# Patient Record
Sex: FEMALE | Race: WHITE | NOT HISPANIC OR LATINO | Employment: OTHER | URBAN - METROPOLITAN AREA
[De-identification: names, ages, dates, MRNs, and addresses within clinical notes are randomized per-mention and may not be internally consistent; named-entity substitution may affect disease eponyms.]

---

## 2017-01-05 ENCOUNTER — ANTI-COAG VISIT (OUTPATIENT)
Dept: CARDIOLOGY | Facility: CLINIC | Age: 37
End: 2017-01-05
Payer: COMMERCIAL

## 2017-01-05 DIAGNOSIS — I63.411 EMBOLIC STROKE INVOLVING RIGHT MIDDLE CEREBRAL ARTERY: ICD-10-CM

## 2017-01-05 DIAGNOSIS — I63.9 CEREBROVASCULAR ACCIDENT (CVA), UNSPECIFIED MECHANISM: ICD-10-CM

## 2017-01-05 DIAGNOSIS — G81.94 LEFT HEMIPARESIS: ICD-10-CM

## 2017-01-05 DIAGNOSIS — Z79.01 LONG-TERM (CURRENT) USE OF ANTICOAGULANTS: Primary | ICD-10-CM

## 2017-01-05 LAB
CTP QC/QA: NORMAL
INR PPP: 2.6 (ref 2–3)

## 2017-01-05 PROCEDURE — 85610 PROTHROMBIN TIME: CPT | Mod: QW,S$GLB,,

## 2017-01-05 NOTE — PROGRESS NOTES
Patient confirms dose and compliance. No changes/problems reported. Will maintain dose and recheck in 3 weeks.

## 2017-01-05 NOTE — MR AVS SNAPSHOT
[facility]  [address]  [place] [address]  Phone: [phone]                  Cat Denson   2017 9:15 AM   Anti-coag visit    Description:  Female : 1980   Provider:  Vidya Metzger PharmD   Department:  [facility]           Diagnoses this Visit        Comments    Long-term (current) use of anticoagulants    -  Primary     Cerebrovascular accident (CVA), unspecified mechanism         Left hemiparesis         Embolic stroke involving right middle cerebral artery                To Do List           Future Appointments        Provider Department Dept Phone    2017 9:15 AM [facility] [phone]    2017 9:20 AM Chris Mauro MD [facility] Nephrology [phone]    2017 9:00 AM [facility] [phone]    2017 9:45 AM [facility] [phone]    2017 8:30 AM Rafaela Benoit MD [place] - Rheumatology [phone]      Goals (5 Years of Data)     None      [facility]     [facility] - 24/7 Assistance  Registered nurses in the [facility] provide clinical advisement, health education, appointment booking, and other advisory services.  Call for this free service at [phone].             Medications           Message regarding Medications     Verify the changes and/or additions to your medication regime listed below are the same as discussed with your clinician today.  If any of these changes or additions are incorrect, please notify your healthcare provider.             Verify that the below list of medications is an accurate representation of the medications you are currently taking.  If none reported, the list may be blank. If incorrect, please contact your healthcare provider. Carry this list with you in case of emergency.           Current Medications     buPROPion (WELLBUTRIN  SR) 150 MG TBSR 12 hr tablet Take 1 tablet (150 mg total) by mouth 2 (two) times daily.    cetirizine (ZYRTEC) 10 MG tablet Take 1 tablet (10 mg total) by mouth once daily.    chlorhexidine (PERIDEX) 0.12 % solution RINSE WITH 1/2 OZ BID AFTER BRUSHING AND FLOSSING    clobetasol 0.05% (TEMOVATE) 0.05 % Oint Apply topically 2 (two) times daily.    clonazePAM (KLONOPIN) 0.5 MG tablet Take 1 tablet (0.5 mg total) by mouth 3 (three) times daily as needed for Anxiety.    cyanocobalamin, vitamin B-12, (VITAMIN B-12) 1,000 mcg Subl Place under the tongue once daily.    escitalopram oxalate (LEXAPRO) 10 MG tablet Take 1 tablet (10 mg total) by mouth once daily.    fluticasone (FLONASE) 50 mcg/actuation nasal spray 2 sprays by Each Nare route once daily.    gabapentin (NEURONTIN) 800 MG tablet Take 800 mg by mouth 3 (three) times daily.    hydrocodone-acetaminophen 5-325mg (NORCO) 5-325 mg per tablet Take 1 tablet by mouth every 6 (six) hours as needed for Pain.    hydroxychloroquine (PLAQUENIL) 200 mg tablet TAKE 1 TABLET BY MOUTH TWICE DAILY    meloxicam (MOBIC) 15 MG tablet TAKE 1 TABLET(15 MG) BY MOUTH DAILY AS NEEDED FOR PAIN    methylphenidate (RITALIN) 5 MG tablet Take one tablet PO twice daily with meals. May take additional one-half to one tablet daily by 4 pm as needed for attention/focus    metoprolol succinate (TOPROL-XL) 25 MG 24 hr tablet Take 1 tablet (25 mg total) by mouth once daily.    nicotine (NICODERM CQ) 14 mg/24 hr Place 1 patch onto the skin once daily.    omeprazole (PRILOSEC) 40 MG capsule TAKE 1 CAPSULE(40 MG) BY MOUTH EVERY MORNING    ondansetron (ZOFRAN-ODT) 4 MG TbDL Take 1 tablet (4 mg total) by mouth every 8 (eight) hours as needed.    PNV WITH CA#74/IRON/FOLIC ACID (PRENATAL VITAMIN 1+1 ORAL) Take by mouth once daily. No vitamin K    PREVIDENT 5000 BOOSTER PLUS 1.1 % Pste BRUSH ON NIGHTLY AFTER NORMAL HYGIENE REGIMAN. SPIT OUT EXCESS DO NOT RINSE    promethazine (PHENERGAN) 25 MG tablet Take  25 mg by mouth every 6 (six) hours as needed.     ranitidine (ZANTAC) 300 MG tablet TK 1 T PO  QAM    trazodone (DESYREL) 50 MG tablet Take 1 tablet (50 mg total) by mouth nightly as needed for Insomnia.    warfarin (COUMADIN) 1 MG tablet Take 1 mg by mouth Daily. On Tuesday and friday     warfarin (COUMADIN) 2 MG tablet TAKE 1 TABLET BY MOUTH EVERY DAY    WELCHOL 625 mg tablet TAKE 1 TABLET BY MOUTH EVERY DAY INCREASE BY ONE PILL EVERY 3 TO 4 DAYS UNTIL DESIRED CONTROL IS REACHED           Clinical Reference Information           Allergies as of 1/5/2017     Amoxil [Amoxicillin]    Augmentin [Amoxicillin-pot Clavulanate]    Avelox [Moxifloxacin]      Immunizations Administered on Date of Encounter - 1/5/2017     None      Orders Placed During Today's Visit      Normal Orders This Visit    ISTAT INR          1/5/2017  9:06 AM - Trisha StephensD      Component Results     Component Value Flag Ref Range Units Status    INR 2.6  2.0 - 3.0  Final     Acceptable           December 2016 Details    Sun Mon Tue Wed Thu Fri Sat         1               2               3                 4               5               6      1 mg         7      2 mg         8      1 mg         9      2 mg         10      1 mg           11      2 mg         12      2 mg         13      1 mg         14      2 mg         15      1 mg         16      2 mg         17      1 mg           18      2 mg         19      2 mg         20   3.5   Hold   See details      21      2 mg         22      1 mg         23      2 mg         24      1 mg           25      2 mg         26      2 mg         27      1 mg         28      2 mg         29      1 mg         30      2 mg         31      1 mg          Date Details   12/20 Last INR check   INR: 3.5                 January 2017 Details    Sun Mon Tue Wed Thu Fri Sat     1      2 mg         2      2 mg         3      1 mg         4      2 mg         5   2.6   1 mg   See details      6      2  mg         7      1 mg           8      2 mg         9      2 mg         10      1 mg         11      2 mg         12      1 mg         13      2 mg         14      1 mg           15      2 mg         16      2 mg         17      1 mg         18      2 mg         19      1 mg         20      2 mg         21      1 mg           22      2 mg         23      2 mg         24      1 mg         25      2 mg         26      1 mg         27            28                 29               30               31                    Date Details   01/05 This INR check   INR: 2.6       Date of next INR:  1/27/2017               How to take your warfarin dose     To take:  1 mg Take 0.5 of a 2 mg tablet.    To take:  2 mg Take 1 of the 2 mg tablets.           Anticoagulation Summary as of 1/5/2017     Maintenance plan 1 mg (2 mg x 0.5) on Tue, Thu, Sat; 2 mg (2 mg x 1) all other days    Full instructions 1 mg on Tue, Thu, Sat; 2 mg all other days    Next INR check 1/27/2017      Anticoagulation Episode Summary     Comments [facility]

## 2017-01-10 RX ORDER — MELOXICAM 15 MG/1
TABLET ORAL
Qty: 30 TABLET | Refills: 0 | Status: SHIPPED | OUTPATIENT
Start: 2017-01-10 | End: 2017-02-17 | Stop reason: SDUPTHER

## 2017-01-10 RX ORDER — PROMETHAZINE HYDROCHLORIDE 25 MG/1
TABLET ORAL
Qty: 60 TABLET | Refills: 0 | OUTPATIENT
Start: 2017-01-10

## 2017-01-12 RX ORDER — TAMSULOSIN HYDROCHLORIDE 0.4 MG/1
0.4 CAPSULE ORAL DAILY
Qty: 30 CAPSULE | Refills: 3 | Status: SHIPPED | OUTPATIENT
Start: 2017-01-12 | End: 2017-10-03 | Stop reason: SDUPTHER

## 2017-01-12 RX ORDER — GABAPENTIN 800 MG/1
800 TABLET ORAL 2 TIMES DAILY
Qty: 60 TABLET | Refills: 3 | Status: SHIPPED | OUTPATIENT
Start: 2017-01-12 | End: 2017-05-09 | Stop reason: SDUPTHER

## 2017-01-16 ENCOUNTER — OFFICE VISIT (OUTPATIENT)
Dept: PSYCHIATRY | Facility: CLINIC | Age: 37
End: 2017-01-16
Payer: COMMERCIAL

## 2017-01-16 VITALS
WEIGHT: 107.56 LBS | HEART RATE: 77 BPM | BODY MASS INDEX: 19.06 KG/M2 | SYSTOLIC BLOOD PRESSURE: 119 MMHG | HEIGHT: 63 IN | DIASTOLIC BLOOD PRESSURE: 78 MMHG

## 2017-01-16 DIAGNOSIS — F51.01 PRIMARY INSOMNIA: ICD-10-CM

## 2017-01-16 DIAGNOSIS — Z86.73 HISTORY OF CVA (CEREBROVASCULAR ACCIDENT): ICD-10-CM

## 2017-01-16 DIAGNOSIS — F31.81 BIPOLAR 2 DISORDER: Chronic | ICD-10-CM

## 2017-01-16 DIAGNOSIS — F09 COGNITIVE DISORDER: ICD-10-CM

## 2017-01-16 PROCEDURE — 1159F MED LIST DOCD IN RCRD: CPT | Mod: S$GLB,,, | Performed by: PSYCHIATRY & NEUROLOGY

## 2017-01-16 PROCEDURE — 99213 OFFICE O/P EST LOW 20 MIN: CPT | Mod: S$GLB,,, | Performed by: PSYCHIATRY & NEUROLOGY

## 2017-01-16 PROCEDURE — 99499 UNLISTED E&M SERVICE: CPT | Mod: S$GLB,,, | Performed by: PSYCHIATRY & NEUROLOGY

## 2017-01-16 PROCEDURE — 99999 PR PBB SHADOW E&M-EST. PATIENT-LVL III: CPT | Mod: PBBFAC,,, | Performed by: PSYCHIATRY & NEUROLOGY

## 2017-01-16 RX ORDER — RISPERIDONE 0.5 MG/1
0.5 TABLET ORAL NIGHTLY
COMMUNITY
End: 2017-01-16 | Stop reason: SDUPTHER

## 2017-01-16 RX ORDER — ESCITALOPRAM OXALATE 20 MG/1
20 TABLET ORAL DAILY
Qty: 30 TABLET | Refills: 2 | Status: SHIPPED | OUTPATIENT
Start: 2017-01-16 | End: 2017-02-15

## 2017-01-16 RX ORDER — TRAZODONE HYDROCHLORIDE 50 MG/1
TABLET ORAL
Qty: 30 TABLET | Refills: 1 | Status: SHIPPED | OUTPATIENT
Start: 2017-01-16 | End: 2017-03-29 | Stop reason: SDUPTHER

## 2017-01-16 RX ORDER — RISPERIDONE 0.5 MG/1
0.5 TABLET ORAL NIGHTLY
Qty: 30 TABLET | Refills: 2 | Status: SHIPPED | OUTPATIENT
Start: 2017-01-16 | End: 2017-03-29

## 2017-01-16 RX ORDER — CLONAZEPAM 0.5 MG/1
TABLET ORAL
Qty: 45 TABLET | Refills: 1 | Status: SHIPPED | OUTPATIENT
Start: 2017-01-16 | End: 2017-03-29 | Stop reason: SDUPTHER

## 2017-01-16 RX ORDER — METHYLPHENIDATE HYDROCHLORIDE 5 MG/1
TABLET ORAL
Qty: 75 TABLET | Refills: 0 | Status: SHIPPED | OUTPATIENT
Start: 2017-02-15 | End: 2017-03-29

## 2017-01-16 RX ORDER — BUPROPION HYDROCHLORIDE 150 MG/1
TABLET, EXTENDED RELEASE ORAL
Qty: 1 TABLET | Refills: 0
Start: 2017-01-16 | End: 2017-02-22

## 2017-01-16 RX ORDER — METHYLPHENIDATE HYDROCHLORIDE 5 MG/1
TABLET ORAL
Qty: 75 TABLET | Refills: 0 | Status: SHIPPED | OUTPATIENT
Start: 2017-01-16 | End: 2017-02-15

## 2017-01-16 NOTE — PROGRESS NOTES
"Outpatient Psychiatry Follow-Up Visit (MD/NP)    1/16/2017    Clinical Status of Patient:  Outpatient (Ambulatory)    Chief Complaint:  Cat Denson is a 36 y.o. female who presents today for follow-up of mood disorder and anxiety.  Met with patient alone and pt's mother briefly after her appointment.     Interval History and Content of Current Session:  Interim Events/Subjective Report/Content of Current Session:  follow up appt.   Pt had a CVA in November 2015 - Embolic stroke involving right middle cerebral artery; she is hypercoagulable due to lupus.  She is currently taking Coumadin.  Pt has residual left hemiparesis.   She has had balance problems, forgetfulness, and severe focusing issue.    She is under the care of Rheumatology - Dr Benoit    Pt reports she is doing really good.   Not depressed at all;  She and BF Brent are getting along well; he is supportive to her; he assists her with ADLs including taking showers.  He moved into her mom's home with her and mom, her brother and his 2 children (3 yo and 3 yo).  This is the first time she has been in a heterosexual relationship; she is having some issues with sexual attraction to him; he does not pressure her.   She wants to remain in the relationship.  She has hx of low libido (discussed it be antidepressant related).   Denies hopelessness/worthlessness.   She enjoys social media; pt does not drive.  She feels mood symptoms are manageable.     Denies symptoms of bowen/psychosis.     Sleep is pretty good; "as long as I don't wet the bed."  This started after her CVA - she was incontinent day and night after her CVA and it has improved     Memory is ok; forgetful at times.  Nowhere near her previous baseline.  She handles her own meds without errors; her mother assists her with her finances.  When she leaves one room, she forgets why she is there.  Loses train of thought.   She does not wander or get lost.   She still has left sided weakness; she " "falls frequently - "it is a normal thing for me."  This is since her CVA.   Not currently in PT.  She denies any head injuries (discussed risks of anticoagulant/ brain hemorrhage).  Ritalin allows her to think more clearly and hold her focus on something for longer than 5 mins.     Denies suicidal/homicidal ideations.  No self harm or violence.  No access to guns.  Prior hx of suicide attempts - when she was 20 yo after her grandmother  - she walked to her GF house, she sat on top of her and made her watch her swallow a bottle of pills (Zoloft, Paxil, muscle relaxant).  GF induced pt to vomit.  Prior hx of cutting.  "I can't now b/c of blood thinners."  She does think about doing it - she instead distracts herself on her computer.  Her paternal GF committed suicide (she reports b/c her dad  a black woman - she shot himself in the head).   She reports she had some SI a few weeks ago - passive SI - she told mom she may need inpatient treatment.  When it was raining x 3 days, and she felt trapped at home.       Anxiety - manageable; around [place], she felt stressed, she has mild panic attack at [facility].  No agoraphobia.  No excessive worry.   No PTSD symptoms.     Appetite - non existent;  She has lost 8 lbs in interim.     She denies binge eating or purging; body image is "good"  No excessive exercise.  No diuretics or laxatives.       - scheduled for neuropsychological testing   Left ankle fracture now healed.     Alcohol: none   Drug use: marijuana use daily - the only way she can get an appetite 4-5 times daily;  She first started at 20 yo.  She denies use today. No clonazepam use yet today; denies misuse.   No other drugs, No IVDA.   She does not identify marijuana as a problem  Tobacco - 1/2 ppd   She quit caffeine completely     Pt receives SSID since  (health and psychiatric conditions)    Mother helps her bills and medications; pt does not drive (not since CVA).      On AIMS today, " "pt noted to have some minimal mouth movements - activated with using both hands and right hand alone too - pt has no awareness and denies any impairment.  When she is trying to do something with her left hand, she has some awareness of this - this started after her CVA    Review of Systems   · PSYCHIATRIC: Pertinant items are noted in the narrative.  · CONSTITUTIONAL: + weight loss   · MUSCULOSKELETAL: none   · CV: no chest pain, no awareness of tachycardia  · NEURO: left HP, no seizures     Past Medical, Family and Social History: The patient's past medical, family and social history have been reviewed and updated as appropriate within the electronic medical record - see encounter notes.    Medications:   Lexapro 10 mg daily   Clonazepam 0.5 mg PO TID prn anxiety - she takes Clonazepam 0.5- 1 mg nightly - PCP prescribed   Risperdal 0.5 mg nightly   Trazodone 50 mg nightly   Ritalin 5 mg TID - she takes three times DAILY   Wellbutrin  mg TWICE DAILY - Dr Goins     Compliance:  Yes     Side effects: ? Involuntary movements, urinary incontinence on higher dose of trazodone, wt loss on Wellbutrin ?     Risk Parameters:  Patient reports no current suicidal ideation  Patient reports no homicidal ideation  Patient reports no self-injurious behavior  Patient reports no violent behavior    Exam (detailed: at least 9 elements; comprehensive: all 15 elements)   Constitutional  Vitals:  Most recent vital signs, dated more than 90 days prior to this appointment, were reviewed.   Vitals:    01/16/17 0931   BP: 119/78   Pulse: 77   Weight: 48.8 kg (107 lb 9.4 oz)   Height: 5' 3" (1.6 m)        General:  age appropriate, thinner, fair grooming, good eye contact, hair dyed pink/purle grown out, wearing black clothes      Musculoskeletal  Muscle Strength/Tone:  no tremor   Gait & Station:  slow, mostly steady today      Psychiatric  Speech:  no latency; no press, improved spontaneity today, talkative   Mood & Affect:  "good" "   Restricted, smiles at times    Thought Process:  Linear with questions, slowed    Associations:  intact   Thought Content:  no current suicidality, no homicidality, delusions, or paranoia   Insight:  Fair    Judgement: behavior is adequate to circumstances   Orientation:  Alert and oriented to month, 2017, Monday  [facility], SIMRAN Tariq   Memory: fair recall of recent hx, 3/3 recall, 2/3 at 3 mins    Language: grossly intact, can repeat    Attention Span & Concentration:  BALDO Medina, 20-17-14-7    Fund of Knowledge:  intact and appropriate to age and level of education, familiar with aspects of current personal life     Assessment and Diagnosis   Status/Progress: Based on the examination today, the patient's problem(s) is/are under fair control..  New problems have not been presented today.   Co-morbidities are complicating management of the primary condition.      General Impression: pt had CVA Nov 2015 with significantly impaired focus, memory lapses, worsening depression, anxiety - improved with ritalin and on current medications (previously ok'd ritalin use with PCP).  Pt had episode of acute hepatitis, liver enzymes improved, but now with declining renal function; pt also resumed smoking; she is also using marijuana. She stopped caffeine products;  Pt reports mood and anxiety have improved considerably, she is upbeat and positive today.  Involuntary mouth movements noted on AIMS    Bipolar 2 Disorder   Panic Disorder with Agoraphobia   Cognitive Disorder due to CVA  Insomnia Disorder  Cannabis Use Disorder   Tobacco Use Disorder  Abnormal involuntary movements     Lupus, migraines, CVA, hx hepatitis, declining renal function     GAF: 7    Intervention/Counseling/Treatment Plan   · Medication Management: The risks and benefits of medication were discussed with the patient.    -  Reduce Clonazepam 0.5 mg to BID PRN anxiety (#45), advised to us lowest effective dose. PCP prescribing the medication.   Discussed risk of decreased RT, fall risk, sedation, addictive potential, and not to mix with alcohol.    - Continue Risperdal 0.5 mg nightly for now. Discussed risks of TD and will repeat AIMS at next visit; may need to d/c. Typical RAJWINDER's reviewed including weight gain, abnormal movements, EPS, TD, metabolic side effects.     - Titrate Lexapro to 20 mg daily.     - Continue Ritalin to 5 mg TWICE DAILY to TID prn poor focus. Monitor BP/HR.   Discussed risks of abuse potential, insomnia, anxiety, elevated BP, HR, arrthymias, MI, stroke, seizures. sudden death. Discussed significant risks of mixing this with stimulant, seizures, stroke, cardiac adverse events. Pt verbalized an understanding.  She has stopped caffeine.    - Continue Trazodone 25-50 mg nightly for now     - Continue psychotherapy with Monique Dleeon LCSW    - refrain from cannabis use - psychoeducation today about risks of use, pt counseled today on health risks associated with use.     - Pt instructed to go to ER with thoughts of harming self, others; Call to report any worsening of symptoms or problems with the medication    - referral to Neurology - pt reports she is not being followed by Neurology anymore since her CVA. Will need evaluation of abnormal mouth movements     - referral for neuropsychological testing s/p CVA - pt with ongoing cognitive deficits 2/21/17    - Wean off of wellbutrin  mg daily x 10 days, then stop - pt losing wt since starting this medication       Return to Clinic: 2 months

## 2017-01-16 NOTE — MR AVS SNAPSHOT
[facility]  [address]  [place] [address]  Phone: [phone]                  Cat Denson   2017 9:30 AM   Office Visit    Description:  Female : 1980   Provider:  Sakina Cruz MD   Department:  [facility]           Diagnoses this Visit        Comments    Bipolar 2 disorder         Primary insomnia         History of CVA (cerebrovascular accident)         Cognitive disorder                To Do List           Future Appointments        Provider Department Dept Phone    2017 9:00 AM [facility] [phone]    2017 10:00 AM [facility]  Psychiatry [phone]    2017 9:45 AM [facility] [phone]    2017 8:30 AM MD Yamel [facility]ell - Rheumatology [phone]    3/2/2017 11:00 AM [facility]  Psychiatry [phone]      Goals (5 Years of Data)     None      Follow-Up and Disposition     Return for 2 months .       These Medications        Disp Refills Start End    escitalopram oxalate (LEXAPRO) 20 MG tablet 30 tablet 2 2017 2/15/2017    Take 1 tablet (20 mg total) by mouth once daily. - Oral    Pharmacy: [facility] [address] [place], [place] [address] - [address] AT [facility] [address] Ph #: [phone]       buPROPion (WELLBUTRIN SR) 150 MG TBSR 12 hr tablet 1 tablet 0 2017     Take one tablet PO daily x 10 days, then STOP    Pharmacy: [facility] [address] [place] [address] [place] - [address] AT [facility] & [facility] [address] Ph #: [phone]       clonazePAM (KLONOPIN) 0.5 MG tablet 45 tablet 1 2017     Take one tablet PO BID PRN anxiety    Pharmacy: [facility] [address] [place] [address] [place] - [address] AT [facility] [address] Ph #: [phone]       methylphenidate (RITALIN) 5 MG tablet 75 tablet 0 2017 2/15/2017    Take one tablet PO twice daily with  meals. May take additional one-half to one tablet daily by 4 pm as needed for attention/focus    Pharmacy: [facility] [address] [place] [address] [address] AT [place] [address] Ph #: [phone]       risperidone (RISPERDAL) 0.5 MG Tab 30 tablet 2 1/16/2017     Take 1 tablet (0.5 mg total) by mouth every evening. - Oral    Pharmacy: [facility] [address] [place] [address] [address] AT [place] [address] Ph #: [phone]       methylphenidate (RITALIN) 5 MG tablet 75 tablet 0 2/15/2017 3/17/2017    Take one tablet PO twice daily with meals. May take additional one-half to one tablet daily by 4 pm as needed for attention/focus    Pharmacy: [facility] [address] [place] [address] [address] AT [place] [address] Ph #: [phone]       trazodone (DESYREL) 50 MG tablet 30 tablet 1 1/16/2017     Take one-half to one tablet PO nightly PRN insomnia    Pharmacy: [facility] [place] [address] AT [place] [address] Ph #: [phone]         [facility] On [facility]     [facility] On Call Nurse [facility] - 24/7 Assistance  Registered nurses in the [facility] Center provide clinical advisement, health education, appointment booking, and other advisory services.  Call for this free service at [phone].             Medications           Message regarding Medications     Verify the changes and/or additions to your medication regime listed below are the same as discussed with your clinician today.  If any of these changes or additions are incorrect, please notify your healthcare provider.        START taking these NEW medications        Refills    escitalopram oxalate (LEXAPRO) 20 MG tablet 2    Sig: Take 1 tablet (20 mg total) by mouth once daily.    Class: Normal    Route: Oral    risperidone (RISPERDAL) 0.5 MG Tab 2    Sig: Take 1 tablet (0.5 mg total) by mouth every evening.    Class: Normal    Route: Oral    methylphenidate  (RITALIN) 5 MG tablet 0    Starting on: 2/15/2017    Sig: Take one tablet PO twice daily with meals. May take additional one-half to one tablet daily by 4 pm as needed for attention/focus    Class: Print      CHANGE how you are taking these medications     Start Taking Instead of    buPROPion (WELLBUTRIN SR) 150 MG TBSR 12 hr tablet buPROPion (WELLBUTRIN SR) 150 MG TBSR 12 hr tablet    Dosage:  Take one tablet PO daily x 10 days, then STOP Dosage:  Take 1 tablet (150 mg total) by mouth 2 (two) times daily.    Reason for Change:  Reorder     clonazePAM (KLONOPIN) 0.5 MG tablet clonazePAM (KLONOPIN) 0.5 MG tablet    Dosage:  Take one tablet PO BID PRN anxiety Dosage:  Take 1 tablet (0.5 mg total) by mouth 3 (three) times daily as needed for Anxiety.    Reason for Change:  Reorder     trazodone (DESYREL) 50 MG tablet trazodone (DESYREL) 50 MG tablet    Dosage:  Take one-half to one tablet PO nightly PRN insomnia Dosage:  Take 1 tablet (50 mg total) by mouth nightly as needed for Insomnia.    Reason for Change:  Reorder       STOP taking these medications     hydrocodone-acetaminophen 5-325mg (NORCO) 5-325 mg per tablet Take 1 tablet by mouth every 6 (six) hours as needed for Pain.           Verify that the below list of medications is an accurate representation of the medications you are currently taking.  If none reported, the list may be blank. If incorrect, please contact your healthcare provider. Carry this list with you in case of emergency.           Current Medications     buPROPion (WELLBUTRIN SR) 150 MG TBSR 12 hr tablet Take one tablet PO daily x 10 days, then STOP    cetirizine (ZYRTEC) 10 MG tablet Take 1 tablet (10 mg total) by mouth once daily.    chlorhexidine (PERIDEX) 0.12 % solution RINSE WITH 1/2 OZ BID AFTER BRUSHING AND FLOSSING    clonazePAM (KLONOPIN) 0.5 MG tablet Take one tablet PO BID PRN anxiety    cyanocobalamin, vitamin B-12, (VITAMIN B-12) 1,000 mcg Subl Place under the tongue once daily.     fluticasone (FLONASE) 50 mcg/actuation nasal spray 2 sprays by Each Nare route once daily.    gabapentin (NEURONTIN) 800 MG tablet Take 1 tablet (800 mg total) by mouth 2 (two) times daily.    hydroxychloroquine (PLAQUENIL) 200 mg tablet TAKE 1 TABLET BY MOUTH TWICE DAILY    meloxicam (MOBIC) 15 MG tablet TAKE 1 TABLET(15 MG) BY MOUTH DAILY AS NEEDED FOR PAIN    metoprolol succinate (TOPROL-XL) 25 MG 24 hr tablet Take 1 tablet (25 mg total) by mouth once daily.    omeprazole (PRILOSEC) 40 MG capsule TAKE 1 CAPSULE(40 MG) BY MOUTH EVERY MORNING    ondansetron (ZOFRAN-ODT) 4 MG TbDL Take 1 tablet (4 mg total) by mouth every 8 (eight) hours as needed.    PNV WITH CA#74/IRON/FOLIC ACID (PRENATAL VITAMIN 1+1 ORAL) Take by mouth once daily. No vitamin K    PREVIDENT 5000 BOOSTER PLUS 1.1 % Pste BRUSH ON NIGHTLY AFTER NORMAL HYGIENE REGIMAN. SPIT OUT EXCESS DO NOT RINSE    promethazine (PHENERGAN) 25 MG tablet Take 25 mg by mouth every 6 (six) hours as needed.     ranitidine (ZANTAC) 300 MG tablet Take 1 tablet (300 mg total) by mouth once daily.    risperidone (RISPERDAL) 0.5 MG Tab Take 1 tablet (0.5 mg total) by mouth every evening.    tamsulosin (FLOMAX) 0.4 mg Cp24 Take 1 capsule (0.4 mg total) by mouth once daily.    trazodone (DESYREL) 50 MG tablet Take one-half to one tablet PO nightly PRN insomnia    warfarin (COUMADIN) 1 MG tablet Take 1 mg by mouth Daily. On Tuesday and friday     warfarin (COUMADIN) 2 MG tablet TAKE 1 TABLET BY MOUTH EVERY DAY    WELCHOL 625 mg tablet TAKE 1 TABLET BY MOUTH EVERY DAY INCREASE BY ONE PILL EVERY 3 TO 4 DAYS UNTIL DESIRED CONTROL IS REACHED    clobetasol 0.05% (TEMOVATE) 0.05 % Oint Apply topically 2 (two) times daily.    escitalopram oxalate (LEXAPRO) 20 MG tablet Take 1 tablet (20 mg total) by mouth once daily.    methylphenidate (RITALIN) 5 MG tablet Take one tablet PO twice daily with meals. May take additional one-half to one tablet daily by 4 pm as needed for  "attention/focus    methylphenidate (RITALIN) 5 MG tablet Starting on Feb 15, 2017. Take one tablet PO twice daily with meals. May take additional one-half to one tablet daily by 4 pm as needed for attention/focus    nicotine (NICODERM CQ) 14 mg/24 hr Place 1 patch onto the skin once daily.           Clinical Reference Information           Vital Signs - Last Recorded  Most recent update: 1/16/2017  9:36 AM by Lydia Mccracken MA    BP Pulse Ht Wt BMI    119/78 77 5' 3" (1.6 m) 48.8 kg (107 lb 9.4 oz) 19.06 kg/m2      Blood Pressure          Most Recent Value    BP  119/78      Allergies as of 1/16/2017     Amoxil [Amoxicillin]    Augmentin [Amoxicillin-pot Clavulanate]    Avelox [Moxifloxacin]      Immunizations Administered on Date of Encounter - 1/16/2017     None      Orders Placed During Today's Visit      Normal Orders This Visit    Ambulatory Referral to Neurology       "

## 2017-01-19 ENCOUNTER — TELEPHONE (OUTPATIENT)
Dept: NEUROLOGY | Facility: CLINIC | Age: 37
End: 2017-01-19

## 2017-01-20 ENCOUNTER — DOCUMENTATION ONLY (OUTPATIENT)
Dept: REHABILITATION | Facility: HOSPITAL | Age: 37
End: 2017-01-20

## 2017-01-20 NOTE — PROGRESS NOTES
REHAB SERVICES OUTPATIENT DISCHARGE SUMMARY  Physical Therapy      Name:  Cat Denson  Date:  1/20/17  Date of Evaluation:  10/20/16  Physician:  Dayton Dukes MD  Total # Of Visits:  1  Cancelled:  1    Diagnosis:  L hemiparesis    Physical/Functional Status: see EMR for PT recommendations/POC  The patient is to be discharged from our Therapy service for the following reason(s):  Patient has not attended therapy since 10/24/16    Degree of Goal Achievement:  Patient has not met goals secondary to:  Lack of attendance    Patient Education:  provided    Discharge Plan:  Other:  Ongoing PT would be beneficial.

## 2017-01-26 ENCOUNTER — LAB VISIT (OUTPATIENT)
Dept: LAB | Facility: HOSPITAL | Age: 37
End: 2017-01-26
Attending: INTERNAL MEDICINE
Payer: COMMERCIAL

## 2017-01-26 DIAGNOSIS — M32.9 LUPUS (SYSTEMIC LUPUS ERYTHEMATOSUS): ICD-10-CM

## 2017-01-26 LAB
ALBUMIN SERPL BCP-MCNC: 4.3 G/DL
ALP SERPL-CCNC: 83 U/L
ALT SERPL W/O P-5'-P-CCNC: 13 U/L
ANION GAP SERPL CALC-SCNC: 13 MMOL/L
AST SERPL-CCNC: 19 U/L
BASOPHILS # BLD AUTO: 0 K/UL
BASOPHILS NFR BLD: 1 %
BILIRUB SERPL-MCNC: 0.4 MG/DL
BUN SERPL-MCNC: 12 MG/DL
C3 SERPL-MCNC: 91 MG/DL
C4 SERPL-MCNC: 29 MG/DL
CALCIUM SERPL-MCNC: 9.8 MG/DL
CHLORIDE SERPL-SCNC: 108 MMOL/L
CO2 SERPL-SCNC: 19 MMOL/L
CREAT SERPL-MCNC: 2.1 MG/DL
CRP SERPL-MCNC: 0.4 MG/L
DIFFERENTIAL METHOD: ABNORMAL
EOSINOPHIL # BLD AUTO: 0.1 K/UL
EOSINOPHIL NFR BLD: 2.6 %
ERYTHROCYTE [DISTWIDTH] IN BLOOD BY AUTOMATED COUNT: 13.3 %
ERYTHROCYTE [SEDIMENTATION RATE] IN BLOOD BY WESTERGREN METHOD: 9 MM/HR
EST. GFR  (AFRICAN AMERICAN): 34 ML/MIN/1.73 M^2
EST. GFR  (NON AFRICAN AMERICAN): 30 ML/MIN/1.73 M^2
GLUCOSE SERPL-MCNC: 109 MG/DL
HCT VFR BLD AUTO: 40 %
HGB BLD-MCNC: 12.9 G/DL
LYMPHOCYTES # BLD AUTO: 1.7 K/UL
LYMPHOCYTES NFR BLD: 41.9 %
MCH RBC QN AUTO: 28.9 PG
MCHC RBC AUTO-ENTMCNC: 32.1 %
MCV RBC AUTO: 90 FL
MONOCYTES # BLD AUTO: 0.2 K/UL
MONOCYTES NFR BLD: 4.5 %
NEUTROPHILS # BLD AUTO: 2.1 K/UL
NEUTROPHILS NFR BLD: 50 %
PLATELET # BLD AUTO: 157 K/UL
PMV BLD AUTO: 10.6 FL
POTASSIUM SERPL-SCNC: 4 MMOL/L
PROT SERPL-MCNC: 7.4 G/DL
RBC # BLD AUTO: 4.44 M/UL
SODIUM SERPL-SCNC: 140 MMOL/L
WBC # BLD AUTO: 4.1 K/UL

## 2017-01-26 PROCEDURE — 86160 COMPLEMENT ANTIGEN: CPT

## 2017-01-26 PROCEDURE — 85651 RBC SED RATE NONAUTOMATED: CPT

## 2017-01-26 PROCEDURE — 86160 COMPLEMENT ANTIGEN: CPT | Mod: 59

## 2017-01-26 PROCEDURE — 86140 C-REACTIVE PROTEIN: CPT

## 2017-01-26 PROCEDURE — 36415 COLL VENOUS BLD VENIPUNCTURE: CPT

## 2017-01-26 PROCEDURE — 85025 COMPLETE CBC W/AUTO DIFF WBC: CPT

## 2017-01-26 PROCEDURE — 80053 COMPREHEN METABOLIC PANEL: CPT

## 2017-01-26 PROCEDURE — 86225 DNA ANTIBODY NATIVE: CPT

## 2017-01-27 ENCOUNTER — ANTI-COAG VISIT (OUTPATIENT)
Dept: CARDIOLOGY | Facility: CLINIC | Age: 37
End: 2017-01-27
Payer: COMMERCIAL

## 2017-01-27 DIAGNOSIS — G81.94 LEFT HEMIPARESIS: ICD-10-CM

## 2017-01-27 DIAGNOSIS — Z79.01 LONG-TERM (CURRENT) USE OF ANTICOAGULANTS: Primary | ICD-10-CM

## 2017-01-27 DIAGNOSIS — I63.411 EMBOLIC STROKE INVOLVING RIGHT MIDDLE CEREBRAL ARTERY: ICD-10-CM

## 2017-01-27 LAB
CTP QC/QA: YES
DSDNA AB SER-ACNC: NORMAL [IU]/ML
INR PPP: 1.3 (ref 2–3)

## 2017-01-27 PROCEDURE — 85610 PROTHROMBIN TIME: CPT | Mod: QW,S$GLB,,

## 2017-01-27 PROCEDURE — 99211 OFF/OP EST MAY X REQ PHY/QHP: CPT | Mod: 25,S$GLB,,

## 2017-01-27 NOTE — PROGRESS NOTES
Patient confirms dose, reports that she had about 1/2 cup of broccoli this past week. Nothing to explain this extremely low INR. Will boost today and increase but watch close until in range and stable.

## 2017-02-03 ENCOUNTER — ANTI-COAG VISIT (OUTPATIENT)
Dept: CARDIOLOGY | Facility: CLINIC | Age: 37
End: 2017-02-03
Payer: COMMERCIAL

## 2017-02-03 DIAGNOSIS — Z79.01 LONG-TERM (CURRENT) USE OF ANTICOAGULANTS: Primary | ICD-10-CM

## 2017-02-03 DIAGNOSIS — I63.411 EMBOLIC STROKE INVOLVING RIGHT MIDDLE CEREBRAL ARTERY: ICD-10-CM

## 2017-02-03 DIAGNOSIS — G81.94 LEFT HEMIPARESIS: ICD-10-CM

## 2017-02-03 LAB — INR PPP: 1.9 (ref 2–3)

## 2017-02-03 PROCEDURE — 85610 PROTHROMBIN TIME: CPT | Mod: QW,S$GLB,,

## 2017-02-03 PROCEDURE — 99211 OFF/OP EST MAY X REQ PHY/QHP: CPT | Mod: 25,S$GLB,,

## 2017-02-03 NOTE — PROGRESS NOTES
Confirms dose and compliance. Had tofu this week broccoli the week prior. Patient will be consistent. Will increase dose slightly and recheck in 2 weeks.

## 2017-02-03 NOTE — MR AVS SNAPSHOT
[facility]  [address]  [place] [address]  Phone: [phone]                  Cat Denson   2/3/2017 9:30 AM   Anti-coag visit    Description:  Female : 1980   Provider:  Vidya Metzger PharmD   Department:  [facility]           Diagnoses this Visit        Comments    Long-term (current) use of anticoagulants    -  Primary     Left hemiparesis         Embolic stroke involving right middle cerebral artery                To Do List           Future Appointments        Provider Department Dept Phone    2/3/2017 9:30 AM [facility] [phone]    2017 1:15 PM [facility] [phone]    2017 10:00 AM [facility] [phone]      Goals (5 Years of Data)     None      [facility] On Call     [facility] On Call Nurse Care Line -  Assistance  Registered nurses in the [facility] On Call Center provide clinical advisement, health education, appointment booking, and other advisory services.  Call for this free service at [phone].             Medications           Message regarding Medications     Verify the changes and/or additions to your medication regime listed below are the same as discussed with your clinician today.  If any of these changes or additions are incorrect, please notify your healthcare provider.             Verify that the below list of medications is an accurate representation of the medications you are currently taking.  If none reported, the list may be blank. If incorrect, please contact your healthcare provider. Carry this list with you in case of emergency.           Current Medications     buPROPion (WELLBUTRIN SR) 150 MG TBSR 12 hr tablet Take one tablet PO daily x 10 days, then STOP    cetirizine (ZYRTEC) 10 MG tablet Take 1 tablet (10 mg total) by mouth once daily.    chlorhexidine (PERIDEX) 0.12 % solution RINSE WITH 1/2  OZ BID AFTER BRUSHING AND FLOSSING    clobetasol 0.05% (TEMOVATE) 0.05 % Oint Apply topically 2 (two) times daily.    clonazePAM (KLONOPIN) 0.5 MG tablet Take one tablet PO BID PRN anxiety    cyanocobalamin, vitamin B-12, (VITAMIN B-12) 1,000 mcg Subl Place under the tongue once daily.    escitalopram oxalate (LEXAPRO) 20 MG tablet Take 1 tablet (20 mg total) by mouth once daily.    fluticasone (FLONASE) 50 mcg/actuation nasal spray 2 sprays by Each Nare route once daily.    gabapentin (NEURONTIN) 800 MG tablet Take 1 tablet (800 mg total) by mouth 2 (two) times daily.    hydroxychloroquine (PLAQUENIL) 200 mg tablet TAKE 1 TABLET BY MOUTH TWICE DAILY    meloxicam (MOBIC) 15 MG tablet TAKE 1 TABLET(15 MG) BY MOUTH DAILY AS NEEDED FOR PAIN    methylphenidate (RITALIN) 5 MG tablet Take one tablet PO twice daily with meals. May take additional one-half to one tablet daily by 4 pm as needed for attention/focus    methylphenidate (RITALIN) 5 MG tablet Starting on Feb 15, 2017. Take one tablet PO twice daily with meals. May take additional one-half to one tablet daily by 4 pm as needed for attention/focus    metoprolol succinate (TOPROL-XL) 25 MG 24 hr tablet Take 1 tablet (25 mg total) by mouth once daily.    nicotine (NICODERM CQ) 14 mg/24 hr Place 1 patch onto the skin once daily.    omeprazole (PRILOSEC) 40 MG capsule TAKE 1 CAPSULE(40 MG) BY MOUTH EVERY MORNING    ondansetron (ZOFRAN-ODT) 4 MG TbDL Take 1 tablet (4 mg total) by mouth every 8 (eight) hours as needed.    PNV WITH CA#74/IRON/FOLIC ACID (PRENATAL VITAMIN 1+1 ORAL) Take by mouth once daily. No vitamin K    PREVIDENT 5000 BOOSTER PLUS 1.1 % Pste BRUSH ON NIGHTLY AFTER NORMAL HYGIENE REGIMAN. SPIT OUT EXCESS DO NOT RINSE    promethazine (PHENERGAN) 25 MG tablet Take 25 mg by mouth every 6 (six) hours as needed.     ranitidine (ZANTAC) 300 MG tablet Take 1 tablet (300 mg total) by mouth once daily.    risperidone (RISPERDAL) 0.5 MG Tab Take 1 tablet (0.5 mg  total) by mouth every evening.    tamsulosin (FLOMAX) 0.4 mg Cp24 Take 1 capsule (0.4 mg total) by mouth once daily.    trazodone (DESYREL) 50 MG tablet Take one-half to one tablet PO nightly PRN insomnia    warfarin (COUMADIN) 1 MG tablet Take 1 mg by mouth Daily. On Tuesday and friday     warfarin (COUMADIN) 2 MG tablet TAKE 1 TABLET BY MOUTH EVERY DAY    WELCHOL 625 mg tablet TAKE 1 TABLET BY MOUTH EVERY DAY INCREASE BY ONE PILL EVERY 3 TO 4 DAYS UNTIL DESIRED CONTROL IS REACHED           Clinical Reference Information           Allergies as of 2/3/2017     Amoxil [Amoxicillin]    Augmentin [Amoxicillin-pot Clavulanate]    Avelox [Moxifloxacin]      Immunizations Administered on Date of Encounter - 2/3/2017     None      Orders Placed During Today's Visit      Normal Orders This Visit    POCT INR          2/3/2017  8:53 AM - Trisha StephensD      Component Results     Component Value Flag Ref Range Units Status    INR 1.9 (A) 2.0 - 3.0  Final      January 2017 Details    Sun Mon Tue Wed Thu Fri Sat     1               2               3               4      2 mg         5   2.6   1 mg   See details      6      2 mg         7      1 mg           8      2 mg         9      2 mg         10      1 mg         11      2 mg         12      1 mg         13      2 mg         14      1 mg           15      2 mg         16      2 mg         17      1 mg         18      2 mg         19      1 mg         20      2 mg         21      1 mg           22      2 mg         23      2 mg         24      1 mg         25      2 mg         26      1 mg         27   1.3   4 mg   See details      28      2 mg           29      2 mg         30      2 mg         31      1 mg              Date Details   01/05 INR: 2.6      01/27 Last INR check   INR: 1.3                 February 2017 Details    Sun Mon Tue Wed Thu Fri Sat        1      2 mg         2      1 mg         3   1.9   2 mg   See details      4      2 mg           5      2 mg          6      2 mg         7      2 mg         8      2 mg         9      1 mg         10      2 mg         11      2 mg           12      2 mg         13      2 mg         14      2 mg         15      2 mg         16            17               18                 19               20               21               22               23               24               25                 26               27               28                    Date Details   02/03 This INR check   INR: 1.9       Date of next INR:  2/16/2017               How to take your warfarin dose     To take:  1 mg Take 0.5 of a 2 mg tablet.    To take:  2 mg Take 1 of the 2 mg tablets.           Anticoagulation Summary as of 2/3/2017     Maintenance plan 1 mg (2 mg x 0.5) on Thu; 2 mg (2 mg x 1) all other days    Full instructions 1 mg on Thu; 2 mg all other days    Next INR check 2/16/2017      Anticoagulation Episode Summary     Comments [facility]      Language Assistance Services     ATTENTION: Language assistance services are available, free of charge. Please call [phone].      ATENCIÓN: Si habla myron, tiene a couch disposición servicios gratuitos de asistencia lingüística. Llame al [phone].     IDRIS Ý: N?u b?n nói Ti?ng Vi?t, có các d?ch v? h? tr? ngôn ng? mi?n phí dành cho b?n. G?i s? [phone].         [facility] complies with applicable Federal civil rights laws and does not discriminate on the basis of race, color, national origin, age, disability, or sex.

## 2017-02-14 ENCOUNTER — OFFICE VISIT (OUTPATIENT)
Dept: FAMILY MEDICINE | Facility: CLINIC | Age: 37
End: 2017-02-14
Payer: COMMERCIAL

## 2017-02-14 VITALS
BODY MASS INDEX: 18.32 KG/M2 | TEMPERATURE: 98 F | HEART RATE: 96 BPM | SYSTOLIC BLOOD PRESSURE: 113 MMHG | HEIGHT: 63 IN | DIASTOLIC BLOOD PRESSURE: 86 MMHG | WEIGHT: 103.38 LBS

## 2017-02-14 DIAGNOSIS — N39.0 URINARY TRACT INFECTION WITH HEMATURIA, SITE UNSPECIFIED: ICD-10-CM

## 2017-02-14 DIAGNOSIS — R11.2 NAUSEA AND VOMITING, INTRACTABILITY OF VOMITING NOT SPECIFIED, UNSPECIFIED VOMITING TYPE: Primary | ICD-10-CM

## 2017-02-14 DIAGNOSIS — R31.9 URINARY TRACT INFECTION WITH HEMATURIA, SITE UNSPECIFIED: ICD-10-CM

## 2017-02-14 LAB
BILIRUB SERPL-MCNC: ABNORMAL MG/DL
BLOOD, POC UA: 250
GLUCOSE UR QL STRIP: NORMAL
KETONES UR QL STRIP: ABNORMAL
LEUKOCYTE ESTERASE URINE, POC: ABNORMAL
NITRITE, POC UA: ABNORMAL
PH, POC UA: 5
PROTEIN, POC: ABNORMAL
SPECIFIC GRAVITY, POC UA: 1.01
UROBILINOGEN, POC UA: NORMAL

## 2017-02-14 PROCEDURE — 87186 SC STD MICRODIL/AGAR DIL: CPT

## 2017-02-14 PROCEDURE — 99214 OFFICE O/P EST MOD 30 MIN: CPT | Mod: 25,S$GLB,, | Performed by: FAMILY MEDICINE

## 2017-02-14 PROCEDURE — 87086 URINE CULTURE/COLONY COUNT: CPT

## 2017-02-14 PROCEDURE — 87088 URINE BACTERIA CULTURE: CPT

## 2017-02-14 PROCEDURE — 99999 PR PBB SHADOW E&M-EST. PATIENT-LVL IV: CPT | Mod: PBBFAC,,, | Performed by: FAMILY MEDICINE

## 2017-02-14 PROCEDURE — 81000 URINALYSIS NONAUTO W/SCOPE: CPT | Mod: S$GLB,,, | Performed by: FAMILY MEDICINE

## 2017-02-14 PROCEDURE — 87077 CULTURE AEROBIC IDENTIFY: CPT

## 2017-02-14 RX ORDER — ONDANSETRON HYDROCHLORIDE 8 MG/1
8 TABLET, FILM COATED ORAL EVERY 8 HOURS PRN
Qty: 15 TABLET | Refills: 1 | Status: SHIPPED | OUTPATIENT
Start: 2017-02-14 | End: 2017-03-29

## 2017-02-14 RX ORDER — SULFAMETHOXAZOLE AND TRIMETHOPRIM 800; 160 MG/1; MG/1
1 TABLET ORAL 2 TIMES DAILY
Qty: 20 TABLET | Refills: 0 | Status: SHIPPED | OUTPATIENT
Start: 2017-02-14 | End: 2017-02-24

## 2017-02-14 NOTE — PROGRESS NOTES
Subjective:       Patient ID: Cat Denson is a 37 y.o. female.    Chief Complaint: Diarrhea and Throwing up    HPI Comments: Mother and pt. report a life long hx of episodes of nausea and vomiting.  No recent travel or Abx or suspect foods.    Diarrhea    This is a new problem. The current episode started in the past 7 days. The problem occurs less than 2 times per day. The stool consistency is described as watery. Associated symptoms include vomiting. Pertinent negatives include no abdominal pain or fever. There are no known risk factors. She has tried change of diet for the symptoms. The treatment provided mild relief.     Review of Systems   Constitutional: Negative for fever.   Respiratory: Negative for shortness of breath.    Cardiovascular: Negative for chest pain.   Gastrointestinal: Positive for diarrhea, nausea and vomiting. Negative for abdominal pain.   Genitourinary: Positive for dysuria and frequency. Negative for flank pain.   Skin: Negative for rash.   All other systems reviewed and are negative.      Objective:      Physical Exam   Constitutional: She appears well-developed and well-nourished.   HENT:   Mouth/Throat: Oropharynx is clear and moist and mucous membranes are normal.   Eyes: Pupils are equal, round, and reactive to light. No scleral icterus.   Neck: Neck supple.   Cardiovascular: Normal rate and regular rhythm.    No murmur heard.  , sitting on my exam.  Orthostatic VS show no tilt.   Pulmonary/Chest: Effort normal and breath sounds normal.   Abdominal: Soft. Bowel sounds are normal. There is no tenderness. There is no CVA tenderness.   Musculoskeletal: She exhibits no edema or tenderness.   Lymphadenopathy:     She has no cervical adenopathy.   Skin: Skin is warm and dry.       Assessment:       1. Nausea and vomiting, intractability of vomiting not specified, unspecified vomiting type    2. Urinary tract infection with hematuria, site unspecified        Plan:          Cat was seen today for diarrhea and throwing up.    Diagnoses and all orders for this visit:    Nausea and vomiting, intractability of vomiting not specified, unspecified vomiting type  -     ondansetron (ZOFRAN) 8 MG tablet; Take 1 tablet (8 mg total) by mouth every 8 (eight) hours as needed for Nausea.    Urinary tract infection with hematuria, site unspecified  -     sulfamethoxazole-trimethoprim 800-160mg (BACTRIM DS) 800-160 mg Tab; Take 1 tablet by mouth 2 (two) times daily.  -     POCT Urinalysis  -     Urine culture     Pt and mother advised to go to ER if any worsening.

## 2017-02-16 LAB — BACTERIA UR CULT: NORMAL

## 2017-02-17 DIAGNOSIS — G47.00 INSOMNIA: ICD-10-CM

## 2017-02-18 RX ORDER — TRAZODONE HYDROCHLORIDE 100 MG/1
TABLET ORAL
Qty: 30 TABLET | Refills: 0 | OUTPATIENT
Start: 2017-02-18

## 2017-02-18 RX ORDER — MELOXICAM 15 MG/1
TABLET ORAL
Qty: 30 TABLET | Refills: 0 | Status: SHIPPED | OUTPATIENT
Start: 2017-02-18 | End: 2017-03-29

## 2017-02-20 ENCOUNTER — ANTI-COAG VISIT (OUTPATIENT)
Dept: CARDIOLOGY | Facility: CLINIC | Age: 37
End: 2017-02-20

## 2017-02-20 ENCOUNTER — LAB VISIT (OUTPATIENT)
Dept: LAB | Facility: HOSPITAL | Age: 37
End: 2017-02-20
Attending: FAMILY MEDICINE
Payer: MEDICARE

## 2017-02-20 DIAGNOSIS — I63.411 EMBOLIC STROKE INVOLVING RIGHT MIDDLE CEREBRAL ARTERY: ICD-10-CM

## 2017-02-20 DIAGNOSIS — Z79.01 LONG-TERM (CURRENT) USE OF ANTICOAGULANTS: ICD-10-CM

## 2017-02-20 DIAGNOSIS — G81.94 LEFT HEMIPARESIS: ICD-10-CM

## 2017-02-20 LAB
INR PPP: 2.1
PROTHROMBIN TIME: 21.6 SEC

## 2017-02-20 PROCEDURE — 85610 PROTHROMBIN TIME: CPT

## 2017-02-20 PROCEDURE — 36415 COLL VENOUS BLD VENIPUNCTURE: CPT

## 2017-02-21 ENCOUNTER — OFFICE VISIT (OUTPATIENT)
Dept: PSYCHIATRY | Facility: CLINIC | Age: 37
End: 2017-02-21
Payer: COMMERCIAL

## 2017-02-21 DIAGNOSIS — F31.81 BIPOLAR 2 DISORDER: ICD-10-CM

## 2017-02-21 DIAGNOSIS — F01.50 VASCULAR DEMENTIA, UNCOMPLICATED: ICD-10-CM

## 2017-02-21 DIAGNOSIS — F09 COGNITIVE DISORDER: Primary | ICD-10-CM

## 2017-02-21 PROCEDURE — 90791 PSYCH DIAGNOSTIC EVALUATION: CPT | Mod: S$GLB,,, | Performed by: PSYCHOLOGIST

## 2017-02-21 PROCEDURE — 96118 PR NEUROPSYCH TESTING BY PSYCH/PHYS: CPT | Mod: 59,S$GLB,, | Performed by: PSYCHOLOGIST

## 2017-02-21 PROCEDURE — 96119 PR NEUROPSYCH TESTING BY TECHNICIAN: CPT | Mod: 59,S$GLB,, | Performed by: PSYCHOLOGIST

## 2017-02-22 ENCOUNTER — OFFICE VISIT (OUTPATIENT)
Dept: NEUROLOGY | Facility: CLINIC | Age: 37
End: 2017-02-22
Payer: MEDICARE

## 2017-02-22 VITALS
DIASTOLIC BLOOD PRESSURE: 81 MMHG | BODY MASS INDEX: 18.32 KG/M2 | RESPIRATION RATE: 18 BRPM | HEART RATE: 106 BPM | HEIGHT: 63 IN | SYSTOLIC BLOOD PRESSURE: 108 MMHG | WEIGHT: 103.38 LBS

## 2017-02-22 DIAGNOSIS — M62.838 MUSCLE SPASTICITY: ICD-10-CM

## 2017-02-22 DIAGNOSIS — F09 COGNITIVE DYSFUNCTION, ACQUIRED: ICD-10-CM

## 2017-02-22 DIAGNOSIS — M32.9 SLE (SYSTEMIC LUPUS ERYTHEMATOSUS RELATED SYNDROME): ICD-10-CM

## 2017-02-22 DIAGNOSIS — I63.411 EMBOLIC STROKE INVOLVING RIGHT MIDDLE CEREBRAL ARTERY: Primary | ICD-10-CM

## 2017-02-22 DIAGNOSIS — R41.4 LEFT-SIDED NEGLECT: ICD-10-CM

## 2017-02-22 DIAGNOSIS — R32 URINARY INCONTINENCE, UNSPECIFIED TYPE: ICD-10-CM

## 2017-02-22 DIAGNOSIS — E78.5 HYPERLIPIDEMIA LDL GOAL <70: ICD-10-CM

## 2017-02-22 DIAGNOSIS — H53.462 LEFT HOMONYMOUS HEMIANOPSIA: ICD-10-CM

## 2017-02-22 PROCEDURE — 99999 PR PBB SHADOW E&M-EST. PATIENT-LVL V: CPT | Mod: PBBFAC,,, | Performed by: PSYCHIATRY & NEUROLOGY

## 2017-02-22 PROCEDURE — 99215 OFFICE O/P EST HI 40 MIN: CPT | Mod: S$GLB,,, | Performed by: PSYCHIATRY & NEUROLOGY

## 2017-02-22 PROCEDURE — 99499 UNLISTED E&M SERVICE: CPT | Mod: S$GLB,,, | Performed by: PSYCHIATRY & NEUROLOGY

## 2017-02-22 PROCEDURE — 1160F RVW MEDS BY RX/DR IN RCRD: CPT | Mod: S$GLB,,, | Performed by: PSYCHIATRY & NEUROLOGY

## 2017-02-22 RX ORDER — BACLOFEN 10 MG/1
TABLET ORAL
Qty: 60 TABLET | Refills: 3 | Status: SHIPPED | OUTPATIENT
Start: 2017-02-22 | End: 2017-05-21 | Stop reason: SDUPTHER

## 2017-02-22 NOTE — PROGRESS NOTES
Subjective:         Patient ID: Cat Denson is a very pleasnat right handed 37 y.o.  female who presents for history of stroke and hypercoagulable state, as well as involuntary movement of the mouth    History of Present Illness    Reviewed records in [facility], including clinic note from Dr. Cruz January 16, 2017.  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  Currently on Coumadin, residual left hemiparesis, balance problems, forgetfulness and concentration difficulty.  She was referred here for follow-up after her stroke, evaluation of abnormal mouth movements.  Referred for detailed neuropsychological testing to further assess her ongoing cognitive deficits.    Review of her earlier records indicates embolic stroke involving right middle cerebral artery, hemiplegic migraine, seizure, systemic lupus erythematosus, fibromyalgia, bipolar 2 disorder    She presented to [facility] 11/25/15 with left-sided weakness, numbness, slurred speech, right gaze preference.  NIH stroke scale score 15.  She was outside the treatment window for IV TPA.    She recalls a few days prior to her stroke, left foot went completely dead.  Severe headache at onset, went to the hospital next day. Remote hx of migraines as a child.  No hx of blood clots, no pregnancies.  Uncle had a stroke, over age 50 at least.     Reviewed medications with her.  She has not been having any bleeding issues she is aware of. She does find ritalin helpful with thinking.     She still has loss of left visual field, still having nocturnal enuresis; on flomax.  Does not think flomax is helping.   Has frequency, large volumes. Often difficulty starting.  Feels urge, but may have some functional incontinence.  Wearing undergarments.     Nocturnal painful leg cramps, inattention to the left side, twisted and fractured her left ankle October 2016.  May get left arm caught when passing furniture, may forget  to put left arm in a shirt sleeve.     Nausea in AM, will throw up, takes zofran in the AM. Smokes marijuana daily to stimulate appetite. Denies any other illegal drugs, denies abusing prescriptions.    She nor boyfriend are aware of involuntary limb or facial movements    Review of patient's allergies indicates:   Allergen Reactions    Amoxil [amoxicillin]      hives    Augmentin [amoxicillin-pot clavulanate]     Avelox [moxifloxacin]      itching     Current Outpatient Prescriptions   Medication Sig Dispense Refill    cetirizine (ZYRTEC) 10 MG tablet Take 1 tablet (10 mg total) by mouth once daily.  0    chlorhexidine (PERIDEX) 0.12 % solution RINSE WITH 1/2 OZ BID AFTER BRUSHING AND FLOSSING  6    clonazePAM (KLONOPIN) 0.5 MG tablet Take one tablet PO BID PRN anxiety 45 tablet 1    cyanocobalamin, vitamin B-12, (VITAMIN B-12) 1,000 mcg Subl Place under the tongue once daily.      gabapentin (NEURONTIN) 800 MG tablet Take 1 tablet (800 mg total) by mouth 2 (two) times daily. 60 tablet 3    hydroxychloroquine (PLAQUENIL) 200 mg tablet TAKE 1 TABLET BY MOUTH TWICE DAILY 60 tablet 0    meloxicam (MOBIC) 15 MG tablet TAKE 1 TABLET(15 MG) BY MOUTH DAILY AS NEEDED FOR PAIN 30 tablet 0    methylphenidate (RITALIN) 5 MG tablet Take one tablet PO twice daily with meals. May take additional one-half to one tablet daily by 4 pm as needed for attention/focus 75 tablet 0    metoprolol succinate (TOPROL-XL) 25 MG 24 hr tablet Take 1 tablet (25 mg total) by mouth once daily. 30 tablet 5    omeprazole (PRILOSEC) 40 MG capsule TAKE 1 CAPSULE(40 MG) BY MOUTH EVERY MORNING 30 capsule 5    ondansetron (ZOFRAN) 8 MG tablet Take 1 tablet (8 mg total) by mouth every 8 (eight) hours as needed for Nausea. 15 tablet 1    PNV WITH CA#74/IRON/FOLIC ACID (PRENATAL VITAMIN 1+1 ORAL) Take by mouth once daily. No vitamin K      PREVIDENT 5000 BOOSTER PLUS 1.1 % Pste BRUSH ON NIGHTLY AFTER NORMAL HYGIENE REGIMAN. SPIT OUT EXCESS  DO NOT RINSE  4    ranitidine (ZANTAC) 300 MG tablet Take 1 tablet (300 mg total) by mouth once daily. 30 tablet 3    risperidone (RISPERDAL) 0.5 MG Tab Take 1 tablet (0.5 mg total) by mouth every evening. 30 tablet 2    sulfamethoxazole-trimethoprim 800-160mg (BACTRIM DS) 800-160 mg Tab Take 1 tablet by mouth 2 (two) times daily. 20 tablet 0    tamsulosin (FLOMAX) 0.4 mg Cp24 Take 1 capsule (0.4 mg total) by mouth once daily. 30 capsule 3    trazodone (DESYREL) 50 MG tablet Take one-half to one tablet PO nightly PRN insomnia 30 tablet 1    warfarin (COUMADIN) 1 MG tablet Take 1 mg by mouth Daily. On Tuesday and friday       warfarin (COUMADIN) 2 MG tablet TAKE 1 TABLET BY MOUTH EVERY DAY 30 tablet 6    WELCHOL 625 mg tablet TAKE 1 TABLET BY MOUTH EVERY DAY INCREASE BY ONE PILL EVERY 3 TO 4 DAYS UNTIL DESIRED CONTROL IS REACHED 180 tablet 0    baclofen (LIORESAL) 10 MG tablet Take one to two tablet before bedtime for muscle spasms and cramps as needed 60 tablet 3    clobetasol 0.05% (TEMOVATE) 0.05 % Oint Apply topically 2 (two) times daily. 30 g 0    escitalopram oxalate (LEXAPRO) 20 MG tablet Take 1 tablet (20 mg total) by mouth once daily. 30 tablet 2     No current facility-administered medications for this visit.        Past Medical History  Past Medical History   Diagnosis Date    Anxiety     Bipolar 1 disorder     Chronic kidney disease     Chronic pain     Depression      bipolar 2    Fibromyalgia     Hx of psychiatric care     Lupus     Psychiatric problem     Renal tubular acidosis     Seizure     Stroke 13-    Suicide attempt      overdosed on a bottle of paxil, muscle relaxers, & zoloft    Therapy        Past Surgical History  Past Surgical History   Procedure Laterality Date    Cholecystectomy         Family History  Family History   Problem Relation Age of Onset    Hypertension Mother     Hyperlipidemia Mother     No Known Problems Father     Post-traumatic stress  disorder Brother     Drug abuse Brother     Diabetes Maternal Uncle     Hypertension Maternal Uncle     Alcohol abuse Maternal Uncle     Scoliosis Paternal Aunt     Heart disease Paternal Uncle     Mental illness Maternal Grandmother     Cancer Maternal Grandmother      lung / brain - smoker    Drug abuse Maternal Grandmother     Hypertension Maternal Grandmother     Hyperlipidemia Maternal Grandmother     Diabetes Maternal Grandmother     Heart disease Maternal Grandfather     Hypertension Maternal Grandfather     Alcohol abuse Maternal Grandfather     No Known Problems Paternal Grandmother     Mental illness Paternal Grandfather     Early death Paternal Grandfather      self       Social History  Social History     Social History    Marital status: Single     Spouse name: N/A    Number of children: 0    Years of education: N/A     Occupational History    Not on file.     Social History Main Topics    Smoking status: Former Smoker     Packs/day: 1.00     Years: 15.00     Types: Cigarettes     Start date: 11/25/2015    Smokeless tobacco: Never Used      Comment: quit smoking after the stroke    Alcohol use No    Drug use: Yes     Special: Marijuana      Comment: smokes occasionally, helps with pain and appetite    Sexual activity: Yes     Partners: Female     Other Topics Concern    Not on file     Social History Narrative    Doesn't drive since the stroke, mother drives her and she lives with her mother.        Review of Systems  Review of Systems    Objective:        Vitals:    02/22/17 1011   BP: 108/81   Pulse: 106   Resp: 18     Body mass index is 18.32 kg/(m^2).  Neurologic Exam     Mental Status   Oriented to person, place, and time.   Follows 3 step commands.   Attention: decreased to left to two modalities. Concentration: normal.   Speech: speech is normal (Soft spoken slow jeff, no dysarthria)  Level of consciousness: alert  Knowledge: good.   Able to name object. Able to  repeat. Normal comprehension.     Cranial Nerves     CN II   Visual acuity: (incomplete left hemianopsia)    CN III, IV, VI   Pupils are equal, round, and reactive to light.  Extraocular motions are normal.   Right pupil: Shape: regular.   Left pupil: Shape: regular.   CN III: no CN III palsy  CN VI: no CN VI palsy  Nystagmus: none   Diplopia: none  Ophthalmoparesis: none    CN V   Facial sensation intact.     CN VII   Right facial weakness: none  Left facial weakness: subtle flattening of left nasolabial fold, subtle perioral asymmetry L upper lip.    CN VIII   Hearing: intact    CN IX, X   Palate: symmetric    CN XI   CN XI normal.     CN XII   CN XII normal.     Motor Exam   Muscle bulk: normal  Overall muscle tone: normal  Right arm tone: normal  Left arm tone: spastic (very mildly)  Right arm pronator drift: absent  Left arm pronator drift: present  Right leg tone: normal  Left leg tone: spastic (mild to confrontation)    Strength   Strength 5/5 except as noted.   Left wrist extension: 4/5  Left anterior tibial: 4/5       Slight weakness, 4+ with left wrist and finger extensors,  4/5 left ankle dorsiflexion    Minimal spasticity on the left to passive movement, however with ambulation holds left arm flexed at the wrist and elbow, adducts left leg     Sensory Exam   Light touch normal.   Right arm proprioception: normal  Right leg proprioception: normal  Pinprick normal.        Diminished proprioceptive sense left arm and leg, sensory neglect intermittently to 2x simultaneous stimuli     Gait, Coordination, and Reflexes     Gait  Gait: spastic (with ambulation holds left arm flexed at the wrist and elbow, adducts left leg)    Coordination   Finger to nose coordination: abnormal (on the left)    Tremor   Resting tremor: absent  Intention tremor: present (LUE)    Reflexes   Right brachioradialis: 1+  Left brachioradialis: 1+  Right biceps: 1+  Left biceps: 1+  Right triceps: 1+  Left triceps: 1+  Right patellar:  1+  Left patellar: 1+  Right achilles: 1+  Left achilles: 1+       Mild intention tremor LUE, slow F-N     NIH Stroke Scale      Interval: 3 months  Time: 12:03 PM  Person Administering Scale: Orestes Ribeiro    Administer stroke scale items in the order listed. Record performance in each category after each subscale exam. Do not go back and change scores. Follow directions provided for each exam technique. Scores should reflect what the patient does, not what the clinician thinks the patient can do. The clinician should record answers while administering the exam and work quickly. Except where indicated, the patient should not be coached (i.e., repeated requests to patient to make a special effort).      1a  Level of consciousness: 0=alert; keenly responsive   1b. LOC questions:  0=Answers both tasks correctly   1c. LOC commands: 0=Answers both tasks correctly   2.  Best Gaze: 0=normal   3.  Visual: 1=Partial hemianopia   4. Facial Palsy: 1=Minor paralysis (flattened nasolabial fold, asymmetric on smiling)   5a.  Motor left arm: 1=Drift, limb holds 90 (or 45) degrees but drifts down before full 10 seconds: does not hit bed   5b.  Motor right arm: 0=No drift, limb holds 90 (or 45) degrees for full 10 seconds   6a. motor left le=No drift, limb holds 90 (or 45) degrees for full 10 seconds   6b  Motor right le=No drift, limb holds 90 (or 45) degrees for full 10 seconds   7. Limb Ataxia: 0=Absent   8.  Sensory: 0=Normal; no sensory loss   9. Best Language:  0=No aphasia, normal   10. Dysarthria: 0=Normal   11. Extinction and Inattention: 2=Profound kiko-inattention or kkio-inattention to more than one modality. Does not recognize own hand or orients only to one side of space   12. Distal motor function: 1=At least some extension after 5 seconds, but is not fully extended. Any movement of the fingers which is not in response to a command is not scored    Total:   6       Abnormal Involuntary Movement Scale  (AIMS)    0=none, 1=minimal 2=mild 3= moderate 4=severe    I. Facial and oral movements:  1. Muscles of facial expression forehead, eyebrows, periorbital area, cheeks, frowning, blinking, smiling, grimaning 0   2. Lips and perioral area puckering, pouting, smacking 0   3. Jaw biting, clenching, chewing, mouth opening, lateral movement 0   4. Tongue movement in and out of mouth, darting 0     II. Extremity Movements:  5. Upper (arms, wrists, hands) 0   6. Lower (legs, knees, ankles, toes) 0     III. Trunk movements:  7. Neck, shoulders, and hips 0       IV. Global Judgement:  8. Severity of abnormal movements overall 0   9. Incapacitation due to abnormal movements 0   10. Patients awareness (patient only report, 0-not aware, 1-no distress, 2-mild, 3-mod, 4-severe) 0     V. Dental status:  11. Current problems with teeth and/or dentures?   No   11. Are dentures usually worn?   No   12. Edentia? No   13. Do movements disappear with sleep? No          Physical Exam   Constitutional: She is oriented to person, place, and time. She appears well-developed and well-nourished.   HENT:   Head: Normocephalic and atraumatic.   Eyes: EOM are normal. Pupils are equal, round, and reactive to light.       Left inferior quadrantanopia, incomplete superior quadrantanopia     Neck: Carotid bruit is not present.   Cardiovascular: Normal rate and regular rhythm.    Neurological: She is oriented to person, place, and time. She has an abnormal Finger-Nose-Finger Test (on the left).   Reflex Scores:       Tricep reflexes are 1+ on the right side and 1+ on the left side.       Bicep reflexes are 1+ on the right side and 1+ on the left side.       Brachioradialis reflexes are 1+ on the right side and 1+ on the left side.       Patellar reflexes are 1+ on the right side and 1+ on the left side.       Achilles reflexes are 1+ on the right side and 1+ on the left side.  Psychiatric: Her speech is normal.   Vitals reviewed.        Data Review:      Results for MARA LYNCH (MRN [id number]) as of 2/22/2017 10:34   Ref. Range 2/20/2017 14:11   Protime Latest Ref Range: 9.0 - 12.5 sec 21.6 (H)   Coumadin Monitoring INR Latest Ref Range: 0.8 - 1.2  2.1 (H)     Results for MARA LYNCH (MRN [id number]) as of 2/22/2017 10:34   Ref. Range 1/26/2017 08:33   WBC Latest Ref Range: 3.90 - 12.70 K/uL 4.10   RBC Latest Ref Range: 4.00 - 5.40 M/uL 4.44   Hemoglobin Latest Ref Range: 12.0 - 16.0 g/dL 12.9   Hematocrit Latest Ref Range: 37.0 - 48.5 % 40.0   MCV Latest Ref Range: 82 - 98 fL 90   MCH Latest Ref Range: 27.0 - 31.0 pg 28.9   MCHC Latest Ref Range: 32.0 - 36.0 % 32.1   RDW Latest Ref Range: 11.5 - 14.5 % 13.3   Platelets Latest Ref Range: 150 - 350 K/uL 157   MPV Latest Ref Range: 9.2 - 12.9 fL 10.6   Gran% Latest Ref Range: 38.0 - 73.0 % 50.0   Gran # Latest Ref Range: 1.8 - 7.7 K/uL 2.1   Lymph% Latest Ref Range: 18.0 - 48.0 % 41.9   Lymph # Latest Ref Range: 1.0 - 4.8 K/uL 1.7   Mono% Latest Ref Range: 4.0 - 15.0 % 4.5   Mono # Latest Ref Range: 0.3 - 1.0 K/uL 0.2 (L)   Eosinophil% Latest Ref Range: 0.0 - 8.0 % 2.6   Eos # Latest Ref Range: 0.0 - 0.5 K/uL 0.1   Basophil% Latest Ref Range: 0.0 - 1.9 % 1.0   Baso # Latest Ref Range: 0.00 - 0.20 K/uL 0.00   Sed Rate Latest Ref Range: 0 - 20 mm/Hr 9   Sodium Latest Ref Range: 136 - 145 mmol/L 140   Potassium Latest Ref Range: 3.5 - 5.1 mmol/L 4.0   Chloride Latest Ref Range: 95 - 110 mmol/L 108   CO2 Latest Ref Range: 23 - 29 mmol/L 19 (L)   Anion Gap Latest Ref Range: 8 - 16 mmol/L 13   BUN, Bld Latest Ref Range: 6 - 20 mg/dL 12   Creatinine Latest Ref Range: 0.5 - 1.4 mg/dL 2.1 (H)   eGFR if non African American Latest Ref Range: >60 mL/min/1.73 m^2 30 (A)   eGFR if African American Latest Ref Range: >60 mL/min/1.73 m^2 34 (A)   Glucose Latest Ref Range: 70 - 110 mg/dL 109   Calcium Latest Ref Range: 8.7 - 10.5 mg/dL 9.8   Alkaline Phosphatase Latest Ref Range: 55 - 135 U/L 83   Total Protein  Latest Ref Range: 6.0 - 8.4 g/dL 7.4   Albumin Latest Ref Range: 3.5 - 5.2 g/dL 4.3   Total Bilirubin Latest Ref Range: 0.1 - 1.0 mg/dL 0.4   AST Latest Ref Range: 10 - 40 U/L 19   ALT Latest Ref Range: 10 - 44 U/L 13   CRP Latest Ref Range: 0.0 - 8.2 mg/L 0.4   ds DNA Ab Latest Ref Range: Negative 1:10  Negative 1:10   Complement (C-3) Latest Ref Range: 50 - 180 mg/dL 91   Complement (C-4) Latest Ref Range: 11 - 44 mg/dL 29   Results for MARA LYNCH (MRN [id number]) as of 2/22/2017 10:34   Ref. Range 8/2/2016 08:20 8/2/2016 08:20   TRISTAN HEP-2 Titer Unknown Positive 1:1280 S...    Anti-SSA Antibody Latest Ref Range: 0.00 - 19.99 .04 (H) 189.04 (H)   Anti-SSA Interpretation Latest Ref Range: Negative  Positive (A) Positive (A)   Anti-SSB Antibody Latest Ref Range: 0.00 - 19.99 EU  63.51 (H)   Anti-SSB Interpretation Latest Ref Range: Negative   Positive (A)   ds DNA Ab Latest Ref Range: Negative 1:10  Negative 1:10 Negative 1:10   Anti Sm Antibody Latest Ref Range: 0.00 - 19.99 EU  1.22   Anti-Sm Interpretation Latest Ref Range: Negative   Negative   Anti Sm/RNP Antibody Latest Ref Range: 0.00 - 19.99 EU  1.40   Anti-Sm/RNP Interpretation Latest Ref Range: Negative   Negative     Results for MARA LYNCH (MRN [id number]) as of 2/22/2017 10:34   Ref. Range 1/29/2016 08:48   Beta-2 Glyco 1 IgG Latest Ref Range: <=20 SGU <9   Beta-2 Glyco 1 IgM Latest Ref Range: <=20 SMU <9   Beta-2 Glyco 1 IgA Latest Ref Range: <=20 KATHERINE <9   Results for MARA LYNCH (MRN [id number]) as of 2/22/2017 10:34   Ref. Range 9/23/2013 16:40   APA Isotype IgG Latest Ref Range: 0.00 - 14.99 GPL <9.40   APA Isotype IgM Latest Ref Range: 0.00 - 12.49 MPL <9.40   Beta-2 Glyco 1 IgG Latest Ref Range: 0 - 20 SGU <9   Beta-2 Glyco 1 IgM Latest Ref Range: 0 - 20 SMU <9   Beta-2 Glyco 1 IgA Latest Ref Range: 0 - 20 KATHERINE <9   DRVVT, Lupus Anticoagulant Latest Ref Range: Negative  Negative     Results for FEMIERKERAY  ALLAN (MRN [id number]) as of 2/22/2017 10:55   Ref. Range 4/4/2016 08:48   Cholesterol Latest Ref Range: 120 - 199 mg/dL 176   HDL Latest Ref Range: 40 - 75 mg/dL 40   LDL Cholesterol Latest Ref Range: 63.0 - 159.0 mg/dL 116.0   Total Cholesterol/HDL Ratio Latest Ref Range: 2.0 - 5.0  4.4   Triglycerides Latest Ref Range: 30 - 150 mg/dL 100     CT head 11/12/15:  Impression        Acute-early subacute right MCA territory cerebral infarction.  No hemorrhagic transformation.  Consider correlation with MRI of the brain.         CTA head and neck, CT perfusion 11/25/15:  Impression       Core infarcts, without penumbra, are noted within the right anterior frontal lobe and right posterior parietal lobe.  Of note, there is gyriform enhancement involving the core infarct within the right anterior frontal lobe, suggesting at least subacute/late subacute/remote timing.  Additionally, there is a focus of hypoattenuation with surrounding gyriform enhancement within the right posterior parasagittal frontal lobe, that demonstrates increased mean transit time however no significant blood flow or blood volume abnormalities, may reflect remote infarction.  There is no evidence of underlying arteriovenous malformation in the regions of gyriform enhancement.    Abrupt cut off of the proximal to mid portion of the A1 segment of the right PORSHA, with intraluminal filling defect in the proximal M1 segment of the right MCA concerning for thrombus.  The vessels are widely patent distal to these regions of suspected thrombosis.  No aneurysm seen.    Apparent edema involving the vocal cords, could reflect sequela of apposition from breath-holding for the examination, however correlation with current respiratory status is recommended, direct visualization as warranted.    Additional findings above.      This case was reviewed by staff radiologist Dr. Sun and findings were discussed with Dr. Cuba  21:40.  ______________________________________     Electronically signed by resident: АННА STEVENS MD  Date: 11/25/15  Time: 22:24     MRI brain 11/26/15:  Narrative   Findings: The patient was administered 10 cc of gadavist.    There are multifocal hemispheric infarcts involving the right frontal, right parietal, right occipital and posterior temporal lobes as well as the right basal ganglia including caudate head and globus pallidus.  This is MCA territory.  No blood products are seen.  Pituitary craniocervical junction show nothing unusual.  Left hemisphere is unremarkable.  Postcontrast images show some intra-arterial enhancement as well as gyriform enhancement.  Most of the gyriform enhancement there is superior.   Impression    Acute right hemispheric infarcts as above.         Echo 11/27/15:  CONCLUSIONS     1 - Normal left ventricular systolic function (EF 60-65%).     2 - Normal right ventricular systolic function .     3 - Normal left ventricular diastolic function.     Assessment:       1. Embolic stroke involving right middle cerebral artery    2. Left-sided neglect    3. Left homonymous hemianopsia    4. Urinary incontinence, unspecified type    5. Cognitive dysfunction, acquired    6. Hyperlipidemia LDL goal <70    7. SLE (systemic lupus erythematosus related syndrome)    8. Muscle spasticity        One week prior to her presentation with stroke, she experienced sudden onset of painless weakness and numbness to the left foot.  CT of the lumbar spine showed some mild disc bulging, and apparently was suspected that this was peripheral in nature.  Most likely this was in early stroke event, followed by recurrent embolic phenomenon.    She has a history of SLE, kidney disease, hepatic dysfunction, cigarette smoking, mildly elevated lipids.  Currently on anticoagulation, at times either supra or subtherapeutic.  Not currently on statin.    I don't see any evidence of tardive dyskinesia or dystonia on  today's exam.  There is some subtle left facial weakness and asymmetry which is most likely stroke related.  Unclear if she had lupus anticoagulant or antiphospholipid antibodies aside from beta-2 checked at the time of her infarct.    Plan:       Agree with referral for detailed neuropsychological testing; this was done yesterday and will follow-up on the results.  Suspect her neglect is going to make ongoing rehabilitation to her left side difficult.  Ongoing cognitive therapy would be beneficial, and would like to see her wean off of the Ritalin in the future.    Will repeat lipid panel - need to weight potential benefit of statin vs risk of further hepatic injury.  Consider Omega 3 FA    Baclofen before bedtime for nocturnal muscle cramps and spasticity, would like to see her try to use clonazepam less frequently.  Counseled against cigarette smoking, as well as marijuana smoking due to the direct injurious effects of smoking, as well as long-term psychological effects of chronic marijuana use.  Also strongly cautioned against use of Reglan in the future as dyskinesias were raised as a concern.  I don't see any on today's exam however.    I'm not sure an alpha blocker is what she needs to help treat her urinary incontinence.  Refer to urology, consider urodynamic testing and antispasmodic agent if appropriate    Embolic stroke involving right middle cerebral artery  -     Cardiolipin antibody; Future; Expected date: 2/22/17  -     Cardiolipin antibody, IgA; Future; Expected date: 2/22/17  -     Antithrombin III; Future; Expected date: 2/22/17  -     Protein C activity; Future; Expected date: 2/22/17  -     Protein S activity; Future; Expected date: 2/22/17  -     DRVVT; Future; Expected date: 2/22/17  -     Homocysteine, serum; Future; Expected date: 2/22/17  -     Factor 5 leiden; Future; Expected date: 2/22/17  -     Prothrombin gene mutation; Future; Expected date: 2/22/17  -     Beta-2 glycoprotein  antibodies; Future; Expected date: 2/22/17  -     Miscellaneous Sendout Test Blood; Future; Expected date: 2/22/17  -     Miscellaneous Sendout Test Blood; Future; Expected date: 2/22/17  -     Miscellaneous Sendout Test Blood; Future; Expected date: 2/22/17  -     Miscellaneous Sendout Test Blood; Future; Expected date: 2/22/17  -     LIPID PANEL; Future; Expected date: 2/22/17    Left-sided neglect    Left homonymous hemianopsia    Urinary incontinence, unspecified type  -     Ambulatory Referral to Urogynecology    Cognitive dysfunction, acquired    Hyperlipidemia LDL goal <70  -     LIPID PANEL; Future; Expected date: 2/22/17    SLE (systemic lupus erythematosus related syndrome)  -     Cardiolipin antibody; Future; Expected date: 2/22/17  -     Cardiolipin antibody, IgA; Future; Expected date: 2/22/17  -     Antithrombin III; Future; Expected date: 2/22/17  -     Protein C activity; Future; Expected date: 2/22/17  -     Protein S activity; Future; Expected date: 2/22/17  -     DRVVT; Future; Expected date: 2/22/17  -     Homocysteine, serum; Future; Expected date: 2/22/17  -     Factor 5 leiden; Future; Expected date: 2/22/17  -     Prothrombin gene mutation; Future; Expected date: 2/22/17  -     Beta-2 glycoprotein antibodies; Future; Expected date: 2/22/17  -     Miscellaneous Sendout Test Blood; Future; Expected date: 2/22/17  -     Miscellaneous Sendout Test Blood; Future; Expected date: 2/22/17  -     Miscellaneous Sendout Test Blood; Future; Expected date: 2/22/17  -     Miscellaneous Sendout Test Blood; Future; Expected date: 2/22/17    Muscle spasticity  Comments:  left lower extremity, post stroke  Orders:  -     baclofen (LIORESAL) 10 MG tablet; Take one to two tablet before bedtime for muscle spasms and cramps as needed  Dispense: 60 tablet; Refill: 3    Return in about 3 months (around 5/22/2017).        Patient information shared at the time of visit:    Over 60 minutes time spent with patient, >  50% in discussion and counseling with patient regarding diagnosis, prognosis and treatment strategies     Thank you very much for the opportunity to assist in this patient's care.  If you have any questions or concerns, please do not hesitate to contact me at any time.     Sincerely,  Orestes Ribeiro, DO

## 2017-02-22 NOTE — LETTER
February 22, 2017      Sakina Cruz MD  [address]  [facility] [address]           [facility]  [address] [facility] [address] [place] [address]  Phone: [phone]  Fax: [phone]          Patient: Cat Denson   MR Number: [id number]   YOB: 1980   Date of Visit: 2/22/2017       Dear Dr. Sakina Cruz:    Thank you for referring Cat Denson to me for evaluation. Attached you will find relevant portions of my assessment and plan of care.    If you have questions, please do not hesitate to call me. I look forward to following Cat Denson along with you.    Sincerely,    Orestes Ribeiro, DO    Enclosure  CC:  No Recipients    If you would like to receive this communication electronically, please contact [email or web address] or [phone] to request more information on [employer] Link access.    For providers and/or their staff who would like to refer a patient to [facility], please contact us through our one-stop-shop provider referral line, [facility], at [phone].    If you feel you have received this communication in error or would no longer like to receive these types of communications, please e-mail [email or web address]

## 2017-02-22 NOTE — MR AVS SNAPSHOT
[facility]  [address] [facility] [address] [place] [address]  Phone: [phone]  Fax: [phone]                  Cat Denson   2017 10:00 AM   Office Visit    Description:  Female : 1980   Provider:  Orestes Ribeiro DO   Department:  [place] - Neurology           Reason for Visit     Stroke           Diagnoses this Visit        Comments    Embolic stroke involving right middle cerebral artery    -  Primary     Left-sided neglect         Left homonymous hemianopsia         Urinary incontinence, unspecified type         Cognitive dysfunction, acquired         Hyperlipidemia LDL goal <70         SLE (systemic lupus erythematosus related syndrome)         Muscle spasticity     left lower extremity, post stroke           To Do List           Future Appointments        Provider Department Dept Phone    2017 8:00 AM LAB, [facility] [phone]    3/14/2017 9:15 AM MD Chandni Cochran - Urology [phone]    3/17/2017 8:30 AM Eduard Stephens - Coumadin [phone]    2017 1:00 PM Orestes Ribeiro DO [facility] [phone]      Goals (5 Years of Data)     None      Follow-Up and Disposition     Return in about 3 months (around 2017).       These Medications        Disp Refills Start End    baclofen (LIORESAL) 10 MG tablet 60 tablet 3 2017     Take one to two tablet before bedtime for muscle spasms and cramps as needed    [facility]: [facility] [id number] [place] [address] [address] [facility] [address] Ph #: [phone]         [facility]     [facility] Nurse Care Line -  Assistance  Registered nurses in the [facility] provide clinical advisement, health education, appointment booking, and other advisory services.  Call for this free service at [phone].             Medications           Message regarding Medications      Verify the changes and/or additions to your medication regime listed below are the same as discussed with your clinician today.  If any of these changes or additions are incorrect, please notify your healthcare provider.        START taking these NEW medications        Refills    baclofen (LIORESAL) 10 MG tablet 3    Sig: Take one to two tablet before bedtime for muscle spasms and cramps as needed    Class: Normal      STOP taking these medications     buPROPion (WELLBUTRIN SR) 150 MG TBSR 12 hr tablet Take one tablet PO daily x 10 days, then STOP    nicotine (NICODERM CQ) 14 mg/24 hr Place 1 patch onto the skin once daily.    ondansetron (ZOFRAN-ODT) 4 MG TbDL Take 1 tablet (4 mg total) by mouth every 8 (eight) hours as needed.    promethazine (PHENERGAN) 25 MG tablet Take 25 mg by mouth every 6 (six) hours as needed.            Verify that the below list of medications is an accurate representation of the medications you are currently taking.  If none reported, the list may be blank. If incorrect, please contact your healthcare provider. Carry this list with you in case of emergency.           Current Medications     cetirizine (ZYRTEC) 10 MG tablet Take 1 tablet (10 mg total) by mouth once daily.    chlorhexidine (PERIDEX) 0.12 % solution RINSE WITH 1/2 OZ BID AFTER BRUSHING AND FLOSSING    clonazePAM (KLONOPIN) 0.5 MG tablet Take one tablet PO BID PRN anxiety    cyanocobalamin, vitamin B-12, (VITAMIN B-12) 1,000 mcg Subl Place under the tongue once daily.    gabapentin (NEURONTIN) 800 MG tablet Take 1 tablet (800 mg total) by mouth 2 (two) times daily.    hydroxychloroquine (PLAQUENIL) 200 mg tablet TAKE 1 TABLET BY MOUTH TWICE DAILY    meloxicam (MOBIC) 15 MG tablet TAKE 1 TABLET(15 MG) BY MOUTH DAILY AS NEEDED FOR PAIN    methylphenidate (RITALIN) 5 MG tablet Take one tablet PO twice daily with meals. May take additional one-half to one tablet daily by 4 pm as needed for attention/focus    metoprolol  "succinate (TOPROL-XL) 25 MG 24 hr tablet Take 1 tablet (25 mg total) by mouth once daily.    omeprazole (PRILOSEC) 40 MG capsule TAKE 1 CAPSULE(40 MG) BY MOUTH EVERY MORNING    ondansetron (ZOFRAN) 8 MG tablet Take 1 tablet (8 mg total) by mouth every 8 (eight) hours as needed for Nausea.    PNV WITH CA#74/IRON/FOLIC ACID (PRENATAL VITAMIN 1+1 ORAL) Take by mouth once daily. No vitamin K    PREVIDENT 5000 BOOSTER PLUS 1.1 % Pste BRUSH ON NIGHTLY AFTER NORMAL HYGIENE REGIMAN. SPIT OUT EXCESS DO NOT RINSE    ranitidine (ZANTAC) 300 MG tablet Take 1 tablet (300 mg total) by mouth once daily.    risperidone (RISPERDAL) 0.5 MG Tab Take 1 tablet (0.5 mg total) by mouth every evening.    sulfamethoxazole-trimethoprim 800-160mg (BACTRIM DS) 800-160 mg Tab Take 1 tablet by mouth 2 (two) times daily.    tamsulosin (FLOMAX) 0.4 mg Cp24 Take 1 capsule (0.4 mg total) by mouth once daily.    trazodone (DESYREL) 50 MG tablet Take one-half to one tablet PO nightly PRN insomnia    warfarin (COUMADIN) 1 MG tablet Take 1 mg by mouth Daily. On Tuesday and friday     warfarin (COUMADIN) 2 MG tablet TAKE 1 TABLET BY MOUTH EVERY DAY    WELCHOL 625 mg tablet TAKE 1 TABLET BY MOUTH EVERY DAY INCREASE BY ONE PILL EVERY 3 TO 4 DAYS UNTIL DESIRED CONTROL IS REACHED    baclofen (LIORESAL) 10 MG tablet Take one to two tablet before bedtime for muscle spasms and cramps as needed    clobetasol 0.05% (TEMOVATE) 0.05 % Oint Apply topically 2 (two) times daily.    escitalopram oxalate (LEXAPRO) 20 MG tablet Take 1 tablet (20 mg total) by mouth once daily.           Clinical Reference Information           Your Vitals Were     BP Pulse Resp Height Weight BMI    108/81 (BP Location: Right arm, Patient Position: Sitting, BP Method: Automatic) 106 18 5' 3" (1.6 m) 46.9 kg (103 lb 6.3 oz) 18.32 kg/m2      Blood Pressure          Most Recent Value    BP  108/81      Allergies as of 2/22/2017     Amoxil [Amoxicillin]    Augmentin [Amoxicillin-pot " Clavulanate]    Avelox [Moxifloxacin]      Immunizations Administered on Date of Encounter - 2/22/2017     None      Orders Placed During Today's Visit      Normal Orders This Visit    Ambulatory Referral to Urogynecology     Future Labs/Procedures Expected by Expires    Antithrombin III  2/22/2017 4/23/2018    Beta-2 glycoprotein antibodies  2/22/2017 4/23/2018    Cardiolipin antibody, IgA  2/22/2017 4/23/2018    Cardiolipin antibody  2/22/2017 4/23/2018    DRVVT  2/22/2017 4/23/2018    Factor 5 leiden  2/22/2017 4/23/2018    Homocysteine, serum  2/22/2017 4/23/2018    LIPID PANEL  2/22/2017 4/23/2018    Miscellaneous Sendout Test Blood  2/22/2017 4/23/2018    Miscellaneous Sendout Test Blood  2/22/2017 4/23/2018    Miscellaneous Sendout Test Blood  2/22/2017 4/23/2018    Miscellaneous Sendout Test Blood  2/22/2017 4/23/2018    Protein C activity  2/22/2017 4/23/2018    Protein S activity  2/22/2017 4/23/2018    Prothrombin gene mutation  2/22/2017 4/23/2018      Language Assistance Services     ATTENTION: Language assistance services are available, free of charge. Please call [phone].      ATENCIÓN: Si habla español, tiene a couch disposición servicios gratuitos de asistencia lingüística. Llame al [phone].     CHÚ Ý: N?u b?n nói Ti?ng Vi?t, có các d?ch v? h? tr? ngôn ng? mi?n phí dành cho b?n. G?i s? [phone].         [facility] complies with applicable Federal civil rights laws and does not discriminate on the basis of race, color, national origin, age, disability, or sex.

## 2017-02-23 ENCOUNTER — LAB VISIT (OUTPATIENT)
Dept: LAB | Facility: HOSPITAL | Age: 37
End: 2017-02-23
Attending: PSYCHIATRY & NEUROLOGY
Payer: MEDICARE

## 2017-02-23 DIAGNOSIS — E78.5 HYPERLIPIDEMIA LDL GOAL <70: ICD-10-CM

## 2017-02-23 DIAGNOSIS — M32.9 SLE (SYSTEMIC LUPUS ERYTHEMATOSUS RELATED SYNDROME): ICD-10-CM

## 2017-02-23 DIAGNOSIS — I63.411 EMBOLIC STROKE INVOLVING RIGHT MIDDLE CEREBRAL ARTERY: ICD-10-CM

## 2017-02-23 LAB
CHOLEST/HDLC SERPL: 5.1 {RATIO}
HCYS SERPL-SCNC: 26.9 UMOL/L
HDL/CHOLESTEROL RATIO: 19.5 %
HDLC SERPL-MCNC: 164 MG/DL
HDLC SERPL-MCNC: 32 MG/DL
LDLC SERPL CALC-MCNC: 110.8 MG/DL
NONHDLC SERPL-MCNC: 132 MG/DL
TRIGL SERPL-MCNC: 106 MG/DL

## 2017-02-23 PROCEDURE — 86147 CARDIOLIPIN ANTIBODY EA IG: CPT | Mod: 59

## 2017-02-23 PROCEDURE — 86148 ANTI-PHOSPHOLIPID ANTIBODY: CPT | Mod: 91

## 2017-02-23 PROCEDURE — 30000890 MAYO MISCELLANEOUS TEST (REFLEX): Mod: 91

## 2017-02-23 PROCEDURE — 83090 ASSAY OF HOMOCYSTEINE: CPT

## 2017-02-23 PROCEDURE — 83520 IMMUNOASSAY QUANT NOS NONAB: CPT | Mod: 91

## 2017-02-23 PROCEDURE — 86147 CARDIOLIPIN ANTIBODY EA IG: CPT

## 2017-02-23 PROCEDURE — 81241 F5 GENE: CPT

## 2017-02-23 PROCEDURE — 85598 HEXAGNAL PHOSPH PLTLT NEUTRL: CPT

## 2017-02-23 PROCEDURE — 83516 IMMUNOASSAY NONANTIBODY: CPT | Mod: 91

## 2017-02-23 PROCEDURE — 86146 BETA-2 GLYCOPROTEIN ANTIBODY: CPT | Mod: 59

## 2017-02-23 PROCEDURE — 80061 LIPID PANEL: CPT

## 2017-02-23 PROCEDURE — 85300 ANTITHROMBIN III ACTIVITY: CPT

## 2017-02-23 PROCEDURE — 85303 CLOT INHIBIT PROT C ACTIVITY: CPT

## 2017-02-23 PROCEDURE — 85613 RUSSELL VIPER VENOM DILUTED: CPT

## 2017-02-23 PROCEDURE — 85305 CLOT INHIBIT PROT S TOTAL: CPT

## 2017-02-23 PROCEDURE — 81240 F2 GENE: CPT

## 2017-02-24 ENCOUNTER — TELEPHONE (OUTPATIENT)
Dept: NEUROLOGY | Facility: CLINIC | Age: 37
End: 2017-02-24

## 2017-02-24 DIAGNOSIS — R79.83 HOMOCYSTEINEMIA: ICD-10-CM

## 2017-02-24 LAB
CARDIOLIPIN IGG SER IA-ACNC: <9.4 GPL
CARDIOLIPIN IGM SER IA-ACNC: <9.4 MPL

## 2017-02-24 NOTE — TELEPHONE ENCOUNTER
Spoke with patient , informed of recent lab results, verbalized understanding and reports that she has viewed results on the patient portal. Patient to follow up with PCP to monitor liver function as well as the suggested need for statin medication.  Message forwarded to PCP to schedule follow up.

## 2017-02-24 NOTE — TELEPHONE ENCOUNTER
Dr Ribeiro Has referred the patient back to you for f/u of liver function and has suggested the patient start a statin medication due to recent lipid panel results. How do you wish to proceed?

## 2017-02-25 ENCOUNTER — TELEPHONE (OUTPATIENT)
Dept: FAMILY MEDICINE | Facility: CLINIC | Age: 37
End: 2017-02-25

## 2017-02-25 LAB
B2 GLYCOPROT1 IGA SER QL: <9 SAU
B2 GLYCOPROT1 IGG SER QL: <9 SGU
B2 GLYCOPROT1 IGM SER QL: <9 SMU

## 2017-02-26 ENCOUNTER — TELEPHONE (OUTPATIENT)
Dept: PSYCHIATRY | Facility: CLINIC | Age: 37
End: 2017-02-26

## 2017-02-26 NOTE — PROGRESS NOTES
Psychiatry Initial Visit (PhD/LCSW)    Date:   2/21/2017                CPT Code: 57355    Referred by: Sakina Cruz M.D.    Chief complaint/reason for encounter:  Neuropsychological Evaluation    Clinical status of patient:  Outpatient    Met with:  Patient and boyfriend  Brent    History of present illness: Ms. Cat Denson is a 37 year old white  female referred for Neuropsychological Evaluation on an outpatient basis due to subjective memory decline following right MCA CVA in November 2015.  She reported that she forgets to close the car door, forgets to flush the toilet, forgets why she went into a room, and can no longer count money. Her boyfriend Brent reported that is seems that she chiefly forgets or has trouble with things on her left side (left neglect). She reported that she misplaces personal items in the home; forgets conversations and events; has difficulty recalling appointments; gets confused about what day it is; has left food cooking on the stove; needs help managing her medications; needs help managing her finances; loses her train of thought in conversation; and reports word-finding difficulty (not observed).  She does not drive. She reported it seems to be a little better over time as she has learned how to manage it. There is no family history of memory problems/dementia. Ms. Denson has a long prior psychiatric history, beginning in adolescence. She has been hospitalized in psychiatric facilities 6 times in the past. Her last hospitalization was prior to her stroke in 2015. She has had suicidal ideation in the past but denies this currently. She is currently receiving psychiatric care from Dr. Cruz and Mrs. Wiley with a diagnosis of bipolar disorder. She and her boyfriend reported she is currently relatively stable from the emotional perspective. She also denied current depression or bowen but did admit she is a bit anxious. She was pleasant and cooperative in interview.  She will continue to see Dr. Cruz and Mrs. Wiley for psychiatric care. She was encouraged to resume PT, OT, and Speech Therapy for further stroke rehabilitation.    Pain scale: noncontributory    Symptoms:  Mood: denied  Anxiety:  anxious  Substance abuse: smokes MJ daily for her stomach, denies problems with it  Cognitive functioning:  memory decline    Psychiatric history: as above    Medical history: cognitive disorder, embolic stroke involving right MCA, migraine, insomnia, asthma, CKD, hypokalemia, underweight, fibromyalgia, lupus, tachycardia, and tobacco use disorder., CT of the head completed 4/21/2016 revealed encephalomalacia from an old infarct right hemisphere but no acute intracranial findings. MRI of the brain completed on 11/25/2015 revealed multifocal hemispheric infarcts involving the right frontal, right parietal, right occipital and posterior temporal lobes as well as the right basal ganglia including caudate head and globus pallidus, in the MCA territory. MMSE completed in Psychiatry on 11/15/16 yielded a score of 27/30, in the average range.    Family history of psychiatric illness: grandmother with paranoid schizophrenia, mother with depression    Social history (marriage, employment, etc.):  9th grade education with subsequent GED. Worked as a . Disabled. Has not worked since 2001.  once and . No children. Has boyfriend of 2 years, Brent. Lives with mother, boyfriend, brother and his two children.  Enjoys playing games on the computer, reading [employer].     Substance use:   Alcohol: no   Drugs: smokes MJ 5 bowls per day for stomach   Tobacco: ½ ppd   Caffeine: takes ½ caffeine pill per day for headaches    Strengths and Liabilities:   Strength:  Pt is expressive/articulate.   Liability:  Pt has subjective memory loss, bipolar disorder.    Mental Status Exam:  General appearance:  appears stated age, casually dressed, short pink hair, gait abnormality  Speech:   normal rate and tone  Level of cooperation:  cooperative  Thought processes:  logical, goal-directed  Mood:  euthymic  Thought content:  no illusions, no visual hallucinations, no auditory hallucinations, no delusions, no active or passive homicidal thoughts, no active or passive suicidal ideation, no obsessions, no compulsions, no violence  Affect:  appropriate  Orientation:  oriented to person and place, but not to time - off 4 days on day of the month  Memory:  Recent memory:  3 of 3 objects after brief delay.    Remote memory - says she cannot recall things from the past but boyfriend does not see that  Attention span and concentration:  spelled HOUSE forward and backwards  Fund of general knowledge: 4 of 4 recent presidents  Abstract reasoning:    Similarities: abstract.    Proverbs: dont know  Judgment and insight: fair  Language:  intact    Diagnostic impressions:  Cognitive disorder F09  Vascular dementia F01.50  Bipolar 2 disorder F31.81    Plan:  Pt will complete Neuropsychological testing.  Report of Neuropsychological Evaluation will follow. It can be accessed through the Chart Review activity in Epic under the Notes tab.  It will be titled Psych Testing.  She will continue to see Dr. Cruz and Mrs. Wiley for psychiatric care.   She was encouraged to resume PT, OT, and Speech Therapy for further stroke rehabilitation.    Return to clinic:  as scheduled    Length of time:   50 minutes

## 2017-02-26 NOTE — PSYCH TESTING
[facility] [address] [place]  [address]  [phone]    REPORT OF NEUROPSYCHOLOGICAL EVALUATION    NAME: Cat Denson  OC #: [id number]  : 1980    REFERRED BY: Sakina Cruz M.D.    EVALUATED BY:  Court Miller, Ph.D., Clinical Psychologist  Dariel Sena M.S., Psychometrician    DATE OF EVALUATION:     EVALUATION PROCEDURES AND TIME:  Conducted by Psychologist:  Interpretation and report of test data  Review and integration of diagnostic interview, medical record, and test data  Conducted by Technician:  Repeatable Battery for the Assessment of Neuropsychological Status (RBANS)  Temporal Orientation Test  Naming Test from the Neuropsychological Assessment Battery (NAB)  Controlled Oral Word Association Test  Facial Recognition Test  Acevedo Visual Motor Gestalt Test  Wechsler Memory Scale IV Logical Memory & Visual Reproduction Battery (WMS-IV LMVR)  Wisconsin Card Sorting Test - 64 (WCST-64)  Trail Making Test, Parts A & B  Scott Depression Inventory - II (BDI-II)  Scott Anxiety Inventory (JULIET)  Time: CPT Code 74971 - 2 hours; CPT Code 17706 - 2 hours    EVALUATION FINDINGS:  The diagnostic interview revealed Ms. Cat Denson is a 37 year old right-handed white female referred for Neuropsychological Evaluation on an outpatient basis due to subjective memory decline following right MCA CVA in 2015.  She reported that she forgets to close the car door, forgets to flush the toilet, forgets why she went into a room, and can no longer count money. Her boyfriend Brent reported that is seems that she chiefly forgets or has trouble with things on her left side (left neglect). She reported that she misplaces personal items in the home; forgets conversations and events; has difficulty recalling appointments; gets confused about what day it is; has left food cooking on the stove; needs help managing her medications; needs help managing her finances; loses her  train of thought in conversation; and reports word-finding difficulty (not observed).  She does not drive. She reported it seems to be a little better over time as she has learned how to manage it. There is no family history of memory problems/dementia. Ms. Denson has a long prior psychiatric history, beginning in adolescence. She has been hospitalized in psychiatric facilities 6 times in the past. Her last hospitalization was prior to her stroke in 2015. She has had suicidal ideation in the past but denies this currently. She is currently receiving psychiatric care from Dr. Cruz and Mrs. Wiley with a diagnosis of bipolar disorder. She and her boyfriend reported she is currently relatively stable from the emotional perspective. She also denied current depression or bowen but did admit she is a bit anxious. She was pleasant and cooperative in interview. The Mental Status Exam suggested reduced temporal orientation and abstraction ability. She will continue to see Dr. Cruz and Mrs. Wiley for psychiatric care. She was encouraged to resume PT, OT, and Speech Therapy for further stroke rehabilitation.    The medical record also revealed prior medical history of cognitive disorder, embolic stroke involving right MCA, migraine, insomnia, asthma, CKD, hypokalemia, underweight, fibromyalgia, lupus, tachycardia, and tobacco use disorder. CT of the head completed 4/21/2016 revealed encephalomalacia from an old infarct right hemisphere but no acute intracranial findings. MRI of the brain completed on 11/25/2015 revealed multifocal hemispheric infarcts involving the right frontal, right parietal, right occipital and posterior temporal lobes as well as the right basal ganglia including caudate head and globus pallidus, in the MCA territory. MMSE completed in Psychiatry on 11/15/16 yielded a score of 27/30, in the average range.      TEST DATA:  Neuropsychological tests administered by the technician revealed that  the patient reported she has a 9th grade education with subsequent GED. She worked as a . She is disabled and has not worked since 2001. She was alert and cooperative during the evaluation.  Effort on all tests was satisfactory to produce valid results.    Ms. Denson obtained a total score of 74 on the RBANS, which is at the 4th percentile, suggesting moderate impairment in general cognitive functioning.  Five areas were tested.  The Immediate Memory Index revealed mild impairment in the ability to remember verbal information immediately after it is presented, with a score of 85 at the 16th percentile.  The Visuospatial/Constructional Index revealed moderate impairment in the ability to perceive spatial relations and to construct a spatially accurate copy of a drawing, with a score of 69 at the 2nd percentile. Visuospatial perception and constructional ability were moderately impaired. The Language Index revealed mild impairment in the ability to respond verbally to either naming or retrieving learned material, with a score of 82 at the 12th percentile. Naming was average. Verbal fluency was moderately impaired. Attention, or the capacity to remember and manipulate both visually and orally presented information in short-term storage, was severely impaired, with a score of 64 at the 1st percentile. Auditory attention was average. Visual attention was severely impaired. Delayed memory, or anterograde memory capacity, was average, with a score of 97 at the 42nd percentile.  Subtest performances revealed high average list recall, average list recognition and story recall, and mildly impaired figure recall. For reference, the expected average score on each index is 100, which is at the 50th percentile.    The Temporal Orientation Test indicated she was oriented to day of the week, month, year, and time of day but not to day of the month (4 days off).  Her score on this measure suggested moderate impairment in  temporal orientation.    Naming, as assessed by the NAB Test, was above average.  Verbal fluency, as assessed by the Controlled Oral Word Association Test, was in the low average range.    Performance on the Facial Recognition Test suggested no prosopagnosia was present, as her score was in the average range.  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    The WMS-IV LMVR was administered to further assess memory.  Her Immediate Memory Index of 88 was in the low average range and her Delayed Memory Index of 70 was in the moderately impaired range. Her Auditory Memory Index of 102 was in the average range and her Visual Memory Index of 55 was in the severely impaired range. The expected average score for each index is 100. Scaled scores of the memory indexes revealed high average immediate auditory memory, average delayed auditory memory, moderately impaired immediate visual memory, and severely impaired delayed visual memory.    The WCST-64 was administered to assess abstract reasoning and conceptualization.  Her categories completed score (2) was in the mildly impaired range. Her total errors score (29) was in the mildly impaired range and her perseverative errors score (22) was in the mildly to moderately impaired range.  Her performance suggested mildly impaired abstract reasoning skills.    Her score on the Trail Making Test, Part A, (77 seconds for completion) indicated that psychomotor speed was in the severely impaired range.  Her score on Part B (222 seconds for completion) indicated that her ability to handle complex relationships which require rapid change of set, or mental flexibility, was in the severely impaired range.    The BDI-II was administered to assess for depression.  Her score of 15 suggested mild depression was present.  The JULIET was administered to assess for anxiety.  Her score of 6 suggested minimal anxiety was  present.    SUMMARY AND RECOMMENDATIONS:  Ms. Denson was referred for Neuropsychological Evaluation on an outpatient basis due to subjective memory decline following right MCA CVA in November 2015.  Her general cognitive abilities as assessed by the RBANS were in the moderately impaired range, with average delayed memory (with mildly impaired delayed visual memory); mildly impaired immediate verbal memory and language (with moderately impaired verbal fluency); moderately impaired visuospatial/constructional abilities; and severely impaired attention.  Further assessment of specific cognitive abilities revealed no deficits in naming, verbal fluency, and facial recognition; but temporal orientation, constructional ability, abstract reasoning, psychomotor speed, and mental flexibility were impaired.  Additional memory assessment revealed high average immediate auditory memory, average delayed auditory memory, moderately impaired immediate visual memory, and severely impaired delayed visual memory.  Personality test data suggested the presence of mild depression.  Neuropsychological test results reveal impairment in immediate and delayed visual memory, visual attention, temporal orientation, visuospatial/constructional abilities, abstract reasoning, psychomotor speed, and mental flexibility; and variability in immediate auditory/verbal memory (high average and mildly impaired performances) and verbal fluency (low average and moderately impaired performances); with mild depression. The pattern of impairment is consistent with right MCA CVA. She will continue to see Dr. Cruz and Mrs. Wiley for psychiatric care. She was encouraged to resume PT, OT, and Speech Therapy for further stroke rehabilitation.        Interpretation and report and coding were completed on 2/26/2017.

## 2017-02-27 LAB
APTT HEX PL PPP: NEGATIVE S
AT III ACT/NOR PPP CHRO: 105 %
DEPRECATED CARDIOLIPIN IGA SER: <9 APL
F2 GENE MUT ANL BLD/T: NORMAL
F5 P.R506Q BLD/T QL: NORMAL

## 2017-02-28 LAB — MAYO MISCELLANEOUS RESULT (REF): NORMAL

## 2017-03-01 ENCOUNTER — HOSPITAL ENCOUNTER (INPATIENT)
Facility: HOSPITAL | Age: 37
LOS: 2 days | Discharge: HOME | DRG: 918 | End: 2017-03-04
Attending: EMERGENCY MEDICINE | Admitting: HOSPITALIST
Payer: COMMERCIAL

## 2017-03-01 ENCOUNTER — TELEPHONE (OUTPATIENT)
Dept: FAMILY MEDICINE | Facility: CLINIC | Age: 37
End: 2017-03-01

## 2017-03-01 DIAGNOSIS — M32.19 SYSTEMIC LUPUS ERYTHEMATOSUS WITH OTHER ORGAN INVOLVEMENT, UNSPECIFIED SLE TYPE: ICD-10-CM

## 2017-03-01 DIAGNOSIS — R41.82 ALTERED MENTAL STATUS: ICD-10-CM

## 2017-03-01 DIAGNOSIS — R79.1 SUPRATHERAPEUTIC INR: Primary | ICD-10-CM

## 2017-03-01 DIAGNOSIS — F31.81 BIPOLAR 2 DISORDER: Chronic | ICD-10-CM

## 2017-03-01 DIAGNOSIS — R79.1 SUPRATHERAPEUTIC INTERNATIONAL NORMALIZED RATIO (INR): ICD-10-CM

## 2017-03-01 DIAGNOSIS — K92.0 HEMATEMESIS WITHOUT NAUSEA: ICD-10-CM

## 2017-03-01 LAB — LA PPP-IMP: NEGATIVE

## 2017-03-01 PROCEDURE — 99285 EMERGENCY DEPT VISIT HI MDM: CPT | Mod: 25

## 2017-03-01 PROCEDURE — 20000000 HC ICU ROOM

## 2017-03-01 NOTE — TELEPHONE ENCOUNTER
----- Message from Camelia Sanderson sent at 3/1/2017  8:27 AM CST -----  Contact: Arabella Melendez called regarding the patient receiving an email to schedule for nausea. Wanted to schedule for today if possible. Please contact [phone]

## 2017-03-01 NOTE — IP AVS SNAPSHOT
[address] [place] [address]  Phone: [phone]           Patient Discharge Instructions     Our goal is to set you up for success. This packet includes information on your condition, medications, and your home care. It will help you to care for yourself so you don't get sicker and need to go back to the hospital.     Please ask your nurse if you have any questions.        There are many details to remember when preparing to leave the hospital. Here is what you will need to do:    1. Take your medicine. If you are prescribed medications, review your Medication List in the following pages. You may have new medications to  at the pharmacy and others that you'll need to stop taking. Review the instructions for how and when to take your medications. Talk with your doctor or nurses if you are unsure of what to do.     2. Go to your follow-up appointments. Specific follow-up information is listed in the following pages. Your may be contacted by a transition nurse or clinical provider about future appointments. Be sure we have all of the phone numbers to reach you, if needed. Please contact your provider's office if you are unable to make an appointment.     3. Watch for warning signs. Your doctor or nurse will give you detailed warning signs to watch for and when to call for assistance. These instructions may also include educational information about your condition. If you experience any of warning signs to your health, call your doctor.               ** Verify the list of medication(s) below is accurate and up to date. Carry this with you in case of emergency. If your medications have changed, please notify your healthcare provider.             Medication List      START taking these medications        Additional Info                      rivaroxaban 20 mg Tab   Commonly known as:  XARELTO   Quantity:  30 tablet   Refills:  2   Dose:  20 mg    Instructions:  Take  1 tablet (20 mg total) by mouth daily with dinner or evening meal.     Begin Date    AM    Noon    PM    Bedtime         CONTINUE taking these medications        Additional Info                      baclofen 10 MG tablet   Commonly known as:  LIORESAL   Quantity:  60 tablet   Refills:  3    Instructions:  Take one to two tablet before bedtime for muscle spasms and cramps as needed     Begin Date    AM    Noon    PM    Bedtime       cetirizine 10 MG tablet   Commonly known as:  ZYRTEC   Refills:  0   Dose:  10 mg    Instructions:  Take 1 tablet (10 mg total) by mouth once daily.     Begin Date    AM    Noon    PM    Bedtime       chlorhexidine 0.12 % solution   Commonly known as:  PERIDEX   Refills:  6    Instructions:  RINSE WITH 1/2 OZ BID AFTER BRUSHING AND FLOSSING     Begin Date    AM    Noon    PM    Bedtime       clobetasol 0.05% 0.05 % Oint   Commonly known as:  TEMOVATE   Quantity:  30 g   Refills:  0    Instructions:  Apply topically 2 (two) times daily.     Begin Date    AM    Noon    PM    Bedtime       clonazePAM 0.5 MG tablet   Commonly known as:  KLONOPIN   Quantity:  45 tablet   Refills:  1    Last time this was given:  0.5 mg on 3/4/2017  1:00 AM   Instructions:  Take one tablet PO BID PRN anxiety     Begin Date    AM    Noon    PM    Bedtime       escitalopram oxalate 20 MG tablet   Commonly known as:  LEXAPRO   Quantity:  30 tablet   Refills:  2   Dose:  20 mg    Last time this was given:  20 mg on 3/4/2017  8:25 AM   Instructions:  Take 1 tablet (20 mg total) by mouth once daily.     Begin Date    AM    Noon    PM    Bedtime       gabapentin 800 MG tablet   Commonly known as:  NEURONTIN   Quantity:  60 tablet   Refills:  3   Dose:  800 mg    Instructions:  Take 1 tablet (800 mg total) by mouth 2 (two) times daily.     Begin Date    AM    Noon    PM    Bedtime       hydroxychloroquine 200 mg tablet   Commonly known as:  PLAQUENIL   Quantity:  60 tablet   Refills:  0    Last time this was given:   200 mg on 3/4/2017  8:25 AM   Instructions:  TAKE 1 TABLET BY MOUTH TWICE DAILY     Begin Date    AM    Noon    PM    Bedtime       meloxicam 15 MG tablet   Commonly known as:  MOBIC   Quantity:  30 tablet   Refills:  0    Instructions:  TAKE 1 TABLET(15 MG) BY MOUTH DAILY AS NEEDED FOR PAIN     Begin Date    AM    Noon    PM    Bedtime       methylphenidate 5 MG tablet   Commonly known as:  RITALIN   Quantity:  75 tablet   Refills:  0    Instructions:  Take one tablet PO twice daily with meals. May take additional one-half to one tablet daily by 4 pm as needed for attention/focus     Begin Date    AM    Noon    PM    Bedtime       metoprolol succinate 25 MG 24 hr tablet   Commonly known as:  TOPROL-XL   Quantity:  30 tablet   Refills:  5   Dose:  25 mg    Last time this was given:  25 mg on 3/2/2017 11:27 AM   Instructions:  Take 1 tablet (25 mg total) by mouth once daily.     Begin Date    AM    Noon    PM    Bedtime       omeprazole 40 MG capsule   Commonly known as:  PRILOSEC   Quantity:  30 capsule   Refills:  5    Instructions:  TAKE 1 CAPSULE(40 MG) BY MOUTH EVERY MORNING     Begin Date    AM    Noon    PM    Bedtime       ondansetron 8 MG tablet   Commonly known as:  ZOFRAN   Quantity:  15 tablet   Refills:  1   Dose:  8 mg    Instructions:  Take 1 tablet (8 mg total) by mouth every 8 (eight) hours as needed for Nausea.     Begin Date    AM    Noon    PM    Bedtime       PRENATAL VITAMIN 1+1 ORAL   Refills:  0    Instructions:  Take by mouth once daily. No vitamin K     Begin Date    AM    Noon    PM    Bedtime       PREVIDENT 5000 BOOSTER PLUS 1.1 % Pste   Refills:  4   Generic drug:  sodium fluoride    Instructions:  BRUSH ON NIGHTLY AFTER NORMAL HYGIENE REGIMAN. SPIT OUT EXCESS DO NOT RINSE     Begin Date    AM    Noon    PM    Bedtime       ranitidine 300 MG tablet   Commonly known as:  ZANTAC   Quantity:  30 tablet   Refills:  3   Dose:  300 mg    Instructions:  Take 1 tablet (300 mg total) by mouth  once daily.     Begin Date    AM    Noon    PM    Bedtime       risperidone 0.5 MG Tab   Commonly known as:  RISPERDAL   Quantity:  30 tablet   Refills:  2   Dose:  0.5 mg    Last time this was given:  0.5 mg on 3/3/2017  9:41 PM   Instructions:  Take 1 tablet (0.5 mg total) by mouth every evening.     Begin Date    AM    Noon    PM    Bedtime       tamsulosin 0.4 mg Cp24   Commonly known as:  FLOMAX   Quantity:  30 capsule   Refills:  3   Dose:  0.4 mg    Instructions:  Take 1 capsule (0.4 mg total) by mouth once daily.     Begin Date    AM    Noon    PM    Bedtime       trazodone 50 MG tablet   Commonly known as:  DESYREL   Quantity:  30 tablet   Refills:  1    Last time this was given:  50 mg on 3/2/2017  8:20 PM   Instructions:  Take one-half to one tablet PO nightly PRN insomnia     Begin Date    AM    Noon    PM    Bedtime       VITAMIN B-12 1,000 mcg Subl   Refills:  0   Generic drug:  cyanocobalamin (vitamin B-12)    Instructions:  Place under the tongue once daily.     Begin Date    AM    Noon    PM    Bedtime       WELCHOL 625 mg tablet   Quantity:  180 tablet   Refills:  0   Generic drug:  colesevelam    Last time this was given:  625 mg on 3/4/2017  8:25 AM   Instructions:  TAKE 1 TABLET BY MOUTH EVERY DAY INCREASE BY ONE PILL EVERY 3 TO 4 DAYS UNTIL DESIRED CONTROL IS REACHED     Begin Date    AM    Noon    PM    Bedtime         STOP taking these medications     warfarin 1 MG tablet   Commonly known as:  COUMADIN       warfarin 2 MG tablet   Commonly known as:  COUMADIN            Where to Get Your Medications      These medications were sent to [id number]  SIMRAN AGUSTIN - [address] AT [facility] [address]  [address] [address]     Phone:  [phone]     rivaroxaban 20 mg Tab                  Please bring to all follow up appointments:    1. A copy of your discharge instructions.  2. All medicines you are currently taking in their original  bottles.  3. Identification and insurance card.    Please arrive 15 minutes ahead of scheduled appointment time.    Please call 24 hours in advance if you must reschedule your appointment and/or time.        Your Scheduled Appointments     Mar 14, 2017  9:15 AM CDT   Consult with MD [facility] - Urology ([place] [facility])    [address]  [place] [address]   [phone]            Mar 17, 2017  8:30 AM CDT   Anticoagulation with [facility]ard [facility] - Coumadin ([place] [facility])    [address]  [place] [address]   [phone]            May 23, 2017  1:00 PM CDT   Established Patient Visit with DO Zhen Ruby - Neurology ([place])    [address] [facility] [address] [place] [address]   [phone]              Follow-up Information     Follow up with Orestes Ribeiro DO In 1 week.    Specialty:  Neurology    Contact information:    [address] [facility] [place] [address]  [phone]          Follow up with Sakina Cruz MD In 1 week.    Specialty:  Psychiatry    Contact information:    [address]  [place] [address]  [phone]          Follow up with Dayton Dukes MD.    Specialty:  Family Medicine    Why:  As needed    Contact information:    [address]  [address]  [place] [address]  [phone]          Discharge Instructions     Future Orders    Activity as tolerated     Diet general     Questions:    Total calories:      Fat restriction, if any:      Protein restriction, if any:      Na restriction, if any:      Fluid restriction:      Additional restrictions:          Primary Diagnosis     Your primary diagnosis was:  Altered Mental Status      Admission Information     Date & Time Provider Department CSN    3/1/2017 11:39 PM Neela Sifuentes MD [facility] [id number]      Care Providers     Provider Role Specialty Primary office phone    Neela Sifuentes MD Attending Provider  "Hospitalist [phone]    Jeovanny Lobo MD Consulting Physician  Psychiatry [phone]    Orestes Ribeiro DO Consulting Physician  Neurology [phone]      Your Vitals Were     BP Pulse Temp Resp Height Weight    144/81 75 97.6 °F (36.4 °C) 34 5' 6" (1.676 m) 46.7 kg (103 lb)    Last Period SpO2 BMI          (LMP Unknown) 98% 16.62 kg/m2        Recent Lab Values        11/26/2015 12/1/2015                        4:35 AM  7:14 AM          A1C 5.0 5.0                     Pending Labs     Order Current Status    Phosphatidylethanol (PETH) In process    VDRL, CSF In process    Vitamin B1 In process    West Nile Virus by PCR, CSF In process    CSF culture Preliminary result    Prepare Fresh Frozen Plasma 4 Units Preliminary result      Allergies as of 3/4/2017        Reactions    Amoxil [Amoxicillin]     hives    Augmentin [Amoxicillin-pot Clavulanate]     Avelox [Moxifloxacin]     itching      [facility]     [facility] Nurse Care Line - 24/7 Assistance  Unless otherwise directed by your provider, please contact [facility], our nurse care line that is available for 24/7 assistance.     Registered nurses in the [facility] provide clinical advisement, health education, appointment booking, and other advisory services.  Call for this free service at [phone].        Advance Directives     An advance directive is a document which, in the event you are no longer able to make decisions for yourself, tells your healthcare team what kind of treatment you do or do not want to receive, or who you would like to make those decisions for you.  If you do not currently have an advance directive, [facility] encourages you to create one.  For more information call:  [phone]), [phone] ([phone]),  or log on to [email or web address].        Language Assistance Services     ATTENTION: Language assistance services are available, free of charge. Please call " [phone].      ATENCIÓN: Si habla myron, tiene a couch disposición servicios gratuitos de asistencia lingüística. Babita al [phone].     [facility] Ý: N?u b?n nói Ti?ng Vi?t, có các d?ch v? h? tr? ngôn ng? mi?n phí dành cho b?n. G?i s? [phone].        Blood Transfusion Reaction Signs and Symptoms     The blood you have received has been matched for you as carefully as possible. Most patients who receive a blood transfusion do not experience problems. However, there can be a delayed reaction that happens a few weeks after your blood transfusion. Contact your physician immediately if you experience any NEW SYMPTOMS listed below:     Fever greater than 100.4 degrees    Chills   Yellow color to your skin or eyes(Jaundice)   Back pain, chest pain, or pain at the infusion site   Weakness (more than usual)   Discomfort or uneasiness more than usual (Malaise)   Nausea or vomiting   Shortness of breath, wheezing, or coughing   Higher or lower blood pressure than normal   Skin rash, itching, skin redness, or localized swelling (example: hands or feet)   Urinating less than normal   Urine appears reddish or orange and is darker than normal      Remember that some these signs may already exist for you--such as having chronic back pain or high blood pressure. You only need to look for and report to your doctor any new occurrences since your blood transfusion that are of concern.        Stroke Education              Chronic Kindey Disease Education             X[place]elto Information            [facility] complies with applicable Federal civil rights laws and does not discriminate on the basis of race, color, national origin, age, disability, or sex.

## 2017-03-02 ENCOUNTER — TELEPHONE (OUTPATIENT)
Dept: PSYCHIATRY | Facility: CLINIC | Age: 37
End: 2017-03-02

## 2017-03-02 ENCOUNTER — TELEPHONE (OUTPATIENT)
Dept: NEUROLOGY | Facility: CLINIC | Age: 37
End: 2017-03-02

## 2017-03-02 PROBLEM — E87.20 METABOLIC ACIDOSIS: Status: ACTIVE | Noted: 2017-03-02

## 2017-03-02 PROBLEM — R41.82 ALTERED MENTAL STATUS: Status: ACTIVE | Noted: 2017-03-02

## 2017-03-02 PROBLEM — R79.1 SUPRATHERAPEUTIC INTERNATIONAL NORMALIZED RATIO (INR): Status: ACTIVE | Noted: 2017-03-02

## 2017-03-02 LAB
ABO + RH BLD: NORMAL
ALBUMIN SERPL BCP-MCNC: 4.9 G/DL
ALP SERPL-CCNC: 81 U/L
ALT SERPL W/O P-5'-P-CCNC: 14 U/L
AMMONIA PLAS-SCNC: 29 UMOL/L
AMPHET+METHAMPHET UR QL: NEGATIVE
ANION GAP SERPL CALC-SCNC: 16 MMOL/L
APAP SERPL-MCNC: <3 UG/ML
APTT PROTEIN C ACTIVATOR+FV DP/APTT PPP: 12 %
AST SERPL-CCNC: 16 U/L
B-HCG UR QL: NEGATIVE
BARBITURATES UR QL SCN>200 NG/ML: NEGATIVE
BASOPHILS # BLD AUTO: 0 K/UL
BASOPHILS # BLD AUTO: 0 K/UL
BASOPHILS NFR BLD: 0.7 %
BASOPHILS NFR BLD: 0.7 %
BENZODIAZ UR QL SCN>200 NG/ML: NEGATIVE
BILIRUB SERPL-MCNC: 0.3 MG/DL
BILIRUB UR QL STRIP: ABNORMAL
BLD GP AB SCN CELLS X3 SERPL QL: NORMAL
BLD PROD TYP BPU: NORMAL
BLOOD UNIT EXPIRATION DATE: NORMAL
BLOOD UNIT TYPE CODE: 1700
BLOOD UNIT TYPE CODE: 7300
BLOOD UNIT TYPE: NORMAL
BUN SERPL-MCNC: 16 MG/DL
BZE UR QL SCN: NEGATIVE
CALCIUM SERPL-MCNC: 9.8 MG/DL
CANNABINOIDS UR QL SCN: NORMAL
CHLORIDE SERPL-SCNC: 105 MMOL/L
CLARITY UR: CLEAR
CO2 SERPL-SCNC: 17 MMOL/L
CODING SYSTEM: NORMAL
COLOR UR: YELLOW
CREAT SERPL-MCNC: 2.3 MG/DL
CREAT UR-MCNC: 146 MG/DL
CTP QC/QA: YES
DIFFERENTIAL METHOD: ABNORMAL
DIFFERENTIAL METHOD: NORMAL
DISPENSE STATUS: NORMAL
EOSINOPHIL # BLD AUTO: 0 K/UL
EOSINOPHIL # BLD AUTO: 0 K/UL
EOSINOPHIL NFR BLD: 0.4 %
EOSINOPHIL NFR BLD: 0.7 %
ERYTHROCYTE [DISTWIDTH] IN BLOOD BY AUTOMATED COUNT: 13.1 %
ERYTHROCYTE [DISTWIDTH] IN BLOOD BY AUTOMATED COUNT: 13.2 %
EST. GFR  (AFRICAN AMERICAN): 31 ML/MIN/1.73 M^2
EST. GFR  (NON AFRICAN AMERICAN): 27 ML/MIN/1.73 M^2
ETHANOL SERPL-MCNC: <10 MG/DL
GLUCOSE SERPL-MCNC: 117 MG/DL
GLUCOSE UR QL STRIP: NEGATIVE
HCT VFR BLD AUTO: 39.1 %
HCT VFR BLD AUTO: 40.8 %
HGB BLD-MCNC: 13.1 G/DL
HGB BLD-MCNC: 13.3 G/DL
HGB UR QL STRIP: NEGATIVE
INR PPP: 8.1
INR PPP: 8.6
KETONES UR QL STRIP: ABNORMAL
LEUKOCYTE ESTERASE UR QL STRIP: NEGATIVE
LYMPHOCYTES # BLD AUTO: 1.4 K/UL
LYMPHOCYTES # BLD AUTO: 1.4 K/UL
LYMPHOCYTES NFR BLD: 22.5 %
LYMPHOCYTES NFR BLD: 24.1 %
MAYO MISCELLANEOUS RESULT (REF): NORMAL
MCH RBC QN AUTO: 29.5 PG
MCH RBC QN AUTO: 29.8 PG
MCHC RBC AUTO-ENTMCNC: 32.5 %
MCHC RBC AUTO-ENTMCNC: 33.4 %
MCV RBC AUTO: 89 FL
MCV RBC AUTO: 91 FL
METHADONE UR QL SCN>300 NG/ML: NEGATIVE
MONOCYTES # BLD AUTO: 0.2 K/UL
MONOCYTES # BLD AUTO: 0.4 K/UL
MONOCYTES NFR BLD: 3.5 %
MONOCYTES NFR BLD: 6.9 %
NEUTROPHILS # BLD AUTO: 3.8 K/UL
NEUTROPHILS # BLD AUTO: 4.5 K/UL
NEUTROPHILS NFR BLD: 67.6 %
NEUTROPHILS NFR BLD: 72.9 %
NITRITE UR QL STRIP: NEGATIVE
NUM UNITS TRANS FFP: NORMAL
OPIATES UR QL SCN: NEGATIVE
PCP UR QL SCN>25 NG/ML: NEGATIVE
PH UR STRIP: 6 [PH] (ref 5–8)
PLATELET # BLD AUTO: 158 K/UL
PLATELET # BLD AUTO: 171 K/UL
PMV BLD AUTO: 10.4 FL
PMV BLD AUTO: 10.9 FL
POTASSIUM SERPL-SCNC: 4.4 MMOL/L
PROT S ACT/NOR PPP: 26 %
PROT SERPL-MCNC: 8 G/DL
PROT UR QL STRIP: NEGATIVE
PROTHROMBIN TIME: 78 SEC
PROTHROMBIN TIME: 83.4 SEC
RBC # BLD AUTO: 4.38 M/UL
RBC # BLD AUTO: 4.51 M/UL
SALICYLATES SERPL-MCNC: <5 MG/DL
SODIUM SERPL-SCNC: 138 MMOL/L
SP GR UR STRIP: 1.02 (ref 1–1.03)
TOXICOLOGY INFORMATION: NORMAL
TSH SERPL DL<=0.005 MIU/L-ACNC: 1.55 UIU/ML
URN SPEC COLLECT METH UR: ABNORMAL
UROBILINOGEN UR STRIP-ACNC: NEGATIVE EU/DL
WBC # BLD AUTO: 5.6 K/UL
WBC # BLD AUTO: 6.2 K/UL

## 2017-03-02 PROCEDURE — 63600175 PHARM REV CODE 636 W HCPCS: Performed by: PSYCHIATRY & NEUROLOGY

## 2017-03-02 PROCEDURE — 85610 PROTHROMBIN TIME: CPT | Mod: 91

## 2017-03-02 PROCEDURE — 86901 BLOOD TYPING SEROLOGIC RH(D): CPT

## 2017-03-02 PROCEDURE — 63600175 PHARM REV CODE 636 W HCPCS: Performed by: HOSPITALIST

## 2017-03-02 PROCEDURE — P9011 BLOOD SPLIT UNIT: HCPCS

## 2017-03-02 PROCEDURE — 81025 URINE PREGNANCY TEST: CPT | Performed by: EMERGENCY MEDICINE

## 2017-03-02 PROCEDURE — 36430 TRANSFUSION BLD/BLD COMPNT: CPT

## 2017-03-02 PROCEDURE — 82140 ASSAY OF AMMONIA: CPT

## 2017-03-02 PROCEDURE — 80320 DRUG SCREEN QUANTALCOHOLS: CPT

## 2017-03-02 PROCEDURE — 84443 ASSAY THYROID STIM HORMONE: CPT

## 2017-03-02 PROCEDURE — 99232 SBSQ HOSP IP/OBS MODERATE 35: CPT | Mod: ,,, | Performed by: INTERNAL MEDICINE

## 2017-03-02 PROCEDURE — 85025 COMPLETE CBC W/AUTO DIFF WBC: CPT | Mod: 91

## 2017-03-02 PROCEDURE — 86985 SPLIT BLOOD OR PRODUCTS: CPT

## 2017-03-02 PROCEDURE — 36415 COLL VENOUS BLD VENIPUNCTURE: CPT

## 2017-03-02 PROCEDURE — 81003 URINALYSIS AUTO W/O SCOPE: CPT

## 2017-03-02 PROCEDURE — C9113 INJ PANTOPRAZOLE SODIUM, VIA: HCPCS | Performed by: HOSPITALIST

## 2017-03-02 PROCEDURE — 85610 PROTHROMBIN TIME: CPT

## 2017-03-02 PROCEDURE — 86900 BLOOD TYPING SEROLOGIC ABO: CPT

## 2017-03-02 PROCEDURE — 99291 CRITICAL CARE FIRST HOUR: CPT | Mod: ,,, | Performed by: PSYCHIATRY & NEUROLOGY

## 2017-03-02 PROCEDURE — 80053 COMPREHEN METABOLIC PANEL: CPT

## 2017-03-02 PROCEDURE — 84425 ASSAY OF VITAMIN B-1: CPT

## 2017-03-02 PROCEDURE — 80307 DRUG TEST PRSMV CHEM ANLYZR: CPT | Mod: 59

## 2017-03-02 PROCEDURE — 25000003 PHARM REV CODE 250: Performed by: PHYSICIAN ASSISTANT

## 2017-03-02 PROCEDURE — 25000003 PHARM REV CODE 250: Performed by: HOSPITALIST

## 2017-03-02 PROCEDURE — 97802 MEDICAL NUTRITION INDIV IN: CPT | Performed by: DIETITIAN, REGISTERED

## 2017-03-02 PROCEDURE — 80329 ANALGESICS NON-OPIOID 1 OR 2: CPT

## 2017-03-02 PROCEDURE — P9017 PLASMA 1 DONOR FRZ W/IN 8 HR: HCPCS

## 2017-03-02 PROCEDURE — 63600175 PHARM REV CODE 636 W HCPCS: Performed by: PHYSICIAN ASSISTANT

## 2017-03-02 PROCEDURE — 25000003 PHARM REV CODE 250: Performed by: PSYCHIATRY & NEUROLOGY

## 2017-03-02 PROCEDURE — 20000000 HC ICU ROOM

## 2017-03-02 PROCEDURE — 63600175 PHARM REV CODE 636 W HCPCS: Performed by: INTERNAL MEDICINE

## 2017-03-02 PROCEDURE — 82570 ASSAY OF URINE CREATININE: CPT

## 2017-03-02 RX ORDER — HYDROXYCHLOROQUINE SULFATE 200 MG/1
200 TABLET, FILM COATED ORAL 2 TIMES DAILY
Status: DISCONTINUED | OUTPATIENT
Start: 2017-03-02 | End: 2017-03-04 | Stop reason: HOSPADM

## 2017-03-02 RX ORDER — WARFARIN 1 MG/1
1 TABLET ORAL DAILY
Status: DISCONTINUED | OUTPATIENT
Start: 2017-03-02 | End: 2017-03-02

## 2017-03-02 RX ORDER — SODIUM CHLORIDE 9 MG/ML
1000 INJECTION, SOLUTION INTRAVENOUS CONTINUOUS
Status: ACTIVE | OUTPATIENT
Start: 2017-03-02 | End: 2017-03-03

## 2017-03-02 RX ORDER — ZIPRASIDONE MESYLATE 20 MG/ML
10 INJECTION, POWDER, LYOPHILIZED, FOR SOLUTION INTRAMUSCULAR EVERY 12 HOURS PRN
Status: DISCONTINUED | OUTPATIENT
Start: 2017-03-02 | End: 2017-03-04

## 2017-03-02 RX ORDER — CLONAZEPAM 0.5 MG/1
0.5 TABLET ORAL 2 TIMES DAILY PRN
Status: DISCONTINUED | OUTPATIENT
Start: 2017-03-02 | End: 2017-03-04 | Stop reason: HOSPADM

## 2017-03-02 RX ORDER — RISPERIDONE 0.25 MG/1
0.5 TABLET ORAL NIGHTLY
Status: DISCONTINUED | OUTPATIENT
Start: 2017-03-02 | End: 2017-03-04 | Stop reason: HOSPADM

## 2017-03-02 RX ORDER — ONDANSETRON 2 MG/ML
4 INJECTION INTRAMUSCULAR; INTRAVENOUS EVERY 6 HOURS PRN
Status: DISCONTINUED | OUTPATIENT
Start: 2017-03-02 | End: 2017-03-04

## 2017-03-02 RX ORDER — PANTOPRAZOLE SODIUM 40 MG/10ML
40 INJECTION, POWDER, LYOPHILIZED, FOR SOLUTION INTRAVENOUS DAILY
Status: DISCONTINUED | OUTPATIENT
Start: 2017-03-02 | End: 2017-03-03

## 2017-03-02 RX ORDER — PHYTONADIONE 10 MG/ML
10 INJECTION, EMULSION INTRAMUSCULAR; INTRAVENOUS; SUBCUTANEOUS ONCE
Status: COMPLETED | OUTPATIENT
Start: 2017-03-03 | End: 2017-03-03

## 2017-03-02 RX ORDER — TRAZODONE HYDROCHLORIDE 50 MG/1
50 TABLET ORAL NIGHTLY PRN
Status: DISCONTINUED | OUTPATIENT
Start: 2017-03-02 | End: 2017-03-04 | Stop reason: HOSPADM

## 2017-03-02 RX ORDER — AMOXICILLIN 250 MG
1 CAPSULE ORAL 2 TIMES DAILY PRN
Status: DISCONTINUED | OUTPATIENT
Start: 2017-03-02 | End: 2017-03-04 | Stop reason: HOSPADM

## 2017-03-02 RX ORDER — METOPROLOL SUCCINATE 25 MG/1
25 TABLET, EXTENDED RELEASE ORAL DAILY
Status: DISCONTINUED | OUTPATIENT
Start: 2017-03-02 | End: 2017-03-04

## 2017-03-02 RX ORDER — ACETAMINOPHEN 325 MG/1
650 TABLET ORAL EVERY 6 HOURS PRN
Status: DISCONTINUED | OUTPATIENT
Start: 2017-03-02 | End: 2017-03-04 | Stop reason: HOSPADM

## 2017-03-02 RX ORDER — ESCITALOPRAM OXALATE 10 MG/1
20 TABLET ORAL DAILY
Status: DISCONTINUED | OUTPATIENT
Start: 2017-03-02 | End: 2017-03-04 | Stop reason: HOSPADM

## 2017-03-02 RX ORDER — COLESEVELAM 180 1/1
625 TABLET ORAL
Status: DISCONTINUED | OUTPATIENT
Start: 2017-03-02 | End: 2017-03-04 | Stop reason: HOSPADM

## 2017-03-02 RX ORDER — GABAPENTIN 400 MG/1
800 CAPSULE ORAL 2 TIMES DAILY
Status: DISCONTINUED | OUTPATIENT
Start: 2017-03-02 | End: 2017-03-04 | Stop reason: HOSPADM

## 2017-03-02 RX ORDER — PHYTONADIONE 10 MG/ML
10 INJECTION, EMULSION INTRAMUSCULAR; INTRAVENOUS; SUBCUTANEOUS ONCE
Status: COMPLETED | OUTPATIENT
Start: 2017-03-02 | End: 2017-03-02

## 2017-03-02 RX ADMIN — GABAPENTIN 800 MG: 400 CAPSULE ORAL at 11:03

## 2017-03-02 RX ADMIN — RISPERIDONE 0.5 MG: 0.25 TABLET, FILM COATED ORAL at 08:03

## 2017-03-02 RX ADMIN — THIAMINE HYDROCHLORIDE 100 MG: 100 INJECTION, SOLUTION INTRAMUSCULAR; INTRAVENOUS at 06:03

## 2017-03-02 RX ADMIN — SODIUM CHLORIDE 1000 ML: 0.9 INJECTION, SOLUTION INTRAVENOUS at 09:03

## 2017-03-02 RX ADMIN — GABAPENTIN 800 MG: 400 CAPSULE ORAL at 08:03

## 2017-03-02 RX ADMIN — PHYTONADIONE 10 MG: 10 INJECTION, EMULSION INTRAMUSCULAR; INTRAVENOUS; SUBCUTANEOUS at 11:03

## 2017-03-02 RX ADMIN — PANTOPRAZOLE SODIUM 40 MG: 40 INJECTION, POWDER, FOR SOLUTION INTRAVENOUS at 10:03

## 2017-03-02 RX ADMIN — TRAZODONE HYDROCHLORIDE 50 MG: 50 TABLET ORAL at 08:03

## 2017-03-02 RX ADMIN — ONDANSETRON 4 MG: 2 INJECTION INTRAMUSCULAR; INTRAVENOUS at 07:03

## 2017-03-02 RX ADMIN — ZIPRASIDONE MESYLATE 10 MG: 20 INJECTION, POWDER, LYOPHILIZED, FOR SOLUTION INTRAMUSCULAR at 09:03

## 2017-03-02 RX ADMIN — ESCITALOPRAM OXALATE 20 MG: 10 TABLET ORAL at 11:03

## 2017-03-02 RX ADMIN — HYDROXYCHLOROQUINE SULFATE 200 MG: 200 TABLET, FILM COATED ORAL at 11:03

## 2017-03-02 RX ADMIN — HYDROXYCHLOROQUINE SULFATE 200 MG: 200 TABLET, FILM COATED ORAL at 08:03

## 2017-03-02 RX ADMIN — CLONAZEPAM 0.5 MG: 0.5 TABLET ORAL at 02:03

## 2017-03-02 RX ADMIN — METOPROLOL SUCCINATE 25 MG: 25 TABLET, EXTENDED RELEASE ORAL at 11:03

## 2017-03-02 RX ADMIN — COLESEVELAM HYDROCHLORIDE 625 MG: 625 TABLET, FILM COATED ORAL at 02:03

## 2017-03-02 NOTE — ASSESSMENT & PLAN NOTE
Avoid non-essential nephrotoxins, dose medications according to GFR  Monitor I/O, daily weight. Monitor for volume overload

## 2017-03-02 NOTE — SUBJECTIVE & OBJECTIVE
Past Medical History:   Diagnosis Date    Anxiety     Bipolar 1 disorder     Chronic kidney disease     Chronic pain     Depression     bipolar 2    Fibromyalgia     Hx of psychiatric care     Lupus     Psychiatric problem     Renal tubular acidosis     Seizure     Stroke 11-    Suicide attempt     overdosed on a bottle of paxil, muscle relaxers, & zoloft    Therapy        Past Surgical History:   Procedure Laterality Date    CHOLECYSTECTOMY         Review of patient's allergies indicates:   Allergen Reactions    Amoxil [amoxicillin]      hives    Augmentin [amoxicillin-pot clavulanate]     Avelox [moxifloxacin]      itching       Current Neurological Medications:      No current facility-administered medications on file prior to encounter.      Current Outpatient Prescriptions on File Prior to Encounter   Medication Sig    baclofen (LIORESAL) 10 MG tablet Take one to two tablet before bedtime for muscle spasms and cramps as needed    cetirizine (ZYRTEC) 10 MG tablet Take 1 tablet (10 mg total) by mouth once daily.    chlorhexidine (PERIDEX) 0.12 % solution RINSE WITH 1/2 OZ BID AFTER BRUSHING AND FLOSSING    clobetasol 0.05% (TEMOVATE) 0.05 % Oint Apply topically 2 (two) times daily.    clonazePAM (KLONOPIN) 0.5 MG tablet Take one tablet PO BID PRN anxiety    cyanocobalamin, vitamin B-12, (VITAMIN B-12) 1,000 mcg Subl Place under the tongue once daily.    escitalopram oxalate (LEXAPRO) 20 MG tablet Take 1 tablet (20 mg total) by mouth once daily.    gabapentin (NEURONTIN) 800 MG tablet Take 1 tablet (800 mg total) by mouth 2 (two) times daily.    hydroxychloroquine (PLAQUENIL) 200 mg tablet TAKE 1 TABLET BY MOUTH TWICE DAILY    meloxicam (MOBIC) 15 MG tablet TAKE 1 TABLET(15 MG) BY MOUTH DAILY AS NEEDED FOR PAIN    methylphenidate (RITALIN) 5 MG tablet Take one tablet PO twice daily with meals. May take additional one-half to one tablet daily by 4 pm as needed for attention/focus     metoprolol succinate (TOPROL-XL) 25 MG 24 hr tablet Take 1 tablet (25 mg total) by mouth once daily.    omeprazole (PRILOSEC) 40 MG capsule TAKE 1 CAPSULE(40 MG) BY MOUTH EVERY MORNING    ondansetron (ZOFRAN) 8 MG tablet Take 1 tablet (8 mg total) by mouth every 8 (eight) hours as needed for Nausea.    PNV WITH CA#74/IRON/FOLIC ACID (PRENATAL VITAMIN 1+1 ORAL) Take by mouth once daily. No vitamin K    PREVIDENT 5000 BOOSTER PLUS 1.1 % Pste BRUSH ON NIGHTLY AFTER NORMAL HYGIENE REGIMAN. SPIT OUT EXCESS DO NOT RINSE    ranitidine (ZANTAC) 300 MG tablet Take 1 tablet (300 mg total) by mouth once daily.    risperidone (RISPERDAL) 0.5 MG Tab Take 1 tablet (0.5 mg total) by mouth every evening.    tamsulosin (FLOMAX) 0.4 mg Cp24 Take 1 capsule (0.4 mg total) by mouth once daily.    trazodone (DESYREL) 50 MG tablet Take one-half to one tablet PO nightly PRN insomnia    warfarin (COUMADIN) 1 MG tablet Take 1 mg by mouth Daily. On Tuesday and friday     warfarin (COUMADIN) 2 MG tablet TAKE 1 TABLET BY MOUTH EVERY DAY    WELCHOL 625 mg tablet TAKE 1 TABLET BY MOUTH EVERY DAY INCREASE BY ONE PILL EVERY 3 TO 4 DAYS UNTIL DESIRED CONTROL IS REACHED     Family History     Problem Relation (Age of Onset)    Alcohol abuse Maternal Uncle, Maternal Grandfather    Cancer Maternal Grandmother    Diabetes Maternal Uncle, Maternal Grandmother    Drug abuse Brother, Maternal Grandmother    Early death Paternal Grandfather    Heart disease Paternal Uncle, Maternal Grandfather    Hyperlipidemia Mother, Maternal Grandmother    Hypertension Mother, Maternal Uncle, Maternal Grandmother, Maternal Grandfather    Mental illness Maternal Grandmother, Paternal Grandfather    No Known Problems Father, Paternal Grandmother    Post-traumatic stress disorder Brother    Scoliosis Paternal Aunt        Social History Main Topics    Smoking status: Former Smoker     Packs/day: 1.00     Years: 15.00     Types: Cigarettes     Start date:  "11/25/2015    Smokeless tobacco: Never Used      Comment: quit smoking after the stroke    Alcohol use No    Drug use: Yes     Special: Marijuana      Comment: smokes occasionally, helps with pain and appetite    Sexual activity: Yes     Partners: Female     Review of Systems   Unable to perform ROS: Mental status change     Objective:     Vital Signs (Most Recent):  Temp: 99 °F (37.2 °C) (03/02/17 1615)  Pulse: (!) 54 (03/02/17 1615)  Resp: (!) 23 (03/02/17 1615)  BP: 136/82 (03/02/17 1615)  SpO2: 100 % (03/02/17 1615) Vital Signs (24h Range):  Temp:  [98 °F (36.7 °C)-99 °F (37.2 °C)] 99 °F (37.2 °C)  Pulse:  [] 54  Resp:  [16-51] 23  SpO2:  [97 %-100 %] 100 %  BP: (133-178)/() 136/82     Weight: 46.7 kg (103 lb)  Body mass index is 16.62 kg/(m^2).    Physical Exam   Constitutional: She appears well-developed and well-nourished.   HENT:   Head: Normocephalic and atraumatic.   Cardiovascular: Normal rate and regular rhythm.    Pulmonary/Chest: Effort normal and breath sounds normal.   Neurological:   Reflex Scores:       Tricep reflexes are 1+ on the right side and 1+ on the left side.       Bicep reflexes are 1+ on the right side and 1+ on the left side.       Brachioradialis reflexes are 1+ on the right side and 1+ on the left side.       Patellar reflexes are 1+ on the right side and 1+ on the left side.       Achilles reflexes are 1+ on the right side and 1+ on the left side.  Psychiatric: Her mood appears not anxious. Her affect is inappropriate. Her affect is not angry. Her speech is delayed and tangential. She is actively hallucinating. Thought content is delusional. She expresses impulsivity and inappropriate judgment. She is inattentive.   Vitals reviewed.      NEUROLOGICAL EXAMINATION:     MENTAL STATUS   Oriented to person.   Disoriented to place.   Disoriented to time.   Speech: (Slowed  )  Knowledge: poor.   Able to name object. Able to repeat.        Stated we were at "school" - when " cued by my dress, stethoscope;  her clothing, IV, etc. Recognized in hospital. Able to perform simple calculations.  Easily distracted. Responding to internal stimuli     CRANIAL NERVES     CN V   Facial sensation intact.     CN VII   Left facial weakness: central (flat NLF, asymmetric smile)    CN VIII   CN VIII normal.     CN IX, X   CN IX normal.        Left hemianopsia     MOTOR EXAM        Mild spastic weakness left UE and left LE     REFLEXES     Reflexes   Right brachioradialis: 1+  Left brachioradialis: 1+  Right biceps: 1+  Left biceps: 1+  Right triceps: 1+  Left triceps: 1+  Right patellar: 1+  Left patellar: 1+  Right achilles: 1+  Left achilles: 1+      Significant Labs:  Results for MARA LYNCH (MRN [id number]) as of 3/2/2017 16:05   Ref. Range 2/23/2017 12:23 3/2/2017 01:10 3/2/2017 09:03   WBC Latest Ref Range: 3.90 - 12.70 K/uL  5.60 6.20   RBC Latest Ref Range: 4.00 - 5.40 M/uL  4.38 4.51   Hemoglobin Latest Ref Range: 12.0 - 16.0 g/dL  13.1 13.3   Hematocrit Latest Ref Range: 37.0 - 48.5 %  39.1 40.8   MCV Latest Ref Range: 82 - 98 fL  89 91   MCH Latest Ref Range: 27.0 - 31.0 pg  29.8 29.5   MCHC Latest Ref Range: 32.0 - 36.0 %  33.4 32.5   RDW Latest Ref Range: 11.5 - 14.5 %  13.2 13.1   Platelets Latest Ref Range: 150 - 350 K/uL  171 158   MPV Latest Ref Range: 9.2 - 12.9 fL  10.4 10.9   Gran% Latest Ref Range: 38.0 - 73.0 %  67.6 72.9   Gran # Latest Ref Range: 1.8 - 7.7 K/uL  3.8 4.5   Lymph% Latest Ref Range: 18.0 - 48.0 %  24.1 22.5   Lymph # Latest Ref Range: 1.0 - 4.8 K/uL  1.4 1.4   Mono% Latest Ref Range: 4.0 - 15.0 %  6.9 3.5 (L)   Mono # Latest Ref Range: 0.3 - 1.0 K/uL  0.4 0.2 (L)   Eosinophil% Latest Ref Range: 0.0 - 8.0 %  0.7 0.4   Eos # Latest Ref Range: 0.0 - 0.5 K/uL  0.0 0.0   Basophil% Latest Ref Range: 0.0 - 1.9 %  0.7 0.7   Baso # Latest Ref Range: 0.00 - 0.20 K/uL  0.00 0.00   Protime Latest Ref Range: 9.0 - 12.5 sec  78.0 (H) 83.4 (H)   Coumadin Monitoring INR  Latest Ref Range: 0.8 - 1.2   8.1 (HH) 8.6 (HH)   Prothrombin Gene Mutation Unknown Normal Genotype     APA Isotype IgG Latest Ref Range: 0.00 - 14.99 GPL <9.40     APA Isotype IgM Latest Ref Range: 0.00 - 12.49 MPL <9.40     Anticardiolipin IgA Latest Ref Range: <12 APL <9.0     Beta-2 Glyco 1 IgG Latest Ref Range: <=20 SGU <9     Beta-2 Glyco 1 IgM Latest Ref Range: <=20 SMU <9     Beta-2 Glyco 1 IgA Latest Ref Range: <=20 KATHERINE <9     DRVVT, Lupus Anticoagulant Latest Ref Range: Negative  Negative     Protein C Activity Latest Ref Range: 70 - 140 % 12 (L)     Protein S Activity Latest Ref Range: 70 - 140 % 26 (L)     Factor V Leiden Unknown Normal Genotype     Hex Phosph Neut Test Latest Ref Range: Negative  Negative     Antithrombin III Latest Ref Range: 83 - 118 % 105       Intermediately elevated PS/PT Antibody ([facility] lab result)    Results for MARA LYNCH (MRN [id number]) as of 3/2/2017 16:05   Ref. Range 2/23/2017 12:23 3/2/2017 00:39 3/2/2017 00:48 3/2/2017 06:43   Sodium Latest Ref Range: 136 - 145 mmol/L   138    Potassium Latest Ref Range: 3.5 - 5.1 mmol/L   4.4    Chloride Latest Ref Range: 95 - 110 mmol/L   105    CO2 Latest Ref Range: 23 - 29 mmol/L   17 (L)    Anion Gap Latest Ref Range: 8 - 16 mmol/L   16    BUN, Bld Latest Ref Range: 6 - 20 mg/dL   16    Creatinine Latest Ref Range: 0.5 - 1.4 mg/dL   2.3 (H)    eGFR if non African American Latest Ref Range: >60 mL/min/1.73 m^2   27 (A)    eGFR if African American Latest Ref Range: >60 mL/min/1.73 m^2   31 (A)    Glucose Latest Ref Range: 70 - 110 mg/dL   117 (H)    Calcium Latest Ref Range: 8.7 - 10.5 mg/dL   9.8    Alkaline Phosphatase Latest Ref Range: 55 - 135 U/L   81    Total Protein Latest Ref Range: 6.0 - 8.4 g/dL   8.0    Albumin Latest Ref Range: 3.5 - 5.2 g/dL   4.9    Total Bilirubin Latest Ref Range: 0.1 - 1.0 mg/dL   0.3    AST Latest Ref Range: 10 - 40 U/L   16    ALT Latest Ref Range: 10 - 44 U/L   14    Ammonia Latest Ref  Range: 10 - 50 umol/L    29   Triglycerides Latest Ref Range: 30 - 150 mg/dL 106      Cholesterol Latest Ref Range: 120 - 199 mg/dL 164      HDL Latest Ref Range: 40 - 75 mg/dL 32 (L)      LDL Cholesterol Latest Ref Range: 63.0 - 159.0 mg/dL 110.8      Total Cholesterol/HDL Ratio Latest Ref Range: 2.0 - 5.0  5.1 (H)      Homocysteine Latest Ref Range: 4.0 - 15.5 umol/L 26.9 (H)          Significant Imaging:   Imaging Results         CT Head Without Contrast (Final result) Result time:  03/02/17 08:18:06    Final result by Jensen Acuña MD (03/02/17 08:18:06)    Impression:       1.  No acute intracranial process.  2.  Chronic right cerebral hemisphere infarcts without significant change.          Electronically signed by: Jensen Acuña MD  Date:     03/02/17  Time:    08:18     Narrative:    CT HEAD WITHOUT CONTRAST    Technique: 5 mm axial images were obtained from the vertex to the skullbase, without administration of contrast.  Multiplanar reformatted images were created.    Comparison: 04/21/2016    FINDINGS:  Moderate regions of encephalomalacia are again identified within the right frontal and parietal lobes compatible with chronic infarcts.  No visible acute infarct.  There is no intracranial hemorrhage.  No mass or mass effect.  The ventricles are normal in size.  The calvarium is intact.

## 2017-03-02 NOTE — PLAN OF CARE
"Met with pt to complete her assessment.  Pt's affect was flat and blunted. Pt verified her PCP as Dr. LINDA Dukes, psychiatrist as Dr. Cruz, primary insurance as PHN and secondary insurance as Medicaid. Pt reports hx of numerous inpatient psych admissions, however cannot remember where.  She verified that is was okay for me to call her mother for additional information.  Pt reports alcohol use daily and THC "a lot."  Pt denies using other street drugs.  Pt is aware of the PEC.  Phone contact with pt's mother Arabella, [phone] who stated pt has had 4 previous inpatient psych admissions.  The last admission for depression was at [facility], a couple of years ago, [facility] before 2015 and several admissions in [place] however mother could not remember when.  Pt reportedly had a stroke in 2015 and [place] [facility] [place] [facility].  Mother denies that pt has been depressed stating that pt "has been doing well, has not been depressed, taking her meds and suddenly started acting strange."  CM will continue to follow.       03/02/17 1530   Discharge Assessment   Assessment Type Discharge Planning Assessment   Confirmed/corrected address and phone number on facesheet? Yes   Assessment information obtained from? Patient   Communicated expected length of stay with patient/caregiver no   Type of Healthcare Directive Received (denies, pt is single with no children)   If Healthcare Directive is received, is it scanned into Epic? no (comment)   Prior to hospitilization cognitive status: Unable to Assess   Prior to hospitalization functional status: Independent   Current cognitive status: (pt is alert, oriented to day and year however not month)   Arrived From home or self-care   Lives With sibling(s);parent(s);other (see comments)  (mother, brother Patrick and boyfriend Brent Andrade)   Able to Return to Prior Arrangements no  (pt is under a PEC)   How many people do you have in your home that can help with your " "care after discharge? 3   Who are your caregiver(s) and their phone number(s)? (mother Arabella, [phone], boyfriend Brent Andrade, [phone], brother Patrick Denson [phone])   Patient's perception of discharge disposition other (comments)  (pt aware of the PEC)   Readmission Within The Last 30 Days other (see comments)  (pt reports "yes, here ([facility]) for TB")   Patient currently receives home health services? No   Does the patient currently use HME? No   Equipment Currently Used at Home none   Do you have any problems affording any of your prescribed medications? No  (pharmacy [facility])   Is the patient taking medications as prescribed? yes   Do you have any financial concerns preventing you from receiving the healthcare you need? No   Does the patient have transportation to healthcare appointments? Yes   Transportation Available family or friend will provide   On Dialysis? No   Does the patient receive services at the Coumadin Clinic? No   Discharge Plan A Psychiatric hospital   Discharge Plan B Home   Patient/Family In Agreement With Plan yes     "

## 2017-03-02 NOTE — CONSULTS
"[facility] Note    CC: hematemesis    HPI 37 y.o. female is being evaluated for moderate hematemesis that started yesterday - associated with melena and coumadin toxicity.  She is unable to provide any additional history due to her mental status.  She just repeats whatever I say.      Past Medical History:   Diagnosis Date    Anxiety     Bipolar 1 disorder     Chronic kidney disease     Chronic pain     Depression     bipolar 2    Fibromyalgia     Hx of psychiatric care     Lupus     Psychiatric problem     Renal tubular acidosis     Seizure     Stroke 11-    Suicide attempt     overdosed on a bottle of paxil, muscle relaxers, & zoloft    Therapy          Review of Systems  Unable to obtain due to AMS    Physical Examination  /86  Pulse 82  Temp 98.8 °F (37.1 °C) (Oral)   Resp (!) 51  Ht 5' 6" (1.676 m)  Wt 46.7 kg (103 lb)  LMP  (LMP Unknown) Comment: hx of Depo  SpO2 100%  Breastfeeding? No  BMI 16.62 kg/m2  General appearance: alert, cooperative, no distress  HENT: Normocephalic, atraumatic, neck symmetrical, no nasal discharge   Lungs: clear to auscultation bilaterally, symmetric chest wall expansion bilaterally  Heart: regular rate and rhythm without rub  Abdomen: soft, NT ND BS present no organomegaly    Labs:  H/H 13/40    Imaging:  CT head - 1.  No acute intracranial process.  2.  Chronic right cerebral hemisphere infarcts without significant change.      Assessment:   38 yo female admit with hematemesis and melena with coumadin toxicity with an INR > 8.      Plan:  NPO x meds  Vitamin K 10 mg SQ x 2 doses  Daily INR  Keep Hgb at 8-9 mg/dL  Place 2 large bore IV's  Serial H/H  Plan for EGD once INR corrected    Maneesh Gupta MD [facility]  [address], [address]  [place] [address]  Office: [phone]  Fax: [phone]    "

## 2017-03-02 NOTE — PROGRESS NOTES
Attempt to call in consult to Psych but office does not open until 10am.  Called 's office and spoke with Danyelle; confirmed that they did receive the PEC fax on this pt.

## 2017-03-02 NOTE — CONSULTS
[facility]  Adult Nutrition  Consult Note    SUMMARY     Recommendations  Recommendation/Intervention:   1.) When appopriate per MD, recommend Clear Liquids advancing as patient tolerats to GI soft/Light diet   2.) Not appropriate for Vitamin K/Coumadin education at this time- will f/u.  Goals: 1.) diet will advance within 72 hrs.   Nutrition Goal Status: new  Communication of RD Recs:  (sticky note to MD)     1. Supratherapeutic INR    2. Altered mental status      Past Medical History:   Diagnosis Date    Anxiety     Bipolar 1 disorder     Chronic kidney disease     Chronic pain     Depression     bipolar 2    Fibromyalgia     Hx of psychiatric care     Lupus     Psychiatric problem     Renal tubular acidosis     Seizure     Stroke 11-    Suicide attempt     overdosed on a bottle of paxil, muscle relaxers, & zoloft    Therapy          Reason for Assessment  Reason for Assessment: nurse/nurse practitioner consult  Interdisciplinary Rounds: attended  General Information Comments: Discussed on rounds, patient admits with AMS and INR >8. NPO. RN reports patient with nausea/vomiting/diarrhea x7 days pta and x2 episodes of vomiting today. Rd consulted for decreased oral intake and coumadin education. Patient not appropriate for eduation at this time due to mental status. Will continue to follow.     Nutrition Prescription Ordered  Current Diet Order: NPO      Evaluation of Received Nutrients/Fluid Intake  Oral Fluid (mL):  (no data per I/Os)  IV Fluid (mL):  (no continuous orders)     Nutrition Risk Screen  Nutrition Risk Screen: reduced oral intake over the last month, other (see comments) (emesis times one week)    Labs/Tests/Procedures/Meds  Diagnostic Test/Procedure Review: reviewed, pertinent  Pertinent Labs Reviewed: reviewed, pertinent  BMP  Lab Results   Component Value Date     03/02/2017    K 4.4 03/02/2017     03/02/2017    CO2 17 (L) 03/02/2017    BUN 16  03/02/2017    CREATININE 2.3 (H) 03/02/2017    CALCIUM 9.8 03/02/2017    ANIONGAP 16 03/02/2017    ESTGFRAFRICA 31 (A) 03/02/2017    EGFRNONAA 27 (A) 03/02/2017     Lab Results   Component Value Date    ALBUMIN 4.9 03/02/2017     Lab Results   Component Value Date    CALCIUM 9.8 03/02/2017    PHOS 4.2 12/13/2015     No results for input(s): POCTGLUCOSE in the last 24 hours.    Pertinent Medications Reviewed: reviewed   Scheduled Meds:   colesevelam  625 mg Oral Daily with breakfast    escitalopram oxalate  20 mg Oral Daily    gabapentin  800 mg Oral BID    hydroxychloroquine  200 mg Oral BID    metoprolol succinate  25 mg Oral Daily    pantoprazole  40 mg Intravenous Daily    [START ON 3/3/2017] phytonadione vitamin k  10 mg Subcutaneous Once    risperidone  0.5 mg Oral QHS     Continuous Infusions:       Physical Findings  Overall Physical Appearance: underweight  Oral/Mouth Cavity: WDL  Skin:  (Geronimo score 17)    Anthropometrics  Height (inches): 65.98 in  Weight Method: Stated  Weight (kg): 46.7 kg  Ideal Body Weight (IBW), Female: 129.9 lb  % Ideal Body Weight, Female (lb): 79.26 lb  BMI (kg/m2): 16.63  BMI Grade: 16 - 16.9 protein-energy malnutrition grade II  Usual Body Weight (UBW), kg:  (JOEY at this time)      Estimated/Assessed Needs  Weight Used For Calorie Calculations: 46.7 kg (102 lb 15.3 oz)   Height (cm): 167.6 cm  35 kcal/kg (kcal): 1634.5 and 40 kcal/kg (kcal): 1868 (weight gain)   RMR (Dewey-St. Jeor Equation): 1180.83  Weight Used For Protein Calculations: 46.7 kg (102 lb 15.3 oz)  1.2 gm Protein (gm): 56.16 and 2.0 gm Protein (gm): 93.6  Fluid Need Method: RDA Method (or per MD)      Monitor and Evaluation  Food and Nutrient Intake: energy intake, food and beverage intake  Food and Nutrient Adminstration: diet order  Anthropometric Measurements: weight, weight change, body mass index  Biochemical Data, Medical Tests and Procedures: electrolyte and renal panel, glucose/endocrine  profile, lipid profile  Nutrition-Focused Physical Findings: overall appearance    Nutrition Risk  Level of Risk:  (x2 weekly)    Nutrition Follow-Up  RD Follow-up?: Yes    Assessment and Plan  No new Assessment & Plan notes have been filed under this hospital service since the last note was generated.  Service: Nutrition    Nutrition Diagnosis: Malnutrition  Etiology: decreased ability to consume sufficient energy  Signs/Symptoms: BMI 16.63, NPO status  Status: new    %EEN: 0-25%  %Meal intake: NPO    Discharge Planning: unable to determine at this time

## 2017-03-02 NOTE — ASSESSMENT & PLAN NOTE
D/dx includes psychosis from alcohol, drug effect, lupus cerebritis or neuropsychiatric manifestation of lupus, psychosis associated with bipolar disorder. Behavior less likely from acute stroke or seizure activity    MRI and LP (if possible once INR normal)  for possible lupus cerebritis  Thiamine supplementation

## 2017-03-02 NOTE — PLAN OF CARE
Problem: Nutrition, Imbalanced: Inadequate Oral Intake (Adult)  Goal: Identify Related Risk Factors and Signs and Symptoms  Related risk factors and signs and symptoms are identified upon initiation of Human Response Clinical Practice Guideline (CPG)  Recommendations  Recommendation/Intervention:   1.) When appopriate per MD, recommend Clear Liquids advancing as patient tolerats to GI soft/Light diet   2.) Not appropriate for Vitamin K/Coumadin education at this time- will f/u.  Goals: 1.) diet will advance within 72 hrs.   Nutrition Goal Status: new  Communication of RD Recs: (sticky note to MD)

## 2017-03-02 NOTE — TELEPHONE ENCOUNTER
----- Message from Chica Valiente sent at 3/2/2017  9:43 AM CST -----  Contact: [phone]  Please call  Dr. Ericksons/[facility] in reference to patient.  Call [phone].

## 2017-03-02 NOTE — TELEPHONE ENCOUNTER
Spoke with CCU nurse at [facility], reports Dr. Sifuentes would like consult on this patient. Notified Dr. Ribeiro.

## 2017-03-02 NOTE — SUBJECTIVE & OBJECTIVE
Past Medical History:   Diagnosis Date    Anxiety     Bipolar 1 disorder     Chronic kidney disease     Chronic pain     Depression     bipolar 2    Fibromyalgia     Hx of psychiatric care     Lupus     Psychiatric problem     Renal tubular acidosis     Seizure     Stroke 11-    Suicide attempt     overdosed on a bottle of paxil, muscle relaxers, & zoloft    Therapy        Past Surgical History:   Procedure Laterality Date    CHOLECYSTECTOMY         Review of patient's allergies indicates:   Allergen Reactions    Amoxil [amoxicillin]      hives    Augmentin [amoxicillin-pot clavulanate]     Avelox [moxifloxacin]      itching       No current facility-administered medications on file prior to encounter.      Current Outpatient Prescriptions on File Prior to Encounter   Medication Sig    baclofen (LIORESAL) 10 MG tablet Take one to two tablet before bedtime for muscle spasms and cramps as needed    cetirizine (ZYRTEC) 10 MG tablet Take 1 tablet (10 mg total) by mouth once daily.    chlorhexidine (PERIDEX) 0.12 % solution RINSE WITH 1/2 OZ BID AFTER BRUSHING AND FLOSSING    clobetasol 0.05% (TEMOVATE) 0.05 % Oint Apply topically 2 (two) times daily.    clonazePAM (KLONOPIN) 0.5 MG tablet Take one tablet PO BID PRN anxiety    cyanocobalamin, vitamin B-12, (VITAMIN B-12) 1,000 mcg Subl Place under the tongue once daily.    escitalopram oxalate (LEXAPRO) 20 MG tablet Take 1 tablet (20 mg total) by mouth once daily.    gabapentin (NEURONTIN) 800 MG tablet Take 1 tablet (800 mg total) by mouth 2 (two) times daily.    hydroxychloroquine (PLAQUENIL) 200 mg tablet TAKE 1 TABLET BY MOUTH TWICE DAILY    meloxicam (MOBIC) 15 MG tablet TAKE 1 TABLET(15 MG) BY MOUTH DAILY AS NEEDED FOR PAIN    methylphenidate (RITALIN) 5 MG tablet Take one tablet PO twice daily with meals. May take additional one-half to one tablet daily by 4 pm as needed for attention/focus    metoprolol succinate (TOPROL-XL)  25 MG 24 hr tablet Take 1 tablet (25 mg total) by mouth once daily.    omeprazole (PRILOSEC) 40 MG capsule TAKE 1 CAPSULE(40 MG) BY MOUTH EVERY MORNING    ondansetron (ZOFRAN) 8 MG tablet Take 1 tablet (8 mg total) by mouth every 8 (eight) hours as needed for Nausea.    PNV WITH CA#74/IRON/FOLIC ACID (PRENATAL VITAMIN 1+1 ORAL) Take by mouth once daily. No vitamin K    PREVIDENT 5000 BOOSTER PLUS 1.1 % Pste BRUSH ON NIGHTLY AFTER NORMAL HYGIENE REGIMAN. SPIT OUT EXCESS DO NOT RINSE    ranitidine (ZANTAC) 300 MG tablet Take 1 tablet (300 mg total) by mouth once daily.    risperidone (RISPERDAL) 0.5 MG Tab Take 1 tablet (0.5 mg total) by mouth every evening.    tamsulosin (FLOMAX) 0.4 mg Cp24 Take 1 capsule (0.4 mg total) by mouth once daily.    trazodone (DESYREL) 50 MG tablet Take one-half to one tablet PO nightly PRN insomnia    warfarin (COUMADIN) 1 MG tablet Take 1 mg by mouth Daily. On Tuesday and friday     warfarin (COUMADIN) 2 MG tablet TAKE 1 TABLET BY MOUTH EVERY DAY    WELCHOL 625 mg tablet TAKE 1 TABLET BY MOUTH EVERY DAY INCREASE BY ONE PILL EVERY 3 TO 4 DAYS UNTIL DESIRED CONTROL IS REACHED     Family History     Problem Relation (Age of Onset)    Alcohol abuse Maternal Uncle, Maternal Grandfather    Cancer Maternal Grandmother    Diabetes Maternal Uncle, Maternal Grandmother    Drug abuse Brother, Maternal Grandmother    Early death Paternal Grandfather    Heart disease Paternal Uncle, Maternal Grandfather    Hyperlipidemia Mother, Maternal Grandmother    Hypertension Mother, Maternal Uncle, Maternal Grandmother, Maternal Grandfather    Mental illness Maternal Grandmother, Paternal Grandfather    No Known Problems Father, Paternal Grandmother    Post-traumatic stress disorder Brother    Scoliosis Paternal Aunt        Social History Main Topics    Smoking status: Former Smoker     Packs/day: 1.00     Years: 15.00     Types: Cigarettes     Start date: 11/25/2015    Smokeless tobacco: Never  Used      Comment: quit smoking after the stroke    Alcohol use No    Drug use: Yes     Special: Marijuana      Comment: smokes occasionally, helps with pain and appetite    Sexual activity: Yes     Partners: Female     Review of Systems   Unable to perform ROS: Mental status change     Objective:     Vital Signs (Most Recent):  Temp: 98.8 °F (37.1 °C) (03/01/17 2336)  Pulse: 78 (03/02/17 0330)  Resp: 20 (03/02/17 0330)  BP: 133/83 (03/02/17 0330)  SpO2: 98 % (03/02/17 0330) Vital Signs (24h Range):  Temp:  [98.8 °F (37.1 °C)] 98.8 °F (37.1 °C)  Pulse:  [] 78  Resp:  [16-20] 20  SpO2:  [98 %-100 %] 98 %  BP: (133-147)/() 133/83     Weight: 46.7 kg (103 lb)  Body mass index is 16.62 kg/(m^2).    Physical Exam   Constitutional: She appears well-developed. No distress.   HENT:   Head: Normocephalic and atraumatic.   Eyes: Pupils are equal, round, and reactive to light. Right eye exhibits no discharge. Left eye exhibits no discharge. No scleral icterus.   Neck: Neck supple. No JVD present.   Cardiovascular: Normal rate and regular rhythm.    Pulmonary/Chest: Effort normal and breath sounds normal. No respiratory distress.   Abdominal: Soft. Bowel sounds are normal.   Musculoskeletal: She exhibits no edema or tenderness.   Neurological: She is alert. She exhibits normal muscle tone.   Skin: Skin is warm and dry. She is not diaphoretic.   Psychiatric: Her mood appears anxious. Her affect is inappropriate. She is hyperactive. Thought content is delusional. Cognition and memory are impaired.   Nursing note and vitals reviewed.       Significant Labs:   CBC:   Recent Labs  Lab 03/02/17  0110   WBC 5.60   HGB 13.1   HCT 39.1        CMP:   Recent Labs  Lab 03/02/17  0048      K 4.4      CO2 17*   *   BUN 16   CREATININE 2.3*   CALCIUM 9.8   PROT 8.0   ALBUMIN 4.9   BILITOT 0.3   ALKPHOS 81   AST 16   ALT 14   ANIONGAP 16   EGFRNONAA 27*     Lab Results   Component Value Date    TSH  1.552 03/02/2017     Lab Results   Component Value Date    INR 8.1 (HH) 03/02/2017    INR 2.1 (H) 02/20/2017    INR 1.9 (A) 02/03/2017     Toxicology: Marijuana    Urinalysis    Recent Labs  Lab 03/02/17  0035   COLORU Yellow   SPECGRAV 1.020   PHUR 6.0   PROTEINUA Negative   NITRITE Negative   LEUKOCYTESUR Negative   UROBILINOGEN Negative       Significant Imaging:     CT Head (preliminary interpretation):  No acute intracranial findings

## 2017-03-02 NOTE — PROGRESS NOTES
Pt continues to exhibit bizarre behaviour such as staring at the nurse and following her around the room with her eyes, repeating phrases and questions back to the nurse, and reaching for the name jackie or just stretching out her hand toward the nurse and continuing to keep it stretched toward the nurse as she walks in the room.  Pt father has been to the room but the pt doesn't appear to recognize him; they have been estranged for many years and have just recently begun trying to have a relationship.  Her mother came also and when asked the pt said her name is Willa; the mother does not know who Willa is.  The pt brother came also and seemed to recognize him; he kissed her cheek and she appeared to expect this greeting.  Then Brent the pt boyfriend came in and the pt called him by the correct name.  She continues to pick at her tele leads and takes off her BP cuff but she has not pulled at her IV although she complains about it vehemently with cursing; she loudly states five or six curse words as if she is reading them off a list whenever I have to access her IV.

## 2017-03-02 NOTE — PROGRESS NOTES
Dr. Ribeiro was in to see the pt. Pt con to exhibit flight of ideas, picking at the air or especially at name tags, and parroting phrases.

## 2017-03-02 NOTE — PROGRESS NOTES
Called in consult for Dr. Ribeiro to [facility] scheduling (number on [facility]lodex).  Received call from Nilda at Dr. Ribeiro's office; she states he is in the hospital and she will text him and request that he come see her.

## 2017-03-02 NOTE — H&P
[facility] Medicine  History & Physical    Patient Name: Cat Denson  MRN: [id number]  Admission Date: 3/1/2017  Attending Physician: Neela Sifuentes MD   Primary Care Provider: Dayton Dukes MD         Patient information was obtained from spouse/SO and ER records.     Subjective:     Principal Problem:Altered mental status    Chief Complaint:   Chief Complaint   Patient presents with    bizarre behavior     pt has flat effect and has shuffling walk/weakness to left side of body(chronic from stroke Nov 2015); slow to answer but speech is clear        HPI: Miss Denson presents for evaluation of abnormal behavior.  She is not providing an appropriate/reliable history. History obtained by her boyfriend who lives with her. He noticed on couple of days ago she has been hyper focused and not answering questions appropriately. He does not know her past medical history. He does not know her family medical history. He states she does not drink alcohol or use illicit substances.    Past Medical History:   Diagnosis Date    Anxiety     Bipolar 1 disorder     Chronic kidney disease     Chronic pain     Depression     bipolar 2    Fibromyalgia     Hx of psychiatric care     Lupus     Psychiatric problem     Renal tubular acidosis     Seizure     Stroke 11-    Suicide attempt     overdosed on a bottle of paxil, muscle relaxers, & zoloft    Therapy        Past Surgical History:   Procedure Laterality Date    CHOLECYSTECTOMY         Review of patient's allergies indicates:   Allergen Reactions    Amoxil [amoxicillin]      hives    Augmentin [amoxicillin-pot clavulanate]     Avelox [moxifloxacin]      itching       No current facility-administered medications on file prior to encounter.      Current Outpatient Prescriptions on File Prior to Encounter   Medication Sig    baclofen (LIORESAL) 10 MG tablet Take one to two tablet before bedtime for muscle spasms and  cramps as needed    cetirizine (ZYRTEC) 10 MG tablet Take 1 tablet (10 mg total) by mouth once daily.    chlorhexidine (PERIDEX) 0.12 % solution RINSE WITH 1/2 OZ BID AFTER BRUSHING AND FLOSSING    clobetasol 0.05% (TEMOVATE) 0.05 % Oint Apply topically 2 (two) times daily.    clonazePAM (KLONOPIN) 0.5 MG tablet Take one tablet PO BID PRN anxiety    cyanocobalamin, vitamin B-12, (VITAMIN B-12) 1,000 mcg Subl Place under the tongue once daily.    escitalopram oxalate (LEXAPRO) 20 MG tablet Take 1 tablet (20 mg total) by mouth once daily.    gabapentin (NEURONTIN) 800 MG tablet Take 1 tablet (800 mg total) by mouth 2 (two) times daily.    hydroxychloroquine (PLAQUENIL) 200 mg tablet TAKE 1 TABLET BY MOUTH TWICE DAILY    meloxicam (MOBIC) 15 MG tablet TAKE 1 TABLET(15 MG) BY MOUTH DAILY AS NEEDED FOR PAIN    methylphenidate (RITALIN) 5 MG tablet Take one tablet PO twice daily with meals. May take additional one-half to one tablet daily by 4 pm as needed for attention/focus    metoprolol succinate (TOPROL-XL) 25 MG 24 hr tablet Take 1 tablet (25 mg total) by mouth once daily.    omeprazole (PRILOSEC) 40 MG capsule TAKE 1 CAPSULE(40 MG) BY MOUTH EVERY MORNING    ondansetron (ZOFRAN) 8 MG tablet Take 1 tablet (8 mg total) by mouth every 8 (eight) hours as needed for Nausea.    PNV WITH CA#74/IRON/FOLIC ACID (PRENATAL VITAMIN 1+1 ORAL) Take by mouth once daily. No vitamin K    PREVIDENT 5000 BOOSTER PLUS 1.1 % Pste BRUSH ON NIGHTLY AFTER NORMAL HYGIENE REGIMAN. SPIT OUT EXCESS DO NOT RINSE    ranitidine (ZANTAC) 300 MG tablet Take 1 tablet (300 mg total) by mouth once daily.    risperidone (RISPERDAL) 0.5 MG Tab Take 1 tablet (0.5 mg total) by mouth every evening.    tamsulosin (FLOMAX) 0.4 mg Cp24 Take 1 capsule (0.4 mg total) by mouth once daily.    trazodone (DESYREL) 50 MG tablet Take one-half to one tablet PO nightly PRN insomnia    warfarin (COUMADIN) 1 MG tablet Take 1 mg by mouth Daily. On  Tuesday and friday     warfarin (COUMADIN) 2 MG tablet TAKE 1 TABLET BY MOUTH EVERY DAY    WELCHOL 625 mg tablet TAKE 1 TABLET BY MOUTH EVERY DAY INCREASE BY ONE PILL EVERY 3 TO 4 DAYS UNTIL DESIRED CONTROL IS REACHED     Family History     Problem Relation (Age of Onset)    Alcohol abuse Maternal Uncle, Maternal Grandfather    Cancer Maternal Grandmother    Diabetes Maternal Uncle, Maternal Grandmother    Drug abuse Brother, Maternal Grandmother    Early death Paternal Grandfather    Heart disease Paternal Uncle, Maternal Grandfather    Hyperlipidemia Mother, Maternal Grandmother    Hypertension Mother, Maternal Uncle, Maternal Grandmother, Maternal Grandfather    Mental illness Maternal Grandmother, Paternal Grandfather    No Known Problems Father, Paternal Grandmother    Post-traumatic stress disorder Brother    Scoliosis Paternal Aunt        Social History Main Topics    Smoking status: Former Smoker     Packs/day: 1.00     Years: 15.00     Types: Cigarettes     Start date: 11/25/2015    Smokeless tobacco: Never Used      Comment: quit smoking after the stroke    Alcohol use No    Drug use: Yes     Special: Marijuana      Comment: smokes occasionally, helps with pain and appetite    Sexual activity: Yes     Partners: Female     Review of Systems   Unable to perform ROS: Mental status change     Objective:     Vital Signs (Most Recent):  Temp: 98.8 °F (37.1 °C) (03/01/17 2336)  Pulse: 78 (03/02/17 0330)  Resp: 20 (03/02/17 0330)  BP: 133/83 (03/02/17 0330)  SpO2: 98 % (03/02/17 0330) Vital Signs (24h Range):  Temp:  [98.8 °F (37.1 °C)] 98.8 °F (37.1 °C)  Pulse:  [] 78  Resp:  [16-20] 20  SpO2:  [98 %-100 %] 98 %  BP: (133-147)/() 133/83     Weight: 46.7 kg (103 lb)  Body mass index is 16.62 kg/(m^2).    Physical Exam   Constitutional: She appears well-developed. No distress.   HENT:   Head: Normocephalic and atraumatic.   Eyes: Pupils are equal, round, and reactive to light. Right eye exhibits  no discharge. Left eye exhibits no discharge. No scleral icterus.   Neck: Neck supple. No JVD present.   Cardiovascular: Normal rate and regular rhythm.    Pulmonary/Chest: Effort normal and breath sounds normal. No respiratory distress.   Abdominal: Soft. Bowel sounds are normal.   Musculoskeletal: She exhibits no edema or tenderness.   Neurological: She is alert. She exhibits normal muscle tone.   Skin: Skin is warm and dry. She is not diaphoretic.   Psychiatric: Her mood appears anxious. Her affect is inappropriate. She is hyperactive. Thought content is delusional. Cognition and memory are impaired.   Nursing note and vitals reviewed.       Significant Labs:   CBC:   Recent Labs  Lab 03/02/17  0110   WBC 5.60   HGB 13.1   HCT 39.1        CMP:   Recent Labs  Lab 03/02/17  0048      K 4.4      CO2 17*   *   BUN 16   CREATININE 2.3*   CALCIUM 9.8   PROT 8.0   ALBUMIN 4.9   BILITOT 0.3   ALKPHOS 81   AST 16   ALT 14   ANIONGAP 16   EGFRNONAA 27*     Lab Results   Component Value Date    TSH 1.552 03/02/2017     Lab Results   Component Value Date    INR 8.1 (HH) 03/02/2017    INR 2.1 (H) 02/20/2017    INR 1.9 (A) 02/03/2017     Toxicology: Marijuana    Urinalysis    Recent Labs  Lab 03/02/17  0035   COLORU Yellow   SPECGRAV 1.020   PHUR 6.0   PROTEINUA Negative   NITRITE Negative   LEUKOCYTESUR Negative   UROBILINOGEN Negative       Significant Imaging:     CT Head (preliminary interpretation):  No acute intracranial findings    Assessment/Plan:     * Altered mental status  ?Psychosis  Continue PEC  Consult a psychiatrist for evaluation    Supratherapeutic international normalized ratio (INR)  Hold warfarin      CKD (chronic kidney disease) stage 3, GFR 30-59 ml/min  Avoid non-essential nephrotoxins, dose medications according to GFR  Monitor I/O, daily weight. Monitor for volume overload      Lupus (systemic lupus erythematosus)  Continue Hydroxychloroquine. Hold Warfarin.      Bipolar 2  disorder  Consult a psychiatrist      Metabolic acidosis  ?due to CKD  Serial BMP  Consider adding Sodium Bicarbonate      VTE Risk Mitigation     None        Mandi Dewitt PA-C  Department of Hospital Medicine   [facility]

## 2017-03-02 NOTE — CONSULTS
"[facility]  Neurology  Consult Note    Patient Name: Cat Denson  MRN: [id number]  Admission Date: 3/1/2017  Hospital Length of Stay: 0 days  Code Status: Full Code   Attending Provider: Neela Sifuentes MD   Consulting Provider: Orestes Ribeiro DO  Primary Care Physician: Dayton Dukes MD  Principal Problem:Altered mental status    Consults   Subjective:     Chief Complaint:  Mental status change     HPI:   Reviewed prior records as well as ED and hospital records.  Pt known to me from recent clinic visit for suspected embolic stroke in 2015, on warfarin for antiphospholipid antibody syndrome in setting of SLE.     Has been exhibiting odd behaviors; picking at invisible objects, incoherent, making bizarre statements and mannerisms. Brought to ED, noted to have elevated INR 8.1.      From ED note:  The patient is a 37 y.o. female with a PMHx of CKD, lupus, fibromyalgia, sz, and stroke who is presenting with the ED for the acute onset of AMS x 1.5 days. Per pt's boyfriend, the pt suddenly began to "act strangely" yesterday morning: inappropriate responses as well as intermittent aphasia. The boyfriend notes that the pt will "grab at people and things" but "doesn't react like normal to me". He denies any change in baseline strength or sensation to L side of body, recent fevers, or drug abuse but confirms N/v/d x 1 week. He denies previous episodes of similar sx. Pt has past hx of psychiatric concerns including Bipolar I and previous suicide attempt. No pertinent past surgical hx. HPI and ROS are limited secondary to mental status change.     She states she was "bleeding from every orifice" but clearly though processes are not normal; disoriented, tangential, incoherent speech. Has had some emesis. Nursing notes document examples of bizarre behaviors and interactions with nursing, family, etc.        Past Medical History:   Diagnosis Date    Anxiety     Bipolar 1 disorder     " Chronic kidney disease     Chronic pain     Depression     bipolar 2    Fibromyalgia     Hx of psychiatric care     Lupus     Psychiatric problem     Renal tubular acidosis     Seizure     Stroke 11-    Suicide attempt     overdosed on a bottle of paxil, muscle relaxers, & zoloft    Therapy        Past Surgical History:   Procedure Laterality Date    CHOLECYSTECTOMY         Review of patient's allergies indicates:   Allergen Reactions    Amoxil [amoxicillin]      hives    Augmentin [amoxicillin-pot clavulanate]     Avelox [moxifloxacin]      itching       Current Neurological Medications:      No current facility-administered medications on file prior to encounter.      Current Outpatient Prescriptions on File Prior to Encounter   Medication Sig    baclofen (LIORESAL) 10 MG tablet Take one to two tablet before bedtime for muscle spasms and cramps as needed    cetirizine (ZYRTEC) 10 MG tablet Take 1 tablet (10 mg total) by mouth once daily.    chlorhexidine (PERIDEX) 0.12 % solution RINSE WITH 1/2 OZ BID AFTER BRUSHING AND FLOSSING    clobetasol 0.05% (TEMOVATE) 0.05 % Oint Apply topically 2 (two) times daily.    clonazePAM (KLONOPIN) 0.5 MG tablet Take one tablet PO BID PRN anxiety    cyanocobalamin, vitamin B-12, (VITAMIN B-12) 1,000 mcg Subl Place under the tongue once daily.    escitalopram oxalate (LEXAPRO) 20 MG tablet Take 1 tablet (20 mg total) by mouth once daily.    gabapentin (NEURONTIN) 800 MG tablet Take 1 tablet (800 mg total) by mouth 2 (two) times daily.    hydroxychloroquine (PLAQUENIL) 200 mg tablet TAKE 1 TABLET BY MOUTH TWICE DAILY    meloxicam (MOBIC) 15 MG tablet TAKE 1 TABLET(15 MG) BY MOUTH DAILY AS NEEDED FOR PAIN    methylphenidate (RITALIN) 5 MG tablet Take one tablet PO twice daily with meals. May take additional one-half to one tablet daily by 4 pm as needed for attention/focus    metoprolol succinate (TOPROL-XL) 25 MG 24 hr tablet Take 1 tablet (25 mg  total) by mouth once daily.    omeprazole (PRILOSEC) 40 MG capsule TAKE 1 CAPSULE(40 MG) BY MOUTH EVERY MORNING    ondansetron (ZOFRAN) 8 MG tablet Take 1 tablet (8 mg total) by mouth every 8 (eight) hours as needed for Nausea.    PNV WITH CA#74/IRON/FOLIC ACID (PRENATAL VITAMIN 1+1 ORAL) Take by mouth once daily. No vitamin K    PREVIDENT 5000 BOOSTER PLUS 1.1 % Pste BRUSH ON NIGHTLY AFTER NORMAL HYGIENE REGIMAN. SPIT OUT EXCESS DO NOT RINSE    ranitidine (ZANTAC) 300 MG tablet Take 1 tablet (300 mg total) by mouth once daily.    risperidone (RISPERDAL) 0.5 MG Tab Take 1 tablet (0.5 mg total) by mouth every evening.    tamsulosin (FLOMAX) 0.4 mg Cp24 Take 1 capsule (0.4 mg total) by mouth once daily.    trazodone (DESYREL) 50 MG tablet Take one-half to one tablet PO nightly PRN insomnia    warfarin (COUMADIN) 1 MG tablet Take 1 mg by mouth Daily. On Tuesday and friday     warfarin (COUMADIN) 2 MG tablet TAKE 1 TABLET BY MOUTH EVERY DAY    WELCHOL 625 mg tablet TAKE 1 TABLET BY MOUTH EVERY DAY INCREASE BY ONE PILL EVERY 3 TO 4 DAYS UNTIL DESIRED CONTROL IS REACHED     Family History     Problem Relation (Age of Onset)    Alcohol abuse Maternal Uncle, Maternal Grandfather    Cancer Maternal Grandmother    Diabetes Maternal Uncle, Maternal Grandmother    Drug abuse Brother, Maternal Grandmother    Early death Paternal Grandfather    Heart disease Paternal Uncle, Maternal Grandfather    Hyperlipidemia Mother, Maternal Grandmother    Hypertension Mother, Maternal Uncle, Maternal Grandmother, Maternal Grandfather    Mental illness Maternal Grandmother, Paternal Grandfather    No Known Problems Father, Paternal Grandmother    Post-traumatic stress disorder Brother    Scoliosis Paternal Aunt        Social History Main Topics    Smoking status: Former Smoker     Packs/day: 1.00     Years: 15.00     Types: Cigarettes     Start date: 11/25/2015    Smokeless tobacco: Never Used      Comment: quit smoking after the  "stroke    Alcohol use No    Drug use: Yes     Special: Marijuana      Comment: smokes occasionally, helps with pain and appetite    Sexual activity: Yes     Partners: Female     Review of Systems   Unable to perform ROS: Mental status change     Objective:     Vital Signs (Most Recent):  Temp: 99 °F (37.2 °C) (03/02/17 1615)  Pulse: (!) 54 (03/02/17 1615)  Resp: (!) 23 (03/02/17 1615)  BP: 136/82 (03/02/17 1615)  SpO2: 100 % (03/02/17 1615) Vital Signs (24h Range):  Temp:  [98 °F (36.7 °C)-99 °F (37.2 °C)] 99 °F (37.2 °C)  Pulse:  [] 54  Resp:  [16-51] 23  SpO2:  [97 %-100 %] 100 %  BP: (133-178)/() 136/82     Weight: 46.7 kg (103 lb)  Body mass index is 16.62 kg/(m^2).    Physical Exam   Constitutional: She appears well-developed and well-nourished.   HENT:   Head: Normocephalic and atraumatic.   Cardiovascular: Normal rate and regular rhythm.    Pulmonary/Chest: Effort normal and breath sounds normal.   Neurological:   Reflex Scores:       Tricep reflexes are 1+ on the right side and 1+ on the left side.       Bicep reflexes are 1+ on the right side and 1+ on the left side.       Brachioradialis reflexes are 1+ on the right side and 1+ on the left side.       Patellar reflexes are 1+ on the right side and 1+ on the left side.       Achilles reflexes are 1+ on the right side and 1+ on the left side.  Psychiatric: Her mood appears not anxious. Her affect is inappropriate. Her affect is not angry. Her speech is delayed and tangential. She is actively hallucinating. Thought content is delusional. She expresses impulsivity and inappropriate judgment. She is inattentive.   Vitals reviewed.      NEUROLOGICAL EXAMINATION:     MENTAL STATUS   Oriented to person.   Disoriented to place.   Disoriented to time.   Speech: (Slowed  )  Knowledge: poor.   Able to name object. Able to repeat.        Stated we were at "school" - when cued by my dress, stethoscope;  her clothing, IV, etc. Recognized in hospital. Able " to perform simple calculations.  Easily distracted. Responding to internal stimuli     CRANIAL NERVES     CN V   Facial sensation intact.     CN VII   Left facial weakness: central (flat NLF, asymmetric smile)    CN VIII   CN VIII normal.     CN IX, X   CN IX normal.        Left hemianopsia     MOTOR EXAM        Mild spastic weakness left UE and left LE     REFLEXES     Reflexes   Right brachioradialis: 1+  Left brachioradialis: 1+  Right biceps: 1+  Left biceps: 1+  Right triceps: 1+  Left triceps: 1+  Right patellar: 1+  Left patellar: 1+  Right achilles: 1+  Left achilles: 1+      Significant Labs:  Results for MARA LYNCH (MRN [id number]) as of 3/2/2017 16:05   Ref. Range 2/23/2017 12:23 3/2/2017 01:10 3/2/2017 09:03   WBC Latest Ref Range: 3.90 - 12.70 K/uL  5.60 6.20   RBC Latest Ref Range: 4.00 - 5.40 M/uL  4.38 4.51   Hemoglobin Latest Ref Range: 12.0 - 16.0 g/dL  13.1 13.3   Hematocrit Latest Ref Range: 37.0 - 48.5 %  39.1 40.8   MCV Latest Ref Range: 82 - 98 fL  89 91   MCH Latest Ref Range: 27.0 - 31.0 pg  29.8 29.5   MCHC Latest Ref Range: 32.0 - 36.0 %  33.4 32.5   RDW Latest Ref Range: 11.5 - 14.5 %  13.2 13.1   Platelets Latest Ref Range: 150 - 350 K/uL  171 158   MPV Latest Ref Range: 9.2 - 12.9 fL  10.4 10.9   Gran% Latest Ref Range: 38.0 - 73.0 %  67.6 72.9   Gran # Latest Ref Range: 1.8 - 7.7 K/uL  3.8 4.5   Lymph% Latest Ref Range: 18.0 - 48.0 %  24.1 22.5   Lymph # Latest Ref Range: 1.0 - 4.8 K/uL  1.4 1.4   Mono% Latest Ref Range: 4.0 - 15.0 %  6.9 3.5 (L)   Mono # Latest Ref Range: 0.3 - 1.0 K/uL  0.4 0.2 (L)   Eosinophil% Latest Ref Range: 0.0 - 8.0 %  0.7 0.4   Eos # Latest Ref Range: 0.0 - 0.5 K/uL  0.0 0.0   Basophil% Latest Ref Range: 0.0 - 1.9 %  0.7 0.7   Baso # Latest Ref Range: 0.00 - 0.20 K/uL  0.00 0.00   Protime Latest Ref Range: 9.0 - 12.5 sec  78.0 (H) 83.4 (H)   Coumadin Monitoring INR Latest Ref Range: 0.8 - 1.2   8.1 (HH) 8.6 (HH)   Prothrombin Gene Mutation Unknown  Normal Genotype     APA Isotype IgG Latest Ref Range: 0.00 - 14.99 GPL <9.40     APA Isotype IgM Latest Ref Range: 0.00 - 12.49 MPL <9.40     Anticardiolipin IgA Latest Ref Range: <12 APL <9.0     Beta-2 Glyco 1 IgG Latest Ref Range: <=20 SGU <9     Beta-2 Glyco 1 IgM Latest Ref Range: <=20 SMU <9     Beta-2 Glyco 1 IgA Latest Ref Range: <=20 KATHERINE <9     DRVVT, Lupus Anticoagulant Latest Ref Range: Negative  Negative     Protein C Activity Latest Ref Range: 70 - 140 % 12 (L)     Protein S Activity Latest Ref Range: 70 - 140 % 26 (L)     Factor V Leiden Unknown Normal Genotype     Hex Phosph Neut Test Latest Ref Range: Negative  Negative     Antithrombin III Latest Ref Range: 83 - 118 % 105       Intermediately elevated PS/PT Antibody ([facility] lab result)    Results for MARA LYNCH (MRN [id number]) as of 3/2/2017 16:05   Ref. Range 2/23/2017 12:23 3/2/2017 00:39 3/2/2017 00:48 3/2/2017 06:43   Sodium Latest Ref Range: 136 - 145 mmol/L   138    Potassium Latest Ref Range: 3.5 - 5.1 mmol/L   4.4    Chloride Latest Ref Range: 95 - 110 mmol/L   105    CO2 Latest Ref Range: 23 - 29 mmol/L   17 (L)    Anion Gap Latest Ref Range: 8 - 16 mmol/L   16    BUN, Bld Latest Ref Range: 6 - 20 mg/dL   16    Creatinine Latest Ref Range: 0.5 - 1.4 mg/dL   2.3 (H)    eGFR if non African American Latest Ref Range: >60 mL/min/1.73 m^2   27 (A)    eGFR if African American Latest Ref Range: >60 mL/min/1.73 m^2   31 (A)    Glucose Latest Ref Range: 70 - 110 mg/dL   117 (H)    Calcium Latest Ref Range: 8.7 - 10.5 mg/dL   9.8    Alkaline Phosphatase Latest Ref Range: 55 - 135 U/L   81    Total Protein Latest Ref Range: 6.0 - 8.4 g/dL   8.0    Albumin Latest Ref Range: 3.5 - 5.2 g/dL   4.9    Total Bilirubin Latest Ref Range: 0.1 - 1.0 mg/dL   0.3    AST Latest Ref Range: 10 - 40 U/L   16    ALT Latest Ref Range: 10 - 44 U/L   14    Ammonia Latest Ref Range: 10 - 50 umol/L    29   Triglycerides Latest Ref Range: 30 - 150 mg/dL 106       Cholesterol Latest Ref Range: 120 - 199 mg/dL 164      HDL Latest Ref Range: 40 - 75 mg/dL 32 (L)      LDL Cholesterol Latest Ref Range: 63.0 - 159.0 mg/dL 110.8      Total Cholesterol/HDL Ratio Latest Ref Range: 2.0 - 5.0  5.1 (H)      Homocysteine Latest Ref Range: 4.0 - 15.5 umol/L 26.9 (H)          Significant Imaging:   Imaging Results         CT Head Without Contrast (Final result) Result time:  03/02/17 08:18:06    Final result by Jensen Acuña MD (03/02/17 08:18:06)    Impression:       1.  No acute intracranial process.  2.  Chronic right cerebral hemisphere infarcts without significant change.          Electronically signed by: Jensen Acuña MD  Date:     03/02/17  Time:    08:18     Narrative:    CT HEAD WITHOUT CONTRAST    Technique: 5 mm axial images were obtained from the vertex to the skullbase, without administration of contrast.  Multiplanar reformatted images were created.    Comparison: 04/21/2016    FINDINGS:  Moderate regions of encephalomalacia are again identified within the right frontal and parietal lobes compatible with chronic infarcts.  No visible acute infarct.  There is no intracranial hemorrhage.  No mass or mass effect.  The ventricles are normal in size.  The calvarium is intact.                Assessment and Plan:     * Altered mental status  D/dx includes psychosis from alcohol, drug effect, lupus cerebritis or neuropsychiatric manifestation of lupus, psychosis associated with bipolar disorder. Behavior less likely from acute stroke or seizure activity    MRI and LP (if possible once INR normal)  for possible lupus cerebritis  Thiamine supplementation    Bipolar 2 disorder  Address per psychiatry    Lupus (systemic lupus erythematosus)  Lupus dx and moderate positive aPL antibody, appropriate setting for continued treatment with warfarin to prevent stroke.  Concern with hepatic disease and difficulty maintaining INR therapeutic range.  Consider alternative (NOAC) vs  antiplatelet therapy only    Possible neuropsychiatric manifestation of lupus, lupus cerebritis.  Will obtain MRI brain with and without contrast if possible, recommend LP for evidence of active inflammation if persistent and once INR normalized.  Treat behaviors with antipsychotic medications, recommend psychiatry and rheumatology input    Stage 3 chronic kidney disease       Anticoagulated on Coumadin  Holding due to supratherapeutic    Homocysteinemia  B12, B6, folic acid      VTE Risk Mitigation     None          Thank you for your consult. I will follow-up with patient. Please contact us if you have any additional questions.    Orestes Ribeiro, DO  Neurology  [facility]

## 2017-03-02 NOTE — PROGRESS NOTES
Received a call from the lab with repeat INR/ PT= 8.6/ 83.4  Called to Dr. Harris and let him know Dr. Sifuentes has ordered 4 UFFP for INR > 4  States he will put in orders for Vit K and keep NPO except meds; I let him know she has not had meds or food today.   May scope tomorrow.

## 2017-03-02 NOTE — ASSESSMENT & PLAN NOTE
Lupus dx and moderate positive aPL antibody, appropriate setting for continued treatment with warfarin to prevent stroke.  Concern with hepatic disease and difficulty maintaining INR therapeutic range.  Consider alternative (NOAC) vs antiplatelet therapy only    Possible neuropsychiatric manifestation of lupus, lupus cerebritis.  Will obtain MRI brain with and without contrast if possible, recommend LP for evidence of active inflammation if persistent and once INR normalized.  Treat behaviors with antipsychotic medications, recommend psychiatry and rheumatology input

## 2017-03-02 NOTE — PROGRESS NOTES
"Pt admitted to ICU room 513 from ER. Pt transferred to ICU bed and connected to monitors. Pt Boyfriend at bedside. Pt answers direct questions however not always appropriately. Pt does not attempt to converse with nursing or boyfriend. Pt follows simple commands. Pt able to state birthday however denies knowing name. Pt knows Tigre Hernandez is president however denies knowing year. Pt denies knowing boyfriends name. When asked mothers name pt states "mamma" over and over again in a loop. Pt also parroting what nurse and boyfriend are saying. Pt with slight strength deficit to lt side mainly seen in LLE. Pt boyfriend states that the last time pt was seen in her normal mental status was Tuesday afternoon around 2 pm. Pt mostly cooperative however does point, poke, and pick at self, nurse, and boyfriend. Face symmetrical. PERRL at 4 mm.   "

## 2017-03-02 NOTE — ED PROVIDER NOTES
"Encounter Date: 3/1/2017    SCRIBE #1 NOTE: I, Day Arellano , am scribing for, and in the presence of,  Dr. Robles . I have scribed the entire note.       History     Chief Complaint   Patient presents with    bizarre behavior     pt has flat effect and has shuffling walk/weakness to left side of body(chronic from stroke Nov 2015); slow to answer but speech is clear     Review of patient's allergies indicates:   Allergen Reactions    Amoxil [amoxicillin]      hives    Augmentin [amoxicillin-pot clavulanate]     Avelox [moxifloxacin]      itching     HPI Comments:     03/02/2017  12:25 AM     Chief Complaint: Bizarre behavior       The patient is a 37 y.o. female with a PMHx of CKD, lupus, fibromyalgia, sz, and stroke who is presenting with the ED for the acute onset of AMS x 1.5 days. Per pt's boyfriend, the pt suddenly began to "act strangely" yesterday morning: inappropriate responses as well as intermittent aphasia. The boyfriend notes that the pt will "grab at people and things" but "doesn't react like normal to me". He denies any change in baseline strength or sensation to L side of body, recent fevers, or drug abuse but confirms N/v/d x 1 week. He denies previous episodes of similar sx. Pt has past hx of psychiatric concerns including Bipolar I and previous suicide attempt. No pertinent past surgical hx. HPI and ROS are limited secondary to mental status change.             The history is provided by medical records and the patient.     Past Medical History:   Diagnosis Date    Anxiety     Bipolar 1 disorder     Chronic kidney disease     Chronic pain     Depression     bipolar 2    Fibromyalgia     Hx of psychiatric care     Lupus     Psychiatric problem     Renal tubular acidosis     Seizure     Stroke 11-    Suicide attempt     overdosed on a bottle of paxil, muscle relaxers, & zoloft    Therapy      Past Surgical History:   Procedure Laterality Date    CHOLECYSTECTOMY       Family " History   Problem Relation Age of Onset    Hypertension Mother     Hyperlipidemia Mother     No Known Problems Father     Post-traumatic stress disorder Brother     Drug abuse Brother     Diabetes Maternal Uncle     Hypertension Maternal Uncle     Alcohol abuse Maternal Uncle     Scoliosis Paternal Aunt     Heart disease Paternal Uncle     Mental illness Maternal Grandmother     Cancer Maternal Grandmother      lung / brain - smoker    Drug abuse Maternal Grandmother     Hypertension Maternal Grandmother     Hyperlipidemia Maternal Grandmother     Diabetes Maternal Grandmother     Heart disease Maternal Grandfather     Hypertension Maternal Grandfather     Alcohol abuse Maternal Grandfather     No Known Problems Paternal Grandmother     Mental illness Paternal Grandfather     Early death Paternal Grandfather      self     Social History   Substance Use Topics    Smoking status: Former Smoker     Packs/day: 1.00     Years: 15.00     Types: Cigarettes     Start date: 11/25/2015    Smokeless tobacco: Never Used      Comment: quit smoking after the stroke    Alcohol use No     Review of Systems   Unable to perform ROS: Mental status change       Physical Exam   Initial Vitals   BP Pulse Resp Temp SpO2   03/01/17 2336 03/01/17 2336 03/01/17 2336 03/01/17 2336 03/01/17 2336   147/102 127 16 98.8 °F (37.1 °C) 100 %     Physical Exam    Nursing note and vitals reviewed.  Constitutional: She appears well-developed and well-nourished.   HENT:   Head: Normocephalic and atraumatic.   Mouth/Throat: Oropharynx is clear and moist.   Eyes: EOM are normal. Pupils are equal, round, and reactive to light.   Neck: Normal range of motion. Neck supple.   Cardiovascular: Normal rate, regular rhythm, normal heart sounds and intact distal pulses. Exam reveals no friction rub.    No murmur heard.  Pulmonary/Chest: Breath sounds normal. She has no wheezes. She has no rhonchi. She has no rales. She exhibits no  "tenderness.   Abdominal: Soft. She exhibits no distension. There is no tenderness.   Musculoskeletal: Normal range of motion. She exhibits no edema or tenderness.   Lymphadenopathy:     She has no cervical adenopathy.   Neurological: She is alert.   Pt states that she is oriented x 0 but is able to name a "sethascope" and "no ifs ands or buts". Mild L sided weakness that is chronic.    Skin: Skin is warm and dry.   Psychiatric: Her affect is inappropriate.   Bizarre affect          ED Course   Procedures  Labs Reviewed   COMPREHENSIVE METABOLIC PANEL - Abnormal; Notable for the following:        Result Value    CO2 17 (*)     Glucose 117 (*)     Creatinine 2.3 (*)     eGFR if  31 (*)     eGFR if non  27 (*)     All other components within normal limits   URINALYSIS - Abnormal; Notable for the following:     Ketones, UA Trace (*)     Bilirubin (UA) 1+ (*)     All other components within normal limits   ACETAMINOPHEN LEVEL - Abnormal; Notable for the following:     Acetaminophen (Tylenol), Serum <3.0 (*)     All other components within normal limits   SALICYLATE LEVEL - Abnormal; Notable for the following:     Salicylate Lvl <5.0 (*)     All other components within normal limits   PROTIME-INR - Abnormal; Notable for the following:     Prothrombin Time 78.0 (*)     INR 8.1 (*)     All other components within normal limits    Narrative:     PT INR  critical result(s) called and verbal readback obtained from   Eli Rush RN. , 03/02/2017 01:58   CBC W/ AUTO DIFFERENTIAL   TSH   DRUG SCREEN PANEL, URINE EMERGENCY   ALCOHOL,MEDICAL (ETHANOL)   POCT URINE PREGNANCY             Medical Decision Making:   History:   I obtained history from: someone other than patient.       <> Summary of History: Boyfriend gave entire history  Old Medical Records: I decided to obtain old medical records.  Differential Diagnosis:   Ddx includes stroke, delirium, medication reaction , psychotic break, " bipolar      Pt with 1.5 day onset of bizarre behavior   Independently Interpreted Test(s):   I have ordered and independently interpreted X-rays - see summary below.       <> Summary of X-Ray Reading(s): Head CT: No acute process  Clinical Tests:   Lab Tests: Ordered and Reviewed  Radiological Study: Ordered  ED Management:  Patient's workup is unremarkable including a head CT.  However, her INR is supratherapeutic at 8.1.  Unclear etiology of her symptoms although a psychiatric etiology is most likely.  I will involuntarily commit the patient and admit to internal medicine for further management of her altered mental status while we await for her INR to improve.  Other:   I discussed test(s) with the performing physician.       <> Summary of the Findings: CT head: No acute process  I have discussed this case with another health care provider.       <> Summary of the Discussion: Internal medicine            Scribe Attestation:   Scribe #1: I performed the above scribed service and the documentation accurately describes the services I performed. I attest to the accuracy of the note.    Attending Attestation:           Physician Attestation for Scribe:  Physician Attestation Statement for Scribe #1: I, Dr. Robles , reviewed documentation, as scribed by Day Arellano  in my presence, and it is both accurate and complete.                 ED Course     Clinical Impression:   The primary encounter diagnosis was Supratherapeutic INR. A diagnosis of Altered mental status was also pertinent to this visit.          Alpesh Robles III, MD  03/02/17 0619

## 2017-03-02 NOTE — PROGRESS NOTES
Pt has vomited bloody looking emesis.  After cleaning pt and administering zofran I asked her a few questions including place, situation, time.  She responds to her own name but gives her boyfriend's name as Mitul, a character in the series [employer].  She does not know where she is or why she is in the hospital.  I said her family says she is not acting like herself; I asked her do you feel like you're normal self right now?  She nodded her head yes.  At times she could not answer questions and repeated back to me what I asked her.

## 2017-03-03 ENCOUNTER — SURGERY (OUTPATIENT)
Age: 37
End: 2017-03-03

## 2017-03-03 ENCOUNTER — ANESTHESIA (OUTPATIENT)
Dept: ENDOSCOPY | Facility: HOSPITAL | Age: 37
DRG: 918 | End: 2017-03-03
Payer: COMMERCIAL

## 2017-03-03 ENCOUNTER — ANESTHESIA EVENT (OUTPATIENT)
Dept: ENDOSCOPY | Facility: HOSPITAL | Age: 37
DRG: 918 | End: 2017-03-03
Payer: COMMERCIAL

## 2017-03-03 VITALS — RESPIRATION RATE: 13 BRPM

## 2017-03-03 LAB
ALBUMIN SERPL BCP-MCNC: 3.9 G/DL
ALP SERPL-CCNC: 74 U/L
ALT SERPL W/O P-5'-P-CCNC: 16 U/L
ANION GAP SERPL CALC-SCNC: 12 MMOL/L
AST SERPL-CCNC: 22 U/L
BASOPHILS # BLD AUTO: 0.1 K/UL
BASOPHILS NFR BLD: 2.1 %
BASOPHILS NFR CSF MANUAL: 0 %
BILIRUB SERPL-MCNC: 0.6 MG/DL
BILIRUB UR QL STRIP: NEGATIVE
BUN SERPL-MCNC: 16 MG/DL
CALCIUM SERPL-MCNC: 8.9 MG/DL
CHLORIDE SERPL-SCNC: 109 MMOL/L
CLARITY CSF: CLEAR
CLARITY UR: CLEAR
CO2 SERPL-SCNC: 19 MMOL/L
COLOR CSF: COLORLESS
COLOR UR: YELLOW
CREAT SERPL-MCNC: 1.7 MG/DL
DIFFERENTIAL METHOD: ABNORMAL
EOSINOPHIL # BLD AUTO: 0.2 K/UL
EOSINOPHIL NFR BLD: 3.1 %
EOSINOPHIL NFR CSF MANUAL: 0 %
ERYTHROCYTE [DISTWIDTH] IN BLOOD BY AUTOMATED COUNT: 13.3 %
EST. GFR  (AFRICAN AMERICAN): 44 ML/MIN/1.73 M^2
EST. GFR  (NON AFRICAN AMERICAN): 39 ML/MIN/1.73 M^2
GLUCOSE CSF-MCNC: 45 MG/DL
GLUCOSE SERPL-MCNC: 71 MG/DL
GLUCOSE UR QL STRIP: NEGATIVE
HCT VFR BLD AUTO: 31.9 %
HGB BLD-MCNC: 10.5 G/DL
HGB UR QL STRIP: NEGATIVE
INR PPP: 1.4
KETONES UR QL STRIP: ABNORMAL
LEUKOCYTE ESTERASE UR QL STRIP: NEGATIVE
LYMPHOCYTES # BLD AUTO: 2.1 K/UL
LYMPHOCYTES NFR BLD: 32.2 %
LYMPHOCYTES NFR CSF MANUAL: 0 %
MAGNESIUM SERPL-MCNC: 2.3 MG/DL
MCH RBC QN AUTO: 29.6 PG
MCHC RBC AUTO-ENTMCNC: 32.8 %
MCV RBC AUTO: 90 FL
MONOCYTES # BLD AUTO: 0.3 K/UL
MONOCYTES NFR BLD: 5.2 %
MONOS+MACROS NFR CSF MANUAL: 0 %
NEUTROPHILS # BLD AUTO: 3.7 K/UL
NEUTROPHILS NFR BLD: 57.4 %
NEUTROPHILS NFR CSF MANUAL: 0 %
NITRITE UR QL STRIP: NEGATIVE
PH UR STRIP: 6 [PH] (ref 5–8)
PHOSPHATE SERPL-MCNC: 3.7 MG/DL
PLATELET # BLD AUTO: 131 K/UL
PMV BLD AUTO: 9.5 FL
POTASSIUM SERPL-SCNC: 4 MMOL/L
PROT CSF-MCNC: 28 MG/DL
PROT SERPL-MCNC: 6.8 G/DL
PROT UR QL STRIP: NEGATIVE
PROTHROMBIN TIME: 14 SEC
RBC # BLD AUTO: 3.54 M/UL
RBC # CSF: 0 /CU MM
SODIUM SERPL-SCNC: 140 MMOL/L
SP GR UR STRIP: <=1.005 (ref 1–1.03)
SPECIMEN VOL CSF: 12 ML
URN SPEC COLLECT METH UR: ABNORMAL
UROBILINOGEN UR STRIP-ACNC: NEGATIVE EU/DL
WBC # BLD AUTO: 6.4 K/UL
WBC # CSF: 0 /CU MM

## 2017-03-03 PROCEDURE — 80053 COMPREHEN METABOLIC PANEL: CPT

## 2017-03-03 PROCEDURE — 84100 ASSAY OF PHOSPHORUS: CPT

## 2017-03-03 PROCEDURE — 37000008 HC ANESTHESIA 1ST 15 MINUTES: Performed by: INTERNAL MEDICINE

## 2017-03-03 PROCEDURE — 81003 URINALYSIS AUTO W/O SCOPE: CPT

## 2017-03-03 PROCEDURE — 43239 EGD BIOPSY SINGLE/MULTIPLE: CPT | Performed by: INTERNAL MEDICINE

## 2017-03-03 PROCEDURE — 99233 SBSQ HOSP IP/OBS HIGH 50: CPT | Mod: ,,, | Performed by: PSYCHIATRY & NEUROLOGY

## 2017-03-03 PROCEDURE — 63600175 PHARM REV CODE 636 W HCPCS: Performed by: INTERNAL MEDICINE

## 2017-03-03 PROCEDURE — 25500020 PHARM REV CODE 255: Performed by: HOSPITALIST

## 2017-03-03 PROCEDURE — C9113 INJ PANTOPRAZOLE SODIUM, VIA: HCPCS | Performed by: HOSPITALIST

## 2017-03-03 PROCEDURE — 25000003 PHARM REV CODE 250: Performed by: NURSE ANESTHETIST, CERTIFIED REGISTERED

## 2017-03-03 PROCEDURE — 43239 EGD BIOPSY SINGLE/MULTIPLE: CPT | Mod: ,,, | Performed by: INTERNAL MEDICINE

## 2017-03-03 PROCEDURE — 88305 TISSUE EXAM BY PATHOLOGIST: CPT | Performed by: PATHOLOGY

## 2017-03-03 PROCEDURE — 82945 GLUCOSE OTHER FLUID: CPT

## 2017-03-03 PROCEDURE — 36415 COLL VENOUS BLD VENIPUNCTURE: CPT

## 2017-03-03 PROCEDURE — 0DB68ZX EXCISION OF STOMACH, VIA NATURAL OR ARTIFICIAL OPENING ENDOSCOPIC, DIAGNOSTIC: ICD-10-PCS | Performed by: INTERNAL MEDICINE

## 2017-03-03 PROCEDURE — D9220A PRA ANESTHESIA: Mod: CRNA,,, | Performed by: NURSE ANESTHETIST, CERTIFIED REGISTERED

## 2017-03-03 PROCEDURE — 87070 CULTURE OTHR SPECIMN AEROBIC: CPT

## 2017-03-03 PROCEDURE — 89051 BODY FLUID CELL COUNT: CPT

## 2017-03-03 PROCEDURE — 83735 ASSAY OF MAGNESIUM: CPT

## 2017-03-03 PROCEDURE — 84157 ASSAY OF PROTEIN OTHER: CPT

## 2017-03-03 PROCEDURE — 37000009 HC ANESTHESIA EA ADD 15 MINS: Performed by: INTERNAL MEDICINE

## 2017-03-03 PROCEDURE — 63600175 PHARM REV CODE 636 W HCPCS: Performed by: NURSE ANESTHETIST, CERTIFIED REGISTERED

## 2017-03-03 PROCEDURE — 20000000 HC ICU ROOM

## 2017-03-03 PROCEDURE — 87205 SMEAR GRAM STAIN: CPT

## 2017-03-03 PROCEDURE — 85610 PROTHROMBIN TIME: CPT

## 2017-03-03 PROCEDURE — 63600175 PHARM REV CODE 636 W HCPCS: Performed by: PHYSICIAN ASSISTANT

## 2017-03-03 PROCEDURE — 63600175 PHARM REV CODE 636 W HCPCS: Performed by: HOSPITALIST

## 2017-03-03 PROCEDURE — A9577 INJ MULTIHANCE: HCPCS | Performed by: HOSPITALIST

## 2017-03-03 PROCEDURE — 25000003 PHARM REV CODE 250: Performed by: PHYSICIAN ASSISTANT

## 2017-03-03 PROCEDURE — 63600175 PHARM REV CODE 636 W HCPCS: Performed by: PSYCHIATRY & NEUROLOGY

## 2017-03-03 PROCEDURE — D9220A PRA ANESTHESIA: Mod: ANES,,, | Performed by: ANESTHESIOLOGY

## 2017-03-03 PROCEDURE — 009U3ZX DRAINAGE OF SPINAL CANAL, PERCUTANEOUS APPROACH, DIAGNOSTIC: ICD-10-PCS | Performed by: RADIOLOGY

## 2017-03-03 PROCEDURE — 80321 ALCOHOLS BIOMARKERS 1OR 2: CPT

## 2017-03-03 PROCEDURE — 87798 DETECT AGENT NOS DNA AMP: CPT

## 2017-03-03 PROCEDURE — 85025 COMPLETE CBC W/AUTO DIFF WBC: CPT

## 2017-03-03 PROCEDURE — 86592 SYPHILIS TEST NON-TREP QUAL: CPT

## 2017-03-03 PROCEDURE — 27201012 HC FORCEPS, HOT/COLD, DISP: Performed by: INTERNAL MEDICINE

## 2017-03-03 PROCEDURE — 25000003 PHARM REV CODE 250: Performed by: PSYCHIATRY & NEUROLOGY

## 2017-03-03 RX ORDER — PANTOPRAZOLE SODIUM 40 MG/1
40 TABLET, DELAYED RELEASE ORAL DAILY
Status: DISCONTINUED | OUTPATIENT
Start: 2017-03-04 | End: 2017-03-04 | Stop reason: HOSPADM

## 2017-03-03 RX ORDER — DEXTROMETHORPHAN/PSEUDOEPHED 2.5-7.5/.8
DROPS ORAL
Status: DISCONTINUED
Start: 2017-03-03 | End: 2017-03-03 | Stop reason: WASHOUT

## 2017-03-03 RX ORDER — SODIUM CHLORIDE 9 MG/ML
INJECTION, SOLUTION INTRAVENOUS CONTINUOUS PRN
Status: DISCONTINUED | OUTPATIENT
Start: 2017-03-03 | End: 2017-03-03

## 2017-03-03 RX ORDER — EPINEPHRINE 0.1 MG/ML
INJECTION INTRAVENOUS
Status: DISCONTINUED
Start: 2017-03-03 | End: 2017-03-03 | Stop reason: WASHOUT

## 2017-03-03 RX ORDER — PROPOFOL 10 MG/ML
VIAL (ML) INTRAVENOUS
Status: DISCONTINUED | OUTPATIENT
Start: 2017-03-03 | End: 2017-03-03

## 2017-03-03 RX ORDER — LIDOCAINE HYDROCHLORIDE 10 MG/ML
INJECTION, SOLUTION INTRAVENOUS
Status: DISCONTINUED | OUTPATIENT
Start: 2017-03-03 | End: 2017-03-03

## 2017-03-03 RX ADMIN — HYDROXYCHLOROQUINE SULFATE 200 MG: 200 TABLET, FILM COATED ORAL at 08:03

## 2017-03-03 RX ADMIN — SODIUM CHLORIDE: 0.9 INJECTION, SOLUTION INTRAVENOUS at 02:03

## 2017-03-03 RX ADMIN — GADOBENATE DIMEGLUMINE 4 ML: 529 INJECTION, SOLUTION INTRAVENOUS at 11:03

## 2017-03-03 RX ADMIN — COLESEVELAM HYDROCHLORIDE 625 MG: 625 TABLET, FILM COATED ORAL at 08:03

## 2017-03-03 RX ADMIN — ONDANSETRON 4 MG: 2 INJECTION INTRAMUSCULAR; INTRAVENOUS at 08:03

## 2017-03-03 RX ADMIN — PHYTONADIONE 10 MG: 10 INJECTION, EMULSION INTRAMUSCULAR; INTRAVENOUS; SUBCUTANEOUS at 06:03

## 2017-03-03 RX ADMIN — RISPERIDONE 0.5 MG: 0.25 TABLET, FILM COATED ORAL at 09:03

## 2017-03-03 RX ADMIN — LIDOCAINE HYDROCHLORIDE 50 MG: 10 INJECTION, SOLUTION INTRAVENOUS at 02:03

## 2017-03-03 RX ADMIN — THIAMINE HYDROCHLORIDE 100 MG: 100 INJECTION, SOLUTION INTRAMUSCULAR; INTRAVENOUS at 05:03

## 2017-03-03 RX ADMIN — PROPOFOL 50 MG: 10 INJECTION, EMULSION INTRAVENOUS at 02:03

## 2017-03-03 RX ADMIN — PROPOFOL 100 MG: 10 INJECTION, EMULSION INTRAVENOUS at 02:03

## 2017-03-03 RX ADMIN — HYDROXYCHLOROQUINE SULFATE 200 MG: 200 TABLET, FILM COATED ORAL at 09:03

## 2017-03-03 RX ADMIN — GABAPENTIN 800 MG: 400 CAPSULE ORAL at 09:03

## 2017-03-03 RX ADMIN — ESCITALOPRAM OXALATE 20 MG: 10 TABLET ORAL at 08:03

## 2017-03-03 RX ADMIN — PANTOPRAZOLE SODIUM 40 MG: 40 INJECTION, POWDER, FOR SOLUTION INTRAVENOUS at 08:03

## 2017-03-03 RX ADMIN — GABAPENTIN 800 MG: 400 CAPSULE ORAL at 08:03

## 2017-03-03 NOTE — INTERVAL H&P NOTE
The patient has been examined and the H&P has been reviewed:    I concur with the findings and no changes have occurred since H&P was written.    Anesthesia/Surgery risks, benefits and alternative options discussed and understood by patient/family.    Planned procedure: Fluoroscopically guided lumbar puncture.        Active Hospital Problems    Diagnosis  POA    *Altered mental status [R41.82]  Yes    Supratherapeutic international normalized ratio (INR) [R79.1]  Yes    Metabolic acidosis [E87.2]  Yes    Homocysteinemia [E72.11]  Yes    Anticoagulated on Coumadin [Z51.81, Z79.01]  Not Applicable    Adult BMI <19 kg/sq m [Z68.1]  Not Applicable    Stage 3 chronic kidney disease [N18.3]  Yes     Chronic    Lupus (systemic lupus erythematosus) [M32.9]  Yes    Bipolar 2 disorder [F31.81]  Yes     Chronic      Resolved Hospital Problems    Diagnosis Date Resolved POA   No resolved problems to display.

## 2017-03-03 NOTE — PROGRESS NOTES
Met with pt and pt's boyfriend in the room.  Pt was alert and actively engaged in conversation.  Pt reports feeling a lot better however does not have any recollection of what brought her to the hospital.  Pt denies feelings of depression or suicidal ideations.  Pt reports that she is waiting for further tests to be done today.  Updated pt that her mother had left me a message last night on my phone requesting an update on pt's condition and to tell pt that she loved her.  Pt reports that her mother came by this morning to see her. Pt denies any needs at this time.  Awaiting psych consult and further testing to be done.  CM will continue to follow.

## 2017-03-03 NOTE — ANESTHESIA PREPROCEDURE EVALUATION
03/03/2017  Cat Denson is a 37 y.o., female.    [facility] Anesthesia Evaluation    I have reviewed the Patient Summary Reports.    I have reviewed the Nursing Notes.   I have reviewed the Medications.     Review of Systems  Anesthesia Hx:  No problems with previous Anesthesia Denies Hx of Anesthetic complications    Social:  Former Smoker    Hematology/Oncology:        Hematology Comments: Lupus   Cardiovascular:   Denies Valvular problems/Murmurs.  Denies Dysrhythmias.  hyperlipidemia    Pulmonary:   Asthma    Renal/:   Chronic Renal Disease, CRI    Hepatic/GI:   GERD Liver Disease, Hepatitis    Neurological:   CVA (left hemiparesis ), residual symptoms Headaches Seizures    Endocrine:   Denies Diabetes. Denies Hypothyroidism.    Psych:   Psychiatric History Bipolar d/o   Prev attempt at suicide          Physical Exam  General:  Well nourished    Airway/Jaw/Neck:  Airway Findings: Mouth Opening: Normal Tongue: Normal  General Airway Assessment: Adult, Good  Mallampati: II  Improves to II with phonation.  TM Distance: 4-6 cm      Dental:  Dental Findings: In tact   Chest/Lungs:  Chest/Lungs Findings: Clear to auscultation, Normal Respiratory Rate, Decreased Breath Sounds Bilateral     Heart/Vascular:  Heart Findings: Rate: Normal  Rhythm: Regular Rhythm  Sounds: Normal  Heart murmur: negative       Mental Status:  Mental Status Findings:  Cooperative, Alert and Oriented         Anesthesia Plan  Type of Anesthesia, risks & benefits discussed:  Anesthesia Type:  general  Patient's Preference:   Intra-op Monitoring Plan:   Intra-op Monitoring Plan Comments:   Post Op Pain Control Plan:   Post Op Pain Control Plan Comments:   Induction:   IV  Beta Blocker:  Patient is not currently on a Beta-Blocker (No further documentation required).       Informed Consent: Patient understands risks and agrees with  Anesthesia plan.  Questions answered. Anesthesia consent signed with patient.  ASA Score: 3     Day of Surgery Review of History & Physical:    H&P update referred to the surgeon.         Ready For Surgery From Anesthesia Perspective.

## 2017-03-03 NOTE — PLAN OF CARE
Problem: Patient Care Overview  Goal: Plan of Care Review  Outcome: Ongoing (interventions implemented as appropriate)  Pt continuing to exhibit bizarre behavior. Pt continues with repetitious and parroting speech. Pt also continues with bizarre statements. Pt medicated with Geodon to help pt relax and sleep. Pt relaxed and slept after medication. VSS. Safety maintained. Sitter at bedside. PEC precautions in place.

## 2017-03-03 NOTE — PROGRESS NOTES
EGD completed    Mild gastritis (biopsied)  Otherwise unremarkable exam    Patient with reported hematemesis prior to admission with no drop in hemoglobin level or melena.  Suspect dark emesis (non bloody).  No further symptoms.    Plan:  Follow up pathology  Advance diet  Protonix 40 mg daily x 1 week  Please re-consult as needed.

## 2017-03-03 NOTE — NURSING
Patient s/p lumbar puncture, consent obtained by Dr. Nickerson, pt tolerated procedure well, bandaid to lumbar spine Rosario FREDERICK, RN pts nurse at bedside during YAMILEX josé

## 2017-03-03 NOTE — OP NOTE
Radiology Post-Procedure Note    Pre Op Diagnosis: Headache  Post Op Diagnosis: Same    Procedure: Fluoro guided LP    Procedure performed by: Krishan  Written Informed Consent Obtained: Yes  Specimen Removed: YES   Estimated Blood Loss: Minimal    Findings:   Left paramedian L2-3 level.  11 cc Clear CSF obtained.         Patient tolerated procedure well.    @SIG@

## 2017-03-03 NOTE — ASSESSMENT & PLAN NOTE
Significantly improved. On thiamine supplementation.  No evidence of CNS infection or inflammation, unlikely lupus related.     D/dx includes psychosis from alcohol, drug effect, lupus cerebritis or neuropsychiatric manifestation of lupus, psychosis associated with bipolar disorder. Behavior less likely from acute stroke or seizure activity

## 2017-03-03 NOTE — SUBJECTIVE & OBJECTIVE
Subjective:     Interval History: much better today.  MRI showed old stroke, no new infarct, no hemorrhage, no abnormal enhancement.  LP results with no cells, normal protein and glucose. No signs of inflammatory process.    She does not have any idea what could have happened, poorly recalls events of the past few days.  Denies smoking any synthetic cannabinoids or other drug ingestions, denies any recent change in medications, recent trauma, fevers, etc.     Current Neurological Medications:      Current Facility-Administered Medications   Medication Dose Route Frequency Provider Last Rate Last Dose    acetaminophen tablet 650 mg  650 mg Oral Q6H PRN Feizal Fakier, PA-C        clonazePAM tablet 0.5 mg  0.5 mg Oral BID PRN Feizal Fakier, PA-C   0.5 mg at 03/02/17 1448    colesevelam tablet 625 mg  625 mg Oral Daily with breakfast Feizal Fakier, PA-C   625 mg at 03/03/17 0814    escitalopram oxalate tablet 20 mg  20 mg Oral Daily Feizal Fakier, PA-C   20 mg at 03/03/17 0814    gabapentin capsule 800 mg  800 mg Oral BID Feizal Fakier, PA-C   800 mg at 03/03/17 0814    hydroxychloroquine tablet 200 mg  200 mg Oral BID Feizal Fakier, PA-C   200 mg at 03/03/17 0814    metoprolol succinate (TOPROL-XL) 24 hr tablet 25 mg  25 mg Oral Daily Feizal Fakier, PA-C   Stopped at 03/03/17 0900    multihance gadobenate dimeglumine 529 mg/mL (0.1mmol/0.2mL)             ondansetron injection 4 mg  4 mg Intravenous Q6H PRN Feizal Fakier, PA-C   4 mg at 03/03/17 0814    [START ON 3/4/2017] pantoprazole EC tablet 40 mg  40 mg Oral Daily David Harris MD        risperidone tablet 0.5 mg  0.5 mg Oral QHS Feizal Fakier, PA-C   0.5 mg at 03/02/17 2020    senna-docusate 8.6-50 mg per tablet 1 tablet  1 tablet Oral BID PRN Feizal Fakier, PA-C        thiamine (B-1) 100 mg in dextrose 5 % 50 mL IVPB  100 mg Intravenous Q24H Orestes Ribeiro DO 12.5 mL/hr at 03/03/17 1739 100 mg at 03/03/17 1739    trazodone tablet 50 mg  50  mg Oral Nightly PRN Mandi Dewitt PA-C   50 mg at 03/02/17 2020    ziprasidone injection 10 mg  10 mg Intramuscular Q12H PRN Mandi Dewitt PA-C   10 mg at 03/02/17 2144       Review of Systems   Constitutional: Negative for chills, fatigue and fever.   HENT: Negative for congestion.    Eyes: Negative for visual disturbance.   Respiratory: Negative for cough, shortness of breath and wheezing.    Cardiovascular: Negative for chest pain and palpitations.   Gastrointestinal: Negative for abdominal pain, constipation, diarrhea, nausea and vomiting.   Endocrine: Negative for cold intolerance and heat intolerance.   Genitourinary: Negative for difficulty urinating, dysuria and hematuria.   Musculoskeletal: Negative for arthralgias and myalgias.   Skin: Negative for rash and wound.   Allergic/Immunologic: Negative for immunocompromised state.   Neurological: Negative for dizziness, tremors, seizures, weakness, numbness and headaches.   Hematological: Does not bruise/bleed easily.   Psychiatric/Behavioral: Negative for dysphoric mood and sleep disturbance. The patient is not nervous/anxious.      Objective:     Vital Signs (Most Recent):  Temp: 98 °F (36.7 °C) (03/03/17 1300)  Pulse: (!) 44 (03/03/17 1530)  Resp: 13 (03/03/17 1530)  BP: (!) 93/53 (03/03/17 1530)  SpO2: 100 % (03/03/17 1530) Vital Signs (24h Range):  Temp:  [97.6 °F (36.4 °C)-98.4 °F (36.9 °C)] 98 °F (36.7 °C)  Pulse:  [44-82] 44  Resp:  [13-54] 13  SpO2:  [95 %-100 %] 100 %  BP: ()/(50-86) 93/53     Weight: 46.7 kg (103 lb)  Body mass index is 16.62 kg/(m^2).    Physical Exam   Constitutional: She appears well-developed and well-nourished.   HENT:   Head: Normocephalic and atraumatic.   Eyes: Pupils are equal, round, and reactive to light.   Cardiovascular: Normal rate and regular rhythm.    Psychiatric: Her speech is normal and behavior is normal. Judgment and thought content normal. Her affect is blunt.   Vitals reviewed.      NEUROLOGICAL  EXAMINATION:     MENTAL STATUS   Oriented to person.   Oriented to place.   Oriented to time.   Attention: normal. Concentration: normal.   Speech: speech is normal   Level of consciousness: alert  Able to name object. Able to repeat. Normal comprehension.        Speech content normal.  Flat affect but coherent and appropriate     CRANIAL NERVES     CN III, IV, VI   Pupils are equal, round, and reactive to light.      Significant Labs:   Results for MARA LYNCH (MRN [id number]) as of 3/3/2017 17:54   Ref. Range 3/3/2017 07:34   WBC Latest Ref Range: 3.90 - 12.70 K/uL 6.40   RBC Latest Ref Range: 4.00 - 5.40 M/uL 3.54 (L)   Hemoglobin Latest Ref Range: 12.0 - 16.0 g/dL 10.5 (L)   Hematocrit Latest Ref Range: 37.0 - 48.5 % 31.9 (L)   MCV Latest Ref Range: 82 - 98 fL 90   MCH Latest Ref Range: 27.0 - 31.0 pg 29.6   MCHC Latest Ref Range: 32.0 - 36.0 % 32.8   RDW Latest Ref Range: 11.5 - 14.5 % 13.3   Platelets Latest Ref Range: 150 - 350 K/uL 131 (L)   MPV Latest Ref Range: 9.2 - 12.9 fL 9.5   Gran% Latest Ref Range: 38.0 - 73.0 % 57.4   Gran # Latest Ref Range: 1.8 - 7.7 K/uL 3.7   Lymph% Latest Ref Range: 18.0 - 48.0 % 32.2   Lymph # Latest Ref Range: 1.0 - 4.8 K/uL 2.1   Mono% Latest Ref Range: 4.0 - 15.0 % 5.2   Mono # Latest Ref Range: 0.3 - 1.0 K/uL 0.3   Eosinophil% Latest Ref Range: 0.0 - 8.0 % 3.1   Eos # Latest Ref Range: 0.0 - 0.5 K/uL 0.2   Basophil% Latest Ref Range: 0.0 - 1.9 % 2.1 (H)   Baso # Latest Ref Range: 0.00 - 0.20 K/uL 0.10   Protime Latest Ref Range: 9.0 - 12.5 sec 14.0 (H)   Coumadin Monitoring INR Latest Ref Range: 0.8 - 1.2  1.4 (H)   Sodium Latest Ref Range: 136 - 145 mmol/L 140   Potassium Latest Ref Range: 3.5 - 5.1 mmol/L 4.0   Chloride Latest Ref Range: 95 - 110 mmol/L 109   CO2 Latest Ref Range: 23 - 29 mmol/L 19 (L)   Anion Gap Latest Ref Range: 8 - 16 mmol/L 12   BUN, Bld Latest Ref Range: 6 - 20 mg/dL 16   Creatinine Latest Ref Range: 0.5 - 1.4 mg/dL 1.7 (H)   eGFR if non   Latest Ref Range: >60 mL/min/1.73 m^2 39 (A)   eGFR if African American Latest Ref Range: >60 mL/min/1.73 m^2 44 (A)   Glucose Latest Ref Range: 70 - 110 mg/dL 71   Calcium Latest Ref Range: 8.7 - 10.5 mg/dL 8.9   Phosphorus Latest Ref Range: 2.7 - 4.5 mg/dL 3.7   Magnesium Latest Ref Range: 1.6 - 2.6 mg/dL 2.3   Alkaline Phosphatase Latest Ref Range: 55 - 135 U/L 74   Total Protein Latest Ref Range: 6.0 - 8.4 g/dL 6.8   Albumin Latest Ref Range: 3.5 - 5.2 g/dL 3.9   Total Bilirubin Latest Ref Range: 0.1 - 1.0 mg/dL 0.6   AST Latest Ref Range: 10 - 40 U/L 22   ALT Latest Ref Range: 10 - 44 U/L 16     Results for MARA LYNCH (MRN [id number]) as of 3/3/2017 17:54   Ref. Range 3/3/2017 11:46   Color, CSF Latest Ref Range: Colorless  Colorless   Heme Aliquot Latest Units: mL 12.0   Appearance, CSF Latest Ref Range: Clear  Clear   WBC, CSF Latest Ref Range: 0 - 5 /cu mm 0   RBC, CSF Latest Ref Range: 0 /cu mm 0   Segmented Neutrophils, CSF Latest Ref Range: 0 - 6 % 0   Lymphs, CSF Latest Ref Range: 40 - 80 % 0 (L)   Mono/Macrophage, CSF Latest Ref Range: 15 - 45 % 0 (L)   Eosinophils, CSF Latest Units: % 0   Baso, CSF Latest Units: % 0   Glucose, CSF Latest Ref Range: 40 - 70 mg/dL 45   Protein, CSF Latest Ref Range: 15 - 40 mg/dL 28       Significant Imaging:   MRI Brain W WO Contrast    Status: Final result         MyChart Results Release      MyChart Status: Active Results Release       Signed by      Signed Credentials Date/Time    Phone Pager     FABIOLA CLIFTON MD 3/03/2017 11:57 [phone]       PACS Images      Show images for MRI Brain W WO Contrast       External Result Report      External Result Report       Narrative      Comparison:CT head dated 3/2/17 and MRI brain dated 11/26/15    Technique: Sagittal T1, axial T1, T2, flair, T2 gradient echo and diffusion and 3 plane postcontrast T1-weighted sequences of the brain. 4 ml Gadavist was administered  intravenously for the contrast sequences.    Findings:No reduced diffusion is identified to suggest acute ischemia.  There are large areas of encephalomalacia within the right frontal, parietal and superior temporal lobes consistent with prior MCA territory infarcts.  There is also a right paramedian posterior frontal/parietal encephalomalacia consistent with PORSHA territory infarct.  Thin areas of T1 hyperintensity are identified involving the parietal and temporal involved territories is noted consistent with cortical laminar necrosis.  There is ipsilateral Wallerian degeneration.  No focal abnormal magnetic susceptibility to suggest abnormal parenchymal blood products.  There is mild diffuse sulcal prominence consistent with atrophy.  There is mild ventriculomegaly.  No extra-axial fluid collection is seen.  No mass effect or midline shift is seen.  Confluent T2 hyperintense white matter changes are noted in the right cerebral hemisphere consistent with gliosis.  No left-sided white matter disease is identified.  No acute posterior fossa abnormality is seen.  Basal arterial flow voids are grossly maintained.  The dural venous sinuses flow-voids are grossly maintained.  No acute orbital soft tissue abnormality is seen.  No focal abnormal contrast enhancement is identified.  No abnormal leptomeningeal enhancement is seen.  The pituitary gland and corpus callosum are unremarkable.  No clival or calvarial abnormality is seen.       Impression        1.  No acute intracranial abnormality is seen.  Specifically, no reduced diffusion to suggest acute ischemia.  No abnormal contrast enhancement.  2.  Chronic right cerebral hemisphere infarcts including large MCA territory infarcts and smaller territory in the right frontoparietal PORSHA territory.  Associated ipsilateral Wallerian degeneration and mild cortical laminar necrosis noted.  Confluent white matter disease in the right cerebral hemisphere consistent with associated  gliosis.      Electronically signed by: Gagandeep Nickerson MD  Date: 03/03/17  Time: 11:57

## 2017-03-03 NOTE — ANESTHESIA POSTPROCEDURE EVALUATION
"Anesthesia Post Evaluation    Patient: Cat Denson    Procedure(s) Performed: Procedure(s) (LRB):  ESOPHAGOGASTRODUODENOSCOPY (EGD) (N/A)    Final Anesthesia Type: general  Patient location during evaluation: PACU  Patient participation: Yes- Able to Participate  Level of consciousness: awake and alert and oriented  Post-procedure vital signs: reviewed and stable  Pain management: adequate  Airway patency: patent  PONV status at discharge: No PONV  Anesthetic complications: no      Cardiovascular status: blood pressure returned to baseline  Respiratory status: unassisted, spontaneous ventilation and room air  Hydration status: euvolemic  Follow-up not needed.        Visit Vitals    BP (!) 92/54    Pulse (!) 49    Temp 36.7 °C (98 °F)    Resp 13    Ht 5' 6" (1.676 m)    Wt 46.7 kg (103 lb)    LMP  (LMP Unknown)    SpO2 99%    Breastfeeding No    BMI 16.62 kg/m2       Pain/Link Score: Pain Assessment Performed: Yes (3/3/2017  3:05 PM)  Presence of Pain: non-verbal indicators absent (3/3/2017  3:10 PM)  Link Score: 5 (3/3/2017  3:10 PM)      "

## 2017-03-03 NOTE — TRANSFER OF CARE
"Anesthesia Transfer of Care Note    Patient: Cat Denson    Procedure(s) Performed: Procedure(s) (LRB):  ESOPHAGOGASTRODUODENOSCOPY (EGD) (N/A)    Patient location: ICU    Anesthesia Type: general    Transport from OR: Transported from OR on 2-3 L/min O2 by NC with adequate spontaneous ventilation    Post pain: adequate analgesia    Post assessment: no apparent anesthetic complications    Post vital signs: stable    Level of consciousness: awake    Nausea/Vomiting: no nausea/vomiting    Complications: none          Last vitals:   Visit Vitals    /61 (BP Method: Automatic)    Pulse (!) 58    Temp 36.7 °C (98 °F)    Resp 18    Ht 5' 6" (1.676 m)    Wt 46.7 kg (103 lb)    LMP  (LMP Unknown)    SpO2 95%    Breastfeeding No    BMI 16.62 kg/m2     "

## 2017-03-03 NOTE — PROGRESS NOTES
"Pt noticed camera in room and asked that it be covered up.  Explained that no one is watching pt unless the camera is turned toward her.  She states, "okay."  Called EICU and let them know she is a psych pt and to please avoid coming into room.  Leyla in EICU agreed and states she will put a privacy notice on pt room.    "

## 2017-03-03 NOTE — PROGRESS NOTES
"Pt asking when the "virvane" would be out of her system so she could go home. Pt verbalizing she is affected by "dark magic" and she wouldn't get better until "she removed the dark magic".   "

## 2017-03-03 NOTE — NURSING
Received orders for LP on patient, chart reviewed Labs PT/INR 14.0/1.4,  pt received 4 units FFP yesterday for INR 8.1 on arrival. Pt is on coumadin home medication presumed last dose Tuesday since information unable to be obtained from patient. Dr. Nickerson notified, will proceed with LP. AR

## 2017-03-03 NOTE — ASSESSMENT & PLAN NOTE
INR today 1.4.  Consider starting xarelto 20 mg daily as she has had difficulty in the past maintaining therapeutic INR on warfarin

## 2017-03-04 VITALS
OXYGEN SATURATION: 98 % | HEIGHT: 66 IN | TEMPERATURE: 98 F | RESPIRATION RATE: 34 BRPM | DIASTOLIC BLOOD PRESSURE: 81 MMHG | HEART RATE: 75 BPM | WEIGHT: 103 LBS | BODY MASS INDEX: 16.55 KG/M2 | SYSTOLIC BLOOD PRESSURE: 144 MMHG

## 2017-03-04 LAB
INR PPP: 1.1
PROTHROMBIN TIME: 11.9 SEC
SPECIMEN SOURCE: NORMAL
WNV RNA SPEC QL NAA+PROBE: NEGATIVE

## 2017-03-04 PROCEDURE — 36415 COLL VENOUS BLD VENIPUNCTURE: CPT

## 2017-03-04 PROCEDURE — 25000003 PHARM REV CODE 250: Performed by: PHYSICIAN ASSISTANT

## 2017-03-04 PROCEDURE — 25000003 PHARM REV CODE 250: Performed by: INTERNAL MEDICINE

## 2017-03-04 PROCEDURE — 85610 PROTHROMBIN TIME: CPT

## 2017-03-04 RX ORDER — TRAMADOL HYDROCHLORIDE 50 MG/1
50 TABLET ORAL ONCE
Status: COMPLETED | OUTPATIENT
Start: 2017-03-04 | End: 2017-03-04

## 2017-03-04 RX ORDER — ENOXAPARIN SODIUM 100 MG/ML
50 INJECTION SUBCUTANEOUS EVERY 12 HOURS
Status: DISCONTINUED | OUTPATIENT
Start: 2017-03-04 | End: 2017-03-04

## 2017-03-04 RX ORDER — WARFARIN SODIUM 5 MG/1
5 TABLET ORAL DAILY
Status: DISCONTINUED | OUTPATIENT
Start: 2017-03-04 | End: 2017-03-04

## 2017-03-04 RX ADMIN — ACETAMINOPHEN 650 MG: 325 TABLET, FILM COATED ORAL at 11:03

## 2017-03-04 RX ADMIN — TRAMADOL HYDROCHLORIDE 50 MG: 50 TABLET, COATED ORAL at 01:03

## 2017-03-04 RX ADMIN — ESCITALOPRAM OXALATE 20 MG: 10 TABLET ORAL at 08:03

## 2017-03-04 RX ADMIN — COLESEVELAM HYDROCHLORIDE 625 MG: 625 TABLET, FILM COATED ORAL at 08:03

## 2017-03-04 RX ADMIN — CLONAZEPAM 0.5 MG: 0.5 TABLET ORAL at 01:03

## 2017-03-04 RX ADMIN — GABAPENTIN 800 MG: 400 CAPSULE ORAL at 08:03

## 2017-03-04 RX ADMIN — PANTOPRAZOLE SODIUM 40 MG: 40 TABLET, DELAYED RELEASE ORAL at 08:03

## 2017-03-04 RX ADMIN — HYDROXYCHLOROQUINE SULFATE 200 MG: 200 TABLET, FILM COATED ORAL at 08:03

## 2017-03-04 NOTE — PLAN OF CARE
Problem: Patient Care Overview  Goal: Plan of Care Review  Outcome: Ongoing (interventions implemented as appropriate)  Pt much more coherent this shift. Pt at reported baseline behavior/ mannerisms. VSS. Safety measures in place. PEC precautions in place.

## 2017-03-04 NOTE — PROGRESS NOTES
[facility]  Neurology  Progress Note    Patient Name: Cat Denson  MRN: [id number]  Admission Date: 3/1/2017  Hospital Length of Stay: 1 days  Code Status: Full Code   Attending Provider: Neela Sifuentes MD  Primary Care Physician: Dayton Dukes MD   Principal Problem:Altered mental status      Subjective:     Interval History: much better today.  MRI showed old stroke, no new infarct, no hemorrhage, no abnormal enhancement.  LP results with no cells, normal protein and glucose. No signs of inflammatory process.    She does not have any idea what could have happened, poorly recalls events of the past few days.  Denies smoking any synthetic cannabinoids or other drug ingestions, denies any recent change in medications, recent trauma, fevers, etc.     Current Neurological Medications:      Current Facility-Administered Medications   Medication Dose Route Frequency Provider Last Rate Last Dose    acetaminophen tablet 650 mg  650 mg Oral Q6H PRN Feizal Fakier, PA-C        clonazePAM tablet 0.5 mg  0.5 mg Oral BID PRN Feizal Fakier, PA-C   0.5 mg at 03/02/17 1448    colesevelam tablet 625 mg  625 mg Oral Daily with breakfast Feizal Fakier, PA-C   625 mg at 03/03/17 0814    escitalopram oxalate tablet 20 mg  20 mg Oral Daily Feizal Fakier, PA-C   20 mg at 03/03/17 0814    gabapentin capsule 800 mg  800 mg Oral BID Feizal Fakier, PA-C   800 mg at 03/03/17 0814    hydroxychloroquine tablet 200 mg  200 mg Oral BID Feizal Fakier, PA-C   200 mg at 03/03/17 0814    metoprolol succinate (TOPROL-XL) 24 hr tablet 25 mg  25 mg Oral Daily Feizal Fakier, PA-C   Stopped at 03/03/17 0900    multihance gadobenate dimeglumine 529 mg/mL (0.1mmol/0.2mL)             ondansetron injection 4 mg  4 mg Intravenous Q6H PRN Feizal Fakier, PA-C   4 mg at 03/03/17 0814    [START ON 3/4/2017] pantoprazole EC tablet 40 mg  40 mg Oral Daily David Harris MD        risperidone tablet 0.5 mg  0.5 mg Oral QHS  YASMIN Hahn-C   0.5 mg at 03/02/17 2020    senna-docusate 8.6-50 mg per tablet 1 tablet  1 tablet Oral BID PRN Mandi Dewitt PA-C        thiamine (B-1) 100 mg in dextrose 5 % 50 mL IVPB  100 mg Intravenous Q24H Orestes Ashrafsusie, DO 12.5 mL/hr at 03/03/17 1739 100 mg at 03/03/17 1739    trazodone tablet 50 mg  50 mg Oral Nightly PRN YASMIN Hahn-C   50 mg at 03/02/17 2020    ziprasidone injection 10 mg  10 mg Intramuscular Q12H PRN YASMIN Hahn-C   10 mg at 03/02/17 2144       Review of Systems   Constitutional: Negative for chills, fatigue and fever.   HENT: Negative for congestion.    Eyes: Negative for visual disturbance.   Respiratory: Negative for cough, shortness of breath and wheezing.    Cardiovascular: Negative for chest pain and palpitations.   Gastrointestinal: Negative for abdominal pain, constipation, diarrhea, nausea and vomiting.   Endocrine: Negative for cold intolerance and heat intolerance.   Genitourinary: Negative for difficulty urinating, dysuria and hematuria.   Musculoskeletal: Negative for arthralgias and myalgias.   Skin: Negative for rash and wound.   Allergic/Immunologic: Negative for immunocompromised state.   Neurological: Negative for dizziness, tremors, seizures, weakness, numbness and headaches.   Hematological: Does not bruise/bleed easily.   Psychiatric/Behavioral: Negative for dysphoric mood and sleep disturbance. The patient is not nervous/anxious.      Objective:     Vital Signs (Most Recent):  Temp: 98 °F (36.7 °C) (03/03/17 1300)  Pulse: (!) 44 (03/03/17 1530)  Resp: 13 (03/03/17 1530)  BP: (!) 93/53 (03/03/17 1530)  SpO2: 100 % (03/03/17 1530) Vital Signs (24h Range):  Temp:  [97.6 °F (36.4 °C)-98.4 °F (36.9 °C)] 98 °F (36.7 °C)  Pulse:  [44-82] 44  Resp:  [13-54] 13  SpO2:  [95 %-100 %] 100 %  BP: ()/(50-86) 93/53     Weight: 46.7 kg (103 lb)  Body mass index is 16.62 kg/(m^2).    Physical Exam   Constitutional: She appears well-developed and  well-nourished.   HENT:   Head: Normocephalic and atraumatic.   Eyes: Pupils are equal, round, and reactive to light.   Cardiovascular: Normal rate and regular rhythm.    Psychiatric: Her speech is normal and behavior is normal. Judgment and thought content normal. Her affect is blunt.   Vitals reviewed.      NEUROLOGICAL EXAMINATION:     MENTAL STATUS   Oriented to person.   Oriented to place.   Oriented to time.   Attention: normal. Concentration: normal.   Speech: speech is normal   Level of consciousness: alert  Able to name object. Able to repeat. Normal comprehension.        Speech content normal.  Flat affect but coherent and appropriate     CRANIAL NERVES     CN III, IV, VI   Pupils are equal, round, and reactive to light.      Significant Labs:   Results for MARA LYNCH (MRN [id number]) as of 3/3/2017 17:54   Ref. Range 3/3/2017 07:34   WBC Latest Ref Range: 3.90 - 12.70 K/uL 6.40   RBC Latest Ref Range: 4.00 - 5.40 M/uL 3.54 (L)   Hemoglobin Latest Ref Range: 12.0 - 16.0 g/dL 10.5 (L)   Hematocrit Latest Ref Range: 37.0 - 48.5 % 31.9 (L)   MCV Latest Ref Range: 82 - 98 fL 90   MCH Latest Ref Range: 27.0 - 31.0 pg 29.6   MCHC Latest Ref Range: 32.0 - 36.0 % 32.8   RDW Latest Ref Range: 11.5 - 14.5 % 13.3   Platelets Latest Ref Range: 150 - 350 K/uL 131 (L)   MPV Latest Ref Range: 9.2 - 12.9 fL 9.5   Gran% Latest Ref Range: 38.0 - 73.0 % 57.4   Gran # Latest Ref Range: 1.8 - 7.7 K/uL 3.7   Lymph% Latest Ref Range: 18.0 - 48.0 % 32.2   Lymph # Latest Ref Range: 1.0 - 4.8 K/uL 2.1   Mono% Latest Ref Range: 4.0 - 15.0 % 5.2   Mono # Latest Ref Range: 0.3 - 1.0 K/uL 0.3   Eosinophil% Latest Ref Range: 0.0 - 8.0 % 3.1   Eos # Latest Ref Range: 0.0 - 0.5 K/uL 0.2   Basophil% Latest Ref Range: 0.0 - 1.9 % 2.1 (H)   Baso # Latest Ref Range: 0.00 - 0.20 K/uL 0.10   Protime Latest Ref Range: 9.0 - 12.5 sec 14.0 (H)   Coumadin Monitoring INR Latest Ref Range: 0.8 - 1.2  1.4 (H)   Sodium Latest Ref Range: 136 -  145 mmol/L 140   Potassium Latest Ref Range: 3.5 - 5.1 mmol/L 4.0   Chloride Latest Ref Range: 95 - 110 mmol/L 109   CO2 Latest Ref Range: 23 - 29 mmol/L 19 (L)   Anion Gap Latest Ref Range: 8 - 16 mmol/L 12   BUN, Bld Latest Ref Range: 6 - 20 mg/dL 16   Creatinine Latest Ref Range: 0.5 - 1.4 mg/dL 1.7 (H)   eGFR if non African American Latest Ref Range: >60 mL/min/1.73 m^2 39 (A)   eGFR if African American Latest Ref Range: >60 mL/min/1.73 m^2 44 (A)   Glucose Latest Ref Range: 70 - 110 mg/dL 71   Calcium Latest Ref Range: 8.7 - 10.5 mg/dL 8.9   Phosphorus Latest Ref Range: 2.7 - 4.5 mg/dL 3.7   Magnesium Latest Ref Range: 1.6 - 2.6 mg/dL 2.3   Alkaline Phosphatase Latest Ref Range: 55 - 135 U/L 74   Total Protein Latest Ref Range: 6.0 - 8.4 g/dL 6.8   Albumin Latest Ref Range: 3.5 - 5.2 g/dL 3.9   Total Bilirubin Latest Ref Range: 0.1 - 1.0 mg/dL 0.6   AST Latest Ref Range: 10 - 40 U/L 22   ALT Latest Ref Range: 10 - 44 U/L 16     Results for MARA LYNCH (MRN [id number]) as of 3/3/2017 17:54   Ref. Range 3/3/2017 11:46   Color, CSF Latest Ref Range: Colorless  Colorless   Heme Aliquot Latest Units: mL 12.0   Appearance, CSF Latest Ref Range: Clear  Clear   WBC, CSF Latest Ref Range: 0 - 5 /cu mm 0   RBC, CSF Latest Ref Range: 0 /cu mm 0   Segmented Neutrophils, CSF Latest Ref Range: 0 - 6 % 0   Lymphs, CSF Latest Ref Range: 40 - 80 % 0 (L)   Mono/Macrophage, CSF Latest Ref Range: 15 - 45 % 0 (L)   Eosinophils, CSF Latest Units: % 0   Baso, CSF Latest Units: % 0   Glucose, CSF Latest Ref Range: 40 - 70 mg/dL 45   Protein, CSF Latest Ref Range: 15 - 40 mg/dL 28       Significant Imaging:   MRI Brain W WO Contrast    Status: Final result         [facility] Results Release      [facility] Status: Active Results Release       Signed by      Signed Credentials Date/Time    Phone Pager     FABIOLA CLIFTON MD 3/03/2017 11:57 [phone]       PACS Images      Show images for MRI Brain W WO  Contrast       External Result Report      External Result Report       Narrative      Comparison:CT head dated 3/2/17 and MRI brain dated 11/26/15    Technique: Sagittal T1, axial T1, T2, flair, T2 gradient echo and diffusion and 3 plane postcontrast T1-weighted sequences of the brain. 4 ml Gadavist was administered intravenously for the contrast sequences.    Findings:No reduced diffusion is identified to suggest acute ischemia.  There are large areas of encephalomalacia within the right frontal, parietal and superior temporal lobes consistent with prior MCA territory infarcts.  There is also a right paramedian posterior frontal/parietal encephalomalacia consistent with PORSHA territory infarct.  Thin areas of T1 hyperintensity are identified involving the parietal and temporal involved territories is noted consistent with cortical laminar necrosis.  There is ipsilateral Wallerian degeneration.  No focal abnormal magnetic susceptibility to suggest abnormal parenchymal blood products.  There is mild diffuse sulcal prominence consistent with atrophy.  There is mild ventriculomegaly.  No extra-axial fluid collection is seen.  No mass effect or midline shift is seen.  Confluent T2 hyperintense white matter changes are noted in the right cerebral hemisphere consistent with gliosis.  No left-sided white matter disease is identified.  No acute posterior fossa abnormality is seen.  Basal arterial flow voids are grossly maintained.  The dural venous sinuses flow-voids are grossly maintained.  No acute orbital soft tissue abnormality is seen.  No focal abnormal contrast enhancement is identified.  No abnormal leptomeningeal enhancement is seen.  The pituitary gland and corpus callosum are unremarkable.  No clival or calvarial abnormality is seen.       Impression        1.  No acute intracranial abnormality is seen.  Specifically, no reduced diffusion to suggest acute ischemia.  No abnormal contrast enhancement.  2.  Chronic  right cerebral hemisphere infarcts including large MCA territory infarcts and smaller territory in the right frontoparietal PORSHA territory.  Associated ipsilateral Wallerian degeneration and mild cortical laminar necrosis noted.  Confluent white matter disease in the right cerebral hemisphere consistent with associated gliosis.      Electronically signed by: Gagandeep Nickerson MD  Date: 03/03/17  Time: 11:57                 Assessment and Plan:     * Altered mental status  Significantly improved. On thiamine supplementation.  No evidence of CNS infection or inflammation, unlikely lupus related.     D/dx includes psychosis from alcohol, drug effect, lupus cerebritis or neuropsychiatric manifestation of lupus, psychosis associated with bipolar disorder. Behavior less likely from acute stroke or seizure activity      Lupus (systemic lupus erythematosus)  Lupus dx and moderate positive aPL antibody, appropriate setting for continued treatment with warfarin to prevent stroke.  Concern with hepatic disease and difficulty maintaining INR therapeutic range.      INR today 1.4.  Consider starting xarelto 20 mg daily as she has had difficulty in the past maintaining therapeutic INR on warfarin        Stage 3 chronic kidney disease  Acute renal injury improved       Anticoagulated on Coumadin  INR today 1.4.  Consider starting xarelto 20 mg daily as she has had difficulty in the past maintaining therapeutic INR on warfarin      VTE Risk Mitigation     None          Orestes Ribeiro,   Neurology  [facility]

## 2017-03-04 NOTE — NURSING
Pt more alert and coherent. Calm and cooperative. To be discharged home this afternoon. Patient aware and family notified.

## 2017-03-04 NOTE — ASSESSMENT & PLAN NOTE
Lupus dx and moderate positive aPL antibody, appropriate setting for continued treatment with warfarin to prevent stroke.  Concern with hepatic disease and difficulty maintaining INR therapeutic range.      INR today 1.4.  Consider starting xarelto 20 mg daily as she has had difficulty in the past maintaining therapeutic INR on warfarin

## 2017-03-04 NOTE — PSYCH
HISTORY OF PRESENT ILLNESS:  The patient is a 37-year-old white female whose   mailing address is [address] [place] [address].  The patient   presented with bizarre behavior, which was described as having inappropriate   verbal responses.  She stated that she was told that she felt being in a TV show   and that she was talking to people who were not there and that she has no   memory of what she was doing at the time when she was brought to the hospital.    The patient states that she has sleep difficulty, which she describes as early   insomnia.  Her appetite and weight is stable.  Denies any crying spells, has   mood swings with sadness and euphoria, less need for sleep, more energetic.    Denies any auditory or visual hallucinations, and was surprised to hear the   condition that she was brought in.  The patient states that she smokes marijuana   on a regular basis because of her nausea and vomiting and that she smokes about   a gram a day and has been smoking this amount for a period of time.  Denies any   alcohol use.  Denies any other street drug use.    PAST PSYCHIATRIC HISTORY:  The patient has history of suicide attempt in the   past, has history of psychiatric hospitalization as well.  She is currently   under treatment of Dr. Cruz and is on Lexapro and Risperdal.  The patient feels   that she has been stable on this combination.    FAMILY HISTORY:  Strong mood disorder with one of the grandfathers having   committed suicide.    SOCIAL HISTORY:  The patient's parents are both alive in their late 50s.  The   patient has one brother.  She is unmarried, no children and says that she does   not work.    MEDICAL AND PHYSICAL HISTORY:  She has history of seizure, renal tubular   acidosis, lupus, fibromyalgia, and status post cholecystectomy.    MENTAL STATUS EXAMINATION:  A 37-year-old white female who gave information   readily, has low volume speech.  Her thought processes are fairly well  "  organized.  There is no evidence of any looseness of associations or   disorganized thinking.  Denies any auditory or visual hallucinations, none   observed.  She is alert, attentive, cooperative, fully oriented to day, date,   month, year, age, date of birth, president and past president.  Her recent   memory is 4 out of 4 words after 1 minute and 3 out of 4 words after 5 minutes.    Remote memory seems fair.  Her serial seven is 100-7, 93-7, 86-7, 79.  Her   insight and judgment seems in the realm of normalcy.    IMPRESSION:  AXIS I:  1.  Delirium, probably multifactorial, seems to have resolved.  2.  Bipolar disorder by history.  AXIS II:  Nothing significant.  AXIS III:  Includes chronic pain, fibromyalgia, lupus, and seizure disorder.  AXIS IV:  Social, economic, health.  AXIS V:  About 40.    RECOMMENDATIONS AND PLAN:  The patient's delirium seems to have resolved.  She   does have bipolar disorder, which she says "that Lexapro and Risperdal seemed to   have helped."  The patient admits to marijuana use on a regular basis except   that any marijuana till proven otherwise have PCP and/or PCP like analogs   present.  From psychiatric point, she seems to be stable at this point and needs   to follow her outpatient appointments.      CARMEN  dd: 03/03/2017 14:24:46 (CST)  td: 03/03/2017 15:22:27 (CST)  Doc ID   #[id number]  Job ID #[id number]    CC:     "

## 2017-03-04 NOTE — DISCHARGE SUMMARY
[facility] Medicine  Discharge Summary      Patient Name: Cat Denson  MRN: [id number]  Admission Date: 3/1/2017  Hospital Length of Stay: 2 days  Discharge Date and Time:  03/04/2017 2:42 PM  Attending Physician: Neela Sifuentes MD   Discharging Provider: Neela Sifuentes MD  Primary Care Provider: [place] Dukes MD      HPI:   Miss Denson presents for evaluation of abnormal behavior.  She is not providing an appropriate/reliable history. History obtained by her boyfriend who lives with her. He noticed on couple of days ago she has been hyper focused and not answering questions appropriately. He does not know her past medical history. He does not know her family medical history. He states she does not drink alcohol or use illicit substances.    Procedure(s) (LRB):  ESOPHAGOGASTRODUODENOSCOPY (EGD) (N/A)      Indwelling Lines/Drains at time of discharge:   Lines/Drains/Airways          No matching active lines, drains, or airways        Hospital Course:   AMS--improved. 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   hld on welchol  Hx bipolar-/depression-on resperdal/lexapro/trazodone, follows with psychiatry   Follows with neurology for CVA/APLA       Darrell-improved with ivf. Step mother reported that pt had recent uti treated with bactrim  Trended.    UTI-e coli pansensitive. Dc on ceftin   Alert Ox3, NAD, lungs clear, cor r, abd neg ext neg edema rom x 4    Advised not to use thc, etoh or any other substances besides her medication.     Was seen by neurology and psychiatry during admit   Consults:       Consults         Status Ordering Provider     Inpatient consult to Gastroenterology  Once     Provider:  David Harris MD    Completed NEELA SIFUENTES     Inpatient consult to Neurology  Once     Provider:  Orestes Ribeiro DO    Acknowledged NEELA SIFUENTES     Inpatient consult to  Psychiatry  Once     Provider:  Jeovanny Lobo MD    Acknowledged GUSTAVO PAEZ consult to dietary  Once     Provider:  (Not yet assigned)    Completed RACQUEL ANDREA     Nutrition Services Referral  Once     Provider:  (Not yet assigned)    Completed RACQUEL ANDREA          Significant Diagnostic Studies:   Microbiology Results (last 7 days)     Procedure Component Value Units Date/Time    CSF culture [[id number]] Collected:  03/03/17 1146    Order Status:  Completed Specimen:  CSF (Spinal Fluid) Updated:  03/04/17 0719     CSF CULTURE No Growth to date     Gram Stain Result Cytospin indicates:      Rare WBC's      No organisms seen    CSF culture [[id number]] Collected:  03/03/17 1146    Order Status:  Canceled Specimen:  CSF (Spinal Fluid) from CSF Tap, Tube 1 Updated:  03/03/17 1147    Narrative:       Culture CSF was cancelled on 03/03/2017 at 12:21 by CLG; Duplicate   order; reordered as stat      Results for MARA LYNCH (MRN [id number]) as of 3/4/2017 14:42   Ref. Range 3/2/2017 09:03 3/3/2017 07:34   RBC Latest Ref Range: 4.00 - 5.40 M/uL 4.51 3.54 (L)   Hemoglobin Latest Ref Range: 12.0 - 16.0 g/dL 13.3 10.5 (L)   Hematocrit Latest Ref Range: 37.0 - 48.5 % 40.8 31.9 (L)   MCV Latest Ref Range: 82 - 98 fL 91 90   MCH Latest Ref Range: 27.0 - 31.0 pg 29.5 29.6   MCHC Latest Ref Range: 32.0 - 36.0 % 32.5 32.8   RDW Latest Ref Range: 11.5 - 14.5 % 13.1 13.3   Platelets Latest Ref Range: 150 - 350 K/uL 158 131 (L)     Results for MARA LYNCH (MRN [id number]) as of 3/4/2017 14:42   Ref. Range 3/2/2017 00:48 3/2/2017 06:43 3/3/2017 07:34   Sodium Latest Ref Range: 136 - 145 mmol/L 138  140   Potassium Latest Ref Range: 3.5 - 5.1 mmol/L 4.4  4.0   Chloride Latest Ref Range: 95 - 110 mmol/L 105  109   CO2 Latest Ref Range: 23 - 29 mmol/L 17 (L)  19 (L)   Anion Gap Latest Ref Range: 8 - 16 mmol/L 16  12   BUN, Bld Latest Ref Range: 6 - 20 mg/dL 16  16   Creatinine Latest Ref Range: 0.5  - 1.4 mg/dL 2.3 (H)  1.7 (H)   eGFR if non African American Latest Ref Range: >60 mL/min/1.73 m^2 27 (A)  39 (A)   eGFR if African American Latest Ref Range: >60 mL/min/1.73 m^2 31 (A)  44 (A)   Glucose Latest Ref Range: 70 - 110 mg/dL 117 (H)  71   Calcium Latest Ref Range: 8.7 - 10.5 mg/dL 9.8  8.9   Phosphorus Latest Ref Range: 2.7 - 4.5 mg/dL   3.7   Magnesium Latest Ref Range: 1.6 - 2.6 mg/dL   2.3   Alkaline Phosphatase Latest Ref Range: 55 - 135 U/L 81  74   Total Protein Latest Ref Range: 6.0 - 8.4 g/dL 8.0  6.8   Albumin Latest Ref Range: 3.5 - 5.2 g/dL 4.9  3.9   Total Bilirubin Latest Ref Range: 0.1 - 1.0 mg/dL 0.3  0.6   AST Latest Ref Range: 10 - 40 U/L 16  22   ALT Latest Ref Range: 10 - 44 U/L 14  16   Results for MARA LYNCH (MRN [id number]) as of 3/4/2017 14:42   Ref. Range 3/2/2017 00:35   Benzodiazepines Unknown Negative   Methadone metabolites Unknown Negative   Phencyclidine Unknown Negative   Cocaine (Metab.) Unknown Negative   Opiate Scrn, Ur Unknown Negative   Barbiturate Screen, Ur Unknown Negative   Amphetamine Screen, Ur Unknown Negative   Marijuana (THC) Metabolite Unknown Presumptive Positive     Pending Diagnostic Studies:     Procedure Component Value Units Date/Time    Phosphatidylethanol (PETH) [[id number]] Collected:  03/03/17 1815    Order Status:  Sent Lab Status:  In process Updated:  03/03/17 2056    Specimen:  Blood from Blood     VDRL, CSF [[id number]] Collected:  03/03/17 1146    Order Status:  Sent Lab Status:  In process Updated:  03/03/17 1741    Specimen:  CSF (Spinal Fluid) from Cerebrospinal Fluid     Vitamin B1 [[id number]] Collected:  03/02/17 2002    Order Status:  Sent Lab Status:  In process Updated:  03/02/17 2007    Specimen:  Blood from Blood     West Nile Virus by PCR, CSF [[id number]] Collected:  03/03/17 1146    Order Status:  Sent Lab Status:  In process Updated:  03/03/17 1204    Specimen:  CSF (Spinal Fluid) from Cerebrospinal Fluid         Final  Active Diagnoses:    Diagnosis Date Noted POA    PRINCIPAL PROBLEM:  Altered mental status [R41.82] 03/02/2017 Yes    Supratherapeutic international normalized ratio (INR) [R79.1] 03/02/2017 Yes    Metabolic acidosis [E87.2] 03/02/2017 Yes    Homocysteinemia [E72.11] 02/24/2017 Yes    Anticoagulated on Coumadin [Z51.81, Z79.01] 12/29/2015 Not Applicable    Adult BMI <19 kg/sq m [Z68.1] 12/29/2015 Not Applicable    Stage 3 chronic kidney disease [N18.3] 11/25/2015 Yes     Chronic    Lupus (systemic lupus erythematosus) [M32.9] 11/27/2013 Yes    Bipolar 2 disorder [F31.81] 07/19/2013 Yes     Chronic      Problems Resolved During this Admission:    Diagnosis Date Noted Date Resolved POA      No new Assessment & Plan notes have been filed under this hospital service since the last note was generated.  Service: Hospital Medicine      Discharged Condition: fair    Disposition: Home or Self Care    Follow Up:  Follow-up Information     Follow up with Orestes Ribeiro DO In 1 week.    Specialty:  Neurology    Contact information:    [address] [facility] [address]  [phone]          Follow up with Sakina Cruz MD In 1 week.    Specialty:  Psychiatry    Contact information:    [address]  [place] [address]  [phone]          Follow up with [place] Dukes MD.    Specialty:  Family Medicine    Why:  As needed    Contact information:    [address]  [address]  [place] [address] [phone]          Patient Instructions:     Diet general     Activity as tolerated       Medications:  Reconciled Home Medications:   Current Discharge Medication List      START taking these medications    Details   rivaroxaban (XARELTO) 20 mg Tab Take 1 tablet (20 mg total) by mouth daily with dinner or evening meal.  Qty: 30 tablet, Refills: 2         CONTINUE these medications which have NOT CHANGED    Details   baclofen (LIORESAL) 10 MG tablet Take one to two tablet before bedtime for muscle  spasms and cramps as needed  Qty: 60 tablet, Refills: 3    Associated Diagnoses: Muscle spasticity      cetirizine (ZYRTEC) 10 MG tablet Take 1 tablet (10 mg total) by mouth once daily.  Refills: 0    Associated Diagnoses: URI (upper respiratory infection)      chlorhexidine (PERIDEX) 0.12 % solution RINSE WITH 1/2 OZ BID AFTER BRUSHING AND FLOSSING  Refills: 6      clobetasol 0.05% (TEMOVATE) 0.05 % Oint Apply topically 2 (two) times daily.  Qty: 30 g, Refills: 0      clonazePAM (KLONOPIN) 0.5 MG tablet Take one tablet PO BID PRN anxiety  Qty: 45 tablet, Refills: 1    Associated Diagnoses: Bipolar 2 disorder; Primary insomnia      cyanocobalamin, vitamin B-12, (VITAMIN B-12) 1,000 mcg Subl Place under the tongue once daily.      escitalopram oxalate (LEXAPRO) 20 MG tablet Take 1 tablet (20 mg total) by mouth once daily.  Qty: 30 tablet, Refills: 2      gabapentin (NEURONTIN) 800 MG tablet Take 1 tablet (800 mg total) by mouth 2 (two) times daily.  Qty: 60 tablet, Refills: 3      hydroxychloroquine (PLAQUENIL) 200 mg tablet TAKE 1 TABLET BY MOUTH TWICE DAILY  Qty: 60 tablet, Refills: 0    Associated Diagnoses: Lupus (systemic lupus erythematosus)      meloxicam (MOBIC) 15 MG tablet TAKE 1 TABLET(15 MG) BY MOUTH DAILY AS NEEDED FOR PAIN  Qty: 30 tablet, Refills: 0      methylphenidate (RITALIN) 5 MG tablet Take one tablet PO twice daily with meals. May take additional one-half to one tablet daily by 4 pm as needed for attention/focus  Qty: 75 tablet, Refills: 0      metoprolol succinate (TOPROL-XL) 25 MG 24 hr tablet Take 1 tablet (25 mg total) by mouth once daily.  Qty: 30 tablet, Refills: 5    Associated Diagnoses: Tachycardia      omeprazole (PRILOSEC) 40 MG capsule TAKE 1 CAPSULE(40 MG) BY MOUTH EVERY MORNING  Qty: 30 capsule, Refills: 5    Associated Diagnoses: Gastroesophageal reflux disease, esophagitis presence not specified      ondansetron (ZOFRAN) 8 MG tablet Take 1 tablet (8 mg total) by mouth every 8  (eight) hours as needed for Nausea.  Qty: 15 tablet, Refills: 1    Associated Diagnoses: Nausea and vomiting, intractability of vomiting not specified, unspecified vomiting type      PNV WITH CA#74/IRON/FOLIC ACID (PRENATAL VITAMIN 1+1 ORAL) Take by mouth once daily. No vitamin K      PREVIDENT 5000 BOOSTER PLUS 1.1 % Pste BRUSH ON NIGHTLY AFTER NORMAL HYGIENE REGIMAN. SPIT OUT EXCESS DO NOT RINSE  Refills: 4      ranitidine (ZANTAC) 300 MG tablet Take 1 tablet (300 mg total) by mouth once daily.  Qty: 30 tablet, Refills: 3      risperidone (RISPERDAL) 0.5 MG Tab Take 1 tablet (0.5 mg total) by mouth every evening.  Qty: 30 tablet, Refills: 2      tamsulosin (FLOMAX) 0.4 mg Cp24 Take 1 capsule (0.4 mg total) by mouth once daily.  Qty: 30 capsule, Refills: 3      trazodone (DESYREL) 50 MG tablet Take one-half to one tablet PO nightly PRN insomnia  Qty: 30 tablet, Refills: 1      WELCHOL 625 mg tablet TAKE 1 TABLET BY MOUTH EVERY DAY INCREASE BY ONE PILL EVERY 3 TO 4 DAYS UNTIL DESIRED CONTROL IS REACHED  Qty: 180 tablet, Refills: 0    Associated Diagnoses: Postcholecystectomy diarrhea         STOP taking these medications       warfarin (COUMADIN) 1 MG tablet Comments:   Reason for Stopping:         warfarin (COUMADIN) 2 MG tablet Comments:   Reason for Stopping:             Time spent on the discharge of patient: 24 minutes    Neela Sifuentes MD  Department of Hospital Medicine  [facility]

## 2017-03-05 NOTE — PLAN OF CARE
03/05/17 0926   Final Note   Assessment Type Discharge Planning Assessment   Discharge Disposition Home   Discharge planning education complete? Yes

## 2017-03-06 ENCOUNTER — PATIENT OUTREACH (OUTPATIENT)
Dept: ADMINISTRATIVE | Facility: CLINIC | Age: 37
End: 2017-03-06
Payer: MEDICARE

## 2017-03-06 LAB — VIT B1 SERPL-MCNC: 32 UG/L (ref 38–122)

## 2017-03-06 NOTE — PATIENT INSTRUCTIONS
Confusion  Confusion is a change in a persons ability to think clearly. There may be trouble recognizing familiar people and places or knowing what day it is. Memory, judgment, and decision-making may also be affected. In severe cases, the person may have limited or no response to being spoken to.  Confusion is usually a sign of an underlying problem. It may occur suddenly. Or it may develop gradually over time. Causes of confusion include brain injury, medicines, alcohol, withdrawal from certain medicines or illegal drugs, and infection. Heart attack and stroke may cause it. Confusion can also be a sign of dementia or a mental illness.  Treatment will depend on the cause of the problem. If the issue is a medicine, stopping the medicine may help. The healthcare provider will perform an evaluation and certain tests may be done. Follow up with the healthcare provider for the results.  Home care  · Be sure someone is with the confused person at all times. He or she should not be left alone or unsupervised.  · Tell the healthcare provider about all medicines that the person takes. These include prescription, over-the-counter, herbs, and supplements.  · Dehydration can increase confusion. Ask the healthcare provider how much fluid the person should be drinking. Offer liquids and ensure that they are taken.  · Keep all medicines in a secure place under the caregivers control. To prevent overdose, a confused person should take medicines only under the supervision of a caregiver.  · To help a person with confusion:  ¨ Establish a daily routine. Change can be a source of stress for someone with confusion. Make and keep a time schedule for common tasks such as bathing, dressing, taking medicines, meals, going for walks, shopping, naps and bed time.  ¨ Speak slowly and clearly with a gentle tone of voice. Use short simple words and sentences. Ask one question at a time. Do not interrupt, criticize or argue. Be calm and  supportive. Use friendly facial expressions. Use pointing and touching to help communicate. If there has been loss of long-term memory, do not ask questions about past events. This would only cause frustration for the person.  ¨ Use lists, signs, family photos, clocks and calendars as memory aids. Label cabinets and drawers. Try to distract, not confront, the patient. When he/she becomes frustrated or upset, redirect his/her attention to eating or some other activity of interest.  ¨ If this proves to be due to a permanent condition, talk to the healthcare provider or a  about getting a Power of  for healthcare and for financial decisions. It is best to do this while the person can still sign legal documents and make his or her own decisions. Otherwise, a court order will be required.  Follow-up care  Follow up with the person's healthcare provider or as advised for further testing or changes in medical care.  When to seek medical advice  Call the healthcare provider for any of the following:  · Frequent falling  · Refusal to eat or drink  · Violent behavior or behavior too difficult to manage at home  · New hallucinations or delusions  · Increased drowsiness  · Complaints of headache or numbness or weakness of the face, arm or leg  · Nausea or vomiting  · Slurred speech or trouble speaking, walking, or seeing  · Fainting spell, dizziness or seizure  · Unexplained fever over 100.4º F (38.0º C) or as directed by the healthcare provider  Date Last Reviewed: 8/23/2015  © 9088-9693 [employer]. [address] [place] [address], [place] [address]. All rights reserved. This information is not intended as a substitute for professional medical care. Always follow your healthcare professional's instructions.

## 2017-03-07 ENCOUNTER — TELEPHONE (OUTPATIENT)
Dept: NEUROLOGY | Facility: CLINIC | Age: 37
End: 2017-03-07

## 2017-03-08 ENCOUNTER — TELEPHONE (OUTPATIENT)
Dept: FAMILY MEDICINE | Facility: CLINIC | Age: 37
End: 2017-03-08

## 2017-03-08 LAB
CMV SPEC QL SHELL VIAL CULT: NO GROWTH
GRAM STN SPEC: NORMAL
VDRL CSF QL: NORMAL

## 2017-03-08 NOTE — TELEPHONE ENCOUNTER
----- Message from Romi Hoff sent at 3/7/2017 12:50 PM CST -----  Contact: Patient's mother Arabella  Patient was in [facility] in [place] for 3 days with a bad headache. Over dosed on Coumadin and had a terrible headache. Still having bad headaches. Patient needs to be seen by her PCP. She is in terrible pain. Please call Arabella at [phone] or patient [phone] with the soonest appointment.

## 2017-03-09 ENCOUNTER — OFFICE VISIT (OUTPATIENT)
Dept: FAMILY MEDICINE | Facility: CLINIC | Age: 37
End: 2017-03-09
Payer: MEDICARE

## 2017-03-09 VITALS
DIASTOLIC BLOOD PRESSURE: 96 MMHG | HEART RATE: 83 BPM | WEIGHT: 101 LBS | SYSTOLIC BLOOD PRESSURE: 143 MMHG | BODY MASS INDEX: 17.89 KG/M2 | HEIGHT: 63 IN

## 2017-03-09 DIAGNOSIS — Z09 HOSPITAL DISCHARGE FOLLOW-UP: Primary | ICD-10-CM

## 2017-03-09 PROCEDURE — 1160F RVW MEDS BY RX/DR IN RCRD: CPT | Mod: S$GLB,,, | Performed by: FAMILY MEDICINE

## 2017-03-09 PROCEDURE — 99213 OFFICE O/P EST LOW 20 MIN: CPT | Mod: S$GLB,,, | Performed by: FAMILY MEDICINE

## 2017-03-09 PROCEDURE — 99999 PR PBB SHADOW E&M-EST. PATIENT-LVL II: CPT | Mod: PBBFAC,,, | Performed by: FAMILY MEDICINE

## 2017-03-09 RX ORDER — ESCITALOPRAM OXALATE 10 MG/1
20 TABLET ORAL
Refills: 2 | COMMUNITY
Start: 2017-02-23 | End: 2017-03-29

## 2017-03-09 RX ORDER — ACETAMINOPHEN 500 MG
500 TABLET ORAL EVERY 6 HOURS PRN
Refills: 0 | COMMUNITY
Start: 2017-03-09 | End: 2017-08-03

## 2017-03-09 NOTE — PROGRESS NOTES
Subjective:       Patient ID: Cat Denson is a 37 y.o. female.    Chief Complaint: Headache    HPI     Hospital Discharge Follow Up   Pt is here to follow up for recent hospitalization.   The patient was admitted after experiencing changes to mental status and disorientation.  Pt reports that during the course of hospitalization pt had improvement in mental status.     MRI results showed: Chronic right cerebral hemisphere infarcts including large MCA territory infarcts and smaller territory in the right frontoparietal PORSHA territory.  Associated ipsilateral Wallerian degeneration and mild cortical laminar necrosis noted.  Confluent white matter disease in the right cerebral hemisphere consistent with associated gliosis.    Changes to medications after discharge include, coumadin was switched to Xarelto.  Since discharge, the pt has continued to have normal mental status but reports that she has HA's.     Review of Systems   Constitutional: Negative for chills and fever.   HENT: Negative for sore throat.    Eyes: Negative for visual disturbance.   Respiratory: Negative for cough and shortness of breath.    Cardiovascular: Negative for chest pain and leg swelling.   Gastrointestinal: Negative for abdominal pain, blood in stool, constipation, diarrhea and vomiting.   Genitourinary: Negative for difficulty urinating, dysuria and hematuria.   Musculoskeletal: Negative for arthralgias.   Neurological: Positive for headaches. Negative for dizziness and weakness.       Objective:      Physical Exam   Constitutional: She appears well-developed and well-nourished. No distress.   HENT:   Head: Normocephalic and atraumatic.   Mouth/Throat: Oropharynx is clear and moist. No oropharyngeal exudate.   Eyes: EOM are normal. Pupils are equal, round, and reactive to light.   Neck: Normal range of motion. Neck supple. No thyromegaly present.   Cardiovascular: Normal rate, regular rhythm, normal heart sounds and intact distal  pulses.    Pulmonary/Chest: Effort normal and breath sounds normal. No respiratory distress. She has no wheezes.   Abdominal: Soft. Bowel sounds are normal. She exhibits no distension and no mass. There is no tenderness.   Musculoskeletal: She exhibits no edema.   Lymphadenopathy:     She has no cervical adenopathy.   Neurological: She is alert.   Skin: Skin is warm. No rash noted. No erythema.   Psychiatric: She has a normal mood and affect. Her behavior is normal.   Vitals reviewed.      Assessment:       1. Hospital discharge follow-up        Plan:       1. Hospital discharge follow-up  Continue Xarelto and follow up with Neurology.   Give Tylenol for HA, PRN.   - acetaminophen (TYLENOL) 500 MG tablet; Take 1 tablet (500 mg total) by mouth every 6 (six) hours as needed for Pain.; Refill: 0    Portions of this note were created using Dragon voice recognition software. There may be voice recognition errors found in the text, and attempts were made to correct these errors prior to signature    [place] Dukes MD    Family Medicine  3/9/2017

## 2017-03-09 NOTE — MR AVS SNAPSHOT
[facility]  [address]  [place] [address]  Phone: [phone]  Fax: [phone]                  Cat Denson   3/9/2017 9:20 AM   Office Visit    Description:  Female : 1980   Provider:  Dayton Dukes MD   Department:  [facility]           Reason for Visit     Headache           Diagnoses this Visit        Comments    Hospital discharge follow-up    -  Primary            To Do List           Future Appointments        Provider Department Dept Phone    3/14/2017 9:15 AM Karmen Edwards MD [facility] - Urology [phone]    3/17/2017 8:30 AM Trisha StephensD [facility] - Coumadin [phone]    2017 1:00 PM Orestes Ribeiro DO [facility] [phone]    2017 9:20 AM Dayton Dukes MD [facility] [phone]      Goals (5 Years of Data)     None      Follow-Up and Disposition     Return in about 3 months (around 2017).    Follow-up and Disposition History      PURCHASE these Medications (No prescription required)        Start End    acetaminophen (TYLENOL) 500 MG tablet 3/9/2017     Sig - Route: Take 1 tablet (500 mg total) by mouth every 6 (six) hours as needed for Pain. - Oral    Class: OTC      [facility]     [facility] -  Assistance  Registered nurses in the [facility] provide clinical advisement, health education, appointment booking, and other advisory services.  Call for this free service at [phone].             Medications           Message regarding Medications     Verify the changes and/or additions to your medication regime listed below are the same as discussed with your clinician today.  If any of these changes or additions are incorrect, please notify your healthcare provider.        START taking these NEW medications        Refills    acetaminophen (TYLENOL) 500 MG tablet 0    Sig: Take 1 tablet (500 mg total) by mouth  every 6 (six) hours as needed for Pain.    Class: OTC    Route: Oral           Verify that the below list of medications is an accurate representation of the medications you are currently taking.  If none reported, the list may be blank. If incorrect, please contact your healthcare provider. Carry this list with you in case of emergency.           Current Medications     baclofen (LIORESAL) 10 MG tablet Take one to two tablet before bedtime for muscle spasms and cramps as needed    cetirizine (ZYRTEC) 10 MG tablet Take 1 tablet (10 mg total) by mouth once daily.    chlorhexidine (PERIDEX) 0.12 % solution RINSE WITH 1/2 OZ BID AFTER BRUSHING AND FLOSSING    clonazePAM (KLONOPIN) 0.5 MG tablet Take one tablet PO BID PRN anxiety    cyanocobalamin, vitamin B-12, (VITAMIN B-12) 1,000 mcg Subl Place under the tongue once daily.    escitalopram oxalate (LEXAPRO) 10 MG tablet 20 mg.    gabapentin (NEURONTIN) 800 MG tablet Take 1 tablet (800 mg total) by mouth 2 (two) times daily.    hydroxychloroquine (PLAQUENIL) 200 mg tablet TAKE 1 TABLET BY MOUTH TWICE DAILY    meloxicam (MOBIC) 15 MG tablet TAKE 1 TABLET(15 MG) BY MOUTH DAILY AS NEEDED FOR PAIN    methylphenidate (RITALIN) 5 MG tablet Take one tablet PO twice daily with meals. May take additional one-half to one tablet daily by 4 pm as needed for attention/focus    metoprolol succinate (TOPROL-XL) 25 MG 24 hr tablet Take 1 tablet (25 mg total) by mouth once daily.    omeprazole (PRILOSEC) 40 MG capsule TAKE 1 CAPSULE(40 MG) BY MOUTH EVERY MORNING    ondansetron (ZOFRAN) 8 MG tablet Take 1 tablet (8 mg total) by mouth every 8 (eight) hours as needed for Nausea.    PNV WITH CA#74/IRON/FOLIC ACID (PRENATAL VITAMIN 1+1 ORAL) Take by mouth once daily. No vitamin K    PREVIDENT 5000 BOOSTER PLUS 1.1 % Pste BRUSH ON NIGHTLY AFTER NORMAL HYGIENE REGIMAN. SPIT OUT EXCESS DO NOT RINSE    ranitidine (ZANTAC) 300 MG tablet Take 1 tablet (300 mg total) by mouth once daily.     "risperidone (RISPERDAL) 0.5 MG Tab Take 1 tablet (0.5 mg total) by mouth every evening.    rivaroxaban (XARELTO) 20 mg Tab Take 1 tablet (20 mg total) by mouth daily with dinner or evening meal.    tamsulosin (FLOMAX) 0.4 mg Cp24 Take 1 capsule (0.4 mg total) by mouth once daily.    trazodone (DESYREL) 50 MG tablet Take one-half to one tablet PO nightly PRN insomnia    WELCHOL 625 mg tablet TAKE 1 TABLET BY MOUTH EVERY DAY INCREASE BY ONE PILL EVERY 3 TO 4 DAYS UNTIL DESIRED CONTROL IS REACHED    acetaminophen (TYLENOL) 500 MG tablet Take 1 tablet (500 mg total) by mouth every 6 (six) hours as needed for Pain.    clobetasol 0.05% (TEMOVATE) 0.05 % Oint Apply topically 2 (two) times daily.           Clinical Reference Information           Your Vitals Were     BP Pulse Height Weight Last Period BMI    143/96 83 5' 3" (1.6 m) 45.8 kg (100 lb 15.5 oz) (LMP Unknown) 17.89 kg/m2      Blood Pressure          Most Recent Value    BP  (!)  143/96      Allergies as of 3/9/2017     Amoxil [Amoxicillin]    Augmentin [Amoxicillin-pot Clavulanate]    Avelox [Moxifloxacin]      Immunizations Administered on Date of Encounter - 3/9/2017     None      Language Assistance Services     ATTENTION: Language assistance services are available, free of charge. Please call [phone].      ATENCIÓN: Si habla myron, tiene a couch disposición servicios gratuitos de asistencia lingüística. Llame al [phone].     IDRIS Ý: N?u b?n nói Ti?ng Vi?t, có các d?ch v? h? tr? ngôn ng? mi?n phí dành cho b?n. G?i s? [phone].         [facility] complies with applicable Federal civil rights laws and does not discriminate on the basis of race, color, national origin, age, disability, or sex.        "

## 2017-03-10 LAB — PHOSPHATIDYLETHANOL (PETH): NEGATIVE NG/ML

## 2017-03-11 ENCOUNTER — PATIENT MESSAGE (OUTPATIENT)
Dept: FAMILY MEDICINE | Facility: CLINIC | Age: 37
End: 2017-03-11

## 2017-03-12 ENCOUNTER — HOSPITAL ENCOUNTER (EMERGENCY)
Facility: HOSPITAL | Age: 37
Discharge: PSYCHIATRIC OR SUBSTANCE USE FACILITY | End: 2017-03-12
Attending: EMERGENCY MEDICINE
Payer: MEDICARE

## 2017-03-12 ENCOUNTER — PATIENT MESSAGE (OUTPATIENT)
Dept: PSYCHIATRY | Facility: CLINIC | Age: 37
End: 2017-03-12

## 2017-03-12 VITALS
HEART RATE: 88 BPM | OXYGEN SATURATION: 98 % | TEMPERATURE: 98 F | HEIGHT: 63 IN | DIASTOLIC BLOOD PRESSURE: 72 MMHG | SYSTOLIC BLOOD PRESSURE: 138 MMHG | WEIGHT: 100 LBS | BODY MASS INDEX: 17.72 KG/M2 | RESPIRATION RATE: 24 BRPM

## 2017-03-12 DIAGNOSIS — F12.10 MARIJUANA ABUSE: ICD-10-CM

## 2017-03-12 DIAGNOSIS — F31.10 BIPOLAR I DISORDER WITH MANIA: Primary | ICD-10-CM

## 2017-03-12 DIAGNOSIS — N83.201 RIGHT OVARIAN CYST: ICD-10-CM

## 2017-03-12 LAB
ALBUMIN SERPL BCP-MCNC: 5.1 G/DL
ALP SERPL-CCNC: 92 U/L
ALT SERPL W/O P-5'-P-CCNC: 26 U/L
AMPHET+METHAMPHET UR QL: NEGATIVE
ANION GAP SERPL CALC-SCNC: 15 MMOL/L
APAP SERPL-MCNC: <3 UG/ML
AST SERPL-CCNC: 20 U/L
B-HCG UR QL: NEGATIVE
BARBITURATES UR QL SCN>200 NG/ML: NEGATIVE
BASOPHILS # BLD AUTO: 0 K/UL
BASOPHILS NFR BLD: 0.2 %
BENZODIAZ UR QL SCN>200 NG/ML: NORMAL
BILIRUB SERPL-MCNC: 0.4 MG/DL
BILIRUB UR QL STRIP: NEGATIVE
BUN SERPL-MCNC: 14 MG/DL
BZE UR QL SCN: NEGATIVE
CALCIUM SERPL-MCNC: 10.5 MG/DL
CANNABINOIDS UR QL SCN: NORMAL
CHLORIDE SERPL-SCNC: 111 MMOL/L
CLARITY UR: CLEAR
CO2 SERPL-SCNC: 19 MMOL/L
COLOR UR: YELLOW
CREAT SERPL-MCNC: 1.9 MG/DL
CREAT UR-MCNC: 62.6 MG/DL
CTP QC/QA: YES
DIFFERENTIAL METHOD: ABNORMAL
EOSINOPHIL # BLD AUTO: 0 K/UL
EOSINOPHIL NFR BLD: 0 %
ERYTHROCYTE [DISTWIDTH] IN BLOOD BY AUTOMATED COUNT: 13.4 %
EST. GFR  (AFRICAN AMERICAN): 39 ML/MIN/1.73 M^2
EST. GFR  (NON AFRICAN AMERICAN): 34 ML/MIN/1.73 M^2
GLUCOSE SERPL-MCNC: 122 MG/DL
GLUCOSE UR QL STRIP: NEGATIVE
HCT VFR BLD AUTO: 37.5 %
HGB BLD-MCNC: 11.9 G/DL
HGB UR QL STRIP: NEGATIVE
KETONES UR QL STRIP: NEGATIVE
LEUKOCYTE ESTERASE UR QL STRIP: NEGATIVE
LIPASE SERPL-CCNC: 39 U/L
LYMPHOCYTES # BLD AUTO: 1.1 K/UL
LYMPHOCYTES NFR BLD: 13 %
MCH RBC QN AUTO: 28.9 PG
MCHC RBC AUTO-ENTMCNC: 31.7 %
MCV RBC AUTO: 91 FL
METHADONE UR QL SCN>300 NG/ML: NEGATIVE
MONOCYTES # BLD AUTO: 0.6 K/UL
MONOCYTES NFR BLD: 7.2 %
NEUTROPHILS # BLD AUTO: 6.5 K/UL
NEUTROPHILS NFR BLD: 79.6 %
NITRITE UR QL STRIP: NEGATIVE
OPIATES UR QL SCN: NEGATIVE
PCP UR QL SCN>25 NG/ML: NEGATIVE
PH UR STRIP: 7 [PH] (ref 5–8)
PLATELET # BLD AUTO: 296 K/UL
PMV BLD AUTO: 9.6 FL
POTASSIUM SERPL-SCNC: 3.4 MMOL/L
PROT SERPL-MCNC: 8.6 G/DL
PROT UR QL STRIP: NEGATIVE
RBC # BLD AUTO: 4.11 M/UL
SALICYLATES SERPL-MCNC: <5 MG/DL
SODIUM SERPL-SCNC: 145 MMOL/L
SP GR UR STRIP: <=1.005 (ref 1–1.03)
TOXICOLOGY INFORMATION: NORMAL
TSH SERPL DL<=0.005 MIU/L-ACNC: 1.02 UIU/ML
URN SPEC COLLECT METH UR: ABNORMAL
UROBILINOGEN UR STRIP-ACNC: NEGATIVE EU/DL
WBC # BLD AUTO: 8.1 K/UL

## 2017-03-12 PROCEDURE — 81025 URINE PREGNANCY TEST: CPT | Performed by: EMERGENCY MEDICINE

## 2017-03-12 PROCEDURE — 81003 URINALYSIS AUTO W/O SCOPE: CPT

## 2017-03-12 PROCEDURE — 63600175 PHARM REV CODE 636 W HCPCS: Performed by: EMERGENCY MEDICINE

## 2017-03-12 PROCEDURE — 99285 EMERGENCY DEPT VISIT HI MDM: CPT | Mod: 25

## 2017-03-12 PROCEDURE — 96365 THER/PROPH/DIAG IV INF INIT: CPT

## 2017-03-12 PROCEDURE — 36415 COLL VENOUS BLD VENIPUNCTURE: CPT

## 2017-03-12 PROCEDURE — 85025 COMPLETE CBC W/AUTO DIFF WBC: CPT

## 2017-03-12 PROCEDURE — 82570 ASSAY OF URINE CREATININE: CPT

## 2017-03-12 PROCEDURE — 84443 ASSAY THYROID STIM HORMONE: CPT

## 2017-03-12 PROCEDURE — 96375 TX/PRO/DX INJ NEW DRUG ADDON: CPT

## 2017-03-12 PROCEDURE — 25000003 PHARM REV CODE 250: Performed by: EMERGENCY MEDICINE

## 2017-03-12 PROCEDURE — 80053 COMPREHEN METABOLIC PANEL: CPT

## 2017-03-12 PROCEDURE — 83690 ASSAY OF LIPASE: CPT

## 2017-03-12 PROCEDURE — 96366 THER/PROPH/DIAG IV INF ADDON: CPT

## 2017-03-12 PROCEDURE — 80307 DRUG TEST PRSMV CHEM ANLYZR: CPT

## 2017-03-12 PROCEDURE — 80329 ANALGESICS NON-OPIOID 1 OR 2: CPT

## 2017-03-12 RX ORDER — IBUPROFEN 200 MG
1 TABLET ORAL
Status: DISCONTINUED | OUTPATIENT
Start: 2017-03-12 | End: 2017-03-12 | Stop reason: HOSPADM

## 2017-03-12 RX ORDER — ONDANSETRON 2 MG/ML
4 INJECTION INTRAMUSCULAR; INTRAVENOUS
Status: COMPLETED | OUTPATIENT
Start: 2017-03-12 | End: 2017-03-12

## 2017-03-12 RX ORDER — PANTOPRAZOLE SODIUM 40 MG/1
40 TABLET, DELAYED RELEASE ORAL
Status: COMPLETED | OUTPATIENT
Start: 2017-03-12 | End: 2017-03-12

## 2017-03-12 RX ORDER — LORAZEPAM 2 MG/ML
2 INJECTION INTRAMUSCULAR
Status: COMPLETED | OUTPATIENT
Start: 2017-03-12 | End: 2017-03-12

## 2017-03-12 RX ADMIN — NICOTINE 1 PATCH: 21 PATCH, EXTENDED RELEASE TRANSDERMAL at 03:03

## 2017-03-12 RX ADMIN — PANTOPRAZOLE SODIUM 40 MG: 40 TABLET, DELAYED RELEASE ORAL at 07:03

## 2017-03-12 RX ADMIN — PROMETHAZINE HYDROCHLORIDE 12.5 MG: 25 INJECTION, SOLUTION INTRAMUSCULAR; INTRAVENOUS at 04:03

## 2017-03-12 RX ADMIN — LORAZEPAM 2 MG: 2 INJECTION, SOLUTION INTRAMUSCULAR; INTRAVENOUS at 12:03

## 2017-03-12 RX ADMIN — ONDANSETRON 4 MG: 2 INJECTION INTRAMUSCULAR; INTRAVENOUS at 12:03

## 2017-03-12 NOTE — ED NOTES
pts mother called and asked for update and was told no information can be given over phone but pt was still here. She asked if she could be called if pt is kept or discharged.cell number [phone] Arabella Rose.

## 2017-03-12 NOTE — ED NOTES
Faxed packet to [facility], [facility], [facility], [facility], [facility], [facility], [facility], [facility], and [facility],.

## 2017-03-12 NOTE — ED NOTES
Presents to the ER with c/o altered mental status. Family report that patient has not slept for 3 days. Patient is restless and confused. Associated complaints are lowe back pain, RUQ pain decreased appetite that is secondary to nausea from patient attempting self induced vomiting with her fingers, rhinorrhea and mild frontal headache that does not radiate and has resolved since arriving to the ED. Family reports that patient has been attempting to make herself vomit for the past day. Patient does have a psych history and has an appointment with psychiatrist tomorrow. Patient is currently taking Trazodone, family denies patient missing any recent doses. Mucous membranes are pink and moist. Skin is warm, dry and intact. Lungs are clear bilaterally, respirations are regular and unlabored. Denies cough, congestion, rhinorrhea or SOB. BS active x4, tenderness to RUQ  with palpation, abd is soft and not distended. Denies any appetite or activity change. S1S2, capillary refill is < 2 seconds. Denies dysuria, difficulty urinating, frequency, numbness, tingling or weakness. HERBERTH SHELDON

## 2017-03-12 NOTE — ED NOTES
Patient has complaint of nausea and is requesting medication. Patient has been instructed to stop sticking her finger down her throat; she verbalized understanding.

## 2017-03-12 NOTE — ED PROVIDER NOTES
Encounter Date: 3/12/2017    SCRIBE #1 NOTE: Oriana ORTIZ am scribing for, and in the presence of, Dr. Lazar.       History     Chief Complaint   Patient presents with    Altered Mental Status     Review of patient's allergies indicates:   Allergen Reactions    Amoxil [amoxicillin]      hives    Augmentin [amoxicillin-pot clavulanate]     Avelox [moxifloxacin]      itching     HPI Comments: 3/12/2017  11:32 AM     Chief Complaint: Bizarre Behavior    The patient is a 37 y.o. female with PMHx of lupus, fibromyalgia, bipolar 1 disorder, seizure, stroke with left side weakness, CKD, depression and anxiety who presents with bizarre behavior. The patient is confused, restless and has not sleeping for the past 3 days. The patient is having difficulty with memory recall. Pt complains of sharp pains to the right lower abdomen with dysuria, nausea and vomiting for 2 days. Patient's boyfriend reports she is self-inducing vomiting by sticking her finger in her mouth. This has been ongoing for 1 day. She reports she cannot eat due to the nausea. She also reports chronic lower back pain, runny nose and headache. No recent fevers, vision changes, sore throat, cough, cp or sob. Pt is currently on trazodone for psychiatric issues. Pt has an appointment with psychiatrist tomorrow.     The patient was recently admitted for similar behavior. She was treated for UTI with antibiotics. Pt boyfriend reports her current symptoms began after being discharged. Shx of cholecystectomy.    The history is provided by the patient and a significant other.     Past Medical History:   Diagnosis Date    Anxiety     Bipolar 1 disorder     Chronic kidney disease     Chronic pain     Depression     bipolar 2    Fibromyalgia     Hx of psychiatric care     Lupus     Psychiatric problem     Renal tubular acidosis     Seizure     Stroke 11-    Suicide attempt     overdosed on a bottle of paxil, muscle relaxers, & zoloft     Therapy      Past Surgical History:   Procedure Laterality Date    CHOLECYSTECTOMY       Family History   Problem Relation Age of Onset    Hypertension Mother     Hyperlipidemia Mother     No Known Problems Father     Post-traumatic stress disorder Brother     Drug abuse Brother     Diabetes Maternal Uncle     Hypertension Maternal Uncle     Alcohol abuse Maternal Uncle     Scoliosis Paternal Aunt     Heart disease Paternal Uncle     Mental illness Maternal Grandmother     Cancer Maternal Grandmother      lung / brain - smoker    Drug abuse Maternal Grandmother     Hypertension Maternal Grandmother     Hyperlipidemia Maternal Grandmother     Diabetes Maternal Grandmother     Heart disease Maternal Grandfather     Hypertension Maternal Grandfather     Alcohol abuse Maternal Grandfather     No Known Problems Paternal Grandmother     Mental illness Paternal Grandfather     Early death Paternal Grandfather      self     Social History   Substance Use Topics    Smoking status: Former Smoker     Packs/day: 1.00     Years: 15.00     Types: Cigarettes     Start date: 11/25/2015    Smokeless tobacco: Never Used      Comment: quit smoking after the stroke    Alcohol use No     Review of Systems   Constitutional: Negative for appetite change, chills and fever.   HENT: Positive for rhinorrhea. Negative for congestion and sore throat.    Eyes: Negative for visual disturbance.   Respiratory: Negative for cough and shortness of breath.    Cardiovascular: Negative for chest pain.   Gastrointestinal: Positive for abdominal pain, nausea and vomiting. Negative for diarrhea.   Genitourinary: Positive for dysuria.   Musculoskeletal: Positive for back pain. Negative for myalgias.   Skin: Negative for rash.   Neurological: Positive for headaches. Negative for weakness and numbness.   Hematological: Does not bruise/bleed easily.   Psychiatric/Behavioral: Positive for sleep disturbance (restless).        + Bizarre  behavior   All other systems reviewed and are negative.      Physical Exam   Initial Vitals   BP Pulse Resp Temp SpO2   03/12/17 1045 03/12/17 1045 03/12/17 1045 03/12/17 1045 03/12/17 1045   149/92 133 24 98.2 °F (36.8 °C) 99 %     Physical Exam    Nursing note and vitals reviewed.  Constitutional: No distress.   Patient repeatedly sticks her finger in her mouth for self-induced vomiting.   HENT:   Head: Normocephalic and atraumatic.   Mouth/Throat: Oropharynx is clear and moist.   Eyes: EOM are normal. Pupils are equal, round, and reactive to light.   Neck: Normal range of motion.   Cardiovascular: Normal rate, regular rhythm, normal heart sounds and intact distal pulses. Exam reveals no gallop and no friction rub.    No murmur heard.  Pulmonary/Chest: Breath sounds normal. She has no wheezes. She has no rhonchi. She has no rales.   Abdominal: Soft. She exhibits no distension. There is tenderness (mid pelvic TTP) in the right lower quadrant. There is no rebound and no guarding.   No flank TTP.   Musculoskeletal: Normal range of motion. She exhibits tenderness (Mild benign lower back TTP). She exhibits no edema.   Neurological: She is alert and oriented to person, place, and time.   Skin: Skin is dry. No rash noted.         ED Course   Procedures  Labs Reviewed   CBC W/ AUTO DIFFERENTIAL   COMPREHENSIVE METABOLIC PANEL   DRUG SCREEN PANEL, URINE EMERGENCY   TSH   SALICYLATE LEVEL   ACETAMINOPHEN LEVEL   LIPASE   URINALYSIS   POCT URINE PREGNANCY             Medical Decision Making:   Patient was recently admitted for the same thing.  She was seen by neurology, psychiatry, internal medicine, and lupus vasculitis or cerebral etiology of her altered mental status was ruled out.  Her lumbar puncture was negative.  Her MRI did not show anything acute.  The patient has had a prior stroke, but she doesn't have any evidence of new CVA.  Today her labs show no signs of leukocytosis and her electrolytes are at baseline.  She  presented with withdrawn affect, not sleeping for the past few days, insomnia, poor memory recall, and increasing possible psychosis.  She is complaining of mild right lower quadrant abdominal pain and I ordered a CT which showed an 18 mm right ovarian hyperdensity likely hemorrhagic cyst versus follicle versus an endometrioma.  She has no signs of pyelonephritis and no signs of urinary tract infection on urinalysis.  I believe the ovarian cyst is causing pain, but also believe she is having exacerbation of her bipolar with bowen and needs to be placed under a physician's emergency certificate to placement at a psychiatric hospital.  Here in emergency room and give her Ativan   For her fixation on trying to induce herself to vomit.  She calm down and I gave her Phenergan which also helped.  Her urine drug screen is positive for marijuana again she admits to smoking marijuana on a regular basis.  During her hospitalization last time Dr. Lobo saw her and felt that she had bipolar but he saw her on the fourth day of admission after she been on her psychiatric meds.  Her symptoms improved but when she went home her symptoms exacerbated again.  I feel that she needs to be hospitalized a psychiatric institution for medical management.  And I believe that she is medically clear at this time.  In regards to her cyst she can take ibuprofen.              Scribe Attestation:   Scribe #1: I performed the above scribed service and the documentation accurately describes the services I performed. I attest to the accuracy of the note.    Attending Attestation:           Physician Attestation for Scribe:  Physician Attestation Statement for Scribe #1: I, Dr. Lazar, reviewed documentation, as scribed by Oriana Walters in my presence, and it is both accurate and complete.                 ED Course     Clinical Impression:   The primary encounter diagnosis was Bipolar I disorder with bowen. Diagnoses of Right ovarian cyst and  Marijuana abuse were also pertinent to this visit.          Tj Lazar MD  03/12/17 [id number]

## 2017-03-12 NOTE — ED NOTES
Patient is resting in bed with eyes closed, chest rise is symmetrical, respirations are regular and unlabored. PITO KEVIN

## 2017-03-12 NOTE — ED NOTES
Patient is resting in bed with her eyes closed, chest rise is symmetrical, respirations are regular and unlabored. PITO KEVIN

## 2017-03-12 NOTE — ED NOTES
Dr. Marlow office was called per Dr. Lazar request regarding patient. Message was sent to on call  and I was instructed to call office back if Dr. Marlow or on call Dr does not return call in next 30 minutes.

## 2017-03-12 NOTE — ED NOTES
PEC faxed to and called into both [facility] and [facility] office.

## 2017-03-13 ENCOUNTER — TELEPHONE (OUTPATIENT)
Dept: FAMILY MEDICINE | Facility: CLINIC | Age: 37
End: 2017-03-13

## 2017-03-13 NOTE — TELEPHONE ENCOUNTER
----- Message from Olga Prasad sent at 3/13/2017  9:46 AM CDT -----  Contact: Karmen with [facility]   Karmen with [facility] called for a hospital follow up for the patient for the last week on March or the first week on April   Nothing showing   Please call her at   to schedule or advise.

## 2017-03-13 NOTE — ED NOTES
Patient has been updated on acceptance and mother has been called with name and number of facility.

## 2017-03-17 ENCOUNTER — PATIENT MESSAGE (OUTPATIENT)
Dept: CARDIOLOGY | Facility: CLINIC | Age: 37
End: 2017-03-17

## 2017-03-17 ENCOUNTER — TELEPHONE (OUTPATIENT)
Dept: FAMILY MEDICINE | Facility: CLINIC | Age: 37
End: 2017-03-17

## 2017-03-17 NOTE — TELEPHONE ENCOUNTER
----- Message from Eva Blood sent at 3/17/2017  1:21 PM CDT -----  Contact: [facility]  Patient needs with in 30 days appointment due to discharge 03/18/17 [facility] Dx bi polar and anxiety. Please call patient at [phone]. Thanks!

## 2017-03-27 ENCOUNTER — DOCUMENTATION ONLY (OUTPATIENT)
Dept: PSYCHIATRY | Facility: CLINIC | Age: 37
End: 2017-03-27

## 2017-03-28 ENCOUNTER — OFFICE VISIT (OUTPATIENT)
Dept: FAMILY MEDICINE | Facility: CLINIC | Age: 37
End: 2017-03-28
Payer: MEDICARE

## 2017-03-28 VITALS
HEART RATE: 75 BPM | DIASTOLIC BLOOD PRESSURE: 68 MMHG | WEIGHT: 94.13 LBS | BODY MASS INDEX: 16.68 KG/M2 | SYSTOLIC BLOOD PRESSURE: 99 MMHG | HEIGHT: 63 IN

## 2017-03-28 DIAGNOSIS — G81.94 LEFT HEMIPARESIS: ICD-10-CM

## 2017-03-28 DIAGNOSIS — R56.9 SEIZURE: ICD-10-CM

## 2017-03-28 DIAGNOSIS — Z09 HOSPITAL DISCHARGE FOLLOW-UP: Primary | ICD-10-CM

## 2017-03-28 DIAGNOSIS — G43.409 HEMIPLEGIC MIGRAINE WITHOUT STATUS MIGRAINOSUS, NOT INTRACTABLE: ICD-10-CM

## 2017-03-28 PROBLEM — R41.82 ALTERED MENTAL STATUS: Status: RESOLVED | Noted: 2017-03-02 | Resolved: 2017-03-28

## 2017-03-28 PROCEDURE — 99213 OFFICE O/P EST LOW 20 MIN: CPT | Mod: S$GLB,,, | Performed by: FAMILY MEDICINE

## 2017-03-28 PROCEDURE — 99499 UNLISTED E&M SERVICE: CPT | Mod: S$GLB,,, | Performed by: FAMILY MEDICINE

## 2017-03-28 PROCEDURE — 99999 PR PBB SHADOW E&M-EST. PATIENT-LVL II: CPT | Mod: PBBFAC,,, | Performed by: FAMILY MEDICINE

## 2017-03-28 PROCEDURE — 1160F RVW MEDS BY RX/DR IN RCRD: CPT | Mod: S$GLB,,, | Performed by: FAMILY MEDICINE

## 2017-03-28 RX ORDER — OLANZAPINE 5 MG/1
TABLET ORAL
Refills: 0 | COMMUNITY
Start: 2017-03-18 | End: 2017-03-29 | Stop reason: SDUPTHER

## 2017-03-28 RX ORDER — PROMETHAZINE HYDROCHLORIDE 25 MG/1
25 TABLET ORAL EVERY 4 HOURS
Qty: 60 TABLET | Refills: 5 | Status: SHIPPED | OUTPATIENT
Start: 2017-03-28 | End: 2017-09-29

## 2017-03-28 NOTE — PROGRESS NOTES
Subjective:       Patient ID: Cat Denson is a 37 y.o. female.    Chief Complaint: Hospital Follow Up    HPI     Hospital D/c f/u  Pt was recently hospitalized for psych related issues.   She was observed by her mother to have abnormal behavior.   She was admitted to [facility].   Since discharge, the pt has had baseline behavior and mental status.   She has been adherent to medication regimen.   Pt will have regularly scheduled appointments with her behavioral therapists.     Review of Systems   Constitutional: Negative for chills and fever.   HENT: Negative for sore throat.    Eyes: Negative for visual disturbance.   Respiratory: Negative for cough and shortness of breath.    Cardiovascular: Negative for chest pain and leg swelling.   Gastrointestinal: Negative for abdominal pain, blood in stool, constipation, diarrhea and vomiting.   Genitourinary: Negative for difficulty urinating, dysuria and hematuria.   Musculoskeletal: Negative for arthralgias.   Neurological: Negative for dizziness and weakness.       Objective:      Physical Exam   Constitutional: She appears well-developed and well-nourished. No distress.   HENT:   Head: Normocephalic and atraumatic.   Mouth/Throat: Oropharynx is clear and moist. No oropharyngeal exudate.   Eyes: EOM are normal. Pupils are equal, round, and reactive to light.   Neck: Normal range of motion. Neck supple. No thyromegaly present.   Cardiovascular: Normal rate, regular rhythm, normal heart sounds and intact distal pulses.    Pulmonary/Chest: Effort normal and breath sounds normal. No respiratory distress. She has no wheezes.   Abdominal: Soft. Bowel sounds are normal. She exhibits no distension and no mass. There is no tenderness.   Musculoskeletal: She exhibits no edema.   Lymphadenopathy:     She has no cervical adenopathy.   Neurological: She is alert.   Skin: Skin is warm. No rash noted. No erythema.   Psychiatric: She has a normal mood  and affect. Her behavior is normal.   Vitals reviewed.      Assessment:       1. Hospital discharge follow-up    2. Hemiplegic migraine without status migrainosus, not intractable    3. Seizure    4. Left hemiparesis        Plan:       1. Hospital discharge follow-up  No changes to medications at this time  Pt is to continue to f/u with psychiatry for further eval and management.     2. Hemiplegic migraine without status migrainosus, not intractable  Continue to follow up with neurology.   Condition stable.     3. Seizure  Continue to follow up with neurology. Last seizure approx 4 years ago.   Condition stable.     4. Left hemiparesis  Continue to follow up with neurology.   Condition stable.       Portions of this note were created using Dragon voice recognition software. There may be voice recognition errors found in the text, and attempts were made to correct these errors prior to signature    Dayton Dukes MD    Family Medicine  3/28/2017

## 2017-03-28 NOTE — PROGRESS NOTES
I received discharge records from [facility]:     3/12/17 - 3/17/17    Psych medications:     Lexapro 10 mg daily   Olanzapine 5 mg nightly   Clonazepam 0.5 mg BID    Pt admitted for w/d affect, psychosis, poor memory recall, not sleeping x days     Pt worked up first for abdominal pain, nausea, vomiting.   No significant findings.    Previous admit earlier on Medicine for accidental OD oncoumadin.    Urine tox + THC, benzodiazepines

## 2017-03-28 NOTE — MR AVS SNAPSHOT
[facility]  [address] [place] [address]  Phone: [phone]  Fax: [phone]                  Cat Denson   3/28/2017 2:00 PM   Office Visit    Description:  Female : 1980   Provider:  Dayton Dukes MD   Department:  [facility] [facility]           Reason for Visit     Hospital Follow Up           Diagnoses this Visit        Comments    Hospital discharge follow-up    -  Primary     Hemiplegic migraine without status migrainosus, not intractable         Seizure         Left hemiparesis                To Do List           Future Appointments        Provider Department Dept Phone    2017 1:00 PM Orestes Ribeiro DO [place] - Neurology [phone]    2017 9:20 AM Dayton Dukes MD [facility] [phone]      Goals (5 Years of Data)     None      Follow-Up and Disposition     Return in about 6 months (around 2017).    Follow-up and Disposition History       These Medications        Disp Refills Start End    promethazine (PHENERGAN) 25 MG tablet 60 tablet 5 3/28/2017     Take 1 tablet (25 mg total) by mouth every 4 (four) hours. - Oral    Pharmacy: [id number]  [place], [place] - [address] AT [place] [address] Ph #: [phone]         [facility] On Call     [facility] On Call Nurse Care Line -  Assistance  Registered nurses in the [facility] On Call Center provide clinical advisement, health education, appointment booking, and other advisory services.  Call for this free service at [phone].             Medications           Message regarding Medications     Verify the changes and/or additions to your medication regime listed below are the same as discussed with your clinician today.  If any of these changes or additions are incorrect, please notify your healthcare provider.        START taking these NEW medications        Refills    promethazine (PHENERGAN) 25 MG tablet 5    Sig:  Take 1 tablet (25 mg total) by mouth every 4 (four) hours.    Class: Normal    Route: Oral           Verify that the below list of medications is an accurate representation of the medications you are currently taking.  If none reported, the list may be blank. If incorrect, please contact your healthcare provider. Carry this list with you in case of emergency.           Current Medications     acetaminophen (TYLENOL) 500 MG tablet Take 1 tablet (500 mg total) by mouth every 6 (six) hours as needed for Pain.    baclofen (LIORESAL) 10 MG tablet Take one to two tablet before bedtime for muscle spasms and cramps as needed    cetirizine (ZYRTEC) 10 MG tablet Take 1 tablet (10 mg total) by mouth once daily.    chlorhexidine (PERIDEX) 0.12 % solution RINSE WITH 1/2 OZ BID AFTER BRUSHING AND FLOSSING    clonazePAM (KLONOPIN) 0.5 MG tablet Take one tablet PO BID PRN anxiety    cyanocobalamin, vitamin B-12, (VITAMIN B-12) 1,000 mcg Subl Place under the tongue once daily.    escitalopram oxalate (LEXAPRO) 10 MG tablet 20 mg.    gabapentin (NEURONTIN) 800 MG tablet Take 1 tablet (800 mg total) by mouth 2 (two) times daily.    hydroxychloroquine (PLAQUENIL) 200 mg tablet TAKE 1 TABLET BY MOUTH TWICE DAILY    meloxicam (MOBIC) 15 MG tablet TAKE 1 TABLET(15 MG) BY MOUTH DAILY AS NEEDED FOR PAIN    metoprolol succinate (TOPROL-XL) 25 MG 24 hr tablet Take 1 tablet (25 mg total) by mouth once daily.    olanzapine (ZYPREXA) 5 MG tablet TK 1 T PO QHS    omeprazole (PRILOSEC) 40 MG capsule TAKE 1 CAPSULE(40 MG) BY MOUTH EVERY MORNING    ondansetron (ZOFRAN) 8 MG tablet Take 1 tablet (8 mg total) by mouth every 8 (eight) hours as needed for Nausea.    PNV WITH CA#74/IRON/FOLIC ACID (PRENATAL VITAMIN 1+1 ORAL) Take by mouth once daily. No vitamin K    PREVIDENT 5000 BOOSTER PLUS 1.1 % Pste BRUSH ON NIGHTLY AFTER NORMAL HYGIENE REGIMAN. SPIT OUT EXCESS DO NOT RINSE    ranitidine (ZANTAC) 300 MG tablet Take 1 tablet (300 mg total) by mouth  "once daily.    risperidone (RISPERDAL) 0.5 MG Tab Take 1 tablet (0.5 mg total) by mouth every evening.    rivaroxaban (XARELTO) 20 mg Tab Take 1 tablet (20 mg total) by mouth daily with dinner or evening meal.    tamsulosin (FLOMAX) 0.4 mg Cp24 Take 1 capsule (0.4 mg total) by mouth once daily.    trazodone (DESYREL) 50 MG tablet Take one-half to one tablet PO nightly PRN insomnia    WELCHOL 625 mg tablet TAKE 1 TABLET BY MOUTH EVERY DAY INCREASE BY ONE PILL EVERY 3 TO 4 DAYS UNTIL DESIRED CONTROL IS REACHED    clobetasol 0.05% (TEMOVATE) 0.05 % Oint Apply topically 2 (two) times daily.    methylphenidate (RITALIN) 5 MG tablet Take one tablet PO twice daily with meals. May take additional one-half to one tablet daily by 4 pm as needed for attention/focus    promethazine (PHENERGAN) 25 MG tablet Take 1 tablet (25 mg total) by mouth every 4 (four) hours.           Clinical Reference Information           Your Vitals Were     BP Pulse Height Weight Last Period BMI    99/68 75 5' 3" (1.6 m) 42.7 kg (94 lb 2.2 oz) (LMP Unknown) 16.68 kg/m2      Blood Pressure          Most Recent Value    BP  99/68      Allergies as of 3/28/2017     Amoxil [Amoxicillin]    Augmentin [Amoxicillin-pot Clavulanate]    Avelox [Moxifloxacin]      Immunizations Administered on Date of Encounter - 3/28/2017     None      Language Assistance Services     ATTENTION: Language assistance services are available, free of charge. Please call [phone].      ATENCIÓN: Si habla español, tiene a couch disposición servicios gratuitos de asistencia lingüística. Llame al [phone].     IDRIS Ý: N?u b?n nói Ti?ng Vi?t, có các d?ch v? h? tr? ngôn ng? mi?n phí dành cho b?n. G?i s? [phone].         [facility] complies with applicable Federal civil rights laws and does not discriminate on the basis of race, color, national origin, age, disability, or sex.        "

## 2017-03-29 ENCOUNTER — OFFICE VISIT (OUTPATIENT)
Dept: PSYCHIATRY | Facility: CLINIC | Age: 37
End: 2017-03-29
Payer: COMMERCIAL

## 2017-03-29 VITALS
SYSTOLIC BLOOD PRESSURE: 118 MMHG | HEIGHT: 63 IN | DIASTOLIC BLOOD PRESSURE: 77 MMHG | WEIGHT: 103.19 LBS | HEART RATE: 87 BPM | BODY MASS INDEX: 18.29 KG/M2

## 2017-03-29 DIAGNOSIS — F09 COGNITIVE DISORDER: ICD-10-CM

## 2017-03-29 DIAGNOSIS — F31.81 BIPOLAR 2 DISORDER: ICD-10-CM

## 2017-03-29 DIAGNOSIS — F51.01 PRIMARY INSOMNIA: ICD-10-CM

## 2017-03-29 PROCEDURE — 99499 UNLISTED E&M SERVICE: CPT | Mod: S$GLB,,, | Performed by: PSYCHIATRY & NEUROLOGY

## 2017-03-29 PROCEDURE — 1160F RVW MEDS BY RX/DR IN RCRD: CPT | Mod: S$GLB,,, | Performed by: PSYCHIATRY & NEUROLOGY

## 2017-03-29 PROCEDURE — 99999 PR PBB SHADOW E&M-EST. PATIENT-LVL III: CPT | Mod: PBBFAC,,, | Performed by: PSYCHIATRY & NEUROLOGY

## 2017-03-29 PROCEDURE — 99213 OFFICE O/P EST LOW 20 MIN: CPT | Mod: S$GLB,,, | Performed by: PSYCHIATRY & NEUROLOGY

## 2017-03-29 RX ORDER — LORATADINE 10 MG/1
10 TABLET ORAL DAILY
COMMUNITY
End: 2017-10-03 | Stop reason: SDUPTHER

## 2017-03-29 RX ORDER — CLONAZEPAM 0.5 MG/1
TABLET ORAL
Qty: 60 TABLET | Refills: 0 | Status: SHIPPED | OUTPATIENT
Start: 2017-03-29 | End: 2017-04-28 | Stop reason: SDUPTHER

## 2017-03-29 RX ORDER — TRAZODONE HYDROCHLORIDE 50 MG/1
TABLET ORAL
Qty: 30 TABLET | Refills: 0 | Status: SHIPPED | OUTPATIENT
Start: 2017-03-29 | End: 2017-04-28 | Stop reason: SDUPTHER

## 2017-03-29 RX ORDER — OLANZAPINE 5 MG/1
TABLET ORAL
Qty: 30 TABLET | Refills: 0 | Status: SHIPPED | OUTPATIENT
Start: 2017-03-29 | End: 2017-04-28 | Stop reason: SDUPTHER

## 2017-03-29 NOTE — MR AVS SNAPSHOT
[facility]  [address]  [facility] [address]  Phone: [phone]                  Cat Denson   3/29/2017 9:00 AM   Office Visit    Description:  Female : 1980   Provider:  Sakina Cruz MD   Department:  [facility]           Diagnoses this Visit        Comments    Bipolar 2 disorder         Cognitive disorder         Primary insomnia                To Do List           Future Appointments        Provider Department Dept Phone    2017 10:00 AM Monique Deleon [facility] - Psychiatry [phone]    2017 9:00 AM Monique Deleon [facility]  Psychiatry [phone]    2017 1:00 PM Orestes Ribeiro DO [facility] Neurology [phone]    2017 9:20 AM Dayton Dukes MD [facility] Family Medicine [phone]      Goals (5 Years of Data)     None      Follow-Up and Disposition     Return in about 4 weeks (around 2017).       These Medications        Disp Refills Start End    trazodone (DESYREL) 50 MG tablet 30 tablet 0 3/29/2017     Take one-half to one tablet PO nightly PRN insomnia    Pharmacy: [facility] [address] [place] [address] [address] AT [facility] [address] Ph #: [phone]       olanzapine (ZYPREXA) 5 MG tablet 30 tablet 0 3/29/2017     Take one tablet PO nightly    Pharmacy: [facility] [address] [place] [address] [address] AT [facility] [address] [facility] [address] Ph #: [phone]       clonazePAM (KLONOPIN) 0.5 MG tablet 60 tablet 0 3/29/2017     Take one tablet PO BID PRN anxiety    Pharmacy: [facility] [address] [place] [address] [address] AT [facility] & [place] [address] Ph #: [phone]         [facility] On Call     [facility] On Call Nurse Care Line -  Assistance  Registered nurses in the [facility] On Call Center provide clinical advisement, health education, appointment booking, and other advisory services.  Call for this free service at [phone].              Medications           Message regarding Medications     Verify the changes and/or additions to your medication regime listed below are the same as discussed with your clinician today.  If any of these changes or additions are incorrect, please notify your healthcare provider.        CHANGE how you are taking these medications     Start Taking Instead of    olanzapine (ZYPREXA) 5 MG tablet olanzapine (ZYPREXA) 5 MG tablet    Dosage:  Take one tablet PO nightly Dosage:  TK 1 T PO QHS    Reason for Change:  Reorder       STOP taking these medications     cetirizine (ZYRTEC) 10 MG tablet Take 1 tablet (10 mg total) by mouth once daily.    clobetasol 0.05% (TEMOVATE) 0.05 % Oint Apply topically 2 (two) times daily.    cyanocobalamin, vitamin B-12, (VITAMIN B-12) 1,000 mcg Subl Place under the tongue once daily.    escitalopram oxalate (LEXAPRO) 10 MG tablet 20 mg.    meloxicam (MOBIC) 15 MG tablet TAKE 1 TABLET(15 MG) BY MOUTH DAILY AS NEEDED FOR PAIN    methylphenidate (RITALIN) 5 MG tablet Take one tablet PO twice daily with meals. May take additional one-half to one tablet daily by 4 pm as needed for attention/focus    ondansetron (ZOFRAN) 8 MG tablet Take 1 tablet (8 mg total) by mouth every 8 (eight) hours as needed for Nausea.    PNV WITH CA#74/IRON/FOLIC ACID (PRENATAL VITAMIN 1+1 ORAL) Take by mouth once daily. No vitamin K    risperidone (RISPERDAL) 0.5 MG Tab Take 1 tablet (0.5 mg total) by mouth every evening.           Verify that the below list of medications is an accurate representation of the medications you are currently taking.  If none reported, the list may be blank. If incorrect, please contact your healthcare provider. Carry this list with you in case of emergency.           Current Medications     acetaminophen (TYLENOL) 500 MG tablet Take 1 tablet (500 mg total) by mouth every 6 (six) hours as needed for Pain.    baclofen (LIORESAL) 10 MG tablet Take one to two tablet before bedtime for muscle  "spasms and cramps as needed    chlorhexidine (PERIDEX) 0.12 % solution RINSE WITH 1/2 OZ BID AFTER BRUSHING AND FLOSSING    clonazePAM (KLONOPIN) 0.5 MG tablet Take one tablet PO BID PRN anxiety    gabapentin (NEURONTIN) 800 MG tablet Take 1 tablet (800 mg total) by mouth 2 (two) times daily.    hydroxychloroquine (PLAQUENIL) 200 mg tablet TAKE 1 TABLET BY MOUTH TWICE DAILY    loratadine (CLARITIN) 10 mg tablet Take 10 mg by mouth once daily.    metoprolol succinate (TOPROL-XL) 25 MG 24 hr tablet Take 1 tablet (25 mg total) by mouth once daily.    olanzapine (ZYPREXA) 5 MG tablet Take one tablet PO nightly    omeprazole (PRILOSEC) 40 MG capsule TAKE 1 CAPSULE(40 MG) BY MOUTH EVERY MORNING    PREVIDENT 5000 BOOSTER PLUS 1.1 % Pste BRUSH ON NIGHTLY AFTER NORMAL HYGIENE REGIMAN. SPIT OUT EXCESS DO NOT RINSE    promethazine (PHENERGAN) 25 MG tablet Take 1 tablet (25 mg total) by mouth every 4 (four) hours.    ranitidine (ZANTAC) 300 MG tablet Take 1 tablet (300 mg total) by mouth once daily.    rivaroxaban (XARELTO) 20 mg Tab Take 1 tablet (20 mg total) by mouth daily with dinner or evening meal.    tamsulosin (FLOMAX) 0.4 mg Cp24 Take 1 capsule (0.4 mg total) by mouth once daily.    trazodone (DESYREL) 50 MG tablet Take one-half to one tablet PO nightly PRN insomnia    WELCHOL 625 mg tablet TAKE 1 TABLET BY MOUTH EVERY DAY INCREASE BY ONE PILL EVERY 3 TO 4 DAYS UNTIL DESIRED CONTROL IS REACHED           Clinical Reference Information           Your Vitals Were     BP Pulse Height Weight Last Period BMI    118/77 87 5' 3" (1.6 m) 46.8 kg (103 lb 2.8 oz) (LMP Unknown) 18.28 kg/m2      Blood Pressure          Most Recent Value    BP  118/77      Allergies as of 3/29/2017     Amoxil [Amoxicillin]    Augmentin [Amoxicillin-pot Clavulanate]    Avelox [Moxifloxacin]      Immunizations Administered on Date of Encounter - 3/29/2017     None      Language Assistance Services     ATTENTION: Language assistance services are " available, free of charge. Please call [phone].      ATENCIÓN: Si habla davidañol, tiene a couch disposición servicios gratuitos de asistencia lingüística. Llame al [phone].     CHÚ Ý: N?u b?n nói Ti?ng Vi?t, có các d?ch v? h? tr? ngôn ng? mi?n phí dành cho b?n. G?i s? [phone].         [facility] complies with applicable Federal civil rights laws and does not discriminate on the basis of race, color, national origin, age, disability, or sex.

## 2017-03-29 NOTE — PROGRESS NOTES
Outpatient Psychiatry Follow-Up Visit (MD/NP)    3/29/2017    Clinical Status of Patient:  Outpatient (Ambulatory)    Chief Complaint:  Cat Denson is a 37 y.o. female who presents today for follow-up of mood disorder and anxiety.  Met with patient alone and pt's BF Brent Andrade    Interval History and Content of Current Session:  Interim Events/Subjective Report/Content of Current Session:  follow up appt.       Pt had a CVA in November 2015 - Embolic stroke involving right middle cerebral artery; she is hypercoagulable due to lupus.  She is currently taking Coumadin.  Pt has residual left hemiparesis.   She has had balance problems, forgetfulness, and severe focusing issue.    She is under the care of Rheumatology - Dr Benoit  Now under the care of Dr Chebehar - Neurology.     Pt had episode of altered mental status - she became obsessed with Netflix per BF- she thought she was in the show; BF reports she lost touch with reality; she was admitted on med unit for prolonged INR - she was not admitted to psych unit; transitioned to xarelto.     One week later - she became obsessed with vomiting - she took Ritalin again BID - she kept feeling nauseous and would not go away; many times a day she was self inducing vomiting.  BF returned her to ER and she was sent to [facility].     D/C Summary reviewed -   Psych medications:      Lexapro 10 mg daily   Olanzapine 5 mg nightly   Clonazepam 0.5 mg BID     Pt admitted for w/d affect, psychosis, poor memory recall, not sleeping x days      Pt worked up first for abdominal pain, nausea, vomiting.   No significant findings.     Previous admit earlier on Medicine for accidental OD oncoumadin.   Urine tox + THC, benzodiazepines     Today, pt reports she is not taking lexapro - did not take it at all following d/c.  BF confirms.   She has resumed trazodone 50 mg nightly and is taking olanzapine and clonazepam.     Neuropsychological testing results  "reviewed with patient and BF:   Personality test data suggested the presence of mild depression. Neuropsychological test results reveal impairment in immediate and delayed visual memory, visual attention, temporal orientation, visuospatial/constructional abilities, abstract reasoning, psychomotor speed, and mental flexibility; and variability in immediate auditory/verbal memory (high average and mildly impaired performances) and verbal fluency (low average and moderately impaired performances); with mild depression. The pattern of impairment is consistent with right MCA CVA. She will continue to see Dr. Cruz and . [facility] for psychiatric care. She was encouraged to resume PT, OT, and Speech Therapy for further stroke rehabilitation.    Interim HX:   She is doing good now - no signs of this recurring of recurrence.    Pt is using marijuana twice daily - at one point she was using six times a day.     Tobacco: 1/2 pack daily   Alcohol: none   Drug use - see above;  She started using cannabis at 20 yo; she has used on/off for years.  She uses for appetite boost; she does not see it as a problem; she denies that it affects her home life; she does verbalizes an understanding that it could affect her health.  "it slows me down a little, it gives me the stumbly walk."     Pt is sleeping nightly - she goes to bed early around 7 pm - she woke up at 2 am this am and thought it was 6 am - sometimes, she does have broken sleep.  She feels she is getting enough sleep.   Appetite improved; wt loss of 4 lbs; no recent emesis or purging;  Nausea still present; she denies hx eating d/o; phenergan helps.      Motivation tends to be low;  No crying spells; + fatigue   Denies suicidal/homicidal ideations.  No self harm, no violence.      Memory is fairly good she thinks;  She feels her focus is ok; BF thinks her memory is ok.   She needs assistance with bathing, meal preparation, dressing self; some incontinence is am; she wears " undergarment at night,  No fecal incontinence. Mom now helps with meds - not prior;  Mom handles finances; she does not drive.     She does feel depressed at all currently - she cannot recall when she last did feel depressed.     Prior to [facility] - she thinks she mixed up her ritalin and took an extra - she was up for days.  She was managing her own medications.  5 days with little sleep.  No rapid speech - she was staying to herself; BF reports she was confused; she was oriented the second ER presentation, but not the first.   No psychosis per patient or BF; no RIS. Denies auditory/visual hallucinations    Anxiety - some anxiety; I wish I know what happened   No panic wattacks  Some worry     Pt receives SSID since 2010 (health and psychiatric conditions)    Mother helps her bills and medications; pt does not drive (not since CVA).      On AIMS today, pt noted to have some minimal mouth movements - activated with using right hand alone - pt has no awareness and denies any impairment.  this started after her CVA - BF notes it only occurs with use of hemiparetic side - not occurring with left hand us today and not occurring spontaneously - no dyskinesias seen on Dr Chebehar's exam     Review of Systems   · PSYCHIATRIC: Pertinant items are noted in the narrative.  · CONSTITUTIONAL: + weight loss   · MUSCULOSKELETAL: none   · CV: no chest pain, no awareness of tachycardia  · NEURO: left HP, no seizures     Past Medical, Family and Social History: The patient's past medical, family and social history have been reviewed and updated as appropriate within the electronic medical record - see encounter notes.    Medications:   Clonazepam 0.5 mg PO BID prn anxiety   Olanzapine 5 mg nightly   Trazodone 50 mg nightly     Compliance:  Yes - not taking Lexapro     Side effects: none reported now; methylphenidate may have destabilized mood, especially if patient taking extra doses     Risk Parameters:  Patient reports no current  "suicidal ideation  Patient reports no homicidal ideation  Patient reports no self-injurious behavior  Patient reports no violent behavior    Exam (detailed: at least 9 elements; comprehensive: all 15 elements)   Constitutional  Vitals:  Most recent vital signs, dated less than 90 days prior to this appointment, were reviewed.   Vitals:    03/29/17 0901   BP: 118/77   Pulse: 87   Weight: 46.8 kg (103 lb 2.8 oz)   Height: 5' 3" (1.6 m)        General:  age appropriate, thinner, fair grooming, good eye contact, hair dyed and cut short,  wearing black clothes      Musculoskeletal  Muscle Strength/Tone:  no tremor   Gait & Station:  slow, mostly steady today      Psychiatric  Speech:  no latency; no press, decreased spontaneity today    Mood & Affect:  "good"   Restricted, smiles at times    Thought Process:  Linear with questions, slowed    Associations:  intact   Thought Content:  no current suicidality, no homicidality, delusions, or paranoia   Insight:  Fair    Judgement: Fair    Orientation:  Alert and oriented to month, 2017, Wednesday   [facility] [place]   Memory: fair recall of recent hx, 3/3 recall, 2/3 at 3 mins    Language: grossly intact, can repeat - no ifs, ands, or buts    Attention Span & Concentration:  Grossly intact to interview [facility] of Knowledge:  intact and appropriate to age and level of education, familiar with aspects of current personal life     Assessment and Diagnosis   Status/Progress: Based on the examination today, the patient's problem(s) is/are under fair control..  New problems have been presented today.   Co-morbidities are complicating management of the primary condition.      General Impression: pt had CVA Nov 2015 with significantly impaired focus, memory lapses, worsening depression, anxiety - improved with ritalin and on current medications (previously ok'd ritalin use with PCP).  Pt had episode of acute hepatitis, liver enzymes improved, but now with declining " renal function; pt also resumed smoking; she is also using marijuana. Recent admission to [facility] and earlier admission for supratherapeutic INR. Mother managing medications now. Pt under care of Neurology; Neuropsychological testing done and consistent with her MCA CVA.        Bipolar 2 Disorder   Panic Disorder with Agoraphobia   Cognitive Disorder due to CVA  Insomnia Disorder  Cannabis Use Disorder   Tobacco Use Disorder  Abnormal involuntary movements     Lupus, migraines, CVA, hx hepatitis, declining renal function     GAF: 58     Intervention/Counseling/Treatment Plan   · Medication Management: The risks and benefits of medication were discussed with the patient.    -  Continue Clonazepam 0.5 mg BID PRN anxiety for now - advised to us lowest effective dose. Discussed risk of decreased RT, fall risk, sedation, addictive potential, and not to mix with alcohol. Plan to taper further next visit.     - Continue Olanzapine 5 mg nightly. Typical RAJWINDER's reviewed including weight gain, abnormal movements, EPS, TD, metabolic side effects.     - advised to not take Ritalin, do not restart Lexapro     - Continue Trazodone 25-50 mg nightly for now     - Continue psychotherapy with Monique Deleon LCSW    - refrain from cannabis use - psychoeducation today about risks of use, pt counseled today on health risks associated with use. She declines resources for rehab     - pt counseled on smoking cessation and risks for further CVA    - Pt instructed to go to ER with thoughts of harming self, others; Call to report any worsening of symptoms or problems with the medication    - follow up with Neurology 5/23/17     - [facility] [place] - pt's boyfriend. Santy Andrade.       Return to Clinic: 1 month

## 2017-04-04 ENCOUNTER — OFFICE VISIT (OUTPATIENT)
Dept: PSYCHIATRY | Facility: CLINIC | Age: 37
End: 2017-04-04
Payer: COMMERCIAL

## 2017-04-04 DIAGNOSIS — F31.81 BIPOLAR 2 DISORDER: Primary | ICD-10-CM

## 2017-04-04 DIAGNOSIS — F40.01 PANIC DISORDER WITH AGORAPHOBIA: ICD-10-CM

## 2017-04-04 DIAGNOSIS — F09 COGNITIVE DISORDER: ICD-10-CM

## 2017-04-04 PROCEDURE — 99999 PR PBB SHADOW E&M-EST. PATIENT-LVL III: CPT | Mod: PBBFAC,,, | Performed by: SOCIAL WORKER

## 2017-04-04 PROCEDURE — 99499 UNLISTED E&M SERVICE: CPT | Mod: S$GLB,,, | Performed by: SOCIAL WORKER

## 2017-04-04 PROCEDURE — 90834 PSYTX W PT 45 MINUTES: CPT | Mod: S$GLB,,, | Performed by: SOCIAL WORKER

## 2017-04-06 ENCOUNTER — TELEPHONE (OUTPATIENT)
Dept: NEUROLOGY | Facility: CLINIC | Age: 37
End: 2017-04-06

## 2017-04-10 RX ORDER — MELOXICAM 15 MG/1
TABLET ORAL
Qty: 30 TABLET | Refills: 0 | Status: SHIPPED | OUTPATIENT
Start: 2017-04-10 | End: 2017-05-12 | Stop reason: SDUPTHER

## 2017-04-18 DIAGNOSIS — M32.9 LUPUS (SYSTEMIC LUPUS ERYTHEMATOSUS): ICD-10-CM

## 2017-04-19 RX ORDER — HYDROXYCHLOROQUINE SULFATE 200 MG/1
TABLET, FILM COATED ORAL
Qty: 30 TABLET | Refills: 0 | Status: SHIPPED | OUTPATIENT
Start: 2017-04-19 | End: 2017-04-24 | Stop reason: SDUPTHER

## 2017-04-19 NOTE — TELEPHONE ENCOUNTER
Patient has an appointment made for 4/24/17 at 3:30pm. Pt is aware of time and date of her appointment.

## 2017-04-24 ENCOUNTER — OFFICE VISIT (OUTPATIENT)
Dept: RHEUMATOLOGY | Facility: CLINIC | Age: 37
End: 2017-04-24
Payer: COMMERCIAL

## 2017-04-24 VITALS
BODY MASS INDEX: 17.7 KG/M2 | DIASTOLIC BLOOD PRESSURE: 63 MMHG | HEIGHT: 63 IN | SYSTOLIC BLOOD PRESSURE: 101 MMHG | WEIGHT: 99.88 LBS | HEART RATE: 85 BPM

## 2017-04-24 DIAGNOSIS — Z79.899 LONG-TERM USE OF HYDROXYCHLOROQUINE: Primary | ICD-10-CM

## 2017-04-24 DIAGNOSIS — M32.19 OTHER SYSTEMIC LUPUS ERYTHEMATOSUS WITH OTHER ORGAN INVOLVEMENT: ICD-10-CM

## 2017-04-24 PROCEDURE — 99999 PR PBB SHADOW E&M-EST. PATIENT-LVL III: CPT | Mod: PBBFAC,,, | Performed by: INTERNAL MEDICINE

## 2017-04-24 PROCEDURE — 1160F RVW MEDS BY RX/DR IN RCRD: CPT | Mod: S$GLB,,, | Performed by: INTERNAL MEDICINE

## 2017-04-24 PROCEDURE — 99213 OFFICE O/P EST LOW 20 MIN: CPT | Mod: S$GLB,,, | Performed by: INTERNAL MEDICINE

## 2017-04-24 PROCEDURE — 99499 UNLISTED E&M SERVICE: CPT | Mod: S$GLB,,, | Performed by: INTERNAL MEDICINE

## 2017-04-24 RX ORDER — HYDROXYCHLOROQUINE SULFATE 200 MG/1
200 TABLET, FILM COATED ORAL 2 TIMES DAILY
Qty: 30 TABLET | Refills: 2 | Status: SHIPPED | OUTPATIENT
Start: 2017-04-24 | End: 2017-06-13 | Stop reason: SDUPTHER

## 2017-04-24 ASSESSMENT — ROUTINE ASSESSMENT OF PATIENT INDEX DATA (RAPID3)
TOTAL RAPID3 SCORE: 4.94
MDHAQ FUNCTION SCORE: 1.3
PSYCHOLOGICAL DISTRESS SCORE: 4.4
PATIENT GLOBAL ASSESSMENT SCORE: 4.5
PAIN SCORE: 6

## 2017-04-24 ASSESSMENT — SYSTEMIC LUPUS ERYTHEMATOSUS DISEASE ACTIVITY INDEX (SLEDAI): TOTAL_SCORE: 0

## 2017-04-24 NOTE — PROGRESS NOTES
"Subjective:       Patient ID: Cat Denson is a 37 y.o. female.    Chief Complaint: SLE  HPI 37-year-old patient with SLE is here for follow-up. She was diagnosed with SLE and Sjogren syndrome in 2005.  Manifested by TRISTAN Pos 1:1280 S, .29, SSB 46.65, + arthritis + tyler rash + photosensitivity +sicca symptoms +Raynaud's +livedoid rash.  She also has history of embolic stroke secondary to smoking and birth control use.  Negative anti-phospholipid antibodies.  Currently on hydroxychloroquine 200 mg twice daily.  Up-to-date on eye exams  Also has fibromyalgia and chronic pain syndrome.  Denies any joint pain today Denies any joint swelling.  Denies early morning stiffness  She denies fever, weight loss, serositis, hematuria, no photosensitivity/rash at this time, oral or nasal ulcerS, alopecia, dysphagia, diarrhea or blood in the stools    Review of Systems   Constitutional: Negative for fatigue and fever.   HENT: Negative for ear discharge and ear pain.    Eyes: Negative for pain and redness.   Respiratory: Negative for cough and shortness of breath.    Cardiovascular: Negative for chest pain and palpitations.   Gastrointestinal: Negative for abdominal distention and abdominal pain.   Genitourinary: Negative for genital sores and hematuria.   Neurological: Negative for tremors and seizures.   Psychiatric/Behavioral: Negative for agitation and hallucinations.         Objective:     /63 (BP Location: Right arm, Patient Position: Sitting, BP Method: Automatic)  Pulse 85  Ht 5' 3" (1.6 m)  Wt 45.3 kg (99 lb 13.9 oz)  BMI 17.69 kg/m2     Physical Exam   Constitutional: She is oriented to person, place, and time and well-developed, well-nourished, and in no distress.   HENT:   Head: Atraumatic.   Eyes: Conjunctivae and EOM are normal. Pupils are equal, round, and reactive to light.   Neck: Normal range of motion. Neck supple. No thyromegaly present.   Cardiovascular: Normal rate and regular rhythm. "    Pulmonary/Chest: Effort normal and breath sounds normal.   Abdominal: Soft. Bowel sounds are normal.   Neurological: She is alert and oriented to person, place, and time.   Skin: Skin is warm and dry.     Psychiatric: Memory and affect normal.   Musculoskeletal: She exhibits no edema.     No joint effusion                Lab Results   Component Value Date    WBC 8.10 03/12/2017    HGB 11.9 (L) 03/12/2017    HCT 37.5 03/12/2017    MCV 91 03/12/2017     03/12/2017     CMP  Sodium   Date Value Ref Range Status   03/12/2017 145 136 - 145 mmol/L Final     Potassium   Date Value Ref Range Status   03/12/2017 3.4 (L) 3.5 - 5.1 mmol/L Final     Chloride   Date Value Ref Range Status   03/12/2017 111 (H) 95 - 110 mmol/L Final     CO2   Date Value Ref Range Status   03/12/2017 19 (L) 23 - 29 mmol/L Final     Glucose   Date Value Ref Range Status   03/12/2017 122 (H) 70 - 110 mg/dL Final     BUN, Bld   Date Value Ref Range Status   03/12/2017 14 6 - 20 mg/dL Final     Creatinine   Date Value Ref Range Status   03/12/2017 1.9 (H) 0.5 - 1.4 mg/dL Final     Calcium   Date Value Ref Range Status   03/12/2017 10.5 8.7 - 10.5 mg/dL Final     Total Protein   Date Value Ref Range Status   03/12/2017 8.6 (H) 6.0 - 8.4 g/dL Final     Albumin   Date Value Ref Range Status   03/12/2017 5.1 3.5 - 5.2 g/dL Final     Total Bilirubin   Date Value Ref Range Status   03/12/2017 0.4 0.1 - 1.0 mg/dL Final     Comment:     For infants and newborns, interpretation of results should be based  on gestational age, weight and in agreement with clinical  observations.  Premature Infant recommended reference ranges:  Up to 24 hours.............<8.0 mg/dL  Up to 48 hours............<12.0 mg/dL  3-5 days..................<15.0 mg/dL  6-29 days.................<15.0 mg/dL       Alkaline Phosphatase   Date Value Ref Range Status   03/12/2017 92 55 - 135 U/L Final     AST   Date Value Ref Range Status   03/12/2017 20 10 - 40 U/L Final     ALT   Date  Value Ref Range Status   03/12/2017 26 10 - 44 U/L Final     Anion Gap   Date Value Ref Range Status   03/12/2017 15 8 - 16 mmol/L Final     eGFR if    Date Value Ref Range Status   03/12/2017 39 (A) >60 mL/min/1.73 m^2 Final     eGFR if non    Date Value Ref Range Status   03/12/2017 34 (A) >60 mL/min/1.73 m^2 Final     Comment:     Calculation used to obtain the estimated glomerular filtration  rate (eGFR) is the CKD-EPI equation. Since race is unknown   in our information system, the eGFR values for   -American and Non--American patients are given   for each creatinine result.       Lab Results   Component Value Date    SEDRATE 9 01/26/2017     Lab Results   Component Value Date    CRP 0.4 01/26/2017      Results for MARA LYNCH (MRN [id number]) as of 8/1/2016 09:59   Ref. Range 7/19/2013 10:30 9/23/2013 16:40 9/30/2014 11:53 8/3/2015 08:53 1/29/2016 08:48   TRISTAN Latest Ref Range: Neg <1:160  Pos, Titer to follow (A)       TRISTAN HEP-2 Titer Unknown Pos 1:1280 (A) Positive 1:640 Sp...      TRISTAN Hep-2 Pattern Unknown Speckled (A)       Anti-SSA Antibody Latest Ref Range: 0.00 - 19.99 .29 (A) 139.68 (H)      Anti-SSA Interpretation Latest Ref Range: Negative  Positive (A) Positive (A)      Anti-SSB Antibody Latest Ref Range: 0.00 - 19.99 EU 46.65 33.16 (H)      Anti-SSB Interpretation Latest Ref Range: Negative  Positive (A) Positive (A)      ds DNA Ab Latest Ref Range: Negative 1:10   Negative 1:10   Negative 1:10   Anti Sm Antibody Latest Ref Range: 0.00 - 19.99 EU  1.51      Anti-Sm Interpretation Latest Ref Range: Negative   Negative      Anti Sm/RNP Antibody Latest Ref Range: 0.00 - 19.99 EU  16.51      Anti-Sm/RNP Interpretation Latest Ref Range: Negative   Negative      TTG IgA Latest Ref Range: <20 UNITS   7     Complement (C-3) Latest Ref Range: 50 - 180 mg/dL  102  100 118   Complement (C-4) Latest Ref Range: 11 - 44 mg/dL  29  32 34   IgA Latest Ref  Range: 40 - 350 mg/dL   229     Protein, Serum Latest Ref Range: 6.0 - 8.4 g/dL  7.5      Albumin grams/dl Latest Ref Range: 3.35 - 5.55 g/dL  4.35      Alpha-1 grams/dl Latest Ref Range: 0.17 - 0.41 g/dL  0.33      Alpha-2 grams/dl Latest Ref Range: 0.43 - 0.99 g/dL  0.83      Beta grams/dl Latest Ref Range: 0.50 - 1.10 g/dL  0.77      Gamma grams/dl Latest Ref Range: 0.67 - 1.58 g/dL  1.23      CCP Antibodies Latest Ref Range: 0 - 4.9 U/mL < 0.5 (<)       Rheumatoid Factor Latest Ref Range: 0 - 15 IU/ml 39 (H)         Assessment:   SLE -TRISTAN Pos 1:1280 S, .29, SSB 46.65, + arthritis + tyler rash + photosensitivity.  SLEDAI 0    Sjogren's syndrome  Raynaud's phenomenon  Fibromyalgia  Chronic pain syndrome  Bipolar 2 disorder  Long-term use of hydroxychloroquine  S/P left hemiparesis  Plan:   Check c3, c4, ds dna today, reviewed UA   Continue hydroxychloroquine 200 mg twice daily  Continue gabapentin to 800 mg tid  Return to clinic in 3 months with lupus activity labs

## 2017-04-24 NOTE — MR AVS SNAPSHOT
[facility]  [address]  [place] [address]  Phone: [phone]  Fax: [phone]                  Cat Denson   2017 3:30 PM   Office Visit    Description:  Female : 1980   Provider:  Rafaela Benoit MD   Department:  [facility]           Reason for Visit     Follow-up           Diagnoses this Visit        Comments    Other systemic lupus erythematosus with other organ involvement                To Do List           Future Appointments        Provider Department Dept Phone    2017 3:50 PM LAB, [address] [address] [facility] [phone]    2017 11:40 AM Orestes Ribeiro DO [facility] [phone]    2017 11:00 AM LAB, [facility] [phone]    2017 11:20 AM LAB, [facility] [phone]    2017 10:30 AM MD [facility] [phone]      Goals (5 Years of Data)     None       These Medications        Disp Refills Start End    hydroxychloroquine (PLAQUENIL) 200 mg tablet 30 tablet 2 2017     Take 1 tablet (200 mg total) by mouth 2 (two) times daily. - Oral    Pharm[facility]: [facility] [address] [place] [address] [facility] [address] [facility] [address] [facility] [address] Ph #: [phone]         [facility]     [facility] Nurse Care Line -  Assistance  Unless otherwise directed by your provider, please contact [facility], our nurse care line that is available for  assistance.     Registered nurses in the [facility] provide: appointment scheduling, clinical advisement, health education, and other advisory services.  Call: [phone] (toll free)               Medications           Message regarding Medications     Verify the changes and/or additions to your medication regime listed below are the same as discussed with your clinician today.  If any of these changes  or additions are incorrect, please notify your healthcare provider.        CHANGE how you are taking these medications     Start Taking Instead of    hydroxychloroquine (PLAQUENIL) 200 mg tablet hydroxychloroquine (PLAQUENIL) 200 mg tablet    Dosage:  Take 1 tablet (200 mg total) by mouth 2 (two) times daily. Dosage:  TAKE 1 TABLET BY MOUTH TWICE DAILY    Reason for Change:  Reorder            Verify that the below list of medications is an accurate representation of the medications you are currently taking.  If none reported, the list may be blank. If incorrect, please contact your healthcare provider. Carry this list with you in case of emergency.           Current Medications     acetaminophen (TYLENOL) 500 MG tablet Take 1 tablet (500 mg total) by mouth every 6 (six) hours as needed for Pain.    baclofen (LIORESAL) 10 MG tablet Take one to two tablet before bedtime for muscle spasms and cramps as needed    chlorhexidine (PERIDEX) 0.12 % solution RINSE WITH 1/2 OZ BID AFTER BRUSHING AND FLOSSING    clonazePAM (KLONOPIN) 0.5 MG tablet Take one tablet PO BID PRN anxiety    gabapentin (NEURONTIN) 800 MG tablet Take 1 tablet (800 mg total) by mouth 2 (two) times daily.    hydroxychloroquine (PLAQUENIL) 200 mg tablet Take 1 tablet (200 mg total) by mouth 2 (two) times daily.    loratadine (CLARITIN) 10 mg tablet Take 10 mg by mouth once daily.    meloxicam (MOBIC) 15 MG tablet TAKE 1 TABLET BY MOUTH EVERY DAY AS NEEDED FOR PAIN    metoprolol succinate (TOPROL-XL) 25 MG 24 hr tablet Take 1 tablet (25 mg total) by mouth once daily.    olanzapine (ZYPREXA) 5 MG tablet Take one tablet PO nightly    omeprazole (PRILOSEC) 40 MG capsule TAKE 1 CAPSULE(40 MG) BY MOUTH EVERY MORNING    PREVIDENT 5000 BOOSTER PLUS 1.1 % Pste BRUSH ON NIGHTLY AFTER NORMAL HYGIENE REGIMAN. SPIT OUT EXCESS DO NOT RINSE    promethazine (PHENERGAN) 25 MG tablet Take 1 tablet (25 mg total) by mouth every 4 (four) hours.    ranitidine (ZANTAC) 300 MG  "tablet Take 1 tablet (300 mg total) by mouth once daily.    rivaroxaban (XARELTO) 20 mg Tab Take 1 tablet (20 mg total) by mouth daily with dinner or evening meal.    tamsulosin (FLOMAX) 0.4 mg Cp24 Take 1 capsule (0.4 mg total) by mouth once daily.    trazodone (DESYREL) 50 MG tablet Take one-half to one tablet PO nightly PRN insomnia    WELCHOL 625 mg tablet TAKE 1 TABLET BY MOUTH EVERY DAY INCREASE BY ONE PILL EVERY 3 TO 4 DAYS UNTIL DESIRED CONTROL IS REACHED           Clinical Reference Information           Your Vitals Were     BP Pulse Height Weight BMI    101/63 (BP Location: Right arm, Patient Position: Sitting, BP Method: Automatic) 85 5' 3" (1.6 m) 45.3 kg (99 lb 13.9 oz) 17.69 kg/m2      Blood Pressure          Most Recent Value    BP  101/63      Allergies as of 4/24/2017     Amoxil [Amoxicillin]    Augmentin [Amoxicillin-pot Clavulanate]    Avelox [Moxifloxacin]      Immunizations Administered on Date of Encounter - 4/24/2017     None      Orders Placed During Today's Visit     Future Labs/Procedures Expected by Expires    Anti-DNA antibody, double-stranded  4/24/2017 6/23/2018    Anti-DNA antibody, double-stranded  4/24/2017 6/23/2018    C-reactive protein  4/24/2017 6/23/2018    C3 complement  4/24/2017 6/23/2018    C3 complement  4/24/2017 6/23/2018    C4 complement  4/24/2017 6/23/2018    C4 complement  4/24/2017 6/23/2018    CBC auto differential  4/24/2017 6/23/2018    Comprehensive metabolic panel  4/24/2017 6/23/2018    Protein / creatinine ratio, urine  4/24/2017 6/23/2018    Sedimentation rate, manual  4/24/2017 6/23/2018    Urinalysis  4/24/2017 6/23/2018      Language Assistance Services     ATTENTION: Language assistance services are available, free of charge. Please call [phone].      ATENCIÓN: Si habla español, tiene a couch disposición servicios gratuitos de asistencia lingüística. Llame al [phone].     CHÚ Ý: N?u b?n nói Ti?ng Vi?t, có các d?ch v? h? tr? ngôn ng? mi?n phí " dành cho b?n. G?i s? [phone].         [facility] complies with applicable Federal civil rights laws and does not discriminate on the basis of race, color, national origin, age, disability, or sex.

## 2017-04-28 ENCOUNTER — OFFICE VISIT (OUTPATIENT)
Dept: PSYCHIATRY | Facility: CLINIC | Age: 37
End: 2017-04-28
Payer: COMMERCIAL

## 2017-04-28 VITALS
DIASTOLIC BLOOD PRESSURE: 76 MMHG | WEIGHT: 99.19 LBS | HEIGHT: 63 IN | BODY MASS INDEX: 17.57 KG/M2 | HEART RATE: 100 BPM | SYSTOLIC BLOOD PRESSURE: 134 MMHG

## 2017-04-28 DIAGNOSIS — F40.01 PANIC DISORDER WITH AGORAPHOBIA: ICD-10-CM

## 2017-04-28 DIAGNOSIS — F31.81 BIPOLAR 2 DISORDER: ICD-10-CM

## 2017-04-28 DIAGNOSIS — F12.20 CANNABIS USE DISORDER, MODERATE, DEPENDENCE: ICD-10-CM

## 2017-04-28 DIAGNOSIS — F09 COGNITIVE DISORDER: ICD-10-CM

## 2017-04-28 PROCEDURE — 99499 UNLISTED E&M SERVICE: CPT | Mod: S$GLB,,, | Performed by: PSYCHIATRY & NEUROLOGY

## 2017-04-28 PROCEDURE — 1160F RVW MEDS BY RX/DR IN RCRD: CPT | Mod: S$GLB,,, | Performed by: PSYCHIATRY & NEUROLOGY

## 2017-04-28 PROCEDURE — 99999 PR PBB SHADOW E&M-EST. PATIENT-LVL III: CPT | Mod: PBBFAC,,, | Performed by: PSYCHIATRY & NEUROLOGY

## 2017-04-28 PROCEDURE — 99213 OFFICE O/P EST LOW 20 MIN: CPT | Mod: S$GLB,,, | Performed by: PSYCHIATRY & NEUROLOGY

## 2017-04-28 RX ORDER — TRAZODONE HYDROCHLORIDE 50 MG/1
TABLET ORAL
Qty: 30 TABLET | Refills: 0 | Status: SHIPPED | OUTPATIENT
Start: 2017-04-28 | End: 2017-05-19 | Stop reason: SDUPTHER

## 2017-04-28 RX ORDER — OLANZAPINE 5 MG/1
TABLET ORAL
Qty: 1 TABLET | Refills: 0
Start: 2017-04-28 | End: 2017-05-19

## 2017-04-28 RX ORDER — ARIPIPRAZOLE 5 MG/1
TABLET ORAL
Qty: 30 TABLET | Refills: 1 | Status: SHIPPED | OUTPATIENT
Start: 2017-04-28 | End: 2017-05-19 | Stop reason: SDUPTHER

## 2017-04-28 RX ORDER — CLONAZEPAM 0.5 MG/1
TABLET ORAL
Qty: 30 TABLET | Refills: 2 | Status: SHIPPED | OUTPATIENT
Start: 2017-04-28 | End: 2017-05-19 | Stop reason: SDUPTHER

## 2017-04-28 NOTE — PROGRESS NOTES
Outpatient Psychiatry Follow-Up Visit (MD/NP)    4/28/2017    Clinical Status of Patient:  Outpatient (Ambulatory)    Chief Complaint:  Cat Denson is a 37 y.o. female who presents today for follow-up of mood disorder and anxiety.  Met with patient alone and then pt with her mother together.     Interval History and Content of Current Session:  Interim Events/Subjective Report/Content of Current Session:       Pt presents for follow up treatment of  follow up appt of bipolar II disorder, panic disorder, cannabis use disorder, and cognitive disorder.   Pt had a CVA in November 2015 - Embolic stroke involving right middle cerebral artery; she is hypercoagulable due to lupus.  She is currently taking Coumadin.  Pt has residual left hemiparesis.   She has had balance problems, forgetfulness, and severe focusing issue.    She is under the care of Rheumatology - Dr Benoit  Now under the care of Dr Chebehar - Neurology.     Pt admitted to [facility] last month for w/d affect, psychosis, poor memory recall, not sleeping x days - if related to bipolar spectrum illness, this would change dx to bipolar 1 disorder.  Pt's ongoing cannabis use complicates clinical picture.  Also, pt may have been taking meds inappropriately at the time, including Ritalin - mother now administering medications.     Interim Hx:   Pt presents today appearing withdrawn, mildly tremulous;  Her heart rate was initially quite elevated at 147.  I rechecked it at end of interview and it dropped to 100.  Pt reports she was smoking in the parking lot when called into the office.  She also took 1/2 caffeine tablet this morning ~ apprx 100 mg b/c she has trouble waking up in am.  Pt denies chest pain or awareness of palpitations.  Restless, fidgety.     She took 1/2 caffeine pill this AM - 100 mg this am b/c trouble waking up in morning,     HR now 147 - no chest pain or awareness of palpitations.  She is restless, fidgety.     Mom  "in waiting room.      She is sleeping at night. Is feeling restless at times.   She cannot stay focused on anything.      Mood is good.  Still with boyfriend and doing well;   Motivation is fair, some days in bed due to LE pain "it's just easier to lay in bed and watch TV."   Appetite so/so - eats one a day in the evening - not cooking.  She drinks mostly juice during the day.  Typical dinner is tacos.   Does not eat breakfast - not hungry.   BMI 17.5. She continues to lose weight.  She drinks Smoothies throughout the day.     No binging or purging - she throws up once a week; but not self induced.  She denies intentionally restricted calories.  Never been able to keep weight on.      Self care is adequate.   Hopeful, no worthlessness   Denies suicidal/homicidal ideations.  No self harm.     Memory ok - not forgetful per patient.    She needs assistance with bathing, meal preparation, dressing self; some incontinence is am; she wears undergarment at night,  No fecal incontinence. Mom now helps with meds - not prior;  Mom handles finances; she does not drive.     No bowen;  Denies auditory/visual hallucinations  No ideas of reference.     Anxiety - ok;  She stopped AM Clonazepam in morning - it did not help with the grogginess - this was a few weeks ago.  She has continued to take nighttime dose.   She reports she gets weird twtichy" things in her hands (bilaterally).     No panic attacks;  Not really comfortable leaving home "it is a lot of work."    No racing thoughts, mostly calm.    Alcohol: "never" - last drink few years ago.   THC daily - 4-5 times/daily (this is higher report than last visit);  It makes her stomach less nauseous; it giver her an appetite - pt report of benefit.   No other drug use.  1/2 ppd tobacco    Per mother:   Lays around alot, not going out in the world, not motivated to do anyhting  But, mom reports she is upbeat, not depressed.   Mom now administers medications b/c she thinks Cat " "was taking medications improperly leading to psych admission.  Pt has chronic digestive disorder, with frequent emesis in am - mom reports pt has had GI work up which did not reveal cause of emesis .   She does not feel in any discomfort today.   No falls, confusion, seizures.  Mother reports pt is chronically restless, she does not feel it is worse than in past     "I think I am doing pretty good."    Mom administers all medications.    No crying spells.        She is under the care of Monique Deleon [facility]    Neuropsychological testing:   Personality test data suggested the presence of mild depression. Neuropsychological test results reveal impairment in immediate and delayed visual memory, visual attention, temporal orientation, visuospatial/constructional abilities, abstract reasoning, psychomotor speed, and mental flexibility; and variability in immediate auditory/verbal memory (high average and mildly impaired performances) and verbal fluency (low average and moderately impaired performances); with mild depression. The pattern of impairment is consistent with right MCA CVA. She will continue to see Dr. Cruz and Mrs. Wiley for psychiatric care. She was encouraged to resume PT, OT, and Speech Therapy for further stroke rehabilitation.    Pt receives SSID since 2010 (health and psychiatric conditions)      On AIMS today, pt noted to have continued some minimal mouth movements - activated with using right hand alone - pt has no awareness and denies any impairment.  this started after her CVA - BF noted it only occurs with use of hemiparetic side - not occurring with left hand us today and not occurring spontaneously - no dyskinesias seen on Dr Chebehar's exam     Review of Systems   · PSYCHIATRIC: Pertinant items are noted in the narrative.  · CONSTITUTIONAL: + weight loss   · MUSCULOSKELETAL: none   · CV: no chest pain, no awareness of tachycardia  · NEURO: left HP, no seizures     Past Medical, Family " "and Social History: The patient's past medical, family and social history have been reviewed and updated as appropriate within the electronic medical record - see encounter notes.    Medications:   Clonazepam 0.5 mg PO BID prn anxiety - taking once daily   Olanzapine 5 mg nightly   Trazodone 50 mg nightly     Compliance:  Yes     Side effects: ? sedation; methylphenidate may have destabilized mood, especially if patient taking extra doses     Risk Parameters:  Patient reports no current suicidal ideation  Patient reports no homicidal ideation  Patient reports no self-injurious behavior  Patient reports no violent behavior    Exam (detailed: at least 9 elements; comprehensive: all 15 elements)   Constitutional  Vitals:  Most recent vital signs, dated less than 90 days prior to this appointment, were reviewed.   Vitals:    04/28/17 0900   BP: 134/76   Pulse: (!) 147   Weight: 45 kg (99 lb 3.3 oz)   Height: 5' 3" (1.6 m)   repeat       General:  age appropriate, thinner, fair grooming, good eye contact, hair dyed and cut short,  wearing black clothes, restless      Musculoskeletal  Muscle Strength/Tone:  Very slight tremulousness    Gait & Station:  slow     Psychiatric  Speech:  no latency; no press, decreased spontaneity    Mood & Affect:  "good"   Restricted    Thought Process:  Linear with questions, somewhat slowed    Associations:  intact   Thought Content:  no current suicidality, no homicidality, delusions, or paranoia   Insight:  Fair    Judgement: Fair    Orientation:  Grossly intact for content of interview    Memory: fair recall of recent hx   Language: grossly intact   Attention Span & Concentration:  Grossly intact to interview    Fund of Knowledge:  intact and appropriate to age and level of education, familiar with aspects of current personal life     Assessment and Diagnosis   Status/Progress: Based on the examination today, the patient's problem(s) is/are under fair but inadequate control..  New " problems have not been presented today.   Co-morbidities are complicating management of the primary condition.      General Impression: pt had CVA Nov 2015 with significantly impaired focus, memory lapses, worsening depression, anxiety - improved with ritalin and on current medications (previously ok'd ritalin use with PCP).  Pt had episode of acute hepatitis, liver enzymes improved, but now with declining renal function; pt also resumed smoking; she is also using marijuana. Recent admission to [facility] and earlier admission for supratherapeutic INR. Mother managing medications now. Pt under care of Neurology; Neuropsychological testing done and consistent with her MCA CVA.    Today, pt presents as restless, initially tachycardic.  She continues to smoke tobacco and marijuana.  She complains of poor focus/motivation.  Mother feels pt is not depressed       Bipolar 2 Disorder   R/O Bipolar 1 Disorder   Panic Disorder with Agoraphobia   Cognitive Disorder due to CVA  Insomnia Disorder  Cannabis Use Disorder. Moderate   Tobacco Use Disorder  Abnormal involuntary movements     Lupus, migraines, CVA, hx hepatitis, declining renal function     GAF: 55     Intervention/Counseling/Treatment Plan   · Medication Management: The risks and benefits of medication were discussed with the patient.    -  Reduce Rx to Clonazepam 0.5 mg only once daily PRN anxiety for now - advised to us lowest effective dose and try to reduce nightly tablet to just one-half tablet nightly. Discussed risk of decreased RT, fall risk, sedation, addictive potential, and not to mix with alcohol. Plan to taper further next visit.     - Wean Olanzapine to 2.5 mg nightly x 10 days, then stop.  May be too sedating for patient     - Start Abilify 2.5 mg daily x 3 days, then titrate to 5 mg daily.  Typical RAJWINDER's reviewed including weight gain, abnormal movements, EPS, TD, metabolic side effects. May give added benefit for focus issues in  addition to use as mood stabilizer    - Try to reduce Trazodone to 25 mg nightly for now due to oversedation     - Continue psychotherapy with Monique Deleon LCSW    - refrain from cannabis use - psychoeducation today about risks of use, pt counseled today on health risks associated with use. She declines resources for rehab     - pt counseled on smoking cessation and risks for further CVA    - Pt instructed to go to ER with thoughts of harming self, others; Call to report any worsening of symptoms or problems with the medication    - follow up with Neurology 5/23/17     - [facility] - pt's boyfriend. Santy Andrade on chart .       Return to Clinic: 3 weeks

## 2017-04-28 NOTE — MR AVS SNAPSHOT
[facility]  [address]  [place] [address]  Phone: [phone]                  Cat Denson   2017 9:30 AM   Office Visit    Description:  Female : 1980   Provider:  Sakina Cruz MD   Department:  [facility]           Diagnoses this Visit        Comments    Bipolar 2 disorder         Primary insomnia                To Do List           Future Appointments        Provider Department Dept Phone    2017 9:00 AM Moniqueevangelista Deleon LCSW [place] [facility] [phone]    2017 11:40 AM Orestes Ribeiro DO [facility] [phone]    2017 11:00 AM [facility] [phone]    2017 11:20 AM [facility] [phone]    2017 10:30 AM Rafaela Benoit MD [place] [facility] [phone]      Goals (5 Years of Data)     None      Follow-Up and Disposition     Return for 3 weeks .       These Medications        Disp Refills Start End    clonazePAM (KLONOPIN) 0.5 MG tablet 30 tablet 2 2017     Take one tablet PO nightly PRN anxiety    Pharmacy: [facility] [address] [place] [address] [address] AT [facility] & [place] [address] Ph #: [phone]       aripiprazole (ABILIFY) 5 MG Tab 30 tablet 1 2017     Take one-half tablet PO daily x 3 days, then increase to one tablet PO daily    Pharmacy: [facility] [address] [place], [place] [address] [address] AT [facility] & [facility] [address] Ph #: [phone]       olanzapine (ZYPREXA) 5 MG tablet 1 tablet 0 2017     Take one-half tablet nightly x 10 days, then STOP    Pharmacy: [facility] [address] [place] [address] [address] AT [facility] & [facility] [address] Ph #: [phone]       trazodone (DESYREL) 50 MG tablet 30 tablet 0 2017     Take one-half to one tablet PO nightly PRN insomnia    Pharmacy: [facility] [address] [facility] [address]  [address] #: [phone]         [facility]     [facility] Care [facility] - 24/7 Assistance  Unless otherwise directed by your provider, please contact [facility], our nurse care line that is available for 24/7 assistance.     Registered nurses in the [facility] provide: appointment scheduling, clinical advisement, health education, and other advisory services.  Call: [phone] (toll free)               Medications           Message regarding Medications     Verify the changes and/or additions to your medication regime listed below are the same as discussed with your clinician today.  If any of these changes or additions are incorrect, please notify your healthcare provider.        START taking these NEW medications        Refills    aripiprazole (ABILIFY) 5 MG Tab 1    Sig: Take one-half tablet PO daily x 3 days, then increase to one tablet PO daily    Class: Normal      CHANGE how you are taking these medications     Start Taking Instead of    clonazePAM (KLONOPIN) 0.5 MG tablet clonazePAM (KLONOPIN) 0.5 MG tablet    Dosage:  Take one tablet PO nightly PRN anxiety Dosage:  Take one tablet PO BID PRN anxiety    Reason for Change:  Reorder     olanzapine (ZYPREXA) 5 MG tablet olanzapine (ZYPREXA) 5 MG tablet    Dosage:  Take one-half tablet nightly x 10 days, then STOP Dosage:  Take one tablet PO nightly    Reason for Change:  Reorder            Verify that the below list of medications is an accurate representation of the medications you are currently taking.  If none reported, the list may be blank. If incorrect, please contact your healthcare provider. Carry this list with you in case of emergency.           Current Medications     acetaminophen (TYLENOL) 500 MG tablet Take 1 tablet (500 mg total) by mouth every 6 (six) hours as needed for Pain.    baclofen (LIORESAL) 10 MG tablet Take one to two tablet before bedtime for muscle spasms and cramps as needed     "chlorhexidine (PERIDEX) 0.12 % solution RINSE WITH 1/2 OZ BID AFTER BRUSHING AND FLOSSING    clonazePAM (KLONOPIN) 0.5 MG tablet Take one tablet PO nightly PRN anxiety    gabapentin (NEURONTIN) 800 MG tablet Take 1 tablet (800 mg total) by mouth 2 (two) times daily.    hydroxychloroquine (PLAQUENIL) 200 mg tablet Take 1 tablet (200 mg total) by mouth 2 (two) times daily.    loratadine (CLARITIN) 10 mg tablet Take 10 mg by mouth once daily.    olanzapine (ZYPREXA) 5 MG tablet Take one-half tablet nightly x 10 days, then STOP    omeprazole (PRILOSEC) 40 MG capsule TAKE 1 CAPSULE(40 MG) BY MOUTH EVERY MORNING    PREVIDENT 5000 BOOSTER PLUS 1.1 % Pste BRUSH ON NIGHTLY AFTER NORMAL HYGIENE REGIMAN. SPIT OUT EXCESS DO NOT RINSE    promethazine (PHENERGAN) 25 MG tablet Take 1 tablet (25 mg total) by mouth every 4 (four) hours.    ranitidine (ZANTAC) 300 MG tablet Take 1 tablet (300 mg total) by mouth once daily.    rivaroxaban (XARELTO) 20 mg Tab Take 1 tablet (20 mg total) by mouth daily with dinner or evening meal.    tamsulosin (FLOMAX) 0.4 mg Cp24 Take 1 capsule (0.4 mg total) by mouth once daily.    trazodone (DESYREL) 50 MG tablet Take one-half to one tablet PO nightly PRN insomnia    WELCHOL 625 mg tablet TAKE 1 TABLET BY MOUTH EVERY DAY INCREASE BY ONE PILL EVERY 3 TO 4 DAYS UNTIL DESIRED CONTROL IS REACHED    aripiprazole (ABILIFY) 5 MG Tab Take one-half tablet PO daily x 3 days, then increase to one tablet PO daily    meloxicam (MOBIC) 15 MG tablet TAKE 1 TABLET BY MOUTH EVERY DAY AS NEEDED FOR PAIN    metoprolol succinate (TOPROL-XL) 25 MG 24 hr tablet Take 1 tablet (25 mg total) by mouth once daily.           Clinical Reference Information           Your Vitals Were     BP Pulse Height Weight BMI    134/76 100 5' 3" (1.6 m) 45 kg (99 lb 3.3 oz) 17.57 kg/m2      Blood Pressure          Most Recent Value    BP  134/76      Allergies as of 4/28/2017     Amoxil [Amoxicillin]    Augmentin [Amoxicillin-pot " Clavulanate]    Avelox [Moxifloxacin]      Immunizations Administered on Date of Encounter - 4/28/2017     None      Language Assistance Services     ATTENTION: Language assistance services are available, free of charge. Please call [phone].      ATENCIÓN: Si habla myron, tiene a couch disposición servicios gratuitos de asistencia lingüística. Llame al [phone].     CHÚ Ý: N?u b?n nói Ti?ng Vi?t, có các d?ch v? h? tr? ngôn ng? mi?n phí dành cho b?n. G?i s? [phone].         [facility] complies with applicable Federal civil rights laws and does not discriminate on the basis of race, color, national origin, age, disability, or sex.

## 2017-04-30 NOTE — PROGRESS NOTES
Individual Psychotherapy (PhD/LCSW)    4/4/2017    Site:  [facility]         Therapeutic Intervention: Met with patient.  Outpatient - Insight oriented psychotherapy 45 min - CPT code 20951, Outpatient - Behavior modifying psychotherapy 45 min - CPT code 13550 and Outpatient - Supportive psychotherapy 45 min - CPT Code 18032    Chief complaint/reason for encounter: depression, anxiety, sleep, interpersonal and sexual problems     Interval history and content of current session:  Patient presented for follow up session.  Patient was admitted to a psychiatric facility twice in the last 2 months.  Regarding her hospitalizations, she said that her medications got all mixed up in the way that she was taking them and this caused strange behavior.  Patient stated that her mother is now in charge of administering her medication.  Patient stated that following her deliruim, she didn't feel like herself at all.  This has gotten better and she feels more like herself now, although she has lost most memories of 2016.  She appeared animated in speech and talkative.  Patient stated that she plays video games or watches television for most of the day.   discussed the need to get out of the house and exercise.  We discussed her weight loss and she stated that she has lost 15 pounds since June.  She stated that she usually wakes up nauseous and ends up vomiting once or twice a day.  She believes that this is due to sinus drainage upsetting her stomach.  Patient stated that she is not having any problems at home with her mother or her brother.  She is not experiencing any depression or anxiety.  Her relationship with her boyfriend is stable.  He is still living with her.  She discussed a sexual problem that they are having, the contributing barriers, and how she feels about this.      Treatment plan:  · Target symptoms: depression, distractability, lack of focus, anxiety   · Why chosen therapy is appropriate versus another  modality: relevant to diagnosis  · Outcome monitoring methods: self-report, observation, feedback from family  · Therapeutic intervention type: insight oriented psychotherapy, behavior modifying psychotherapy, supportive psychotherapy    Risk parameters:  Patient reports no suicidal ideation  Patient reports no homicidal ideation  Patient reports no self-injurious behavior  Patient reports no violent behavior    Verbal deficits: None    Patient's response to intervention:  The patient's response to intervention is accepting.    Progress toward goals and other mental status changes:  The patient's progress toward goals is limited.    Diagnosis:     ICD-10-CM ICD-9-CM   1. Bipolar 2 disorder F31.81 296.89   2. Cognitive disorder F09 294.9   3. Panic disorder with agoraphobia F40.01 300.21       Plan:  individual psychotherapy and consult psychiatrist for medication evaluation    Return to clinic: Return in about 4 weeks (around 5/4/2017).    Length of Service (minutes): 45

## 2017-05-04 ENCOUNTER — OFFICE VISIT (OUTPATIENT)
Dept: PSYCHIATRY | Facility: CLINIC | Age: 37
End: 2017-05-04
Payer: COMMERCIAL

## 2017-05-04 DIAGNOSIS — F09 COGNITIVE DISORDER: ICD-10-CM

## 2017-05-04 DIAGNOSIS — F31.81 BIPOLAR 2 DISORDER: Primary | ICD-10-CM

## 2017-05-04 DIAGNOSIS — F40.01 PANIC DISORDER WITH AGORAPHOBIA: ICD-10-CM

## 2017-05-04 PROCEDURE — 90834 PSYTX W PT 45 MINUTES: CPT | Mod: S$GLB,,, | Performed by: SOCIAL WORKER

## 2017-05-04 PROCEDURE — 99999 PR PBB SHADOW E&M-EST. PATIENT-LVL III: CPT | Mod: PBBFAC,,, | Performed by: SOCIAL WORKER

## 2017-05-04 PROCEDURE — 99499 UNLISTED E&M SERVICE: CPT | Mod: S$GLB,,, | Performed by: SOCIAL WORKER

## 2017-05-05 ENCOUNTER — TELEPHONE (OUTPATIENT)
Dept: FAMILY MEDICINE | Facility: CLINIC | Age: 37
End: 2017-05-05

## 2017-05-05 NOTE — TELEPHONE ENCOUNTER
----- Message from Christi Rollins sent at 5/4/2017 11:50 AM CDT -----  Contact: Elma redd/Fadia [phone]  Please call her to verify the directions for the Welchol.  Thank you!

## 2017-05-09 RX ORDER — GABAPENTIN 800 MG/1
TABLET ORAL
Qty: 60 TABLET | Refills: 0 | Status: SHIPPED | OUTPATIENT
Start: 2017-05-09 | End: 2017-05-10 | Stop reason: SDUPTHER

## 2017-05-09 RX ORDER — OLANZAPINE 5 MG/1
TABLET ORAL
Qty: 30 TABLET | Refills: 0 | OUTPATIENT
Start: 2017-05-09

## 2017-05-10 RX ORDER — GABAPENTIN 800 MG/1
TABLET ORAL
Qty: 60 TABLET | Refills: 4 | Status: SHIPPED | OUTPATIENT
Start: 2017-05-10 | End: 2017-11-14 | Stop reason: SDUPTHER

## 2017-05-10 NOTE — TELEPHONE ENCOUNTER
----- Message from Camelia Sanderson sent at 5/9/2017 12:30 PM CDT -----  Contact: Arabella  Mom called regarding refill of medications for the patient. Please contact [phone] (home)     gabapentin 800 MG tablet  XARELTO 20 mg [facility] [address]  [place] - [address] AT [facility] & [facility] [address]  [address] [place] [address]  Phone: [phone] Fax: [phone]

## 2017-05-12 RX ORDER — MELOXICAM 15 MG/1
TABLET ORAL
Qty: 30 TABLET | Refills: 0 | Status: SHIPPED | OUTPATIENT
Start: 2017-05-12 | End: 2017-06-05 | Stop reason: SDUPTHER

## 2017-05-17 ENCOUNTER — PATIENT MESSAGE (OUTPATIENT)
Dept: FAMILY MEDICINE | Facility: CLINIC | Age: 37
End: 2017-05-17

## 2017-05-18 ENCOUNTER — OFFICE VISIT (OUTPATIENT)
Dept: NEUROLOGY | Facility: CLINIC | Age: 37
End: 2017-05-18
Payer: MEDICARE

## 2017-05-18 VITALS
WEIGHT: 93.81 LBS | BODY MASS INDEX: 16.62 KG/M2 | RESPIRATION RATE: 20 BRPM | DIASTOLIC BLOOD PRESSURE: 77 MMHG | SYSTOLIC BLOOD PRESSURE: 112 MMHG | HEART RATE: 86 BPM | HEIGHT: 63 IN

## 2017-05-18 DIAGNOSIS — M32.9 SLE (SYSTEMIC LUPUS ERYTHEMATOSUS RELATED SYNDROME): ICD-10-CM

## 2017-05-18 DIAGNOSIS — I63.411 EMBOLIC STROKE INVOLVING RIGHT MIDDLE CEREBRAL ARTERY: Primary | ICD-10-CM

## 2017-05-18 DIAGNOSIS — E55.9 VITAMIN D DEFICIENCY: ICD-10-CM

## 2017-05-18 DIAGNOSIS — R11.2 INTRACTABLE VOMITING WITH NAUSEA, UNSPECIFIED VOMITING TYPE: ICD-10-CM

## 2017-05-18 DIAGNOSIS — R63.4 WEIGHT LOSS, NON-INTENTIONAL: ICD-10-CM

## 2017-05-18 DIAGNOSIS — E51.9 THIAMINE DEFICIENCY: ICD-10-CM

## 2017-05-18 PROCEDURE — 99214 OFFICE O/P EST MOD 30 MIN: CPT | Mod: S$GLB,,, | Performed by: PSYCHIATRY & NEUROLOGY

## 2017-05-18 PROCEDURE — 99999 PR PBB SHADOW E&M-EST. PATIENT-LVL V: CPT | Mod: PBBFAC,,, | Performed by: PSYCHIATRY & NEUROLOGY

## 2017-05-18 PROCEDURE — 99499 UNLISTED E&M SERVICE: CPT | Mod: S$GLB,,, | Performed by: PSYCHIATRY & NEUROLOGY

## 2017-05-18 RX ORDER — ONDANSETRON 4 MG/1
4 TABLET, ORALLY DISINTEGRATING ORAL EVERY 6 HOURS PRN
Qty: 60 TABLET | Refills: 3 | Status: SHIPPED | OUTPATIENT
Start: 2017-05-18 | End: 2017-10-03 | Stop reason: SDUPTHER

## 2017-05-18 NOTE — TELEPHONE ENCOUNTER
Patient mother is requesting something else other than phenergan , she is vomiting everyday , so it is not working. Please advise

## 2017-05-18 NOTE — MR AVS SNAPSHOT
[facility]  [address] [facility] [address] [place] [address]  Phone: [phone]  Fax: [phone]                  Cat Denson   2017 11:40 AM   Office Visit    Description:  Female : 1980   Provider:  Orestes Ribeiro DO   Department:  [facility]           Reason for Visit     Stroke           Diagnoses this Visit        Comments    Embolic stroke involving right middle cerebral artery    -  Primary     SLE (systemic lupus erythematosus related syndrome)         Intractable vomiting with nausea, unspecified vomiting type         Thiamine deficiency         Weight loss, non-intentional         Vitamin D deficiency                To Do List           Future Appointments        Provider Department Dept Phone    2017 11:00 AM [facility] [phone]    2017 11:20 AM [facility] [phone]    2017 10:30 AM Rafaela Benoit MD [place] [facility] [phone]    2017 11:00 AM Orestes Ribeiro DO [facility] [phone]    2017 9:20 AM Dayton Dukes MD [place] [facility] [phone]      Goals (5 Years of Data)     None      Follow-Up and Disposition     Return in about 3 months (around 2017).      [facility]     [facility] Care [facility] - / Assistance  Unless otherwise directed by your provider, please contact [facility], our nurse care line that is available for  assistance.     Registered nurses in the [facility] provide: appointment scheduling, clinical advisement, health education, and other advisory services.  Call: [phone] (toll free)               Medications           Message regarding Medications     Verify the changes and/or additions to your medication regime listed below are the same as discussed with your clinician today.  If any of these changes or additions are incorrect, please  notify your healthcare provider.             Verify that the below list of medications is an accurate representation of the medications you are currently taking.  If none reported, the list may be blank. If incorrect, please contact your healthcare provider. Carry this list with you in case of emergency.           Current Medications     acetaminophen (TYLENOL) 500 MG tablet Take 1 tablet (500 mg total) by mouth every 6 (six) hours as needed for Pain.    aripiprazole (ABILIFY) 5 MG Tab Take one-half tablet PO daily x 3 days, then increase to one tablet PO daily    baclofen (LIORESAL) 10 MG tablet Take one to two tablet before bedtime for muscle spasms and cramps as needed    chlorhexidine (PERIDEX) 0.12 % solution RINSE WITH 1/2 OZ BID AFTER BRUSHING AND FLOSSING    clonazePAM (KLONOPIN) 0.5 MG tablet Take one tablet PO nightly PRN anxiety    gabapentin (NEURONTIN) 800 MG tablet TAKE 1 TABLET(800 MG) BY MOUTH TWICE DAILY    hydroxychloroquine (PLAQUENIL) 200 mg tablet Take 1 tablet (200 mg total) by mouth 2 (two) times daily.    loratadine (CLARITIN) 10 mg tablet Take 10 mg by mouth once daily.    meloxicam (MOBIC) 15 MG tablet TAKE 1 TABLET BY MOUTH EVERY DAY WITH FOOD AS NEEDED FOR PAIN    olanzapine (ZYPREXA) 5 MG tablet Take one-half tablet nightly x 10 days, then STOP    omeprazole (PRILOSEC) 40 MG capsule TAKE 1 CAPSULE(40 MG) BY MOUTH EVERY MORNING    PREVIDENT 5000 BOOSTER PLUS 1.1 % Pste BRUSH ON NIGHTLY AFTER NORMAL HYGIENE REGIMAN. SPIT OUT EXCESS DO NOT RINSE    promethazine (PHENERGAN) 25 MG tablet Take 1 tablet (25 mg total) by mouth every 4 (four) hours.    ranitidine (ZANTAC) 300 MG tablet TAKE 1 TABLET(300 MG) BY MOUTH EVERY DAY    rivaroxaban (XARELTO) 20 mg Tab Take 1 tablet (20 mg total) by mouth daily with dinner or evening meal.    tamsulosin (FLOMAX) 0.4 mg Cp24 Take 1 capsule (0.4 mg total) by mouth once daily.    trazodone (DESYREL) 50 MG tablet Take one-half to one tablet PO nightly PRN  "insomnia    WELCHOL 625 mg tablet TAKE 1 TABLET BY MOUTH EVERY DAY INCREASE BY ONE PILL EVERY 3 TO 4 DAYS UNTIL DESIRED CONTROL IS REACHED    metoprolol succinate (TOPROL-XL) 25 MG 24 hr tablet Take 1 tablet (25 mg total) by mouth once daily.           Clinical Reference Information           Your Vitals Were     BP Pulse Resp Height Weight BMI    112/77 86 20 5' 3" (1.6 m) 42.5 kg (93 lb 12.9 oz) 16.62 kg/m2      Blood Pressure          Most Recent Value    BP  112/77      Allergies as of 5/18/2017     Amoxil [Amoxicillin]    Augmentin [Amoxicillin-pot Clavulanate]    Avelox [Moxifloxacin]      Immunizations Administered on Date of Encounter - 5/18/2017     None      Orders Placed During Today's Visit      Normal Orders This Visit    Ambulatory consult to Nutrition Services     Future Labs/Procedures Expected by Expires    VITAMIN D  5/18/2017 7/17/2018      Instructions    I want you to start taking a over the counter thiamine supplement - start with 100 mg one pill three times a day for the next week, then drop it down to once a day.  Can crush this and take with applesauce if you want.      I would also like you to get in with a nutritionist / dietician to make the most of whatever calories you are getting, and to make sure you are not deficient in other nutrients.     Take an over the counter vitamin D3 supplement, I would recommend 5000 units a day, if this needs to be changed i can let you know after the lab studies are done       Language Assistance Services     ATTENTION: Language assistance services are available, free of charge. Please call [phone].      ATENCIÓN: Si habla davidañol, tiene a couch disposición servicios gratuitos de asistencia lingüística. Llame al [phone].     [facility] Ý: N?u b?n nói Ti?ng Vi?t, có các d?ch v? h? tr? ngôn ng? mi?n phí dành cho b?n. G?i s? [phone].         [facility] complies with applicable Federal civil rights laws and does not discriminate on the " basis of race, color, national origin, age, disability, or sex.

## 2017-05-18 NOTE — PROGRESS NOTES
Subjective:         Patient ID: Cat Denson is a 37 y.o.  female who presents for follow up of prior embolic right MCA stroke, antiphospholipid antibody syndrome and lupus    History of Present Illness    Since we initially met in the office February 22, 2017, I saw Cat in the hospital in March 2017 when she presented with bizarre behaviors,  These resolved spontaneously after 48 hrs. Repeat MRI did not show any new stroke.  LP was negative.     Reviewed records from hospital re-admission 3/12/17; has been experiencing significant nausea and vomiting, had been inducing vomiting initially. Was admitted to psychiatric hospital for a week, had medication changes but says she still feels nausea all the time. Low back and leg pain, worse with activity.  Not very active, in bed a lot.     Reviewed records from psychiatry, Dr. Cruz.      Review of patient's allergies indicates:   Allergen Reactions    Amoxil [amoxicillin]      hives    Augmentin [amoxicillin-pot clavulanate]     Avelox [moxifloxacin]      itching     Current Outpatient Prescriptions   Medication Sig Dispense Refill    acetaminophen (TYLENOL) 500 MG tablet Take 1 tablet (500 mg total) by mouth every 6 (six) hours as needed for Pain.  0    chlorhexidine (PERIDEX) 0.12 % solution RINSE WITH 1/2 OZ BID AFTER BRUSHING AND FLOSSING  6    gabapentin (NEURONTIN) 800 MG tablet TAKE 1 TABLET(800 MG) BY MOUTH TWICE DAILY 60 tablet 4    hydroxychloroquine (PLAQUENIL) 200 mg tablet Take 1 tablet (200 mg total) by mouth 2 (two) times daily. 30 tablet 2    loratadine (CLARITIN) 10 mg tablet Take 10 mg by mouth once daily.      meloxicam (MOBIC) 15 MG tablet TAKE 1 TABLET BY MOUTH EVERY DAY WITH FOOD AS NEEDED FOR PAIN 30 tablet 0    omeprazole (PRILOSEC) 40 MG capsule TAKE 1 CAPSULE(40 MG) BY MOUTH EVERY MORNING 30 capsule 5    PREVIDENT 5000 BOOSTER PLUS 1.1 % Pste BRUSH ON NIGHTLY AFTER NORMAL HYGIENE REGIMAN. SPIT OUT EXCESS DO NOT RINSE  4     promethazine (PHENERGAN) 25 MG tablet Take 1 tablet (25 mg total) by mouth every 4 (four) hours. 60 tablet 5    rivaroxaban (XARELTO) 20 mg Tab Take 1 tablet (20 mg total) by mouth daily with dinner or evening meal. 30 tablet 5    tamsulosin (FLOMAX) 0.4 mg Cp24 Take 1 capsule (0.4 mg total) by mouth once daily. 30 capsule 3    WELCHOL 625 mg tablet TAKE 1 TABLET BY MOUTH EVERY DAY INCREASE BY ONE PILL EVERY 3 TO 4 DAYS UNTIL DESIRED CONTROL IS REACHED 180 tablet 0    aripiprazole (ABILIFY) 5 MG Tab Take one and one-half tablets PO nightly 45 tablet 1    baclofen (LIORESAL) 10 MG tablet TAKE 1 TO 2 TABLETS BY MOUTH EVERY NIGHT AT BEDTIME FOR MUSCLE SPASMS AND CRAMPS AS NEEDED 60 tablet 5    clonazePAM (KLONOPIN) 0.5 MG tablet Take one-half to one tablet PO nightly PRN anxiety 30 tablet 1    metoprolol succinate (TOPROL-XL) 25 MG 24 hr tablet Take 1 tablet (25 mg total) by mouth once daily. 30 tablet 5    ondansetron (ZOFRAN-ODT) 4 MG TbDL Take 1 tablet (4 mg total) by mouth every 6 (six) hours as needed. 60 tablet 3    ranitidine (ZANTAC) 300 MG tablet TAKE 1 TABLET(300 MG) BY MOUTH EVERY DAY 30 tablet 0    trazodone (DESYREL) 50 MG tablet Take one-half to one tablet PO nightly PRN insomnia 30 tablet 1     No current facility-administered medications for this visit.          Review of Systems  Review of Systems    Objective:        Vitals:    05/18/17 1159   BP: 112/77   Pulse: 86   Resp: 20     Body mass index is 16.62 kg/m².  Neurologic Exam     Mental Status   Oriented to person, place, and time.   Follows 3 step commands.   Attention: decreased to left to two modalities. Concentration: normal.   Speech: speech is normal (Soft spoken slow jeff, no dysarthria)  Level of consciousness: alert  Knowledge: good.   Able to name object. Able to repeat. Normal comprehension.     Cranial Nerves     CN II   Visual acuity: (incomplete left hemianopsia)    CN III, IV, VI   Pupils are equal, round, and  reactive to light.  Extraocular motions are normal.   Right pupil: Shape: regular.   Left pupil: Shape: regular.   CN III: no CN III palsy  CN VI: no CN VI palsy  Nystagmus: none   Diplopia: none  Ophthalmoparesis: none    CN V   Facial sensation intact.     CN VII   Right facial weakness: none  Left facial weakness: subtle flattening of left nasolabial fold, subtle perioral asymmetry L upper lip.    CN VIII   Hearing: intact    CN IX, X   Palate: symmetric    CN XI   CN XI normal.     CN XII   CN XII normal.     Motor Exam   Muscle bulk: normal  Overall muscle tone: normal  Right arm tone: normal  Left arm tone: spastic (very mildly)  Right arm pronator drift: absent  Left arm pronator drift: present  Right leg tone: normal  Left leg tone: spastic (mild to confrontation)    Strength   Strength 5/5 except as noted.   Left wrist extension: 4/5  Left anterior tibial: 4/5Slight weakness, 4+ with left wrist and finger extensors,  4/5 left ankle dorsiflexion    Minimal spasticity on the left to passive movement, however with ambulation holds left arm flexed at the wrist and elbow, adducts left leg     Sensory Exam   Light touch normal.   Right arm proprioception: normal  Right leg proprioception: normal  Pinprick normal.   Diminished proprioceptive sense left arm and leg, sensory neglect intermittently to 2x simultaneous stimuli     Gait, Coordination, and Reflexes     Gait  Gait: spastic (with ambulation holds left arm flexed at the wrist and elbow, adducts left leg)    Coordination   Finger to nose coordination: abnormal (on the left)    Tremor   Resting tremor: absent  Intention tremor: present (LUE)    Reflexes   Right brachioradialis: 1+  Left brachioradialis: 1+  Right biceps: 1+  Left biceps: 1+  Right triceps: 1+  Left triceps: 1+  Right patellar: 1+  Left patellar: 1+  Right achilles: 1+  Left achilles: 1+Mild intention tremor LUE, slow F-N       Physical Exam   Constitutional: She is oriented to person, place,  and time.   Eyes: EOM are normal. Pupils are equal, round, and reactive to light.   Neurological: She is oriented to person, place, and time. She has an abnormal Finger-Nose-Finger Test (on the left).   Reflex Scores:       Tricep reflexes are 1+ on the right side and 1+ on the left side.       Bicep reflexes are 1+ on the right side and 1+ on the left side.       Brachioradialis reflexes are 1+ on the right side and 1+ on the left side.       Patellar reflexes are 1+ on the right side and 1+ on the left side.       Achilles reflexes are 1+ on the right side and 1+ on the left side.  Psychiatric: Her speech is normal.         Data Review:  Results for MARA LYNCH (MRN [id number]) as of 5/18/2017 12:24   Ref. Range 3/2/2017 20:02   Thiamine Latest Ref Range: 38 - 122 ug/L 32 (L)   Results for MARA LYNCH (MRN [id number]) as of 5/18/2017 12:24   Ref. Range 3/3/2017 11:46   Color, CSF Latest Ref Range: Colorless  Colorless   Heme Aliquot Latest Units: mL 12.0   Appearance, CSF Latest Ref Range: Clear  Clear   WBC, CSF Latest Ref Range: 0 - 5 /cu mm 0   RBC, CSF Latest Ref Range: 0 /cu mm 0   Segmented Neutrophils, CSF Latest Ref Range: 0 - 6 % 0   Lymphs, CSF Latest Ref Range: 40 - 80 % 0 (L)   Mono/Macrophage, CSF Latest Ref Range: 15 - 45 % 0 (L)   Eosinophils, CSF Latest Units: % 0   Baso, CSF Latest Units: % 0   Glucose, CSF Latest Ref Range: 40 - 70 mg/dL 45   Protein, CSF Latest Ref Range: 15 - 40 mg/dL 28   West Nile Virus Source Unknown CSF   West Nile Virus Result Latest Ref Range: Negative  Negative     Assessment:        1. Embolic stroke involving right middle cerebral artery    2. SLE (systemic lupus erythematosus related syndrome)    3. Intractable vomiting with nausea, unspecified vomiting type    4. Thiamine deficiency    5. Weight loss, non-intentional    6. Vitamin D deficiency            Plan:         Problem List Items Addressed This Visit        Neuro    Embolic stroke involving  right middle cerebral artery - Primary      Other Visit Diagnoses     SLE (systemic lupus erythematosus related syndrome)        Intractable vomiting with nausea, unspecified vomiting type        Relevant Orders    Ambulatory consult to Nutrition Services    Thiamine deficiency        Weight loss, non-intentional        Relevant Orders    Ambulatory consult to Nutrition Services    Vitamin D deficiency        Relevant Orders    VITAMIN D (Completed)        I want you to start taking a over the counter thiamine supplement - start with 100 mg one pill three times a day for the next week, then drop it down to once a day.  Can crush this and take with applesauce if you want.      I would also like you to get in with a nutritionist / dietician to make the most of whatever calories you are getting, and to make sure you are not deficient in other nutrients.     Take an over the counter vitamin D3 supplement, I would recommend 5000 units a day, if this needs to be changed i can let you know after the lab studies are done    Return in about 3 months (around 8/18/2017).       Thank you very much for the opportunity to assist in this patient's care.  If you have any questions or concerns, please do not hesitate to contact me at any time.     Sincerely,  Orestes Ribeiro, DO

## 2017-05-18 NOTE — PATIENT INSTRUCTIONS
I want you to start taking a over the counter thiamine supplement - start with 100 mg one pill three times a day for the next week, then drop it down to once a day.  Can crush this and take with applesauce if you want.      I would also like you to get in with a nutritionist / dietician to make the most of whatever calories you are getting, and to make sure you are not deficient in other nutrients.     Take an over the counter vitamin D3 supplement, I would recommend 5000 units a day, if this needs to be changed i can let you know after the lab studies are done

## 2017-05-18 NOTE — TELEPHONE ENCOUNTER
I have sent in Zofran for this pt to take.     Portions of this note were created using Dragon voice recognition software. There may be voice recognition errors found in the text, and attempts were made to correct these errors prior to signature    Dayton Dukes MD    Family Medicine  5/18/2017

## 2017-05-19 ENCOUNTER — TELEPHONE (OUTPATIENT)
Dept: PSYCHIATRY | Facility: CLINIC | Age: 37
End: 2017-05-19

## 2017-05-19 ENCOUNTER — OFFICE VISIT (OUTPATIENT)
Dept: PSYCHIATRY | Facility: CLINIC | Age: 37
End: 2017-05-19
Payer: COMMERCIAL

## 2017-05-19 ENCOUNTER — LAB VISIT (OUTPATIENT)
Dept: LAB | Facility: HOSPITAL | Age: 37
End: 2017-05-19
Attending: PSYCHIATRY & NEUROLOGY
Payer: MEDICARE

## 2017-05-19 VITALS
DIASTOLIC BLOOD PRESSURE: 78 MMHG | BODY MASS INDEX: 16.8 KG/M2 | WEIGHT: 94.81 LBS | SYSTOLIC BLOOD PRESSURE: 112 MMHG | HEIGHT: 63 IN | HEART RATE: 93 BPM

## 2017-05-19 DIAGNOSIS — R63.4 WEIGHT LOSS: ICD-10-CM

## 2017-05-19 DIAGNOSIS — F31.81 BIPOLAR 2 DISORDER: ICD-10-CM

## 2017-05-19 DIAGNOSIS — R11.15 EMESIS, PERSISTENT: ICD-10-CM

## 2017-05-19 DIAGNOSIS — F41.9 ANXIETY: Primary | ICD-10-CM

## 2017-05-19 DIAGNOSIS — E55.9 VITAMIN D DEFICIENCY: ICD-10-CM

## 2017-05-19 LAB — 25(OH)D3+25(OH)D2 SERPL-MCNC: 27 NG/ML

## 2017-05-19 PROCEDURE — 82306 VITAMIN D 25 HYDROXY: CPT

## 2017-05-19 PROCEDURE — 36415 COLL VENOUS BLD VENIPUNCTURE: CPT | Mod: PO

## 2017-05-19 PROCEDURE — 99499 UNLISTED E&M SERVICE: CPT | Mod: S$GLB,,, | Performed by: PSYCHIATRY & NEUROLOGY

## 2017-05-19 PROCEDURE — 1160F RVW MEDS BY RX/DR IN RCRD: CPT | Mod: S$GLB,,, | Performed by: PSYCHIATRY & NEUROLOGY

## 2017-05-19 PROCEDURE — 99999 PR PBB SHADOW E&M-EST. PATIENT-LVL III: CPT | Mod: PBBFAC,,, | Performed by: PSYCHIATRY & NEUROLOGY

## 2017-05-19 PROCEDURE — 99213 OFFICE O/P EST LOW 20 MIN: CPT | Mod: S$GLB,,, | Performed by: PSYCHIATRY & NEUROLOGY

## 2017-05-19 RX ORDER — CLONAZEPAM 0.5 MG/1
TABLET ORAL
Qty: 30 TABLET | Refills: 1 | Status: SHIPPED | OUTPATIENT
Start: 2017-05-19 | End: 2017-06-27 | Stop reason: DRUGHIGH

## 2017-05-19 RX ORDER — ARIPIPRAZOLE 5 MG/1
TABLET ORAL
Qty: 45 TABLET | Refills: 1 | Status: SHIPPED | OUTPATIENT
Start: 2017-05-19 | End: 2017-06-27 | Stop reason: DRUGHIGH

## 2017-05-19 RX ORDER — TRAZODONE HYDROCHLORIDE 50 MG/1
TABLET ORAL
Qty: 30 TABLET | Refills: 1 | Status: SHIPPED | OUTPATIENT
Start: 2017-05-19 | End: 2017-06-27 | Stop reason: ALTCHOICE

## 2017-05-19 NOTE — MR AVS SNAPSHOT
[facility]  [address]  [place] [address]  Phone: [phone]                  Cat Denson   2017 9:00 AM   Office Visit    Description:  Female : 1980   Provider:  Sakina Cruz MD   Department:  [facility]           Diagnoses this Visit        Comments    Emesis, persistent    -  Primary     Bipolar 2 disorder         Weight loss                To Do List           Future Appointments        Provider Department Dept Phone    2017 11:00 AM [facility] [phone]    2017 11:20 AM [facility] [phone]    2017 10:30 AM Rafaela Benoit MD [facility] [phone]    8/10/2017 9:00 AM Monique Deleon [facility] [place] [facility] [phone]    2017 11:00 AM Orestes Ribeiro DO [facility] [phone]      Goals (5 Years of Data)     None      Follow-Up and Disposition     Return for 6 WEEKS .       These Medications        Disp Refills Start End    aripiprazole (ABILIFY) 5 MG Tab 45 tablet 1 2017     Take one and one-half tablets PO nightly    Pharmacy: [facility] [address] [place] [address] [address] AT [facility] [address] Ph #: [phone]       trazodone (DESYREL) 50 MG tablet 30 tablet 1 2017     Take one-half to one tablet PO nightly PRN insomnia    Pharmacy: [facility] [address] [place] [address] [address] AT [facility] [address] Ph #: [phone]       clonazePAM (KLONOPIN) 0.5 MG tablet 30 tablet 1 2017     Take one-half to one tablet PO nightly PRN anxiety    Pharmacy: [facility] [address] [place] [address] [address] AT [facility] [address] Ph #: [phone]         [facility]     [facility] Care [facility] -  Assistance  Unless otherwise directed by your provider, please contact [facility], our nurse care line that is  available for 24/7 assistance.     Registered nurses in the [facility] provide: appointment scheduling, clinical advisement, health education, and other advisory services.  Call: [phone] (toll free)               Medications           Message regarding Medications     Verify the changes and/or additions to your medication regime listed below are the same as discussed with your clinician today.  If any of these changes or additions are incorrect, please notify your healthcare provider.        CHANGE how you are taking these medications     Start Taking Instead of    aripiprazole (ABILIFY) 5 MG Tab aripiprazole (ABILIFY) 5 MG Tab    Dosage:  Take one and one-half tablets PO nightly Dosage:  Take one-half tablet PO daily x 3 days, then increase to one tablet PO daily    Reason for Change:  Reorder     clonazePAM (KLONOPIN) 0.5 MG tablet clonazePAM (KLONOPIN) 0.5 MG tablet    Dosage:  Take one-half to one tablet PO nightly PRN anxiety Dosage:  Take one tablet PO nightly PRN anxiety    Reason for Change:  Reorder       STOP taking these medications     olanzapine (ZYPREXA) 5 MG tablet Take one-half tablet nightly x 10 days, then STOP           Verify that the below list of medications is an accurate representation of the medications you are currently taking.  If none reported, the list may be blank. If incorrect, please contact your healthcare provider. Carry this list with you in case of emergency.           Current Medications     acetaminophen (TYLENOL) 500 MG tablet Take 1 tablet (500 mg total) by mouth every 6 (six) hours as needed for Pain.    aripiprazole (ABILIFY) 5 MG Tab Take one and one-half tablets PO nightly    baclofen (LIORESAL) 10 MG tablet Take one to two tablet before bedtime for muscle spasms and cramps as needed    chlorhexidine (PERIDEX) 0.12 % solution RINSE WITH 1/2 OZ BID AFTER BRUSHING AND FLOSSING    clonazePAM (KLONOPIN) 0.5 MG tablet Take one-half to one tablet PO nightly  "PRN anxiety    gabapentin (NEURONTIN) 800 MG tablet TAKE 1 TABLET(800 MG) BY MOUTH TWICE DAILY    hydroxychloroquine (PLAQUENIL) 200 mg tablet Take 1 tablet (200 mg total) by mouth 2 (two) times daily.    loratadine (CLARITIN) 10 mg tablet Take 10 mg by mouth once daily.    meloxicam (MOBIC) 15 MG tablet TAKE 1 TABLET BY MOUTH EVERY DAY WITH FOOD AS NEEDED FOR PAIN    omeprazole (PRILOSEC) 40 MG capsule TAKE 1 CAPSULE(40 MG) BY MOUTH EVERY MORNING    ondansetron (ZOFRAN-ODT) 4 MG TbDL Take 1 tablet (4 mg total) by mouth every 6 (six) hours as needed.    PREVIDENT 5000 BOOSTER PLUS 1.1 % Pste BRUSH ON NIGHTLY AFTER NORMAL HYGIENE REGIMAN. SPIT OUT EXCESS DO NOT RINSE    promethazine (PHENERGAN) 25 MG tablet Take 1 tablet (25 mg total) by mouth every 4 (four) hours.    ranitidine (ZANTAC) 300 MG tablet TAKE 1 TABLET(300 MG) BY MOUTH EVERY DAY    rivaroxaban (XARELTO) 20 mg Tab Take 1 tablet (20 mg total) by mouth daily with dinner or evening meal.    tamsulosin (FLOMAX) 0.4 mg Cp24 Take 1 capsule (0.4 mg total) by mouth once daily.    trazodone (DESYREL) 50 MG tablet Take one-half to one tablet PO nightly PRN insomnia    WELCHOL 625 mg tablet TAKE 1 TABLET BY MOUTH EVERY DAY INCREASE BY ONE PILL EVERY 3 TO 4 DAYS UNTIL DESIRED CONTROL IS REACHED    metoprolol succinate (TOPROL-XL) 25 MG 24 hr tablet Take 1 tablet (25 mg total) by mouth once daily.           Clinical Reference Information           Your Vitals Were     BP Pulse Height Weight BMI    112/78 93 5' 3" (1.6 m) 43 kg (94 lb 12.8 oz) 16.79 kg/m2      Blood Pressure          Most Recent Value    BP  112/78      Allergies as of 5/19/2017     Amoxil [Amoxicillin]    Augmentin [Amoxicillin-pot Clavulanate]    Avelox [Moxifloxacin]      Immunizations Administered on Date of Encounter - 5/19/2017     None      Orders Placed During Today's Visit      Normal Orders This Visit    Ambulatory Referral to Gastroenterology       Language Assistance Services     ATTENTION: " Language assistance services are available, free of charge. Please call [phone].      ATENCIÓN: Si habla myron, tiene a couch disposición servicios gratuitos de asistencia lingüística. Llame al [phone].     CHÚ Ý: N?u b?n nói Ti?ng Vi?t, có các d?ch v? h? tr? ngôn ng? mi?n phí dành cho b?n. G?i s? [phone].         [facility] complies with applicable Federal civil rights laws and does not discriminate on the basis of race, color, national origin, age, disability, or sex.
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
## 2017-05-21 DIAGNOSIS — M62.838 MUSCLE SPASTICITY: ICD-10-CM

## 2017-05-21 RX ORDER — BACLOFEN 10 MG/1
TABLET ORAL
Qty: 60 TABLET | Refills: 5 | Status: SHIPPED | OUTPATIENT
Start: 2017-05-21 | End: 2017-08-03

## 2017-05-23 ENCOUNTER — PATIENT MESSAGE (OUTPATIENT)
Dept: GASTROENTEROLOGY | Facility: CLINIC | Age: 37
End: 2017-05-23

## 2017-05-24 ENCOUNTER — TELEPHONE (OUTPATIENT)
Dept: PSYCHIATRY | Facility: CLINIC | Age: 37
End: 2017-05-24

## 2017-05-24 NOTE — TELEPHONE ENCOUNTER
----- Message from Sakina Cruz MD sent at 5/23/2017 12:36 PM CDT -----  Please refer pt to [facility] if not done.  Thanks

## 2017-05-25 ENCOUNTER — PATIENT MESSAGE (OUTPATIENT)
Dept: GASTROENTEROLOGY | Facility: CLINIC | Age: 37
End: 2017-05-25

## 2017-05-29 DIAGNOSIS — R00.0 TACHYCARDIA: ICD-10-CM

## 2017-05-29 RX ORDER — METOPROLOL SUCCINATE 25 MG/1
25 TABLET, EXTENDED RELEASE ORAL DAILY
Qty: 30 TABLET | Refills: 5 | Status: SHIPPED | OUTPATIENT
Start: 2017-05-29 | End: 2017-10-03 | Stop reason: SDUPTHER
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
## 2017-06-05 RX ORDER — MELOXICAM 15 MG/1
TABLET ORAL
Qty: 30 TABLET | Refills: 0 | Status: SHIPPED | OUTPATIENT
Start: 2017-06-05 | End: 2017-07-05 | Stop reason: SDUPTHER

## 2017-06-06 ENCOUNTER — OFFICE VISIT (OUTPATIENT)
Dept: GASTROENTEROLOGY | Facility: CLINIC | Age: 37
End: 2017-06-06
Payer: MEDICARE

## 2017-06-06 VITALS
WEIGHT: 92.81 LBS | HEART RATE: 89 BPM | BODY MASS INDEX: 16.45 KG/M2 | HEIGHT: 63 IN | DIASTOLIC BLOOD PRESSURE: 55 MMHG | SYSTOLIC BLOOD PRESSURE: 81 MMHG | RESPIRATION RATE: 20 BRPM

## 2017-06-06 DIAGNOSIS — K29.50 CHRONIC GASTRITIS WITHOUT BLEEDING, UNSPECIFIED GASTRITIS TYPE: ICD-10-CM

## 2017-06-06 DIAGNOSIS — R63.4 WEIGHT LOSS: ICD-10-CM

## 2017-06-06 DIAGNOSIS — R93.89 ABNORMAL FINDING ON CT SCAN: ICD-10-CM

## 2017-06-06 DIAGNOSIS — K31.84 GASTROPARESIS: ICD-10-CM

## 2017-06-06 DIAGNOSIS — I95.9 HYPOTENSION, UNSPECIFIED HYPOTENSION TYPE: ICD-10-CM

## 2017-06-06 DIAGNOSIS — R11.15 PERSISTENT VOMITING: Primary | ICD-10-CM

## 2017-06-06 PROCEDURE — 99999 PR PBB SHADOW E&M-EST. PATIENT-LVL V: CPT | Mod: PBBFAC,,, | Performed by: NURSE PRACTITIONER

## 2017-06-06 PROCEDURE — 99214 OFFICE O/P EST MOD 30 MIN: CPT | Mod: S$GLB,,, | Performed by: NURSE PRACTITIONER

## 2017-06-06 NOTE — LETTER
June 8, 2017        Sakina Cruz MD  [address]  [place] [address]             [place] [facility] [place] [facility]  [address]  [place] [address]  Phone: [phone]   Patient: Cat Denson   MR Number: [id number]   YOB: 1980   Date of Visit: 6/6/2017       Dear Dr. Cruz:    Thank you for referring Cat Denson to me for evaluation. Below are the relevant portions of my assessment and plan of care.        1. Persistent vomiting    2. Chronic gastritis without bleeding, unspecified gastritis type    3. Weight loss    4. Gastroparesis    5. Abnormal finding on CT scan    6. Hypotension, unspecified hypotension type          Persistent vomiting  -     NM Gastric Emptying; Future; Expected date: 06/06/2017  -     CT Abdomen Pelvis  Without Contrast; Future; Expected date: 06/06/2017  -     Pregnancy, urine rapid; Future; Expected date: 06/06/2017  - continue zofran or phenergan prn as directed; advised not to take together; patient and mother verbalized understanding    Chronic gastritis without bleeding, unspecified gastritis type  - continue prilosec 40 mg once daily as directed, take in the morning before breakfast, discussed about possible long term use of medication (prefer to use lowest effective dose or discontinuing if possible) and discussed the risks & benefits with taking a reflux medication long term, and to take OTC calcium and vitamin d supplements as directed (such as Citracal +D), pt verbalized understanding  - continue zantac 300 mg once daily as directed  -discussed about the different types of medications used to treat reflux and how to use them, antacids can be used PRN for breakthrough heartburn symptoms by reducing stomach acid that is already produced, H2 blockers (zantac) work by limiting the amount acid production, & PPI's work to block acid production and are taken daily, patient verbalized understanding.  -Educated patient on  lifestyle modifications to help control/reduce reflux/abdominal pain including: avoid large meals, avoid eating within 2-3 hours of bedtime (avoid late night eating & lying down soon after eating), elevate head of bed if nocturnal symptoms are present, smoking cessation (if current smoker), & weight loss (if overweight).   -Educated to avoid known foods which trigger reflux symptoms & to minimize/avoid high-fat foods, chocolate, caffeine, citrus, alcohol, & tomato products.  -Advised to avoid/limit use of NSAID's (such as Mobic), since they can cause GI upset, bleeding, and/or ulcers. If needed, take with food.    Weight loss  - encouraged PO intake and daily calorie counts to ensure adequate nutrition is taken in, recommend at least 1,800-2,000 calories a day  - recommend nutritional drinks, such as Boost, Ensure or Glucerna, to supplement nutrition needs    Gastroparesis  -     NM Gastric Emptying; Future; Expected date: 06/06/2017  -     CT Abdomen Pelvis  Without Contrast; Future; Expected date: 06/06/2017  -     Pregnancy, urine rapid; Future; Expected date: 06/06/2017  Discussed diagnosis and possibly etiologies and that it can be idiopathetic, may also improve over time or can be lifelong, reviewed AVS educational material on diagnosis and given to patient, patient verbalized understanding  Recommended small frequent meals instead of 3 big meals a day, low fat meals, avoid high fiber (insoluble fiber), red meats, dairy, and non-digestible solids (peels, fruit pulp, etc). Avoid NSAIDs and narcotics. Recommend low residue diet.  -recommend gastric emptying studies  -Discussed about possible medical therapy such as Reglan or erythromycin and discussed that this would be managed by one of the GI doctors, lastly surgical options are available at [facility] (Dr. Justin Hein), patient verbalized understanding    Abnormal finding on CT scan  -     Ambulatory referral to Gynecology  -     CT Abdomen Pelvis   Without Contrast; Future; Expected date: 06/06/2017  -     Pregnancy, urine rapid; Future; Expected date: 06/06/2017  - follow-up with urology    Hypotension, unspecified hypotension type  Recommend follow-up with Primary Care Provider for continued evaluation and management.    Return in about 2 months (around 8/6/2017), or if symptoms worsen or fail to improve, for follow-up with one of the GI physicians.      If you have questions, please do not hesitate to call me. I look forward to following Cat along with you.    Sincerely,      April Valencia, KEESHAP           CC  No Recipients

## 2017-06-06 NOTE — PATIENT INSTRUCTIONS
Gastroparesis     Gastroparesis means that food and fluids move too slowly out of the stomach into the duodenum.   Gastroparesis (also called delayed gastric emptying) happens when the stomach takes longer than normal to empty of food. This is due to a problem with motility (the movement of the muscles in the digestive tract). For many people, gastroparesis is a lifelong condition. But treatment can help relieve symptoms and prevent complications. Read on to learn more about gastroparesis and how it can be managed.  How gastroparesis develops  With normal motility, signals from nerves tell the stomach muscles when to contract. These muscles move food from the stomach into the duodenum (the first part of the small bowel). With gastroparesis, the nerves or muscles are damaged. This causes motility to slow down or stop completely. As a result, food cannot move from the stomach properly. This delayed emptying can cause nausea, vomiting, and other symptoms. Malnutrition can result. Bezoars (hardened lumps of food) can form in the stomach and cause other complications as well.  Causes of gastroparesis  Gastroparesis can be caused by any of the following:  · Diabetes  · Surgery involving any of the digestive organs, such as the stomach and bowels  · Certain medicines, such as strong pain medicines (narcotics)  · Certain conditions, such as systemic scleroderma, Parkinson disease, and thyroid disease  · After a viral illness  In many cases, the cause of gastroparesis cannot be found.  Signs and symptoms of gastroparesis  These can include:  · Nausea and vomiting  · Feeling full quickly when eating  · Belly pain  · Heartburn  · Belly bloating  · Weight loss  · Loss of appetite  · High and low blood sugar levels (in people with diabetes)  Diagnosing gastroparesis  Your healthcare provider will ask about your symptoms and health history. Youll also be examined. In addition, blood tests and X-rays are often done to check  your health and rule out other problems. To confirm the problem, you may need other tests as well. These can include:  · Upper endoscopy. This is done to see inside the stomach and duodenum. For the test, an endoscope is used. This is a thin, flexible tube with a tiny camera on the end. Its inserted through the mouth and down into the stomach and duodenum.  · Upper gastrointestinal (GI) series. This is done to take X-rays of the upper GI tract from the mouth to the small bowel. For the test, a substance called barium is used. The barium coats the upper GI tract so that it will show up clearly on X-rays.  · Gastric emptying scan. This is done to measure how quickly food leaves the stomach. For the test, a meal containing a harmless radioactive substance (tracer) is eaten. Then scans of the stomach are done. The tracer shows up clearly on the scans and shows the movement of the food through the stomach.  · Antroduodenal manometry. This test gives pressure measurements of the stomach and small intestine to check how the contractions are working.  · Newer tests. These are being created and include breath tests and wireless capsule studies   Treating gastroparesis  The goal of treatment is to help you manage your condition. Treatment may include one or more of the following:  · Dietary changes. You may need to make changes to your eating habits and daily diet. For instance, your healthcare provider may instruct you to eat small meals throughout the day. Doing this can keep you from feeling full too quickly. You may be placed on a liquid or soft diet. This means youll eat liquid foods or foods that are mashed or put through a . In addition, you may need to avoid foods high in fats and fiber. These can slow digestion. For more help with your diet, your healthcare provider can refer you to a dietitian. In severe cases, you may need a feeding tube. This sends liquid food or medicine directly to your small bowel,  bypassing the stomach.  · Treating diabetes. If you are diabetic, it is important to control your blood sugar. High sugar levels worsen gastroparesis.   · Medicines. These can help manage symptoms, such as nausea and vomiting. They can also improve motility. Each medicine has specific risks and side effects. Your doctor can tell you more about any medicine that is prescribed for you.  · Surgery. You may need to have a tube surgically inserted into the stomach. The tube removes excess air and fluid. This can relieve severe symptoms of nausea and vomiting. In rare cases, other surgery may be needed on the stomach or small bowel. This is to create a new passageway for food to be emptied from the stomach.  · Gastric electrical stimulation. This treatment is done less often and may not be available. Your healthcare provider can tell you more about this treatment if it is a choice for you.  Diabetes and gastroparesis  If you have diabetes, gastroparesis can make it harder to manage your blood sugar level. Youll need to take extra steps in your treatment to prevent complications. Work with your healthcare provider to learn what you can do to protect your health. For more information, contact the American Diabetes Association, www.diabetes.org.   Long-term concerns   With treatment, most people can manage their symptoms and maintain their usual routines. If your symptoms are moderate to severe, you may need to see your healthcare provider more often for checkups. Also, other treatments will likely be needed.  Date Last Reviewed: 7/14/2016  © 3415-6979 [employer]. [address] [place] [address], [place] [address]. All rights reserved. This information is not intended as a substitute for professional medical care. Always follow your healthcare professional's instructions.

## 2017-06-06 NOTE — PROGRESS NOTES
"Subjective:       Patient ID: Cat Denson is a 37 y.o. female Body mass index is 16.44 kg/m².    Chief Complaint: Emesis (nausea) and Weight Loss    This patient is new to me.  Referring Provider:  Dr. Cruz for weight loss and persistent emesis.  Established patient of Dr. Harris & Dr. Colin.    Patient is here with her mother, whom assisted with history.  Reviewed ER note:  "HPI Comments: 3/12/2017  11:32 AM   Chief Complaint: Bizarre Behavior  The patient is a 37 y.o. female with PMHx of lupus, fibromyalgia, bipolar 1 disorder, seizure, stroke with left side weakness, CKD, depression and anxiety who presents with bizarre behavior. The patient is confused, restless and has not sleeping for the past 3 days. The patient is having difficulty with memory recall. Pt complains of sharp pains to the right lower abdomen with dysuria, nausea and vomiting for 2 days. Patient's boyfriend reports she is self-inducing vomiting by sticking her finger in her mouth. This has been ongoing for 1 day. She reports she cannot eat due to the nausea. She also reports chronic lower back pain, runny nose and headache. No recent fevers, vision changes, sore throat, cough, cp or sob. Pt is currently on trazodone for psychiatric issues. Pt has an appointment with psychiatrist tomorrow.   The patient was recently admitted for similar behavior. She was treated for UTI with antibiotics. Pt boyfriend reports her current symptoms began after being discharged. Shx of cholecystectomy.  The history is provided by the patient and a significant other."      Emesis    This is a chronic problem. Episode onset: intermittnet throughout her life. Episode frequency: at least 3 times a day. The problem has been gradually worsening (over the past month). The emesis has an appearance of stomach contents (denies bright red blood or coffee ground emesis). There has been no fever. Associated symptoms include weight loss. Pertinent negatives include no " abdominal pain, chest pain, chills, coughing, diarrhea (history of diarrhea; controlled on welchol) or fever. Associated symptoms comments: Bowel movements once a day- welchol 2 nightly for diarrhea. Risk factors: denies foreign travel, ill contacts, suspect food intake, recent head injury, or change/new medications; except for abilify started on 5/19/17; reports takes mobic rarely. Treatments tried: zofran prn and phenergan prn; prilosec 40 mg once daily, zantac 300 mg once daily. The treatment provided mild relief.     Review of Systems   Constitutional: Positive for appetite change (decreased) and weight loss. Negative for chills, fatigue and fever.   HENT: Negative for sore throat and trouble swallowing.    Respiratory: Negative for cough, choking and shortness of breath.    Cardiovascular: Negative for chest pain.   Gastrointestinal: Positive for vomiting. Negative for abdominal pain, anal bleeding, blood in stool, constipation, diarrhea (history of diarrhea; controlled on welchol), nausea and rectal pain.   Genitourinary: Negative for difficulty urinating, dysuria, flank pain, frequency, pelvic pain and urgency.   Neurological: Negative for weakness and light-headedness.       Past Medical History:   Diagnosis Date    Anxiety     Bipolar 1 disorder     Chronic kidney disease     Chronic pain     Depression     bipolar 2    Fibromyalgia     Gastroparesis     Hx of psychiatric care     Lactose intolerance     Lupus     Psychiatric problem     Renal tubular acidosis     Seizure     Sjogren's syndrome     Smoker     Stroke 11-    Suicide attempt     overdosed on a bottle of paxil, muscle relaxers, & zoloft    Therapy      Past Surgical History:   Procedure Laterality Date    CHOLECYSTECTOMY      COLONOSCOPY  11/12/2014    Dr. Colin, repeat at 52 years old for screening    UPPER GASTROINTESTINAL ENDOSCOPY  03/03/2017    Dr. Harris     Family History   Problem Relation Age of Onset     Hypertension Mother     Hyperlipidemia Mother     No Known Problems Father     Post-traumatic stress disorder Brother     Drug abuse Brother     Diabetes Maternal Uncle     Hypertension Maternal Uncle     Alcohol abuse Maternal Uncle     Scoliosis Paternal Aunt     Heart disease Paternal Uncle     Mental illness Maternal Grandmother     Cancer Maternal Grandmother      lung / brain - smoker    Drug abuse Maternal Grandmother     Hypertension Maternal Grandmother     Hyperlipidemia Maternal Grandmother     Diabetes Maternal Grandmother     Heart disease Maternal Grandfather     Hypertension Maternal Grandfather     Alcohol abuse Maternal Grandfather     No Known Problems Paternal Grandmother     Mental illness Paternal Grandfather     Early death Paternal Grandfather      self    Colon cancer Neg Hx     Colon polyps Neg Hx     Crohn's disease Neg Hx     Ulcerative colitis Neg Hx     Celiac disease Neg Hx      Wt Readings from Last 10 Encounters:   06/06/17 42.1 kg (92 lb 13 oz)   05/18/17 42.5 kg (93 lb 12.9 oz)   04/24/17 45.3 kg (99 lb 13.9 oz)   03/28/17 42.7 kg (94 lb 2.2 oz)   03/12/17 45.4 kg (100 lb)   03/09/17 45.8 kg (100 lb 15.5 oz)   03/03/17 46.7 kg (103 lb)   02/22/17 46.9 kg (103 lb 6.3 oz)   02/14/17 46.9 kg (103 lb 6.3 oz)   12/08/16 56.7 kg (125 lb)     Lab Results   Component Value Date    WBC 8.10 03/12/2017    HGB 11.9 (L) 03/12/2017    HCT 37.5 03/12/2017    MCV 91 03/12/2017     03/12/2017     CMP  Sodium   Date Value Ref Range Status   03/12/2017 145 136 - 145 mmol/L Final     Potassium   Date Value Ref Range Status   03/12/2017 3.4 (L) 3.5 - 5.1 mmol/L Final     Chloride   Date Value Ref Range Status   03/12/2017 111 (H) 95 - 110 mmol/L Final     CO2   Date Value Ref Range Status   03/12/2017 19 (L) 23 - 29 mmol/L Final     Glucose   Date Value Ref Range Status   03/12/2017 122 (H) 70 - 110 mg/dL Final     BUN, Bld   Date Value Ref Range Status   03/12/2017 14  6 - 20 mg/dL Final     Creatinine   Date Value Ref Range Status   03/12/2017 1.9 (H) 0.5 - 1.4 mg/dL Final     Calcium   Date Value Ref Range Status   03/12/2017 10.5 8.7 - 10.5 mg/dL Final     Total Protein   Date Value Ref Range Status   03/12/2017 8.6 (H) 6.0 - 8.4 g/dL Final     Albumin   Date Value Ref Range Status   03/12/2017 5.1 3.5 - 5.2 g/dL Final     Total Bilirubin   Date Value Ref Range Status   03/12/2017 0.4 0.1 - 1.0 mg/dL Final     Comment:     For infants and newborns, interpretation of results should be based  on gestational age, weight and in agreement with clinical  observations.  Premature Infant recommended reference ranges:  Up to 24 hours.............<8.0 mg/dL  Up to 48 hours............<12.0 mg/dL  3-5 days..................<15.0 mg/dL  6-29 days.................<15.0 mg/dL       Alkaline Phosphatase   Date Value Ref Range Status   03/12/2017 92 55 - 135 U/L Final     AST   Date Value Ref Range Status   03/12/2017 20 10 - 40 U/L Final     ALT   Date Value Ref Range Status   03/12/2017 26 10 - 44 U/L Final     Anion Gap   Date Value Ref Range Status   03/12/2017 15 8 - 16 mmol/L Final     eGFR if    Date Value Ref Range Status   03/12/2017 39 (A) >60 mL/min/1.73 m^2 Final     eGFR if non    Date Value Ref Range Status   03/12/2017 34 (A) >60 mL/min/1.73 m^2 Final     Comment:     Calculation used to obtain the estimated glomerular filtration  rate (eGFR) is the CKD-EPI equation. Since race is unknown   in our information system, the eGFR values for   -American and Non--American patients are given   for each creatinine result.       Lab Results   Component Value Date    LIPASE 39 03/12/2017     Lab Results   Component Value Date    TSH 1.023 03/12/2017     Reviewed prior medical records including radiology report of 3/12/17 CT renal stone, abdomen, pelvis; 8/19/16 limited abdominal ultrasound; 12/23/14 gastric emptying study (delayed); 3/4/17  "discharge summary, & endoscopy history (see surgical history).    11/12/14 Colonoscopy was reviewed and procedure report states:   " Findings:       The perianal examination was normal.       Non-bleeding internal hemorrhoids were found during retroflexion and        were medium-sized.       The rectum, sigmoid colon, descending colon, transverse colon,        ascending colon, cecum, appendiceal orifice and ileocecal valve        appeared normal. Biopsies were taken with a cold forceps from the        right colon and left colon for evaluation of microscopic colitis.       The terminal ileum appeared normal.  Impression:          - Non-bleeding internal hemorrhoids.                       - The rectum, sigmoid colon, descending colon,                        transverse colon, ascending colon, cecum,                        appendiceal orifice and ileocecal valve are normal.                        Biopsied.                       - The examined portion of the ileum was normal.  Recommendation:      - Patient has a contact number available for                        emergencies. The signs and symptoms of potential                        delayed complications were discussed with the                        patient. Return to normal activities tomorrow.                        Written discharge instructions were provided to the                        patient.                       - High fiber diet.                       - Continue present medications.                       - Repeat colonoscopy at age 52 for screening                        purposes.                       - Discharge patient to home (ambulatory).                       - Await pathology results.                       - Return to nurse practitioner after studies are                        complete. ".  Biopsy results:   "1-2. COLON; BIOPSY:  UNREMARKABLE COLONIC MUCOSA.  NO EVIDENCE OF MICROSCOPIC COLITIS.  NO ACUTE COLITIS.  NO EVIDENCE OF CHRONIC " "INJURY.  NEGATIVE FOR ADENOMA."    3/3/17 EGD was reviewed and procedure report states:   " Impression:          - Normal esophagus.                       - Gastritis. Biopsied.                       - Normal examined duodenum.                       - Patient with reported hematemesis without a drop                        in hemoglobin with mild gastritis on exam (biopsied).  Recommendation:      - [facility] care.                       - Advance diet as tolerated.                       - Continue present medications.                       - Await pathology results.                       - Return to primary care physician as previously                        scheduled. ".  Biopsy results:   "Stomach, antrum and body, biopsy:  Mild chronic nonactive gastritis.  Negative for H. pylori by routine stain.  Negative for intestinal metaplasia and dysplasia."  Objective:      Physical Exam   Constitutional: She is oriented to person, place, and time. She appears well-developed and well-nourished. No distress.   HENT:   Mouth/Throat: Oropharynx is clear and moist and mucous membranes are normal. No oral lesions. No oropharyngeal exudate.   Eyes: Conjunctivae are normal. Pupils are equal, round, and reactive to light. No scleral icterus.   Neck: Neck supple. No tracheal deviation present.   Cardiovascular: Normal rate.    Pulmonary/Chest: Effort normal and breath sounds normal. No respiratory distress. She has no wheezes.   Abdominal: Soft. Normal appearance and bowel sounds are normal. She exhibits no distension, no abdominal bruit and no mass. There is no hepatosplenomegaly. There is no tenderness. There is no rigidity, no rebound, no guarding, no tenderness at McBurney's point and negative Richards's sign. No hernia.   Lymphadenopathy:     She has no cervical adenopathy.   Neurological: She is alert and oriented to person, place, and time.   Skin: Skin is warm and dry. No rash noted. She is " not diaphoretic. No erythema. No pallor.   Non-jaundiced   Psychiatric: She has a normal mood and affect. Her behavior is normal.   Nursing note and vitals reviewed.      Assessment:       1. Persistent vomiting    2. Chronic gastritis without bleeding, unspecified gastritis type    3. Weight loss    4. Gastroparesis    5. Abnormal finding on CT scan    6. Hypotension, unspecified hypotension type        Plan:       Persistent vomiting  -     NM Gastric Emptying; Future; Expected date: 06/06/2017  -     CT Abdomen Pelvis  Without Contrast; Future; Expected date: 06/06/2017  -     Pregnancy, urine rapid; Future; Expected date: 06/06/2017  - continue zofran or phenergan prn as directed; advised not to take together; patient and mother verbalized understanding    Chronic gastritis without bleeding, unspecified gastritis type  - continue prilosec 40 mg once daily as directed, take in the morning before breakfast, discussed about possible long term use of medication (prefer to use lowest effective dose or discontinuing if possible) and discussed the risks & benefits with taking a reflux medication long term, and to take OTC calcium and vitamin d supplements as directed (such as Citracal +D), pt verbalized understanding  - continue zantac 300 mg once daily as directed  -discussed about the different types of medications used to treat reflux and how to use them, antacids can be used PRN for breakthrough heartburn symptoms by reducing stomach acid that is already produced, H2 blockers (zantac) work by limiting the amount acid production, & PPI's work to block acid production and are taken daily, patient verbalized understanding.  -Educated patient on lifestyle modifications to help control/reduce reflux/abdominal pain including: avoid large meals, avoid eating within 2-3 hours of bedtime (avoid late night eating & lying down soon after eating), elevate head of bed if nocturnal symptoms are present, smoking cessation (if  current smoker), & weight loss (if overweight).   -Educated to avoid known foods which trigger reflux symptoms & to minimize/avoid high-fat foods, chocolate, caffeine, citrus, alcohol, & tomato products.  -Advised to avoid/limit use of NSAID's (such as Mobic), since they can cause GI upset, bleeding, and/or ulcers. If needed, take with food.    Weight loss  - encouraged PO intake and daily calorie counts to ensure adequate nutrition is taken in, recommend at least 1,800-2,000 calories a day  - recommend nutritional drinks, such as Boost, Ensure or Glucerna, to supplement nutrition needs    Gastroparesis  -     NM Gastric Emptying; Future; Expected date: 06/06/2017  -     CT Abdomen Pelvis  Without Contrast; Future; Expected date: 06/06/2017  -     Pregnancy, urine rapid; Future; Expected date: 06/06/2017  Discussed diagnosis and possibly etiologies and that it can be idiopathetic, may also improve over time or can be lifelong, reviewed AVS educational material on diagnosis and given to patient, patient verbalized understanding  Recommended small frequent meals instead of 3 big meals a day, low fat meals, avoid high fiber (insoluble fiber), red meats, dairy, and non-digestible solids (peels, fruit pulp, etc). Avoid NSAIDs and narcotics. Recommend low residue diet.  -recommend gastric emptying studies  -Discussed about possible medical therapy such as Reglan or erythromycin and discussed that this would be managed by one of the GI doctors, lastly surgical options are available at [facility] (Dr. Justin Hein), patient verbalized understanding    Abnormal finding on CT scan  -     Ambulatory referral to Gynecology  -     CT Abdomen Pelvis  Without Contrast; Future; Expected date: 06/06/2017  -     Pregnancy, urine rapid; Future; Expected date: 06/06/2017  - follow-up with urology    Hypotension, unspecified hypotension type  Recommend follow-up with Primary Care Provider for continued evaluation and  management.    Return in about 2 months (around 8/6/2017), or if symptoms worsen or fail to improve, for follow-up with one of the GI physicians.      If no improvement in symptoms or symptoms worsen, call/follow-up at clinic or go to ER.

## 2017-06-12 ENCOUNTER — HOSPITAL ENCOUNTER (OUTPATIENT)
Dept: RADIOLOGY | Facility: HOSPITAL | Age: 37
Discharge: HOME | End: 2017-06-12
Attending: NURSE PRACTITIONER
Payer: MEDICARE

## 2017-06-12 DIAGNOSIS — R93.89 ABNORMAL FINDING ON CT SCAN: ICD-10-CM

## 2017-06-12 DIAGNOSIS — R11.15 PERSISTENT VOMITING: ICD-10-CM

## 2017-06-12 DIAGNOSIS — K31.84 GASTROPARESIS: ICD-10-CM

## 2017-06-12 PROCEDURE — 74176 CT ABD & PELVIS W/O CONTRAST: CPT | Mod: 26,,, | Performed by: RADIOLOGY

## 2017-06-12 PROCEDURE — 74176 CT ABD & PELVIS W/O CONTRAST: CPT | Mod: TC

## 2017-06-13 ENCOUNTER — HOSPITAL ENCOUNTER (OUTPATIENT)
Dept: RADIOLOGY | Facility: HOSPITAL | Age: 37
Discharge: HOME | End: 2017-06-13
Attending: NURSE PRACTITIONER
Payer: MEDICARE

## 2017-06-13 DIAGNOSIS — R11.15 PERSISTENT VOMITING: ICD-10-CM

## 2017-06-13 DIAGNOSIS — K31.84 GASTROPARESIS: ICD-10-CM

## 2017-06-13 DIAGNOSIS — M32.19 OTHER SYSTEMIC LUPUS ERYTHEMATOSUS WITH OTHER ORGAN INVOLVEMENT: ICD-10-CM

## 2017-06-13 PROCEDURE — 78264 GASTRIC EMPTYING IMG STUDY: CPT | Mod: 26,,, | Performed by: RADIOLOGY

## 2017-06-13 PROCEDURE — 78264 GASTRIC EMPTYING IMG STUDY: CPT | Mod: TC

## 2017-06-14 DIAGNOSIS — R93.89 ABNORMAL FINDING ON CT SCAN: Primary | ICD-10-CM

## 2017-06-14 RX ORDER — HYDROXYCHLOROQUINE SULFATE 200 MG/1
TABLET, FILM COATED ORAL
Qty: 30 TABLET | Refills: 0 | Status: SHIPPED | OUTPATIENT
Start: 2017-06-14 | End: 2017-06-28 | Stop reason: SDUPTHER

## 2017-06-27 ENCOUNTER — OFFICE VISIT (OUTPATIENT)
Dept: PSYCHIATRY | Facility: CLINIC | Age: 37
End: 2017-06-27
Payer: COMMERCIAL

## 2017-06-27 VITALS
HEIGHT: 63 IN | BODY MASS INDEX: 15.94 KG/M2 | HEART RATE: 111 BPM | SYSTOLIC BLOOD PRESSURE: 112 MMHG | WEIGHT: 89.94 LBS | DIASTOLIC BLOOD PRESSURE: 76 MMHG

## 2017-06-27 DIAGNOSIS — R63.4 WEIGHT LOSS: ICD-10-CM

## 2017-06-27 DIAGNOSIS — F41.9 ANXIETY: ICD-10-CM

## 2017-06-27 DIAGNOSIS — F12.20 CANNABIS USE DISORDER, MODERATE, DEPENDENCE: ICD-10-CM

## 2017-06-27 DIAGNOSIS — K31.84 GASTROPARESIS: Primary | ICD-10-CM

## 2017-06-27 DIAGNOSIS — F09 COGNITIVE DISORDER: ICD-10-CM

## 2017-06-27 DIAGNOSIS — Z79.899 HIGH RISK MEDICATION USE: ICD-10-CM

## 2017-06-27 DIAGNOSIS — F31.81 BIPOLAR 2 DISORDER: ICD-10-CM

## 2017-06-27 PROCEDURE — 99213 OFFICE O/P EST LOW 20 MIN: CPT | Mod: S$GLB,,, | Performed by: PSYCHIATRY & NEUROLOGY

## 2017-06-27 PROCEDURE — 99999 PR PBB SHADOW E&M-EST. PATIENT-LVL III: CPT | Mod: PBBFAC,,, | Performed by: PSYCHIATRY & NEUROLOGY

## 2017-06-27 PROCEDURE — 99499 UNLISTED E&M SERVICE: CPT | Mod: S$GLB,,, | Performed by: PSYCHIATRY & NEUROLOGY

## 2017-06-27 RX ORDER — ARIPIPRAZOLE 10 MG/1
10 TABLET ORAL DAILY
Qty: 30 TABLET | Refills: 1 | Status: SHIPPED | OUTPATIENT
Start: 2017-06-27 | End: 2017-08-03 | Stop reason: DRUGHIGH

## 2017-06-27 RX ORDER — MIRTAZAPINE 7.5 MG/1
7.5 TABLET, FILM COATED ORAL NIGHTLY
Qty: 30 TABLET | Refills: 1 | Status: SHIPPED | OUTPATIENT
Start: 2017-06-27 | End: 2017-08-03 | Stop reason: DRUGHIGH

## 2017-06-27 RX ORDER — CLONAZEPAM 0.5 MG/1
TABLET ORAL
Qty: 15 TABLET | Refills: 1 | Status: SHIPPED | OUTPATIENT
Start: 2017-06-27 | End: 2017-08-03

## 2017-06-28 DIAGNOSIS — M32.19 OTHER SYSTEMIC LUPUS ERYTHEMATOSUS WITH OTHER ORGAN INVOLVEMENT: ICD-10-CM

## 2017-06-28 RX ORDER — HYDROXYCHLOROQUINE SULFATE 200 MG/1
TABLET, FILM COATED ORAL
Qty: 30 TABLET | Refills: 0 | Status: SHIPPED | OUTPATIENT
Start: 2017-06-28 | End: 2017-07-12 | Stop reason: SDUPTHER

## 2017-07-04 NOTE — PROGRESS NOTES
Outpatient Psychiatry Follow-Up Visit (MD/NP)    6/27/2017    Clinical Status of Patient:  Outpatient (Ambulatory)    Chief Complaint:  Cat Denson is a 37 y.o. female who presents today for follow-up of mood disorder and anxiety.  Met with patient alone and then pt with her boyfriend together.     Interval History and Content of Current Session:  Interim Events/Subjective Report/Content of Current Session:  38 yo disabled female presents to clinic for follow up treatment of  follow up appt of bipolar I disorder, panic disorder, cannabis use disorder, and cognitive disorder.   She has a prior hx of chronic pain, nausea, and emesis.  Has been having health issues since she was a child, dx with Lupus at 12 yo. She missed a lot of school.  Has impulsive tendencies with hx of cutting, shaving her head (shaved today).  She has prior hx of suicide attempt at 22 yo.  Multiple prior admissions for depression, SI.   Pt had a CVA in November 2015 - Embolic stroke involving right middle cerebral artery; she is hypercoagulable due to lupus.  She is currently taking Coumadin.  Pt has residual left hemiparesis.   She has had balance problems, forgetfulness, and severe focusing issue.    She is under the care of Rheumatology - Dr Benoit  Now also under the care of Dr Chebehar - Neurology.     Neuropsychological testing 2/17:   Personality test data suggested the presence of mild depression. Neuropsychological test results reveal impairment in immediate and delayed visual memory, visual attention, temporal orientation, visuospatial/constructional abilities, abstract reasoning, psychomotor speed, and mental flexibility; and variability in immediate auditory/verbal memory (high average and mildly impaired performances) and verbal fluency (low average and moderately impaired performances); with mild depression. The pattern of impairment is consistent with right MCA CVA. She will continue to see Dr. Cruz and Mrs. Wiley  for psychiatric care. She was encouraged to resume PT, OT, and Speech Therapy for further stroke rehabilitation.     Past Psychiatric Hx:  Dx Bipolar 2 Disorder (dx over 10 yrs ago), Anxiety, Impulse Control Disorder, hx pseudoseizures with possible epileptic seizures   Prior IOP: [facility]   Hx Suicide attempt at 20 yo  Hx suicidal ideations with admit to [facility] January 2015, [facility] for SI 2015,   First admit: 26 yo: for SI - [facility]; most recent [facility]. 2017 - for w/d affect, psychosis, poor memory recall, not sleeping x days  5 prior psych admissions  Hx self harm at age 17 yo     Past psychiatric hx: Wellbutrin for smoking cessation, helped to reduce, Zoloft, Trazodone (taken off due to tiredness one month ago), Lexapro, Paxil, Cymbalta (no pain benefit), Geodon, Trileptal, Lamictal, Seroquel, Chantix (makes her vomit), Olanzapine, Risperdal, Effexor, ritalin     Past medical history:  Lupus   Sjogren's   CKD  Fibromyalgia, chronic pain   Seizure Disorder   Bronchospasm   Hx CVA  Gastroparesis   Fibromyalgia     Interim Hx:   Pt presents today for follow up.  Pt has seen GI and has undergone gastric emptying studying, CT abd/pelvis for persistent nausea/vomiting.  Dx of gastroparesis.   And ovarian cyst- recommend seeing GYN.  She has lost 5 pounds in interim.  BMI today is 15.9.  Lowest adult weight 60 lbs at age 63 yo.  She has not seen nutritionist. She does not tolerate Boost- stomach gets heavy and she throws up.  She denies prior h/o eating disorder.  She denies intentional purging; she consumes very few calories because it is not tolerated she reports. She feels she is underweight.  She has eaten only a can of Taiwanese onion soup and crackers today.  She wants to quit smoking - she started at 21 yrs old.  She did not start [facility] per my recommendations - she has down intake, but does not know if she is up to the drive.  She is tired, weak,  "but denies feeling depressed. + chronic pain b/l LE.  She denies any urges to cut/self harm. She feels her memory is stable.   Denies symptoms of bowen/psychosis.  Denies suicidal/homicidal ideations.  Mildly irritable at times. She watches [employer] all day. Does not leave home much "I don't have anywhere to go."    She uses THC multiple times daily  Tobacco: 1/2 ppd  No alcohol use   Caffeine: 1/2 caffeine pill few times a week     Per BF Brent: she does not do much, never has.  He does not think she is depressed.  Denies change in her personality. He is concerned she needs to eat more. She has not been recently confused.  She enjoys video games. Does not get dressed daily.  Showers once weekly. .  BF lives with her.      Low libido, sex physically hurts her, she bleeds after.  Due for Gyn appt.  Last menstrual cycle Nov 2015 - she had Depo provera inj prior to CVA.   She is currently in her first heterosexual relationship.        She needs assistance with bathing, meal preparation, dressing self; some incontinence is am (improved); she wears undergarment at night,  No fecal incontinence. Mom now helps with meds - not prior;  Mom handles finances; she does not drive.     She is under the care of Monique Deleon LCSW    On AIMS, pt noted to have continued some minimal mouth movements - activated with using right hand alone - pt has no awareness and denies any impairment.  this started after her CVA - BF noted it only occurs with use of hemiparetic side - not occurring with left hand us today and not occurring spontaneously - no dyskinesias seen on Dr Chebehar's exam     Review of Systems   · PSYCHIATRIC: Pertinant items are noted in the narrative.  · CONSTITUTIONAL: + weight loss   · MUSCULOSKELETAL: back and LE pain   · CV: no chest pain, occasional tachycardia  · NEURO: left HP, no seizures     Past Medical, Family and Social History: The patient's past medical, family and social history have been reviewed and " "updated as appropriate within the electronic medical record - see encounter notes.    Medications:   Clonazepam 0.5 mg PO nightly - BID prn anxiety - taking usually once daily   Abilify 7.5 mg daily   Trazodone 50 mg nightly     Compliance:  Yes     Side effects:  None side effects     Risk Parameters:  Patient reports no current suicidal ideation  Patient reports no homicidal ideation  Patient reports no self-injurious behavior  Patient reports no violent behavior    Exam (detailed: at least 9 elements; comprehensive: all 15 elements)   Constitutional  Vitals:  Most recent vital signs, dated less than 90 days prior to this appointment, were reviewed.   Vitals:    06/27/17 0926   BP: 112/76   Pulse: (!) 111   Weight: 40.8 kg (89 lb 15.2 oz)   Height: 5' 3" (1.6 m)   repeat       General:  age appropriate, very thin, dark clothes, fair grooming, good eye contact, hair cut short,  Not restless     Musculoskeletal  Muscle Strength/Tone:  No tremors noted    Gait & Station:  slow     Psychiatric  Speech:  no latency; no press, spontaneous    Mood & Affect:  "ok"  Restricted, but does smile    Thought Process:  Linear with questions, somewhat slowed    Associations:  intact   Thought Content:  no current suicidality, no homicidality, delusions, or paranoia   Insight:  Fair    Judgement: Fair    Orientation:  Grossly intact for content of interview   Memory: fair recall of recent hx   Language: grossly intact    Attention Span & Concentration:  Grossly intact to interview    Fund of Knowledge:  intact and appropriate to age and level of education, familiar with aspects of current personal life     Assessment and Diagnosis   Status/Progress: Based on the examination today, the patient's problem(s) is/are under inadequate control.  New problems have not been presented today.   Co-morbidities are complicating management of the primary condition.      General Impression: pt had CVA Nov 2015 with significantly impaired " focus, memory lapses, worsening depression, anxiety - improved with ritalin and on current medications (previously ok'd ritalin use with PCP).  Pt had episode of acute hepatitis, liver enzymes improved, but now with declining renal function; pt also resumed smoking; she is also using marijuana. Recent admission to [facility] and earlier admission for supratherapeutic INR. Mother managing medications now. Pt under care of Neurology; Neuropsychological testing done and consistent with her MCA CVA.    She continues to smoke tobacco and marijuana.  She complains of poor focus/motivation.  Mother feels pt is not depressed - improved with Abilify. Pt continues to lose weight with persistent n/v.   Given hospitalization earlier this yr with manic symptoms, will change dx to Bipolar 1 disorder which may be more appropriate.  Substance abuse also a factor and complicates dx.     Bipolar 1 Disorder, MRE depressed, partial remission   Panic Disorder with Agoraphobia   Cognitive Disorder due to CVA  Insomnia Disorder  Cannabis Use Disorder. Moderate   Tobacco Use Disorder    Lupus, migraines, CVA, hx hepatitis, declining renal function     GAF: 54     Intervention/Counseling/Treatment Plan   · Medication Management: The risks and benefits of medication were discussed with the patient.    -  Reduce Clonazepam to 0.25 mg only once daily PRN anxiety for now.  Discussed risk of decreased RT, fall risk, sedation, addictive potential, and not to mix with alcohol.     - titrate Abilify to 10 mg daily.  Typical RAJWINDER's reviewed including weight gain, abnormal movements, EPS, TD, metabolic side effects. May give added benefit for focus issues in addition to use as mood stabilizer    - D/C Trazodone and start Mirtazapine 7.5 mg nightly - may promote wt gain     - Continue psychotherapy with Monique Deleon LCSW    - refrain from cannabis use - psychoeducation today about risks of use, pt counseled today on health risks  associated with use. She declines resources for rehab     - pt counseled on smoking cessation and risks for further CVA    - Pt instructed to go to ER with thoughts of harming self, others; Call to report any worsening of symptoms or problems with the medication    - IOC - pt's boyfriend. Santy Andrade on chart     - referral to IOP - she is accepting of referral to [facility]     - referral again to Nutritionist     - lab work: HbA1c, lipid panel     Return to Clinic: 6 weeks

## 2017-07-06 RX ORDER — MELOXICAM 15 MG/1
TABLET ORAL
Qty: 30 TABLET | Refills: 0 | Status: SHIPPED | OUTPATIENT
Start: 2017-07-06 | End: 2017-08-03

## 2017-07-10 ENCOUNTER — TELEPHONE (OUTPATIENT)
Dept: PSYCHIATRY | Facility: CLINIC | Age: 37
End: 2017-07-10

## 2017-07-10 NOTE — TELEPHONE ENCOUNTER
Please advise Mirtazapine is a generic and may be affordable if they discuss directly with pharmacy; we also have good Rx savings cards here in clinic.     Please all provide pt with contact information for [facility] in case pt not able to obtain new insurance in near future:     [address], [place], [place] · [phone] - medications may potentially be provided through this program     Please advise pt/her mother that we do offer lower cost self pay rates for patient's without insurance for office visits.      It is important that Cat remain on her medications - please ask for pt/her mother to contact us for update

## 2017-07-10 NOTE — TELEPHONE ENCOUNTER
Received PA request for mirtazapine and aripiprazole. And faxed to [facility].    When checking on its status, I was informaed that both these medications are on formulary for patient's plan.     Called pharmacy who states that the medications have been put to patients 'profile' but are not being reflled. When I asked for clarifcation she states 'patient probably didn't need to medication refilled.    Attempt to call patient for clarification if she is taking these medications but no answer and unable to leave voicemail.    Will reattempt later

## 2017-07-10 NOTE — TELEPHONE ENCOUNTER
Spoke with patient's mom, Arabella who states that the patient is currently uninsured and can't get any of her medications refilled until they apply for another insurance plan-     Arabella states she got abilify filled late last month so she's okay on that medication but never filled the mirtazapine

## 2017-07-11 NOTE — TELEPHONE ENCOUNTER
AROLDO Spoke with patient's mother who states patient's father is working to fix the insurance problem but the patient still has people's health medicare.    She states she is thankful Dr. Cruz called with information on different routes so Susan can continue her medication.     States she will call tomorrow with more information on the health insurance and should be able to fill her medications

## 2017-07-12 ENCOUNTER — PATIENT MESSAGE (OUTPATIENT)
Dept: PSYCHIATRY | Facility: CLINIC | Age: 37
End: 2017-07-12

## 2017-07-12 DIAGNOSIS — M32.19 OTHER SYSTEMIC LUPUS ERYTHEMATOSUS WITH OTHER ORGAN INVOLVEMENT: ICD-10-CM

## 2017-07-12 RX ORDER — HYDROXYCHLOROQUINE SULFATE 200 MG/1
TABLET, FILM COATED ORAL
Qty: 30 TABLET | Refills: 0 | Status: SHIPPED | OUTPATIENT
Start: 2017-07-12 | End: 2017-07-25 | Stop reason: SDUPTHER

## 2017-07-16 RX ORDER — ARIPIPRAZOLE 5 MG/1
TABLET ORAL
Qty: 45 TABLET | Refills: 0 | OUTPATIENT
Start: 2017-07-16

## 2017-07-18 ENCOUNTER — LAB VISIT (OUTPATIENT)
Dept: LAB | Facility: HOSPITAL | Age: 37
End: 2017-07-18
Attending: INTERNAL MEDICINE
Payer: MEDICARE

## 2017-07-18 ENCOUNTER — OFFICE VISIT (OUTPATIENT)
Dept: GASTROENTEROLOGY | Facility: CLINIC | Age: 37
End: 2017-07-18
Payer: MEDICARE

## 2017-07-18 VITALS
DIASTOLIC BLOOD PRESSURE: 69 MMHG | WEIGHT: 88.38 LBS | HEIGHT: 63 IN | SYSTOLIC BLOOD PRESSURE: 98 MMHG | HEART RATE: 91 BPM | BODY MASS INDEX: 15.66 KG/M2 | RESPIRATION RATE: 18 BRPM

## 2017-07-18 DIAGNOSIS — K31.84 GASTROPARESIS: Primary | ICD-10-CM

## 2017-07-18 DIAGNOSIS — R63.4 WEIGHT LOSS: ICD-10-CM

## 2017-07-18 DIAGNOSIS — M32.19 OTHER SYSTEMIC LUPUS ERYTHEMATOSUS WITH OTHER ORGAN INVOLVEMENT: ICD-10-CM

## 2017-07-18 DIAGNOSIS — R11.2 NON-INTRACTABLE VOMITING WITH NAUSEA, UNSPECIFIED VOMITING TYPE: ICD-10-CM

## 2017-07-18 DIAGNOSIS — K29.50 CHRONIC GASTRITIS WITHOUT BLEEDING, UNSPECIFIED GASTRITIS TYPE: ICD-10-CM

## 2017-07-18 LAB
BACTERIA #/AREA URNS HPF: ABNORMAL /HPF
BILIRUB UR QL STRIP: NEGATIVE
CLARITY UR: CLEAR
COLOR UR: YELLOW
CREAT UR-MCNC: 109.7 MG/DL
GLUCOSE UR QL STRIP: NEGATIVE
HGB UR QL STRIP: NEGATIVE
KETONES UR QL STRIP: NEGATIVE
LEUKOCYTE ESTERASE UR QL STRIP: ABNORMAL
MICROSCOPIC COMMENT: ABNORMAL
NITRITE UR QL STRIP: POSITIVE
PH UR STRIP: 7 [PH] (ref 5–8)
PROT UR QL STRIP: ABNORMAL
PROT UR-MCNC: 18 MG/DL
PROT/CREAT RATIO, UR: 0.16
RBC #/AREA URNS HPF: 1 /HPF (ref 0–4)
SP GR UR STRIP: 1.02 (ref 1–1.03)
SQUAMOUS #/AREA URNS HPF: 1 /HPF
URN SPEC COLLECT METH UR: ABNORMAL
UROBILINOGEN UR STRIP-ACNC: NEGATIVE EU/DL
WBC #/AREA URNS HPF: 5 /HPF (ref 0–5)

## 2017-07-18 PROCEDURE — 81000 URINALYSIS NONAUTO W/SCOPE: CPT

## 2017-07-18 PROCEDURE — 82570 ASSAY OF URINE CREATININE: CPT

## 2017-07-18 PROCEDURE — 99214 OFFICE O/P EST MOD 30 MIN: CPT | Mod: S$GLB,,, | Performed by: NURSE PRACTITIONER

## 2017-07-18 PROCEDURE — 99999 PR PBB SHADOW E&M-EST. PATIENT-LVL V: CPT | Mod: PBBFAC,,, | Performed by: NURSE PRACTITIONER

## 2017-07-18 RX ORDER — PANTOPRAZOLE SODIUM 40 MG/1
40 TABLET, DELAYED RELEASE ORAL DAILY
Qty: 30 TABLET | Refills: 6 | Status: SHIPPED | OUTPATIENT
Start: 2017-07-18 | End: 2017-10-03 | Stop reason: SDUPTHER

## 2017-07-18 NOTE — PROGRESS NOTES
"Subjective:       Patient ID: Cat Denson is a 37 y.o. female Body mass index is 15.66 kg/m².    Chief Complaint: Nausea and Emesis    Established patient of Dr. Harris, Dr. Colin, and myself.    Patient is here for follow-up. Patient denies having any heartburn- controlled with medication but she is requesting to switch to protonix since prilosec isn't covered by her insurance.   Reviewed ER note:  "HPI Comments: 3/12/2017  11:32 AM   Chief Complaint: Bizarre Behavior  The patient is a 37 y.o. female with PMHx of lupus, fibromyalgia, bipolar 1 disorder, seizure, stroke with left side weakness, CKD, depression and anxiety who presents with bizarre behavior. The patient is confused, restless and has not sleeping for the past 3 days. The patient is having difficulty with memory recall. Pt complains of sharp pains to the right lower abdomen with dysuria, nausea and vomiting for 2 days. Patient's boyfriend reports she is self-inducing vomiting by sticking her finger in her mouth. This has been ongoing for 1 day. She reports she cannot eat due to the nausea. She also reports chronic lower back pain, runny nose and headache. No recent fevers, vision changes, sore throat, cough, cp or sob. Pt is currently on trazodone for psychiatric issues. Pt has an appointment with psychiatrist tomorrow.   The patient was recently admitted for similar behavior. She was treated for UTI with antibiotics. Pt boyfriend reports her current symptoms began after being discharged. Shx of cholecystectomy.  The history is provided by the patient and a significant other."      Emesis    This is a chronic problem. Episode onset: intermittent throughout her life. Episode frequency: about  1-2 times a week. The problem has been gradually improving (since our last visit). The emesis has an appearance of stomach contents (denies bright red blood or coffee ground emesis). There has been no fever. Associated symptoms include weight loss " (continue to lose weight despite increase in eating). Pertinent negatives include no abdominal pain, chest pain, chills, coughing, diarrhea (history of diarrhea; controlled on welchol) or fever. Associated symptoms comments: Bowel movements once a day- welchol 2 nightly. Risk factors: denies foreign travel, ill contacts, suspect food intake, recent head injury, or change/new medications; reports takes mobic rarely. Treatments tried: zofran prn and phenergan prn; prilosec 40 mg once daily, zantac 300 mg once daily. The treatment provided moderate relief.   Nausea   This is a chronic problem. The problem occurs every several days. The problem has been gradually improving. Associated symptoms include nausea and vomiting. Pertinent negatives include no abdominal pain, chest pain, chills, coughing, fatigue, fever, sore throat or weakness.     Review of Systems   Constitutional: Positive for appetite change (decreased; eats about one meal a day, denies snacking; has tried ensure/boost and does not tolerate it well) and weight loss (continue to lose weight despite increase in eating). Negative for chills, fatigue and fever.   HENT: Negative for sore throat and trouble swallowing.    Respiratory: Negative for cough, choking and shortness of breath.    Cardiovascular: Negative for chest pain.   Gastrointestinal: Positive for nausea and vomiting. Negative for abdominal pain, anal bleeding, blood in stool, constipation, diarrhea (history of diarrhea; controlled on welchol) and rectal pain.   Genitourinary: Negative for difficulty urinating, dysuria, flank pain, frequency, pelvic pain and urgency.   Neurological: Negative for weakness and light-headedness.       Past Medical History:   Diagnosis Date    Anxiety     Bipolar 1 disorder     Chronic kidney disease     Chronic pain     Depression     bipolar 2    Fibromyalgia     Gastroparesis     Hx of psychiatric care     Lactose intolerance     Lupus     Psychiatric  problem     Renal tubular acidosis     Seizure     Sjogren's syndrome     Smoker     Stroke 11-    Suicide attempt     overdosed on a bottle of paxil, muscle relaxers, & zoloft    Therapy      Past Surgical History:   Procedure Laterality Date    CHOLECYSTECTOMY      COLONOSCOPY  11/12/2014    Dr. Colin, repeat at 52 years old for screening    UPPER GASTROINTESTINAL ENDOSCOPY  03/03/2017    Dr. Harris     Family History   Problem Relation Age of Onset    Hypertension Mother     Hyperlipidemia Mother     No Known Problems Father     Post-traumatic stress disorder Brother     Drug abuse Brother     Diabetes Maternal Uncle     Hypertension Maternal Uncle     Alcohol abuse Maternal Uncle     Scoliosis Paternal Aunt     Heart disease Paternal Uncle     Mental illness Maternal Grandmother     Cancer Maternal Grandmother      lung / brain - smoker    Drug abuse Maternal Grandmother     Hypertension Maternal Grandmother     Hyperlipidemia Maternal Grandmother     Diabetes Maternal Grandmother     Heart disease Maternal Grandfather     Hypertension Maternal Grandfather     Alcohol abuse Maternal Grandfather     No Known Problems Paternal Grandmother     Mental illness Paternal Grandfather     Early death Paternal Grandfather      self    Colon cancer Neg Hx     Colon polyps Neg Hx     Crohn's disease Neg Hx     Ulcerative colitis Neg Hx     Celiac disease Neg Hx      Wt Readings from Last 10 Encounters:   07/18/17 40.1 kg (88 lb 6.5 oz)   06/06/17 42.1 kg (92 lb 13 oz)   05/18/17 42.5 kg (93 lb 12.9 oz)   04/24/17 45.3 kg (99 lb 13.9 oz)   03/28/17 42.7 kg (94 lb 2.2 oz)   03/12/17 45.4 kg (100 lb)   03/09/17 45.8 kg (100 lb 15.5 oz)   03/03/17 46.7 kg (103 lb)   02/22/17 46.9 kg (103 lb 6.3 oz)   02/14/17 46.9 kg (103 lb 6.3 oz)     Lab Results   Component Value Date    WBC 8.10 03/12/2017    HGB 11.9 (L) 03/12/2017    HCT 37.5 03/12/2017    MCV 91 03/12/2017      03/12/2017     CMP  Sodium   Date Value Ref Range Status   03/12/2017 145 136 - 145 mmol/L Final     Potassium   Date Value Ref Range Status   03/12/2017 3.4 (L) 3.5 - 5.1 mmol/L Final     Chloride   Date Value Ref Range Status   03/12/2017 111 (H) 95 - 110 mmol/L Final     CO2   Date Value Ref Range Status   03/12/2017 19 (L) 23 - 29 mmol/L Final     Glucose   Date Value Ref Range Status   03/12/2017 122 (H) 70 - 110 mg/dL Final     BUN, Bld   Date Value Ref Range Status   03/12/2017 14 6 - 20 mg/dL Final     Creatinine   Date Value Ref Range Status   03/12/2017 1.9 (H) 0.5 - 1.4 mg/dL Final     Calcium   Date Value Ref Range Status   03/12/2017 10.5 8.7 - 10.5 mg/dL Final     Total Protein   Date Value Ref Range Status   03/12/2017 8.6 (H) 6.0 - 8.4 g/dL Final     Albumin   Date Value Ref Range Status   03/12/2017 5.1 3.5 - 5.2 g/dL Final     Total Bilirubin   Date Value Ref Range Status   03/12/2017 0.4 0.1 - 1.0 mg/dL Final     Comment:     For infants and newborns, interpretation of results should be based  on gestational age, weight and in agreement with clinical  observations.  Premature Infant recommended reference ranges:  Up to 24 hours.............<8.0 mg/dL  Up to 48 hours............<12.0 mg/dL  3-5 days..................<15.0 mg/dL  6-29 days.................<15.0 mg/dL       Alkaline Phosphatase   Date Value Ref Range Status   03/12/2017 92 55 - 135 U/L Final     AST   Date Value Ref Range Status   03/12/2017 20 10 - 40 U/L Final     ALT   Date Value Ref Range Status   03/12/2017 26 10 - 44 U/L Final     Anion Gap   Date Value Ref Range Status   03/12/2017 15 8 - 16 mmol/L Final     eGFR if    Date Value Ref Range Status   03/12/2017 39 (A) >60 mL/min/1.73 m^2 Final     eGFR if non    Date Value Ref Range Status   03/12/2017 34 (A) >60 mL/min/1.73 m^2 Final     Comment:     Calculation used to obtain the estimated glomerular filtration  rate (eGFR) is the CKD-EPI  "equation. Since race is unknown   in our information system, the eGFR values for   -American and Non--American patients are given   for each creatinine result.       Lab Results   Component Value Date    LIPASE 39 03/12/2017     Lab Results   Component Value Date    TSH 1.023 03/12/2017     Reviewed prior medical records including radiology report of 3/12/17 CT renal stone, abdomen, pelvis; 8/19/16 limited abdominal ultrasound; 12/23/14 gastric emptying study (delayed); 3/4/17 discharge summary, 6/12/17 ct abdomen pelvis, 6/13/17 gastric emptying study, & endoscopy history (see surgical history).    11/12/14 Colonoscopy was reviewed and procedure report states:   " Findings:       The perianal examination was normal.       Non-bleeding internal hemorrhoids were found during retroflexion and        were medium-sized.       The rectum, sigmoid colon, descending colon, transverse colon,        ascending colon, cecum, appendiceal orifice and ileocecal valve        appeared normal. Biopsies were taken with a cold forceps from the        right colon and left colon for evaluation of microscopic colitis.       The terminal ileum appeared normal.  Impression:          - Non-bleeding internal hemorrhoids.                       - The rectum, sigmoid colon, descending colon,                        transverse colon, ascending colon, cecum,                        appendiceal orifice and ileocecal valve are normal.                        Biopsied.                       - The examined portion of the ileum was normal.  Recommendation:      - Patient has a contact number available for                        emergencies. The signs and symptoms of potential                        delayed complications were discussed with the                        patient. Return to normal activities tomorrow.                        Written discharge instructions were provided to the                        patient.                       - " "High fiber diet.                       - Continue present medications.                       - Repeat colonoscopy at age 52 for screening                        purposes.                       - Discharge patient to home (ambulatory).                       - Await pathology results.                       - Return to nurse practitioner after studies are                        complete. ".  Biopsy results:   "1-2. COLON; BIOPSY:  UNREMARKABLE COLONIC MUCOSA.  NO EVIDENCE OF MICROSCOPIC COLITIS.  NO ACUTE COLITIS.  NO EVIDENCE OF CHRONIC INJURY.  NEGATIVE FOR ADENOMA."    3/3/17 EGD was reviewed and procedure report states:   " Impression:          - Normal esophagus.                       - Gastritis. Biopsied.                       - Normal examined duodenum.                       - Patient with reported hematemesis without a drop                        in hemoglobin with mild gastritis on exam (biopsied).  Recommendation:      - [facility] care.                       - Advance diet as tolerated.                       - Continue present medications.                       - Await pathology results.                       - Return to primary care physician as previously                        scheduled. ".  Biopsy results:   "Stomach, antrum and body, biopsy:  Mild chronic nonactive gastritis.  Negative for H. pylori by routine stain.  Negative for intestinal metaplasia and dysplasia."  Objective:      Physical Exam   Constitutional: She is oriented to person, place, and time. She appears well-developed. No distress.   HENT:   Mouth/Throat: Oropharynx is clear and moist and mucous membranes are normal. No oral lesions. No oropharyngeal exudate.   Eyes: Conjunctivae are normal. No scleral icterus.   Cardiovascular: Normal rate.    Pulmonary/Chest: Effort normal. No respiratory distress.   Abdominal: Soft. Normal appearance and bowel sounds are normal. She exhibits no distension and no " mass. There is no hepatosplenomegaly. There is no tenderness. There is no rigidity, no rebound and no guarding. No hernia.   Neurological: She is alert and oriented to person, place, and time.   Skin: Skin is warm and dry. No rash noted. She is not diaphoretic. No erythema. No pallor.   Non-jaundiced   Psychiatric: She has a normal mood and affect. Her behavior is normal.   Nursing note and vitals reviewed.      Assessment:       1. Gastroparesis    2. Chronic gastritis without bleeding, unspecified gastritis type    3. Non-intractable vomiting with nausea, unspecified vomiting type    4. Weight loss        Plan:       Gastroparesis  -     Ambulatory Referral to Gastroenterology- Dr. Arevalo (motility specialist)  reviewed diagnosis and possibly etiologies and that it can be idiopathetic, may also improve over time or can be lifelong, reviewed AVS educational material on diagnosis and given to patient, patient verbalized understanding  Recommended small frequent meals instead of 3 big meals a day, low fat meals, avoid high fiber (insoluble fiber), red meats, dairy, and non-digestible solids (peels, fruit pulp, etc). Avoid NSAIDs (such as Mobic) and narcotics. Recommend low residue diet.  -Discussed about possible medical therapy such as Reglan or erythromycin and discussed that this would be managed by one of the GI doctors, lastly surgical options are available at [facility] (Dr. Justin Hein), patient verbalized understanding & agreed with management plan    Chronic gastritis without bleeding, unspecified gastritis type  -     Ambulatory Referral to Gastroenterology  - discontinue prilosec due to ineffective therapy  - continue zantac 300 mg once daily as directed  - START protonix 40 mg once daily as directed, - take in the morning before breakfast, discussed about possible long term use of medication (prefer to use lowest effective dose or discontinuing if possible) and discussed the risks & benefits with  taking a reflux medication long term, and to take OTC calcium and vitamin d supplements as directed (such as Citracal +D), pt verbalized understanding  -discussed about the different types of medications used to treat reflux and how to use them, antacids can be used PRN for breakthrough heartburn symptoms by reducing stomach acid that is already produced, H2 blockers (zantac) work by limiting the amount acid production, & PPI's work to block acid production and are taken daily, patient verbalized understanding.  -Educated patient on lifestyle modifications to help control/reduce reflux/abdominal pain including: avoid large meals, avoid eating within 2-3 hours of bedtime (avoid late night eating & lying down soon after eating), elevate head of bed if nocturnal symptoms are present, smoking cessation (if current smoker), & weight loss (if overweight).   -Educated to avoid known foods which trigger reflux symptoms & to minimize/avoid high-fat foods, chocolate, caffeine, citrus, alcohol, & tomato products.  -Advised to avoid/limit use of NSAID's, since they can cause GI upset, bleeding, and/or ulcers. If needed, take with food.    Nausea and vomiting  -     Ambulatory Referral to Gastroenterology  - continue zofran or phenergan prn as directed; advised not to take together; patient and mother verbalized understanding    Weight loss  -     Ambulatory Referral to Gastroenterology- Dr. Arevalo  - encouraged PO intake and daily calorie counts to ensure adequate nutrition is taken in, recommend at least 2,000 calories a day  - recommend nutritional drinks, such as ProNourish, to supplement nutrition needs    Abnormal finding on CT scan  - follow-up with GYN for continued evaluation and management    Hypotension, unspecified hypotension type  Recommend follow-up with Primary Care Provider for continued evaluation and management.    Return if symptoms worsen or fail to improve, for FOLLOW-UP WITH ONE OF THE GI PHYSICIANS FOR  CONTINUED EVALUATION AND MANAGEMENT.    If no improvement in symptoms or symptoms worsen, call/follow-up at clinic or go to ER.

## 2017-07-18 NOTE — PATIENT INSTRUCTIONS
Gastroparesis     Gastroparesis means that food and fluids move too slowly out of the stomach into the duodenum.   Gastroparesis (also called delayed gastric emptying) happens when the stomach takes longer than normal to empty of food. This is due to a problem with motility (the movement of the muscles in the digestive tract). For many people, gastroparesis is a lifelong condition. But treatment can help relieve symptoms and prevent complications. Read on to learn more about gastroparesis and how it can be managed.  How gastroparesis develops  With normal motility, signals from nerves tell the stomach muscles when to contract. These muscles move food from the stomach into the duodenum (the first part of the small bowel). With gastroparesis, the nerves or muscles are damaged. This causes motility to slow down or stop completely. As a result, food cannot move from the stomach properly. This delayed emptying can cause nausea, vomiting, and other symptoms. Malnutrition can result. Bezoars (hardened lumps of food) can form in the stomach and cause other complications as well.  Causes of gastroparesis  Gastroparesis can be caused by any of the following:  · Diabetes  · Surgery involving any of the digestive organs, such as the stomach and bowels  · Certain medicines, such as strong pain medicines (narcotics)  · Certain conditions, such as systemic scleroderma, Parkinson disease, and thyroid disease  · After a viral illness  In many cases, the cause of gastroparesis cannot be found.  Signs and symptoms of gastroparesis  These can include:  · Nausea and vomiting  · Feeling full quickly when eating  · Belly pain  · Heartburn  · Belly bloating  · Weight loss  · Loss of appetite  · High and low blood sugar levels (in people with diabetes)  Diagnosing gastroparesis  Your healthcare provider will ask about your symptoms and health history. Youll also be examined. In addition, blood tests and X-rays are often done to check  your health and rule out other problems. To confirm the problem, you may need other tests as well. These can include:  · Upper endoscopy. This is done to see inside the stomach and duodenum. For the test, an endoscope is used. This is a thin, flexible tube with a tiny camera on the end. Its inserted through the mouth and down into the stomach and duodenum.  · Upper gastrointestinal (GI) series. This is done to take X-rays of the upper GI tract from the mouth to the small bowel. For the test, a substance called barium is used. The barium coats the upper GI tract so that it will show up clearly on X-rays.  · Gastric emptying scan. This is done to measure how quickly food leaves the stomach. For the test, a meal containing a harmless radioactive substance (tracer) is eaten. Then scans of the stomach are done. The tracer shows up clearly on the scans and shows the movement of the food through the stomach.  · Antroduodenal manometry. This test gives pressure measurements of the stomach and small intestine to check how the contractions are working.  · Newer tests. These are being created and include breath tests and wireless capsule studies   Treating gastroparesis  The goal of treatment is to help you manage your condition. Treatment may include one or more of the following:  · Dietary changes. You may need to make changes to your eating habits and daily diet. For instance, your healthcare provider may instruct you to eat small meals throughout the day. Doing this can keep you from feeling full too quickly. You may be placed on a liquid or soft diet. This means youll eat liquid foods or foods that are mashed or put through a . In addition, you may need to avoid foods high in fats and fiber. These can slow digestion. For more help with your diet, your healthcare provider can refer you to a dietitian. In severe cases, you may need a feeding tube. This sends liquid food or medicine directly to your small bowel,  bypassing the stomach.  · Treating diabetes. If you are diabetic, it is important to control your blood sugar. High sugar levels worsen gastroparesis.   · Medicines. These can help manage symptoms, such as nausea and vomiting. They can also improve motility. Each medicine has specific risks and side effects. Your doctor can tell you more about any medicine that is prescribed for you.  · Surgery. You may need to have a tube surgically inserted into the stomach. The tube removes excess air and fluid. This can relieve severe symptoms of nausea and vomiting. In rare cases, other surgery may be needed on the stomach or small bowel. This is to create a new passageway for food to be emptied from the stomach.  · Gastric electrical stimulation. This treatment is done less often and may not be available. Your healthcare provider can tell you more about this treatment if it is a choice for you.  Diabetes and gastroparesis  If you have diabetes, gastroparesis can make it harder to manage your blood sugar level. Youll need to take extra steps in your treatment to prevent complications. Work with your healthcare provider to learn what you can do to protect your health. For more information, contact the American Diabetes Association, www.diabetes.org.   Long-term concerns   With treatment, most people can manage their symptoms and maintain their usual routines. If your symptoms are moderate to severe, you may need to see your healthcare provider more often for checkups. Also, other treatments will likely be needed.  Date Last Reviewed: 7/14/2016  © 6324-1816 [employer] [address] [place] [address], [place] [address]. All rights reserved. This information is not intended as a substitute for professional medical care. Always follow your healthcare professional's instructions.

## 2017-07-19 ENCOUNTER — TELEPHONE (OUTPATIENT)
Dept: PSYCHIATRY | Facility: CLINIC | Age: 37
End: 2017-07-19

## 2017-07-20 NOTE — TELEPHONE ENCOUNTER
"Please advise the patient:     I have reviewed her lab results and released them on My Chart:     1. Hemoglobin A1c (3 month check of blood sugar): no diabetes present  2. Lipid panel: cholesterol and triglycerides are acceptable except HDL or "good" cholesterol is low.  This is not a new finding.     I recommend that the patient quit smoking, get regular physical exercise as tolerated, and follow a healthy diet to target her low HDL.  Diets high in fruits, veggies, whole grains, beans, and lean meats (if you eat meat) are generally considered healthy.    Please continue the psychiatric medications as prescribed.  I do not see any evidence of significant metabolic side effects from Abilify at this time.   i  "

## 2017-07-20 NOTE — TELEPHONE ENCOUNTER
Attempt to reach patient. No answer on mobile number and voicemail left on home number to call us back at [phone]

## 2017-07-21 ENCOUNTER — PATIENT MESSAGE (OUTPATIENT)
Dept: FAMILY MEDICINE | Facility: CLINIC | Age: 37
End: 2017-07-21

## 2017-07-21 ENCOUNTER — PATIENT MESSAGE (OUTPATIENT)
Dept: PSYCHIATRY | Facility: CLINIC | Age: 37
End: 2017-07-21

## 2017-07-24 NOTE — TELEPHONE ENCOUNTER
Patient mother has been called and she states no appointment is need at this time this was just a FYI for the doctor.

## 2017-07-25 ENCOUNTER — OFFICE VISIT (OUTPATIENT)
Dept: RHEUMATOLOGY | Facility: CLINIC | Age: 37
End: 2017-07-25
Payer: MEDICARE

## 2017-07-25 VITALS
HEART RATE: 79 BPM | BODY MASS INDEX: 15.47 KG/M2 | SYSTOLIC BLOOD PRESSURE: 100 MMHG | HEIGHT: 63 IN | DIASTOLIC BLOOD PRESSURE: 70 MMHG | WEIGHT: 87.31 LBS

## 2017-07-25 DIAGNOSIS — N39.0 URINARY TRACT INFECTION WITHOUT HEMATURIA, SITE UNSPECIFIED: ICD-10-CM

## 2017-07-25 DIAGNOSIS — M32.19 OTHER SYSTEMIC LUPUS ERYTHEMATOSUS WITH OTHER ORGAN INVOLVEMENT: ICD-10-CM

## 2017-07-25 DIAGNOSIS — Z79.899 LONG-TERM USE OF PLAQUENIL: Primary | ICD-10-CM

## 2017-07-25 PROCEDURE — 99999 PR PBB SHADOW E&M-EST. PATIENT-LVL III: CPT | Mod: PBBFAC,,, | Performed by: INTERNAL MEDICINE

## 2017-07-25 PROCEDURE — 99499 UNLISTED E&M SERVICE: CPT | Mod: S$GLB,,, | Performed by: INTERNAL MEDICINE

## 2017-07-25 PROCEDURE — 99214 OFFICE O/P EST MOD 30 MIN: CPT | Mod: S$GLB,,, | Performed by: INTERNAL MEDICINE

## 2017-07-25 RX ORDER — HYDROXYCHLOROQUINE SULFATE 200 MG/1
200 TABLET, FILM COATED ORAL 2 TIMES DAILY
Qty: 60 TABLET | Refills: 5 | Status: SHIPPED | OUTPATIENT
Start: 2017-07-25 | End: 2017-10-03 | Stop reason: SDUPTHER

## 2017-07-25 RX ORDER — SULFAMETHOXAZOLE AND TRIMETHOPRIM 800; 160 MG/1; MG/1
1 TABLET ORAL 2 TIMES DAILY
Qty: 14 TABLET | Refills: 0 | Status: SHIPPED | OUTPATIENT
Start: 2017-07-25 | End: 2017-08-03 | Stop reason: ALTCHOICE

## 2017-07-25 ASSESSMENT — SYSTEMIC LUPUS ERYTHEMATOSUS DISEASE ACTIVITY INDEX (SLEDAI): TOTAL_SCORE: 0

## 2017-07-25 ASSESSMENT — ROUTINE ASSESSMENT OF PATIENT INDEX DATA (RAPID3)
TOTAL RAPID3 SCORE: 5.33
MDHAQ FUNCTION SCORE: 1.2
PAIN SCORE: 5
PATIENT GLOBAL ASSESSMENT SCORE: 7
PSYCHOLOGICAL DISTRESS SCORE: 4.4

## 2017-07-25 NOTE — PROGRESS NOTES
"Subjective:       Patient ID: Cat Denson is a 37 y.o. female.    Chief Complaint: SLE  HPI 37-year-old patient with SLE is here for follow-up. She was diagnosed with SLE and Sjogren syndrome in 2005.  Manifested by TRISTAN Pos 1:1280 S, .29, SSB 46.65, + arthritis + tyler rash + photosensitivity +sicca symptoms +Raynaud's +livedoid rash.  She also has history of embolic stroke secondary to smoking and birth control use.  Negative anti-phospholipid antibodies.  Currently on hydroxychloroquine 200 mg twice daily.  Eye exam pending  UA reveals UTI.  Did not follow-up with primary care physician.  Requesting Bactrim as it has helped in the past.   Denies any joint pain today Denies any joint swelling.  Denies early morning stiffness  She denies fever, weight loss, serositis, hematuria, no photosensitivity/rash at this time, oral or nasal ulcerS, alopecia, dysphagia, diarrhea or blood in the stools    Review of Systems   Constitutional: Negative for fatigue and fever.   HENT: Negative for ear discharge and ear pain.    Eyes: Negative for pain and redness.   Respiratory: Negative for cough and shortness of breath.    Cardiovascular: Negative for chest pain and palpitations.   Gastrointestinal: Negative for abdominal distention and abdominal pain.   Genitourinary: Negative for genital sores and hematuria.   Neurological: Negative for tremors and seizures.   Psychiatric/Behavioral: Negative for agitation and hallucinations.         Objective:     /70 (BP Location: Right arm, Patient Position: Sitting, BP Method: Automatic)   Pulse 79   Ht 5' 3" (1.6 m)   Wt 39.6 kg (87 lb 4.8 oz)   BMI 15.46 kg/m²      Physical Exam   Constitutional: She is oriented to person, place, and time and well-developed, well-nourished, and in no distress.   HENT:   Head: Atraumatic.   Eyes: Conjunctivae and EOM are normal. Pupils are equal, round, and reactive to light.   Neck: Normal range of motion. Neck supple. No " thyromegaly present.   Cardiovascular: Normal rate and regular rhythm.    Pulmonary/Chest: Effort normal and breath sounds normal.   Abdominal: Soft. Bowel sounds are normal.   Neurological: She is alert and oriented to person, place, and time.   Skin: Skin is warm and dry.     Psychiatric: Memory and affect normal.   Musculoskeletal: She exhibits no edema.     No joint effusion                Lab Results   Component Value Date    WBC 7.90 07/18/2017    HGB 13.5 07/18/2017    HCT 40.6 07/18/2017    MCV 93 07/18/2017     07/18/2017     CMP  Sodium   Date Value Ref Range Status   07/18/2017 141 136 - 145 mmol/L Final     Potassium   Date Value Ref Range Status   07/18/2017 3.7 3.5 - 5.1 mmol/L Final     Chloride   Date Value Ref Range Status   07/18/2017 107 95 - 110 mmol/L Final     CO2   Date Value Ref Range Status   07/18/2017 24 23 - 29 mmol/L Final     Glucose   Date Value Ref Range Status   07/18/2017 91 70 - 110 mg/dL Final     BUN, Bld   Date Value Ref Range Status   07/18/2017 6 6 - 20 mg/dL Final     Creatinine   Date Value Ref Range Status   07/18/2017 1.4 0.5 - 1.4 mg/dL Final     Calcium   Date Value Ref Range Status   07/18/2017 10.0 8.7 - 10.5 mg/dL Final     Total Protein   Date Value Ref Range Status   07/18/2017 6.8 6.0 - 8.4 g/dL Final     Albumin   Date Value Ref Range Status   07/18/2017 3.7 3.5 - 5.2 g/dL Final     Total Bilirubin   Date Value Ref Range Status   07/18/2017 0.3 0.1 - 1.0 mg/dL Final     Comment:     For infants and newborns, interpretation of results should be based  on gestational age, weight and in agreement with clinical  observations.  Premature Infant recommended reference ranges:  Up to 24 hours.............<8.0 mg/dL  Up to 48 hours............<12.0 mg/dL  3-5 days..................<15.0 mg/dL  6-29 days.................<15.0 mg/dL       Alkaline Phosphatase   Date Value Ref Range Status   07/18/2017 68 55 - 135 U/L Final     AST   Date Value Ref Range Status    07/18/2017 17 10 - 40 U/L Final     ALT   Date Value Ref Range Status   07/18/2017 10 10 - 44 U/L Final     Anion Gap   Date Value Ref Range Status   07/18/2017 10 8 - 16 mmol/L Final     eGFR if    Date Value Ref Range Status   07/18/2017 56 (A) >60 mL/min/1.73 m^2 Final     eGFR if non    Date Value Ref Range Status   07/18/2017 49 (A) >60 mL/min/1.73 m^2 Final     Comment:     Calculation used to obtain the estimated glomerular filtration  rate (eGFR) is the CKD-EPI equation. Since race is unknown   in our information system, the eGFR values for   -American and Non--American patients are given   for each creatinine result.       Lab Results   Component Value Date    SEDRATE 7 07/18/2017     Lab Results   Component Value Date    CRP 0.2 07/18/2017      Results for MARA LYNCH (MRN [id number]) as of 8/1/2016 09:59   Ref. Range 7/19/2013 10:30 9/23/2013 16:40 9/30/2014 11:53 8/3/2015 08:53 1/29/2016 08:48   TRISTAN Latest Ref Range: Neg <1:160  Pos, Titer to follow (A)       TRISTAN HEP-2 Titer Unknown Pos 1:1280 (A) Positive 1:640 Sp...      TRISTAN Hep-2 Pattern Unknown Speckled (A)       Anti-SSA Antibody Latest Ref Range: 0.00 - 19.99 .29 (A) 139.68 (H)      Anti-SSA Interpretation Latest Ref Range: Negative  Positive (A) Positive (A)      Anti-SSB Antibody Latest Ref Range: 0.00 - 19.99 EU 46.65 33.16 (H)      Anti-SSB Interpretation Latest Ref Range: Negative  Positive (A) Positive (A)      ds DNA Ab Latest Ref Range: Negative 1:10   Negative 1:10   Negative 1:10   Anti Sm Antibody Latest Ref Range: 0.00 - 19.99 EU  1.51      Anti-Sm Interpretation Latest Ref Range: Negative   Negative      Anti Sm/RNP Antibody Latest Ref Range: 0.00 - 19.99 EU  16.51      Anti-Sm/RNP Interpretation Latest Ref Range: Negative   Negative      TTG IgA Latest Ref Range: <20 UNITS   7     Complement (C-3) Latest Ref Range: 50 - 180 mg/dL  102  100 118   Complement (C-4) Latest Ref  Range: 11 - 44 mg/dL  29  32 34   IgA Latest Ref Range: 40 - 350 mg/dL   229     Protein, Serum Latest Ref Range: 6.0 - 8.4 g/dL  7.5      Albumin grams/dl Latest Ref Range: 3.35 - 5.55 g/dL  4.35      Alpha-1 grams/dl Latest Ref Range: 0.17 - 0.41 g/dL  0.33      Alpha-2 grams/dl Latest Ref Range: 0.43 - 0.99 g/dL  0.83      Beta grams/dl Latest Ref Range: 0.50 - 1.10 g/dL  0.77      Gamma grams/dl Latest Ref Range: 0.67 - 1.58 g/dL  1.23      CCP Antibodies Latest Ref Range: 0 - 4.9 U/mL < 0.5 (<)       Rheumatoid Factor Latest Ref Range: 0 - 15 IU/ml 39 (H)         Assessment:   SLE -TRISTAN Pos 1:1280 S, .29, SSB 46.65, + arthritis + tyler rash + photosensitivity.  SLEDAI 0    Long-term use of hydroxychloroquine  UTI  Sjogren's syndrome  Raynaud's phenomenon  Fibromyalgia  Chronic pain syndrome  Bipolar 2 disorder  S/P left hemiparesis  Plan:   Ophthalmology referral for Plaquenil eye exam  Continue hydroxychloroquine 200 mg twice daily  Start Bactrim for UTI in follow-up with primary care physician  Continue gabapentin to 800 mg tid  Return to clinic in 6 months with lupus activity labs

## 2017-08-03 ENCOUNTER — TELEPHONE (OUTPATIENT)
Dept: PSYCHIATRY | Facility: CLINIC | Age: 37
End: 2017-08-03

## 2017-08-03 ENCOUNTER — TELEPHONE (OUTPATIENT)
Dept: FAMILY MEDICINE | Facility: CLINIC | Age: 37
End: 2017-08-03

## 2017-08-03 ENCOUNTER — OFFICE VISIT (OUTPATIENT)
Dept: PSYCHIATRY | Facility: CLINIC | Age: 37
End: 2017-08-03
Payer: COMMERCIAL

## 2017-08-03 VITALS
WEIGHT: 83.13 LBS | HEIGHT: 63 IN | SYSTOLIC BLOOD PRESSURE: 110 MMHG | BODY MASS INDEX: 14.73 KG/M2 | DIASTOLIC BLOOD PRESSURE: 62 MMHG

## 2017-08-03 DIAGNOSIS — F12.20 CANNABIS USE DISORDER, MODERATE, DEPENDENCE: ICD-10-CM

## 2017-08-03 DIAGNOSIS — K31.84 GASTROPARESIS: ICD-10-CM

## 2017-08-03 DIAGNOSIS — F09 COGNITIVE DISORDER: ICD-10-CM

## 2017-08-03 DIAGNOSIS — F31.32 BIPOLAR 1 DISORDER, DEPRESSED, MODERATE: ICD-10-CM

## 2017-08-03 DIAGNOSIS — F41.9 ANXIETY: ICD-10-CM

## 2017-08-03 PROCEDURE — 99213 OFFICE O/P EST LOW 20 MIN: CPT | Mod: S$GLB,,, | Performed by: PSYCHIATRY & NEUROLOGY

## 2017-08-03 PROCEDURE — 99999 PR PBB SHADOW E&M-EST. PATIENT-LVL II: CPT | Mod: PBBFAC,,, | Performed by: PSYCHIATRY & NEUROLOGY

## 2017-08-03 PROCEDURE — 99499 UNLISTED E&M SERVICE: CPT | Mod: S$GLB,,, | Performed by: PSYCHIATRY & NEUROLOGY

## 2017-08-03 PROCEDURE — 3008F BODY MASS INDEX DOCD: CPT | Mod: S$GLB,,, | Performed by: PSYCHIATRY & NEUROLOGY

## 2017-08-03 RX ORDER — HYDROXYZINE PAMOATE 25 MG/1
25 CAPSULE ORAL EVERY 8 HOURS PRN
Qty: 60 CAPSULE | Refills: 0 | Status: SHIPPED | OUTPATIENT
Start: 2017-08-03 | End: 2017-08-28 | Stop reason: SDUPTHER

## 2017-08-03 RX ORDER — MIRTAZAPINE 15 MG/1
15 TABLET, FILM COATED ORAL NIGHTLY
Qty: 30 TABLET | Refills: 0 | Status: SHIPPED | OUTPATIENT
Start: 2017-08-03 | End: 2017-08-28 | Stop reason: SDUPTHER

## 2017-08-03 RX ORDER — ARIPIPRAZOLE 10 MG/1
10 TABLET ORAL DAILY
Qty: 30 TABLET | Refills: 0 | Status: SHIPPED | OUTPATIENT
Start: 2017-08-03 | End: 2017-08-28 | Stop reason: SDUPTHER

## 2017-08-03 NOTE — TELEPHONE ENCOUNTER
Patient was scheduled to see Dr Escamilla today 8/3/17 for weight loss, not eating and dehydration.  Spoke to patient's mother which she states this has been going on for a month now.  Advised patient's mother to take her to the ER.  Verbalized understanding appt will be canceled.

## 2017-08-03 NOTE — PROGRESS NOTES
Outpatient Psychiatry Follow-Up Visit (MD/NP)    8/3/2017    Clinical Status of Patient:  Outpatient (Ambulatory)    Chief Complaint:  Cat Denson is a 37 y.o. female who presents today for follow-up of mood disorder and anxiety.  Met with patient alone and then pt with her boyfriend together.     Interval History and Content of Current Session:  Interim Events/Subjective Report/Content of Current Session:  36 yo disabled female presents to clinic for follow up treatment of  follow up appt of bipolar I disorder, panic disorder, cannabis use disorder, and cognitive disorder.   She has a prior hx of chronic pain, nausea, and emesis.  Has been having health issues since she was a child, dx with Lupus at 10 yo. She missed a lot of school.  Has impulsive tendencies with hx of cutting, shaving her head (shaved today).  She has prior hx of suicide attempt at 20 yo.  Multiple prior admissions for depression, SI.   Pt had a CVA in November 2015 - Embolic stroke involving right middle cerebral artery; she is hypercoagulable due to lupus.  She is currently taking Coumadin.  Pt has residual left hemiparesis.   She has had balance problems, forgetfulness, and severe focusing issue.    She is under the care of Rheumatology - Dr Singla Dr Chebehar - Neurology.   PCP: Hector Dukes MD    Neuropsychological testing 2/17:   Personality test data suggested the presence of mild depression. Neuropsychological test results reveal impairment in immediate and delayed visual memory, visual attention, temporal orientation, visuospatial/constructional abilities, abstract reasoning, psychomotor speed, and mental flexibility; and variability in immediate auditory/verbal memory (high average and mildly impaired performances) and verbal fluency (low average and moderately impaired performances); with mild depression. The pattern of impairment is consistent with right MCA CVA. She will continue to see Dr. Cruz and Mrs. Wiley for  psychiatric care. She was encouraged to resume PT, OT, and Speech Therapy for further stroke rehabilitation.     Past Psychiatric Hx:  Dx Bipolar 2 Disorder (dx over 10 yrs ago), Anxiety, Impulse Control Disorder, hx pseudoseizures with possible epileptic seizures   Prior IOP: [facility]   Hx Suicide attempt at 20 yo  Hx suicidal ideations with admit to [facility] January 2015, [facility] for SI 2015,   First admit: 26 yo: for SI - [facility]; most recent [facility]. 2017 - for w/d affect, psychosis, poor memory recall, not sleeping x days  5 prior psych admissions  Hx self harm at age 17 yo     Past psychiatric hx: Wellbutrin for smoking cessation, helped to reduce, Zoloft, Trazodone (taken off due to tiredness one month ago), Lexapro, Paxil, Cymbalta (no pain benefit), Geodon, Trileptal, Lamictal, Seroquel, Chantix (makes her vomit), Olanzapine, Risperdal, Effexor, ritalin     Past medical history:  Lupus   Sjogren's   CKD  Fibromyalgia, chronic pain   Seizure Disorder   Bronchospasm   Hx CVA  Gastroparesis   Fibromyalgia     Interim Hx:   Mirtazapine 7.5 mg nightly added last visit.  She is no longer taking Clonazepam.  She is compliant with Abilify 10 mg daily.   Pt weighs 83 lbs today. BMI 14.72.   Wt last visit with me 6/17 was 89 lbs.     She reports problems with nausea, vomiting 2-3 times daily;  She denies purging.  She denies binge eating.  She knows she is underweight.  She missed Depoprovera in 2/17 - she has still not had a menstrual cycle since her CVA.  She denies being sexually active except for once with current BF.   She is still in relationship - he is with her today in [place].  She thinks she will end it soon, he wants to have sexual relationship (once they did, she does not want to do it again),   Pt has no libido.   She reports he is very supportive to her.   Her home environment is ok, brother in penitentiary - he had an outburst at bank and was arrested.  "  She eats "a little bit" Bulgarian onion soup - 1/2 can a day - this is it, but is drinking fluids.  Watered down apple juice 2-3 ten ounce bottles daily.     She has appt with gastroparesis appt at [facility] in Sept Dr Decker?   She has a lot of anxiety, chest has weight on it, heart races.  It is hard to leave home. She is agoraphobia - fearful to leave home - only leaves for MD appts.  She is tense, on edge. She gets irritable.  Family tells her she is lashing out, easily angered.     Denies auditory/visual hallucinations  No paranoia  No bowen.   Not sleeping well - does not get sleepy until 3:30 am - sleeps only 3 hrs a night. She does nap 1 hr during day - sleep issues x 2 weeks   Denies feeling sad, no motivation, no energy, not hopelessness, no worthlessness, no guilt.   Denies suicidal/homicidal ideations.  Yells, no violence, no self harm at all    Tobacco: 1/2 ppd - states she wants to quit, does not know how   Drug use: no THC x 1 week, no other drug use   Caffeine: she reports she quit 1/2 tab caffeine tab last week 200 mg tabs - stomach upset     She needs assistance with bathing - needs reminders - last bath 2 days ago, does not get dressed daily, watches TV/on [employer].   Mom now helps with meds and handles finances; she does not drive.   No incontinence  She does not drive   Memory ok     I spoke with patient's mother today on the phone; she also expressed concerned about pt's wt loss. She feels patient's medical issue is primary issue, not psychiatric.  She denies that Cat is very depressed, but acknowledges that she hardly eats due to gastroparesis.  She has not observed any intentional purging by patient in months since her prior psych admission.  She reports a few weeks ago, pt began having body spasms on the floor - she was fully conscious and was given K+ tablets b/c she has had this reaction in past with low K+.   The spasms did stop. Mother notified PCP.  Mother also reports pt has diarrhea " "since insurance stopped paying for Welchol.     Lab work 7/18 reviewed.     She is under the care of Monique Deleon LCSW    On AIMS, pt noted to have continued some minimal mouth movements - activated with using right hand alone - pt has no awareness and denies any impairment.  this started after her CVA - BF noted it only occurs with use of hemiparetic side - not occurring with left hand us today and not occurring spontaneously - no dyskinesias seen on Dr Chebehar's exam     Review of Systems   · PSYCHIATRIC: Pertinant items are noted in the narrative.  · CONSTITUTIONAL: + weight loss   · MUSCULOSKELETAL: back and LE pain   · CV: no chest pain, occasional tachycardia  · NEURO: left HP, no seizures     Past Medical, Family and Social History: The patient's past medical, family and social history have been reviewed and updated as appropriate within the electronic medical record - see encounter notes.    Medications:   Abilify 10 mg daily   Mirtazapine 7.5 mg nightly     Compliance:  Yes     Side effects:  None reported     Risk Parameters:  Patient reports no current suicidal ideation  Patient reports no homicidal ideation  Patient reports no self-injurious behavior  Patient reports no violent behavior    Exam (detailed: at least 9 elements; comprehensive: all 15 elements)   Constitutional  Vitals:  Most recent vital signs, dated less than 90 days prior to this appointment, were reviewed.   Vitals:    08/03/17 1029   Weight: 37.7 kg (83 lb 1.8 oz)   Height: 5' 3" (1.6 m)   repeat       General:  age appropriate, very thin, dark clothes, fair grooming, fair eye contact, anxious at times      Musculoskeletal  Muscle Strength/Tone:  No tremors noted    Gait & Station:  slow     Psychiatric  Speech:  no latency; no press, decreased spontaneity    Mood & Affect:  "ok"  Blunted    Thought Process:  Linear with questions, somewhat slowed    Associations:  intact   Thought Content:  no current suicidality, no " homicidality, delusions, or paranoia   Insight:  Fair    Judgement: Fair    Orientation:  Grossly intact for content of interview   Memory: fair recall of recent hx   Language: grossly intact    Attention Span & Concentration:  Grossly intact to interview    Fund of Knowledge:  intact and appropriate to age and level of education, familiar with aspects of current personal life     Assessment and Diagnosis   Status/Progress: Based on the examination today, the patient's problem(s) is/are under inadequate control.  New problems have not been presented today.   Co-morbidities are complicating management of the primary condition.      General Impression: pt had CVA Nov 2015 with significantly impaired focus, memory lapses, worsening depression, anxiety - improved with ritalin and on current medications (previously ok'd ritalin use with PCP).  Pt had episode of acute hepatitis, liver enzymes improved, but now with declining renal function; pt also resumed smoking; she is also using marijuana. Recent admission to [facility] and earlier admission for supratherapeutic INR. Mother managing medications now. Pt under care of Neurology; Neuropsychological testing done and consistent with her MCA CVA.    She continues to smoke tobacco and marijuana.  She complains of poor focus/motivation.  Mother feels pt is not depressed - improved with Abilify. Pt continues to lose weight with persistent n/v.   Given hospitalization earlier this yr with manic symptoms, will change dx to Bipolar 1 disorder which may be more appropriate.  Substance abuse also a factor and complicates dx.   Pt with significant wt loss since last visit; she and her mother both deny that pt has purged at all in recent months (not since last admission).  She reports nausea and emesis - no benefit from anti-emetics.     Bipolar 1 Disorder, MRE depressed, moderate   Panic Disorder with Agoraphobia   Cognitive Disorder due to CVA  Insomnia  "Disorder  Cannabis Use Disorder. Moderate   Tobacco Use Disorder    Lupus, migraines, CVA, hx hepatitis, declining renal function, gastroparesis     GAF: 54    Intervention/Counseling/Treatment Plan   · Medication Management: The risks and benefits of medication were discussed with the patient.    -  Trial of Vistaril 25 mg TID prn anxiety; discussed potential for oversedation, dry mouth.    - continue Abilify 10 mg daily for now  Typical RAJWINDER's reviewed including weight gain, abnormal movements, EPS, TD, metabolic side effects. May give added benefit for focus issues in addition to use as mood stabilizer    - titrate Mirtazapine to 15 mg nightly - may promote wt gain, tx anxiety, monitor for mood destabilization      - Continue psychotherapy with Monique Deleon LCSW    - refrain from cannabis use - psychoeducation today about risks of use, pt counseled today on health risks associated with use. She declines resources for rehab     - pt counseled on smoking cessation and risks for further CVA    - Pt instructed to go to ER with thoughts of harming self, others; Call to report any worsening of symptoms or problems with the medication    - IOC - pt's boyfriend. Santy Andrade on chart     - referral to IOP - she is accepting of referral to [facility] - has not followed up due to medical issue    - arranged appt today to be seen by PCP, Dr Escamilla for emesis/nausea associated with gastroparesis; pt with significant wt loss in interim and mother reports episode of "body spasms"     Return to Clinic: 3 weeks  "

## 2017-08-03 NOTE — TELEPHONE ENCOUNTER
When scheduling patient for follow up appointment for Dr Cruz she voiced that she had an appointment today with Dr Escamilla for 3:40 pm that was cancelled and they told her to go to ER for evaluation. Patient was confused because she didn't think she was feeling bad enough to have to go to ER. We scheduled her for a Saturday appointment with DR Andrew.   Explained to patient that if she was to start experiencing any SOB or dizziness or any other symptoms like passing out vomiting to go to ER for evaluation.  Patient understood and will go to ER if symptoms worsen. Otherwise she will see Dr Andrew on Saturday @ 11am

## 2017-08-05 ENCOUNTER — OFFICE VISIT (OUTPATIENT)
Dept: FAMILY MEDICINE | Facility: CLINIC | Age: 37
End: 2017-08-05
Payer: MEDICARE

## 2017-08-05 VITALS
BODY MASS INDEX: 15.35 KG/M2 | WEIGHT: 86.63 LBS | HEIGHT: 63 IN | DIASTOLIC BLOOD PRESSURE: 70 MMHG | HEART RATE: 93 BPM | RESPIRATION RATE: 16 BRPM | SYSTOLIC BLOOD PRESSURE: 101 MMHG | TEMPERATURE: 99 F | OXYGEN SATURATION: 96 %

## 2017-08-05 DIAGNOSIS — K91.89 POSTCHOLECYSTECTOMY DIARRHEA: ICD-10-CM

## 2017-08-05 DIAGNOSIS — R79.83 HOMOCYSTINEMIA: ICD-10-CM

## 2017-08-05 DIAGNOSIS — K31.84 GASTROPARESIS: Primary | ICD-10-CM

## 2017-08-05 DIAGNOSIS — R19.7 POSTCHOLECYSTECTOMY DIARRHEA: ICD-10-CM

## 2017-08-05 PROCEDURE — 99999 PR PBB SHADOW E&M-EST. PATIENT-LVL IV: CPT | Mod: PBBFAC,,, | Performed by: INTERNAL MEDICINE

## 2017-08-05 PROCEDURE — 3008F BODY MASS INDEX DOCD: CPT | Mod: S$GLB,,, | Performed by: INTERNAL MEDICINE

## 2017-08-05 PROCEDURE — 99499 UNLISTED E&M SERVICE: CPT | Mod: S$GLB,,, | Performed by: INTERNAL MEDICINE

## 2017-08-05 PROCEDURE — 99214 OFFICE O/P EST MOD 30 MIN: CPT | Mod: S$GLB,,, | Performed by: INTERNAL MEDICINE

## 2017-08-05 RX ORDER — CHOLESTYRAMINE 4 G/9G
4 POWDER, FOR SUSPENSION ORAL
Qty: 270 PACKET | Refills: 3 | Status: SHIPPED | OUTPATIENT
Start: 2017-08-05 | End: 2018-02-28

## 2017-08-05 RX ORDER — METOCLOPRAMIDE 5 MG/1
5 TABLET ORAL 4 TIMES DAILY
Qty: 90 TABLET | Refills: 1 | Status: SHIPPED | OUTPATIENT
Start: 2017-08-05 | End: 2017-10-03 | Stop reason: SDUPTHER

## 2017-08-05 NOTE — PROGRESS NOTES
"Subjective:       Patient ID: Cat Denson is a 37 y.o. female.    Chief Complaint: Emesis (daily); Weight Loss; and Diarrhea (sometimes)    HPI   CHIEF COMPLAINT: gastroenteritis   HPI: Has known gastroparesis.  Has both lupus and homocystinemia  .    ONSET/TIMING: Trauma: no. . Onset   5 years    ago. Sudden: no.      DURATION: .  Intermittent     QUALITY/COURSE:    unchanged  .     LOCATION:     INTENSITY/SEVERITY:  #   5   / 10 (on 1 to 10 scale).  Vomited:  2-3 x/day.  diarhia: 1 x/d.     MODIFIERS/TREATMENTS: . Eating worsens: no. . Drinking worsens: no.  Possible contaminated food: no.          The following symptoms/statements  are positive if BOLD, negative otherwise.     CONTEXT/WHEN: . Similar problems.  Exposure_to_others_with_similar_symptoms. .  .  Pregnancy .  Recent_contaminated_food. .  Alcohol_ingestion.   Stopped_adrenal_steroids . . Antibiotics.  Travel    REVIEW OF SYMPTOMS:    . Fever(subjective) .  Vertigo .  Headache . Hematemesis .. Bloody_stools . Watery_stools . Loose_stools . Abdominal_pain . Chest_Pain . Dyspnea .  Urinary_Problems . Jaundice . dizziness .          Review of Systems    Objective:      Vitals:    08/05/17 1126   BP: 96/67   Pulse: 90   Resp: 16   Temp: 98.6 °F (37 °C)   TempSrc: Oral   SpO2: 96%   Weight: 39.3 kg (86 lb 10.3 oz)   Height: 5' 3" (1.6 m)   PainSc: 0-No pain     Physical Exam   Constitutional: She appears well-developed and well-nourished.   2 second capillary refill   Eyes: Pupils are equal, round, and reactive to light.   Cardiovascular: Normal rate, regular rhythm and normal heart sounds.    Pulmonary/Chest: Effort normal and breath sounds normal.   Abdominal: Soft. There is no tenderness.   Musculoskeletal:   Weak left arm with weak hand grasp   Neurological: She is alert.   Psychiatric: She has a normal mood and affect. Her behavior is normal. Thought content normal.   Nursing note and vitals reviewed.        Assessment:       1. Gastroparesis "    2. Homocystinemia    3. Postcholecystectomy diarrhea          Plan:   Discussed the risk of tardive dyskinesia due to the Reglan.  The patient understands that the tardive dyskinesia but could become permanent and is willing to take the risk.  She seemed to understand the implications  Gastroparesis  -     metoclopramide HCl (REGLAN) 5 MG tablet; Take 1 tablet (5 mg total) by mouth 4 (four) times daily.  Dispense: 90 tablet; Refill: 1    Homocystinemia    Postcholecystectomy diarrhea  -     cholestyramine (QUESTRAN) 4 gram packet; Take 1 packet (4 g total) by mouth 3 (three) times daily with meals.  Dispense: 270 packet; Refill: 3      Return in about 6 weeks (around 9/16/2017).

## 2017-08-10 ENCOUNTER — TELEPHONE (OUTPATIENT)
Dept: PSYCHIATRY | Facility: CLINIC | Age: 37
End: 2017-08-10

## 2017-08-10 ENCOUNTER — OFFICE VISIT (OUTPATIENT)
Dept: PSYCHIATRY | Facility: CLINIC | Age: 37
End: 2017-08-10
Payer: COMMERCIAL

## 2017-08-10 ENCOUNTER — OFFICE VISIT (OUTPATIENT)
Dept: OBSTETRICS AND GYNECOLOGY | Facility: CLINIC | Age: 37
End: 2017-08-10
Payer: MEDICARE

## 2017-08-10 VITALS
HEIGHT: 63 IN | WEIGHT: 88.63 LBS | HEART RATE: 77 BPM | DIASTOLIC BLOOD PRESSURE: 72 MMHG | BODY MASS INDEX: 15.7 KG/M2 | SYSTOLIC BLOOD PRESSURE: 107 MMHG

## 2017-08-10 DIAGNOSIS — F40.01 PANIC DISORDER WITH AGORAPHOBIA: ICD-10-CM

## 2017-08-10 DIAGNOSIS — Z01.419 ENCOUNTER FOR WELL WOMAN EXAM WITH ROUTINE GYNECOLOGICAL EXAM: Primary | ICD-10-CM

## 2017-08-10 DIAGNOSIS — F31.81 BIPOLAR 2 DISORDER: ICD-10-CM

## 2017-08-10 DIAGNOSIS — F41.9 ANXIETY: Primary | ICD-10-CM

## 2017-08-10 PROCEDURE — G0101 CA SCREEN;PELVIC/BREAST EXAM: HCPCS | Mod: S$GLB,,, | Performed by: OBSTETRICS & GYNECOLOGY

## 2017-08-10 PROCEDURE — 99499 UNLISTED E&M SERVICE: CPT | Mod: S$GLB,,, | Performed by: SOCIAL WORKER

## 2017-08-10 PROCEDURE — 88142 CYTOPATH C/V THIN LAYER: CPT

## 2017-08-10 PROCEDURE — 90834 PSYTX W PT 45 MINUTES: CPT | Mod: S$GLB,,, | Performed by: SOCIAL WORKER

## 2017-08-10 PROCEDURE — 99999 PR PBB SHADOW E&M-EST. PATIENT-LVL III: CPT | Mod: PBBFAC,,, | Performed by: OBSTETRICS & GYNECOLOGY

## 2017-08-10 NOTE — TELEPHONE ENCOUNTER
----- Message from Monique Deleon LCSW sent at 8/10/2017  9:30 AM CDT -----  Regarding: update  Patient is with me now.  She weighs 88 lbs today, a 5 lb gain in the last week.  She stated that her appetite hasn't changed much but the vomiting has stopped.  She said that she has been eating only chips and salsa con queso about 3 times per day.

## 2017-08-10 NOTE — TELEPHONE ENCOUNTER
Thank you the update.  I am glad to hear that she has gained substantial weight, although she is not getting the proper nutrients. I believe she has an upcoming appt with GI specialist at [facility].

## 2017-08-14 ENCOUNTER — TELEPHONE (OUTPATIENT)
Dept: OPTOMETRY | Facility: CLINIC | Age: 37
End: 2017-08-14

## 2017-08-14 NOTE — TELEPHONE ENCOUNTER
----- Message from Pastora Bowling sent at 8/11/2017  4:40 PM CDT -----  Please call pt and schedule has a ref in system.  [facility]  Book field same day???  Thanks

## 2017-08-18 ENCOUNTER — PATIENT MESSAGE (OUTPATIENT)
Dept: FAMILY MEDICINE | Facility: CLINIC | Age: 37
End: 2017-08-18

## 2017-08-18 NOTE — TELEPHONE ENCOUNTER
Spoke to patient's mother who reports that on 2 occasions patient had what looked like all over muscle spasms.  Resembled a grand mal seizure but was wide awake. She reports that this was very painful. States this happened before quite some time ago and she had a low potassium level. Mother gave her a K+ tab after the 2nd episode today just in case. Advised that she take the patient to the ER for eval. Mother agreed.

## 2017-08-21 NOTE — PROGRESS NOTES
SUBJECTIVE:   37 y.o. female   for annual routine Pap and checkup. No LMP recorded. Patient is not currently having periods (Reason: Other)..  She complains of ovarian cyst on ct.        Past Medical History:   Diagnosis Date    Anxiety     Bipolar 1 disorder     Chronic kidney disease     Chronic pain     Depression     bipolar 2    Fibromyalgia     Gastroparesis     Hx of psychiatric care     Lactose intolerance     Lupus     Psychiatric problem     Renal tubular acidosis     Seizure     Sjogren's syndrome     Smoker     Stroke 2015    Suicide attempt     overdosed on a bottle of paxil, muscle relaxers, & zoloft    Therapy      Past Surgical History:   Procedure Laterality Date    CHOLECYSTECTOMY      COLONOSCOPY  2014    Dr. Colin, repeat at 52 years old for screening    UPPER GASTROINTESTINAL ENDOSCOPY  2017    Dr. Harris     Social History     Social History    Marital status: Single     Spouse name: N/A    Number of children: 0    Years of education: N/A     Occupational History    Not on file.     Social History Main Topics    Smoking status: Current Every Day Smoker     Packs/day: 1.00     Years: 15.00     Types: Cigarettes     Start date: 2015    Smokeless tobacco: Never Used      Comment: quit smoking after the stroke    Alcohol use No    Drug use:      Types: Marijuana      Comment: smokes occasionally, helps with pain and appetite    Sexual activity: No      Comment: usually female, but currently with her boyfriend     Other Topics Concern    Not on file     Social History Narrative    Doesn't drive since the stroke, mother drives her and she lives with her mother.     Family History   Problem Relation Age of Onset    Hypertension Mother     Hyperlipidemia Mother     No Known Problems Father     Post-traumatic stress disorder Brother     Drug abuse Brother     Diabetes Maternal Uncle     Hypertension Maternal Uncle     Alcohol abuse  Maternal Uncle     Scoliosis Paternal Aunt     Heart disease Paternal Uncle     Mental illness Maternal Grandmother     Cancer Maternal Grandmother      lung / brain - smoker    Drug abuse Maternal Grandmother     Hypertension Maternal Grandmother     Hyperlipidemia Maternal Grandmother     Diabetes Maternal Grandmother     Heart disease Maternal Grandfather     Hypertension Maternal Grandfather     Alcohol abuse Maternal Grandfather     No Known Problems Paternal Grandmother     Mental illness Paternal Grandfather     Early death Paternal Grandfather      self    Colon cancer Neg Hx     Colon polyps Neg Hx     Crohn's disease Neg Hx     Ulcerative colitis Neg Hx     Celiac disease Neg Hx      OB History    Para Term  AB Living   0 0 0 0 0 0   SAB TAB Ectopic Multiple Live Births   0 0 0 0 0               Current Outpatient Prescriptions   Medication Sig Dispense Refill    aripiprazole (ABILIFY) 10 MG Tab Take 1 tablet (10 mg total) by mouth once daily. 30 tablet 0    chlorhexidine (PERIDEX) 0.12 % solution RINSE WITH 1/2 OZ BID AFTER BRUSHING AND FLOSSING  6    cholestyramine (QUESTRAN) 4 gram packet Take 1 packet (4 g total) by mouth 3 (three) times daily with meals. 270 packet 3    gabapentin (NEURONTIN) 800 MG tablet TAKE 1 TABLET(800 MG) BY MOUTH TWICE DAILY 60 tablet 4    hydroxychloroquine (PLAQUENIL) 200 mg tablet Take 1 tablet (200 mg total) by mouth 2 (two) times daily. 60 tablet 5    hydrOXYzine pamoate (VISTARIL) 25 MG Cap Take 1 capsule (25 mg total) by mouth every 8 (eight) hours as needed (anxiety). 60 capsule 0    loratadine (CLARITIN) 10 mg tablet Take 10 mg by mouth once daily.      metoclopramide HCl (REGLAN) 5 MG tablet Take 1 tablet (5 mg total) by mouth 4 (four) times daily. 90 tablet 1    metoprolol succinate (TOPROL-XL) 25 MG 24 hr tablet Take 1 tablet (25 mg total) by mouth once daily. 30 tablet 5    mirtazapine (REMERON) 15 MG tablet Take 1 tablet  (15 mg total) by mouth every evening. 30 tablet 0    ondansetron (ZOFRAN-ODT) 4 MG TbDL Take 1 tablet (4 mg total) by mouth every 6 (six) hours as needed. 60 tablet 3    pantoprazole (PROTONIX) 40 MG tablet Take 1 tablet (40 mg total) by mouth once daily. Before breakfast 30 tablet 6    PREVIDENT 5000 BOOSTER PLUS 1.1 % Pste BRUSH ON NIGHTLY AFTER NORMAL HYGIENE REGIMAN. SPIT OUT EXCESS DO NOT RINSE  4    ranitidine (ZANTAC) 300 MG tablet TAKE 1 TABLET(300 MG) BY MOUTH EVERY DAY 30 tablet 0    rivaroxaban (XARELTO) 20 mg Tab Take 1 tablet (20 mg total) by mouth daily with dinner or evening meal. 30 tablet 5    tamsulosin (FLOMAX) 0.4 mg Cp24 Take 1 capsule (0.4 mg total) by mouth once daily. 30 capsule 3    WELCHOL 625 mg tablet TAKE 1 TABLET BY MOUTH EVERY DAY INCREASE BY ONE PILL EVERY 3 TO 4 DAYS UNTIL DESIRED CONTROL IS REACHED 180 tablet 0    promethazine (PHENERGAN) 25 MG tablet Take 1 tablet (25 mg total) by mouth every 4 (four) hours. 60 tablet 5     No current facility-administered medications for this visit.      Allergies: Amoxil [amoxicillin]; Augmentin [amoxicillin-pot clavulanate]; and Avelox [moxifloxacin]     ROS:  Constitutional: no weight loss, weight gain, fever, fatigue  Eyes:  No vision changes, glasses/contacts  ENT/Mouth: No ulcers, sinus problems, ears ringing, headache  Cardiovascular: No inability to lie flat, chest pain, exercise intolerance, swelling, heart palpitations  Respiratory: No wheezing, coughing blood, shortness of breath, or cough  Gastrointestinal: No diarrhea, bloody stool, nausea/vomiting, constipation, gas, hemorrhoids  Genitourinary: No blood in urine, painful urination, urgency of urination, frequency of urination, incomplete emptying, incontinence, abnormal bleeding, painful periods, heavy periods, vaginal discharge, vaginal odor, painful intercourse, sexual problems, bleeding after intercourse.  Musculoskeletal: No muscle weakness  Skin/Breast: No painful  "breasts, nipple discharge, masses, rash, ulcers  Neurological: No passing out, seizures, numbness, headache  Endocrine: No diabetes, hypothyroid, hyperthyroid, hot flashes, hair loss, abnormal hair growth, ance  Psychiatric: No depression, crying  Hematologic: No bruises, bleeding, swollen lymph nodes, anemia.      OBJECTIVE:   The patient appears well, alert, oriented x 3, in no distress.  /72   Pulse 77   Ht 5' 3" (1.6 m)   Wt 40.2 kg (88 lb 10 oz)   BMI 15.70 kg/m²   NECK: no thyromegaly, trachea midline  SKIN: no acne, striae, hirsutism  BREAST EXAM: breasts appear normal, no suspicious masses, no skin or nipple changes or axillary nodes  ABDOMEN: no hernias, masses, or hepatosplenomegaly  GENITALIA: normal external genitalia, no erythema, no discharge  URETHRA: normal urethra, normal urethral meatus  VAGINA: Normal  CERVIX: no lesions or cervical motion tenderness  UTERUS: normal size, contour, position, consistency, mobility, non-tender  ADNEXA: normal adnexa    \  ASSESSMENT:   well woman    PLAN:   pap smear  counseled on breast self exam  return annually or prn  Reassured cyst normal with ovulation  "

## 2017-08-28 ENCOUNTER — OFFICE VISIT (OUTPATIENT)
Dept: PSYCHIATRY | Facility: CLINIC | Age: 37
End: 2017-08-28
Payer: COMMERCIAL

## 2017-08-28 VITALS
SYSTOLIC BLOOD PRESSURE: 110 MMHG | DIASTOLIC BLOOD PRESSURE: 75 MMHG | HEIGHT: 63 IN | BODY MASS INDEX: 15.9 KG/M2 | HEART RATE: 89 BPM | WEIGHT: 89.75 LBS

## 2017-08-28 DIAGNOSIS — F41.9 ANXIETY: ICD-10-CM

## 2017-08-28 DIAGNOSIS — F09 COGNITIVE DISORDER: ICD-10-CM

## 2017-08-28 DIAGNOSIS — F31.81 BIPOLAR 2 DISORDER: ICD-10-CM

## 2017-08-28 DIAGNOSIS — F12.20 CANNABIS USE DISORDER, MODERATE, DEPENDENCE: ICD-10-CM

## 2017-08-28 DIAGNOSIS — Z79.899 HIGH RISK MEDICATION USE: ICD-10-CM

## 2017-08-28 PROCEDURE — 99213 OFFICE O/P EST LOW 20 MIN: CPT | Mod: S$GLB,,, | Performed by: PSYCHIATRY & NEUROLOGY

## 2017-08-28 PROCEDURE — 99999 PR PBB SHADOW E&M-EST. PATIENT-LVL II: CPT | Mod: PBBFAC,,, | Performed by: PSYCHIATRY & NEUROLOGY

## 2017-08-28 PROCEDURE — 3008F BODY MASS INDEX DOCD: CPT | Mod: S$GLB,,, | Performed by: PSYCHIATRY & NEUROLOGY

## 2017-08-28 PROCEDURE — 99499 UNLISTED E&M SERVICE: CPT | Mod: S$GLB,,, | Performed by: PSYCHIATRY & NEUROLOGY

## 2017-08-28 RX ORDER — MIRTAZAPINE 15 MG/1
TABLET, FILM COATED ORAL
Qty: 1 TABLET | Refills: 0
Start: 2017-08-28 | End: 2017-09-29

## 2017-08-28 RX ORDER — DIVALPROEX SODIUM 500 MG/1
500 TABLET, FILM COATED, EXTENDED RELEASE ORAL NIGHTLY
Qty: 30 TABLET | Refills: 1 | Status: SHIPPED | OUTPATIENT
Start: 2017-08-28 | End: 2017-10-25 | Stop reason: SDUPTHER

## 2017-08-28 RX ORDER — HYDROXYZINE PAMOATE 25 MG/1
CAPSULE ORAL
Qty: 1 CAPSULE | Refills: 0
Start: 2017-08-28 | End: 2017-09-29

## 2017-08-28 RX ORDER — ARIPIPRAZOLE 10 MG/1
TABLET ORAL
Qty: 30 TABLET | Refills: 2 | Status: SHIPPED | OUTPATIENT
Start: 2017-08-28 | End: 2017-09-29 | Stop reason: SDUPTHER

## 2017-08-28 NOTE — PROGRESS NOTES
Outpatient Psychiatry Follow-Up Visit (MD/NP)    8/28/2017    Clinical Status of Patient:  Outpatient (Ambulatory)    Chief Complaint:  Cat Denson is a 37 y.o. female who presents today for follow-up of mood disorder and anxiety.  Met with patient alone and then pt with her boyfriend together.     Interval History and Content of Current Session:  Interim Events/Subjective Report/Content of Current Session:  36 yo disabled female presents to clinic for follow up treatment of  follow up appt of bipolar I disorder, panic disorder, cannabis use disorder, and cognitive disorder.   She has a prior hx of chronic pain, nausea, and emesis.  Has been having health issues since she was a child, dx with Lupus at 12 yo. She missed a lot of school.  Has impulsive tendencies with hx of cutting, shaving her head (shaved today).  She has prior hx of suicide attempt at 22 yo.  Multiple prior admissions for depression, SI.   Pt had a CVA in November 2015 - Embolic stroke involving right middle cerebral artery; she is hypercoagulable due to lupus.  She is currently taking Coumadin.  Pt has residual left hemiparesis.   She has had balance problems, forgetfulness, and severe focusing issue.    She is under the care of Rheumatology - Dr Singla Dr Chebehar - Neurology.   PCP: Hector Dukes MD    Neuropsychological testing 2/17:   Personality test data suggested the presence of mild depression. Neuropsychological test results reveal impairment in immediate and delayed visual memory, visual attention, temporal orientation, visuospatial/constructional abilities, abstract reasoning, psychomotor speed, and mental flexibility; and variability in immediate auditory/verbal memory (high average and mildly impaired performances) and verbal fluency (low average and moderately impaired performances); with mild depression. The pattern of impairment is consistent with right MCA CVA. She will continue to see Dr. Cruz and Mrs. Wiley  for psychiatric care. She was encouraged to resume PT, OT, and Speech Therapy for further stroke rehabilitation.     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 2017 - for w/d affect, psychosis, poor memory recall, not sleeping x days  5 prior psych admissions  Hx self harm at age 17 yo     Past psychiatric hx: Wellbutrin for smoking cessation, helped to reduce, Zoloft, Trazodone (taken off due to tiredness one month ago), Lexapro, Paxil, Cymbalta (no pain benefit), Geodon, Trileptal, Lamictal, Seroquel, Chantix (makes her vomit), Olanzapine, Risperdal, Effexor, ritalin, depakote (was taken off ? Cannot recall why)    Past medical history:  Lupus   Sjogren's   CKD  Fibromyalgia, chronic pain   Seizure Disorder   Bronchospasm   Hx CVA  Gastroparesis   Fibromyalgia     Interim Hx:   Mirtazapine titrated to 15 mg nightly last visit.  She continues to take other medications. Reports PRN of Vistaril 25 mg is not effective.   She and BF Brent have broken up, he moved out. He still comes over daily and plays video games.    She is wake for days at a time, does not sleep at all.  She cannot shut off brain.  Does not get tired.  This has gotten worse since last visit.   She is compliant with Abilify 10 mg which she is taking at 9 pm.  Mirtazapine added and titrated to 15 mg nightly. Sleep has worsened on it.     Chart reviewed - note PCP encounter 8/18:   Note      Spoke to patient's mother who reports that on 2 occasions patient had what looked like all over muscle spasms.  Resembled a grand mal seizure but was wide awake. She reports that this was very painful. States  this happened before quite some time ago and she had a low potassium level. Mother gave her a K+ tab after the 2nd episode today just in case. Advised that she take the patient to the ER for eval. Mother agreed.        Pt did not go to ER, has not recurred, she reports muscle spasms start in Left hemiparetic UE - started before Reglan      Weight improved 6 lbs today.  Body mass index is 15.89 kg/m².  Symptoms are improved on Reglan prescribed by Dr Andrew.  She has appt next month with Dr [facility] GI [facility] (specialist in gastroparesis). She has not thrown up in weeks.  She still eats poorly.  Diet consists of chips/dip.  She is not really hungry.  She drinks watered down juice during the day.   No motivation to eat healthier.  She had steak, mashed potatoes at [employer] not long ago.  Mother cooks - she does not like mother's cooking.  She is sensitive to food textures. She is pleased that she gained weight, would like to be 120 lbs.   No binge eating, no purging. No intentional calorie restriction, no exercise.     She spends days binge watching [employer] - she really enjoys this, smiles discussing it. Watching [employer] now.   Brother is now back at home (was incarcerated).  She has 1 yo nephew and 5 yo niece.   Energy low; motivation select for sedentary activities (TV, video games). She is bored, nothing to do. Focus is poor.   She declines IOP - she is concerned that she may not be able to keep up with schedule  Denies suicidal/homicidal ideations.  No self harm, no violence.     She is happy, no excessive worry. Anxiety minimal.  She is restless today, wrapped in sheet (lobby cold).  Taps left leg repeatedly.    No panic attacks. Vistaril not effective - when asked why she takes it, she stated to get herself to calm down - anxious for no reason.   Her head is always thinking, cannot shut it off - boredom, random thoughts.  She denies irritability or anger.   No bowen, Denies auditory/visual hallucinations. No  "IOR, no paranoia.   Not in PT     Pt does not drive. Memory is ok.    Mom administers meds, pt handles her own finances.  She gives her mother some money towards to bills.     She missed Depoprovera in 2/17 - she has still not had a menstrual cycle since her CVA.  She denies being sexually active except for once with current BF. No recent sexual activity.     Tobacco: 1/2 ppd - states she wants to quit, does not know how   Drug use: she has decreased THC - not wanting to smoke as much. Decreased to 2-3 times a week, no other drug use.   Caffeine: she reports she quit 1/2 tab caffeine tab last week 200 mg tabs - stomach upset - no recent caffeine   Alcohol: none      She needs assistance with bathing - self care is reduced. Baths once a week, she does not get dressed daily   No incontinence - much better.     She is under the care of Monique Deleon LCSW    On AIMS, pt noted to have continued some minimal mouth movements - activated with using right hand alone - pt has no awareness and denies any impairment.  this started after her CVA - BF noted it only occurs with use of hemiparetic side - not occurring with left hand us today and not occurring spontaneously - no dyskinesias seen on Dr Chebehar's exam     Review of Systems   · PSYCHIATRIC: Pertinant items are noted in the narrative.  · CONSTITUTIONAL: + weight gain   · MUSCULOSKELETAL: back and LE pain - bilaterally 5/10   · CV: no chest pain, no tachycardia  · NEURO: left HP, no seizures, one day she got dizzy, lightheaded, possibly fell vs "slowly went to the ground."  This occurred in AM - she was standing trying to make some food, lightheaded     Past Medical, Family and Social History: The patient's past medical, family and social history have been reviewed and updated as appropriate within the electronic medical record - see encounter notes.    Medications:   Abilify 10 mg daily   Mirtazapine 15 mg nightly   Vistaril 25 mg TID prn anxiety   Reglan - she " "takes it several times a day     Compliance:  Yes     Side effects:  None reported     Risk Parameters:  Patient reports no current suicidal ideation  Patient reports no homicidal ideation  Patient reports no self-injurious behavior  Patient reports no violent behavior    Exam (detailed: at least 9 elements; comprehensive: all 15 elements)   Constitutional  Vitals:  Most recent vital signs, dated less than 90 days prior to this appointment, were reviewed.   Vitals:    08/28/17 0827   BP: 110/75   Pulse: 89   Weight: 40.7 kg (89 lb 11.6 oz)   Height: 5' 3" (1.6 m)   repeat       General:  age appropriate, very thin, dark clothes, fair grooming, good eye contact, tapping her left LE, wrapped in sheet     Musculoskeletal  Muscle Strength/Tone:  Slight b/l hand tremors    Gait & Station:  slow     Psychiatric  Speech:  no latency; no press, decreased spontaneity    Mood & Affect:  "ok"  Restricted, but reactive, smiles    Thought Process:  Linear with questions, somewhat slowed    Associations:  intact   Thought Content:  no current suicidality, no homicidality, delusions, or paranoia   Insight:  Fair    Judgement: Fair    Orientation:  Grossly intact for content of interview - August 28, 2017    Memory: fair recall of recent hx, recall 3/3, at 3 mins 3/3 at 3 mins    Language: grossly intact can repeat a phrase   Attention Span & Concentration:  Grossly intact to interview, [id number]   Fund of Knowledge:  intact and appropriate to age and level of education, familiar with aspects of current personal life     Assessment and Diagnosis   Status/Progress: Based on the examination today, the patient's problem(s) is/are under inadequate control.  New problems have not been presented today.   Co-morbidities are complicating management of the primary condition.      General Impression: pt had CVA Nov 2015 with significantly impaired focus, memory lapses, worsening depression, anxiety - improved with ritalin and on " current medications (previously ok'd ritalin use with PCP).  Pt had episode of acute hepatitis, liver enzymes improved, but now with declining renal function; pt also resumed smoking; she is also using marijuana. Recent admission to [facility] and earlier admission for supratherapeutic INR. Mother managing medications now. Pt under care of Neurology; Neuropsychological testing done and consistent with her MCA CVA.    She continues to smoke tobacco and marijuana.  She complains of poor focus/motivation.  Mother feels pt is not depressed - improved with Abilify. Pt continues to lose weight with persistent n/v.   Given hospitalization earlier this yr with manic symptoms, will change dx to Bipolar 1 disorder which may be more appropriate.  Substance abuse also a factor and complicates dx.   Pt with significant wt loss since last visit; she and her mother both deny that pt has purged at all in recent months (not since last admission).  She reports nausea and emesis - no benefit from anti-emetics.  Pt improved today. Feeling better physically.  Still having racing thoughts, poor sleep. Poor diet/nutrition.     Bipolar 1 Disorder, MRE depressed, partial remission   Panic Disorder with Agoraphobia   Cognitive Disorder due to CVA  Insomnia Disorder  Cannabis Use Disorder. Moderate   Tobacco Use Disorder    Lupus, migraines, CVA, hx hepatitis, declining renal function, gastroparesis     GAF: 56   Intervention/Counseling/Treatment Plan   · Medication Management: The risks and benefits of medication were discussed with the patient.    -  Titration of Vistaril to 50 mg TID prn anxiety; discussed potential for oversedation, dry mouth.    - continue Abilify 10 mg daily for now - move to morning dosing  Typical RAJWINDER's reviewed including weight gain, abnormal movements, EPS, TD, metabolic side effects. May give added benefit for focus issues in addition to use as mood stabilizer    - wean off of  Mirtazapine for  concern may be too activating or causing mood destabilization:  Reduce to 7.5 mg nightly x 5 days, then stop    - Continue psychotherapy with Monique Deleon LCSW    - refrain from cannabis use - psychoeducation today about risks of use, pt counseled today on health risks associated with use. She declines resources for rehab     - pt counseled on smoking cessation and risks for further CVA    - Pt instructed to go to ER with thoughts of harming self, others; Call to report any worsening of symptoms or problems with the medication    - referral to [facility] - she is accepting of referral to [facility] - will fax referral today     - options reviewed; considered Seroquel at bedtime, but since Reglan was added by PCP, will avoid additional neuroleptic due to risks of interaction.  Will add Depakote  mg nightly to target mood, racing thoughts, decreased sleep.  Will need to monitor LFTs carefully and use lowest effective dose.  Risks to liver discussed with patient    - labs: VPA level, CBC, CMP    - follow up with Neuro, GI, PCP    Return to Clinic: 4 weeks

## 2017-09-01 RX ORDER — MIRTAZAPINE 15 MG/1
TABLET, FILM COATED ORAL
Qty: 30 TABLET | Refills: 0 | OUTPATIENT
Start: 2017-09-01

## 2017-09-05 NOTE — PROGRESS NOTES
"Individual Psychotherapy (PhD/LCSW)    8/10/2017    Site:  [facility]         Therapeutic Intervention: Met with patient.  Outpatient - Insight oriented psychotherapy 45 min - CPT code 67146, Outpatient - Behavior modifying psychotherapy 45 min - CPT code 59645 and Outpatient - Supportive psychotherapy 45 min - CPT Code 45245    Chief complaint/reason for encounter: depression, anxiety, sleep, interpersonal and sexual problems     Interval history and content of current session:  Patient presented for follow up session in a calm mood.  Patient stated that she was having problems with gastroparesis and had been losing weight.  She believes that she has gained some weight because she has been eating chips and salsa con queso.  Patient weighed 88 pounds today.  Five days ago she weighed 86 pounds.  She stated that she has not been vomiting the last couple days, but her appetite hasn't improved much.  Patient stated that her brother is in penitentiary due to "making a scene at the bank".  She expects that he will be released on the 16th.  His girlfriend, Alivia, has been living with them for a couple months.  Patient and Alivia get along well.  Patient spent the majority of the session talking about her relationship with her boyfriend, Brent.  We discussed their differences and her fears about a possible breakup.  She wonders if she is with Brent because she is lonely.  Through the relationship she has realized that a heterosexual relationship is not ideal for her.  She knows that continuing the relationship is not fair to him and she feels that she is "holding him back".  He seems to have withdrawn lately and also probably knows that they are not happy together.   encouraged patient to be honest with Brent and communicate her intentions for their friendship.  She agreed that it is time for them to make a decision.  She denies having any depression or anxiety, other than when she is in crowded public spaces.  "     Treatment plan:  · Target symptoms: depression, distractability, lack of focus, anxiety   · Why chosen therapy is appropriate versus another modality: relevant to diagnosis  · Outcome monitoring methods: self-report, observation, feedback from family  · Therapeutic intervention type: insight oriented psychotherapy, behavior modifying psychotherapy, supportive psychotherapy    Risk parameters:  Patient reports no suicidal ideation  Patient reports no homicidal ideation  Patient reports no self-injurious behavior  Patient reports no violent behavior    Verbal deficits: None    Patient's response to intervention:  The patient's response to intervention is accepting.    Progress toward goals and other mental status changes:  The patient's progress toward goals is limited.    Diagnosis:     ICD-10-CM ICD-9-CM   1. Anxiety F41.9 300.00   2. Panic disorder with agoraphobia F40.01 300.21   3. Bipolar 2 disorder F31.81 296.89       Plan:  individual psychotherapy and consult psychiatrist for medication evaluation    Return to clinic: Return in about 5 weeks (around 9/14/2017).    Length of Service (minutes): 45

## 2017-09-13 RX ORDER — MIRTAZAPINE 7.5 MG/1
TABLET, FILM COATED ORAL
Qty: 30 TABLET | Refills: 0 | OUTPATIENT
Start: 2017-09-13

## 2017-09-13 RX ORDER — ARIPIPRAZOLE 10 MG/1
TABLET ORAL
Qty: 30 TABLET | Refills: 0 | OUTPATIENT
Start: 2017-09-13

## 2017-09-26 ENCOUNTER — PATIENT MESSAGE (OUTPATIENT)
Dept: FAMILY MEDICINE | Facility: CLINIC | Age: 37
End: 2017-09-26

## 2017-09-29 ENCOUNTER — LAB VISIT (OUTPATIENT)
Dept: LAB | Facility: HOSPITAL | Age: 37
End: 2017-09-29
Attending: PSYCHIATRY & NEUROLOGY
Payer: MEDICARE

## 2017-09-29 ENCOUNTER — OFFICE VISIT (OUTPATIENT)
Dept: PSYCHIATRY | Facility: CLINIC | Age: 37
End: 2017-09-29
Payer: COMMERCIAL

## 2017-09-29 VITALS
SYSTOLIC BLOOD PRESSURE: 97 MMHG | WEIGHT: 90.63 LBS | BODY MASS INDEX: 16.06 KG/M2 | HEART RATE: 64 BPM | DIASTOLIC BLOOD PRESSURE: 67 MMHG | HEIGHT: 63 IN

## 2017-09-29 DIAGNOSIS — Z79.899 HIGH RISK MEDICATION USE: ICD-10-CM

## 2017-09-29 DIAGNOSIS — F41.9 ANXIETY: ICD-10-CM

## 2017-09-29 DIAGNOSIS — F31.81 BIPOLAR 2 DISORDER: ICD-10-CM

## 2017-09-29 LAB
ALBUMIN SERPL BCP-MCNC: 3.7 G/DL
ALP SERPL-CCNC: 74 U/L
ALT SERPL W/O P-5'-P-CCNC: 11 U/L
ANION GAP SERPL CALC-SCNC: 10 MMOL/L
AST SERPL-CCNC: 19 U/L
BASOPHILS # BLD AUTO: 0.03 K/UL
BASOPHILS NFR BLD: 0.4 %
BILIRUB SERPL-MCNC: 0.3 MG/DL
BUN SERPL-MCNC: 9 MG/DL
CALCIUM SERPL-MCNC: 9.5 MG/DL
CHLORIDE SERPL-SCNC: 106 MMOL/L
CO2 SERPL-SCNC: 25 MMOL/L
CREAT SERPL-MCNC: 1.6 MG/DL
DIFFERENTIAL METHOD: ABNORMAL
EOSINOPHIL # BLD AUTO: 0.1 K/UL
EOSINOPHIL NFR BLD: 0.7 %
ERYTHROCYTE [DISTWIDTH] IN BLOOD BY AUTOMATED COUNT: 13.6 %
EST. GFR  (AFRICAN AMERICAN): 47.8 ML/MIN/1.73 M^2
EST. GFR  (NON AFRICAN AMERICAN): 41.5 ML/MIN/1.73 M^2
GLUCOSE SERPL-MCNC: 76 MG/DL
HCT VFR BLD AUTO: 41.3 %
HGB BLD-MCNC: 13.4 G/DL
LYMPHOCYTES # BLD AUTO: 2.7 K/UL
LYMPHOCYTES NFR BLD: 37.5 %
MCH RBC QN AUTO: 30.3 PG
MCHC RBC AUTO-ENTMCNC: 32.4 G/DL
MCV RBC AUTO: 93 FL
MONOCYTES # BLD AUTO: 0.5 K/UL
MONOCYTES NFR BLD: 7.3 %
NEUTROPHILS # BLD AUTO: 3.8 K/UL
NEUTROPHILS NFR BLD: 54.1 %
PLATELET # BLD AUTO: 177 K/UL
PMV BLD AUTO: 13.1 FL
POTASSIUM SERPL-SCNC: 4.7 MMOL/L
PROT SERPL-MCNC: 7.4 G/DL
RBC # BLD AUTO: 4.42 M/UL
SODIUM SERPL-SCNC: 141 MMOL/L
VALPROATE SERPL-MCNC: 27.8 UG/ML
WBC # BLD AUTO: 7.09 K/UL

## 2017-09-29 PROCEDURE — 85025 COMPLETE CBC W/AUTO DIFF WBC: CPT

## 2017-09-29 PROCEDURE — 3008F BODY MASS INDEX DOCD: CPT | Mod: S$GLB,,, | Performed by: PSYCHIATRY & NEUROLOGY

## 2017-09-29 PROCEDURE — 99213 OFFICE O/P EST LOW 20 MIN: CPT | Mod: S$GLB,,, | Performed by: PSYCHIATRY & NEUROLOGY

## 2017-09-29 PROCEDURE — 99999 PR PBB SHADOW E&M-EST. PATIENT-LVL III: CPT | Mod: PBBFAC,,, | Performed by: PSYCHIATRY & NEUROLOGY

## 2017-09-29 PROCEDURE — 80164 ASSAY DIPROPYLACETIC ACD TOT: CPT

## 2017-09-29 PROCEDURE — 80053 COMPREHEN METABOLIC PANEL: CPT

## 2017-09-29 PROCEDURE — 99499 UNLISTED E&M SERVICE: CPT | Mod: S$GLB,,, | Performed by: PSYCHIATRY & NEUROLOGY

## 2017-09-29 PROCEDURE — 36415 COLL VENOUS BLD VENIPUNCTURE: CPT | Mod: PO

## 2017-09-29 RX ORDER — ARIPIPRAZOLE 10 MG/1
TABLET ORAL
Qty: 30 TABLET | Refills: 2 | Status: SHIPPED | OUTPATIENT
Start: 2017-09-29 | End: 2017-10-03 | Stop reason: SDUPTHER

## 2017-09-29 RX ORDER — TRAZODONE HYDROCHLORIDE 50 MG/1
50 TABLET ORAL NIGHTLY PRN
Qty: 30 TABLET | Refills: 1 | Status: SHIPPED | OUTPATIENT
Start: 2017-09-29 | End: 2017-11-27 | Stop reason: SDUPTHER

## 2017-09-29 NOTE — PROGRESS NOTES
Outpatient Psychiatry Follow-Up Visit (MD/NP)    9/29/2017    Clinical Status of Patient:  Outpatient (Ambulatory)    Chief Complaint:  Cat Denson is a 37 y.o. female who presents today for follow-up of mood disorder and anxiety.  Met with patient alone and then pt with her mother together.     Interval History and Content of Current Session:  Interim Events/Subjective Report/Content of Current Session:  36 yo disabled female presents to clinic for follow up treatment of  follow up appt of bipolar I disorder, panic disorder, cannabis use disorder, and cognitive disorder.   She has a prior hx of chronic pain, nausea, and emesis.  Has been having health issues since she was a child, dx with Lupus at 10 yo. She missed a lot of school.  Has impulsive tendencies with hx of cutting, shaving her head (shaved today).  She has prior hx of suicide attempt at 22 yo.  Multiple prior admissions for depression, SI.   Pt had a CVA in November 2015 - Embolic stroke involving right middle cerebral artery; she is hypercoagulable due to lupus.  She is currently taking Coumadin.  Pt has residual left hemiparesis.   She has had balance problems, forgetfulness, and severe focusing issue.    She is under the care of Rheumatology - Dr Singla Dr Chebehar - Neurology.   PCP: Hector Dukes MD    Neuropsychological testing 2/17:   Personality test data suggested the presence of mild depression. Neuropsychological test results reveal impairment in immediate and delayed visual memory, visual attention, temporal orientation, visuospatial/constructional abilities, abstract reasoning, psychomotor speed, and mental flexibility; and variability in immediate auditory/verbal memory (high average and mildly impaired performances) and verbal fluency (low average and moderately impaired performances); with mild depression. The pattern of impairment is consistent with right MCA CVA. She will continue to see Dr. Cruz and Mrs. Wiley for  psychiatric care. She was encouraged to resume PT, OT, and Speech Therapy for further stroke rehabilitation.     Past Psychiatric Hx:  Dx Bipolar 2 Disorder (dx over 10 yrs ago), Anxiety, Impulse Control Disorder, hx pseudoseizures with possible epileptic seizures   Prior IOP: [facility]   Hx Suicide attempt at 22 yo  Hx suicidal ideations with admit to [facility] January 2015, [facility] for SI 2015,   First admit: 24 yo: for SI - [facility]; most recent [facility]. 2017 - for w/d affect, psychosis, poor memory recall, not sleeping x days  5 prior psych admissions  Hx self harm at age 17 yo     Past psychiatric hx: Wellbutrin for smoking cessation, helped to reduce, Zoloft, Trazodone (taken off due to tiredness one month ago), Lexapro, Paxil, Cymbalta (no pain benefit), Geodon, Trileptal, Lamictal, Seroquel, Chantix (makes her vomit), Olanzapine, Risperdal, Effexor, ritalin, depakote (was taken off ? Cannot recall why)    Past medical history:  Lupus   Sjogren's   CKD  Fibromyalgia, chronic pain   Seizure Disorder   Bronchospasm   Hx CVA  Gastroparesis   Fibromyalgia     Interim Hx:   Depakote added last visit.  She is no longer taking Mirtazapine.  Continues to take Abilify 10 mg daily.  She is not taking Vistaril - reports it is not effective.  Has not had labs drawn.   Pt reports she is not sleeping at all,  knows she needs to sleep.  Mind will not shut off,+ racing thoughts.    No depression. Hair cut and dyed purple.  Now attending 3 days a week at [facility].    Motivation/energy are improved.    Appetite ok - wt gain of one pound in interim.  She has a hard time explaining her diet.  She saw specialist at [facility] and advised to follow a special diet; cannot have fruits and vegetables unless soft.  Eating some fruits and vegetable.  Body mass index is 16.05 kg/m².  Nausea/vomiting is improved.  Weeks since last episode.      Self care still low. Showers once a week.     Denies suicidal/homicidal ideations.  No self harm, no violence.    Now she and Scout just friends.   He no longer lives with them brother out of [place].     Hopeful, self worth pretty good.     Feels increased energy, content, not grandiose, no impulsivity.  Cannot shut head off.  She has tried Melatonin to sleep - not working; she takes 20 mg nightly. She started it a few days ago.     Denies auditory/visual hallucinations  No paranoia    Alcohol: none   Cannabis: twice daily - cannot notice an effect  No other drug use   Caffeine: 1/2 caffeine pill every now and then, 200 mg - not today - few times a week   Tobacco: pack lasts a few days - sort of ready to quit     Mom does not let her drive     Mother joined us in last moments of appointment: she feels Cat is doing much better this month, she has started coming out of her room, interactive, not sleeping as much, nausea/vomiting improved on reglan; she has gained wt.  Mother pleased with her progress.   No further instances of body spasms, she did not take pt to ER; she has given Cat OTC K+ when body spasms start     She needs assistance with bathing - self care is reduced. Baths once a week, she does not get dressed daily   No incontinence - much better.     She is under the care of Monique Deleon LCSW    On AIMS, pt noted to have continued some minimal mouth movements - activated with using right hand alone - pt has no awareness and denies any impairment.  this started after her CVA - BF noted it only occurs with use of hemiparetic side - not occurring with left hand us today and not occurring spontaneously - no dyskinesias seen on Dr Chebehar's exam     Review of Systems   · PSYCHIATRIC: Pertinant items are noted in the narrative.  · CONSTITUTIONAL: + weight gain   · MUSCULOSKELETAL: back and LE pain improved 0/10   · CV: no chest pain, no tachycardia  · NEURO: left HP, no seizures, no bradycardia    Past Medical, Family and Social History: The  "patient's past medical, family and social history have been reviewed and updated as appropriate within the electronic medical record - see encounter notes.    Medications:   Abilify 10 mg daily   Depakote  mg nightly   Pt added melatonin 20 mg nightly     Reglan - she takes it several times a day     Compliance:  Yes     Side effects:  None reported     Risk Parameters:  Patient reports no current suicidal ideation  Patient reports no homicidal ideation  Patient reports no self-injurious behavior  Patient reports no violent behavior    Exam (detailed: at least 9 elements; comprehensive: all 15 elements)   Constitutional  Vitals:  Most recent vital signs, dated less than 90 days prior to this appointment, were reviewed.   Vitals:    09/29/17 0849   BP: 97/67   Pulse: 64   Weight: 41.1 kg (90 lb 9.7 oz)   Height: 5' 3" (1.6 m)   repeat       General:  age appropriate, very thin, dark clothes, fair grooming, good eye contact, hair dyed purple     Musculoskeletal  Muscle Strength/Tone:  Slight b/l hand tremors    Gait & Station:  slow     Psychiatric  Speech:  no latency; no press, decreased spontaneity    Mood & Affect:  "ok"  Restricted, but reactive, smiles    Thought Process:  Linear with questions, somewhat slowed    Associations:  intact   Thought Content:  no current suicidality, no homicidality, delusions, or paranoia   Insight:  Fair    Judgement: Fair    Orientation:  Grossly intact for content of interview, alert and oriented x 3    Memory: fair recall of recent hx   Language: grossly intact   Attention Span & Concentration:  Grossly intact to interview   Fund of Knowledge:  intact and appropriate to age and level of education, familiar with aspects of current personal life     Assessment and Diagnosis   Status/Progress: Based on the examination today, the patient's problem(s) is/are under inadequate but improved control.  New problems have not been presented today.   Co-morbidities are " complicating management of the primary condition.      General Impression: pt had CVA Nov 2015 with significantly impaired focus, memory lapses, worsening depression, anxiety - improved with ritalin and on current medications (previously ok'd ritalin use with PCP).  Pt had episode of acute hepatitis, liver enzymes improved, but now with declining renal function; pt also resumed smoking; she is also using marijuana. Recent admission to [facility] and earlier admission for supratherapeutic INR. Mother managing medications now. Pt under care of Neurology; Neuropsychological testing done and consistent with her MCA CVA.    She continues to smoke tobacco and marijuana.  She complains of poor focus/motivation.  Mother feels pt is not depressed - improved with Abilify. Pt continues to lose weight with persistent n/v.   Given hospitalization earlier this yr with manic symptoms, will change dx to Bipolar 1 disorder which may be more appropriate.  Substance abuse also a factor and complicates dx.   Pt with significant wt loss since last visit; she and her mother both deny that pt has purged at all in recent months (not since last admission).  She reports nausea and emesis - no benefit from anti-emetics.  Pt improved today. Feeling better physically.  Still having racing thoughts, poor sleep. Poor diet/nutrition.     Bipolar 1 Disorder, MRE depressed, partial remission   Panic Disorder with Agoraphobia   Cognitive Disorder due to CVA  Insomnia Disorder  Cannabis Use Disorder. Moderate   Tobacco Use Disorder    Lupus, migraines, CVA, hx hepatitis, declining renal function, gastroparesis     GAF: 58   Intervention/Counseling/Treatment Plan   · Medication Management: The risks and benefits of medication were discussed with the patient.    -  D/C Vistaril - pt reports it is ineffective     - continue Abilify 10 mg daily for now. Typical RAJWINDER's reviewed including weight gain, abnormal movements, EPS, TD, metabolic  side effects. May give added benefit for focus issues in addition to use as mood stabilizer    - Continue psychotherapy with Monique Deleon LCSW    - refrain from cannabis use - psychoeducation today about risks of use, pt counseled today on health risks associated with use. She declines resources for rehab     - pt counseled on smoking cessation and risks for further CVA    - Pt instructed to go to ER with thoughts of harming self, others; Call to report any worsening of symptoms or problems with the medication    - Continue [facility] - will graduate 10/11/17     - Continue Depakote  mg nightly to target mood, racing thoughts, decreased sleep, labs will be btained today - CBC, CMP, VPA level     - restart Trazodone 50 mg nightly PRN insomnia     - advised pt that high dose of Melatonin is not beneficial, advised to lower to apprx 1 mg Melatonin nightly, if no benefit noted, d/c - discussed this has not been shown to be terribly effective     - follow up with Neuro, GI, PCP    - Discussed nonpharmacologic interventions for anxiety including regular exercise, healthy diet, practice of mindfulness, social relatedness    Return to Clinic: 8 weeks

## 2017-10-03 ENCOUNTER — TELEPHONE (OUTPATIENT)
Dept: PSYCHIATRY | Facility: CLINIC | Age: 37
End: 2017-10-03

## 2017-10-03 ENCOUNTER — OFFICE VISIT (OUTPATIENT)
Dept: FAMILY MEDICINE | Facility: CLINIC | Age: 37
End: 2017-10-03
Payer: MEDICARE

## 2017-10-03 ENCOUNTER — DOCUMENTATION ONLY (OUTPATIENT)
Dept: FAMILY MEDICINE | Facility: CLINIC | Age: 37
End: 2017-10-03

## 2017-10-03 VITALS
DIASTOLIC BLOOD PRESSURE: 68 MMHG | BODY MASS INDEX: 16.25 KG/M2 | WEIGHT: 91.69 LBS | SYSTOLIC BLOOD PRESSURE: 90 MMHG | HEIGHT: 63 IN | HEART RATE: 87 BPM

## 2017-10-03 DIAGNOSIS — Z00.00 WELLNESS EXAMINATION: Primary | ICD-10-CM

## 2017-10-03 DIAGNOSIS — K29.50 CHRONIC GASTRITIS WITHOUT BLEEDING, UNSPECIFIED GASTRITIS TYPE: ICD-10-CM

## 2017-10-03 DIAGNOSIS — I63.411 EMBOLIC STROKE INVOLVING RIGHT MIDDLE CEREBRAL ARTERY: ICD-10-CM

## 2017-10-03 DIAGNOSIS — R00.0 TACHYCARDIA: ICD-10-CM

## 2017-10-03 DIAGNOSIS — K31.84 GASTROPARESIS: ICD-10-CM

## 2017-10-03 DIAGNOSIS — Z23 NEED FOR VACCINATION: ICD-10-CM

## 2017-10-03 DIAGNOSIS — R39.81 FUNCTIONAL URINARY INCONTINENCE: ICD-10-CM

## 2017-10-03 DIAGNOSIS — F31.81 BIPOLAR 2 DISORDER: Chronic | ICD-10-CM

## 2017-10-03 DIAGNOSIS — N18.30 STAGE 3 CHRONIC KIDNEY DISEASE: Chronic | ICD-10-CM

## 2017-10-03 DIAGNOSIS — M32.19 OTHER SYSTEMIC LUPUS ERYTHEMATOSUS WITH OTHER ORGAN INVOLVEMENT: ICD-10-CM

## 2017-10-03 DIAGNOSIS — F17.210 CIGARETTE NICOTINE DEPENDENCE WITHOUT COMPLICATION: ICD-10-CM

## 2017-10-03 PROCEDURE — 90686 IIV4 VACC NO PRSV 0.5 ML IM: CPT | Mod: S$GLB,,, | Performed by: FAMILY MEDICINE

## 2017-10-03 PROCEDURE — 99999 PR PBB SHADOW E&M-EST. PATIENT-LVL III: CPT | Mod: PBBFAC,,, | Performed by: FAMILY MEDICINE

## 2017-10-03 PROCEDURE — G0008 ADMIN INFLUENZA VIRUS VAC: HCPCS | Mod: S$GLB,,, | Performed by: FAMILY MEDICINE

## 2017-10-03 PROCEDURE — 99499 UNLISTED E&M SERVICE: CPT | Mod: S$GLB,,, | Performed by: FAMILY MEDICINE

## 2017-10-03 PROCEDURE — 99395 PREV VISIT EST AGE 18-39: CPT | Mod: S$GLB,,, | Performed by: FAMILY MEDICINE

## 2017-10-03 RX ORDER — TAMSULOSIN HYDROCHLORIDE 0.4 MG/1
0.4 CAPSULE ORAL DAILY
Qty: 30 CAPSULE | Refills: 3 | Status: SHIPPED | OUTPATIENT
Start: 2017-10-03 | End: 2018-06-09 | Stop reason: SDUPTHER

## 2017-10-03 RX ORDER — ONDANSETRON 4 MG/1
4 TABLET, ORALLY DISINTEGRATING ORAL EVERY 6 HOURS PRN
Qty: 60 TABLET | Refills: 3 | Status: SHIPPED | OUTPATIENT
Start: 2017-10-03 | End: 2018-04-24

## 2017-10-03 RX ORDER — METOCLOPRAMIDE 5 MG/1
5 TABLET ORAL 4 TIMES DAILY
Qty: 90 TABLET | Refills: 2 | Status: SHIPPED | OUTPATIENT
Start: 2017-10-03 | End: 2017-10-31 | Stop reason: SINTOL

## 2017-10-03 RX ORDER — TAMSULOSIN HYDROCHLORIDE 0.4 MG/1
0.4 CAPSULE ORAL DAILY
Qty: 30 CAPSULE | Refills: 3 | Status: SHIPPED | OUTPATIENT
Start: 2017-10-03 | End: 2017-10-03 | Stop reason: SDUPTHER

## 2017-10-03 RX ORDER — VARENICLINE TARTRATE 0.5 (11)-1
KIT ORAL
Qty: 1 PACKAGE | Refills: 1 | Status: SHIPPED | OUTPATIENT
Start: 2017-10-03 | End: 2018-01-15

## 2017-10-03 RX ORDER — LORATADINE 10 MG/1
10 TABLET ORAL DAILY
Qty: 30 TABLET | Refills: 11 | COMMUNITY
Start: 2017-10-03 | End: 2019-12-17

## 2017-10-03 RX ORDER — PANTOPRAZOLE SODIUM 40 MG/1
40 TABLET, DELAYED RELEASE ORAL DAILY
Qty: 30 TABLET | Refills: 6 | Status: SHIPPED | OUTPATIENT
Start: 2017-10-03 | End: 2018-05-28 | Stop reason: SDUPTHER

## 2017-10-03 RX ORDER — METOPROLOL SUCCINATE 25 MG/1
25 TABLET, EXTENDED RELEASE ORAL DAILY
Qty: 30 TABLET | Refills: 5 | Status: SHIPPED | OUTPATIENT
Start: 2017-10-03 | End: 2018-05-28 | Stop reason: SDUPTHER

## 2017-10-03 RX ORDER — ARIPIPRAZOLE 10 MG/1
TABLET ORAL
Qty: 30 TABLET | Refills: 11 | Status: SHIPPED | OUTPATIENT
Start: 2017-10-03 | End: 2018-10-23 | Stop reason: SDUPTHER

## 2017-10-03 RX ORDER — HYDROXYCHLOROQUINE SULFATE 200 MG/1
200 TABLET, FILM COATED ORAL 2 TIMES DAILY
Qty: 60 TABLET | Refills: 5 | Status: SHIPPED | OUTPATIENT
Start: 2017-10-03 | End: 2018-04-24 | Stop reason: SDUPTHER

## 2017-10-03 NOTE — PROGRESS NOTES
Subjective:       Patient ID: Cat Denson is a 37 y.o. female.    Chief Complaint: Follow-up    HPI     Follow up  Pt is here to follow up multiple chronic medical conditions.   Pt does reports compliance with medications.   The pt has a current PMH of CVA and CKD..  As it relates to exercise, the pt reports that she has increased her activity.   As far as smoking is concerned, the pt smokes.   Pt has been making attempts at eating healthy.  Health maintenance addressed and updated in chart.    Review of Systems   Constitutional: Negative for chills and fever.   HENT: Negative for sneezing and sore throat.    Eyes: Negative for visual disturbance.   Respiratory: Negative for cough and shortness of breath.    Cardiovascular: Negative for chest pain and leg swelling.   Gastrointestinal: Negative for abdominal pain, blood in stool, constipation, diarrhea and vomiting.   Genitourinary: Negative for difficulty urinating, dysuria and hematuria.   Musculoskeletal: Positive for arthralgias and back pain.   Neurological: Negative for dizziness and weakness.       Objective:      Physical Exam   Constitutional: She appears well-developed and well-nourished. No distress.   HENT:   Head: Normocephalic and atraumatic.   Mouth/Throat: Oropharynx is clear and moist. No oropharyngeal exudate.   Eyes: EOM are normal. Pupils are equal, round, and reactive to light.   Neck: Normal range of motion. Neck supple. No thyromegaly present.   Cardiovascular: Normal rate, regular rhythm, normal heart sounds and intact distal pulses.    Pulmonary/Chest: Effort normal and breath sounds normal. No respiratory distress. She has no wheezes.   Abdominal: Soft. Bowel sounds are normal. She exhibits no distension and no mass. There is no tenderness.   Musculoskeletal: She exhibits no edema.   Lymphadenopathy:     She has no cervical adenopathy.   Neurological: She is alert.   Skin: Skin is warm. No rash noted. No erythema.   Psychiatric:  She has a normal mood and affect. Her behavior is normal.   Vitals reviewed.      Assessment:       1. Wellness examination    2. Chronic gastritis without bleeding, unspecified gastritis type    3. Tachycardia    4. Gastroparesis    5. Other systemic lupus erythematosus with other organ involvement    6. Stage 3 chronic kidney disease    7. Functional urinary incontinence        Plan:       1. Wellness examination  Patient has been advised to continue to maintain a healthy lifestyle, including regular exercise and consuming a well balanced diet.     2. Chronic gastritis without bleeding, unspecified gastritis type  - pantoprazole (PROTONIX) 40 MG tablet; Take 1 tablet (40 mg total) by mouth once daily. Before breakfast  Dispense: 30 tablet; Refill: 6    3. Tachycardia  - metoprolol succinate (TOPROL-XL) 25 MG 24 hr tablet; Take 1 tablet (25 mg total) by mouth once daily.  Dispense: 30 tablet; Refill: 5    4. Gastroparesis  - ranitidine (ZANTAC) 300 MG tablet; TAKE 1 TABLET(300 MG) BY MOUTH EVERY DAY  Dispense: 30 tablet; Refill: 11  - metoclopramide HCl (REGLAN) 5 MG tablet; Take 1 tablet (5 mg total) by mouth 4 (four) times daily.  Dispense: 90 tablet; Refill: 2    5. Other systemic lupus erythematosus with other organ involvement  Followed by Rheumatology for this condition.  - hydroxychloroquine (PLAQUENIL) 200 mg tablet; Take 1 tablet (200 mg total) by mouth 2 (two) times daily.  Dispense: 60 tablet; Refill: 5    6. Stage 3 chronic kidney disease  - Ambulatory referral to Nephrology    7. Functional urinary incontinence  - tamsulosin (FLOMAX) 0.4 mg Cp24; Take 1 capsule (0.4 mg total) by mouth once daily.  Dispense: 30 capsule; Refill: 3    8. Bipolar 2 disorder  Followed by Psychiatry for this condition.   - aripiprazole (ABILIFY) 10 MG Tab; Take one tablet PO in morning  Dispense: 30 tablet; Refill: 11    9. Cigarette nicotine dependence without complication  - varenicline (CHANTIX NELDA) 0.5 mg (11)- 1 mg (42)  tablet; Take one 0.5mg tab by mouth once daily X3 days,then increase to one 0.5mg tab twice daily X4 days,then increase to one 1mg tab twice daily  Dispense: 1 Package; Refill: 1    10. Embolic stroke involving right middle cerebral artery  Followed by Neurology.    11. Need for vaccination  - Influenza - Quadrivalent (3 years & older) (PF)    Portions of this note were created using Dragon voice recognition software. There may be voice recognition errors found in the text, and attempts were made to correct these errors prior to signature    Dayton Dukes MD    Family Medicine  10/3/2017

## 2017-10-03 NOTE — PROGRESS NOTES
Pre-Visit Chart Review  For Appointment Scheduled on (10/3    Health Maintenance Due   Topic Date Due    TETANUS VACCINE  01/23/2000    Pneumococcal PPSV23 (Medium Risk) (1) 01/23/2000    Influenza Vaccine  08/01/2017

## 2017-10-04 NOTE — TELEPHONE ENCOUNTER
Please advise the patient:     I have reviewed and released her lab tests on My Chart:     1. Metabolic panel - normal liver functions, kidney function remains decreased.   2. Depakote level is in low range.   3. CBC - blood counts are all acceptable.     I recommend that we continue the medications as ordered for now - we can increase the depakote if needed in the future.  I see that she saw Dr Dukes and that he is referring her to a kidney specialist.

## 2017-10-04 NOTE — TELEPHONE ENCOUNTER
Attempted to call patient; no answer. Left voicemail to return our call at [phone].  Thank you

## 2017-10-05 NOTE — TELEPHONE ENCOUNTER
Attempted to call patient; no answer. Left voicemail to return our call at [phone].  Thank you

## 2017-10-13 ENCOUNTER — OFFICE VISIT (OUTPATIENT)
Dept: NEPHROLOGY | Facility: CLINIC | Age: 37
End: 2017-10-13
Payer: MEDICARE

## 2017-10-13 VITALS
WEIGHT: 92.56 LBS | OXYGEN SATURATION: 99 % | HEIGHT: 63 IN | DIASTOLIC BLOOD PRESSURE: 64 MMHG | SYSTOLIC BLOOD PRESSURE: 98 MMHG | BODY MASS INDEX: 16.4 KG/M2 | HEART RATE: 100 BPM

## 2017-10-13 DIAGNOSIS — N18.30 CHRONIC KIDNEY DISEASE, STAGE III (MODERATE): Primary | ICD-10-CM

## 2017-10-13 PROCEDURE — 99204 OFFICE O/P NEW MOD 45 MIN: CPT | Mod: S$GLB,,, | Performed by: INTERNAL MEDICINE

## 2017-10-13 PROCEDURE — 99999 PR PBB SHADOW E&M-EST. PATIENT-LVL II: CPT | Mod: PBBFAC,,, | Performed by: INTERNAL MEDICINE

## 2017-10-13 PROCEDURE — 99499 UNLISTED E&M SERVICE: CPT | Mod: S$GLB,,, | Performed by: INTERNAL MEDICINE

## 2017-10-13 NOTE — PROGRESS NOTES
Subjective:       Patient ID: Cat Denson is a 37 y.o. White female who presents for new patient evaluation for chronic renal failure.    Cat Denson is referred by Dayton Dukes MD to be evaluated for chronic renal failure.  She has a long history of lupus since age 11 but has no history of renal involvement.  She does not use NSAIDs but did use pain medications when she broke her ankle last November.  In November of 2015 she suffered a stroke while chain smoking and taking OCPs.  She had resultant L arm and leg weakness, memory/cooncentrations problems, urinary incontinence and vision changes in her left eye since that episode.  She also had a history of a RTA in the past.      Review of Systems   Constitutional: Positive for unexpected weight change. Negative for appetite change, chills and fever.   Eyes: Positive for visual disturbance (L eye).   Respiratory: Negative for cough and shortness of breath.    Cardiovascular: Negative for chest pain and leg swelling.   Gastrointestinal: Positive for nausea. Negative for diarrhea and vomiting.   Genitourinary: Positive for difficulty urinating (incontinence since cva). Negative for dysuria and hematuria.   Musculoskeletal: Positive for arthralgias. Negative for myalgias.   Skin: Negative for rash.   Neurological: Positive for weakness (L arm and leg since cva). Negative for headaches.   Psychiatric/Behavioral: Positive for decreased concentration (memory since cva). Negative for sleep disturbance.       The past medical, family and social histories were reviewed for this encounter.     Past Medical History:   Diagnosis Date    Anxiety     Bipolar 1 disorder     Chronic kidney disease     Chronic pain     Depression     bipolar 2    Fibromyalgia     Gastroparesis     Hx of psychiatric care     Lactose intolerance     Lupus     Psychiatric problem     Renal tubular acidosis     Seizure     Sjogren's syndrome     Smoker     Stroke  11-    Suicide attempt     overdosed on a bottle of paxil, muscle relaxers, & zoloft    Therapy      Past Surgical History:   Procedure Laterality Date    CHOLECYSTECTOMY      COLONOSCOPY  11/12/2014    Dr. Colin, repeat at 52 years old for screening    UPPER GASTROINTESTINAL ENDOSCOPY  03/03/2017    Dr. Harris     Social History     Social History    Marital status: Single     Spouse name: N/A    Number of children: 0    Years of education: N/A     Occupational History    Not on file.     Social History Main Topics    Smoking status: Current Every Day Smoker     Packs/day: 1.00     Years: 15.00     Types: Cigarettes     Start date: 11/25/2015    Smokeless tobacco: Never Used      Comment: quit smoking after the stroke    Alcohol use No    Drug use:      Types: Marijuana      Comment: smokes occasionally, helps with pain and appetite    Sexual activity: No      Comment: usually female, but currently with her boyfriend     Other Topics Concern    Not on file     Social History Narrative    Doesn't drive since the stroke, mother drives her and she lives with her mother.     Current Outpatient Prescriptions   Medication Sig    aripiprazole (ABILIFY) 10 MG Tab Take one tablet PO in morning    chlorhexidine (PERIDEX) 0.12 % solution RINSE WITH 1/2 OZ BID AFTER BRUSHING AND FLOSSING    divalproex ER (DEPAKOTE) 500 MG Tb24 Take 1 tablet (500 mg total) by mouth every evening.    gabapentin (NEURONTIN) 800 MG tablet TAKE 1 TABLET(800 MG) BY MOUTH TWICE DAILY    hydroxychloroquine (PLAQUENIL) 200 mg tablet Take 1 tablet (200 mg total) by mouth 2 (two) times daily.    metoclopramide HCl (REGLAN) 5 MG tablet Take 1 tablet (5 mg total) by mouth 4 (four) times daily.    metoprolol succinate (TOPROL-XL) 25 MG 24 hr tablet Take 1 tablet (25 mg total) by mouth once daily.    pantoprazole (PROTONIX) 40 MG tablet Take 1 tablet (40 mg total) by mouth once daily. Before breakfast    PREVIDENT 5000 BOOSTER  "PLUS 1.1 % Pste BRUSH ON NIGHTLY AFTER NORMAL HYGIENE REGIMAN. SPIT OUT EXCESS DO NOT RINSE    ranitidine (ZANTAC) 300 MG tablet TAKE 1 TABLET(300 MG) BY MOUTH EVERY DAY    rivaroxaban (XARELTO) 20 mg Tab Take 1 tablet (20 mg total) by mouth daily with dinner or evening meal.    tamsulosin (FLOMAX) 0.4 mg Cp24 Take 1 capsule (0.4 mg total) by mouth once daily.    trazodone (DESYREL) 50 MG tablet Take 1 tablet (50 mg total) by mouth nightly as needed for Insomnia.    cholestyramine (QUESTRAN) 4 gram packet Take 1 packet (4 g total) by mouth 3 (three) times daily with meals. (Patient taking differently: Take 4 g by mouth 3 (three) times daily with meals. On hold by patient)    loratadine (CLARITIN) 10 mg tablet Take 1 tablet (10 mg total) by mouth once daily. (Patient taking differently: Take 10 mg by mouth once daily. Patient takes seasonally as needed)    ondansetron (ZOFRAN-ODT) 4 MG TbDL Take 1 tablet (4 mg total) by mouth every 6 (six) hours as needed.    varenicline (CHANTIX NELDA) 0.5 mg (11)- 1 mg (42) tablet Take one 0.5mg tab by mouth once daily X3 days,then increase to one 0.5mg tab twice daily X4 days,then increase to one 1mg tab twice daily     No current facility-administered medications for this visit.        BP 98/64   Pulse 100   Ht 5' 3" (1.6 m)   Wt 42 kg (92 lb 9.5 oz)   SpO2 99%   BMI 16.40 kg/m²     Objective:      Physical Exam   Constitutional: She is oriented to person, place, and time. She appears well-developed and well-nourished. No distress.   HENT:   Head: Normocephalic and atraumatic.   Eyes: Conjunctivae are normal.   Neck: Neck supple. No JVD present.   Cardiovascular: Normal rate, regular rhythm and normal heart sounds.  Exam reveals no gallop and no friction rub.    No murmur heard.  Pulmonary/Chest: Effort normal and breath sounds normal. No respiratory distress. She has no wheezes. She has no rales.   Abdominal: Soft. Bowel sounds are normal. She exhibits no distension. " There is no tenderness.   Musculoskeletal: She exhibits no edema.   Neurological: She is alert and oriented to person, place, and time.   Skin: Skin is warm and dry. No rash noted.   Psychiatric: She has a normal mood and affect.   Vitals reviewed.      Assessment:       1. Chronic kidney disease, stage III (moderate)        Plan:   Return to clinic in 4 months.  Labs for next visit include rp, pth, ua, upc.  Baseline creatinine is 1.3-1.5 since 2013.  Blood pressure is controlled on the current regimen.  She has a bland urine sediment with normal kidneys structurally and relatively stable function.  She does not appear to have any active SLE disease and does not apparently have a history of renal involvement.

## 2017-10-13 NOTE — LETTER
October 13, 2017      Dayton Dukes MD  [address]  [address]  [place] [address]           [facility] [address] [facility] [address] [place] [address]  Phone: [phone]          Patient: Cat Denson   MR Number: [id number]   YOB: 1980   Date of Visit: 10/13/2017       Dear Dr. Dayton Dukes:    Thank you for referring Cat Denson to me for evaluation. Attached you will find relevant portions of my assessment and plan of care.    If you have questions, please do not hesitate to call me. I look forward to following Cat Denson along with you.    Sincerely,    Jasmeet Lovell MD    Enclosure  CC:  No Recipients    If you would like to receive this communication electronically, please contact [email or web address] or [phone] to request more information on [employer] Link access.    For providers and/or their staff who would like to refer a patient to [facility], please contact us through our one-stop-shop provider referral line, [facility], at [phone].    If you feel you have received this communication in error or would no longer like to receive these types of communications, please e-mail [email or web address]

## 2017-10-25 RX ORDER — DIVALPROEX SODIUM 500 MG/1
TABLET, FILM COATED, EXTENDED RELEASE ORAL
Qty: 30 TABLET | Refills: 0 | Status: SHIPPED | OUTPATIENT
Start: 2017-10-25 | End: 2017-11-26 | Stop reason: SDUPTHER

## 2017-10-31 ENCOUNTER — PATIENT MESSAGE (OUTPATIENT)
Dept: PSYCHIATRY | Facility: CLINIC | Age: 37
End: 2017-10-31

## 2017-11-02 ENCOUNTER — PATIENT MESSAGE (OUTPATIENT)
Dept: PSYCHIATRY | Facility: CLINIC | Age: 37
End: 2017-11-02

## 2017-11-14 RX ORDER — GABAPENTIN 800 MG/1
TABLET ORAL
Qty: 60 TABLET | Refills: 0 | Status: SHIPPED | OUTPATIENT
Start: 2017-11-14 | End: 2017-12-13 | Stop reason: SDUPTHER

## 2017-11-17 ENCOUNTER — DOCUMENTATION ONLY (OUTPATIENT)
Dept: PSYCHIATRY | Facility: CLINIC | Age: 37
End: 2017-11-17

## 2017-11-27 RX ORDER — DIVALPROEX SODIUM 500 MG/1
TABLET, FILM COATED, EXTENDED RELEASE ORAL
Qty: 30 TABLET | Refills: 0 | Status: SHIPPED | OUTPATIENT
Start: 2017-11-27 | End: 2017-11-28 | Stop reason: SDUPTHER

## 2017-11-27 RX ORDER — TRAZODONE HYDROCHLORIDE 50 MG/1
TABLET ORAL
Qty: 30 TABLET | Refills: 0 | Status: SHIPPED | OUTPATIENT
Start: 2017-11-27 | End: 2017-11-28 | Stop reason: SDUPTHER

## 2017-11-28 ENCOUNTER — OFFICE VISIT (OUTPATIENT)
Dept: PSYCHIATRY | Facility: CLINIC | Age: 37
End: 2017-11-28
Payer: COMMERCIAL

## 2017-11-28 VITALS
HEART RATE: 67 BPM | BODY MASS INDEX: 17.46 KG/M2 | WEIGHT: 98.56 LBS | DIASTOLIC BLOOD PRESSURE: 75 MMHG | HEIGHT: 63 IN | SYSTOLIC BLOOD PRESSURE: 115 MMHG

## 2017-11-28 DIAGNOSIS — F41.9 ANXIETY: ICD-10-CM

## 2017-11-28 DIAGNOSIS — F31.81 BIPOLAR 2 DISORDER: Chronic | ICD-10-CM

## 2017-11-28 PROCEDURE — 99499 UNLISTED E&M SERVICE: CPT | Mod: S$GLB,,, | Performed by: PSYCHIATRY & NEUROLOGY

## 2017-11-28 PROCEDURE — 99213 OFFICE O/P EST LOW 20 MIN: CPT | Mod: S$GLB,,, | Performed by: PSYCHIATRY & NEUROLOGY

## 2017-11-28 PROCEDURE — 99999 PR PBB SHADOW E&M-EST. PATIENT-LVL III: CPT | Mod: PBBFAC,,, | Performed by: PSYCHIATRY & NEUROLOGY

## 2017-11-28 RX ORDER — TRAZODONE HYDROCHLORIDE 50 MG/1
TABLET ORAL
Qty: 60 TABLET | Refills: 2 | Status: SHIPPED | OUTPATIENT
Start: 2017-11-28 | End: 2018-01-15

## 2017-11-28 RX ORDER — MIRTAZAPINE 15 MG/1
15 TABLET, FILM COATED ORAL NIGHTLY
COMMUNITY
End: 2018-11-27 | Stop reason: SDUPTHER

## 2017-11-28 RX ORDER — DIVALPROEX SODIUM 500 MG/1
TABLET, FILM COATED, EXTENDED RELEASE ORAL
Qty: 30 TABLET | Refills: 2 | Status: SHIPPED | OUTPATIENT
Start: 2017-11-28 | End: 2018-01-15 | Stop reason: SDUPTHER

## 2017-11-28 NOTE — PROGRESS NOTES
Outpatient Psychiatry Follow-Up Visit (MD/NP)    11/28/2017    Clinical Status of Patient:  Outpatient (Ambulatory)    Chief Complaint:  Cat Denson is a 37 y.o. female who presents today for follow-up of mood disorder and anxiety.  Met with patient alone and then pt with her mother together.     Interval History and Content of Current Session:  Interim Events/Subjective Report/Content of Current Session:  38 yo disabled female presents to clinic for follow up treatment of  follow up appt of bipolar I disorder, panic disorder, cannabis use disorder, and cognitive disorder.   She has a prior hx of chronic pain, nausea, and emesis.  Has been having health issues since she was a child, dx with Lupus at 10 yo. She missed a lot of school.  Has impulsive tendencies with hx of cutting, shaving her head (shaved today).  She has prior hx of suicide attempt at 22 yo.  Multiple prior admissions for depression, SI.   Pt had a CVA in November 2015 - Embolic stroke involving right middle cerebral artery; she is hypercoagulable due to lupus.  She is currently taking Coumadin.  Pt has residual left hemiparesis.   She has had balance problems, forgetfulness, and severe focusing issue.    She is under the care of Rheumatology - Dr Singla Dr [place] - Neurology.   PCP: Hector Dukes MD    Neuropsychological testing 2/17:   Personality test data suggested the presence of mild depression. Neuropsychological test results reveal impairment in immediate and delayed visual memory, visual attention, temporal orientation, visuospatial/constructional abilities, abstract reasoning, psychomotor speed, and mental flexibility; and variability in immediate auditory/verbal memory (high average and mildly impaired performances) and verbal fluency (low average and moderately impaired performances); with mild depression. The pattern of impairment is consistent with right MCA CVA. She will continue to see Dr. Cruz and Mrs. Wiley for  psychiatric care. She was encouraged to resume PT, OT, and Speech Therapy for further stroke rehabilitation.     Past Psychiatric Hx:  Dx Bipolar 2 Disorder (dx over 10 yrs ago), Anxiety, Impulse Control Disorder, hx pseudoseizures with possible epileptic seizures   Prior IOP: [facility]   Hx Suicide attempt at 22 yo  Hx suicidal ideations with admit to [facility] January 2015, [facility] for SI 2015,   First admit: 26 yo: for SI - [facility]; most recent [facility]. 2017 - for w/d affect, psychosis, poor memory recall, not sleeping x days  5 prior psych admissions  Hx self harm at age 17 yo     Past psychiatric hx: Wellbutrin for smoking cessation, helped to reduce, Zoloft, Trazodone (taken off due to tiredness one month ago), Lexapro, Paxil, Cymbalta (no pain benefit), Geodon, Trileptal, Lamictal, Seroquel, Chantix (makes her vomit), Olanzapine, Risperdal, Effexor, ritalin, depakote (was taken off ? Cannot recall why)    Past medical history:  Lupus   Sjogren's   CKD  Fibromyalgia, chronic pain   Seizure Disorder   Bronchospasm   Hx CVA  Gastroparesis   Fibromyalgia     Interim Hx:   I received the following message from pt's mother 10/31/17:   I thought you should know that Dr. Arevalo  of [facility] has put Cat on Mirtazapine 15mg for her gastroparesis.  I don't know if you need to readjust any of her meds because of this.  Cat is having some mouth movements such as a chewing motion, that is why they took her off the metoclopramide 5mg.  Also Dr. Dukes put her on Chantix to help her stop smoking.     And 11/2/17:   Cat is not completely off of the Reglan yet, they wanted her to wait a week before she stop taking it.  They wanted her to have a week worth of Mirtazapine in her before she stops the Reglan.  The Chantix is making her a little nauseous.   She is still having mouth movement but she is also still on Reglan.  Her moods been good she is helping me  around the house and getting out more.   Thank you and GOD audra     Pt's weight is increased 8 lbs from last visit.  She is now off of Reglan, still constantly chewing and moving mouth side to side.    She was able to quit smoking on Chantix - she reports it made her feel sick (nauseated) - but last cigarette was yesterday, only 2 puffs.    Back on Mirtazapine 15 mg for GI reasons.  She goes to bed at 8:30 pm to 1 am.  That is her morning time;  Then she takes several naps a day.   She feels rested when she takes a 3 hr nap during day.  She is taking Melatonin 6 mg nightly.  No change in sleep on Mirtazapine.   Appetite is so/so - emesis is much improved.  Body mass index is 17.46 kg/m².  She ate only a can of [place] [employer] Onion Soup and crackers yesterday.    Pt also saw Nephrologist.  Kidneys are currently stable.    Chronic kidney disease, stage III (moderate     Pt reports most of the time, she is upbeat and doing things.  Today, she is having a feeling sorry for herself day. She has maintained a friendship with Scout.    She is trying to leave home more, mom has noted change in her.  Energy level good.    Self care - still not where it should be, showers only 1 time a week; she thinks she gets caught up in her TV shows.  Med compliant with mom's help.   Depression has lifted. Feels she is doing really well.  Emotionally not depressed, feeling lonely mostly. Her friends are busy - she is stuck at home a lot.  Enjoys video games.  Hopeful; no worthless, Denies suicidal/homicidal ideations.  Depression 1/10 today.     No bowen, No psychosis.  No nightmares.   Anxiety: minimal; no panic attacks.  Stopped Vistaril - no benefit.      No significant irritable, no anger, no violence.   No physical pain,  She was considering getting a bike, balance is poor, leg will not stay on peddle.    She attended [employer] - graduated 10/11/17.     Mother: pt's desire to do things has improved to do things, frustrated no means to get  "out,  Transportation is limited.  Missing therapy appointments.   Mouth movements are much better - all improved per mom.   Appetite is out of this world   No longer needs prompting to take care of hygiene.  Mother has not seen "this child in years."  She is doing really well.     No self harm, no violence.      Alcohol: none   Cannabis: quitting - last used 2 days ago, this past week, she used only once.   No other drug use   Caffeine: 1/2 caffeine pill every other day, 200 mg - not today - plans to quit.  It helps her HA  Tobacco: reducing, down to puffs yesterday    Mom does not let her drive   She is under the care of Monique Deleon LCSW    Review of Systems   · PSYCHIATRIC: Pertinant items are noted in the narrative.  · CONSTITUTIONAL: + weight gain   · MUSCULOSKELETAL: back and LE pain improved 0/10   · CV: no chest pain, no tachycardia  · NEURO: left HP, no seizures, no bradycardia    Past Medical, Family and Social History: The patient's past medical, family and social history have been reviewed and updated as appropriate within the electronic medical record - see encounter notes.    Medications:   Abilify 10 mg daily   Depakote  mg nightly   Mirtazapine 15 mg nightly   Trazodone 50 mg nightly   Pt added melatonin 6 mg nightly   Chantix 1 mg BID     Compliance:  Yes     Side effects:  Mouth movements on Reglan - 2 weeks ago     Risk Parameters:  Patient reports no current suicidal ideation  Patient reports no homicidal ideation  Patient reports no self-injurious behavior  Patient reports no violent behavior    Exam (detailed: at least 9 elements; comprehensive: all 15 elements)   Constitutional  Vitals:  Most recent vital signs, dated less than 90 days prior to this appointment, were reviewed.   Vitals:    11/28/17 0845   BP: 115/75   Pulse: 67   Weight: 44.7 kg (98 lb 8.7 oz)   Height: 5' 3" (1.6 m)   repeat       General:  age appropriate, dark clothes, fair grooming, good eye contact, " "wearing a hat      Musculoskeletal  Muscle Strength/Tone:  Slight right tremor with action    Gait & Station:       Psychiatric  Speech:  no latency; no press, decreased spontaneity    Mood & Affect:  "great"  Restricted, but reactive, smiles    Thought Process:  Linear with questions, somewhat slowed    Associations:  intact   Thought Content:  no current suicidality, no homicidality, delusions, or paranoia   Insight:  Improved    Judgement: Improved    Orientation:  Grossly intact for content of interview, alert and oriented x 3 Nov 28, 2017   Memory: fair recall of recent hx; 3/3 immediate recall,  3/3 at 3 mins    Language: grossly intact no ifs, ands, or buts    Attention Span & Concentration:  Grossly intact to interview, DLROW   Fund of Knowledge:  intact and appropriate to age and level of education, familiar with aspects of current personal life pres Trump      Assessment and Diagnosis   Status/Progress: Based on the examination today, the patient's problem(s) is/are under improved control.  New problems have not been presented today.   Co-morbidities are complicating management of the primary condition.      General Impression: pt had CVA Nov 2015 with significantly impaired focus, memory lapses, worsening depression, anxiety - improved with ritalin and on current medications (previously ok'd ritalin use with PCP).  Pt had episode of acute hepatitis, liver enzymes improved, but now with declining renal function; pt also resumed smoking; she is also using marijuana. Recent admission to [facility] and earlier admission for supratherapeutic INR. Mother managing medications now. Pt under care of Neurology; Neuropsychological testing done and consistent with her MCA CVA.    She continues to smoke tobacco and marijuana.  She complains of poor focus/motivation.  Mother feels pt is not depressed - improved with Abilify. Pt continues to lose weight with persistent n/v.   Given hospitalization " earlier this yr with manic symptoms, will change dx to Bipolar 1 disorder which may be more appropriate.  Pt improved today. Feeling better physically.  Much less depressed, more motivated, quitting cannabis and tobacco.     Bipolar 1 Disorder, MRE depressed, partial remission   Panic Disorder with Agoraphobia   Cognitive Disorder due to CVA  Insomnia Disorder  Cannabis Use Disorder. Moderate   Tobacco Use Disorder    Lupus, migraines, CVA, hx hepatitis, declining renal function, gastroparesis, dyskinesias     GAF: 63  Intervention/Counseling/Treatment Plan   · Medication Management: The risks and benefits of medication were discussed with the patient.    - continue Abilify 10 mg daily for now. Typical RAJWINDER's reviewed including weight gain, abnormal movements, EPS, TD, metabolic side effects. May give added benefit for focus issues in addition to use as mood stabilizer; continue to monitor mouth movements which are improving off of Reglan.  Pt doing really well on this med combo, pt and mother comfortable with watchful waiting off of Reglan rather than disrupting what has helped her.     - Continue psychotherapy with Monique Deleon LCSW    - refrain from cannabis use - psychoeducation today about risks of use, pt counseled today on health risks associated with use. Pt praised on cutting back/abstinence.     - pt praised on smoking cessation, tolerating Chantix.     - Pt instructed to go to ER with thoughts of harming self, others; Call to report any worsening of symptoms or problems with the medication    - Consider return to [facility] at next visit - pt really benefited from this program     - Continue Depakote  mg nightly to target mood, racing thoughts, decreased sleep, low dose, but pt is stable and doing well.     - Increase Trazodone to 50 - 100 mg nightly PRN insomnia     - Ok to Continue OTC Melatonin 6 mg nightly     - We have discussed nonpharmacologic interventions for anxiety including regular  exercise, healthy diet, practice of mindfulness, social relatedness    - will message PCP per their request to see if she can restart Phenergan - Zofran not effective.     - Mirtazapine per GI  - pt tolerating well; no evidence of mood destabilization.     Return to Clinic: 12 weeks or sooner PRN

## 2017-11-28 NOTE — Clinical Note
Pt seen today - depression much improved.  She developed oral dyskinesias on Reglan which she stopped 2 weeks ago - they are improving.  Pt reports Zofran not effective for nausea, she is wanting to restart Phenergan for nausea and is asking for your staff to notify her if ok.  Thanks

## 2017-12-13 RX ORDER — GABAPENTIN 800 MG/1
TABLET ORAL
Qty: 60 TABLET | Refills: 0 | Status: SHIPPED | OUTPATIENT
Start: 2017-12-13 | End: 2018-01-12 | Stop reason: SDUPTHER

## 2017-12-14 ENCOUNTER — OFFICE VISIT (OUTPATIENT)
Dept: PSYCHIATRY | Facility: CLINIC | Age: 37
End: 2017-12-14
Payer: COMMERCIAL

## 2017-12-14 DIAGNOSIS — F31.81 BIPOLAR 2 DISORDER: Primary | ICD-10-CM

## 2017-12-14 DIAGNOSIS — F41.9 ANXIETY: ICD-10-CM

## 2017-12-14 PROCEDURE — 99999 PR PBB SHADOW E&M-EST. PATIENT-LVL III: CPT | Mod: PBBFAC,,, | Performed by: SOCIAL WORKER

## 2017-12-14 PROCEDURE — 99499 UNLISTED E&M SERVICE: CPT | Mod: S$GLB,,, | Performed by: SOCIAL WORKER

## 2017-12-14 PROCEDURE — 90834 PSYTX W PT 45 MINUTES: CPT | Mod: S$GLB,,, | Performed by: SOCIAL WORKER

## 2017-12-14 NOTE — PROGRESS NOTES
"Individual Psychotherapy (PhD/LCSW)    12/14/2017    Site:  [facility]         Therapeutic Intervention: Met with patient.  Outpatient - Insight oriented psychotherapy 45 min - CPT code 39407, Outpatient - Behavior modifying psychotherapy 45 min - CPT code 46224 and Outpatient - Supportive psychotherapy 45 min - CPT Code 96044    Chief complaint/reason for encounter: depression, anxiety, sleep, interpersonal and sexual problems     Interval history and content of current session:  Patient presented for follow up session in a calm mood appearing much more alert and talkative than in previous sessions.  Patient stated that she has been feeling "really great; since the stroke, this is the happiest I have been in my entire life".  Patient stated that she and Brent broke up, he moved back in with his mother.  They are still friends and hang out occasionally.  She was on a dating cedric but cancelled it due to cost, stating that she the cost doesn't fit into her budget.  Patient stopped smoking cigarettes and marijuana with the chantix prescription.  She was in [employer] [facility] [place] and graduated on October 11.  She misses the socialization of it, but has been getting out of the house more by going along with her mother to get errands done.  Patient stated that she has been taking vitamins and her appetite has increased now that her medication has changed.  Patient stated that she is getting along well with her mother and brother.  Following her brother's latest term in care home, he has quit smoking and using prescription and street drugs, has been going to [employer] and is trying to reform his life.  Patient's mother is also practicing her noel daily by reading the bible and going to Synagogue occasionally.  Patient stated that she believes there is "something out there" but is not ready for organized Restoration yet.  Patient stated that her sleep is interrupted in the middle of the night but she is sleeping an adequate amount to feel rested.  " "Patient questions whether or not she can drive again.  We discussed her getting her neurologist's opinion about driving.  Patient discussed how the death of her grandmother sent her "off the deep end" into drug use for years.  Patient feels that she has finally worked through her depression and can start the rest of her life.  We discussed the need for therapy at this point.  Patient is feeling good and would like to schedule the next appt when she feels that she needs help again.      Treatment plan:  · Target symptoms: depression, distractability, lack of focus, anxiety   · Why chosen therapy is appropriate versus another modality: relevant to diagnosis  · Outcome monitoring methods: self-report, observation, feedback from family  · Therapeutic intervention type: insight oriented psychotherapy, behavior modifying psychotherapy, supportive psychotherapy    Risk parameters:  Patient reports no suicidal ideation  Patient reports no homicidal ideation  Patient reports no self-injurious behavior  Patient reports no violent behavior    Verbal deficits: None    Patient's response to intervention:  The patient's response to intervention is accepting.    Progress toward goals and other mental status changes:  The patient's progress toward goals is good.    Diagnosis:     ICD-10-CM ICD-9-CM   1. Bipolar 2 disorder F31.81 296.89   2. Anxiety F41.9 300.00       Plan:  individual psychotherapy and consult psychiatrist for medication evaluation    Return to clinic: No Follow-up on file.  Patient to schedule future appts as needed.    Length of Service (minutes): 45  "

## 2017-12-22 RX ORDER — TRAZODONE HYDROCHLORIDE 50 MG/1
TABLET ORAL
Qty: 90 TABLET | Refills: 0 | OUTPATIENT
Start: 2017-12-22

## 2018-01-08 ENCOUNTER — LAB VISIT (OUTPATIENT)
Dept: LAB | Facility: HOSPITAL | Age: 38
End: 2018-01-08
Attending: INTERNAL MEDICINE
Payer: MEDICARE

## 2018-01-08 DIAGNOSIS — M32.19 OTHER SYSTEMIC LUPUS ERYTHEMATOSUS WITH OTHER ORGAN INVOLVEMENT: ICD-10-CM

## 2018-01-08 DIAGNOSIS — Z79.899 LONG-TERM USE OF PLAQUENIL: ICD-10-CM

## 2018-01-08 LAB
BILIRUB UR QL STRIP: NEGATIVE
CLARITY UR: CLEAR
COLOR UR: YELLOW
CREAT UR-MCNC: 49.4 MG/DL
GLUCOSE UR QL STRIP: NEGATIVE
HGB UR QL STRIP: NEGATIVE
KETONES UR QL STRIP: NEGATIVE
LEUKOCYTE ESTERASE UR QL STRIP: NEGATIVE
NITRITE UR QL STRIP: NEGATIVE
PH UR STRIP: 7 [PH] (ref 5–8)
PROT UR QL STRIP: NEGATIVE
PROT UR-MCNC: <7 MG/DL
PROT/CREAT RATIO, UR: NORMAL
SP GR UR STRIP: <=1.005 (ref 1–1.03)
URN SPEC COLLECT METH UR: ABNORMAL
UROBILINOGEN UR STRIP-ACNC: NEGATIVE EU/DL

## 2018-01-08 PROCEDURE — 82570 ASSAY OF URINE CREATININE: CPT

## 2018-01-08 PROCEDURE — 81003 URINALYSIS AUTO W/O SCOPE: CPT

## 2018-01-12 ENCOUNTER — TELEPHONE (OUTPATIENT)
Dept: FAMILY MEDICINE | Facility: CLINIC | Age: 38
End: 2018-01-12

## 2018-01-12 NOTE — TELEPHONE ENCOUNTER
----- Message from Jo Galindo sent at 1/12/2018 10:16 AM CST -----  Mother (Arabella)needs to speak with nurse or doctor concerning patient being seen on February 8th for hospital follow up and getting birth control implant (non-estrogen). Please call back at [phone].

## 2018-01-13 ENCOUNTER — PATIENT MESSAGE (OUTPATIENT)
Dept: PSYCHIATRY | Facility: CLINIC | Age: 38
End: 2018-01-13

## 2018-01-15 ENCOUNTER — TELEPHONE (OUTPATIENT)
Dept: PSYCHIATRY | Facility: CLINIC | Age: 38
End: 2018-01-15

## 2018-01-15 RX ORDER — GABAPENTIN 800 MG/1
TABLET ORAL
Qty: 60 TABLET | Refills: 0 | Status: SHIPPED | OUTPATIENT
Start: 2018-01-15 | End: 2018-02-12 | Stop reason: SDUPTHER

## 2018-01-15 RX ORDER — DIVALPROEX SODIUM 500 MG/1
TABLET, FILM COATED, EXTENDED RELEASE ORAL
Qty: 1 TABLET | Refills: 0
Start: 2018-01-15 | End: 2018-02-28 | Stop reason: SDUPTHER

## 2018-01-16 NOTE — TELEPHONE ENCOUNTER
I spoke to patient on phone after receiving My Chart message from mom reporting pt had recent seizures and was hospitalized.  She reported pt has been spending time with a friend in [facility] and was unsure if she had been taking her meds.  Pt is poor historian on phone, reports she had seizures years ago and again recently.  Depakote was increased to 500 mg BID, and she has continued on Mirtazapine and Abilify. Trazodone was stopped in case it was related to seizures, and pt stopped Chantix.  She reported she has been med compliant.  She denies recent alcohol/drug use.  Responses are vague on phone. Pt declined a sooner follow up appointment.  Pt encouraged to schedule follow up with Neurology.

## 2018-02-08 ENCOUNTER — TELEPHONE (OUTPATIENT)
Dept: FAMILY MEDICINE | Facility: CLINIC | Age: 38
End: 2018-02-08

## 2018-02-12 RX ORDER — GABAPENTIN 800 MG/1
TABLET ORAL
Qty: 60 TABLET | Refills: 0 | Status: SHIPPED | OUTPATIENT
Start: 2018-02-12 | End: 2018-03-14 | Stop reason: SDUPTHER

## 2018-02-20 ENCOUNTER — LAB VISIT (OUTPATIENT)
Dept: LAB | Facility: HOSPITAL | Age: 38
End: 2018-02-20
Attending: INTERNAL MEDICINE
Payer: MEDICARE

## 2018-02-20 DIAGNOSIS — N18.30 CHRONIC KIDNEY DISEASE, STAGE III (MODERATE): ICD-10-CM

## 2018-02-20 LAB
ALBUMIN SERPL BCP-MCNC: 3 G/DL
ANION GAP SERPL CALC-SCNC: 10 MMOL/L
BUN SERPL-MCNC: 15 MG/DL
CALCIUM SERPL-MCNC: 8.5 MG/DL
CHLORIDE SERPL-SCNC: 111 MMOL/L
CO2 SERPL-SCNC: 19 MMOL/L
CREAT SERPL-MCNC: 1.8 MG/DL
EST. GFR  (AFRICAN AMERICAN): 41 ML/MIN/1.73 M^2
EST. GFR  (NON AFRICAN AMERICAN): 36 ML/MIN/1.73 M^2
GLUCOSE SERPL-MCNC: 74 MG/DL
PHOSPHATE SERPL-MCNC: 4 MG/DL
POTASSIUM SERPL-SCNC: 4.4 MMOL/L
PTH-INTACT SERPL-MCNC: 128.1 PG/ML
SODIUM SERPL-SCNC: 140 MMOL/L

## 2018-02-20 PROCEDURE — 36415 COLL VENOUS BLD VENIPUNCTURE: CPT

## 2018-02-20 PROCEDURE — 83970 ASSAY OF PARATHORMONE: CPT

## 2018-02-20 PROCEDURE — 80069 RENAL FUNCTION PANEL: CPT

## 2018-02-23 ENCOUNTER — OFFICE VISIT (OUTPATIENT)
Dept: NEPHROLOGY | Facility: CLINIC | Age: 38
End: 2018-02-23
Payer: MEDICARE

## 2018-02-23 VITALS
SYSTOLIC BLOOD PRESSURE: 118 MMHG | DIASTOLIC BLOOD PRESSURE: 70 MMHG | OXYGEN SATURATION: 100 % | BODY MASS INDEX: 20.58 KG/M2 | WEIGHT: 116.19 LBS | HEART RATE: 69 BPM

## 2018-02-23 DIAGNOSIS — N18.30 CHRONIC KIDNEY DISEASE, STAGE III (MODERATE): Primary | ICD-10-CM

## 2018-02-23 PROCEDURE — 99214 OFFICE O/P EST MOD 30 MIN: CPT | Mod: S$GLB,,, | Performed by: INTERNAL MEDICINE

## 2018-02-23 PROCEDURE — 3008F BODY MASS INDEX DOCD: CPT | Mod: S$GLB,,, | Performed by: INTERNAL MEDICINE

## 2018-02-23 PROCEDURE — 99999 PR PBB SHADOW E&M-EST. PATIENT-LVL II: CPT | Mod: PBBFAC,,, | Performed by: INTERNAL MEDICINE

## 2018-02-23 RX ORDER — PANTOPRAZOLE SODIUM 40 MG/1
40 TABLET, DELAYED RELEASE ORAL DAILY
Refills: 6 | COMMUNITY
Start: 2017-12-23 | End: 2019-01-15 | Stop reason: SDUPTHER

## 2018-02-23 RX ORDER — ATORVASTATIN CALCIUM 10 MG/1
TABLET, FILM COATED ORAL
Refills: 11 | COMMUNITY
Start: 2018-01-12 | End: 2019-02-25

## 2018-02-23 RX ORDER — FOLIC ACID 1 MG/1
TABLET ORAL
Refills: 10 | COMMUNITY
Start: 2018-02-05 | End: 2019-02-25

## 2018-02-23 RX ORDER — DIVALPROEX SODIUM 500 MG/1
TABLET, DELAYED RELEASE ORAL
Refills: 6 | COMMUNITY
Start: 2018-01-12 | End: 2018-02-23 | Stop reason: SDUPTHER

## 2018-02-23 NOTE — PROGRESS NOTES
Subjective:       Patient ID: Cat Denson is a 38 y.o. White female who presents for return patient evaluation for chronic renal failure.    He has no uremic or urinary symptoms and is in his usual state of health.  There have been no recent illnesses, hospitalizations or procedures.        Review of Systems   Constitutional: Positive for unexpected weight change. Negative for appetite change, chills and fever.   Eyes: Positive for visual disturbance (L eye).   Respiratory: Negative for cough and shortness of breath.    Cardiovascular: Negative for chest pain and leg swelling.   Gastrointestinal: Negative for diarrhea, nausea and vomiting.   Endocrine: Positive for cold intolerance.   Genitourinary: Positive for difficulty urinating (incontinence since cva). Negative for dysuria and hematuria.   Musculoskeletal: Positive for arthralgias. Negative for myalgias.   Skin: Negative for rash.   Neurological: Positive for weakness (L arm and leg since cva). Negative for headaches.   Psychiatric/Behavioral: Positive for decreased concentration (memory since cva). Negative for sleep disturbance.       The past medical, family and social histories were reviewed for this encounter.     /70   Pulse 69   Wt 52.7 kg (116 lb 2.9 oz)   LMP 02/22/2018   SpO2 100%   BMI 20.58 kg/m²     Objective:      Physical Exam   Constitutional: She is oriented to person, place, and time. She appears well-developed and well-nourished. No distress.   HENT:   Head: Normocephalic and atraumatic.   Eyes: Conjunctivae are normal.   Neck: Neck supple. No JVD present.   Cardiovascular: Normal rate, regular rhythm and normal heart sounds.  Exam reveals no gallop and no friction rub.    No murmur heard.  Pulmonary/Chest: Effort normal and breath sounds normal. No respiratory distress. She has no wheezes. She has no rales.   Abdominal: Soft. Bowel sounds are normal. She exhibits no distension. There is no tenderness.    Musculoskeletal: She exhibits no edema.   Neurological: She is alert and oriented to person, place, and time.   Skin: Skin is warm and dry. No rash noted.   Psychiatric: She has a normal mood and affect.   Vitals reviewed.      Assessment:       1. Chronic kidney disease, stage III (moderate)        Plan:   Return to clinic in 4 months.  Labs for next visit include rp, pth, ua, upc.  Baseline creatinine is 1.3-1.5 since 2013.  PTH is 128 with a calcium of 8.5.  Blood pressure is controlled on the current regimen.  She has a bland urine sediment with normal kidneys structurally and relatively stable function.  She does not appear to have any active SLE disease and does not apparently have a history of renal involvement.

## 2018-02-28 ENCOUNTER — OFFICE VISIT (OUTPATIENT)
Dept: PSYCHIATRY | Facility: CLINIC | Age: 38
End: 2018-02-28
Payer: COMMERCIAL

## 2018-02-28 VITALS
HEART RATE: 86 BPM | HEIGHT: 63 IN | SYSTOLIC BLOOD PRESSURE: 104 MMHG | BODY MASS INDEX: 20.9 KG/M2 | DIASTOLIC BLOOD PRESSURE: 69 MMHG | WEIGHT: 117.94 LBS

## 2018-02-28 DIAGNOSIS — F31.81 BIPOLAR 2 DISORDER: Chronic | ICD-10-CM

## 2018-02-28 DIAGNOSIS — F41.9 ANXIETY: ICD-10-CM

## 2018-02-28 DIAGNOSIS — Z79.899 HIGH RISK MEDICATION USE: ICD-10-CM

## 2018-02-28 PROCEDURE — 99499 UNLISTED E&M SERVICE: CPT | Mod: S$GLB,,, | Performed by: PSYCHIATRY & NEUROLOGY

## 2018-02-28 PROCEDURE — 99999 PR PBB SHADOW E&M-EST. PATIENT-LVL III: CPT | Mod: PBBFAC,,, | Performed by: PSYCHIATRY & NEUROLOGY

## 2018-02-28 PROCEDURE — 99213 OFFICE O/P EST LOW 20 MIN: CPT | Mod: S$GLB,,, | Performed by: PSYCHIATRY & NEUROLOGY

## 2018-02-28 RX ORDER — DIVALPROEX SODIUM 500 MG/1
TABLET, FILM COATED, EXTENDED RELEASE ORAL
Qty: 60 TABLET | Refills: 2 | Status: SHIPPED | OUTPATIENT
Start: 2018-02-28 | End: 2018-05-24 | Stop reason: SDUPTHER

## 2018-03-15 RX ORDER — GABAPENTIN 800 MG/1
TABLET ORAL
Qty: 60 TABLET | Refills: 0 | Status: SHIPPED | OUTPATIENT
Start: 2018-03-15 | End: 2018-04-07 | Stop reason: SDUPTHER

## 2018-04-08 RX ORDER — GABAPENTIN 800 MG/1
TABLET ORAL
Qty: 60 TABLET | Refills: 0 | Status: SHIPPED | OUTPATIENT
Start: 2018-04-08 | End: 2018-05-07 | Stop reason: SDUPTHER

## 2018-04-10 ENCOUNTER — OFFICE VISIT (OUTPATIENT)
Dept: FAMILY MEDICINE | Facility: CLINIC | Age: 38
End: 2018-04-10
Payer: MEDICARE

## 2018-04-10 VITALS
DIASTOLIC BLOOD PRESSURE: 60 MMHG | WEIGHT: 129.44 LBS | SYSTOLIC BLOOD PRESSURE: 102 MMHG | BODY MASS INDEX: 22.93 KG/M2 | HEIGHT: 63 IN

## 2018-04-10 DIAGNOSIS — R56.9 SEIZURE: ICD-10-CM

## 2018-04-10 DIAGNOSIS — F31.81 BIPOLAR 2 DISORDER: Chronic | ICD-10-CM

## 2018-04-10 DIAGNOSIS — G81.94 LEFT HEMIPARESIS: ICD-10-CM

## 2018-04-10 DIAGNOSIS — N18.30 STAGE 3 CHRONIC KIDNEY DISEASE: Chronic | ICD-10-CM

## 2018-04-10 DIAGNOSIS — R32 URINARY INCONTINENCE, UNSPECIFIED TYPE: Primary | ICD-10-CM

## 2018-04-10 DIAGNOSIS — R79.83 HOMOCYSTEINEMIA: ICD-10-CM

## 2018-04-10 DIAGNOSIS — I63.411 EMBOLIC STROKE INVOLVING RIGHT MIDDLE CEREBRAL ARTERY: ICD-10-CM

## 2018-04-10 DIAGNOSIS — M32.19 SYSTEMIC LUPUS ERYTHEMATOSUS WITH OTHER ORGAN INVOLVEMENT, UNSPECIFIED SLE TYPE: ICD-10-CM

## 2018-04-10 PROCEDURE — 99499 UNLISTED E&M SERVICE: CPT | Mod: S$GLB,,, | Performed by: FAMILY MEDICINE

## 2018-04-10 PROCEDURE — 99999 PR PBB SHADOW E&M-EST. PATIENT-LVL III: CPT | Mod: PBBFAC,,, | Performed by: FAMILY MEDICINE

## 2018-04-10 PROCEDURE — 99214 OFFICE O/P EST MOD 30 MIN: CPT | Mod: S$GLB,,, | Performed by: FAMILY MEDICINE

## 2018-04-10 NOTE — PROGRESS NOTES
Subjective:       Patient ID: Cat Denson is a 38 y.o. female.    Chief Complaint: Follow-up (unable to control bladder)    HPI     Follow up  Pt is here to follow up multiple chronic medical conditions.   Pt does reports compliance with medications.   As it relates to exercise, the pt reports that she remains active but does not routinely exercise.   As far as smoking is concerned, the pt vapes.   Home BP measurements have not been taken.   Pt has not been making attempts at eating healthy.  Health maintenance addressed and updated in chart.    Urinary problem  Pt reports that she has had issues with urinary incontinence for approx 2 years but this condition has worsened over the past 2 months.   The pt experiences issues with holding her urine while asleep and also has pressure throughout the day.   Pt reports that she wears depends as she frequently is unable to make it to the toilet in time.   Pt has been taking Flomax which she was prescribed approx 2 years which has been minimally helpful.   Denies changes to urination.     Review of Systems   Constitutional: Positive for activity change. Negative for chills, fever and unexpected weight change.   HENT: Negative for hearing loss, rhinorrhea, sore throat and trouble swallowing.    Eyes: Negative for discharge.   Respiratory: Negative for cough, chest tightness, shortness of breath and wheezing.    Cardiovascular: Negative for chest pain and leg swelling.   Gastrointestinal: Negative for abdominal pain, blood in stool, constipation, diarrhea and vomiting.   Endocrine: Positive for polyuria. Negative for polydipsia.   Genitourinary: Negative for difficulty urinating, dysuria, hematuria and menstrual problem.   Musculoskeletal: Positive for joint swelling. Negative for arthralgias and neck pain.   Neurological: Positive for weakness and headaches. Negative for dizziness.   Psychiatric/Behavioral: Positive for confusion and dysphoric mood.        Objective:      Physical Exam   Constitutional: She appears well-developed and well-nourished. No distress.   HENT:   Head: Normocephalic and atraumatic.   Mouth/Throat: Oropharynx is clear and moist. No oropharyngeal exudate.   Eyes: EOM are normal. Pupils are equal, round, and reactive to light.   Neck: Normal range of motion. Neck supple. No thyromegaly present.   Cardiovascular: Normal rate, regular rhythm and intact distal pulses.    Pulmonary/Chest: Effort normal. No respiratory distress.   Abdominal: Soft. She exhibits no distension and no mass. There is no tenderness.   Musculoskeletal: She exhibits no edema.   Trace edema to BLE to mid shin.    Lymphadenopathy:     She has no cervical adenopathy.   Neurological: She is alert.   Skin: Skin is warm. No rash noted. No erythema.   Psychiatric: She has a normal mood and affect. Her behavior is normal.   Vitals reviewed.      Assessment:       1. Urinary incontinence, unspecified type    2. Seizure    3. Left hemiparesis    4. Embolic stroke involving right middle cerebral artery    5. Homocysteinemia    6. Systemic lupus erythematosus with other organ involvement, unspecified SLE type    7. Stage 3 chronic kidney disease    8. Bipolar 2 disorder        Plan:       1. Urinary incontinence, unspecified type  - Ambulatory Referral to Urogynecology    2. Seizure  Patient is followed by Neurology for this condition. No changes to management of this condition on today.   Last ictal event reported approx 3 months ago.     3. Left hemiparesis  Fall precautions advised.   Patient is followed by Neurology for this condition. No changes to management of this condition on today.     4. Embolic stroke involving right middle cerebral artery  Patient is followed by Neurology for this condition. No changes to management of this condition on today.     5. Homocysteinemia  No changes to management of this condition on today.     6. Systemic lupus erythematosus with other organ  involvement, unspecified SLE type  Patient is followed by Dr. Benoit with Rhuematology for this condition. No changes to management of this condition on today.     7. Stage 3 chronic kidney disease  Patient is followed by Nephrology for this condition. No changes to management of this condition on today.   The patient will need to increase water intake to help protect kidneys. Avoid NSAID's including ibuprofen, Aleve and high doses of aspirin.    8. Bipolar 2 disorder  Patient is followed by Psychiatry for this condition. No changes to management of this condition on today.     Portions of this note were created using Dragon voice recognition software. There may be voice recognition errors found in the text, and attempts were made to correct these errors prior to signature    Dayton Dukes MD    Family Medicine  4/10/2018

## 2018-04-12 ENCOUNTER — TELEPHONE (OUTPATIENT)
Dept: NEUROLOGY | Facility: CLINIC | Age: 38
End: 2018-04-12

## 2018-04-12 NOTE — TELEPHONE ENCOUNTER
The patient is requesting PT.  She feels like her left side is weaker. Having trouble picking things up. It is difficult lifting her arms and legs. Please advise.

## 2018-04-13 NOTE — TELEPHONE ENCOUNTER
Since I have not seen her in almost a year I don't know why she would be weaker - or what the therapy should be addressing.  Would recommend asking Dr. Dukes for PT based on the fact he just saw her earlier this week, but regardless she needs to be seen again to make sure her multiple neurological issues are being managed

## 2018-04-13 NOTE — TELEPHONE ENCOUNTER
Spoke with patient and let her know what Quintin Schreiber said. Patient verbalized understanding.

## 2018-04-24 ENCOUNTER — OFFICE VISIT (OUTPATIENT)
Dept: RHEUMATOLOGY | Facility: CLINIC | Age: 38
End: 2018-04-24
Payer: MEDICARE

## 2018-04-24 VITALS
WEIGHT: 130.94 LBS | DIASTOLIC BLOOD PRESSURE: 74 MMHG | HEART RATE: 70 BPM | BODY MASS INDEX: 23.2 KG/M2 | HEIGHT: 63 IN | SYSTOLIC BLOOD PRESSURE: 114 MMHG

## 2018-04-24 DIAGNOSIS — F19.10 POLYSUBSTANCE ABUSE: ICD-10-CM

## 2018-04-24 DIAGNOSIS — M32.19 OTHER SYSTEMIC LUPUS ERYTHEMATOSUS WITH OTHER ORGAN INVOLVEMENT: ICD-10-CM

## 2018-04-24 DIAGNOSIS — M32.19 SYSTEMIC LUPUS ERYTHEMATOSUS WITH OTHER ORGAN INVOLVEMENT, UNSPECIFIED SLE TYPE: Primary | ICD-10-CM

## 2018-04-24 DIAGNOSIS — M79.7 FIBROMYALGIA: Chronic | ICD-10-CM

## 2018-04-24 PROCEDURE — 99999 PR PBB SHADOW E&M-EST. PATIENT-LVL III: CPT | Mod: PBBFAC,,, | Performed by: INTERNAL MEDICINE

## 2018-04-24 PROCEDURE — 99499 UNLISTED E&M SERVICE: CPT | Mod: S$GLB,,, | Performed by: INTERNAL MEDICINE

## 2018-04-24 PROCEDURE — 99214 OFFICE O/P EST MOD 30 MIN: CPT | Mod: S$GLB,,, | Performed by: INTERNAL MEDICINE

## 2018-04-24 RX ORDER — HYDROXYCHLOROQUINE SULFATE 200 MG/1
200 TABLET, FILM COATED ORAL 2 TIMES DAILY
Qty: 60 TABLET | Refills: 5 | Status: SHIPPED | OUTPATIENT
Start: 2018-04-24 | End: 2019-12-17

## 2018-04-24 ASSESSMENT — ROUTINE ASSESSMENT OF PATIENT INDEX DATA (RAPID3)
MDHAQ FUNCTION SCORE: 1.5
PATIENT GLOBAL ASSESSMENT SCORE: 6
PAIN SCORE: 4.5
PSYCHOLOGICAL DISTRESS SCORE: 2.2
AM STIFFNESS SCORE: 1, YES
TOTAL RAPID3 SCORE: 5.16
WHEN YOU AWAKENED IN THE MORNING OVER THE LAST WEEK, PLEASE INDICATE THE AMOUNT OF TIME IT TAKES UNTIL YOU ARE AS LIMBER AS YOU WILL BE FOR THE DAY: 1 HR
FATIGUE SCORE: 5

## 2018-04-24 NOTE — PROGRESS NOTES
"Subjective:       Patient ID: Cat Denson is a 38 y.o. female.    Chief Complaint: SLE  HPI 37-year-old patient with SLE is here for follow-up. She was diagnosed with SLE and Sjogren syndrome in 2005.  Manifested by TRISTAN Pos 1:1280 S, .29, SSB 46.65, + arthritis + tyler rash + photosensitivity +sicca symptoms +Raynaud's +livedoid rash.  She also has history of embolic stroke secondary to smoking and birth control use.  Negative anti-phospholipid antibodies.    Last seen by me in July 2017.  Canceled multiple appointments since then.  Eye exam still pending Currently on hydroxychloroquine 200 mg twice daily.  Admits to taking marijuana.  + Current smoking .  No new joint pain or swelling   She denies fever, weight loss, serositis, hematuria, no photosensitivity/rash at this time, oral or nasal ulcers, alopecia, dysphagia, diarrhea or blood in the stools    Review of Systems   HENT: Negative for ear discharge and ear pain.    Eyes: Negative for pain and redness.   Respiratory: Negative for cough and shortness of breath.    Cardiovascular: Negative for chest pain and palpitations.   Gastrointestinal: Negative for abdominal distention and abdominal pain.   Genitourinary: Negative for genital sores and hematuria.   Neurological: Negative for tremors and seizures.   Psychiatric/Behavioral: Negative for agitation and hallucinations.         Objective:     /74 (BP Location: Right arm, Patient Position: Sitting)   Pulse 70   Ht 5' 3" (1.6 m)   Wt 59.4 kg (130 lb 15.3 oz)   LMP 04/24/2018   BMI 23.20 kg/m²      Physical Exam   Constitutional: She is oriented to person, place, and time and well-developed, well-nourished, and in no distress.   HENT:   Head: Atraumatic.   Eyes: Conjunctivae and EOM are normal. Pupils are equal, round, and reactive to light.   Neck: Normal range of motion. Neck supple. No thyromegaly present.   Cardiovascular: Normal rate and regular rhythm.    Pulmonary/Chest: Effort " normal and breath sounds normal.   Abdominal: Soft. Bowel sounds are normal.   Neurological: She is alert and oriented to person, place, and time.   Skin: Skin is warm and dry.     Psychiatric: Memory and affect normal.   Musculoskeletal: She exhibits no edema.     No joint effusion                Lab Results   Component Value Date    WBC 7.10 01/08/2018    HGB 12.3 01/08/2018    HCT 36.8 (L) 01/08/2018    MCV 94 01/08/2018     01/08/2018     CMP  Sodium   Date Value Ref Range Status   02/20/2018 140 136 - 145 mmol/L Final     Potassium   Date Value Ref Range Status   02/20/2018 4.4 3.5 - 5.1 mmol/L Final     Chloride   Date Value Ref Range Status   02/20/2018 111 (H) 95 - 110 mmol/L Final     CO2   Date Value Ref Range Status   02/20/2018 19 (L) 23 - 29 mmol/L Final     Glucose   Date Value Ref Range Status   02/20/2018 74 70 - 110 mg/dL Final     BUN, Bld   Date Value Ref Range Status   02/20/2018 15 6 - 20 mg/dL Final     Creatinine   Date Value Ref Range Status   02/20/2018 1.8 (H) 0.5 - 1.4 mg/dL Final     Calcium   Date Value Ref Range Status   02/20/2018 8.5 (L) 8.7 - 10.5 mg/dL Final     Total Protein   Date Value Ref Range Status   01/08/2018 7.0 6.0 - 8.4 g/dL Final     Albumin   Date Value Ref Range Status   02/20/2018 3.0 (L) 3.5 - 5.2 g/dL Final     Total Bilirubin   Date Value Ref Range Status   01/08/2018 0.2 0.1 - 1.0 mg/dL Final     Comment:     For infants and newborns, interpretation of results should be based  on gestational age, weight and in agreement with clinical  observations.  Premature Infant recommended reference ranges:  Up to 24 hours.............<8.0 mg/dL  Up to 48 hours............<12.0 mg/dL  3-5 days..................<15.0 mg/dL  6-29 days.................<15.0 mg/dL       Alkaline Phosphatase   Date Value Ref Range Status   01/08/2018 75 55 - 135 U/L Final     AST   Date Value Ref Range Status   01/08/2018 21 10 - 40 U/L Final     ALT   Date Value Ref Range Status    01/08/2018 15 10 - 44 U/L Final     Anion Gap   Date Value Ref Range Status   02/20/2018 10 8 - 16 mmol/L Final     eGFR if    Date Value Ref Range Status   02/20/2018 41 (A) >60 mL/min/1.73 m^2 Final     eGFR if non    Date Value Ref Range Status   02/20/2018 36 (A) >60 mL/min/1.73 m^2 Final     Comment:     Calculation used to obtain the estimated glomerular filtration  rate (eGFR) is the CKD-EPI equation.        Lab Results   Component Value Date    SEDRATE 5 01/08/2018     Lab Results   Component Value Date    CRP 0.4 01/08/2018      Results for MARA LYNCH (MRN [id number]) as of 8/1/2016 09:59   Ref. Range 7/19/2013 10:30 9/23/2013 16:40 9/30/2014 11:53 8/3/2015 08:53 1/29/2016 08:48   TRISTAN Latest Ref Range: Neg <1:160  Pos, Titer to follow (A)       TRISTAN HEP-2 Titer Unknown Pos 1:1280 (A) Positive 1:640 Sp...      TRISTAN Hep-2 Pattern Unknown Speckled (A)       Anti-SSA Antibody Latest Ref Range: 0.00 - 19.99 .29 (A) 139.68 (H)      Anti-SSA Interpretation Latest Ref Range: Negative  Positive (A) Positive (A)      Anti-SSB Antibody Latest Ref Range: 0.00 - 19.99 EU 46.65 33.16 (H)      Anti-SSB Interpretation Latest Ref Range: Negative  Positive (A) Positive (A)      ds DNA Ab Latest Ref Range: Negative 1:10   Negative 1:10   Negative 1:10   Anti Sm Antibody Latest Ref Range: 0.00 - 19.99 EU  1.51      Anti-Sm Interpretation Latest Ref Range: Negative   Negative      Anti Sm/RNP Antibody Latest Ref Range: 0.00 - 19.99 EU  16.51      Anti-Sm/RNP Interpretation Latest Ref Range: Negative   Negative      TTG IgA Latest Ref Range: <20 UNITS   7     Complement (C-3) Latest Ref Range: 50 - 180 mg/dL  102  100 118   Complement (C-4) Latest Ref Range: 11 - 44 mg/dL  29  32 34   IgA Latest Ref Range: 40 - 350 mg/dL   229     Protein, Serum Latest Ref Range: 6.0 - 8.4 g/dL  7.5      Albumin grams/dl Latest Ref Range: 3.35 - 5.55 g/dL  4.35      Alpha-1 grams/dl Latest Ref  Range: 0.17 - 0.41 g/dL  0.33      Alpha-2 grams/dl Latest Ref Range: 0.43 - 0.99 g/dL  0.83      Beta grams/dl Latest Ref Range: 0.50 - 1.10 g/dL  0.77      Gamma grams/dl Latest Ref Range: 0.67 - 1.58 g/dL  1.23      CCP Antibodies Latest Ref Range: 0 - 4.9 U/mL < 0.5 (<)       Rheumatoid Factor Latest Ref Range: 0 - 15 IU/ml 39 (H)         Assessment:   SLE -TRISTAN Pos 1:1280 S, .29, SSB 46.65, + arthritis + tyler rash + photosensitivity.  Last SLEDAI 0    Long-term use of hydroxychloroquine  Polysubstance abuse  Sjogren's syndrome  Raynaud's phenomenon  Fibromyalgia  Chronic pain syndrome  Bipolar 2 disorder  S/P left hemiparesis  Plan:   Continue hydroxychloroquine 200 mg twice daily  Schedule appointment with ophthalmology for Plaquenil eye exam   Counseled to quit polysubstance abuse   Return to clinic in 3 months with labs

## 2018-04-26 ENCOUNTER — OFFICE VISIT (OUTPATIENT)
Dept: OPTOMETRY | Facility: CLINIC | Age: 38
End: 2018-04-26
Payer: MEDICARE

## 2018-04-26 DIAGNOSIS — M32.9 SYSTEMIC LUPUS ERYTHEMATOSUS, UNSPECIFIED SLE TYPE, UNSPECIFIED ORGAN INVOLVEMENT STATUS: Primary | ICD-10-CM

## 2018-04-26 DIAGNOSIS — H52.7 REFRACTIVE ERROR: ICD-10-CM

## 2018-04-26 DIAGNOSIS — H04.123 DRY EYE SYNDROME, BILATERAL: ICD-10-CM

## 2018-04-26 DIAGNOSIS — Z79.899 HIGH RISK MEDICATION USE: ICD-10-CM

## 2018-04-26 PROCEDURE — 99999 PR PBB SHADOW E&M-EST. PATIENT-LVL II: CPT | Mod: PBBFAC,,, | Performed by: OPTOMETRIST

## 2018-04-26 PROCEDURE — 92004 COMPRE OPH EXAM NEW PT 1/>: CPT | Mod: S$GLB,,, | Performed by: OPTOMETRIST

## 2018-04-26 PROCEDURE — 92134 CPTRZ OPH DX IMG PST SGM RTA: CPT | Mod: S$GLB,,, | Performed by: OPTOMETRIST

## 2018-04-26 NOTE — PROGRESS NOTES
HPI     Presenting Complaint: Pt here today for long term use of plaquenil. DLE 1   year ago    (+) Loss of vision in left eye from stroke in  2015    (+) Pt has been taking plaquenil for 18 + years. Pt has had periods where   she did not use the medicine for a few years.     (+) Sjogren's / dry eyes. Pt uses Rhoto dry eye drops 2 x day for comfort   of dryness. Pt does have plus in tears ducts 2 years ago.    (-) headaches  (-) diplopia   (-) flashes / (-) floaters      Last edited by Tommy Buchanan, OD on 4/26/2018  9:36 AM. (History)            Assessment /Plan     For exam results, see Encounter Report.    Systemic lupus erythematosus, unspecified SLE type, unspecified organ involvement status  -     Posterior Segment OCT Retina-Both eyes  -     Price Visual Field - OU - Extended - Both Eyes; Future    High risk medication use  -     Posterior Segment OCT Retina-Both eyes  -     Price Visual Field - OU - Extended - Both Eyes; Future    Dry eye syndrome, bilateral    Refractive error      Long-term plaquenil use for SLE. Macula OCT today normal foveal contour, PIL intact with no defects. Ishihara color plates abnormal, possible side effect of stroke vs plaquenil? Discussed possible permanent loss of vision secondary to plaquenil, patient reports good understanding. Scheduled to return for F 10-2 SS, will call with results.     KCS OU, long standing hx of sjrogens. Patient reports asymptomatic with use of artificial tears. Continue OTC artificial tears throughout the day, return if worsening or no alleviation with drops.     Refractive error OU. Patient denies spec Rx, prefers to go to in-network provider with [facility]. Return with me as needed for updated spec Rx.      RTC as scheduled for HVF 10-2 SS, or sooner prn.

## 2018-04-26 NOTE — LETTER
April 26, 2018      Rafaela Benoit MD  [address]  [place] [address]           [facility] [facility] - Optometry  [address] [place] [address] Drive [address]  [facility] [address]  Phone: [phone]          Patient: Cat Denson   MR Number: [id number]   YOB: 1980   Date of Visit: 4/26/2018       Dear Dr. Rafaela Benoit:    Thank you for referring Cat Denson to me for evaluation. Attached you will find relevant portions of my assessment and plan of care.    If you have questions, please do not hesitate to call me. I look forward to following Cat Denson along with you.    Sincerely,    Tommy Buchanan, OD    Enclosure  CC:  No Recipients    If you would like to receive this communication electronically, please contact [email or web address] or [phone] to request more information on [employer] Link access.    For providers and/or their staff who would like to refer a patient to [facility], please contact us through our one-stop-shop provider referral line, [facility], at [phone].    If you feel you have received this communication in error or would no longer like to receive these types of communications, please e-mail [email or web address]

## 2018-05-01 RX ORDER — PROMETHAZINE HYDROCHLORIDE 25 MG/1
TABLET ORAL
Qty: 60 TABLET | Refills: 1 | Status: SHIPPED | OUTPATIENT
Start: 2018-05-01 | End: 2018-11-19 | Stop reason: SDUPTHER

## 2018-05-07 RX ORDER — GABAPENTIN 800 MG/1
TABLET ORAL
Qty: 60 TABLET | Refills: 0 | Status: SHIPPED | OUTPATIENT
Start: 2018-05-07 | End: 2018-06-09 | Stop reason: SDUPTHER

## 2018-05-11 NOTE — PROGRESS NOTES
"[facility] Note  Staff: Leeann Davenport, KEESHAP-C    PCP: Dr. Dayton Dukes    Chief Complaint: Urinary incontinence    Subjective:        HPI: Cat Denson is a 38 y.o. female NEW PATIENT to Urology clinic today presents with increased/worsening episodes of urinary incontinence for the last six months.  Pt states today for the last six months she will get the urge to urinate and she will be walking to her bathroom and she "automatically leaks on herself" before making it to bathroom.  She had to start wearing depends pads few months ago due to the severity of the issue.    Questions asked the pt during ov today:  ++Urinary urgency and frequency  Nocturia 3x nightly for the last few years.  More of her problems are at night.  No dysuria  No gross hematuria  No constipation problems, last BM was yesterday.  Pt feels that she empties with no problems on her own with each visit to restroom.    Caffeine intake daily: She drinks "Jet Alert" in am, green teas.  Tea or soda in evening with dinner.  Different sleeping patterns.    Urinary problem  Pt reports that she has had issues with urinary incontinence for approx 2 years but this condition has worsened over the past 2 months. The pt experiences issues with holding her urine while asleep and also has pressure throughout the day.   Pt reports that she wears depends as she frequently is unable to make it to the toilet in time. Pt has been taking Flomax which she was prescribed approx 2 years which has been minimally helpful.   Denies changes to urination.     MEDICAL HX INCLUDES:    *In November of 2015 she suffered a stroke while chain smoking and taking OCPs. She had resultant L arm and leg weakness, memory/cooncentrations problems, urinary incontinence and vision changes in her left eye since that episode.  She also had a history of a RTA in the past.  Pt also has Chronic kidney disease, Stage III (Moderate)     Family Hx:   " malignancies: No , gyn malignancies: Maternal aunt had ovarian cancer.  kidney stones: No     REVIEW OF SYSTEMS:  Review of Systems   Constitutional: Negative for chills, diaphoresis, fever and weight loss.   HENT: Negative for congestion, hearing loss, nosebleeds and sore throat.    Eyes: Negative for blurred vision and pain.        Vision loss in left eye from stroke in 2015  Wears glasses   Respiratory: Negative for cough and wheezing.    Cardiovascular: Negative for chest pain, palpitations and leg swelling.   Gastrointestinal: Negative for abdominal pain, heartburn, nausea and vomiting.        +Delayed gastric emptying  Hx of reflux   Genitourinary: Negative for dysuria, flank pain, frequency, hematuria and urgency.        Urinary Incontinence   Musculoskeletal: Negative for back pain, joint pain, myalgias and neck pain.        +Chronic pain   Skin: Negative for itching and rash.   Neurological: Positive for seizures. Negative for dizziness, tremors, sensory change, loss of consciousness, weakness and headaches.        Hx of stroke in 2015 with s/p left upper and LE weakness, but more prominently within left arm   Endo/Heme/Allergies: Does not bruise/bleed easily.   Psychiatric/Behavioral: Positive for depression. Negative for suicidal ideas. The patient is not nervous/anxious.      PMHx:  Past Medical History:   Diagnosis Date    Acid reflux     Allergy     Anemia     Anxiety     Asthma     Bipolar 1 disorder     Chronic kidney disease     Chronic pain     Depression     bipolar 2    Fibromyalgia     Gastroparesis     Hx of psychiatric care     Lactose intolerance     Lupus     Psychiatric problem     Renal tubular acidosis     Seizure     Sjogren's syndrome     Smoker     Stroke 11-    Suicide attempt     overdosed on a bottle of paxil, muscle relaxers, & zoloft    Therapy      Kidney stones: No    PSHx:  Past Surgical History:   Procedure Laterality Date    CHOLECYSTECTOMY       COLONOSCOPY  11/12/2014    Dr. Colin, repeat at 52 years old for screening    UPPER GASTROINTESTINAL ENDOSCOPY  03/03/2017    Dr. Harris     Stents/Valves/Foreign Bodies: No  Cardiac Evaluation: Yes - Tachycardia, does    Screening Studies  Colonoscopy: Last test is unknown, by Cristi    Social History     Social History    Marital status: Single     Spouse name: N/A    Number of children: 0    Years of education: N/A     Social History Main Topics    Smoking status: Current Every Day Smoker     Packs/day: 1.00     Years: 15.00     Types: Cigarettes     Start date: 11/25/2015    Smokeless tobacco: Never Used      Comment: quit smoking after the stroke    Alcohol use No    Drug use: Yes     Types: Marijuana      Comment: smokes occasionally, helps with pain and appetite    Sexual activity: No      Comment: usually female, but currently with her boyfriend     Other Topics Concern    None     Social History Narrative    Doesn't drive since the stroke, mother drives her and she lives with her mother.     Allergies:  Reglan [metoclopramide hcl]; Amoxil [amoxicillin]; Augmentin [amoxicillin-pot clavulanate]; and Avelox [moxifloxacin]    Medications: REVIEWED   Anticoagulation: Yes - Xarelto 20 mg one tablet daily    Objective:     Vitals:    05/14/18 0921   BP: 114/75   Pulse: 91   Resp: 18       Physical Exam   Vitals reviewed.  Constitutional: She is oriented to person, place, and time. She appears well-developed and well-nourished.   HENT:   Head: Normocephalic and atraumatic.   Eyes: Conjunctivae and EOM are normal. Pupils are equal, round, and reactive to light.   Neck: Normal range of motion. Neck supple.   Cardiovascular: Normal rate, regular rhythm, normal heart sounds and intact distal pulses.    Pulmonary/Chest: Effort normal and breath sounds normal.   Abdominal: Soft. Bowel sounds are normal.   Musculoskeletal: Normal range of motion.   Neurological: She is alert and oriented to person, place, and  time. She has normal reflexes.   Skin: Skin is warm and dry.     Psychiatric: She has a normal mood and affect. Her behavior is normal. Judgment and thought content normal.     /Pelvic exam:  External Genitalia: normal hair distribution, no lesions  Urethral meatus: normal without prolapse or caruncle  Urethra: without tenderness or mass  Bladder: without fulness or tenderness  Mild POP+  Anus and perineum: appear normal  In and out cath performed BY ME with 30 mL of residual  Levator ani tenderness: None    LABS REVIEW:  UA today:   Color:Clear, Yellow  Spec. Grav.  1.005  PH  7.0  Negative for leukocytes, nitrates, protein, glucose, ketones, urobili, bili, and blood.    Renal Function performed 02/20/2018:  BUN: 15  GFR:  36!  Cr:   Lab Results   Component Value Date    CREATININE 1.8 (H) 02/20/2018     RECENT RADIOLOGY REPORTS:  CT ABDOMEN PELVIS WITHOUT CONTRAST 06/12/17:  IMPRESSION:  Prior cholecystectomy.  2.4 cm heterogeneous right ovary.  Recommend ultrasound evaluation.  Long skinny appendix is noted, this is the structure seen on the prior CT.    CT RENAL STONE STUDY 03/12/2017:  IMPRESSION:  1.  Motion limited study.  A tubular structure containing air without surrounding inflammatory changes visible in the right lower quadrant of the abdomen and is favored to represent the appendix.  No surrounding inflammatory changes appreciated to suggest acute appendicitis.  2.  18 mm right ovarian hyperdensity, possibly a hemorrhagic cyst/follicle. Endometrioma could produce a similar appearance.  The right ovary could be further evaluated with pelvic ultrasound as deemed clinically appropriate.  3.  Possible cystitis.  Consider correlation with urinalysis  4.  Status post cholecystectomy.    NM Gastric Emptying done 06/13/17:  IMPRESSION:  Delayed gastric emptying.     Assessment:       1. Urinary incontinence, unspecified type    2. CKD (chronic kidney disease) stage 3, GFR 30-59 ml/min    3. History of stroke     4. Neurogenic bladder as late effect of cerebrovascular accident (CVA)          Plan:   Urinary incontinence s/p stroke incident in 2015/poss. Neurogenic bladder d/o:    1.  Renal US to be performed with pre and post void residuals.  2.  Continue the Flomax 0.4 mg one tablet daily as previously prescribed.      F/u with Urologist for further evaluation of possible NBD vs. Incomplete bladder emptying.  Most likely will need UDS and poss. Cysto in the future.    [facility]: Active    Leeann Davenport, KEESHAP-C

## 2018-05-14 ENCOUNTER — OFFICE VISIT (OUTPATIENT)
Dept: UROLOGY | Facility: CLINIC | Age: 38
End: 2018-05-14
Payer: MEDICARE

## 2018-05-14 ENCOUNTER — TELEPHONE (OUTPATIENT)
Dept: PSYCHIATRY | Facility: CLINIC | Age: 38
End: 2018-05-14

## 2018-05-14 VITALS
HEART RATE: 91 BPM | HEIGHT: 63 IN | DIASTOLIC BLOOD PRESSURE: 75 MMHG | RESPIRATION RATE: 18 BRPM | WEIGHT: 132.5 LBS | BODY MASS INDEX: 23.48 KG/M2 | SYSTOLIC BLOOD PRESSURE: 114 MMHG

## 2018-05-14 DIAGNOSIS — N31.9 NEUROGENIC BLADDER AS LATE EFFECT OF CEREBROVASCULAR ACCIDENT (CVA): ICD-10-CM

## 2018-05-14 DIAGNOSIS — Z86.73 HISTORY OF STROKE: ICD-10-CM

## 2018-05-14 DIAGNOSIS — N18.30 CKD (CHRONIC KIDNEY DISEASE) STAGE 3, GFR 30-59 ML/MIN: ICD-10-CM

## 2018-05-14 DIAGNOSIS — R32 URINARY INCONTINENCE, UNSPECIFIED TYPE: Primary | ICD-10-CM

## 2018-05-14 DIAGNOSIS — I69.398 NEUROGENIC BLADDER AS LATE EFFECT OF CEREBROVASCULAR ACCIDENT (CVA): ICD-10-CM

## 2018-05-14 LAB
BILIRUB SERPL-MCNC: NORMAL MG/DL
BLOOD URINE, POC: NORMAL
COLOR, POC UA: YELLOW
GLUCOSE UR QL STRIP: NORMAL
KETONES UR QL STRIP: NORMAL
LEUKOCYTE ESTERASE URINE, POC: NORMAL
NITRITE, POC UA: NORMAL
PH, POC UA: 7
PROTEIN, POC: NORMAL
SPECIFIC GRAVITY, POC UA: 1
UROBILINOGEN, POC UA: NORMAL

## 2018-05-14 PROCEDURE — 99204 OFFICE O/P NEW MOD 45 MIN: CPT | Mod: 25,S$GLB,, | Performed by: NURSE PRACTITIONER

## 2018-05-14 PROCEDURE — 81001 URINALYSIS AUTO W/SCOPE: CPT | Mod: S$GLB,,, | Performed by: NURSE PRACTITIONER

## 2018-05-14 PROCEDURE — 99999 PR PBB SHADOW E&M-EST. PATIENT-LVL V: CPT | Mod: PBBFAC,,, | Performed by: NURSE PRACTITIONER

## 2018-05-14 PROCEDURE — 3008F BODY MASS INDEX DOCD: CPT | Mod: CPTII,S$GLB,, | Performed by: NURSE PRACTITIONER

## 2018-05-14 PROCEDURE — 51701 INSERT BLADDER CATHETER: CPT | Mod: S$GLB,,, | Performed by: NURSE PRACTITIONER

## 2018-05-14 NOTE — PATIENT INSTRUCTIONS
Urinary Incontinence, Female (Adult)  Urinary incontinence means loss of control of the bladder. This problem affects many women, especially as they get older. If you have incontinence, you may be embarrassed to ask for help. But know that this problem can be treated.     Types of Incontinence  There are different types of incontinence. Two of the main types are described here. You can have more than one type.  Stress incontinence: With this type, urine leaks when pressure (stress) is put on the bladder. This may happen when you cough, sneeze, or laugh. Stress incontinence most often occurs because the pelvic floor muscles that support the bladder and urethra are weak. This can happen after pregnancy and vaginal childbirth or a hysterectomy. It can also be due to excess body weight or hormone changes.  Urge incontinence (also called overactive bladder): With this type, a sudden urge to urinate is felt often. This may happen even though there may not be much urine in the bladder. The need to urinate often during the night is common. Urge incontinence most often occurs because of bladder spasms. This may be due to bladder irritation or infection. Damage to bladder nerves or pelvic muscles, constipation, and certain medicines can also lead to urge incontinence.  Treatment of urinary incontinence depends on the cause. Further evaluation is needed to find the type you have. This will likely include an exam and certain tests. Based on the results, you and your healthcare provider can then plan treatment. Until a diagnosis is made, the home care tips below can help relieve symptoms.  Home care  · Do pelvic floor muscle (Kegel) exercises, if they are prescribed. The pelvic floor muscles help support the bladder and urethra. Many women find that their symptoms improve when doing special exercises that strengthen these muscles. To do the exercises:  ¨ Contract the muscles you would use to stop your stream of urine, but do  this when youre not urinating. Hold for 10 seconds, then relax. Repeat 10 to 20 times in a row, at least 3 times a day. Your provider may give you other instructions for how to do the exercises and how often.  · Keep a bladder diary. This helps track how often and how much you urinate over a set period of time. Bring this diary with you to your next visit with the provider. The information can help your provider learn more about your bladder problem.  · Lose weight, if advised to by your provider. Excess weight puts pressure on the bladder. Your provider can help you create a weight-loss plan thats right for you. This may include exercising more and making certain diet changes.  · Avoid foods and drinks that may irritate the bladder. These can include alcohol and caffeinated drinks.  · Quit smoking. Smoking and other tobacco use can lead to chronic cough that strains the pelvic floor muscles. Smoking may also damage the bladder and urethra. Talk with your provider about treatments or methods you can use to quit smoking.  · If drinking large amounts of fluid causes you to have symptoms, you may be advised to limit your fluid intake. You may also be advised to drink most of your fluids during the day and to limit fluids at night.  · If youre worried about urine leakage or accidents, you may wear absorbent pads to catch urine. Change the pads often. This helps reduce discomfort. It may also reduce the risk of skin or bladder infections.  Follow-up care  Follow up with your healthcare provider as directed. If testing was done, youll be told the results as soon as they are ready. It may take some to find the right treatment for your problem. Work closely with your provider to ensure you get the best care for your needs. Your treatment plan may include special therapies or medicines. Certain procedures or surgery may also be options. Be sure to discuss any questions you have with your provider.  When to seek medical  advice  Call the healthcare provider right away if any of these occur:  · Fever of 100.4°F (38°C) or higher, or as directed by your provider  · Bladder pain or fullness  · Abdominal swelling  · Nausea or vomiting  · Back pain  · Weakness, dizziness or fainting  Date Last Reviewed: 7/26/2015  © 2222-7111 [employer]. [address] [place] [address], [place] [address]. All rights reserved. This information is not intended as a substitute for professional medical care. Always follow your healthcare professional's instructions.

## 2018-05-14 NOTE — TELEPHONE ENCOUNTER
Attempt to notify- no answer. Left   Notify of new address at   [address]  [address]  [place]  [phone]

## 2018-05-18 ENCOUNTER — CLINICAL SUPPORT (OUTPATIENT)
Dept: OPHTHALMOLOGY | Facility: CLINIC | Age: 38
End: 2018-05-18
Payer: MEDICARE

## 2018-05-18 DIAGNOSIS — Z79.899 HIGH RISK MEDICATION USE: ICD-10-CM

## 2018-05-18 DIAGNOSIS — M32.9 SYSTEMIC LUPUS ERYTHEMATOSUS, UNSPECIFIED SLE TYPE, UNSPECIFIED ORGAN INVOLVEMENT STATUS: ICD-10-CM

## 2018-05-18 PROCEDURE — 92083 EXTENDED VISUAL FIELD XM: CPT | Mod: S$GLB,,, | Performed by: OPTOMETRIST

## 2018-05-21 ENCOUNTER — TELEPHONE (OUTPATIENT)
Dept: OPHTHALMOLOGY | Facility: CLINIC | Age: 38
End: 2018-05-21

## 2018-05-21 NOTE — TELEPHONE ENCOUNTER
Spoke to Ms. Bell regarding Cat's HVF results. Left hemianopsia secondary to stroke 3 years ago. Unable to definitely grade for plaquenil toxicity. Will retest in 1 year to compare for changes from this baseline test. Mom reports good understanding.

## 2018-05-24 RX ORDER — DIVALPROEX SODIUM 500 MG/1
TABLET, FILM COATED, EXTENDED RELEASE ORAL
Qty: 60 TABLET | Refills: 0 | Status: SHIPPED | OUTPATIENT
Start: 2018-05-24 | End: 2018-07-26 | Stop reason: SDUPTHER

## 2018-05-24 RX ORDER — DIVALPROEX SODIUM 500 MG/1
TABLET, FILM COATED, EXTENDED RELEASE ORAL
Qty: 180 TABLET | Refills: 0 | OUTPATIENT
Start: 2018-05-24

## 2018-05-28 DIAGNOSIS — R00.0 TACHYCARDIA: ICD-10-CM

## 2018-05-28 DIAGNOSIS — K29.50 CHRONIC GASTRITIS WITHOUT BLEEDING, UNSPECIFIED GASTRITIS TYPE: ICD-10-CM

## 2018-05-28 RX ORDER — METOPROLOL SUCCINATE 25 MG/1
TABLET, EXTENDED RELEASE ORAL
Qty: 30 TABLET | Refills: 0 | Status: SHIPPED | OUTPATIENT
Start: 2018-05-28 | End: 2018-05-29 | Stop reason: SDUPTHER

## 2018-05-28 RX ORDER — PANTOPRAZOLE SODIUM 40 MG/1
TABLET, DELAYED RELEASE ORAL
Qty: 30 TABLET | Refills: 0 | Status: SHIPPED | OUTPATIENT
Start: 2018-05-28 | End: 2018-05-29 | Stop reason: SDUPTHER

## 2018-05-29 DIAGNOSIS — R00.0 TACHYCARDIA: ICD-10-CM

## 2018-05-29 DIAGNOSIS — K29.50 CHRONIC GASTRITIS WITHOUT BLEEDING, UNSPECIFIED GASTRITIS TYPE: ICD-10-CM

## 2018-05-29 RX ORDER — PANTOPRAZOLE SODIUM 40 MG/1
TABLET, DELAYED RELEASE ORAL
Qty: 90 TABLET | Refills: 0 | Status: SHIPPED | OUTPATIENT
Start: 2018-05-29 | End: 2018-06-27 | Stop reason: SDUPTHER

## 2018-05-29 RX ORDER — METOPROLOL SUCCINATE 25 MG/1
TABLET, EXTENDED RELEASE ORAL
Qty: 90 TABLET | Refills: 0 | Status: SHIPPED | OUTPATIENT
Start: 2018-05-29 | End: 2018-11-27 | Stop reason: ALTCHOICE

## 2018-06-07 ENCOUNTER — PATIENT MESSAGE (OUTPATIENT)
Dept: UROLOGY | Facility: CLINIC | Age: 38
End: 2018-06-07

## 2018-06-09 DIAGNOSIS — R39.81 FUNCTIONAL URINARY INCONTINENCE: ICD-10-CM

## 2018-06-11 DIAGNOSIS — R39.81 FUNCTIONAL URINARY INCONTINENCE: ICD-10-CM

## 2018-06-11 RX ORDER — TAMSULOSIN HYDROCHLORIDE 0.4 MG/1
CAPSULE ORAL
Qty: 30 CAPSULE | Refills: 0 | Status: SHIPPED | OUTPATIENT
Start: 2018-06-11 | End: 2018-06-11 | Stop reason: SDUPTHER

## 2018-06-11 RX ORDER — TAMSULOSIN HYDROCHLORIDE 0.4 MG/1
CAPSULE ORAL
Qty: 90 CAPSULE | Refills: 0 | Status: SHIPPED | OUTPATIENT
Start: 2018-06-11 | End: 2018-06-14 | Stop reason: ALTCHOICE

## 2018-06-11 RX ORDER — GABAPENTIN 800 MG/1
TABLET ORAL
Qty: 60 TABLET | Refills: 0 | Status: SHIPPED | OUTPATIENT
Start: 2018-06-11 | End: 2018-07-08 | Stop reason: SDUPTHER

## 2018-06-13 ENCOUNTER — HOSPITAL ENCOUNTER (OUTPATIENT)
Dept: RADIOLOGY | Facility: CLINIC | Age: 38
Discharge: HOME | End: 2018-06-13
Attending: NURSE PRACTITIONER
Payer: MEDICARE

## 2018-06-13 DIAGNOSIS — N31.9 NEUROGENIC BLADDER AS LATE EFFECT OF CEREBROVASCULAR ACCIDENT (CVA): ICD-10-CM

## 2018-06-13 DIAGNOSIS — N18.30 CKD (CHRONIC KIDNEY DISEASE) STAGE 3, GFR 30-59 ML/MIN: ICD-10-CM

## 2018-06-13 DIAGNOSIS — Z86.73 HISTORY OF STROKE: ICD-10-CM

## 2018-06-13 DIAGNOSIS — R32 URINARY INCONTINENCE, UNSPECIFIED TYPE: ICD-10-CM

## 2018-06-13 DIAGNOSIS — I69.398 NEUROGENIC BLADDER AS LATE EFFECT OF CEREBROVASCULAR ACCIDENT (CVA): ICD-10-CM

## 2018-06-13 PROCEDURE — 76770 US EXAM ABDO BACK WALL COMP: CPT | Mod: TC,PO

## 2018-06-13 PROCEDURE — 76770 US EXAM ABDO BACK WALL COMP: CPT | Mod: 26,,, | Performed by: RADIOLOGY

## 2018-06-14 ENCOUNTER — OFFICE VISIT (OUTPATIENT)
Dept: UROLOGY | Facility: CLINIC | Age: 38
End: 2018-06-14
Payer: MEDICARE

## 2018-06-14 ENCOUNTER — APPOINTMENT (OUTPATIENT)
Dept: LAB | Facility: HOSPITAL | Age: 38
End: 2018-06-14
Attending: UROLOGY
Payer: MEDICARE

## 2018-06-14 VITALS
TEMPERATURE: 98 F | BODY MASS INDEX: 22.73 KG/M2 | HEIGHT: 63 IN | HEART RATE: 70 BPM | SYSTOLIC BLOOD PRESSURE: 108 MMHG | WEIGHT: 128.31 LBS | DIASTOLIC BLOOD PRESSURE: 72 MMHG

## 2018-06-14 DIAGNOSIS — N28.89 URETEROCELE: ICD-10-CM

## 2018-06-14 DIAGNOSIS — R30.0 DYSURIA: ICD-10-CM

## 2018-06-14 DIAGNOSIS — N32.81 OAB (OVERACTIVE BLADDER): Primary | ICD-10-CM

## 2018-06-14 LAB
BACTERIA #/AREA URNS HPF: ABNORMAL /HPF
BILIRUB UR QL STRIP: NEGATIVE
CLARITY UR: CLEAR
COLOR UR: YELLOW
GLUCOSE UR QL STRIP: NEGATIVE
HGB UR QL STRIP: NEGATIVE
KETONES UR QL STRIP: NEGATIVE
LEUKOCYTE ESTERASE UR QL STRIP: ABNORMAL
MICROSCOPIC COMMENT: ABNORMAL
NITRITE UR QL STRIP: NEGATIVE
PH UR STRIP: 6 [PH] (ref 5–8)
PROT UR QL STRIP: NEGATIVE
RBC #/AREA URNS HPF: 0 /HPF (ref 0–4)
SP GR UR STRIP: <=1.005 (ref 1–1.03)
SQUAMOUS #/AREA URNS HPF: 3 /HPF
URN SPEC COLLECT METH UR: ABNORMAL
UROBILINOGEN UR STRIP-ACNC: NEGATIVE EU/DL
WBC #/AREA URNS HPF: 8 /HPF (ref 0–5)

## 2018-06-14 PROCEDURE — 87088 URINE BACTERIA CULTURE: CPT

## 2018-06-14 PROCEDURE — 3008F BODY MASS INDEX DOCD: CPT | Mod: CPTII,S$GLB,, | Performed by: UROLOGY

## 2018-06-14 PROCEDURE — 87186 SC STD MICRODIL/AGAR DIL: CPT

## 2018-06-14 PROCEDURE — 87077 CULTURE AEROBIC IDENTIFY: CPT

## 2018-06-14 PROCEDURE — 99215 OFFICE O/P EST HI 40 MIN: CPT | Mod: 25,S$GLB,, | Performed by: UROLOGY

## 2018-06-14 PROCEDURE — 99999 PR PBB SHADOW E&M-EST. PATIENT-LVL V: CPT | Mod: PBBFAC,,, | Performed by: UROLOGY

## 2018-06-14 PROCEDURE — 87086 URINE CULTURE/COLONY COUNT: CPT

## 2018-06-14 PROCEDURE — 51701 INSERT BLADDER CATHETER: CPT | Mod: S$GLB,,, | Performed by: UROLOGY

## 2018-06-14 PROCEDURE — 81000 URINALYSIS NONAUTO W/SCOPE: CPT

## 2018-06-14 RX ORDER — OXYBUTYNIN CHLORIDE 10 MG/1
10 TABLET, EXTENDED RELEASE ORAL DAILY
Qty: 30 TABLET | Refills: 2 | Status: SHIPPED | OUTPATIENT
Start: 2018-06-14 | End: 2018-07-19 | Stop reason: SDUPTHER

## 2018-06-14 NOTE — PROGRESS NOTES
[facility] Note - [place]  Staff: MD Jerry    Referring provider and please cc: Sagar  PCP: Dr.Darrin [facility]    Chief Complaint: oab     Subjective:        HPI: Cat Denson is a 38 y.o. female presents with     Overactive bladder and urge incontinence  -she has a h/o frequency q20 minutes-30 minutes and has been leaking on the way to the bathroom with urge. This has been occurring for the last 2 years but the nocturnal and urge incontinence has been worsening over the last 2 months. She wears 3 depends and a day and has to change her clothes 1x a day due to this. She has been having some dysuria for the past few day. A UA yesterday showed nit+ urine. She is not typically prone to uti's.  -she has a h/o stroke in 2015 as a result of chain smoking and depo.reisdual SE include L arm, weakness, memory problems, change in vision in left arm and oab. No issues prior to stroke. At some point she was started on flomax around that time. She has never tried an OAB med and she does take jet alert in the morning, green teas and a soda/coffee in the afternoon in addition to 2 to 3 bottles per day. Denies any constipation.   -she saw [facility] on 5/11/18 who ordered a renal ultrasound which showed no hydro and residual 6cc, bilateral renal cysts and right ureterocele, which she has never known about. She denies any uti symptoms. CTRSS 12/2017 showed no stones in her kidney or ureters. Of note she has CKD Stage III since a 20 and her most recent cr is 1.8.   -she has multiple other medical problems including chronic pain, fibromyalgia (no pain medicine), bipolar, srogens, RTA, suicide attempt in addition tot he stroke she experienced in 2015.   -RF for uti: depends and ureterocele.  -RF for incontinence: flomax, neurogenic bladder from stroke and caffeine intake.     Ua cath: send for urinalysis and culture (dysuria).   Urine history:  6/13/18 No cx, void:  nit+/3+leuk  5/14/18 Cath: negative   2/20/18 No cx, void: tr blood/1+leuk  1/8/18  No cx, void: neg  7/18/17 No cx, void: nit+/tr leuk  3/12/17 No cx, void: neg  3/3/17  No cx, void: tr ketones  3/2/17  No cx, void: tr ketoens/1+bili  2/14/17 E.coli  7/15/14 ng    ECOG Status: 0      G0, P0    REVIEW OF SYSTEMS:  General ROS: no fevers, no chills  Psychological ROS: no depression  Endocrine ROS: no heat or cold  Respiratory ROS: no SOB  Cardiovascular ROS: no CP  Gastrointestinal ROS: no abdominal pain, no constipation, no diarrhea, nBRBPR  Musculoskeletal ROS: no muscle pain  Neurological ROS: no headaches  Dermatological ROS: no rashes  HEENT: +glasses, no sinus   ROS: per HPI     PMHx:  Past Medical History:   Diagnosis Date    Acid reflux     Allergy     Anemia     Anxiety     Asthma     Bipolar 1 disorder     Chronic kidney disease     Chronic pain     Depression     bipolar 2    Fibromyalgia     Gastroparesis     Hx of psychiatric care     Lactose intolerance     Lupus     Psychiatric problem     Renal tubular acidosis     Seizure     Sjogren's syndrome     Smoker     Stroke 11-    Suicide attempt     overdosed on a bottle of paxil, muscle relaxers, & zoloft    Therapy      Kidney stones: No    PSHx:  Past Surgical History:   Procedure Laterality Date    CHOLECYSTECTOMY      COLONOSCOPY  11/12/2014    Dr. Colin, repeat at 52 years old for screening    UPPER GASTROINTESTINAL ENDOSCOPY  03/03/2017    Dr. Harris     Urologic or Gynecologic Surgery: no    Stents/Valves/Foreign Bodies: No  Cardiac Evaluation: Yes     Screening Studies  Colonoscopy: yes    Fam Hx:   malignancies: No , gyn malignancies: maternal aunts with breast cancer . Mother alive healthy. Father alive and healthy  kidney stones: No     Soc Hx:  +tobacco.  1 pk per day x 20 year  No alcohol  Lives in [place]  :unmarried  Children: 0  Occupation: disability     Allergies:  Reglan [metoclopramide hcl];  Amoxil [amoxicillin]; Augmentin [amoxicillin-pot clavulanate]; and Avelox [moxifloxacin]   augmentin - diarrhea     Medications: reviewed   Anticoagulation: Yes - eliquis    Objective:     Vitals:    06/14/18 1121   BP: 108/72   Pulse: 70   Temp: 98.1 °F (36.7 °C)       General:WDWN in NAD  Eyes: PERRLA, normal conjunctiva  Respiratory: no increased work on breathing. No wheezing.   Cardiovascular: No obvious extremity edema. Warm and well perfused.   GI: no palpation of masses. No tenderness. No hepatosplenomegaly to palpation.  Musculoskeletal: normal range of motion of bilateral upper extremities. Normal muscle strength and tone.  Skin: no obvious rashes or lesions. No tightening of skin noted.  Neurologic: CN grossly normal. Normal sensation.   Psychiatric: awake, alert and oriented x 3. Mood and affect normal. Cooperative.    In and out cath done by me today sent for ua and culture- will plan to treat.     LABS REVIEW:    Cr:   Lab Results   Component Value Date    CREATININE 1.8 (H) 06/13/2018    CREATININE 1.8 (H) 06/13/2018       PATHOLOGY REVIEW:  none    RADIOGRAPHIC REVIEW:  rbus  6/13/18  1. There is no hydronephrosis.  There is no renal cyst.  The resistive index is elevated in both kidneys suggesting some degree of medical renal disease.  2. Two small hyperechoic foci in the lateral midpole of the left kidney likely represent small angiomyolipomas.  3. Bilateral urine jets are visualized.  4. There is a right ureterocele  5. Multiple cysts are identified in the right kidney.  The largest measures 3.4 x 2.8 x 2.4 cm    ctrss 6/12/17  The adrenal glands are not enlarged.  The kidneys are of normal size, contour and CT density for a noncontrast study.  No mass, cyst or hydronephrosis is noted. No stones are identified   Prior cholecystectomy.  2.4 cm heterogeneous right ovary.  Recommend ultrasound evaluation.  Long skinny appendix is noted, this is the structure seen on the prior CT.    ctrss  3/12/17  The kidneys show no evidence of stones, hydronephrosis, or solid mass.  The ureters are normal in caliber and course without definite stones appreciated.  The urinary bladder shows mild wall thickening anteriorly, nonspecific.  This could be due to nondistention; however, cystitis cannot be excluded.  Please correlate with urinalysis  1.  Motion limited study.  A tubular structure containing air without surrounding inflammatory changes visible in the right lower quadrant of the abdomen and is favored to represent the appendix.  No surrounding inflammatory changes appreciated to suggest acute appendicitis.  2.  18 mm right ovarian hyperdensity, possibly a hemorrhagic cyst/follicle.  Endometrioma could produce a similar appearance.  The right ovary could be further evaluated with pelvic ultrasound as deemed clinically appropriate.  3.  Possible cystitis.  Consider correlation with urinalysis  4.  Status post cholecystectomy.      Assessment:       1. OAB (overactive bladder)    2. Dysuria    3. Ureterocele          Plan:     OAB, possibly neurogenic   -she has a h/o stroke in 2015 and has had issues with OAB and incontinence since. It is worsening and she likely has uti today based on her urinalysis from yesterday. We will send this and treat. She is also on flomax, unclear why but probably had retention at some point however her ultrasound she empties now (pvr 6mL).   -I recommend discontinuing flomax, no need for this. We can also start an overactive bladder medicine.   -Will start ditropan 10mg XL/oxybutynin 10mg XL once a day. If too expensive she will contact her insurance company with list I am giving her. Side effects include constipation and dry mouth.  -also she needs to stop taking Jet Alert and no more caffeine especially within 3 hours of bedtime.  -f/u in 1 month with our NP and if no improvement will try another medicine. All other medicines take 4 to 6 weeks to work. If no improvement after  trying 2 to 3 medicines, recommend urodynamics to further evaluate bladder. May need botox in the bladder in the future if medicines by mouth do not work. Or interstim but if neurogenic likely not to work, coul consider trial.   -Will see if treatment of uti improves symptoms as this could be causing worsening incontinence    Dysuria, uti  -risk factors for uti include pad usage. Goal will be to treat overactive bladder/neurogenic bladder.   -send catheterized urine for urinalysis and culture today and will plan to treat. Pt does have cr 1.8 so will need to renally dose. Will see if treatment of uti improves symptoms.   -could be related to ureterocele, but unlikely    Right ureterocele  -could cause uti but unlikely. Has had all her life and no problems before.   -no hydro seen on rbus. No stones seen on ct.     F/u in 1 month with NP to see how she is doing on OAB medicines and possibly try new med.   F/u with me in 3 months     I spent 60 minutes with the patient of which more than half was spent in direct consultation with the patient in regards to our treatment and plan.      Karmen Edwards MD

## 2018-06-14 NOTE — PATIENT INSTRUCTIONS
Patient Instructions (MUST READ this part and have family member review with you)  Medicines started today:   1. Ditropan/oxybutynin, Take 10mg daily. Why?: for overactive bladder Side effects include dry mouth or constipation. If too expensive call your insurance company to find out alternative medicines and your co-pay for each medication and call us back or send us a message via [employer] with this information.     Medicines discontinued today: flomax    Imaging ordered (to be done in radiology at the hospital): none.     Blood tests ordered (to be done at hospital):none.     Other instructions:   1. Urine was sent for culture to look for infection and we will call you next week and prescribe antibiotics.   2. STOP drinking caffeine    Follow up: 1 month with our nurse practitioner to see how you are doing on medicine. If you do not receive a request for a follow-up appointment or no follow-up appointment has been made please make sure to contact us.    Detailed information about your medical conditions you have been diagnosed and treated for today. Please feel free to contact us with any questions about this.       OAB (overactive bladder), possibly neurogenic   -she has a h/o stroke in 2015 and has had issues with OAB and incontinence since. It is worsening and she likely has uti today based on her urinalysis from yesterday. We will send this and treat. She is also on flomax, unclear why but probably had retention at some point however her ultrasound she empties now (pvr 6mL).   -I recommend discontinuing flomax, no need for this. We can also start an overactive bladder medicine.   -Will start ditropan 10mg XL/oxybutynin 10mg XL once a day. If too expensive she will contact her insurance company with list I am giving her. Side effects include constipation and dry mouth.  -also she needs to stop taking Jet Alert and no more caffeine especially within 3 hours of bedtime.  -f/u in 1 month with our NP and if no  improvement will try another medicine. All other medicines take 4 to 6 weeks to work. If no improvement after trying 2 to 3 medicines, recommend urodynamics to further evaluate bladder. May need botox in the bladder in the future if medicines by mouth do not work. Or interstim but if neurogenic likely not to work, coul consider trial.     Dysuria, uti  -risk factors for uti include pad usage. Goal will be to treat overactive bladder/neurogenic bladder.   -send catheterized urine for urinalysis and culture today and will plan to treat. Pt does have cr 1.8 so will need to renally dose.  -could be related to ureterocele, but unlikely    Right ureterocele  -could cause uti but unlikely. Has had all her life and no problems before.   -no hydro seen on rbus. No stones seen on ct.     F/u in 1 month with NP to see how she is doing on OAB medicines and possibly try new med.   F/u with me in 3 months

## 2018-06-15 ENCOUNTER — PATIENT MESSAGE (OUTPATIENT)
Dept: FAMILY MEDICINE | Facility: CLINIC | Age: 38
End: 2018-06-15

## 2018-06-15 ENCOUNTER — TELEPHONE (OUTPATIENT)
Dept: PSYCHIATRY | Facility: CLINIC | Age: 38
End: 2018-06-15

## 2018-06-15 RX ORDER — VARENICLINE TARTRATE 0.5 (11)-1
KIT ORAL
Qty: 1 PACKAGE | Refills: 3 | Status: SHIPPED | OUTPATIENT
Start: 2018-06-15 | End: 2018-06-27

## 2018-06-15 NOTE — TELEPHONE ENCOUNTER
Please advise the patient:     I have reviewed and released her lab results:     1. Depakote level is within acceptable range.   2. Metabolic panel shows decreased kidney function which is stable from last lab test. Liver functions are normal.    Her chloride is low, has pt been experiencing any vomiting or diarrhea lately?   3. CBC - blood counts show that red blood cells are decreased but stable from previous lab, her red blood cells are on large side which may indicate vitamin deficiency, platelets are normal.     I will send a copy of labs to Dr Dukes - please advise the patient to discuss lab abnormalities with him.  How has the patient been feeling lately?

## 2018-06-18 LAB — BACTERIA UR CULT: NORMAL

## 2018-06-19 NOTE — TELEPHONE ENCOUNTER
Attempted to call patient; no answer. Left voicemail to return our call at [phone]  Thank you

## 2018-06-20 ENCOUNTER — TELEPHONE (OUTPATIENT)
Dept: UROLOGY | Facility: CLINIC | Age: 38
End: 2018-06-20

## 2018-06-20 RX ORDER — SULFAMETHOXAZOLE AND TRIMETHOPRIM 800; 160 MG/1; MG/1
1 TABLET ORAL 2 TIMES DAILY
Qty: 6 TABLET | Refills: 0 | Status: SHIPPED | OUTPATIENT
Start: 2018-06-20 | End: 2018-06-20 | Stop reason: ALTCHOICE

## 2018-06-20 RX ORDER — SULFAMETHOXAZOLE AND TRIMETHOPRIM 400; 80 MG/1; MG/1
1 TABLET ORAL 2 TIMES DAILY
Qty: 6 TABLET | Refills: 0 | Status: SHIPPED | OUTPATIENT
Start: 2018-06-20 | End: 2018-06-20 | Stop reason: ALTCHOICE

## 2018-06-20 NOTE — TELEPHONE ENCOUNTER
----- Message from Haley Yates LPN sent at 6/19/2018  9:10 AM CDT -----  Spoke with patient informed her of results. Verbally voiced understanding. Patient is having symptoms of burning during urination

## 2018-06-20 NOTE — TELEPHONE ENCOUNTER
Patient notified and verbalized understanding  delt with stomach upset last week but no vomiting.

## 2018-06-20 NOTE — TELEPHONE ENCOUNTER
Spoke with patient informed her antibiotics called in to the pharmacy. Patient verbally voiced understanding.

## 2018-06-20 NOTE — TELEPHONE ENCOUNTER
Let her know I sent in a 3 day prescription to [facility] of bactrim single strength and double strength but I actually want her to  the single strength 400/80 (not double strength bactrim 800/160) bc she has decreased kidney function and the full dose can affect her kidney function    Ask her to see if she notices improvement in her oab symptoms and burning with urination improves with uti treatment          Urine history:  6/14/18 Siobhan, cath: tr leuk (dsyuria) - tx with ss bactrim x3d  6/13/18            No cx, void: nit+/3+leuk  5/14/18            Cath: negative   2/20/18            No cx, void: tr blood/1+leuk  1/8/18              No cx, void: neg  7/18/17            No cx, void: nit+/tr leuk  3/12/17            No cx, void: neg  3/3/17              No cx, void: tr ketones  3/2/17              No cx, void: tr ketoens/1+bili  2/14/17            E.coli  7/15/14            ng

## 2018-06-26 ENCOUNTER — OFFICE VISIT (OUTPATIENT)
Dept: NEPHROLOGY | Facility: CLINIC | Age: 38
End: 2018-06-26
Payer: MEDICARE

## 2018-06-26 VITALS
OXYGEN SATURATION: 98 % | HEIGHT: 63 IN | TEMPERATURE: 97 F | SYSTOLIC BLOOD PRESSURE: 100 MMHG | RESPIRATION RATE: 18 BRPM | WEIGHT: 127.13 LBS | HEART RATE: 68 BPM | DIASTOLIC BLOOD PRESSURE: 60 MMHG | BODY MASS INDEX: 22.53 KG/M2

## 2018-06-26 DIAGNOSIS — N18.30 CHRONIC KIDNEY DISEASE, STAGE III (MODERATE): Primary | ICD-10-CM

## 2018-06-26 PROCEDURE — 99214 OFFICE O/P EST MOD 30 MIN: CPT | Mod: S$GLB,,, | Performed by: INTERNAL MEDICINE

## 2018-06-26 PROCEDURE — 99999 PR PBB SHADOW E&M-EST. PATIENT-LVL III: CPT | Mod: PBBFAC,,, | Performed by: INTERNAL MEDICINE

## 2018-06-26 PROCEDURE — 3008F BODY MASS INDEX DOCD: CPT | Mod: CPTII,S$GLB,, | Performed by: INTERNAL MEDICINE

## 2018-06-26 NOTE — PROGRESS NOTES
"Subjective:       Patient ID: Cat Denson is a 38 y.o. White female who presents for return patient evaluation for chronic renal failure.    She has no uremic symptoms.  There have been no recent hospitalizations or procedures.  She did have a recent Proteus UTI and was prescribed bactrim.      Review of Systems   Constitutional: Positive for unexpected weight change. Negative for appetite change, chills and fever.   Eyes: Positive for visual disturbance (L eye).   Respiratory: Negative for cough and shortness of breath.    Cardiovascular: Negative for chest pain and leg swelling.   Gastrointestinal: Negative for diarrhea, nausea and vomiting.   Endocrine: Positive for cold intolerance.   Genitourinary: Positive for difficulty urinating (incontinence since cva). Negative for dysuria and hematuria.   Musculoskeletal: Positive for arthralgias. Negative for myalgias.   Skin: Negative for rash.   Neurological: Positive for weakness (L arm and leg since cva). Negative for headaches.   Psychiatric/Behavioral: Positive for decreased concentration (memory since cva). Negative for sleep disturbance.       The past medical, family and social histories were reviewed for this encounter.     /60   Pulse 68   Temp 97 °F (36.1 °C) (Oral)   Resp 18   Ht 5' 3" (1.6 m)   Wt 57.7 kg (127 lb 1.6 oz)   LMP 06/20/2018   SpO2 98%   BMI 22.51 kg/m²     Objective:      Physical Exam   Constitutional: She is oriented to person, place, and time. She appears well-developed and well-nourished. No distress.   HENT:   Head: Normocephalic and atraumatic.   Eyes: Conjunctivae are normal.   Neck: Neck supple. No JVD present.   Cardiovascular: Normal rate, regular rhythm and normal heart sounds.  Exam reveals no gallop and no friction rub.    No murmur heard.  Pulmonary/Chest: Effort normal and breath sounds normal. No respiratory distress. She has no wheezes. She has no rales.   Abdominal: Soft. Bowel sounds are normal. She " exhibits no distension. There is no tenderness.   Musculoskeletal: She exhibits no edema.   Neurological: She is alert and oriented to person, place, and time.   Skin: Skin is warm and dry. No rash noted.   Psychiatric: She has a normal mood and affect.   Vitals reviewed.      Assessment:       1. Chronic kidney disease, stage III (moderate)        Plan:   Return to clinic in 4 months.  Labs for next visit include rp, pth, ua, upc.  Baseline creatinine is 1.3-1.5 since 2013.  PTH is 128 with a calcium of 8.5.  Blood pressure is controlled on the current regimen.  She has a bland urine sediment with normal kidneys structurally and relatively stable function.  She does not appear to have any active SLE disease and does not apparently have a history of renal involvement.  Her ds DNA and complement levels are normal.  Renal ultrasound shows R 8.6 cm, L 8.7 cm.  I would consider stopping the atenolol given her blood pressure.  I will send this note to Dr. Dukes to get his opinion.

## 2018-06-27 ENCOUNTER — PATIENT MESSAGE (OUTPATIENT)
Dept: FAMILY MEDICINE | Facility: CLINIC | Age: 38
End: 2018-06-27

## 2018-06-27 ENCOUNTER — HOSPITAL ENCOUNTER (OUTPATIENT)
Facility: HOSPITAL | Age: 38
Discharge: HOME | End: 2018-06-29
Attending: EMERGENCY MEDICINE | Admitting: HOSPITALIST
Payer: MEDICARE

## 2018-06-27 DIAGNOSIS — N39.0 URINARY TRACT INFECTION WITHOUT HEMATURIA, SITE UNSPECIFIED: ICD-10-CM

## 2018-06-27 DIAGNOSIS — G40.909 RECURRENT SEIZURES: Primary | ICD-10-CM

## 2018-06-27 DIAGNOSIS — N30.00 ACUTE CYSTITIS WITHOUT HEMATURIA: ICD-10-CM

## 2018-06-27 LAB
ALBUMIN SERPL BCP-MCNC: 3.7 G/DL
ALP SERPL-CCNC: 87 U/L
ALT SERPL W/O P-5'-P-CCNC: 19 U/L
ANION GAP SERPL CALC-SCNC: 10 MMOL/L
AST SERPL-CCNC: 24 U/L
B-HCG UR QL: NEGATIVE
BACTERIA #/AREA URNS HPF: ABNORMAL /HPF
BASOPHILS # BLD AUTO: ABNORMAL K/UL
BASOPHILS NFR BLD: 0 %
BILIRUB SERPL-MCNC: 0.2 MG/DL
BILIRUB UR QL STRIP: NEGATIVE
BUN SERPL-MCNC: 15 MG/DL
CALCIUM SERPL-MCNC: 9 MG/DL
CHLORIDE SERPL-SCNC: 113 MMOL/L
CK SERPL-CCNC: 38 U/L
CLARITY UR: ABNORMAL
CO2 SERPL-SCNC: 19 MMOL/L
COLOR UR: YELLOW
CREAT SERPL-MCNC: 2 MG/DL
CTP QC/QA: YES
DIFFERENTIAL METHOD: ABNORMAL
EOSINOPHIL # BLD AUTO: ABNORMAL K/UL
EOSINOPHIL NFR BLD: 0 %
ERYTHROCYTE [DISTWIDTH] IN BLOOD BY AUTOMATED COUNT: 12.6 %
EST. GFR  (AFRICAN AMERICAN): 36 ML/MIN/1.73 M^2
EST. GFR  (NON AFRICAN AMERICAN): 31 ML/MIN/1.73 M^2
GLUCOSE SERPL-MCNC: 108 MG/DL
GLUCOSE UR QL STRIP: NEGATIVE
HCT VFR BLD AUTO: 39.2 %
HGB BLD-MCNC: 13 G/DL
HGB UR QL STRIP: ABNORMAL
HYALINE CASTS #/AREA URNS LPF: 0 /LPF
KETONES UR QL STRIP: NEGATIVE
LEUKOCYTE ESTERASE UR QL STRIP: ABNORMAL
LYMPHOCYTES # BLD AUTO: ABNORMAL K/UL
LYMPHOCYTES NFR BLD: 6 %
MCH RBC QN AUTO: 31.8 PG
MCHC RBC AUTO-ENTMCNC: 33.2 G/DL
MCV RBC AUTO: 96 FL
MICROSCOPIC COMMENT: ABNORMAL
MONOCYTES # BLD AUTO: ABNORMAL K/UL
MONOCYTES NFR BLD: 13 %
NEUTROPHILS NFR BLD: 79 %
NEUTS BAND NFR BLD MANUAL: 2 %
NITRITE UR QL STRIP: POSITIVE
PH UR STRIP: 7 [PH] (ref 5–8)
PLATELET # BLD AUTO: 159 K/UL
PLATELET BLD QL SMEAR: ABNORMAL
PMV BLD AUTO: 10.1 FL
POLYCHROMASIA BLD QL SMEAR: ABNORMAL
POTASSIUM SERPL-SCNC: 4.4 MMOL/L
PROT SERPL-MCNC: 7.3 G/DL
PROT UR QL STRIP: ABNORMAL
RBC # BLD AUTO: 4.09 M/UL
RBC #/AREA URNS HPF: 2 /HPF (ref 0–4)
SODIUM SERPL-SCNC: 142 MMOL/L
SP GR UR STRIP: 1.01 (ref 1–1.03)
SQUAMOUS #/AREA URNS HPF: 3 /HPF
URN SPEC COLLECT METH UR: ABNORMAL
UROBILINOGEN UR STRIP-ACNC: NEGATIVE EU/DL
VALPROATE SERPL-MCNC: 55.2 UG/ML
WBC # BLD AUTO: 19.1 K/UL
WBC #/AREA URNS HPF: 46 /HPF (ref 0–5)
WBC CLUMPS URNS QL MICRO: ABNORMAL

## 2018-06-27 PROCEDURE — 81000 URINALYSIS NONAUTO W/SCOPE: CPT

## 2018-06-27 PROCEDURE — 81025 URINE PREGNANCY TEST: CPT | Performed by: EMERGENCY MEDICINE

## 2018-06-27 PROCEDURE — 82550 ASSAY OF CK (CPK): CPT

## 2018-06-27 PROCEDURE — 85007 BL SMEAR W/DIFF WBC COUNT: CPT | Mod: NCS

## 2018-06-27 PROCEDURE — 80164 ASSAY DIPROPYLACETIC ACD TOT: CPT

## 2018-06-27 PROCEDURE — 87077 CULTURE AEROBIC IDENTIFY: CPT

## 2018-06-27 PROCEDURE — 87086 URINE CULTURE/COLONY COUNT: CPT

## 2018-06-27 PROCEDURE — 85027 COMPLETE CBC AUTOMATED: CPT

## 2018-06-27 PROCEDURE — 96365 THER/PROPH/DIAG IV INF INIT: CPT

## 2018-06-27 PROCEDURE — 36415 COLL VENOUS BLD VENIPUNCTURE: CPT

## 2018-06-27 PROCEDURE — 99285 EMERGENCY DEPT VISIT HI MDM: CPT | Mod: 25

## 2018-06-27 PROCEDURE — 87186 SC STD MICRODIL/AGAR DIL: CPT

## 2018-06-27 PROCEDURE — 87088 URINE BACTERIA CULTURE: CPT

## 2018-06-27 PROCEDURE — 80053 COMPREHEN METABOLIC PANEL: CPT

## 2018-06-27 RX ORDER — CEFTRIAXONE 1 G/50ML
1 INJECTION, SOLUTION INTRAVENOUS
Status: COMPLETED | OUTPATIENT
Start: 2018-06-28 | End: 2018-06-28

## 2018-06-28 PROBLEM — N39.0 UTI (URINARY TRACT INFECTION): Status: ACTIVE | Noted: 2018-06-28

## 2018-06-28 PROBLEM — G40.909 RECURRENT SEIZURES: Status: ACTIVE | Noted: 2018-06-28

## 2018-06-28 LAB
ALBUMIN SERPL BCP-MCNC: 3.7 G/DL
ALP SERPL-CCNC: 90 U/L
ALT SERPL W/O P-5'-P-CCNC: 16 U/L
ANION GAP SERPL CALC-SCNC: 11 MMOL/L
AST SERPL-CCNC: 20 U/L
BASOPHILS # BLD AUTO: ABNORMAL K/UL
BASOPHILS NFR BLD: 0 %
BILIRUB SERPL-MCNC: 0.3 MG/DL
BUN SERPL-MCNC: 16 MG/DL
C DIFF GDH STL QL: NEGATIVE
C DIFF TOX A+B STL QL IA: NEGATIVE
CALCIUM SERPL-MCNC: 8.9 MG/DL
CHLORIDE SERPL-SCNC: 115 MMOL/L
CO2 SERPL-SCNC: 18 MMOL/L
CREAT SERPL-MCNC: 1.8 MG/DL
DIFFERENTIAL METHOD: ABNORMAL
EOSINOPHIL # BLD AUTO: ABNORMAL K/UL
EOSINOPHIL NFR BLD: 0 %
ERYTHROCYTE [DISTWIDTH] IN BLOOD BY AUTOMATED COUNT: 12.6 %
EST. GFR  (AFRICAN AMERICAN): 41 ML/MIN/1.73 M^2
EST. GFR  (NON AFRICAN AMERICAN): 36 ML/MIN/1.73 M^2
GLUCOSE SERPL-MCNC: 114 MG/DL
HCT VFR BLD AUTO: 38.5 %
HGB BLD-MCNC: 12.8 G/DL
LYMPHOCYTES # BLD AUTO: ABNORMAL K/UL
LYMPHOCYTES NFR BLD: 5 %
MAGNESIUM SERPL-MCNC: 2.3 MG/DL
MCH RBC QN AUTO: 32 PG
MCHC RBC AUTO-ENTMCNC: 33.3 G/DL
MCV RBC AUTO: 96 FL
MONOCYTES # BLD AUTO: ABNORMAL K/UL
MONOCYTES NFR BLD: 3 %
NEUTROPHILS NFR BLD: 89 %
NEUTS BAND NFR BLD MANUAL: 3 %
PHOSPHATE SERPL-MCNC: 3.8 MG/DL
PLATELET # BLD AUTO: 140 K/UL
PLATELET BLD QL SMEAR: ABNORMAL
PMV BLD AUTO: 10.6 FL
POLYCHROMASIA BLD QL SMEAR: ABNORMAL
POTASSIUM SERPL-SCNC: 4.5 MMOL/L
PROT SERPL-MCNC: 6.9 G/DL
RBC # BLD AUTO: 4.01 M/UL
SODIUM SERPL-SCNC: 144 MMOL/L
WBC # BLD AUTO: 16.1 K/UL

## 2018-06-28 PROCEDURE — 84100 ASSAY OF PHOSPHORUS: CPT

## 2018-06-28 PROCEDURE — 63600175 PHARM REV CODE 636 W HCPCS: Performed by: NURSE PRACTITIONER

## 2018-06-28 PROCEDURE — 85007 BL SMEAR W/DIFF WBC COUNT: CPT

## 2018-06-28 PROCEDURE — 80053 COMPREHEN METABOLIC PANEL: CPT

## 2018-06-28 PROCEDURE — 87449 NOS EACH ORGANISM AG IA: CPT

## 2018-06-28 PROCEDURE — 25000003 PHARM REV CODE 250: Performed by: NURSE PRACTITIONER

## 2018-06-28 PROCEDURE — 11000001 HC ACUTE MED/SURG PRIVATE ROOM

## 2018-06-28 PROCEDURE — 85027 COMPLETE CBC AUTOMATED: CPT

## 2018-06-28 PROCEDURE — 95819 EEG AWAKE AND ASLEEP: CPT | Mod: 26,,, | Performed by: PSYCHIATRY & NEUROLOGY

## 2018-06-28 PROCEDURE — 94761 N-INVAS EAR/PLS OXIMETRY MLT: CPT

## 2018-06-28 PROCEDURE — 25000003 PHARM REV CODE 250: Performed by: EMERGENCY MEDICINE

## 2018-06-28 PROCEDURE — G0378 HOSPITAL OBSERVATION PER HR: HCPCS

## 2018-06-28 PROCEDURE — 83735 ASSAY OF MAGNESIUM: CPT

## 2018-06-28 PROCEDURE — 63600175 PHARM REV CODE 636 W HCPCS: Performed by: EMERGENCY MEDICINE

## 2018-06-28 PROCEDURE — 25000003 PHARM REV CODE 250: Performed by: PSYCHIATRY & NEUROLOGY

## 2018-06-28 PROCEDURE — 36415 COLL VENOUS BLD VENIPUNCTURE: CPT

## 2018-06-28 PROCEDURE — 99222 1ST HOSP IP/OBS MODERATE 55: CPT | Mod: ,,, | Performed by: PSYCHIATRY & NEUROLOGY

## 2018-06-28 RX ORDER — LANOLIN ALCOHOL/MO/W.PET/CERES
800 CREAM (GRAM) TOPICAL
Status: DISCONTINUED | OUTPATIENT
Start: 2018-06-28 | End: 2018-06-29 | Stop reason: HOSPADM

## 2018-06-28 RX ORDER — GLUCAGON 1 MG
1 KIT INJECTION
Status: DISCONTINUED | OUTPATIENT
Start: 2018-06-28 | End: 2018-06-29 | Stop reason: HOSPADM

## 2018-06-28 RX ORDER — ENOXAPARIN SODIUM 100 MG/ML
40 INJECTION SUBCUTANEOUS EVERY 24 HOURS
Status: CANCELLED | OUTPATIENT
Start: 2018-06-28

## 2018-06-28 RX ORDER — LORAZEPAM 2 MG/ML
2 INJECTION INTRAMUSCULAR
Status: DISCONTINUED | OUTPATIENT
Start: 2018-06-28 | End: 2018-06-29 | Stop reason: HOSPADM

## 2018-06-28 RX ORDER — AMOXICILLIN 250 MG
1 CAPSULE ORAL 2 TIMES DAILY
Status: DISCONTINUED | OUTPATIENT
Start: 2018-06-28 | End: 2018-06-29 | Stop reason: HOSPADM

## 2018-06-28 RX ORDER — HYDROXYCHLOROQUINE SULFATE 200 MG/1
200 TABLET, FILM COATED ORAL 2 TIMES DAILY
Status: DISCONTINUED | OUTPATIENT
Start: 2018-06-28 | End: 2018-06-29 | Stop reason: HOSPADM

## 2018-06-28 RX ORDER — IBUPROFEN 200 MG
24 TABLET ORAL
Status: DISCONTINUED | OUTPATIENT
Start: 2018-06-28 | End: 2018-06-29 | Stop reason: HOSPADM

## 2018-06-28 RX ORDER — IBUPROFEN 200 MG
16 TABLET ORAL
Status: DISCONTINUED | OUTPATIENT
Start: 2018-06-28 | End: 2018-06-29 | Stop reason: HOSPADM

## 2018-06-28 RX ORDER — ACETAMINOPHEN 500 MG
1000 TABLET ORAL EVERY 6 HOURS PRN
Status: DISCONTINUED | OUTPATIENT
Start: 2018-06-28 | End: 2018-06-29 | Stop reason: HOSPADM

## 2018-06-28 RX ORDER — MIRTAZAPINE 7.5 MG/1
15 TABLET, FILM COATED ORAL NIGHTLY
Status: DISCONTINUED | OUTPATIENT
Start: 2018-06-28 | End: 2018-06-29 | Stop reason: HOSPADM

## 2018-06-28 RX ORDER — GABAPENTIN 400 MG/1
800 CAPSULE ORAL 3 TIMES DAILY
Status: DISCONTINUED | OUTPATIENT
Start: 2018-06-28 | End: 2018-06-29 | Stop reason: HOSPADM

## 2018-06-28 RX ORDER — ACETAMINOPHEN 325 MG/1
650 TABLET ORAL EVERY 4 HOURS PRN
Status: DISCONTINUED | OUTPATIENT
Start: 2018-06-28 | End: 2018-06-29 | Stop reason: HOSPADM

## 2018-06-28 RX ORDER — ONDANSETRON 2 MG/ML
8 INJECTION INTRAMUSCULAR; INTRAVENOUS EVERY 8 HOURS PRN
Status: DISCONTINUED | OUTPATIENT
Start: 2018-06-28 | End: 2018-06-29 | Stop reason: HOSPADM

## 2018-06-28 RX ORDER — POTASSIUM CHLORIDE 20 MEQ/15ML
40 SOLUTION ORAL
Status: DISCONTINUED | OUTPATIENT
Start: 2018-06-28 | End: 2018-06-29 | Stop reason: HOSPADM

## 2018-06-28 RX ORDER — SODIUM CHLORIDE 0.9 % (FLUSH) 0.9 %
5 SYRINGE (ML) INJECTION
Status: DISCONTINUED | OUTPATIENT
Start: 2018-06-28 | End: 2018-06-29 | Stop reason: HOSPADM

## 2018-06-28 RX ORDER — OXYBUTYNIN CHLORIDE 5 MG/1
10 TABLET, EXTENDED RELEASE ORAL DAILY
Status: DISCONTINUED | OUTPATIENT
Start: 2018-06-28 | End: 2018-06-29 | Stop reason: HOSPADM

## 2018-06-28 RX ORDER — PANTOPRAZOLE SODIUM 40 MG/1
40 TABLET, DELAYED RELEASE ORAL DAILY
Status: DISCONTINUED | OUTPATIENT
Start: 2018-06-28 | End: 2018-06-29 | Stop reason: HOSPADM

## 2018-06-28 RX ORDER — DIVALPROEX SODIUM 250 MG/1
500 TABLET, FILM COATED, EXTENDED RELEASE ORAL DAILY
Status: DISCONTINUED | OUTPATIENT
Start: 2018-06-28 | End: 2018-06-28

## 2018-06-28 RX ORDER — ARIPIPRAZOLE 5 MG/1
10 TABLET ORAL DAILY
Status: DISCONTINUED | OUTPATIENT
Start: 2018-06-28 | End: 2018-06-29 | Stop reason: HOSPADM

## 2018-06-28 RX ORDER — POTASSIUM CHLORIDE 20 MEQ/15ML
60 SOLUTION ORAL
Status: DISCONTINUED | OUTPATIENT
Start: 2018-06-28 | End: 2018-06-29 | Stop reason: HOSPADM

## 2018-06-28 RX ORDER — ATORVASTATIN CALCIUM 10 MG/1
10 TABLET, FILM COATED ORAL DAILY
Status: DISCONTINUED | OUTPATIENT
Start: 2018-06-28 | End: 2018-06-29 | Stop reason: HOSPADM

## 2018-06-28 RX ORDER — SODIUM CHLORIDE 9 MG/ML
INJECTION, SOLUTION INTRAVENOUS CONTINUOUS
Status: DISCONTINUED | OUTPATIENT
Start: 2018-06-28 | End: 2018-06-29 | Stop reason: HOSPADM

## 2018-06-28 RX ORDER — FOLIC ACID 1 MG/1
2000 TABLET ORAL DAILY
Status: DISCONTINUED | OUTPATIENT
Start: 2018-06-28 | End: 2018-06-29 | Stop reason: HOSPADM

## 2018-06-28 RX ORDER — CEFTRIAXONE 1 G/50ML
1 INJECTION, SOLUTION INTRAVENOUS
Status: DISCONTINUED | OUTPATIENT
Start: 2018-06-29 | End: 2018-06-29 | Stop reason: HOSPADM

## 2018-06-28 RX ORDER — DIVALPROEX SODIUM 250 MG/1
500 TABLET, FILM COATED, EXTENDED RELEASE ORAL 2 TIMES DAILY
Status: DISCONTINUED | OUTPATIENT
Start: 2018-06-28 | End: 2018-06-29 | Stop reason: HOSPADM

## 2018-06-28 RX ORDER — METOPROLOL SUCCINATE 25 MG/1
25 TABLET, EXTENDED RELEASE ORAL DAILY
Status: DISCONTINUED | OUTPATIENT
Start: 2018-06-28 | End: 2018-06-29 | Stop reason: HOSPADM

## 2018-06-28 RX ADMIN — SODIUM CHLORIDE: 0.9 INJECTION, SOLUTION INTRAVENOUS at 04:06

## 2018-06-28 RX ADMIN — RIVAROXABAN 20 MG: 20 TABLET, FILM COATED ORAL at 05:06

## 2018-06-28 RX ADMIN — METOPROLOL SUCCINATE 25 MG: 25 TABLET, EXTENDED RELEASE ORAL at 08:06

## 2018-06-28 RX ADMIN — GABAPENTIN 800 MG: 400 CAPSULE ORAL at 05:06

## 2018-06-28 RX ADMIN — DIVALPROEX SODIUM 500 MG: 250 TABLET, FILM COATED, EXTENDED RELEASE ORAL at 08:06

## 2018-06-28 RX ADMIN — MIRTAZAPINE 15 MG: 7.5 TABLET ORAL at 08:06

## 2018-06-28 RX ADMIN — HYDROXYCHLOROQUINE SULFATE 200 MG: 200 TABLET, FILM COATED ORAL at 08:06

## 2018-06-28 RX ADMIN — FOLIC ACID 2000 MCG: 1 TABLET ORAL at 08:06

## 2018-06-28 RX ADMIN — SODIUM CHLORIDE 1000 ML: 0.9 INJECTION, SOLUTION INTRAVENOUS at 02:06

## 2018-06-28 RX ADMIN — ATORVASTATIN CALCIUM 10 MG: 10 TABLET, FILM COATED ORAL at 08:06

## 2018-06-28 RX ADMIN — GABAPENTIN 800 MG: 400 CAPSULE ORAL at 08:06

## 2018-06-28 RX ADMIN — ARIPIPRAZOLE 10 MG: 5 TABLET ORAL at 08:06

## 2018-06-28 RX ADMIN — ONDANSETRON 8 MG: 2 INJECTION INTRAMUSCULAR; INTRAVENOUS at 04:06

## 2018-06-28 RX ADMIN — PANTOPRAZOLE SODIUM 40 MG: 40 TABLET, DELAYED RELEASE ORAL at 08:06

## 2018-06-28 RX ADMIN — CEFTRIAXONE 1 G: 1 INJECTION, SOLUTION INTRAVENOUS at 02:06

## 2018-06-28 RX ADMIN — OXYBUTYNIN CHLORIDE 10 MG: 5 TABLET, EXTENDED RELEASE ORAL at 08:06

## 2018-06-28 RX ADMIN — DEXTROSE 1000 MG: 50 INJECTION, SOLUTION INTRAVENOUS at 12:06

## 2018-06-28 NOTE — PROGRESS NOTES
06/28/18 0805   Patient Assessment/Suction   Level of Consciousness (AVPU) alert   PRE-TX-O2-ETCO2   O2 Device (Oxygen Therapy) room air   SpO2 98 %   Pulse Oximetry Type Intermittent   $ Pulse Oximetry - Multiple Charge Pulse Oximetry - Multiple   Pulse 94   Resp 18

## 2018-06-28 NOTE — PLAN OF CARE
Problem: Patient Care Overview  Goal: Plan of Care Review  Outcome: Ongoing (interventions implemented as appropriate)  Pt alert and oriented. No new symptoms. C diff negative, precautions removed. VSS. No seizures this shift. Plan of care reviewed with patient. Denies nausea, denies pain. Will continue to monitor.

## 2018-06-28 NOTE — PLAN OF CARE
Cm completed the assessment at pt's bedside.  Pt arrived from home, lives with parents and siblings. PCPis Dr. Dukes.  Insurance verified as PHN.  Pt denies diabetes, dialysis and coumadin.  Disposition:  Pt will discharge to home with family.  Pt has no needs at this time.       06/28/18 1218   Discharge Assessment   Assessment Type Discharge Planning Assessment   Confirmed/corrected address and phone number on facesheet? Yes   Assessment information obtained from? Patient   Prior to hospitilization cognitive status: Alert/Oriented   Prior to hospitalization functional status: Independent   Current cognitive status: Alert/Oriented   Current Functional Status: Independent   Facility Arrived From: home   Lives With parent(s);sibling(s)   Able to Return to Prior Arrangements yes   Is patient able to care for self after discharge? Yes   Who are your caregiver(s) and their phone number(s)? mother Mckenna Rose - [phone]   Patient's perception of discharge disposition home or selfcare   Readmission Within The Last 30 Days no previous admission in last 30 days   Patient currently being followed by outpatient case management? No   Patient currently receives any other outside agency services? No   Equipment Currently Used at Home none   Do you have any problems affording any of your prescribed medications? No   Is the patient taking medications as prescribed? yes   Does the patient have transportation home? Yes   Transportation Available family or friend will provide   Dialysis Name and Scheduled days na   Does the patient receive services at the [facility]? No   Discharge Plan A Home with family   Patient/Family In Agreement With Plan yes

## 2018-06-28 NOTE — ASSESSMENT & PLAN NOTE
Chronic problem.  Creat on admission 1.8.  Monitor BUN/SCr.  Monitor electrolytes.  Avoid non essential nephrotoxins.  Renal dose medications for Estimated Creatinine Clearance: 39 mL/min (A) (based on SCr of 1.8 mg/dL (H)).

## 2018-06-28 NOTE — PLAN OF CARE
06/28/18 0423   Patient Assessment/Suction   Level of Consciousness (AVPU) alert   PRE-TX-O2-ETCO2   O2 Device (Oxygen Therapy) room air   SpO2 97 %   Pulse Oximetry Type Intermittent

## 2018-06-28 NOTE — CONSULTS
[facility]  Neurology  Consult    Patient Name: Cat Denson  MRN: [id number]  Admission Date: 6/27/2018  Hospital Length of Stay: 0 days  Code Status: Full Code   Attending Provider: Santiago Peters MD  Primary Care Physician: Dayton Dukes MD   Principal Problem:Recurrent seizures      Reason for consult:  Seizures     Informant:  Patient, chart        HPI:     Cat Denson is a 38 y.o. year old female with anxiety, depression, CKD, Lupus, Fibromyalgia, bipolar disorder, history of right MCA stroke Nov 2015 with residual spastic left hemiparesis, and seizure disorder  who presented to the ED with myalgias, fall, vomiting, 3 seizures, lethargy, dysuria. She was admitted to the service of hospitial medicine with seizures and UTI.      Patient's home AED includes depakote ER 500mg BID which appears to be also used for bipolar disorder. She reports missing more doses than not. Her depakote level here was low therapeutic at 55. She is unsure of her baseline seizure frequency, but states that they normally occur as generalized seizures. The events which occurred yesterday involved jerking of her left arm.     She was found to have a proteus UTI and was started on CTX. Her WBC count was 19. Now 16. Her CT head showed her old very large right MCA stroke.     Review of systems:  14-point review of systems as follows:   No check roseann indicates NEGATIVE response   Constitutional: [] weight loss, [] change to appetite   Eyes: [] change in vision, [] double vision   Ears, nose, mouth, throat: [] frequent nose bleeds, [] ringing in the ears   Respiratory: [] cough, [] wheezing   Cardiovascular: [] chest pain, [] palpitations   Gastrointestinal: [] jaundice, [x] nausea/vomiting   Genitourinary: [] incontinence, [] burning with urination   Hematologic/lymphatic: [] easy bruising/bleeding, [] night sweats   Neurological: [] numbness, [x] weakness   Endocrine: [] fatigue, [] heat/cold  intolerance   Allergy/Immunologic: [] fevers, [] chills   Musculoskeletal: [x] muscle pain, [] joint pain   Psychiatric: [] thoughts of harming self/others, [] depression   Integumentary: [] rashes, [] sores that do not heal        Allergies/Meds:   Allergies:  Reglan [metoclopramide hcl]; Amoxil [amoxicillin]; Augmentin [amoxicillin-pot clavulanate]; and Avelox [moxifloxacin]    Prior to Admission medications    Medication Sig Start Date End Date Taking? Authorizing Provider   aripiprazole (ABILIFY) 10 MG Tab Take one tablet PO in morning 10/3/17  Yes Dayton Dukes MD   atorvastatin (LIPITOR) 10 MG tablet Take one tablet daily. 1/12/18  Yes Historical Provider, MD   chlorhexidine (PERIDEX) 0.12 % solution RINSE WITH 1/2 OZ BID AFTER BRUSHING AND FLOSSING 10/10/16  Yes Historical Provider, MD   divalproex ER (DEPAKOTE) 500 MG Tb24 TAKE 1 TABLET(500 MG) BY MOUTH TWICE DAILY 5/24/18  Yes Sakina Cruz MD   folic acid (FOLVITE) 1 MG tablet Take 2 tablets daily. 2/5/18  Yes Historical Provider, MD   gabapentin (NEURONTIN) 800 MG tablet TAKE 1 TABLET(800 MG) BY MOUTH TWICE DAILY  Patient taking differently: TAKE 1 TABLET(800 MG) BY MOUTH three times per day 6/11/18  Yes Dayton Dukes MD   hydroxychloroquine (PLAQUENIL) 200 mg tablet Take 1 tablet (200 mg total) by mouth 2 (two) times daily. 4/24/18  Yes Rafaela Benoit MD   loratadine (CLARITIN) 10 mg tablet Take 1 tablet (10 mg total) by mouth once daily.  Patient taking differently: Take 10 mg by mouth once daily. Patient takes seasonally as needed 10/3/17  Yes Dayton Dukes MD   metoprolol succinate (TOPROL-XL) 25 MG 24 hr tablet TAKE 1 TABLET(25 MG) BY MOUTH EVERY DAY 5/29/18  Yes aDyton Dukes MD   mirtazapine (REMERON) 15 MG tablet Take 15 mg by mouth every evening.   Yes Historical Provider, MD   oxybutynin (DITROPAN-XL) 10 MG 24 hr tablet Take 1 tablet (10 mg total) by mouth once daily. 6/14/18 6/14/19 Yes Karmen Edwards MD    pantoprazole (PROTONIX) 40 MG tablet Take 40 mg by mouth once daily.  12/23/17  Yes Historical Provider, MD   PREVIDENT 5000 BOOSTER PLUS 1.1 % Pste BRUSH ON NIGHTLY AFTER NORMAL HYGIENE REGIMAN. SPIT OUT EXCESS DO NOT RINSE 7/15/16  Yes Historical Provider, MD   promethazine (PHENERGAN) 25 MG tablet TAKE ONE TABLET BY MOUTH EVERY 4 HOURS 5/1/18  Yes Dayton Dukes MD   ranitidine (ZANTAC) 300 MG tablet TAKE 1 TABLET(300 MG) BY MOUTH EVERY DAY  Patient taking differently: TAKE 1 TABLET(300 MG) BY MOUTH EVERY DAY AT NIGHT 10/3/17  Yes Dayton Dukes MD   rivaroxaban (XARELTO) 20 mg Tab Take 1 tablet (20 mg total) by mouth daily with dinner or evening meal. 10/3/17  Yes Dayton Dukes MD       Current Medications:    Current Facility-Administered Medications   Medication Dose Route Frequency Provider Last Rate Last Dose    0.9%  NaCl infusion   Intravenous Continuous Sima SUSAN Lima  mL/hr at 06/28/18 0453      acetaminophen tablet 1,000 mg  1,000 mg Oral Q6H PRN Sima Lima NP        acetaminophen tablet 650 mg  650 mg Oral Q4H PRN Sima Lima NP        ARIPiprazole tablet 10 mg  10 mg Oral Daily Sima Lima NP   10 mg at 06/28/18 0847    atorvastatin tablet 10 mg  10 mg Oral Daily Sima Lima NP   10 mg at 06/28/18 0847    [START ON 6/29/2018] cefTRIAXone 1 gram/50 mL IVPB 1 g  1 g Intravenous Q24H Sima Lima NP        dextrose 50% injection 12.5 g  12.5 g Intravenous PRN Sima Lima NP        dextrose 50% injection 25 g  25 g Intravenous PRN Sima Lima NP        divalproex ER 24 hr tablet 500 mg  500 mg Oral Daily Sima Lima NP   500 mg at 06/28/18 0848    folic acid tablet 2,000 mcg  2,000 mcg Oral Daily Sima SUSAN Lima NP   2,000 mcg at 06/28/18 0847    gabapentin capsule 800 mg  800 mg Oral TID Sima Lima NP   800 mg at 06/28/18 0847    glucagon (human recombinant) injection 1 mg  1 mg Intramuscular PRN Sima  SUSAN Lima NP        glucose chewable tablet 16 g  16 g Oral PRN Sima SUSAN Lima NP        glucose chewable tablet 24 g  24 g Oral PRN Sima SUSAN Lima NP        hydroxychloroquine tablet 200 mg  200 mg Oral BID Sima SUSAN Lima NP   200 mg at 06/28/18 0847    lorazepam injection 2 mg  2 mg Intravenous Q15 Min PRN Sima SUSAN Lima NP        magnesium oxide tablet 800 mg  800 mg Oral PRN Sima SUSAN Lima NP        magnesium oxide tablet 800 mg  800 mg Oral PRN Sima SUSAN Lima NP        metoprolol succinate (TOPROL-XL) 24 hr tablet 25 mg  25 mg Oral Daily Sima SUSAN Lima NP   25 mg at 06/28/18 0847    mirtazapine tablet 15 mg  15 mg Oral QHS Sima SUSAN Lima NP        ondansetron injection 8 mg  8 mg Intravenous Q8H PRN Sima SUSAN Lima NP   8 mg at 06/28/18 0451    oxybutynin 24 hr tablet 10 mg  10 mg Oral Daily Sima SUSAN Lima NP   10 mg at 06/28/18 0848    pantoprazole EC tablet 40 mg  40 mg Oral Daily Sima SUSAN Lima NP   40 mg at 06/28/18 0848    potassium chloride 10% oral solution 40 mEq  40 mEq Oral PRN Sima Lima NP        potassium chloride 10% oral solution 60 mEq  60 mEq Oral PRN Sima Lima NP        rivaroxaban tablet 20 mg  20 mg Oral Daily with dinner Sima Lima NP        senna-docusate 8.6-50 mg per tablet 1 tablet  1 tablet Oral BID Sima SUSAN Lima NP        sodium chloride 0.9% flush 5 mL  5 mL Intravenous PRN Sima SUSAN Lima NP           Past Medical/Surgical/Family History/Social:   Medical:   Past Medical History:   Diagnosis Date    Acid reflux     Allergy     Anemia     Anxiety     Asthma     Bipolar 1 disorder     Chronic kidney disease     Chronic pain     Depression     bipolar 2    Fibromyalgia     Gastroparesis     Hx of psychiatric care     Lactose intolerance     Lupus     Psychiatric problem     Renal tubular acidosis     Seizure     Sjogren's syndrome     Smoker     Stroke 11-     Suicide attempt     overdosed on a bottle of paxil, muscle relaxers, & zoloft    Therapy       Surgeries:   Past Surgical History:   Procedure Laterality Date    CHOLECYSTECTOMY      COLONOSCOPY  11/12/2014    Dr. Colin, repeat at 52 years old for screening    UPPER GASTROINTESTINAL ENDOSCOPY  03/03/2017    Dr. Harris      Family:   Family History   Problem Relation Age of Onset    Hypertension Mother     Hyperlipidemia Mother     Psoriasis Mother     No Known Problems Father     Post-traumatic stress disorder Brother     Drug abuse Brother     Diabetes Maternal Uncle     Hypertension Maternal Uncle     Alcohol abuse Maternal Uncle     Stroke Maternal Uncle     Scoliosis Paternal Aunt     Heart disease Paternal Uncle     Mental illness Maternal Grandmother     Cancer Maternal Grandmother         lung / brain - smoker    Drug abuse Maternal Grandmother     Hypertension Maternal Grandmother     Hyperlipidemia Maternal Grandmother     Diabetes Maternal Grandmother     Rheum arthritis Maternal Grandmother     Diabetes Mellitus Maternal Grandmother     Heart disease Maternal Grandfather     Hypertension Maternal Grandfather     Alcohol abuse Maternal Grandfather     Osteoarthritis Maternal Grandfather     No Known Problems Paternal Grandmother     Mental illness Paternal Grandfather     Early death Paternal Grandfather         self    Colon cancer Neg Hx     Colon polyps Neg Hx     Crohn's disease Neg Hx     Ulcerative colitis Neg Hx     Celiac disease Neg Hx     Thyroid disease Neg Hx     Lupus Neg Hx     Kidney disease Neg Hx     Inflammatory bowel disease Neg Hx     Depression Neg Hx     COPD Neg Hx     Asthma Neg Hx     Macular degeneration Neg Hx     Retinal detachment Neg Hx     Glaucoma Neg Hx      Social:  reports that she has been smoking Cigarettes.  She started smoking about 2 years ago. She has a 15.00 pack-year smoking history. She has never used smokeless  tobacco. She reports that she uses drugs, including Marijuana. She reports that she does not drink alcohol.    Physical Exam:   Vital Signs:   Vitals:    06/28/18 0842   BP: 126/73   Pulse: 94   Resp: 18   Temp: 98.4 °F (36.9 °C)        PHYSICAL EXAM:  General: Well developed, well nourished.  No acute distress.  HEENT: Atraumatic, normocephalic.  Neck: Trachea midline  Cardiovascular: Vitals reviewed. Normal peripheral perfusion.   Pulmonary: No increased work of breathing.   Abdomen/GI: Soft, non tender. No guarding  Musculoskeletal: No obvious joint deformities.    NEUROLOGICAL EXAM:  Mental status: Awake, alert.  Speech/Language: No dysarthria or aphasia on conversation.   Cranial nerves: Trace left kiko-field cut. Trace visual neglect. Pupils equal round and reactive to light, extraocular movements intact, facial strength intact bilaterally, facial sensation V1-V3 intact bilaterally, palate symmetric, tongue midline, hearing grossly intact bilaterally.  Motor: mild spastic left hemiparesis unchanged from baseline. Nonphysiologic side to side movements of lower extremities during interview.    Sensation: Intact to light touch bilaterally.  Gait: deferred     Data:     I have personally reviewed the referring provider's notes, labs, & imaging made available to me today.     LABORATORY STUDIES:  Recent Results (from the past 24 hour(s))   CBC auto differential    Collection Time: 06/27/18  9:38 PM   Result Value Ref Range    WBC 19.10 (H) 3.90 - 12.70 K/uL    RBC 4.09 4.00 - 5.40 M/uL    Hemoglobin 13.0 12.0 - 16.0 g/dL    Hematocrit 39.2 37.0 - 48.5 %    MCV 96 82 - 98 fL    MCH 31.8 (H) 27.0 - 31.0 pg    MCHC 33.2 32.0 - 36.0 g/dL    RDW 12.6 11.5 - 14.5 %    Platelets 159 150 - 350 K/uL    MPV 10.1 9.2 - 12.9 fL    Lymph # CANCELED 1.0 - 4.8 K/uL    Mono # CANCELED 0.3 - 1.0 K/uL    Eos # CANCELED 0.0 - 0.5 K/uL    Baso # CANCELED 0.00 - 0.20 K/uL    Gran% 79.0 (H) 38.0 - 73.0 %    Lymph% 6.0 (L) 18.0 - 48.0 %     Mono% 13.0 4.0 - 15.0 %    Eosinophil% 0.0 0.0 - 8.0 %    Basophil% 0.0 0.0 - 1.9 %    Bands 2.0 %    Platelet Estimate Appears normal     Poly Occasional     Differential Method Manual    Comprehensive metabolic panel    Collection Time: 06/27/18  9:38 PM   Result Value Ref Range    Sodium 142 136 - 145 mmol/L    Potassium 4.4 3.5 - 5.1 mmol/L    Chloride 113 (H) 95 - 110 mmol/L    CO2 19 (L) 23 - 29 mmol/L    Glucose 108 70 - 110 mg/dL    BUN, Bld 15 6 - 20 mg/dL    Creatinine 2.0 (H) 0.5 - 1.4 mg/dL    Calcium 9.0 8.7 - 10.5 mg/dL    Total Protein 7.3 6.0 - 8.4 g/dL    Albumin 3.7 3.5 - 5.2 g/dL    Total Bilirubin 0.2 0.1 - 1.0 mg/dL    Alkaline Phosphatase 87 55 - 135 U/L    AST 24 10 - 40 U/L    ALT 19 10 - 44 U/L    Anion Gap 10 8 - 16 mmol/L    eGFR if African American 36 (A) >60 mL/min/1.73 m^2    eGFR if non African American 31 (A) >60 mL/min/1.73 m^2   CPK    Collection Time: 06/27/18  9:38 PM   Result Value Ref Range    CPK 38 20 - 180 U/L   Valproic Acid    Collection Time: 06/27/18  9:38 PM   Result Value Ref Range    Valproic Acid Lvl 55.2 50.0 - 100.0 ug/mL   Urinalysis    Collection Time: 06/27/18 11:13 PM   Result Value Ref Range    Specimen UA Urine, Catheterized     Color, UA Yellow Yellow, Straw, Britany    Appearance, UA Hazy (A) Clear    pH, UA 7.0 5.0 - 8.0    Specific Gravity, UA 1.015 1.005 - 1.030    Protein, UA 1+ (A) Negative    Glucose, UA Negative Negative    Ketones, UA Negative Negative    Bilirubin (UA) Negative Negative    Occult Blood UA 1+ (A) Negative    Nitrite, UA Positive (A) Negative    Urobilinogen, UA Negative <2.0 EU/dL    Leukocytes, UA 3+ (A) Negative   Urinalysis Microscopic    Collection Time: 06/27/18 11:13 PM   Result Value Ref Range    RBC, UA 2 0 - 4 /hpf    WBC, UA 46 (H) 0 - 5 /hpf    WBC Clumps, UA Occasional (A) None-Rare    Bacteria, UA Many (A) None-Occ /hpf    Squam Epithel, UA 3 /hpf    Hyaline Casts, UA 0 0-1/lpf /lpf    Microscopic Comment SEE COMMENT     POCT urine pregnancy    Collection Time: 06/27/18 11:15 PM   Result Value Ref Range    POC Preg Test, Ur Negative Negative     Acceptable Yes    Comprehensive Metabolic Panel (CMP)    Collection Time: 06/28/18  5:15 AM   Result Value Ref Range    Sodium 144 136 - 145 mmol/L    Potassium 4.5 3.5 - 5.1 mmol/L    Chloride 115 (H) 95 - 110 mmol/L    CO2 18 (L) 23 - 29 mmol/L    Glucose 114 (H) 70 - 110 mg/dL    BUN, Bld 16 6 - 20 mg/dL    Creatinine 1.8 (H) 0.5 - 1.4 mg/dL    Calcium 8.9 8.7 - 10.5 mg/dL    Total Protein 6.9 6.0 - 8.4 g/dL    Albumin 3.7 3.5 - 5.2 g/dL    Total Bilirubin 0.3 0.1 - 1.0 mg/dL    Alkaline Phosphatase 90 55 - 135 U/L    AST 20 10 - 40 U/L    ALT 16 10 - 44 U/L    Anion Gap 11 8 - 16 mmol/L    eGFR if African American 41 (A) >60 mL/min/1.73 m^2    eGFR if non African American 36 (A) >60 mL/min/1.73 m^2   Magnesium    Collection Time: 06/28/18  5:15 AM   Result Value Ref Range    Magnesium 2.3 1.6 - 2.6 mg/dL   Phosphorus    Collection Time: 06/28/18  5:15 AM   Result Value Ref Range    Phosphorus 3.8 2.7 - 4.5 mg/dL   CBC with Automated Differential    Collection Time: 06/28/18  5:15 AM   Result Value Ref Range    WBC 16.10 (H) 3.90 - 12.70 K/uL    RBC 4.01 4.00 - 5.40 M/uL    Hemoglobin 12.8 12.0 - 16.0 g/dL    Hematocrit 38.5 37.0 - 48.5 %    MCV 96 82 - 98 fL    MCH 32.0 (H) 27.0 - 31.0 pg    MCHC 33.3 32.0 - 36.0 g/dL    RDW 12.6 11.5 - 14.5 %    Platelets 140 (L) 150 - 350 K/uL    MPV 10.6 9.2 - 12.9 fL    Lymph # CANCELED 1.0 - 4.8 K/uL    Mono # CANCELED 0.3 - 1.0 K/uL    Eos # CANCELED 0.0 - 0.5 K/uL    Baso # CANCELED 0.00 - 0.20 K/uL    Gran% 89.0 (H) 38.0 - 73.0 %    Lymph% 5.0 (L) 18.0 - 48.0 %    Mono% 3.0 (L) 4.0 - 15.0 %    Eosinophil% 0.0 0.0 - 8.0 %    Basophil% 0.0 0.0 - 1.9 %    Bands 3.0 %    Platelet Estimate Appears normal     Poly Occasional     Differential Method Manual        RADIOLOGY STUDIES:  I have personally reviewed the pertinent images  performed.   Imaging Results          CT Head Without Contrast (Final result)  Result time 06/28/18 08:19:44    Final result by Chris Sanches MD (06/28/18 08:19:44)                 Impression:      1. There is no acute abnormality.  There is no intracranial hemorrhage, mass, acute infarction.  2. There is no acute skull fracture.  3. There is encephalomalacia and gliosis throughout the right frontal parietal lobes from remote middle and anterior cerebral artery vascular territory infarctions.  Note: Preliminary results were provided by Dr. Erazo ([facility]).  There is no significant discrepancy.      Electronically signed by: Chris Sanches MD  Date:    06/28/2018  Time:    08:19             Narrative:    EXAMINATION:  CT HEAD WITHOUT CONTRAST    CLINICAL HISTORY:  focal seizures;    TECHNIQUE:  Routine unenhanced axial images were obtained through the head.  Sagittal and coronal reformatted images were created.  The study is reviewed in bone and soft tissue windows.    COMPARISON:  Brain MRI dated 03/03/2017, head CT dated 03/02/2017    FINDINGS:  Intracranial contents: There is no acute intracranial abnormality.  The patient is moderately rotated in the scanner.  There is chronic encephalomalacia in the right frontal and parietal lobes from remote right middle cerebral artery territory infarctions as well as infarctions in the anterior frontal parietal anterior cerebral artery territory.  There is mild ex vacuo dilatation of the right lateral ventricle.  There is no intracranial hemorrhage or mass.  The gray-white interface is otherwise preserved without obvious acute infarction.  There is no abnormal extra-axial fluid collection.  The basilar cisterns are open.  The cerebellar tonsils are in normal position.    Extracranial contents, calvarium, soft tissues: There is no acute skull fracture.  The included paranasal sinuses and mastoid air cells are clear.                                Assessment and Plan:  P      #1 Breakthrough seizures - in the setting of UTI and AED noncompliance. Seizures surely originate from her remote large right MCA stroke. Depakote level low therapeutic, will give one time IV load to protect for further seizures until UTI resolved     #2 Remote right MCA stroke - exam from this at baseline    #3 Proteus urinary tract infection - treatment per primary team       Recommendations:    1. IV depakote 1000mg load, once today  2. Change depakote ER 500mg daily (how it is ordered) to reported home dose of 500mg BID    Thank you for the opportunity to assist in this patient's care. I will sign off.  If you have any questions or concerns, please do not hesitate to contact me at any time.     Mayra Muir MD  Neurologist

## 2018-06-28 NOTE — SUBJECTIVE & OBJECTIVE
Interval History: Pt awake and alert answering questions appropriately; denies pain.  States that prior to yesterday, last seizure was 3 months ago.  Recently diagnosed with UTI, treated with bactrim DS, but does not recall if she completed all abx.    Review of Systems   Constitutional: Negative for chills and fever.   HENT: Negative for sore throat and trouble swallowing.    Eyes: Negative for photophobia and visual disturbance.   Respiratory: Negative for cough, shortness of breath and wheezing.    Cardiovascular: Negative for chest pain and palpitations.   Gastrointestinal: Negative for constipation, diarrhea, nausea and vomiting.   Endocrine: Negative for polyphagia and polyuria.   Genitourinary: Positive for dysuria and frequency. Negative for flank pain.   Musculoskeletal: Positive for gait problem (left sided weakness (chronic)). Negative for back pain.   Neurological: Positive for seizures (prior to admission). Negative for weakness.   Psychiatric/Behavioral: Negative for agitation and confusion.     Objective:     Vital Signs (Most Recent):  Temp: 98.1 °F (36.7 °C) (06/28/18 1518)  Pulse: (!) 53 (06/28/18 1518)  Resp: 18 (06/28/18 1518)  BP: (!) 105/53 (06/28/18 1518)  SpO2: 99 % (06/28/18 1518) Vital Signs (24h Range):  Temp:  [97.6 °F (36.4 °C)-98.4 °F (36.9 °C)] 98.1 °F (36.7 °C)  Pulse:  [53-94] 53  Resp:  [14-18] 18  SpO2:  [95 %-99 %] 99 %  BP: (105-131)/(53-78) 105/53     Weight: 57.2 kg (126 lb)  Body mass index is 20.34 kg/m².    Intake/Output Summary (Last 24 hours) at 06/28/18 1642  Last data filed at 06/28/18 0644   Gross per 24 hour   Intake           231.25 ml   Output              300 ml   Net           -68.75 ml      Physical Exam   Constitutional: She is oriented to person, place, and time. She appears well-developed and well-nourished.   HENT:   Head: Normocephalic and atraumatic.   Mouth/Throat: Oropharynx is clear and moist.   Eyes: Conjunctivae and EOM are normal. Pupils are equal,  round, and reactive to light.   Neck: Normal range of motion. Neck supple. No JVD present.   Cardiovascular: Normal rate, regular rhythm, normal heart sounds and intact distal pulses.    Pulmonary/Chest: Effort normal and breath sounds normal. No respiratory distress.   Abdominal: Soft. Bowel sounds are normal. There is no tenderness.   Musculoskeletal: She exhibits no edema.   Left sided hemiparesis   Neurological: She is alert and oriented to person, place, and time.   Skin: Skin is warm and dry. Capillary refill takes less than 2 seconds.   Psychiatric: She has a normal mood and affect. Her behavior is normal.       Significant Labs:   CBC:   Recent Labs  Lab 06/27/18 2138 06/28/18  0515   WBC 19.10* 16.10*   HGB 13.0 12.8   HCT 39.2 38.5    140*     CMP:   Recent Labs  Lab 06/27/18 2138 06/28/18  0515    144   K 4.4 4.5   * 115*   CO2 19* 18*    114*   BUN 15 16   CREATININE 2.0* 1.8*   CALCIUM 9.0 8.9   PROT 7.3 6.9   ALBUMIN 3.7 3.7   BILITOT 0.2 0.3   ALKPHOS 87 90   AST 24 20   ALT 19 16   ANIONGAP 10 11   EGFRNONAA 31* 36*     Urine Studies:   Recent Labs  Lab 06/27/18  2313   COLORU Yellow   APPEARANCEUA Hazy*   PHUR 7.0   SPECGRAV 1.015   PROTEINUA 1+*   GLUCUA Negative   KETONESU Negative   BILIRUBINUA Negative   OCCULTUA 1+*   NITRITE Positive*   UROBILINOGEN Negative   LEUKOCYTESUR 3+*   RBCUA 2   WBCUA 46*   BACTERIA Many*   SQUAMEPITHEL 3   HYALINECASTS 0       Significant Imaging: CT head without contrast:  1. There is no acute abnormality.  There is no intracranial hemorrhage, mass, acute infarction.  2. There is no acute skull fracture.  3. There is encephalomalacia and gliosis throughout the right frontal parietal lobes from remote middle and anterior cerebral artery vascular territory infarctions.

## 2018-06-28 NOTE — H&P
"[facility] Medicine  History & Physical    Patient Name: Cat Denson  MRN: [id number]  Admission Date: 6/27/2018  Attending Physician: Santiago Peters MD  Primary Care Provider: Dayton Dukes MD         Patient information was obtained from past medical records and ER records.     Subjective:     Principal Problem:Recurrent seizures    Chief Complaint:   Chief Complaint   Patient presents with    Spasms     pt complains of muscle sparms pt states " its like I'm having a seizure but I'm awake'        HPI: Cat Denson is a 39 yo female with PMHx significant for anxiety, depression, CKD, Lupus, Fibromyalgia, bipolar disorder, and CVA with left sided residual deficits.  She was admitted to the service of hospitial medicine with seizures and UTI.  HPI/ROS is limited 2/2 to acuity of her condition and is taken from ED records.  She presented to the ED with a sudden onset of acute myalgias.  A friend reported the patient had been having myalgias which were worst on her L side and patient has fallen due to L sided weakness. Associated symptoms of vomiting, seizures 3x, dysuria, and lethargy. The friend noted the pt had multiple seizures PTA when the pt's left side was contracted while the right side was jerking. The friend stated the patient has been compliant with medications but occasionally misses a dose.        Past Medical History:   Diagnosis Date    Acid reflux     Allergy     Anemia     Anxiety     Asthma     Bipolar 1 disorder     Chronic kidney disease     Chronic pain     Depression     bipolar 2    Fibromyalgia     Gastroparesis     Hx of psychiatric care     Lactose intolerance     Lupus     Psychiatric problem     Renal tubular acidosis     Seizure     Sjogren's syndrome     Smoker     Stroke 11-    Suicide attempt     overdosed on a bottle of paxil, muscle relaxers, & zoloft    Therapy        Past Surgical History:   Procedure " Laterality Date    CHOLECYSTECTOMY      COLONOSCOPY  11/12/2014    Dr. Colin, repeat at 52 years old for screening    UPPER GASTROINTESTINAL ENDOSCOPY  03/03/2017    Dr. Harris       Review of patient's allergies indicates:   Allergen Reactions    Reglan [metoclopramide hcl]      Involuntary mouth movements     Amoxil [amoxicillin]      hives    Augmentin [amoxicillin-pot clavulanate]     Avelox [moxifloxacin]      itching       No current facility-administered medications on file prior to encounter.      Current Outpatient Prescriptions on File Prior to Encounter   Medication Sig    aripiprazole (ABILIFY) 10 MG Tab Take one tablet PO in morning    atorvastatin (LIPITOR) 10 MG tablet Take one tablet daily.    chlorhexidine (PERIDEX) 0.12 % solution RINSE WITH 1/2 OZ BID AFTER BRUSHING AND FLOSSING    divalproex ER (DEPAKOTE) 500 MG Tb24 TAKE 1 TABLET(500 MG) BY MOUTH TWICE DAILY    folic acid (FOLVITE) 1 MG tablet Take 2 tablets daily.    gabapentin (NEURONTIN) 800 MG tablet TAKE 1 TABLET(800 MG) BY MOUTH TWICE DAILY (Patient taking differently: TAKE 1 TABLET(800 MG) BY MOUTH three times per day)    hydroxychloroquine (PLAQUENIL) 200 mg tablet Take 1 tablet (200 mg total) by mouth 2 (two) times daily.    loratadine (CLARITIN) 10 mg tablet Take 1 tablet (10 mg total) by mouth once daily. (Patient taking differently: Take 10 mg by mouth once daily. Patient takes seasonally as needed)    metoprolol succinate (TOPROL-XL) 25 MG 24 hr tablet TAKE 1 TABLET(25 MG) BY MOUTH EVERY DAY    mirtazapine (REMERON) 15 MG tablet Take 15 mg by mouth every evening.    oxybutynin (DITROPAN-XL) 10 MG 24 hr tablet Take 1 tablet (10 mg total) by mouth once daily.    pantoprazole (PROTONIX) 40 MG tablet Take 40 mg by mouth once daily.     PREVIDENT 5000 BOOSTER PLUS 1.1 % Pste BRUSH ON NIGHTLY AFTER NORMAL HYGIENE REGIMAN. SPIT OUT EXCESS DO NOT RINSE    promethazine (PHENERGAN) 25 MG tablet TAKE ONE TABLET BY MOUTH  EVERY 4 HOURS    ranitidine (ZANTAC) 300 MG tablet TAKE 1 TABLET(300 MG) BY MOUTH EVERY DAY (Patient taking differently: TAKE 1 TABLET(300 MG) BY MOUTH EVERY DAY AT NIGHT)    rivaroxaban (XARELTO) 20 mg Tab Take 1 tablet (20 mg total) by mouth daily with dinner or evening meal.     Family History     Problem Relation (Age of Onset)    Alcohol abuse Maternal Uncle, Maternal Grandfather    Cancer Maternal Grandmother    Diabetes Maternal Uncle, Maternal Grandmother    Diabetes Mellitus Maternal Grandmother    Drug abuse Brother, Maternal Grandmother    Early death Paternal Grandfather    Heart disease Paternal Uncle, Maternal Grandfather    Hyperlipidemia Mother, Maternal Grandmother    Hypertension Mother, Maternal Uncle, Maternal Grandmother, Maternal Grandfather    Mental illness Maternal Grandmother, Paternal Grandfather    No Known Problems Father, Paternal Grandmother    Osteoarthritis Maternal Grandfather    Post-traumatic stress disorder Brother    Psoriasis Mother    Rheum arthritis Maternal Grandmother    Scoliosis Paternal Aunt    Stroke Maternal Uncle        Social History Main Topics    Smoking status: Current Every Day Smoker     Packs/day: 1.00     Years: 15.00     Types: Cigarettes     Start date: 11/25/2015    Smokeless tobacco: Never Used      Comment: quit smoking after the stroke    Alcohol use No    Drug use: Yes     Types: Marijuana      Comment: smokes occasionally, helps with pain and appetite    Sexual activity: No      Comment: usually female, but currently with her boyfriend     Review of Systems   Unable to perform ROS: Acuity of condition     Objective:     Vital Signs (Most Recent):  Temp: 97.9 °F (36.6 °C) (06/27/18 2028)  Pulse: 76 (06/27/18 2028)  Resp: 17 (06/27/18 2028)  BP: 128/78 (06/27/18 2028)  SpO2: 96 % (06/27/18 2028) Vital Signs (24h Range):  Temp:  [97.9 °F (36.6 °C)] 97.9 °F (36.6 °C)  Pulse:  [76] 76  Resp:  [17] 17  SpO2:  [96 %] 96 %  BP: (128)/(78) 128/78      Weight: 57.6 kg (127 lb)  Body mass index is 19.31 kg/m².    Physical Exam   Constitutional: She appears well-developed and well-nourished. She appears lethargic. No distress.   HENT:   Head: Normocephalic and atraumatic.   Mouth/Throat: Oropharynx is clear and moist.   Eyes: Conjunctivae and EOM are normal. Pupils are equal, round, and reactive to light. No scleral icterus.   Neck: Normal range of motion. Neck supple. No JVD present. No tracheal deviation present. No thyromegaly present.   Cardiovascular: Normal rate, regular rhythm, normal heart sounds and intact distal pulses.  Exam reveals no gallop and no friction rub.    No murmur heard.  Pulses:       Dorsalis pedis pulses are 2+ on the right side, and 2+ on the left side.   Pulmonary/Chest: Effort normal and breath sounds normal. No accessory muscle usage. No respiratory distress. She has no wheezes. She has no rhonchi. She has no rales.   Abdominal: Soft. Bowel sounds are normal. She exhibits no distension and no mass. There is no tenderness. There is no guarding.   Musculoskeletal: She exhibits no edema, tenderness or deformity.   Neurological: She appears lethargic. She is disoriented. She exhibits normal muscle tone. Coordination normal. GCS eye subscore is 3. GCS verbal subscore is 4. GCS motor subscore is 6.   Post-ictal state - very lethargic.  Left sided weakness noted.  3/5 strength on left side, 5/5 strength on right side.   Skin: Skin is warm, dry and intact. Capillary refill takes less than 2 seconds. No rash noted. She is not diaphoretic. No erythema.   Psychiatric: She has a normal mood and affect. Her speech is normal and behavior is normal.   Vitals reviewed.        CRANIAL NERVES     CN III, IV, VI   Pupils are equal, round, and reactive to light.  Extraocular motions are normal.        Significant Labs:   CBC:   Recent Labs  Lab 06/27/18 2138   WBC 19.10*   HGB 13.0   HCT 39.2        CMP:   Recent Labs  Lab 06/27/18  2138   NA  142   K 4.4   *   CO2 19*      BUN 15   CREATININE 2.0*   CALCIUM 9.0   PROT 7.3   ALBUMIN 3.7   BILITOT 0.2   ALKPHOS 87   AST 24   ALT 19   ANIONGAP 10   EGFRNONAA 31*     Urine Studies:   Recent Labs  Lab 06/27/18  2313   COLORU Yellow   APPEARANCEUA Hazy*   PHUR 7.0   SPECGRAV 1.015   PROTEINUA 1+*   GLUCUA Negative   KETONESU Negative   BILIRUBINUA Negative   OCCULTUA 1+*   NITRITE Positive*   UROBILINOGEN Negative   LEUKOCYTESUR 3+*   RBCUA 2   WBCUA 46*   BACTERIA Many*   SQUAMEPITHEL 3   HYALINECASTS 0       Significant Imaging: none    Assessment/Plan:     * Recurrent seizures    Patient with history of seizure disorder - with recurrent seizures prior to admission possibly 2/2 to infection. Valproic Acid level therapeutic.  Consult Neurology - follow recommendations  EEG  Continue depakote  Seizure precautions, fall precautions  Ativan prn          UTI (urinary tract infection)    Recent history of Proteus UTI - pan-sensitive.  Was treated according to records - but unclear if patient was compliant.  Was started on IV Rocephin in ED - continue.  Follow urine culture.          Hemiparesis affecting left side as late effect of cerebrovascular accident    Fall precautions  Continue home medications - Xarelto and Lipitor  Neuro checks          Gastroesophageal reflux disease without esophagitis    Chronic problem.  Continue PPI.           Stage 3 chronic kidney disease    Chronic problem.  Without CHRISTEN.  Monitor BUN/SCr.  Monitor electrolytes.  Avoid non essential nephrotoxins.  Renal dose medications for Estimated Creatinine Clearance: 35.4 mL/min (A) (based on SCr of 2 mg/dL (H)).            Lupus (systemic lupus erythematosus)    Chronic problem.   Continue plaquenil.          Bipolar 2 disorder    Chronic problem.  Continue abilify and monitor for any needed adjustments.            VTE Risk Mitigation     Xarelto 20mg daily             Sima Shahid NP  Department of Hospital Medicine    [facility]

## 2018-06-28 NOTE — ED PROVIDER NOTES
"Encounter Date: 6/27/2018    SCRIBE #1 NOTE: I, Flavio Oshea, am scribing for, and in the presence of, Dr. Lazar.       History     Chief Complaint   Patient presents with    Spasms     pt complains of muscle sparms pt states " its like I'm having a seizure but I'm awake'       06/27/2018 9:16 PM     Chief complaint: Myalgias      Cat Denson is a 38 y.o. female with a past medical history of anxiety, depression, CKD, Lupus, Fibromyalgia, bipolar disorder, CVA, SI, and gastroparesis presenting to the ED with a sudden onset of acute myalgias beginning 9 hrs ago. The friend reported the pt has been having myalgias which are worst on the L side. The friend stated the pt has fallen due to L sided weakness. Associated symptoms of vomiting, seizures 3x, dysuria, and lethargic. The friend noted the pt had multiple seizures PTA when the pt's left side was contracted while the right side was jerking. The pt denied vaginal bleeding, pregnancy, and vaginal bleeding. The friend stated the pt has been compliant with medications but occasionally misses a dose.  The pt has no history of IVDA, EtOH use, and drug overdose. She has a history of cigarette smoking. The pt has a past surgical history of cholecystectomy.       The history is provided by a friend. The history is limited by the condition of the patient.     Review of patient's allergies indicates:   Allergen Reactions    Reglan [metoclopramide hcl]      Involuntary mouth movements     Amoxil [amoxicillin]      hives    Augmentin [amoxicillin-pot clavulanate]     Avelox [moxifloxacin]      itching     Past Medical History:   Diagnosis Date    Acid reflux     Allergy     Anemia     Anxiety     Asthma     Bipolar 1 disorder     Chronic kidney disease     Chronic pain     Depression     bipolar 2    Fibromyalgia     Gastroparesis     Hx of psychiatric care     Lactose intolerance     Lupus     Psychiatric problem     Renal tubular acidosis  "    Seizure     Sjogren's syndrome     Smoker     Stroke 11-    Suicide attempt     overdosed on a bottle of paxil, muscle relaxers, & zoloft    Therapy      Past Surgical History:   Procedure Laterality Date    CHOLECYSTECTOMY      COLONOSCOPY  11/12/2014    Dr. Colin, repeat at 52 years old for screening    UPPER GASTROINTESTINAL ENDOSCOPY  03/03/2017    Dr. Harris     Family History   Problem Relation Age of Onset    Hypertension Mother     Hyperlipidemia Mother     Psoriasis Mother     No Known Problems Father     Post-traumatic stress disorder Brother     Drug abuse Brother     Diabetes Maternal Uncle     Hypertension Maternal Uncle     Alcohol abuse Maternal Uncle     Stroke Maternal Uncle     Scoliosis Paternal Aunt     Heart disease Paternal Uncle     Mental illness Maternal Grandmother     Cancer Maternal Grandmother         lung / brain - smoker    Drug abuse Maternal Grandmother     Hypertension Maternal Grandmother     Hyperlipidemia Maternal Grandmother     Diabetes Maternal Grandmother     Rheum arthritis Maternal Grandmother     Diabetes Mellitus Maternal Grandmother     Heart disease Maternal Grandfather     Hypertension Maternal Grandfather     Alcohol abuse Maternal Grandfather     Osteoarthritis Maternal Grandfather     No Known Problems Paternal Grandmother     Mental illness Paternal Grandfather     Early death Paternal Grandfather         self    Colon cancer Neg Hx     Colon polyps Neg Hx     Crohn's disease Neg Hx     Ulcerative colitis Neg Hx     Celiac disease Neg Hx     Thyroid disease Neg Hx     Lupus Neg Hx     Kidney disease Neg Hx     Inflammatory bowel disease Neg Hx     Depression Neg Hx     COPD Neg Hx     Asthma Neg Hx     Macular degeneration Neg Hx     Retinal detachment Neg Hx     Glaucoma Neg Hx      Social History   Substance Use Topics    Smoking status: Current Every Day Smoker     Packs/day: 1.00     Years: 15.00      Types: Cigarettes     Start date: 11/25/2015    Smokeless tobacco: Never Used      Comment: quit smoking after the stroke    Alcohol use No     Review of Systems   Unable to perform ROS: Other   Constitutional: Negative for fever.        + Lethargic    HENT: Negative for congestion.    Eyes: Negative for visual disturbance.   Respiratory: Negative for wheezing.    Cardiovascular: Negative for chest pain.   Gastrointestinal: Positive for vomiting. Negative for abdominal pain.   Genitourinary: Positive for dysuria. Negative for vaginal bleeding and vaginal discharge.   Musculoskeletal: Positive for myalgias. Negative for joint swelling.   Skin: Negative for rash.   Neurological: Positive for seizures and weakness. Negative for syncope.   Hematological: Does not bruise/bleed easily.   Psychiatric/Behavioral: Negative for confusion.       Physical Exam     Initial Vitals [06/27/18 2028]   BP Pulse Resp Temp SpO2   128/78 76 17 97.9 °F (36.6 °C) 96 %      MAP       --         Physical Exam    Nursing note and vitals reviewed.  Constitutional: She appears well-developed.   HENT:   Head: Normocephalic and atraumatic.   Mouth/Throat: Oropharynx is clear and moist and mucous membranes are normal.   Eyes: EOM are normal. Pupils are equal, round, and reactive to light.   Neck: Neck supple.   Cardiovascular: Normal rate, regular rhythm, normal heart sounds and intact distal pulses. Exam reveals no gallop and no friction rub.    No murmur heard.  Pulmonary/Chest: Breath sounds normal. No respiratory distress. She has no decreased breath sounds. She has no wheezes. She has no rhonchi. She has no rales.   Abdominal: Soft. Bowel sounds are normal.   Musculoskeletal: Normal range of motion.   Neurological: She is alert and oriented to person, place, and time.   Weakness on L hand on grasp  L leg drift  CN intact   Skin: Skin is warm and dry.   Psychiatric:   Confused post ictal         ED Course   Procedures  Labs Reviewed   CBC  W/ AUTO DIFFERENTIAL - Abnormal; Notable for the following:        Result Value    WBC 19.10 (*)     MCH 31.8 (*)     Gran% 79.0 (*)     Lymph% 6.0 (*)     All other components within normal limits   COMPREHENSIVE METABOLIC PANEL - Abnormal; Notable for the following:     Chloride 113 (*)     CO2 19 (*)     Creatinine 2.0 (*)     eGFR if  36 (*)     eGFR if non  31 (*)     All other components within normal limits   CK   VALPROIC ACID   URINALYSIS   POCT URINE PREGNANCY          Imaging Results          CT Head Without Contrast (In process)                  Medical Decision Making:   History:   Old Medical Records: I decided to obtain old medical records.  Clinical Tests:   Lab Tests: Ordered and Reviewed  Radiological Study: Ordered and Reviewed  Patient appears to have Proteus urinary tract infection that was present on culture a few days ago.  She may need imaging to rule out renal lithiasis.  The Proteus is sensitive to Rocephin and I will give her dose here.  She is allergic to amoxicillin and has a rash but I doubt she has a true cephalosporin allergy.  I believe her urinary tract infection lower her seizure threshold which is likely the reason why she had 3 seizures today in the same distribution as her prior infarcts.  CT of the head shows nothing acute.  I will give the patient antibiotics and admitted to the hospital with seizure monitoring.  She has not had a seizure here in 7 hr.  We will continue her home medicines.    Imaging Results          CT Head Without Contrast (In process)                         Scribe Attestation:   Scribe #1: I performed the above scribed service and the documentation accurately describes the services I performed. I attest to the accuracy of the note.    I, Dr. Tj Lazar personally performed the services described in this documentation. All medical record entries made by the scribe were at my direction and in my presence.  I have reviewed  the chart and agree that the record reflects my personal performance and is accurate and complete. Tj Lazar MD.  12:20 AM 06/28/2018    DISCLAIMER: This note was prepared with Dragon NaturallySpeaking voice recognition transcription software. Garbled syntax, mangled pronouns, and other bizarre constructions may be attributed to that software system            Clinical Impression:   The primary encounter diagnosis was Recurrent seizures. A diagnosis of Urinary tract infection without hematuria, site unspecified was also pertinent to this visit.                             Tj Lazar MD  06/28/18 0021

## 2018-06-28 NOTE — ASSESSMENT & PLAN NOTE
Patient with history of seizure disorder - with recurrent seizures prior to admission possibly 2/2 to infection. Valproic Acid level therapeutic.  Consult Neurology - follow recommendations  EEG  Continue depakote  Seizure precautions, fall precautions  Ativan prn

## 2018-06-28 NOTE — ASSESSMENT & PLAN NOTE
Chronic problem.  Without CHRISTEN.  Monitor BUN/SCr.  Monitor electrolytes.  Avoid non essential nephrotoxins.  Renal dose medications for Estimated Creatinine Clearance: 35.4 mL/min (A) (based on SCr of 2 mg/dL (H)).

## 2018-06-28 NOTE — PROGRESS NOTES
[facility] Medicine  Progress Note    Patient Name: Cat Denson  MRN: [id number]  Patient Class: IP- Inpatient   Admission Date: 6/27/2018  Length of Stay: 0 days  Attending Physician: Santiago Peters MD  Primary Care Provider: Dayton Dukes MD        Subjective:     Principal Problem:Recurrent seizures    HPI:  Cat Denson is a 37 yo female with PMHx significant for anxiety, depression, CKD, Lupus, Fibromyalgia, bipolar disorder, and CVA with left sided residual deficits.  She was admitted to the service of hospitial medicine with seizures and UTI.  HPI/ROS is limited 2/2 to acuity of her condition and is taken from ED records.  She presented to the ED with a sudden onset of acute myalgias.  A friend reported the patient had been having myalgias which were worst on her L side and patient has fallen due to L sided weakness. Associated symptoms of vomiting, seizures 3x, dysuria, and lethargy. The friend noted the pt had multiple seizures PTA when the pt's left side was contracted while the right side was jerking. The friend stated the patient has been compliant with medications but occasionally misses a dose.        Hospital Course:  No notes on file    Interval History: Pt awake and alert answering questions appropriately; denies pain.  States that prior to yesterday, last seizure was 3 months ago.  Recently diagnosed with UTI, treated with bactrim DS, but does not recall if she completed all abx.    Review of Systems   Constitutional: Negative for chills and fever.   HENT: Negative for sore throat and trouble swallowing.    Eyes: Negative for photophobia and visual disturbance.   Respiratory: Negative for cough, shortness of breath and wheezing.    Cardiovascular: Negative for chest pain and palpitations.   Gastrointestinal: Negative for constipation, diarrhea, nausea and vomiting.   Endocrine: Negative for polyphagia and polyuria.   Genitourinary: Positive for  dysuria and frequency. Negative for flank pain.   Musculoskeletal: Positive for gait problem (left sided weakness (chronic)). Negative for back pain.   Neurological: Positive for seizures (prior to admission). Negative for weakness.   Psychiatric/Behavioral: Negative for agitation and confusion.     Objective:     Vital Signs (Most Recent):  Temp: 98.1 °F (36.7 °C) (06/28/18 1518)  Pulse: (!) 53 (06/28/18 1518)  Resp: 18 (06/28/18 1518)  BP: (!) 105/53 (06/28/18 1518)  SpO2: 99 % (06/28/18 1518) Vital Signs (24h Range):  Temp:  [97.6 °F (36.4 °C)-98.4 °F (36.9 °C)] 98.1 °F (36.7 °C)  Pulse:  [53-94] 53  Resp:  [14-18] 18  SpO2:  [95 %-99 %] 99 %  BP: (105-131)/(53-78) 105/53     Weight: 57.2 kg (126 lb)  Body mass index is 20.34 kg/m².    Intake/Output Summary (Last 24 hours) at 06/28/18 1642  Last data filed at 06/28/18 0644   Gross per 24 hour   Intake           231.25 ml   Output              300 ml   Net           -68.75 ml      Physical Exam   Constitutional: She is oriented to person, place, and time. She appears well-developed and well-nourished.   HENT:   Head: Normocephalic and atraumatic.   Mouth/Throat: Oropharynx is clear and moist.   Eyes: Conjunctivae and EOM are normal. Pupils are equal, round, and reactive to light.   Neck: Normal range of motion. Neck supple. No JVD present.   Cardiovascular: Normal rate, regular rhythm, normal heart sounds and intact distal pulses.    Pulmonary/Chest: Effort normal and breath sounds normal. No respiratory distress.   Abdominal: Soft. Bowel sounds are normal. There is no tenderness.   Musculoskeletal: She exhibits no edema.   Left sided hemiparesis   Neurological: She is alert and oriented to person, place, and time.   Skin: Skin is warm and dry. Capillary refill takes less than 2 seconds.   Psychiatric: She has a normal mood and affect. Her behavior is normal.       Significant Labs:   CBC:   Recent Labs  Lab 06/27/18  2138 06/28/18  0515   WBC 19.10* 16.10*   HGB  13.0 12.8   HCT 39.2 38.5    140*     CMP:   Recent Labs  Lab 06/27/18  2138 06/28/18  0515    144   K 4.4 4.5   * 115*   CO2 19* 18*    114*   BUN 15 16   CREATININE 2.0* 1.8*   CALCIUM 9.0 8.9   PROT 7.3 6.9   ALBUMIN 3.7 3.7   BILITOT 0.2 0.3   ALKPHOS 87 90   AST 24 20   ALT 19 16   ANIONGAP 10 11   EGFRNONAA 31* 36*     Urine Studies:   Recent Labs  Lab 06/27/18  2313   COLORU Yellow   APPEARANCEUA Hazy*   PHUR 7.0   SPECGRAV 1.015   PROTEINUA 1+*   GLUCUA Negative   KETONESU Negative   BILIRUBINUA Negative   OCCULTUA 1+*   NITRITE Positive*   UROBILINOGEN Negative   LEUKOCYTESUR 3+*   RBCUA 2   WBCUA 46*   BACTERIA Many*   SQUAMEPITHEL 3   HYALINECASTS 0       Significant Imaging: CT head without contrast:  1. There is no acute abnormality.  There is no intracranial hemorrhage, mass, acute infarction.  2. There is no acute skull fracture.  3. There is encephalomalacia and gliosis throughout the right frontal parietal lobes from remote middle and anterior cerebral artery vascular territory infarctions.    Assessment/Plan:      * Recurrent seizures    Patient with history of seizure disorder - with recurrent seizures prior to admission possibly 2/2 to infection. Valproic Acid level therapeutic.  Consult Neurology-done - follow recommendations  EEG  Continue depakote as per neurology instruction.  Seizure precautions, fall precautions  Ativan prn          UTI (urinary tract infection)    Recent history of Proteus UTI - pan-sensitive.  Was treated according to records - but unclear if patient was compliant.  Was started on IV Rocephin in ED - continue.  Follow urine culture.          Gastroesophageal reflux disease without esophagitis    Chronic problem.  Continue PPI.           Stage 3 chronic kidney disease    Chronic problem.  Creat on admission 1.8.  Monitor BUN/SCr.  Monitor electrolytes.  Avoid non essential nephrotoxins.  Renal dose medications for Estimated Creatinine Clearance:  39 mL/min (A) (based on SCr of 1.8 mg/dL (H)).            Embolic stroke involving right middle cerebral artery    Chronic as seen on CT head; no acute process identified.  Continue anticoagulant and statin.  Continue neuro checks.          Hemiparesis affecting left side as late effect of cerebrovascular accident    Continue fall precautions  Continue home medications - Xarelto and Lipitor  Continue neuro checks q4hr          Lupus (systemic lupus erythematosus)    Chronic problem.   Continue plaquenil.          Bipolar 2 disorder    Chronic problem.  Continue abilify and monitor for any needed adjustments.            VTE Risk Mitigation         Ordered     rivaroxaban tablet 20 mg  With dinner      06/28/18 0302     IP VTE HIGH RISK PATIENT  Once      06/28/18 0302              CONRAD Warner  Department of Hospital Medicine   [facility]

## 2018-06-28 NOTE — ASSESSMENT & PLAN NOTE
Chronic as seen on CT head; no acute process identified.  Continue anticoagulant and statin.  Continue neuro checks.

## 2018-06-28 NOTE — ASSESSMENT & PLAN NOTE
Continue fall precautions  Continue home medications - Xarelto and Lipitor  Continue neuro checks q4hr

## 2018-06-28 NOTE — PLAN OF CARE
Problem: Patient Care Overview  Goal: Plan of Care Review  Outcome: Ongoing (interventions implemented as appropriate)  Plan of care reviewed with pt during admit-- neuro consult, SHAYLA/IVF, EEG (missael in lab notified), tele monitoring, isolation until cdiff result.  Pt verbalized understanding. Hourly/ Q2 hourly rounds completed on this pt throughout shift.  Pt has denied pain/need for pain medication through shift, pt with brief in place- moments of bowel and bladder incontinence, pt has also called for assistance to bsc, repositioned self throughout shift, safety maintained.  Patient has remained free from fall/injury, no skin breakdown noted.  Side rails up x2, bed in locked and lowest position, call light kept within reach.  Needs attended to, will continue to monitor/ update as indicated

## 2018-06-28 NOTE — SUBJECTIVE & OBJECTIVE
Past Medical History:   Diagnosis Date    Acid reflux     Allergy     Anemia     Anxiety     Asthma     Bipolar 1 disorder     Chronic kidney disease     Chronic pain     Depression     bipolar 2    Fibromyalgia     Gastroparesis     Hx of psychiatric care     Lactose intolerance     Lupus     Psychiatric problem     Renal tubular acidosis     Seizure     Sjogren's syndrome     Smoker     Stroke 11-    Suicide attempt     overdosed on a bottle of paxil, muscle relaxers, & zoloft    Therapy        Past Surgical History:   Procedure Laterality Date    CHOLECYSTECTOMY      COLONOSCOPY  11/12/2014    Dr. Colin, repeat at 52 years old for screening    UPPER GASTROINTESTINAL ENDOSCOPY  03/03/2017    Dr. Harris       Review of patient's allergies indicates:   Allergen Reactions    Reglan [metoclopramide hcl]      Involuntary mouth movements     Amoxil [amoxicillin]      hives    Augmentin [amoxicillin-pot clavulanate]     Avelox [moxifloxacin]      itching       No current facility-administered medications on file prior to encounter.      Current Outpatient Prescriptions on File Prior to Encounter   Medication Sig    aripiprazole (ABILIFY) 10 MG Tab Take one tablet PO in morning    atorvastatin (LIPITOR) 10 MG tablet Take one tablet daily.    chlorhexidine (PERIDEX) 0.12 % solution RINSE WITH 1/2 OZ BID AFTER BRUSHING AND FLOSSING    divalproex ER (DEPAKOTE) 500 MG Tb24 TAKE 1 TABLET(500 MG) BY MOUTH TWICE DAILY    folic acid (FOLVITE) 1 MG tablet Take 2 tablets daily.    gabapentin (NEURONTIN) 800 MG tablet TAKE 1 TABLET(800 MG) BY MOUTH TWICE DAILY (Patient taking differently: TAKE 1 TABLET(800 MG) BY MOUTH three times per day)    hydroxychloroquine (PLAQUENIL) 200 mg tablet Take 1 tablet (200 mg total) by mouth 2 (two) times daily.    loratadine (CLARITIN) 10 mg tablet Take 1 tablet (10 mg total) by mouth once daily. (Patient taking differently: Take 10 mg by mouth once  daily. Patient takes seasonally as needed)    metoprolol succinate (TOPROL-XL) 25 MG 24 hr tablet TAKE 1 TABLET(25 MG) BY MOUTH EVERY DAY    mirtazapine (REMERON) 15 MG tablet Take 15 mg by mouth every evening.    oxybutynin (DITROPAN-XL) 10 MG 24 hr tablet Take 1 tablet (10 mg total) by mouth once daily.    pantoprazole (PROTONIX) 40 MG tablet Take 40 mg by mouth once daily.     PREVIDENT 5000 BOOSTER PLUS 1.1 % Pste BRUSH ON NIGHTLY AFTER NORMAL HYGIENE REGIMAN. SPIT OUT EXCESS DO NOT RINSE    promethazine (PHENERGAN) 25 MG tablet TAKE ONE TABLET BY MOUTH EVERY 4 HOURS    ranitidine (ZANTAC) 300 MG tablet TAKE 1 TABLET(300 MG) BY MOUTH EVERY DAY (Patient taking differently: TAKE 1 TABLET(300 MG) BY MOUTH EVERY DAY AT NIGHT)    rivaroxaban (XARELTO) 20 mg Tab Take 1 tablet (20 mg total) by mouth daily with dinner or evening meal.     Family History     Problem Relation (Age of Onset)    Alcohol abuse Maternal Uncle, Maternal Grandfather    Cancer Maternal Grandmother    Diabetes Maternal Uncle, Maternal Grandmother    Diabetes Mellitus Maternal Grandmother    Drug abuse Brother, Maternal Grandmother    Early death Paternal Grandfather    Heart disease Paternal Uncle, Maternal Grandfather    Hyperlipidemia Mother, Maternal Grandmother    Hypertension Mother, Maternal Uncle, Maternal Grandmother, Maternal Grandfather    Mental illness Maternal Grandmother, Paternal Grandfather    No Known Problems Father, Paternal Grandmother    Osteoarthritis Maternal Grandfather    Post-traumatic stress disorder Brother    Psoriasis Mother    Rheum arthritis Maternal Grandmother    Scoliosis Paternal Aunt    Stroke Maternal Uncle        Social History Main Topics    Smoking status: Current Every Day Smoker     Packs/day: 1.00     Years: 15.00     Types: Cigarettes     Start date: 11/25/2015    Smokeless tobacco: Never Used      Comment: quit smoking after the stroke    Alcohol use No    Drug use: Yes     Types:  Marijuana      Comment: smokes occasionally, helps with pain and appetite    Sexual activity: No      Comment: usually female, but currently with her boyfriend     Review of Systems   Unable to perform ROS: Acuity of condition     Objective:     Vital Signs (Most Recent):  Temp: 97.9 °F (36.6 °C) (06/27/18 2028)  Pulse: 76 (06/27/18 2028)  Resp: 17 (06/27/18 2028)  BP: 128/78 (06/27/18 2028)  SpO2: 96 % (06/27/18 2028) Vital Signs (24h Range):  Temp:  [97.9 °F (36.6 °C)] 97.9 °F (36.6 °C)  Pulse:  [76] 76  Resp:  [17] 17  SpO2:  [96 %] 96 %  BP: (128)/(78) 128/78     Weight: 57.6 kg (127 lb)  Body mass index is 19.31 kg/m².    Physical Exam   Constitutional: She appears well-developed and well-nourished. She appears lethargic. No distress.   HENT:   Head: Normocephalic and atraumatic.   Mouth/Throat: Oropharynx is clear and moist.   Eyes: Conjunctivae and EOM are normal. Pupils are equal, round, and reactive to light. No scleral icterus.   Neck: Normal range of motion. Neck supple. No JVD present. No tracheal deviation present. No thyromegaly present.   Cardiovascular: Normal rate, regular rhythm, normal heart sounds and intact distal pulses.  Exam reveals no gallop and no friction rub.    No murmur heard.  Pulses:       Dorsalis pedis pulses are 2+ on the right side, and 2+ on the left side.   Pulmonary/Chest: Effort normal and breath sounds normal. No accessory muscle usage. No respiratory distress. She has no wheezes. She has no rhonchi. She has no rales.   Abdominal: Soft. Bowel sounds are normal. She exhibits no distension and no mass. There is no tenderness. There is no guarding.   Musculoskeletal: She exhibits no edema, tenderness or deformity.   Neurological: She appears lethargic. She is disoriented. She exhibits normal muscle tone. Coordination normal. GCS eye subscore is 3. GCS verbal subscore is 4. GCS motor subscore is 6.   Post-ictal state - very lethargic.  Left sided weakness noted.  3/5 strength on  left side, 5/5 strength on right side.   Skin: Skin is warm, dry and intact. Capillary refill takes less than 2 seconds. No rash noted. She is not diaphoretic. No erythema.   Psychiatric: She has a normal mood and affect. Her speech is normal and behavior is normal.   Vitals reviewed.        CRANIAL NERVES     CN III, IV, VI   Pupils are equal, round, and reactive to light.  Extraocular motions are normal.        Significant Labs:   CBC:   Recent Labs  Lab 06/27/18  2138   WBC 19.10*   HGB 13.0   HCT 39.2        CMP:   Recent Labs  Lab 06/27/18  2138      K 4.4   *   CO2 19*      BUN 15   CREATININE 2.0*   CALCIUM 9.0   PROT 7.3   ALBUMIN 3.7   BILITOT 0.2   ALKPHOS 87   AST 24   ALT 19   ANIONGAP 10   EGFRNONAA 31*     Urine Studies:   Recent Labs  Lab 06/27/18  2313   COLORU Yellow   APPEARANCEUA Hazy*   PHUR 7.0   SPECGRAV 1.015   PROTEINUA 1+*   GLUCUA Negative   KETONESU Negative   BILIRUBINUA Negative   OCCULTUA 1+*   NITRITE Positive*   UROBILINOGEN Negative   LEUKOCYTESUR 3+*   RBCUA 2   WBCUA 46*   BACTERIA Many*   SQUAMEPITHEL 3   HYALINECASTS 0       Significant Imaging: none

## 2018-06-28 NOTE — ASSESSMENT & PLAN NOTE
Recent history of Proteus UTI - pan-sensitive.  Was treated according to records - but unclear if patient was compliant.  Was started on IV Rocephin in ED - continue.  Follow urine culture.

## 2018-06-28 NOTE — HPI
Cat Denson is a 37 yo female with PMHx significant for anxiety, depression, CKD, Lupus, Fibromyalgia, bipolar disorder, and CVA with left sided residual deficits.  She was admitted to the service of hospitial medicine with seizures and UTI.  HPI/ROS is limited 2/2 to acuity of her condition and is taken from ED records.  She presented to the ED with a sudden onset of acute myalgias.  A friend reported the patient had been having myalgias which were worst on her L side and patient has fallen due to L sided weakness. Associated symptoms of vomiting, seizures 3x, dysuria, and lethargy. The friend noted the pt had multiple seizures PTA when the pt's left side was contracted while the right side was jerking. The friend stated the patient has been compliant with medications but occasionally misses a dose.

## 2018-06-28 NOTE — ASSESSMENT & PLAN NOTE
Patient with history of seizure disorder - with recurrent seizures prior to admission possibly 2/2 to infection. Valproic Acid level therapeutic.  Consult Neurology-done - follow recommendations  EEG  Continue depakote as per neurology instruction.  Seizure precautions, fall precautions  Ativan prn

## 2018-06-29 ENCOUNTER — TELEPHONE (OUTPATIENT)
Dept: FAMILY MEDICINE | Facility: CLINIC | Age: 38
End: 2018-06-29

## 2018-06-29 ENCOUNTER — TELEPHONE (OUTPATIENT)
Dept: NEUROLOGY | Facility: CLINIC | Age: 38
End: 2018-06-29

## 2018-06-29 VITALS
HEART RATE: 55 BPM | WEIGHT: 126 LBS | OXYGEN SATURATION: 97 % | DIASTOLIC BLOOD PRESSURE: 63 MMHG | SYSTOLIC BLOOD PRESSURE: 138 MMHG | HEIGHT: 66 IN | TEMPERATURE: 97 F | BODY MASS INDEX: 20.25 KG/M2 | RESPIRATION RATE: 18 BRPM

## 2018-06-29 LAB
ALBUMIN SERPL BCP-MCNC: 2.8 G/DL
ALP SERPL-CCNC: 70 U/L
ALT SERPL W/O P-5'-P-CCNC: 12 U/L
ANION GAP SERPL CALC-SCNC: 9 MMOL/L
AST SERPL-CCNC: 18 U/L
BASOPHILS # BLD AUTO: 0.1 K/UL
BASOPHILS NFR BLD: 0.3 %
BILIRUB SERPL-MCNC: 0.2 MG/DL
BUN SERPL-MCNC: 12 MG/DL
CALCIUM SERPL-MCNC: 8.3 MG/DL
CHLORIDE SERPL-SCNC: 116 MMOL/L
CO2 SERPL-SCNC: 18 MMOL/L
CREAT SERPL-MCNC: 1.3 MG/DL
DIFFERENTIAL METHOD: ABNORMAL
EOSINOPHIL # BLD AUTO: 0 K/UL
EOSINOPHIL NFR BLD: 0 %
ERYTHROCYTE [DISTWIDTH] IN BLOOD BY AUTOMATED COUNT: 12.7 %
EST. GFR  (AFRICAN AMERICAN): >60 ML/MIN/1.73 M^2
EST. GFR  (NON AFRICAN AMERICAN): 53 ML/MIN/1.73 M^2
GLUCOSE SERPL-MCNC: 78 MG/DL
HCT VFR BLD AUTO: 33.8 %
HGB BLD-MCNC: 11.3 G/DL
LYMPHOCYTES # BLD AUTO: 3.4 K/UL
LYMPHOCYTES NFR BLD: 22.6 %
MAGNESIUM SERPL-MCNC: 1.9 MG/DL
MCH RBC QN AUTO: 32.3 PG
MCHC RBC AUTO-ENTMCNC: 33.5 G/DL
MCV RBC AUTO: 97 FL
MONOCYTES # BLD AUTO: 1.1 K/UL
MONOCYTES NFR BLD: 7.3 %
NEUTROPHILS # BLD AUTO: 10.4 K/UL
NEUTROPHILS NFR BLD: 69.8 %
PHOSPHATE SERPL-MCNC: 3.4 MG/DL
PLATELET # BLD AUTO: 143 K/UL
PMV BLD AUTO: 10.9 FL
POTASSIUM SERPL-SCNC: 3.9 MMOL/L
PROT SERPL-MCNC: 5.6 G/DL
RBC # BLD AUTO: 3.5 M/UL
SODIUM SERPL-SCNC: 143 MMOL/L
WBC # BLD AUTO: 14.9 K/UL

## 2018-06-29 PROCEDURE — 63600175 PHARM REV CODE 636 W HCPCS: Performed by: NURSE PRACTITIONER

## 2018-06-29 PROCEDURE — 85025 COMPLETE CBC W/AUTO DIFF WBC: CPT

## 2018-06-29 PROCEDURE — 25000003 PHARM REV CODE 250: Performed by: PSYCHIATRY & NEUROLOGY

## 2018-06-29 PROCEDURE — 95819 EEG AWAKE AND ASLEEP: CPT

## 2018-06-29 PROCEDURE — 84100 ASSAY OF PHOSPHORUS: CPT

## 2018-06-29 PROCEDURE — 80053 COMPREHEN METABOLIC PANEL: CPT

## 2018-06-29 PROCEDURE — 36415 COLL VENOUS BLD VENIPUNCTURE: CPT

## 2018-06-29 PROCEDURE — 25000003 PHARM REV CODE 250: Performed by: NURSE PRACTITIONER

## 2018-06-29 PROCEDURE — 83735 ASSAY OF MAGNESIUM: CPT

## 2018-06-29 PROCEDURE — G0378 HOSPITAL OBSERVATION PER HR: HCPCS

## 2018-06-29 RX ORDER — DIVALPROEX SODIUM 500 MG/1
500 TABLET, FILM COATED, EXTENDED RELEASE ORAL 2 TIMES DAILY
Qty: 60 TABLET | Refills: 0 | Status: SHIPPED | OUTPATIENT
Start: 2018-06-29 | End: 2018-07-24 | Stop reason: SDUPTHER

## 2018-06-29 RX ORDER — SULFAMETHOXAZOLE AND TRIMETHOPRIM 800; 160 MG/1; MG/1
1 TABLET ORAL 2 TIMES DAILY
Qty: 10 TABLET | Refills: 0 | Status: SHIPPED | OUTPATIENT
Start: 2018-06-29 | End: 2018-07-19

## 2018-06-29 RX ADMIN — FOLIC ACID 2000 MCG: 1 TABLET ORAL at 08:06

## 2018-06-29 RX ADMIN — CEFTRIAXONE 1 G: 1 INJECTION, SOLUTION INTRAVENOUS at 01:06

## 2018-06-29 RX ADMIN — GABAPENTIN 800 MG: 400 CAPSULE ORAL at 08:06

## 2018-06-29 RX ADMIN — ARIPIPRAZOLE 10 MG: 5 TABLET ORAL at 08:06

## 2018-06-29 RX ADMIN — ATORVASTATIN CALCIUM 10 MG: 10 TABLET, FILM COATED ORAL at 08:06

## 2018-06-29 RX ADMIN — DIVALPROEX SODIUM 500 MG: 250 TABLET, FILM COATED, EXTENDED RELEASE ORAL at 08:06

## 2018-06-29 RX ADMIN — PANTOPRAZOLE SODIUM 40 MG: 40 TABLET, DELAYED RELEASE ORAL at 08:06

## 2018-06-29 RX ADMIN — OXYBUTYNIN CHLORIDE 10 MG: 5 TABLET, EXTENDED RELEASE ORAL at 08:06

## 2018-06-29 RX ADMIN — HYDROXYCHLOROQUINE SULFATE 200 MG: 200 TABLET, FILM COATED ORAL at 08:06

## 2018-06-29 NOTE — PLAN OF CARE
06/29/18 1243   Final Note   Assessment Type Final Discharge Note   Discharge Disposition Home

## 2018-06-29 NOTE — DISCHARGE INSTRUCTIONS
Thank you for choosing [facility] for your medical care. The primary doctor who is taking care of you at the time of your discharge is Santiago Peters MD.     You were admitted to the hospital with Recurrent seizures.     Please note your discharge instructions, including diet/activity restrictions, follow-up appointments, and medication changes.  If you have any questions about your medical issues, prescriptions, or any other questions, please feel free to contact the [facility] Medicine Dept at [phone] and we will help.    If you are previously with Home health, outpatient PT/OT or under a therapy program, you are cleared to return to those programs.    Please direct all long term medication refills and follow up to your primary care provider, Dayton Dukes MD. Thank you again for letting us take care of your health care needs.      Valproic Acid, Divalproex Sodium delayed or extended-release tablets  What is this medicine?  DIVALPROEX SODIUM (dye AMRIT pro ex SO tarun um) is used to prevent seizures caused by some forms of epilepsy. It is also used to treat bipolar bowen and to prevent migraine headaches.  How should I use this medicine?  Take this medicine by mouth with a drink of water. Follow the directions on the prescription label. Do not crush or chew. If this medicine upsets your stomach, take it with food or milk. Take your medicine at regular intervals. Do not take it more often than directed.  Talk to your pediatrician regarding the use of this medicine in children. Special care may be needed.  What side effects may I notice from receiving this medicine?  Side effects that you should report to your doctor or health care professional as soon as possible:  allergic reactions like skin rash, itching or hives, swelling of the face, lips, or tongue  changes in the frequency or severity of seizures  double vision or uncontrollable eye movements  nausea and vomiting  redness,  blistering, peeling or loosening of the skin, including inside the mouth  stomach pain or cramps  trembling of hands or arms  unusual bleeding or bruising or pinpoint red spots on the skin  unusual swelling of the arms or legs  unusually weak or tired  worsening of mood, thoughts or actions of suicide or dying  yellowing of skin or eyes  Side effects that usually do not require medical attention (report to your doctor or health care professional if they continue or are bothersome):  change in menstrual cycle  diarrhea or constipation  headache  loss of bladder control  loss of hair or unusual growth of hair  loss or increase in appetite  weight gain or loss  What may interact with this medicine?  aspirin  barbiturates, like phenobarbital  diazepam  isoniazid  medicines for depression, anxiety, or psychotic disturbances  medicines that treat or prevent blood clots like warfarin  meropenem  other seizure medicines  rifampin  tolbutamide  zidovudine  What if I miss a dose?  If you miss a dose, take it as soon as you can. If it is almost time for your next dose, take only that dose. Do not take double or extra doses.  Where should I keep my medicine?  Keep out of reach of children.  Store at room temperature between 15 and 30 degrees C (59 and 86 degrees F). Keep container tightly closed. Throw away any unused medicine after the expiration date.  What should I tell my health care provider before I take this medicine?  They need to know if you have any of these conditions:  blood disease  brain damage or disease  kidney disease  liver disease  low blood proteins  mitochondrial disease  suicidal thoughts, plans, or attempt; a previous suicide attempt by you or a family member  urea cycle disorder (UCD)  an unusual or allergic reaction to divalproex sodium, other medicines, foods, dyes, or preservatives  pregnant or trying to get pregnant  breast-feeding  What should I watch for while using this medicine?  Visit your  doctor or health care professional for regular checks on your progress. If you are taking this medicine to treat epilepsy (seizures), do not stop taking it suddenly. This increases the risk of seizures. Wear a medical identification bracelet or chain to say you have epilepsy or seizures, and carry a card that lists all your medicines.  You may get drowsy, dizzy, or have blurred vision. Do not drive, use machinery, or do anything that needs mental alertness until you know how this medicine affects you. To reduce dizzy or fainting spells, do not sit or stand up quickly, especially if you are an older patient. Alcohol can increase drowsiness and dizziness. Avoid alcoholic drinks.  This medicine can cause blood problems. This can mean slow healing and a risk of infection. Problems can arise if you need dental work, and in the day to day care of your teeth. Try to avoid damage to your teeth and gums when you brush or floss your teeth.  This medicine can make you more sensitive to the sun. Keep out of the sun. If you cannot avoid being in the sun, wear protective clothing and use sunscreen. Do not use sun lamps or tanning beds/booths.  The use of this medicine may increase the chance of suicidal thoughts or actions. Pay special attention to how you are responding while on this medicine. Any worsening of mood, or thoughts of suicide or dying should be reported to your health care professional right away.  Women who become pregnant while using this medicine may enroll in the [place] Antiepileptic Drug Pregnancy Registry by calling 1-408.427.1519. This registry collects information about the safety of antiepileptic drug use during pregnancy.  Contact your doctor or healthcare professional if you notice any part of your medicine in your stool. Your healthcare provider may want to check the amount of medicine in your blood if this happens.  NOTE:This sheet is a summary. It may not cover all possible information. If you  "have questions about this medicine, talk to your doctor, pharmacist, or health care provider. Copyright© 2017 [employer]            Bladder Infection, Female (Adult)    Urine is normally doesn't have any bacteria in it. But bacteria can get into the urinary tract from the skin around the rectum. Or they can travel in the blood from elsewhere in the body. Once they are in your urinary tract, they can cause infection in the urethra (urethritis), the bladder (cystitis), or the kidneys (pyelonephritis).  The most common place for an infection is in the bladder. This is called a bladder infection. This is one of the most common infections in women. Most bladder infections are easily treated. They are not serious unless the infection spreads to the kidney.  The phrases "bladder infection," "UTI," and "cystitis" are often used to describe the same thing. But they are not always the same. Cystitis is an inflammation of the bladder. The most common cause of cystitis is an infection.  Symptoms  The infection causes inflammation in the urethra and bladder. This causes many of the symptoms. The most common symptoms of a bladder infection are:  · Pain or burning when urinating  · Having to urinate more often than usual  · Urgent need to urinate  · Only a small amount of urine comes out  · Blood in urine  · Abdominal discomfort. This is usually in the lower abdomen above the pubic bone.  · Cloudy urine  · Strong- or bad-smelling urine  · Unable to urinate (urinary retention)  · Unable to hold urine in (urinary incontinence)  · Fever  · Loss of appetite  · Confusion (in older adults)  Causes  Bladder infections are not contagious. You can't get one from someone else, from a toilet seat, or from sharing a bath.  The most common cause of bladder infections is bacteria from the bowels. The bacteria get onto the skin around the opening of the urethra. From there, they can get into the urine and travel up to the bladder, causing " inflammation and infection. This usually happens because of:  · Wiping improperly after urinating. Always wipe from front to back.  · Bowel incontinence  · Pregnancy  · Procedures such as having a catheter inserted  · Older age  · Not emptying your bladder. This can allow bacteria a chance to grow in your urine.  · Dehydration  · Constipation  · Sex  · Use of a diaphragm for birth control   Treatment  Bladder infections are diagnosed by a urine test. They are treated with antibiotics and usually clear up quickly without complications. Treatment helps prevent a more serious kidney infection.  Medicines  Medicines can help in the treatment of a bladder infection:  · Take antibiotics until they are used up, even if you feel better. It is important to finish them to make sure the infection has cleared.  · You can use acetaminophen or ibuprofen for pain, fever, or discomfort, unless another medicine was prescribed. If you have chronic liver or kidney disease, talk with your healthcare provider before using these medicines. Also talk with your provider if you've ever had a stomach ulcer or gastrointestinal bleeding, or are taking blood-thinner medicines.  · If you are given phenazopydridine to reduce burning with urination, it will cause your urine to become a bright orange color. This can stain clothing.  Care and prevention  These self-care steps can help prevent future infections:  · Drink plenty of fluids to prevent dehydration and flush out your bladder. Do this unless you must restrict fluids for other health reasons, or your doctor told you not to.  · Proper cleaning after going to the bathroom is important. Wipe from front to back after using the toilet to prevent the spread of bacteria.  · Urinate more often. Don't try to hold urine in for a long time.  · Wear loose-fitting clothes and cotton underwear. Avoid tight-fitting pants.  · Improve your diet and prevent constipation. Eat more fresh fruit and vegetables,  and fiber, and less junk and fatty foods.  · Avoid sex until your symptoms are gone.  · Avoid caffeine, alcohol, and spicy foods. These can irritate your bladder.  · Urinate right after intercourse to flush out your bladder.  · If you use birth control pills and have frequent bladder infections, discuss it with your doctor.  Follow-up care  Call your healthcare provider if all symptoms are not gone after 3 days of treatment. This is especially important if you have repeat infections.  If a culture was done, you will be told if your treatment needs to be changed. If directed, you can call to find out the results.  If X-rays were done, you will be told if the results will affect your treatment.  Call 911  Call 911 if any of the following occur:  · Trouble breathing  · Hard to wake up or confusion  · Fainting or loss of consciousness  · Rapid heart rate  When to seek medical advice  Call your healthcare provider right away if any of these occur:  · Fever of 100.4ºF (38.0ºC) or higher, or as directed by your healthcare provider  · Symptoms are not better by the third day of treatment  · Back or belly (abdominal) pain that gets worse  · Repeated vomiting, or unable to keep medicine down  · Weakness or dizziness  · Vaginal discharge  · Pain, redness, or swelling in the outer vaginal area (labia)  Date Last Reviewed: 10/1/2016  © 5692-3162 [employer]. [address] [place] [address], [place] [address]. All rights reserved. This information is not intended as a substitute for professional medical care. Always follow your healthcare professional's instructions.

## 2018-06-29 NOTE — TELEPHONE ENCOUNTER
----- Message from Marielena Harden sent at 6/29/2018 12:41 PM CDT -----  Contact: self  Patient [phone] is being discharged from [facility] today 06 29 18 from seizures and was advised to followup with Dr Ribeiro in one week/please call patient to schedule as first available appt that I show is 08 2018/patient is aware she has to schedule in [facility]

## 2018-06-29 NOTE — TELEPHONE ENCOUNTER
----- Message from Marielena Harden sent at 6/29/2018 12:44 PM CDT -----  Contact: self  Patient [phone] is being discharged from [facility] today 06 29 18 from seizures and was advised to followup with Dr Dukes but not sure when/please call patient to advise when to schedule

## 2018-06-29 NOTE — NURSING
Pt given discharge instructions written and verbal. IV taken out. Tele off. All questions answered. Left with all belongings. Will continue to monitor.

## 2018-06-30 LAB — BACTERIA UR CULT: NORMAL

## 2018-07-01 NOTE — DISCHARGE SUMMARY
[facility]  Discharge Summary      Patient Name: Cat Denson  MRN: [id number]  Admission Date: 6/27/2018  Hospital Length of Stay: 1 days  Discharge Date and Time: 6/29/2018  2:54 PM  Attending Physician: No att. providers found   Discharging Provider: CONRAD Warner  Primary Care Provider: Dayton Dukes MD      HPI:   Cat Denson is a 37 yo female with PMHx significant for anxiety, depression, CKD, Lupus, Fibromyalgia, bipolar disorder, and CVA with left sided residual deficits.  She was admitted to the service of hospitial medicine with seizures and UTI.  HPI/ROS is limited 2/2 to acuity of her condition and is taken from ED records.  She presented to the ED with a sudden onset of acute myalgias.  A friend reported the patient had been having myalgias which were worst on her L side and patient has fallen due to L sided weakness. Associated symptoms of vomiting, seizures 3x, dysuria, and lethargy. The friend noted the pt had multiple seizures PTA when the pt's left side was contracted while the right side was jerking. The friend stated the patient has been compliant with medications but occasionally misses a dose.        * No surgery found *      Hospital Course:   Cat Denson is a 39 y/o female who was admitted to the service of hospital medicine for further evaluation of seizure activity and UTI.  Her WBC count was 19.1; she was afebrile.  Her history includes lupus, R MCA stroke, and recent UTI.  IV rocephin was initiated.  A CT head was performed with no new lesions identified.  She was monitored on telemetry with seizure precautions and neuro checks performed frequently.  Neurology was consulted.  Her depakote level was low therapeutic, so a one time loading dose was given IV to prevent further seizure activity; her home dose of depakote 500mg twice daily was continued.  Pt was awake and alert the morning following her admission  "and admitted to "probably not" completing her antibiotic treatment for recent UTI and may have missed some dosing of depakote.  She was monitored throughout the day and following night.  She remained hemodynamically stable and seizure free.  Her WBC trended down. Her previous urine culture was pan-sensitive.  Bactrim DS was prescribed for pt to take at home after discharge.  The importance of medication compliance was stressed to Ms. Denson.  She verbalized understanding.  Pt was discharged home in stable condition.     Consults:   Consults         Status Ordering Provider     Inpatient consult to Neurology  Once     Provider:  Milo Norman Jr., MD    Completed ESSIE CERDA new Assessment & Plan notes have been filed under this hospital service since the last note was generated.  Service: Hospital Medicine    Final Active Diagnoses:    Diagnosis Date Noted POA    PRINCIPAL PROBLEM:  Recurrent seizures [G40.909] 06/28/2018 Yes    UTI (urinary tract infection) [N39.0] 06/28/2018 Yes    Gastroesophageal reflux disease without esophagitis [K21.9] 12/29/2015 Yes    Hemiparesis affecting left side as late effect of cerebrovascular accident [I69.354] 11/25/2015 Not Applicable    Stage 3 chronic kidney disease [N18.3] 11/25/2015 Yes     Chronic    Embolic stroke involving right middle cerebral artery [I63.411] 11/25/2015 Yes    Lupus (systemic lupus erythematosus) [M32.9] 11/27/2013 Yes    Bipolar 2 disorder [F31.81] 07/19/2013 Yes     Chronic      Problems Resolved During this Admission:    Diagnosis Date Noted Date Resolved POA       Discharged Condition: stable    Disposition: Home or Self Care    Follow Up:  Follow-up Information     Dayton Dukes MD In 1 week.    Specialty:  Family Medicine  Why:  Reji spoke with Marielena in scheduling, she will send a message to the nurse to schedule and notify pt of apt.  Contact information:  [address]  [address]  [place] " [address]  [phone]             Orestes Ribeiro, DO In 1 week.    Specialty:  Neurology  Why:  Cm spoke with Marielena in scheduling, she sent a message to the nurse to schedule and notify pt of appointment  Contact information:  [address] [place] [address]  [phone]                 Patient Instructions:     Diet renal     Notify your health care provider if you experience any of the following:  temperature >100.4     Notify your health care provider if you experience any of the following:  persistent nausea and vomiting or diarrhea     Notify your health care provider if you experience any of the following:  severe uncontrolled pain     Notify your health care provider if you experience any of the following:  difficulty breathing or increased cough     Notify your health care provider if you experience any of the following:  severe persistent headache     Notify your health care provider if you experience any of the following:  persistent dizziness, light-headedness, or visual disturbances     Notify your health care provider if you experience any of the following:  increased confusion or weakness     Activity as tolerated         Significant Diagnostic Studies: Labs: CMP No results for input(s): NA, K, CL, CO2, GLU, BUN, CREATININE, CALCIUM, PROT, ALBUMIN, BILITOT, ALKPHOS, AST, ALT, ANIONGAP, ESTGFRAFRICA, EGFRNONAA in the last 48 hours. and CBC No results for input(s): WBC, HGB, HCT, PLT in the last 48 hours.  Radiology: CT scan:   EXAMINATION:  CT HEAD WITHOUT CONTRAST    CLINICAL HISTORY:  focal seizures;    TECHNIQUE:  Routine unenhanced axial images were obtained through the head.  Sagittal and coronal reformatted images were created.  The study is reviewed in bone and soft tissue windows.    COMPARISON:  Brain MRI dated 03/03/2017, head CT dated 03/02/2017    FINDINGS:  Intracranial contents: There is no acute intracranial abnormality.  The patient is moderately rotated in the scanner.  There  is chronic encephalomalacia in the right frontal and parietal lobes from remote right middle cerebral artery territory infarctions as well as infarctions in the anterior frontal parietal anterior cerebral artery territory.  There is mild ex vacuo dilatation of the right lateral ventricle.  There is no intracranial hemorrhage or mass.  The gray-white interface is otherwise preserved without obvious acute infarction.  There is no abnormal extra-axial fluid collection.  The basilar cisterns are open.  The cerebellar tonsils are in normal position.    Extracranial contents, calvarium, soft tissues: There is no acute skull fracture.  The included paranasal sinuses and mastoid air cells are clear.   Impression:       1. There is no acute abnormality.  There is no intracranial hemorrhage, mass, acute infarction.  2. There is no acute skull fracture.  3. There is encephalomalacia and gliosis throughout the right frontal parietal lobes from remote middle and anterior cerebral artery vascular territory infarctions.         Pending Diagnostic Studies:     None         Medications:  Reconciled Home Medications:      Medication List      START taking these medications    sulfamethoxazole-trimethoprim 800-160mg 800-160 mg Tab  Commonly known as:  BACTRIM DS  Take 1 tablet by mouth 2 (two) times daily.        CHANGE how you take these medications    * divalproex  MG Tb24  Commonly known as:  DEPAKOTE  TAKE 1 TABLET(500 MG) BY MOUTH TWICE DAILY  What changed:  Another medication with the same name was added. Make sure you understand how and when to take each.     * divalproex  MG Tb24  Commonly known as:  DEPAKOTE  Take 1 tablet (500 mg total) by mouth 2 (two) times daily.  What changed:  You were already taking a medication with the same name, and this prescription was added. Make sure you understand how and when to take each.     gabapentin 800 MG tablet  Commonly known as:  NEURONTIN  TAKE 1 TABLET(800 MG) BY MOUTH  TWICE DAILY  What changed:  See the new instructions.     loratadine 10 mg tablet  Commonly known as:  CLARITIN  Take 1 tablet (10 mg total) by mouth once daily.  What changed:  additional instructions     ranitidine 300 MG tablet  Commonly known as:  ZANTAC  TAKE 1 TABLET(300 MG) BY MOUTH EVERY DAY  What changed:  additional instructions        * This list has 2 medication(s) that are the same as other medications prescribed for you. Read the directions carefully, and ask your doctor or other care provider to review them with you.            CONTINUE taking these medications    ARIPiprazole 10 MG Tab  Commonly known as:  ABILIFY  Take one tablet PO in morning     atorvastatin 10 MG tablet  Commonly known as:  LIPITOR  Take one tablet daily.     chlorhexidine 0.12 % solution  Commonly known as:  PERIDEX  RINSE WITH 1/2 OZ BID AFTER BRUSHING AND FLOSSING     folic acid 1 MG tablet  Commonly known as:  FOLVITE  Take 2 tablets daily.     hydroxychloroquine 200 mg tablet  Commonly known as:  PLAQUENIL  Take 1 tablet (200 mg total) by mouth 2 (two) times daily.     metoprolol succinate 25 MG 24 hr tablet  Commonly known as:  TOPROL-XL  TAKE 1 TABLET(25 MG) BY MOUTH EVERY DAY     mirtazapine 15 MG tablet  Commonly known as:  REMERON  Take 15 mg by mouth every evening.     oxybutynin 10 MG 24 hr tablet  Commonly known as:  DITROPAN-XL  Take 1 tablet (10 mg total) by mouth once daily.     pantoprazole 40 MG tablet  Commonly known as:  PROTONIX  Take 40 mg by mouth once daily.     PREVIDENT 5000 BOOSTER PLUS 1.1 % Pste  Generic drug:  fluoride (sodium)  BRUSH ON NIGHTLY AFTER NORMAL HYGIENE REGIMAN. SPIT OUT EXCESS DO NOT RINSE     promethazine 25 MG tablet  Commonly known as:  PHENERGAN  TAKE ONE TABLET BY MOUTH EVERY 4 HOURS     rivaroxaban 20 mg Tab  Commonly known as:  XARELTO  Take 1 tablet (20 mg total) by mouth daily with dinner or evening meal.            Indwelling Lines/Drains at time of discharge:    Lines/Drains/Airways          No matching active lines, drains, or airways          Time spent on the discharge of patient: 35 minutes  Patient was seen and examined on the date of discharge and determined to be suitable for discharge.         CONRAD Warner  Department of Hospital Medicine  [facility]

## 2018-07-01 NOTE — HOSPITAL COURSE
"Cat Denson is a 37 y/o female who was admitted to the service of hospital medicine for further evaluation of seizure activity and UTI.  Her WBC count was 19.1; she was afebrile.  Her history includes lupus, R MCA stroke, and recent UTI.  IV rocephin was initiated.  A CT head was performed with no new lesions identified.  She was monitored on telemetry with seizure precautions and neuro checks performed frequently.  Neurology was consulted.  Her depakote level was low therapeutic, so a one time loading dose was given IV to prevent further seizure activity; her home dose of depakote 500mg twice daily was continued.  Pt was awake and alert the morning following her admission and admitted to "probably not" completing her antibiotic treatment for recent UTI and may have missed some dosing of depakote.  She was monitored throughout the day and following night.  She remained hemodynamically stable and seizure free.  Her WBC trended down. Her previous urine culture was pan-sensitive.  Bactrim DS was prescribed for pt to take at home after discharge.  The importance of medication compliance was stressed to Ms. Denson.  She verbalized understanding.  Pt was discharged home in stable condition.  "

## 2018-07-02 ENCOUNTER — TELEPHONE (OUTPATIENT)
Dept: MEDSURG UNIT | Facility: HOSPITAL | Age: 38
End: 2018-07-02

## 2018-07-02 ENCOUNTER — DOCUMENTATION ONLY (OUTPATIENT)
Dept: FAMILY MEDICINE | Facility: CLINIC | Age: 38
End: 2018-07-02

## 2018-07-02 NOTE — PROGRESS NOTES
Pre-Visit Chart Review  For Appointment Scheduled on 7/3/18    Health Maintenance Due   Topic Date Due    TETANUS VACCINE  01/23/2000    Pneumococcal PPSV23 (Medium Risk) (1) 01/23/2000

## 2018-07-03 NOTE — PROCEDURES
DATE OF PROCEDURE:  06/28/2018.    EEG#:  ON-18.    REFERRING PHYSICIAN:  Dr. Dukes.    This EEG was performed to assess for subclinical seizures.    ELECTROENCEPHALOGRAM REPORT    METHODOLOGY:  Electroencephalographic (EEG) recording is recorded with   electrodes placed according to the International 10-20 placement system.  Thirty   two (32) channels of digital signal (sampling rate of 512/sec), including T1   and T2, were simultaneously recorded from the scalp and may include EKG, EMG,   and/or eye monitors.  Recording band pass was 0.1 to 512 Hz.  Digital video   recording of the patient is simultaneously recorded with the EEG.  The patient   is instructed to report clinical symptoms which may occur during the recording   session.  EEG and video recording are stored and archived in digital format.    Activation procedures, which include photic stimulation, hyperventilation and   instructing patients to perform simple tasks, are done in selected patients.  The EEG is displayed on a monitor screen and can be reviewed using different   montages.  Computer assisted-analysis is employed to detect spike and   electrographic seizure activity.   The entire record is submitted for computer   analysis.  The entire recording is visually reviewed, and the times identified   by computer analysis as being spikes or seizures are reviewed again.  Compressed spectral analysis (CSA) is also performed on the activity recorded   from each individual channel.  This is displayed as a power display of   frequencies from 0 to 30 Hz over time.   The CSA is reviewed looking for   asymmetries in power between homologous areas of the scalp, then compared with   the original EEG recording.  [employer] software was also utilized in the review of this study.  This software   suite analyzes the EEG recording in multiple domains.  Coherence and rhythmicity   are computed to identify EEG sections which may contain organized seizures.    Each  channel undergoes analysis to detect the presence of spike and sharp waves   which have special and morphological characteristics of epileptic activity.  The   routine EEG recording is converted from special into frequency domain.  This is   then displayed comparing homologous areas to identify areas of significant   asymmetry.  Algorithm to identify non-cortically generated artifact is used to   separate artifact from the EEG.    EEG FINDINGS:  The recording was obtained with a number of standard bipolar and   referential montages during wakefulness, drowsiness and sleep.  In the alert   state, the background was diffusely disorganized and comprised an admixture of   theta range frequencies with a nonsustained posterior dominant rhythm of up to 7   Hz, which was symmetric.  Mixed delta range activity was also noted, which was   semi-synchronous.  During drowsiness, the background rhythm waxed and waned and   there were periods of slowing.  Intermittent photic stimulation failed to evoke   driving responses.  During stage II sleep, symmetric V waves and sleep spindles   were noted.  There were no interictal epileptiform abnormalities and no clinical   or electrographic seizures were recorded.    The EKG channel revealed sinus rhythm.    IMPRESSION:  This is an abnormal EEG during wakefulness, drowsiness and sleep.    Diffuse disorganized slowing of the background was noted.    CLINICAL CORRELATION:  The patient is a 38-year-old female with a history of   multiple medical problems who is currently maintained on gabapentin and valproic   acid.  This is an abnormal EEG during wakefulness, drowsiness and sleep.  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  There is no   evidence of an epileptic process on this recording.  No seizures were recorded   during this study.      FAK/HN  dd: 07/03/2018 15:47:05 (CDT)   td: 07/03/2018 16:23:06 (CDT)  Doc ID   #[id number]  Job ID #[id number]    CC:

## 2018-07-09 RX ORDER — GABAPENTIN 800 MG/1
TABLET ORAL
Qty: 60 TABLET | Refills: 0 | Status: SHIPPED | OUTPATIENT
Start: 2018-07-09 | End: 2018-08-15 | Stop reason: SDUPTHER

## 2018-07-19 ENCOUNTER — OFFICE VISIT (OUTPATIENT)
Dept: UROLOGY | Facility: CLINIC | Age: 38
End: 2018-07-19
Payer: MEDICARE

## 2018-07-19 VITALS
TEMPERATURE: 99 F | HEART RATE: 68 BPM | WEIGHT: 126.13 LBS | BODY MASS INDEX: 20.27 KG/M2 | DIASTOLIC BLOOD PRESSURE: 71 MMHG | SYSTOLIC BLOOD PRESSURE: 111 MMHG | HEIGHT: 66 IN | RESPIRATION RATE: 18 BRPM

## 2018-07-19 DIAGNOSIS — N32.81 OVERACTIVE BLADDER: Primary | ICD-10-CM

## 2018-07-19 DIAGNOSIS — R32 INCONTINENCE IN FEMALE: ICD-10-CM

## 2018-07-19 PROCEDURE — 99999 PR PBB SHADOW E&M-EST. PATIENT-LVL V: CPT | Mod: PBBFAC,,, | Performed by: NURSE PRACTITIONER

## 2018-07-19 PROCEDURE — 3008F BODY MASS INDEX DOCD: CPT | Mod: CPTII,S$GLB,, | Performed by: NURSE PRACTITIONER

## 2018-07-19 PROCEDURE — 99213 OFFICE O/P EST LOW 20 MIN: CPT | Mod: S$GLB,,, | Performed by: NURSE PRACTITIONER

## 2018-07-19 RX ORDER — OXYBUTYNIN CHLORIDE 10 MG/1
10 TABLET, EXTENDED RELEASE ORAL DAILY
Qty: 90 TABLET | Refills: 3 | Status: SHIPPED | OUTPATIENT
Start: 2018-07-19 | End: 2019-02-25

## 2018-07-19 NOTE — PROGRESS NOTES
[facility] Progress Note  Staff: CRUZ SuhC    Chief Complaint:   Chief Complaint   Patient presents with    Follow-up    Urinary Incontinence      HPI: Cat Denson is a 38 y.o. female here for routine recheck in reference to hx of urinary incontinence issues.    Last seen: Dr. Edwards on 06/14/18    Urologic meds: Pt was started on Oxybutynin and currently doing very well on medication.  She only has one accident at night which is a great improvement from before.    ([facility] LAST OV NOTES)  Overactive bladder and urge incontinence  -she has a h/o frequency q20 minutes-30 minutes and has been leaking on the way to the bathroom with urge. This has been occurring for the last 2 years but the nocturnal and urge incontinence has been worsening over the last 2 months. She wears 3 depends and a day and has to change her clothes 1x a day due to this. She has been having some dysuria for the past few day. A UA yesterday showed nit+ urine. She is not typically prone to uti's.  -she has a h/o stroke in 2015 as a result of chain smoking and depo.reisdual SE include L arm, weakness, memory problems, change in vision in left arm and oab. No issues prior to stroke. At some point she was started on flomax around that time. She has never tried an OAB med and she does take jet alert in the morning, green teas and a soda/coffee in the afternoon in addition to 2 to 3 bottles per day. Denies any constipation.   -she saw emilio on 5/11/18 who ordered a renal ultrasound which showed no hydro and residual 6cc, bilateral renal cysts and right ureterocele, which she has never known about. She denies any uti symptoms. CTRSS 12/2017 showed no stones in her kidney or ureters. Of note she has CKD Stage III since a 20 and her most recent cr is 1.8.   -she has multiple other medical problems including chronic pain, fibromyalgia (no pain medicine), bipolar, srogens, RTA, suicide attempt in  addition tot he stroke she experienced in 2015.   -RF for uti: depends and ureterocele.  -RF for incontinence: flomax, neurogenic bladder from stroke and caffeine intake.      Ua cath: send for urinalysis and culture (dysuria).   Urine history:  6/13/18            No cx, void: nit+/3+leuk  5/14/18            Cath: negative   2/20/18            No cx, void: tr blood/1+leuk  1/8/18              No cx, void: neg  7/18/17            No cx, void: nit+/tr leuk  3/12/17            No cx, void: neg  3/3/17              No cx, void: tr ketones  3/2/17              No cx, void: tr ketoens/1+bili  2/14/17            E.coli  7/15/14            ng     ECOG Status: 0  G0, P0     UA today:  Pt deferred today in office, denies any dysuria, hematuria or problems with emptying her bladder.    Review of Systems   Constitutional: Negative for chills, diaphoresis, fever and weight loss.   HENT: Negative for congestion, hearing loss, nosebleeds and sore throat.    Eyes: Negative for blurred vision and pain.        Wears glasses   Respiratory: Negative for cough and wheezing.    Cardiovascular: Negative for chest pain, palpitations and leg swelling.   Gastrointestinal: Negative for abdominal pain, heartburn, nausea and vomiting.   Genitourinary: Positive for frequency. Negative for dysuria, flank pain, hematuria and urgency.        OAB, incontinence   Musculoskeletal: Negative for back pain, joint pain, myalgias and neck pain.   Skin: Negative for itching and rash.   Neurological: Positive for seizures. Negative for dizziness, tremors, sensory change, loss of consciousness, weakness and headaches.   Endo/Heme/Allergies: Does not bruise/bleed easily.   Psychiatric/Behavioral: Negative for depression and suicidal ideas. The patient is not nervous/anxious.      Physical Exam   Vitals reviewed.  Constitutional: She is oriented to person, place, and time. She appears well-developed and well-nourished.   HENT:   Head: Normocephalic and  atraumatic.   Eyes: Conjunctivae and EOM are normal. Pupils are equal, round, and reactive to light.   Neck: Normal range of motion. Neck supple.   Cardiovascular: Normal rate, regular rhythm, normal heart sounds and intact distal pulses.    Pulmonary/Chest: Effort normal and breath sounds normal.   Abdominal: Soft. Bowel sounds are normal.   Musculoskeletal: Normal range of motion.   Neurological: She is alert and oriented to person, place, and time. She has normal reflexes.   Skin: Skin is warm and dry.     Psychiatric: She has a normal mood and affect. Her behavior is normal. Judgment and thought content normal.     A) Cat Denson is a 38 y.o. female with   1. Overactive bladder    2. Incontinence in female        Plan)   1. Oxybutynin 10 mg XL refilled for this patient during today's office visit.    I have spent 20 minutes with this patient and over 50% of visit was spent counseling with the patient.    Leeann EDOUARD

## 2018-07-24 ENCOUNTER — HOSPITAL ENCOUNTER (OUTPATIENT)
Dept: RADIOLOGY | Facility: HOSPITAL | Age: 38
Discharge: HOME | End: 2018-07-24
Attending: PSYCHIATRY & NEUROLOGY
Payer: MEDICARE

## 2018-07-24 ENCOUNTER — TELEPHONE (OUTPATIENT)
Dept: NEUROLOGY | Facility: CLINIC | Age: 38
End: 2018-07-24

## 2018-07-24 ENCOUNTER — OFFICE VISIT (OUTPATIENT)
Dept: NEUROLOGY | Facility: CLINIC | Age: 38
End: 2018-07-24
Payer: MEDICARE

## 2018-07-24 VITALS
DIASTOLIC BLOOD PRESSURE: 63 MMHG | RESPIRATION RATE: 20 BRPM | SYSTOLIC BLOOD PRESSURE: 133 MMHG | BODY MASS INDEX: 20.81 KG/M2 | WEIGHT: 129.5 LBS | HEART RATE: 65 BPM | HEIGHT: 66 IN

## 2018-07-24 DIAGNOSIS — R56.9 SEIZURE: ICD-10-CM

## 2018-07-24 DIAGNOSIS — M89.9 BONE DISORDER: ICD-10-CM

## 2018-07-24 DIAGNOSIS — R56.9 SEIZURES: ICD-10-CM

## 2018-07-24 DIAGNOSIS — R56.9 SEIZURES: Primary | ICD-10-CM

## 2018-07-24 DIAGNOSIS — Z79.899 HIGH RISK MEDICATION USE: ICD-10-CM

## 2018-07-24 PROCEDURE — 3008F BODY MASS INDEX DOCD: CPT | Mod: CPTII,S$GLB,, | Performed by: PSYCHIATRY & NEUROLOGY

## 2018-07-24 PROCEDURE — 99999 PR PBB SHADOW E&M-EST. PATIENT-LVL III: CPT | Mod: PBBFAC,,, | Performed by: PSYCHIATRY & NEUROLOGY

## 2018-07-24 PROCEDURE — 77080 DXA BONE DENSITY AXIAL: CPT | Mod: 26,,, | Performed by: RADIOLOGY

## 2018-07-24 PROCEDURE — 99215 OFFICE O/P EST HI 40 MIN: CPT | Mod: S$GLB,,, | Performed by: PSYCHIATRY & NEUROLOGY

## 2018-07-24 PROCEDURE — 77080 DXA BONE DENSITY AXIAL: CPT | Mod: TC,PO

## 2018-07-24 NOTE — PROGRESS NOTES
"Date of service:  7/24/2018    Chief complaint:  Seizures    History of present illness:  The patient is a 38 y.o. female referred for evaluation of episodes suspicious for seizures.  She saw Dr. Muir in the hospital in June.  This is my first time seeing her.  The history available is marginal as both the patient and her  have limited insight into certain points.  These began "a couple of years back," "I'm not exactly sure when."  She is not aware of any aura.  Her seizure is characterized by left sided "muscle spasming."  It is not clear how long the events last for.  Afterwards, she reports some confusion.  The patient's frequency of events is roughly once every couple of years.    In the past, she has been told that she has NEE.  This diagnosis was apparently made somewhere in [place].  The patient is not sure where.    She also reports ongoing issues with muscle spasms on the left side.  She does have a spastic left hemiparesis stemming from an R MCA stroke in 2015.    Epilepsy risk factors:  Pregnancy/Labor/Delivery: None  Febrile seizures: None  Head injury: None  CNS infection: None     Stroke: +  Family Hx of Sz: None  Developmental delay: None    Current AEDs:  Depakote 500 mg BID (also being used for bipolar disorder)    Prior AEDs:  Keppra    AEDs not tried:  acetazolamide (Diamox, AZM)  amantadine  brivaracetam (Briviact)  carbamazepine (Tegretol, CBZ)  clobezam (Onfi or Frizium, CLB)  ethosuximide (Zarontin, ESM)  eslicarbazine (Aptiom, ESL)  felbamate (felbatol, FBM)  gabapentin (Neurontin, GPN)  lacosamide (Vimpat, LCS)   lamotrigine (Lamictal, LTG)   methsuximide (Celontin, MSM)  methyphenytoin (Mesantion, MHT)  oxcarbazepine (Trileptal OXC)  perampanel (Fycompa, FCP)   phenobarbital (Pb)  phenytoin (Dilantin, PHT)  pregabalin (Lyrica, PGB)  primidone (Mysoline, PRM)  retigabine (Potiga, RTG)  rufinamide (Banzel, RUF)  tiagabine (Gabatril,  TGB)  topiramate (Topamax, TPM)  viagabatrin, " (Sabril, VGB)  vagal nerve stimulator (VNS)  zonisamide (Zonegran, ZNA)  Benzodiazepines  diazepam - rectal (Diastatl)  diazepam - oral (Valium, DZ)  clonazepam (Klonopin, CZP)  clorazepate (Tranxene, CLZ)  Ativan      Past Medical History:   Diagnosis Date    Acid reflux     Allergy     Anemia     Anxiety     Asthma     Bipolar 1 disorder     Chronic kidney disease     Chronic pain     Depression     bipolar 2    Fibromyalgia     Gastroparesis     Hx of psychiatric care     Lactose intolerance     Lupus     Psychiatric problem     Renal tubular acidosis     Seizure     Sjogren's syndrome     Smoker     Stroke 11-    Suicide attempt     overdosed on a bottle of paxil, muscle relaxers, & zoloft    Therapy        Past Surgical History:   Procedure Laterality Date    CHOLECYSTECTOMY      COLONOSCOPY  11/12/2014    Dr. Colin, repeat at 52 years old for screening    UPPER GASTROINTESTINAL ENDOSCOPY  03/03/2017    Dr. Harris       Family History   Problem Relation Age of Onset    Hypertension Mother     Hyperlipidemia Mother     Psoriasis Mother     Thyroid disease Mother     No Known Problems Father     Post-traumatic stress disorder Brother     Drug abuse Brother     Diabetes Maternal Uncle     Hypertension Maternal Uncle     Alcohol abuse Maternal Uncle     Stroke Maternal Uncle     Scoliosis Paternal Aunt     Heart disease Paternal Uncle     Mental illness Maternal Grandmother     Cancer Maternal Grandmother         lung / brain - smoker    Drug abuse Maternal Grandmother     Hypertension Maternal Grandmother     Hyperlipidemia Maternal Grandmother     Diabetes Maternal Grandmother     Rheum arthritis Maternal Grandmother     Diabetes Mellitus Maternal Grandmother     Heart disease Maternal Grandfather     Hypertension Maternal Grandfather     Alcohol abuse Maternal Grandfather     Osteoarthritis Maternal Grandfather     No Known Problems Paternal Grandmother      Mental illness Paternal Grandfather     Early death Paternal Grandfather         self    Colon cancer Neg Hx     Colon polyps Neg Hx     Crohn's disease Neg Hx     Ulcerative colitis Neg Hx     Celiac disease Neg Hx     Lupus Neg Hx     Kidney disease Neg Hx     Inflammatory bowel disease Neg Hx     Depression Neg Hx     COPD Neg Hx     Asthma Neg Hx     Macular degeneration Neg Hx     Retinal detachment Neg Hx     Glaucoma Neg Hx        Social History     Social History    Marital status: Single     Spouse name: N/A    Number of children: 0    Years of education: N/A     Occupational History    Not on file.     Social History Main Topics    Smoking status: Current Every Day Smoker     Packs/day: 1.00     Years: 15.00     Types: Cigarettes     Start date: 11/25/2015    Smokeless tobacco: Never Used      Comment: quit smoking after the stroke    Alcohol use No    Drug use: Yes     Types: Marijuana      Comment: smokes occasionally, helps with pain and appetite    Sexual activity: No      Comment: usually female, but currently with her boyfriend     Other Topics Concern    Not on file     Social History Narrative    Doesn't drive since the stroke, mother drives her and she lives with her mother.        Review of patient's allergies indicates:   Allergen Reactions    Reglan [metoclopramide hcl]      Involuntary mouth movements     Amoxil [amoxicillin]      hives    Augmentin [amoxicillin-pot clavulanate]     Avelox [moxifloxacin]      itching    Benadryl [diphenhydramine hcl]      Lowers seizure threshold     Quinazolinones      Lowers seizure threshold     Ultram [tramadol]      Lowers seizure threshold     Wellbutrin [bupropion hcl]      Lowers seizure threshold         Review of Systems  General/Constitutional:  No unintentional weight loss, No change in appetite  Eyes/Vision:  No change in vision, No double vision  ENT:  No frequent nose bleeds, No ringing in the  "ears  Respiratory:  No cough, No wheezing  Cardiovascular:  No chest pain, No palpitations  Gastrointestinal:  No jaundice, No nausea/vomiting  Genitourinary:  No incontinence, No burning with urination  Hematologic/Lymphatic:  No easy bruising/bleeding, No night sweats  Neurological:  No numbness, No weakness  Endocrine:  No fatigue, No heat/cold intolerance  Allergy/Immunologic:  No fevers, No chills  Musculoskeletal:  + muscle pain, No joint pain   Psychiatric:  No thoughts of harming self/others, No depression  Integumentary:  No rashes, No sores that do not heal     Physical exam:  /63   Pulse 65   Resp 20   Ht 5' 6" (1.676 m)   Wt 58.7 kg (129 lb 8 oz)   LMP 07/18/2018   BMI 20.90 kg/m²   General: Well developed, well nourished.  No acute distress.  HEENT: Atraumatic, normocephalic.  Neck: Supple, trachea midline.  Cardiovascular: Regular rate and rhythm.  Pulmonary: No increased work of breathing.  Abdomen/GI: No guarding.  Musculoskeletal: No obvious joint deformities, moves all extremities well.    Neurological exam:  Mental status: Awake and alert.  Oriented x4.  Speech fluent and appropriate.  Recent and remote memory appear to be intact.  Fund of knowledge normal.  Cranial nerves: Pupils equal round and reactive to light, extraocular movements intact, facial strength and sensation intact bilaterally, palate and tongue midline, hearing grossly intact bilaterally.  Motor: 5 out of 5 strength throughout the upper and lower extremities on the right, spastic hemiparesis on left. Normal bulk and tone on the right.  Sensation: Intact to light touch and temperature bilaterally.  DTR: 2+ at the knees and biceps on the right, 3+ on the left.  Coordination: Finger-nose-finger testing intact on the right, limited on the left by prior stroke.  Gait: Cautious gait.    Data base:  Chart reviewed.  Briefly summarized, the patient was seen in the hospital by Dr. Muir, who felt that her breakthrough seizures " "were likely related to AED noncompliance and UTI.    MRI brain (3/17):  "1.  No acute intracranial abnormality is seen.  Specifically, no reduced diffusion to suggest acute ischemia.  No abnormal contrast enhancement.  2.  Chronic right cerebral hemisphere infarcts including large MCA territory infarcts and smaller territory in the right frontoparietal PORSHA territory.  Associated ipsilateral Wallerian degeneration and mild cortical laminar necrosis noted.  Confluent white matter disease in the right cerebral hemisphere consistent with associated gliosis."  I independently visualized and interpreted this study.     CT brain (6/18):  "1. There is no acute abnormality.  There is no intracranial hemorrhage, mass, acute infarction.  2. There is no acute skull fracture.  3. There is encephalomalacia and gliosis throughout the right frontal parietal lobes from remote middle and anterior cerebral artery vascular territory infarctions."  I independently visualized and interpreted this study.     EEG (6/18):  "IMPRESSION:  This is an abnormal EEG during wakefulness, drowsiness and sleep.  Diffuse disorganized slowing of the background was noted.   CLINICAL CORRELATION:  The patient is a 38-year-old female with a history of multiple medical problems who is currently maintained on gabapentin and valproic acid.  This is an abnormal EEG during wakefulness, drowsiness and sleep.  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  There is no evidence of an epileptic process on this recording.  No seizures were recorded during this study."    Assessment and plan:  The patient is a 38 y.o. female referred for evaluation for events worrisome for seizures. The differential includes focal onset seizures arising from the right hemisphere symptomatic of her prior stroke.  She has also, apparently, been also diagnosed with NEE. I think a " reasonable workup at this point would include vEEG.  As far as medications go, since she is on Depakote for her bipolar, this may prove sufficient for seizure control as well.  We will check labs for medication monitoring purposes.  She will also need a DEXA as Depakote can contribute to a loss of bone denisty.  Medication side effects were discussed with the patient.  Teratogenicity of AEDs were discussed, including the manner in which Depakote is particularly problematic.  She was instructed to make sure she was utilizing a reliable means of birth control.  Should she decide to become pregnant, she is to contact us first, so we can determine what medication adjustments, if any, are appropriate.  The patient was counseled that the risks posed by uncontrolled seizures typically exceeds that posed by the medications themselves. Consequently, the patient was advised of the importance of continuing her anticonvulsant medication should she become pregnant.  She was advised to take supplemental folate.  State law as it pertains to driving for individuals with seizures was discussed.  The patient was also counseled on seizure safety. We will plan on seeing the patient back in a few weeks.

## 2018-07-25 ENCOUNTER — TELEPHONE (OUTPATIENT)
Dept: NEUROLOGY | Facility: CLINIC | Age: 38
End: 2018-07-25

## 2018-07-25 ENCOUNTER — TELEPHONE (OUTPATIENT)
Dept: FAMILY MEDICINE | Facility: CLINIC | Age: 38
End: 2018-07-25

## 2018-07-25 ENCOUNTER — LAB VISIT (OUTPATIENT)
Dept: LAB | Facility: HOSPITAL | Age: 38
End: 2018-07-25
Attending: PSYCHIATRY & NEUROLOGY
Payer: MEDICARE

## 2018-07-25 DIAGNOSIS — Z79.899 HIGH RISK MEDICATION USE: ICD-10-CM

## 2018-07-25 DIAGNOSIS — M81.8 OTHER OSTEOPOROSIS WITHOUT CURRENT PATHOLOGICAL FRACTURE: Primary | ICD-10-CM

## 2018-07-25 DIAGNOSIS — R56.9 SEIZURES: ICD-10-CM

## 2018-07-25 LAB
ALBUMIN SERPL BCP-MCNC: 3.4 G/DL
ALP SERPL-CCNC: 79 U/L
ALT SERPL W/O P-5'-P-CCNC: 13 U/L
ANION GAP SERPL CALC-SCNC: 9 MMOL/L
AST SERPL-CCNC: 17 U/L
BASOPHILS # BLD AUTO: 0 K/UL
BASOPHILS NFR BLD: 0.5 %
BILIRUB SERPL-MCNC: 0.3 MG/DL
BUN SERPL-MCNC: 9 MG/DL
CALCIUM SERPL-MCNC: 8.9 MG/DL
CHLORIDE SERPL-SCNC: 108 MMOL/L
CO2 SERPL-SCNC: 24 MMOL/L
CREAT SERPL-MCNC: 1.6 MG/DL
DIFFERENTIAL METHOD: ABNORMAL
EOSINOPHIL # BLD AUTO: 0.1 K/UL
EOSINOPHIL NFR BLD: 0.9 %
ERYTHROCYTE [DISTWIDTH] IN BLOOD BY AUTOMATED COUNT: 13.5 %
EST. GFR  (AFRICAN AMERICAN): 47 ML/MIN/1.73 M^2
EST. GFR  (NON AFRICAN AMERICAN): 41 ML/MIN/1.73 M^2
GLUCOSE SERPL-MCNC: 90 MG/DL
HCT VFR BLD AUTO: 38.3 %
HGB BLD-MCNC: 12.4 G/DL
LYMPHOCYTES # BLD AUTO: 2.4 K/UL
LYMPHOCYTES NFR BLD: 28.2 %
MCH RBC QN AUTO: 31.1 PG
MCHC RBC AUTO-ENTMCNC: 32.4 G/DL
MCV RBC AUTO: 96 FL
MONOCYTES # BLD AUTO: 0.5 K/UL
MONOCYTES NFR BLD: 5.7 %
NEUTROPHILS # BLD AUTO: 5.5 K/UL
NEUTROPHILS NFR BLD: 64.7 %
PLATELET # BLD AUTO: 231 K/UL
PMV BLD AUTO: 9.8 FL
POTASSIUM SERPL-SCNC: 4.6 MMOL/L
PROT SERPL-MCNC: 7 G/DL
RBC # BLD AUTO: 3.99 M/UL
SODIUM SERPL-SCNC: 141 MMOL/L
VALPROATE SERPL-MCNC: 46.2 UG/ML
WBC # BLD AUTO: 8.5 K/UL

## 2018-07-25 PROCEDURE — 36415 COLL VENOUS BLD VENIPUNCTURE: CPT

## 2018-07-25 PROCEDURE — 80164 ASSAY DIPROPYLACETIC ACD TOT: CPT

## 2018-07-25 PROCEDURE — 85025 COMPLETE CBC W/AUTO DIFF WBC: CPT

## 2018-07-25 PROCEDURE — 80053 COMPREHEN METABOLIC PANEL: CPT

## 2018-07-25 RX ORDER — ALENDRONATE SODIUM 35 MG/1
35 TABLET ORAL
Qty: 4 TABLET | Refills: 11 | Status: SHIPPED | OUTPATIENT
Start: 2018-07-25 | End: 2018-10-26

## 2018-07-25 NOTE — TELEPHONE ENCOUNTER
This was in regards to a recent bone density.  She definitely needs to be on medication.  I have sent in Fosamax to take once a week.

## 2018-07-25 NOTE — TELEPHONE ENCOUNTER
----- Message from Ruby Schultz LPN sent at 7/25/2018  3:54 PM CDT -----  Informed to f/u with PCP. Please Advise:  ----- Message -----  From: Elyse Talbot LPN  Sent: 7/25/2018   2:16 PM  To: Ernst MACIAS Staff    Patient notified and verbalized understanding.

## 2018-07-26 RX ORDER — DIVALPROEX SODIUM 500 MG/1
TABLET, FILM COATED, EXTENDED RELEASE ORAL
Qty: 90 TABLET | Refills: 0 | Status: ON HOLD | OUTPATIENT
Start: 2018-07-26 | End: 2018-09-17 | Stop reason: SDUPTHER

## 2018-07-26 NOTE — TELEPHONE ENCOUNTER
----- Message from Milo Norman Jr., MD sent at 7/25/2018  5:42 PM CDT -----  Depakote level is low.  Have her go to 500 mg QAM and 1000 mg QHS.  OK to call in additional medication.

## 2018-07-26 NOTE — TELEPHONE ENCOUNTER
----- Message from Alma Thompson sent at 7/26/2018  4:44 PM CDT -----  Contact: [facility]  Type:  Pharmacy Calling to Clarify an RX    Name of Caller:  vish  Pharmacy Name: [facility]    Prescription Name:  divalproex ER (DEPAKOTE) 500 MG Tb24  What do they need to clarify?:  Directions  Best Call Back Number:  [phone]  Additional Information:  na

## 2018-07-26 NOTE — TELEPHONE ENCOUNTER
Called pt regarding below message per Adriana HOFFMAN. Informed pt of DXA scan results and the need to take medication for low bone density. Informed pt Fosamax was sent to preferred pharmacy. Pt verbalized understanding with no further questions.

## 2018-08-16 RX ORDER — GABAPENTIN 800 MG/1
TABLET ORAL
Qty: 60 TABLET | Refills: 0 | Status: SHIPPED | OUTPATIENT
Start: 2018-08-16 | End: 2018-11-19 | Stop reason: SDUPTHER

## 2018-09-10 ENCOUNTER — TELEPHONE (OUTPATIENT)
Dept: NEUROLOGY | Facility: CLINIC | Age: 38
End: 2018-09-10

## 2018-09-11 ENCOUNTER — HOSPITAL ENCOUNTER (INPATIENT)
Facility: HOSPITAL | Age: 38
LOS: 6 days | Discharge: HOME | DRG: 101 | End: 2018-09-17
Attending: PSYCHIATRY & NEUROLOGY | Admitting: PSYCHIATRY & NEUROLOGY
Payer: MEDICARE

## 2018-09-11 DIAGNOSIS — G40.219 COMPLEX PARTIAL EPILEPSY WITH GENERALIZATION AND WITH INTRACTABLE EPILEPSY: ICD-10-CM

## 2018-09-11 DIAGNOSIS — R56.9 SEIZURE: Primary | ICD-10-CM

## 2018-09-11 DIAGNOSIS — F31.81 BIPOLAR 2 DISORDER: Chronic | ICD-10-CM

## 2018-09-11 DIAGNOSIS — R56.9 SEIZURES: ICD-10-CM

## 2018-09-11 PROBLEM — R51.9 CHRONIC DAILY HEADACHE: Chronic | Status: ACTIVE | Noted: 2018-09-11

## 2018-09-11 LAB
ALBUMIN SERPL BCP-MCNC: 3.6 G/DL
ALP SERPL-CCNC: 88 U/L
ALT SERPL W/O P-5'-P-CCNC: 12 U/L
ANION GAP SERPL CALC-SCNC: 9 MMOL/L
AST SERPL-CCNC: 21 U/L
BASOPHILS # BLD AUTO: 0.07 K/UL
BASOPHILS NFR BLD: 0.8 %
BILIRUB SERPL-MCNC: 0.4 MG/DL
BUN SERPL-MCNC: 13 MG/DL
CALCIUM SERPL-MCNC: 9 MG/DL
CHLORIDE SERPL-SCNC: 108 MMOL/L
CO2 SERPL-SCNC: 24 MMOL/L
CREAT SERPL-MCNC: 1.8 MG/DL
DIFFERENTIAL METHOD: ABNORMAL
EOSINOPHIL # BLD AUTO: 0.1 K/UL
EOSINOPHIL NFR BLD: 0.8 %
ERYTHROCYTE [DISTWIDTH] IN BLOOD BY AUTOMATED COUNT: 12.2 %
EST. GFR  (AFRICAN AMERICAN): 41.2 ML/MIN/1.73 M^2
EST. GFR  (NON AFRICAN AMERICAN): 35.7 ML/MIN/1.73 M^2
GLUCOSE SERPL-MCNC: 109 MG/DL
HCT VFR BLD AUTO: 38.7 %
HGB BLD-MCNC: 12.6 G/DL
IMM GRANULOCYTES # BLD AUTO: 0.06 K/UL
IMM GRANULOCYTES NFR BLD AUTO: 0.6 %
LYMPHOCYTES # BLD AUTO: 2.7 K/UL
LYMPHOCYTES NFR BLD: 29.1 %
MCH RBC QN AUTO: 32.7 PG
MCHC RBC AUTO-ENTMCNC: 32.6 G/DL
MCV RBC AUTO: 101 FL
MONOCYTES # BLD AUTO: 0.8 K/UL
MONOCYTES NFR BLD: 8.6 %
NEUTROPHILS # BLD AUTO: 5.6 K/UL
NEUTROPHILS NFR BLD: 60.1 %
NRBC BLD-RTO: 0 /100 WBC
PLATELET # BLD AUTO: 170 K/UL
PMV BLD AUTO: 11.8 FL
POTASSIUM SERPL-SCNC: 4.7 MMOL/L
PROT SERPL-MCNC: 7.1 G/DL
RBC # BLD AUTO: 3.85 M/UL
SODIUM SERPL-SCNC: 141 MMOL/L
VALPROATE SERPL-MCNC: 54.5 UG/ML
WBC # BLD AUTO: 9.28 K/UL

## 2018-09-11 PROCEDURE — 95951 HC EEG MONITORING/VIDEO RECORD: CPT

## 2018-09-11 PROCEDURE — 36415 COLL VENOUS BLD VENIPUNCTURE: CPT

## 2018-09-11 PROCEDURE — 80053 COMPREHEN METABOLIC PANEL: CPT

## 2018-09-11 PROCEDURE — 99223 1ST HOSP IP/OBS HIGH 75: CPT | Mod: ,,, | Performed by: PSYCHIATRY & NEUROLOGY

## 2018-09-11 PROCEDURE — 81003 URINALYSIS AUTO W/O SCOPE: CPT

## 2018-09-11 PROCEDURE — 93010 ELECTROCARDIOGRAM REPORT: CPT | Mod: ,,, | Performed by: INTERNAL MEDICINE

## 2018-09-11 PROCEDURE — S4991 NICOTINE PATCH NONLEGEND: HCPCS | Performed by: STUDENT IN AN ORGANIZED HEALTH CARE EDUCATION/TRAINING PROGRAM

## 2018-09-11 PROCEDURE — 20600001 HC STEP DOWN PRIVATE ROOM

## 2018-09-11 PROCEDURE — 25000003 PHARM REV CODE 250: Performed by: STUDENT IN AN ORGANIZED HEALTH CARE EDUCATION/TRAINING PROGRAM

## 2018-09-11 PROCEDURE — 80164 ASSAY DIPROPYLACETIC ACD TOT: CPT

## 2018-09-11 PROCEDURE — 85025 COMPLETE CBC W/AUTO DIFF WBC: CPT

## 2018-09-11 PROCEDURE — 95951 PR EEG MONITORING/VIDEORECORD: CPT | Mod: 26,,, | Performed by: PSYCHIATRY & NEUROLOGY

## 2018-09-11 PROCEDURE — 80171 DRUG SCREEN QUANT GABAPENTIN: CPT

## 2018-09-11 PROCEDURE — 93005 ELECTROCARDIOGRAM TRACING: CPT

## 2018-09-11 RX ORDER — DOCUSATE SODIUM 100 MG/1
100 CAPSULE, LIQUID FILLED ORAL 2 TIMES DAILY PRN
Status: DISCONTINUED | OUTPATIENT
Start: 2018-09-11 | End: 2018-09-17 | Stop reason: HOSPADM

## 2018-09-11 RX ORDER — ONDANSETRON 8 MG/1
8 TABLET, ORALLY DISINTEGRATING ORAL EVERY 8 HOURS PRN
Status: DISCONTINUED | OUTPATIENT
Start: 2018-09-11 | End: 2018-09-17 | Stop reason: HOSPADM

## 2018-09-11 RX ORDER — ATORVASTATIN CALCIUM 10 MG/1
10 TABLET, FILM COATED ORAL DAILY
Status: DISCONTINUED | OUTPATIENT
Start: 2018-09-12 | End: 2018-09-17 | Stop reason: HOSPADM

## 2018-09-11 RX ORDER — OXYBUTYNIN CHLORIDE 5 MG/1
10 TABLET, EXTENDED RELEASE ORAL NIGHTLY
Status: DISCONTINUED | OUTPATIENT
Start: 2018-09-11 | End: 2018-09-17 | Stop reason: HOSPADM

## 2018-09-11 RX ORDER — ACETAMINOPHEN 325 MG/1
650 TABLET ORAL EVERY 4 HOURS PRN
Status: DISCONTINUED | OUTPATIENT
Start: 2018-09-11 | End: 2018-09-17 | Stop reason: HOSPADM

## 2018-09-11 RX ORDER — ARIPIPRAZOLE 5 MG/1
10 TABLET ORAL DAILY
Status: DISCONTINUED | OUTPATIENT
Start: 2018-09-12 | End: 2018-09-17 | Stop reason: HOSPADM

## 2018-09-11 RX ORDER — IBUPROFEN 200 MG
1 TABLET ORAL DAILY
Status: DISCONTINUED | OUTPATIENT
Start: 2018-09-11 | End: 2018-09-17 | Stop reason: HOSPADM

## 2018-09-11 RX ORDER — MIRTAZAPINE 15 MG/1
15 TABLET, FILM COATED ORAL NIGHTLY
Status: DISCONTINUED | OUTPATIENT
Start: 2018-09-11 | End: 2018-09-17 | Stop reason: HOSPADM

## 2018-09-11 RX ORDER — PROMETHAZINE HYDROCHLORIDE 12.5 MG/1
25 TABLET ORAL EVERY 6 HOURS PRN
Status: DISCONTINUED | OUTPATIENT
Start: 2018-09-11 | End: 2018-09-17 | Stop reason: HOSPADM

## 2018-09-11 RX ORDER — FOLIC ACID 1 MG/1
2000 TABLET ORAL DAILY
Status: DISCONTINUED | OUTPATIENT
Start: 2018-09-12 | End: 2018-09-17 | Stop reason: HOSPADM

## 2018-09-11 RX ORDER — SODIUM CHLORIDE 0.9 % (FLUSH) 0.9 %
3 SYRINGE (ML) INJECTION
Status: DISCONTINUED | OUTPATIENT
Start: 2018-09-11 | End: 2018-09-17 | Stop reason: HOSPADM

## 2018-09-11 RX ORDER — HYDROXYCHLOROQUINE SULFATE 200 MG/1
200 TABLET, FILM COATED ORAL 2 TIMES DAILY
Status: DISCONTINUED | OUTPATIENT
Start: 2018-09-11 | End: 2018-09-17 | Stop reason: HOSPADM

## 2018-09-11 RX ORDER — PANTOPRAZOLE SODIUM 40 MG/1
40 TABLET, DELAYED RELEASE ORAL DAILY
Status: DISCONTINUED | OUTPATIENT
Start: 2018-09-12 | End: 2018-09-17 | Stop reason: HOSPADM

## 2018-09-11 RX ORDER — METOPROLOL SUCCINATE 25 MG/1
25 TABLET, EXTENDED RELEASE ORAL DAILY
Status: DISCONTINUED | OUTPATIENT
Start: 2018-09-12 | End: 2018-09-17 | Stop reason: HOSPADM

## 2018-09-11 RX ADMIN — HYDROXYCHLOROQUINE SULFATE 200 MG: 200 TABLET, FILM COATED ORAL at 08:09

## 2018-09-11 RX ADMIN — RIVAROXABAN 20 MG: 20 TABLET, FILM COATED ORAL at 07:09

## 2018-09-11 RX ADMIN — ACETAMINOPHEN 650 MG: 325 TABLET ORAL at 10:09

## 2018-09-11 RX ADMIN — OXYBUTYNIN CHLORIDE 10 MG: 5 TABLET, EXTENDED RELEASE ORAL at 08:09

## 2018-09-11 RX ADMIN — MIRTAZAPINE 15 MG: 15 TABLET, FILM COATED ORAL at 08:09

## 2018-09-11 RX ADMIN — NICOTINE 1 PATCH: 21 PATCH, EXTENDED RELEASE TRANSDERMAL at 08:09

## 2018-09-11 NOTE — HPI
"Ms. 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  She reports two distinct events: one is a generalized "muscle spasm" of the L side which lasts 20-30 seconds, occurs approx once every six months, and is associated with retained consciousness and no other features.  These have been occurring for years.  She also has had a few episodes of muscle spasm with altered level of consciousness (per friend, can occur during what appears to be sleep).  This seems to be associated with head turning, but neither patient nor friend are definitive on the sidedness of the head turn.  Questionable tongue/cheek biting, and baseline overactive bladder with some urinary incontinence, but no clear correlation with spasm/shaking events.        Per Dr. Norman's Clinic Note 8/14/18:  History of present illness:  The patient is a 38 y.o. female referred for evaluation of episodes suspicious for seizures.  She saw Dr. Muir in the hospital in June.  This is my first time seeing her.  The history available is marginal as both the patient and her  have limited insight into certain points.  These began "a couple of years back," "I'm not exactly sure when."  She is not aware of any aura.  Her seizure is characterized by left sided "muscle spasming."  It is not clear how long the events last for.  Afterwards, she reports some confusion.  The patient's frequency of events is roughly once every couple of years.     In the past, she has been told that she has NEE.  This diagnosis was apparently made somewhere in [place].  The patient is not sure where.     She also reports ongoing issues with muscle spasms on the left side.  She does have a spastic left hemiparesis stemming from an R MCA stroke in 2015.     Epilepsy risk factors:  Pregnancy/Labor/Delivery: " None  Febrile seizures: None  Head injury: None  CNS infection: None     Stroke: +  Family Hx of Sz: None  Developmental delay: None     Current AEDs:  Depakote 500 mg BID (also being used for bipolar disorder)     Prior AEDs:  Keppra     AEDs not tried:  acetazolamide (Diamox, AZM)  amantadine  brivaracetam (Briviact)  carbamazepine (Tegretol, CBZ)  clobezam (Onfi or Frizium, CLB)  ethosuximide (Zarontin, ESM)  eslicarbazine (Aptiom, ESL)  felbamate (felbatol, FBM)  gabapentin (Neurontin, GPN)  lacosamide (Vimpat, LCS)   lamotrigine (Lamictal, LTG)   methsuximide (Celontin, MSM)  methyphenytoin (Mesantion, MHT)  oxcarbazepine (Trileptal OXC)  perampanel (Fycompa, FCP)   phenobarbital (Pb)  phenytoin (Dilantin, PHT)  pregabalin (Lyrica, PGB)  primidone (Mysoline, PRM)  retigabine (Potiga, RTG)  rufinamide (Banzel, RUF)  tiagabine (Gabatril,  TGB)  topiramate (Topamax, TPM)  viagabatrin, (Sabril, VGB)  vagal nerve stimulator (VNS)  zonisamide (Zonegran, ZNA)  Benzodiazepines  diazepam - rectal (Diastatl)  diazepam - oral (Valium, DZ)  clonazepam (Klonopin, CZP)  clorazepate (Tranxene, CLZ)  Ativan

## 2018-09-11 NOTE — SUBJECTIVE & OBJECTIVE
Past Medical History:   Diagnosis Date    Acid reflux     Allergy     Anemia     Anxiety     Asthma     Bipolar 1 disorder     Chronic kidney disease     Chronic pain     Depression     bipolar 2    Fibromyalgia     Gastroparesis     Hx of psychiatric care     Lactose intolerance     Lupus     Psychiatric problem     Renal tubular acidosis     Seizure     Sjogren's syndrome     Smoker     Stroke 11-    Suicide attempt     overdosed on a bottle of paxil, muscle relaxers, & zoloft    Therapy        Past Surgical History:   Procedure Laterality Date    CHOLECYSTECTOMY      COLONOSCOPY  11/12/2014    Dr. Kimble, repeat at 52 years old for screening    COLONOSCOPY N/A 11/12/2014    Performed by Scout Kimble MD at [facility]    ESOPHAGOGASTRODUODENOSCOPY (EGD) N/A 3/3/2017    Performed by David Harris MD at [facility]    ESOPHAGOGASTRODUODENOSCOPY (EGD) N/A 10/16/2014    Performed by Scout Kimble [facility] at [facility]    UPPER GASTROINTESTINAL ENDOSCOPY  03/03/2017    Dr. Harris       Review of patient's allergies indicates:   Allergen Reactions    Reglan [metoclopramide hcl]      Involuntary mouth movements     Amoxil [amoxicillin]      hives    Augmentin [amoxicillin-pot clavulanate]     Avelox [moxifloxacin]      itching    Benadryl [diphenhydramine hcl]      Lowers seizure threshold     Quinazolinones      Lowers seizure threshold     Ultram [tramadol]      Lowers seizure threshold     Wellbutrin [bupropion hcl]      Lowers seizure threshold        Current Neurological Medications: depakote, remeron, gabapentin    No current facility-administered medications on file prior to encounter.      Current Outpatient Medications on File Prior to Encounter   Medication Sig    alendronate (FOSAMAX) 35 MG tablet Take 1 tablet (35 mg total) by mouth every 7 days.    aripiprazole (ABILIFY) 10 MG Tab Take one tablet PO in morning    atorvastatin (LIPITOR) 10 MG tablet Take one  tablet daily.    chlorhexidine (PERIDEX) 0.12 % solution RINSE WITH 1/2 OZ BID AFTER BRUSHING AND FLOSSING    divalproex ER (DEPAKOTE) 500 MG Tb24 500 mg every and 1000 mg every PM    folic acid (FOLVITE) 1 MG tablet Take 2 tablets daily.    hydroxychloroquine (PLAQUENIL) 200 mg tablet Take 1 tablet (200 mg total) by mouth 2 (two) times daily.    loratadine (CLARITIN) 10 mg tablet Take 1 tablet (10 mg total) by mouth once daily. (Patient taking differently: Take 10 mg by mouth once daily. Patient takes seasonally as needed)    metoprolol succinate (TOPROL-XL) 25 MG 24 hr tablet TAKE 1 TABLET(25 MG) BY MOUTH EVERY DAY    mirtazapine (REMERON) 15 MG tablet Take 15 mg by mouth every evening.    oxybutynin (DITROPAN-XL) 10 MG 24 hr tablet Take 1 tablet (10 mg total) by mouth once daily.    pantoprazole (PROTONIX) 40 MG tablet Take 40 mg by mouth once daily.     PREVIDENT 5000 BOOSTER PLUS 1.1 % Pste BRUSH ON NIGHTLY AFTER NORMAL HYGIENE REGIMAN. SPIT OUT EXCESS DO NOT RINSE    promethazine (PHENERGAN) 25 MG tablet TAKE ONE TABLET BY MOUTH EVERY 4 HOURS    ranitidine (ZANTAC) 300 MG tablet TAKE 1 TABLET(300 MG) BY MOUTH EVERY DAY (Patient taking differently: TAKE 1 TABLET(300 MG) BY MOUTH EVERY DAY AT NIGHT)    rivaroxaban (XARELTO) 20 mg Tab Take 1 tablet (20 mg total) by mouth daily with dinner or evening meal.     Family History     Problem Relation (Age of Onset)    Alcohol abuse Maternal Uncle, Maternal Grandfather    Cancer Maternal Grandmother    Diabetes Maternal Uncle, Maternal Grandmother    Diabetes Mellitus Maternal Grandmother    Drug abuse Brother, Maternal Grandmother    Early death Paternal Grandfather    Heart disease Paternal Uncle, Maternal Grandfather    Hyperlipidemia Mother, Maternal Grandmother    Hypertension Mother, Maternal Uncle, Maternal Grandmother, Maternal Grandfather    Mental illness Maternal Grandmother, Paternal Grandfather    No Known Problems Father, Paternal Grandmother     Osteoarthritis Maternal Grandfather    Post-traumatic stress disorder Brother    Psoriasis Mother    Rheum arthritis Maternal Grandmother    Scoliosis Paternal Aunt    Stroke Maternal Uncle    Thyroid disease Mother        Tobacco Use    Smoking status: Current Every Day Smoker     Packs/day: 1.00     Years: 15.00     Pack years: 15.00     Types: Cigarettes     Start date: 11/25/2015    Smokeless tobacco: Never Used    Tobacco comment: quit smoking after the stroke   Substance and Sexual Activity    Alcohol use: No     Alcohol/week: 0.0 oz    Drug use: Yes     Types: Marijuana     Comment: smokes occasionally, helps with pain and appetite    Sexual activity: No     Partners: Female     Comment: usually female, but currently with her boyfriend     Review of Systems   Eyes: Positive for photophobia.   Respiratory: Positive for cough. Negative for shortness of breath.    Cardiovascular: Positive for palpitations (occasional). Negative for chest pain.   Gastrointestinal: Positive for nausea (with HA) and vomiting (with HA). Negative for constipation and diarrhea.   Genitourinary: Negative for dysuria, frequency and urgency.        +bladder incontinence, chronic   Neurological: Positive for tremors, seizures and headaches (daily, with photophobia, phonophobia, n/v, and daily caffeine 200mg use as selfTx).   Psychiatric/Behavioral: Negative for agitation. The patient is not hyperactive.      Objective:     Vital Signs (Most Recent):  Temp: 98.5 °F (36.9 °C) (09/11/18 1809)  Pulse: (!) 52 (09/11/18 1809)  Resp: 18 (09/11/18 1809)  BP: (!) 108/55 (09/11/18 1809)  SpO2: 98 % (09/11/18 1809) Vital Signs (24h Range):  Temp:  [98.5 °F (36.9 °C)] 98.5 °F (36.9 °C)  Pulse:  [52] 52  Resp:  [18] 18  SpO2:  [98 %] 98 %  BP: (108)/(55) 108/55        There is no height or weight on file to calculate BMI.    Physical Exam   Constitutional: She is oriented to person, place, and time. She appears well-developed and  well-nourished. No distress.   HENT:   Head: Normocephalic and atraumatic.   Eyes: EOM are normal.   Cardiovascular: Normal rate.   Pulmonary/Chest: Effort normal.   Musculoskeletal: Normal range of motion.   Neurological: She is alert and oriented to person, place, and time. She has a normal Finger-Nose-Finger Test and a normal Heel to Shin Test.   Reflex Scores:       Bicep reflexes are 2+ on the right side and 2+ on the left side.       Brachioradialis reflexes are 2+ on the right side and 2+ on the left side.       Patellar reflexes are 1+ on the right side and 2+ on the left side.  Skin: Skin is warm and dry. She is not diaphoretic.   Psychiatric: She has a normal mood and affect. Her speech is normal and behavior is normal.   Nursing note and vitals reviewed.      NEUROLOGICAL EXAMINATION:     MENTAL STATUS   Oriented to person, place, and time.   Attention: normal. Concentration: normal.   Speech: speech is normal   Level of consciousness: alert    CRANIAL NERVES     CN III, IV, VI   Extraocular motions are normal.     CN V   Facial sensation intact.     CN VII   Facial expression full, symmetric.     CN VIII   Hearing: intact    CN XI   CN XI normal.     CN XII   CN XII normal.        L homonymous hemianopsia     MOTOR EXAM   Muscle bulk: normal  Overall muscle tone: normal    Strength   Right biceps: 5/5  Left biceps: 5/5  Right triceps: 5/5  Left triceps: 5/5  Right quadriceps: 5/5  Left quadriceps: 5/5  Right hamstrin/5  Left hamstrin/5    REFLEXES     Reflexes   Right brachioradialis: 2+  Left brachioradialis: 2+  Right biceps: 2+  Left biceps: 2+  Right patellar: 1+  Left patellar: 2+    SENSORY EXAM   Light touch normal.     GAIT AND COORDINATION      Coordination   Finger to nose coordination: normal  Heel to shin coordination: normal    Tremor   Resting tremor: absent  Intention tremor: absent      Significant Labs:   Recent Lab Results     None          Significant Imaging: I have reviewed  and interpreted all pertinent imaging results/findings within the past 24 hours.

## 2018-09-11 NOTE — ASSESSMENT & PLAN NOTE
vEEG  HV, PS  Hold Depakote, gabapentin  Seizure precautions  Please call Epilepsy for events>/=5min

## 2018-09-11 NOTE — H&P
"[facility]  Neurology  History & Physical    Patient Name: Cat Denson  MRN: [id number]   Admission Date: 9/11/2018  Code Status: Full Code   Attending Provider: Milo Norman Jr., MD   Primary Care Physician: Dayton Dukes MD (Inactive)  Principal Problem:Complex partial epilepsy with generalization and with intractable epilepsy    Subjective:     Chief Complaint:  EMU characterization of events      HPI:   Ms. 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  She reports two distinct events: one is a generalized "muscle spasm" of the L side which lasts 20-30 seconds, occurs approx once every six months, and is associated with retained consciousness and no other features.  These have been occurring for years.  She also has had a few episodes of muscle spasm with altered level of consciousness (per friend, can occur during what appears to be sleep).  This seems to be associated with head turning, but neither patient nor friend are definitive on the sidedness of the head turn.  Questionable tongue/cheek biting, and baseline overactive bladder with some urinary incontinence, but no clear correlation with spasm/shaking events.        Per Dr. Norman's Clinic Note 8/14/18:  History of present illness:  The patient is a 38 y.o. female referred for evaluation of episodes suspicious for seizures.  She saw Dr. Muir in the hospital in June.  This is my first time seeing her.  The history available is marginal as both the patient and her  have limited insight into certain points.  These began "a couple of years back," "I'm not exactly sure when."  She is not aware of any aura.  Her seizure is characterized by left sided "muscle spasming."  It is not clear how long the events last for.  Afterwards, she reports some " confusion.  The patient's frequency of events is roughly once every couple of years.     In the past, she has been told that she has NEE.  This diagnosis was apparently made somewhere in [place].  The patient is not sure where.     She also reports ongoing issues with muscle spasms on the left side.  She does have a spastic left hemiparesis stemming from an R MCA stroke in 2015.     Epilepsy risk factors:  Pregnancy/Labor/Delivery: None  Febrile seizures: None  Head injury: None  CNS infection: None     Stroke: +  Family Hx of Sz: None  Developmental delay: None     Current AEDs:  Depakote 500 mg BID (also being used for bipolar disorder)     Prior AEDs:  Keppra     AEDs not tried:  acetazolamide (Diamox, AZM)  amantadine  brivaracetam (Briviact)  carbamazepine (Tegretol, CBZ)  clobezam (Onfi or Frizium, CLB)  ethosuximide (Zarontin, ESM)  eslicarbazine (Aptiom, ESL)  felbamate (felbatol, FBM)  gabapentin (Neurontin, GPN)  lacosamide (Vimpat, LCS)   lamotrigine (Lamictal, LTG)   methsuximide (Celontin, MSM)  methyphenytoin (Mesantion, MHT)  oxcarbazepine (Trileptal OXC)  perampanel (Fycompa, FCP)   phenobarbital (Pb)  phenytoin (Dilantin, PHT)  pregabalin (Lyrica, PGB)  primidone (Mysoline, PRM)  retigabine (Potiga, RTG)  rufinamide (Banzel, RUF)  tiagabine (Gabatril,  TGB)  topiramate (Topamax, TPM)  viagabatrin, (Sabril, VGB)  vagal nerve stimulator (VNS)  zonisamide (Zonegran, ZNA)  Benzodiazepines  diazepam - rectal (Diastatl)  diazepam - oral (Valium, DZ)  clonazepam (Klonopin, CZP)  clorazepate (Tranxene, CLZ)  Ativan    Past Medical History:   Diagnosis Date    Acid reflux     Allergy     Anemia     Anxiety     Asthma     Bipolar 1 disorder     Chronic kidney disease     Chronic pain     Depression     bipolar 2    Fibromyalgia     Gastroparesis     Hx of psychiatric care     Lactose intolerance     Lupus     Psychiatric problem     Renal tubular acidosis     Seizure     Sjogren's syndrome      Smoker     Stroke 11-    Suicide attempt     overdosed on a bottle of paxil, muscle relaxers, & zoloft    Therapy        Past Surgical History:   Procedure Laterality Date    CHOLECYSTECTOMY      COLONOSCOPY  11/12/2014    Dr. Kimble, repeat at 52 years old for screening    COLONOSCOPY N/A 11/12/2014    Performed by [facility]    ESOPHAGOGASTRODUODENOSCOPY (EGD) N/A 3/3/2017    Performed by David Harris [facility] at [facility] END[facility]    ESOPHAGOGASTRODUODENOSCOPY (EGD) N/A 10/16/2014    [facility]formed by Scout [facility] at [facility] END[facility]    UPPER GASTROINTESTINAL ENDOSCOPY  03/03/2017    Dr. Harris       Review of patient's allergies indicates:   Allergen Reactions    Reglan [metoclopramide hcl]      Involuntary mouth movements     Amoxil [amoxicillin]      hives    Augmentin [amoxicillin-pot clavulanate]     Avelox [moxifloxacin]      itching    Benadryl [diphenhydramine hcl]      Lowers seizure threshold     Quinazolinones      Lowers seizure threshold     Ultram [tramadol]      Lowers seizure threshold     Wellbutrin [bupropion hcl]      Lowers seizure threshold        Current Neurological Medications: depakote, remeron, gabapentin    No current facility-administered medications on file prior to encounter.      Current Outpatient Medications on File Prior to Encounter   Medication Sig    alendronate (FOSAMAX) 35 MG tablet Take 1 tablet (35 mg total) by mouth every 7 days.    aripiprazole (ABILIFY) 10 MG Tab Take one tablet PO in morning    atorvastatin (LIPITOR) 10 MG tablet Take one tablet daily.    chlorhexidine (PERIDEX) 0.12 % solution RINSE WITH 1/2 OZ BID AFTER BRUSHING AND FLOSSING    divalproex ER (DEPAKOTE) 500 MG Tb24 500 mg every and 1000 mg every PM    folic acid (FOLVITE) 1 MG tablet Take 2 tablets daily.    hydroxychloroquine (PLAQUENIL) 200 mg tablet Take 1 tablet (200 mg total) by mouth 2 (two) times daily.    loratadine (CLARITIN) 10 mg tablet Take 1  tablet (10 mg total) by mouth once daily. (Patient taking differently: Take 10 mg by mouth once daily. Patient takes seasonally as needed)    metoprolol succinate (TOPROL-XL) 25 MG 24 hr tablet TAKE 1 TABLET(25 MG) BY MOUTH EVERY DAY    mirtazapine (REMERON) 15 MG tablet Take 15 mg by mouth every evening.    oxybutynin (DITROPAN-XL) 10 MG 24 hr tablet Take 1 tablet (10 mg total) by mouth once daily.    pantoprazole (PROTONIX) 40 MG tablet Take 40 mg by mouth once daily.     PREVIDENT 5000 BOOSTER PLUS 1.1 % Pste BRUSH ON NIGHTLY AFTER NORMAL HYGIENE REGIMAN. SPIT OUT EXCESS DO NOT RINSE    promethazine (PHENERGAN) 25 MG tablet TAKE ONE TABLET BY MOUTH EVERY 4 HOURS    ranitidine (ZANTAC) 300 MG tablet TAKE 1 TABLET(300 MG) BY MOUTH EVERY DAY (Patient taking differently: TAKE 1 TABLET(300 MG) BY MOUTH EVERY DAY AT NIGHT)    rivaroxaban (XARELTO) 20 mg Tab Take 1 tablet (20 mg total) by mouth daily with dinner or evening meal.     Family History     Problem Relation (Age of Onset)    Alcohol abuse Maternal Uncle, Maternal Grandfather    Cancer Maternal Grandmother    Diabetes Maternal Uncle, Maternal Grandmother    Diabetes Mellitus Maternal Grandmother    Drug abuse Brother, Maternal Grandmother    Early death Paternal Grandfather    Heart disease Paternal Uncle, Maternal Grandfather    Hyperlipidemia Mother, Maternal Grandmother    Hypertension Mother, Maternal Uncle, Maternal Grandmother, Maternal Grandfather    Mental illness Maternal Grandmother, Paternal Grandfather    No Known Problems Father, Paternal Grandmother    Osteoarthritis Maternal Grandfather    Post-traumatic stress disorder Brother    Psoriasis Mother    Rheum arthritis Maternal Grandmother    Scoliosis Paternal Aunt    Stroke Maternal Uncle    Thyroid disease Mother        Tobacco Use    Smoking status: Current Every Day Smoker     Packs/day: 1.00     Years: 15.00     Pack years: 15.00     Types: Cigarettes     Start date: 11/25/2015     Smokeless tobacco: Never Used    Tobacco comment: quit smoking after the stroke   Substance and Sexual Activity    Alcohol use: No     Alcohol/week: 0.0 oz    Drug use: Yes     Types: Marijuana     Comment: smokes occasionally, helps with pain and appetite    Sexual activity: No     Partners: Female     Comment: usually female, but currently with her boyfriend     Review of Systems   Eyes: Positive for photophobia.   Respiratory: Positive for cough. Negative for shortness of breath.    Cardiovascular: Positive for palpitations (occasional). Negative for chest pain.   Gastrointestinal: Positive for nausea (with HA) and vomiting (with HA). Negative for constipation and diarrhea.   Genitourinary: Negative for dysuria, frequency and urgency.        +bladder incontinence, chronic   Neurological: Positive for tremors, seizures and headaches (daily, with photophobia, phonophobia, n/v, and daily caffeine 200mg use as selfTx).   Psychiatric/Behavioral: Negative for agitation. The patient is not hyperactive.      Objective:     Vital Signs (Most Recent):  Temp: 98.5 °F (36.9 °C) (09/11/18 1809)  Pulse: (!) 52 (09/11/18 1809)  Resp: 18 (09/11/18 1809)  BP: (!) 108/55 (09/11/18 1809)  SpO2: 98 % (09/11/18 1809) Vital Signs (24h Range):  Temp:  [98.5 °F (36.9 °C)] 98.5 °F (36.9 °C)  Pulse:  [52] 52  Resp:  [18] 18  SpO2:  [98 %] 98 %  BP: (108)/(55) 108/55        There is no height or weight on file to calculate BMI.    Physical Exam   Constitutional: She is oriented to person, place, and time. She appears well-developed and well-nourished. No distress.   HENT:   Head: Normocephalic and atraumatic.   Eyes: EOM are normal.   Cardiovascular: Normal rate.   Pulmonary/Chest: Effort normal.   Musculoskeletal: Normal range of motion.   Neurological: She is alert and oriented to person, place, and time. She has a normal Finger-Nose-Finger Test and a normal Heel to Shin Test.   Reflex Scores:       Bicep reflexes are 2+ on the right  side and 2+ on the left side.       Brachioradialis reflexes are 2+ on the right side and 2+ on the left side.       Patellar reflexes are 1+ on the right side and 2+ on the left side.  Skin: Skin is warm and dry. She is not diaphoretic.   Psychiatric: She has a normal mood and affect. Her speech is normal and behavior is normal.   Nursing note and vitals reviewed.      NEUROLOGICAL EXAMINATION:     MENTAL STATUS   Oriented to person, place, and time.   Attention: normal. Concentration: normal.   Speech: speech is normal   Level of consciousness: alert    CRANIAL NERVES     CN III, IV, VI   Extraocular motions are normal.     CN V   Facial sensation intact.     CN VII   Facial expression full, symmetric.     CN VIII   Hearing: intact    CN XI   CN XI normal.     CN XII   CN XII normal.        L homonymous hemianopsia     MOTOR EXAM   Muscle bulk: normal  Overall muscle tone: normal    Strength   Right biceps: 5/5  Left biceps: 5/5  Right triceps: 5/5  Left triceps: 5/5  Right quadriceps: 5/5  Left quadriceps: 5/5  Right hamstrin/5  Left hamstrin/5    REFLEXES     Reflexes   Right brachioradialis: 2+  Left brachioradialis: 2+  Right biceps: 2+  Left biceps: 2+  Right patellar: 1+  Left patellar: 2+    SENSORY EXAM   Light touch normal.     GAIT AND COORDINATION      Coordination   Finger to nose coordination: normal  Heel to shin coordination: normal    Tremor   Resting tremor: absent  Intention tremor: absent      Significant Labs:   Recent Lab Results     None          Significant Imaging: I have reviewed and interpreted all pertinent imaging results/findings within the past 24 hours.    Assessment and Plan:     * Complex partial epilepsy with generalization and with intractable epilepsy    vEEG  HV, PS  Hold Depakote, gabapentin  Seizure precautions  Please call Epilepsy for events>/=5min        Chronic daily headache    Conservative measures discussed for outpatient prevention:  Limit all rescue  medications to no more than 2x/week (max 3x/week).  Stay well-hydrated.  Eat regularly.  Avoid stress when possible, and try your best to get enough sleep.    Limit caffeine intake, particularly in the afternoon/evenings.  Magnesium oxide 400mg bid    Inpatient, consider solumedrol 1g daily x1-3 days        Cigarette nicotine dependence without complication    Nicotine patch  Patient interested in quitting; plan to DC on tapered nicotine patches, as ineligible for [facility] program 2/2 year in which patient started smoking        Gastroesophageal reflux disease without esophagitis    Pantoprazole        History of right MCA stroke    Continue Lipitor, Xarelto        Lupus (systemic lupus erythematosus)    Continue Plaquenil, Xarelto        Bipolar 2 disorder    Hold depakote for EMU admission  Continue Abilify, Remeron  If events thought to be PNEE, consider alternative medication for bipolar, especially given low bone mineral density, and patient's inability to afford alendronate  Outpatient psych f/u with Dr. Wyatt        Chronic pain    Hold gabapentin for EMU stay            VTE Risk Mitigation (From admission, onward)        Ordered     rivaroxaban tablet 20 mg  With dinner      09/11/18 2326          Dottie Villatoro MD  Neurology  [facility]

## 2018-09-11 NOTE — ASSESSMENT & PLAN NOTE
Nicotine patch  Patient interested in quitting; plan to DC on tapered nicotine patches, as ineligible for [facility] program 2/2 year in which patient started smoking

## 2018-09-11 NOTE — HOSPITAL COURSE
9/11:  Admitted to EMU  9/11-13:  No clinical or electrographic events overnight (OVN).  Normal EEG.  9/13-14:  No clinical or electrographic events overnight (OVN).  Normal EEG despite sleep deprivation last night and benadryl/tramadol this am.  9/14-15: 1 event endorsed as typical with no associated EEG changes though significant artifact was present, particularly on the channels involving the right more so in the lateral leads.  Intermittent right sided slowing.  9/15-16: No events.  Intermittent right sided slowing.

## 2018-09-11 NOTE — ASSESSMENT & PLAN NOTE
Conservative measures discussed for outpatient prevention:  Limit all rescue medications to no more than 2x/week (max 3x/week).  Stay well-hydrated.  Eat regularly.  Avoid stress when possible, and try your best to get enough sleep.    Limit caffeine intake, particularly in the afternoon/evenings.  Magnesium oxide 400mg bid    Inpatient, consider solumedrol 1g daily x1-3 days

## 2018-09-11 NOTE — ASSESSMENT & PLAN NOTE
Hold depakote for EMU admission  Continue Abilify, Remeron  If events thought to be PNEE, consider alternative medication for bipolar, especially given low bone mineral density, and patient's inability to afford alendronate  Outpatient psych f/u with Dr. Wyatt

## 2018-09-12 LAB
BILIRUB UR QL STRIP: NEGATIVE
CLARITY UR REFRACT.AUTO: CLEAR
COLOR UR AUTO: NORMAL
GLUCOSE UR QL STRIP: NEGATIVE
HGB UR QL STRIP: NEGATIVE
KETONES UR QL STRIP: NEGATIVE
LEUKOCYTE ESTERASE UR QL STRIP: NEGATIVE
NITRITE UR QL STRIP: NEGATIVE
PH UR STRIP: 8 [PH] (ref 5–8)
PROT UR QL STRIP: NEGATIVE
SP GR UR STRIP: 1 (ref 1–1.03)
URN SPEC COLLECT METH UR: NORMAL
UROBILINOGEN UR STRIP-ACNC: NEGATIVE EU/DL

## 2018-09-12 PROCEDURE — 95951 PR EEG MONITORING/VIDEORECORD: CPT | Mod: 26,,, | Performed by: PSYCHIATRY & NEUROLOGY

## 2018-09-12 PROCEDURE — 25000003 PHARM REV CODE 250: Performed by: STUDENT IN AN ORGANIZED HEALTH CARE EDUCATION/TRAINING PROGRAM

## 2018-09-12 PROCEDURE — 20600001 HC STEP DOWN PRIVATE ROOM

## 2018-09-12 PROCEDURE — 99233 SBSQ HOSP IP/OBS HIGH 50: CPT | Mod: ,,, | Performed by: PSYCHIATRY & NEUROLOGY

## 2018-09-12 PROCEDURE — 95951 HC EEG MONITORING/VIDEO RECORD: CPT

## 2018-09-12 PROCEDURE — S4991 NICOTINE PATCH NONLEGEND: HCPCS | Performed by: STUDENT IN AN ORGANIZED HEALTH CARE EDUCATION/TRAINING PROGRAM

## 2018-09-12 RX ADMIN — FOLIC ACID 2000 MCG: 1 TABLET ORAL at 09:09

## 2018-09-12 RX ADMIN — METOPROLOL SUCCINATE 25 MG: 25 TABLET, EXTENDED RELEASE ORAL at 09:09

## 2018-09-12 RX ADMIN — HYDROXYCHLOROQUINE SULFATE 200 MG: 200 TABLET, FILM COATED ORAL at 09:09

## 2018-09-12 RX ADMIN — NICOTINE 1 PATCH: 21 PATCH, EXTENDED RELEASE TRANSDERMAL at 09:09

## 2018-09-12 RX ADMIN — PANTOPRAZOLE SODIUM 40 MG: 40 TABLET, DELAYED RELEASE ORAL at 09:09

## 2018-09-12 RX ADMIN — OXYBUTYNIN CHLORIDE 10 MG: 5 TABLET, EXTENDED RELEASE ORAL at 08:09

## 2018-09-12 RX ADMIN — ACETAMINOPHEN 650 MG: 325 TABLET ORAL at 10:09

## 2018-09-12 RX ADMIN — ARIPIPRAZOLE 10 MG: 5 TABLET ORAL at 09:09

## 2018-09-12 RX ADMIN — HYDROXYCHLOROQUINE SULFATE 200 MG: 200 TABLET, FILM COATED ORAL at 08:09

## 2018-09-12 RX ADMIN — ATORVASTATIN CALCIUM 10 MG: 10 TABLET, FILM COATED ORAL at 09:09

## 2018-09-12 RX ADMIN — MIRTAZAPINE 15 MG: 15 TABLET, FILM COATED ORAL at 08:09

## 2018-09-12 NOTE — PLAN OF CARE
PCP: Dayton Dukse MD (Inactive)  Pharmacy:   [id number] - [place]  [address] AT [facility] [address] &   [address]  [place] [address]  Phone: [phone] Fax: [phone]    [id number] - [place], [place] - [address]  [address]  [place] [address]  Phone: [phone] Fax: [phone]       09/12/18 1250   Discharge Assessment   Assessment Type Discharge Planning Assessment   Confirmed/corrected address and phone number on facesheet? Yes   Assessment information obtained from? Patient   Expected Length of Stay (days) 3   Communicated expected length of stay with patient/caregiver yes   Prior to hospitilization cognitive status: Alert/Oriented;No Deficits   Prior to hospitalization functional status: Independent   Current cognitive status: Alert/Oriented;No Deficits   Current Functional Status: Independent   Lives With parent(s)   Able to Return to Prior Arrangements unable to determine at this time (comments)   Is patient able to care for self after discharge? Unable to determine at this time (comments)   Who are your caregiver(s) and their phone number(s)? Arabella Rose Mother [phone]    Patient's perception of discharge disposition home or selfcare   Readmission Within The Last 30 Days no previous admission in last 30 days   Patient currently being followed by outpatient case management? No   Patient currently receives any other outside agency services? No   Equipment Currently Used at Home none   Do you have any problems affording any of your prescribed medications? No   Is the patient taking medications as prescribed? yes   Does the patient have transportation home? Yes   Transportation Available car;family or friend will provide   Does the patient receive services at the Coumadin Clinic? No   Discharge Plan A Home   Discharge Plan B Home with family   Patient/Family In Agreement With Plan yes

## 2018-09-12 NOTE — PLAN OF CARE
Problem: Patient Care Overview  Goal: Plan of Care Review  Outcome: Ongoing (interventions implemented as appropriate)   09/12/18 0626   Coping/Psychosocial   Plan Of Care Reviewed With patient     All questions answered.    Problem: Fall Risk (Adult)  Goal: Absence of Falls  Patient will demonstrate the desired outcomes by discharge/transition of care.  Outcome: Ongoing (interventions implemented as appropriate)   09/12/18 0626   Fall Risk (Adult)   Absence of Falls achieves outcome     Bed in lowest position with wheels locked. Bedside table and call bell within reach. SO at bedside.     Comments: Patient is A&Ox4. Afebrile. VSS on RA. Sinus Antonio to NSR on tele. Patient resting comfortably in bed overnight with no acute events. Will continue to monitor.

## 2018-09-12 NOTE — PROGRESS NOTES
[facility]  Neurology  Progress Note    Patient Name: Cat Denson  MRN: [id number]  Admission Date: 9/11/2018  Hospital Length of Stay: 1 days  Code Status: Full Code   Attending Provider: Milo Norman Jr., MD  Primary Care Physician: Dayton Dukes MD (Inactive)   Principal Problem:Complex partial epilepsy with generalization and with intractable epilepsy      Subjective:     Interval History: No clinical or electrographic events.    Current Neurological Medications: Abilify, Remeron    Current Facility-Administered Medications   Medication Dose Route Frequency Provider Last Rate Last Dose    acetaminophen tablet 650 mg  650 mg Oral Q4H PRN Dottie Villatoro MD   650 mg at 09/11/18 2215    ARIPiprazole tablet 10 mg  10 mg Oral Daily Dottie Villatoro MD   10 mg at 09/12/18 0912    atorvastatin tablet 10 mg  10 mg Oral Daily Dottie Villatoro MD   10 mg at 09/12/18 0912    docusate sodium capsule 100 mg  100 mg Oral BID PRN Dottie Villatoro MD        folic acid tablet 2,000 mcg  2,000 mcg Oral Daily Dottie Villatoro MD   2,000 mcg at 09/12/18 0913    hydroxychloroquine tablet 200 mg  200 mg Oral BID Dottie Villatoro MD   200 mg at 09/12/18 0912    metoprolol succinate (TOPROL-XL) 24 hr tablet 25 mg  25 mg Oral Daily Dottie Villatoro MD   25 mg at 09/12/18 0912    mirtazapine tablet 15 mg  15 mg Oral QHS Dottie Villatoro MD   15 mg at 09/11/18 2029    nicotine 21 mg/24 hr 1 patch  1 patch Transdermal Daily Dottie Villatoro MD   1 patch at 09/12/18 0912    ondansetron disintegrating tablet 8 mg  8 mg Oral Q8H PRN Dottie Villatoro MD        oxybutynin 24 hr tablet 10 mg  10 mg Oral QHS Dottie Villatoro MD   10 mg at 09/11/18 2028    pantoprazole EC tablet 40 mg  40 mg Oral Daily Dottie Villatoro MD   40 mg at 09/12/18 0912    promethazine tablet 25 mg  25 mg Oral Q6H PRN Dottie Villatoro MD        rivaroxaban tablet 20 mg  20 mg Oral  Daily with dinner Dottie Villatoro MD   20 mg at 09/11/18 1907    sodium chloride 0.9% flush 3 mL  3 mL Intravenous PRN Dottie Villatoro MD           Review of Systems   Eyes: Positive for photophobia.   Respiratory: Positive for cough. Negative for shortness of breath.    Cardiovascular: Positive for palpitations (occasional). Negative for chest pain.   Gastrointestinal: Positive for nausea (with HA) and vomiting (with HA). Negative for constipation and diarrhea.   Genitourinary: Negative for dysuria, frequency and urgency.        +bladder incontinence, chronic   Neurological: Positive for tremors, seizures and headaches (daily, with photophobia, phonophobia, n/v, and daily caffeine 200mg use as selfTx).   Psychiatric/Behavioral: Negative for agitation. The patient is not hyperactive.      Objective:     Vital Signs (Most Recent):  Temp: 97.5 °F (36.4 °C) (09/12/18 0800)  Pulse: 63 (09/12/18 0833)  Resp: 16 (09/12/18 0800)  BP: (!) 112/54 (09/12/18 0800)  SpO2: 98 % (09/12/18 0452) Vital Signs (24h Range):  Temp:  [97.5 °F (36.4 °C)-98.9 °F (37.2 °C)] 97.5 °F (36.4 °C)  Pulse:  [52-68] 63  Resp:  [16-18] 16  SpO2:  [97 %-98 %] 98 %  BP: (106-114)/(54-62) 112/54     Weight: 59 kg (130 lb)  Body mass index is 23.03 kg/m².    Physical Exam   Constitutional: She is oriented to person, place, and time. She appears well-developed and well-nourished. No distress.   HENT:   Head: Normocephalic and atraumatic.   Eyes: EOM are normal.   Cardiovascular: Normal rate.   Pulmonary/Chest: Effort normal.   Musculoskeletal: Normal range of motion.   Neurological: She is alert and oriented to person, place, and time. She has a normal Finger-Nose-Finger Test and a normal Heel to Shin Test.   Reflex Scores:       Bicep reflexes are 2+ on the right side and 2+ on the left side.       Brachioradialis reflexes are 2+ on the right side and 2+ on the left side.       Patellar reflexes are 1+ on the right side and 2+ on the left  side.  Skin: Skin is warm and dry. She is not diaphoretic.   Psychiatric: She has a normal mood and affect. Her speech is normal and behavior is normal.   Nursing note and vitals reviewed.      NEUROLOGICAL EXAMINATION:     MENTAL STATUS   Oriented to person, place, and time.   Attention: normal. Concentration: normal.   Speech: speech is normal   Level of consciousness: alert    CRANIAL NERVES     CN III, IV, VI   Extraocular motions are normal.     CN V   Facial sensation intact.     CN VII   Facial expression full, symmetric.     CN VIII   Hearing: intact    CN XI   CN XI normal.     CN XII   CN XII normal.        L homonymous hemianopsia     MOTOR EXAM   Muscle bulk: normal  Overall muscle tone: normal    Strength   Right biceps: 5/5  Left biceps: 5/5  Right triceps: 5/5  Left triceps: 5/5  Right quadriceps: 5/5  Left quadriceps: 5/5  Right hamstrin/5  Left hamstrin/5    REFLEXES     Reflexes   Right brachioradialis: 2+  Left brachioradialis: 2+  Right biceps: 2+  Left biceps: 2+  Right patellar: 1+  Left patellar: 2+    SENSORY EXAM   Light touch normal.     GAIT AND COORDINATION      Coordination   Finger to nose coordination: normal  Heel to shin coordination: normal    Tremor   Resting tremor: absent  Intention tremor: absent      Significant Labs:   Recent Lab Results       18  2234 18  1839      Immature Granulocytes  0.6     Immature Grans (Abs)  0.06  Comment:  Mild elevation in immature granulocytes is non specific and   can be seen in a variety of conditions including stress response,   acute inflammation, trauma and pregnancy. Correlation with other   laboratory and clinical findings is essential.       Albumin  3.6     Alkaline Phosphatase  88     ALT  12     Anion Gap  9     Appearance, UA Clear      AST  21     Baso #  0.07     Basophil%  0.8     Bilirubin (UA) Negative      Total Bilirubin  0.4  Comment:  For infants and newborns, interpretation of results should be based  on  gestational age, weight and in agreement with clinical  observations.  Premature Infant recommended reference ranges:  Up to 24 hours.............<8.0 mg/dL  Up to 48 hours............<12.0 mg/dL  3-5 days..................<15.0 mg/dL  6-29 days.................<15.0 mg/dL       BUN, Bld  13     Calcium  9.0     Chloride  108     CO2  24     Color, UA Straw      Creatinine  1.8     Differential Method  Automated     eGFR if   41.2     eGFR if non   35.7  Comment:  Calculation used to obtain the estimated glomerular filtration  rate (eGFR) is the CKD-EPI equation.        Eos #  0.1     Eosinophil%  0.8     Glucose  109     Glucose, UA Negative      Gran # (ANC)  5.6     Gran%  60.1     Hematocrit  38.7     Hemoglobin  12.6     Ketones, UA Negative      Leukocytes, UA Negative      Lymph #  2.7     Lymph%  29.1     MCH  32.7     MCHC  32.6     MCV  101     Mono #  0.8     Mono%  8.6     MPV  11.8     Nitrite, UA Negative      nRBC  0     Occult Blood UA Negative      pH, UA 8.0      Platelets  170     Potassium  4.7     Total Protein  7.1     Protein, UA Negative  Comment:  Recommend a 24 hour urine protein or a urine   protein/creatinine ratio if globulin induced proteinuria is  clinically suspected.        RBC  3.85     RDW  12.2     Sodium  141     Specific Gravity, UA 1.005      Specimen UA Urine, Clean Catch      Urobilinogen, UA Negative      Valproic Acid Lvl  54.5  Comment:  Valproic Acid (ug/ml)  Toxic:   >100.0 ug/ml       WBC  9.28           Significant Imaging: I have reviewed and interpreted all pertinent imaging results/findings within the past 24 hours.    Assessment and Plan:     * Complex partial epilepsy with generalization and with intractable epilepsy    vEEG  HV, PS  Hold Depakote, gabapentin  Seizure precautions  Please call Epilepsy for events>/=5min        Chronic daily headache    Conservative measures discussed for outpatient prevention:  Limit all rescue  medications to no more than 2x/week (max 3x/week).  Stay well-hydrated.  Eat regularly.  Avoid stress when possible, and try your best to get enough sleep.    Limit caffeine intake, particularly in the afternoon/evenings.  Magnesium oxide 400mg bid    Inpatient, consider solumedrol 1g daily x1-3 days        Cigarette nicotine dependence without complication    Nicotine patch  Patient interested in quitting; plan to DC on tapered nicotine patches, as ineligible for [facility] program 2/2 year in which patient started smoking        Gastroesophageal reflux disease without esophagitis    Pantoprazole        History of right MCA stroke    Continue Lipitor, Xarelto        Lupus (systemic lupus erythematosus)    Continue Plaquenil, Xarelto        Bipolar 2 disorder    Hold depakote for EMU admission  Continue Abilify, Remeron  If events thought to be PNEE, consider alternative medication for bipolar, especially given low bone mineral density, and patient's inability to afford alendronate  Outpatient psych f/u with Dr. Wyatt        Chronic pain    Hold gabapentin for EMU stay            VTE Risk Mitigation (From admission, onward)        Ordered     rivaroxaban tablet 20 mg  With dinner      09/11/18 3007          Dottie Villatoro MD  Neurology  [facility]

## 2018-09-12 NOTE — SUBJECTIVE & OBJECTIVE
Subjective:     Interval History: No clinical or electrographic events.    Current Neurological Medications: Abilify, Remeron    Current Facility-Administered Medications   Medication Dose Route Frequency Provider Last Rate Last Dose    acetaminophen tablet 650 mg  650 mg Oral Q4H PRN Dottie Villatoro MD   650 mg at 09/11/18 2215    ARIPiprazole tablet 10 mg  10 mg Oral Daily Dottie Villatoro MD   10 mg at 09/12/18 0912    atorvastatin tablet 10 mg  10 mg Oral Daily Dottie Villatoro MD   10 mg at 09/12/18 0912    docusate sodium capsule 100 mg  100 mg Oral BID PRN Dottie Villatoro MD        folic acid tablet 2,000 mcg  2,000 mcg Oral Daily Dottie Villatoro MD   2,000 mcg at 09/12/18 0913    hydroxychloroquine tablet 200 mg  200 mg Oral BID Dottie Villatoro MD   200 mg at 09/12/18 0912    metoprolol succinate (TOPROL-XL) 24 hr tablet 25 mg  25 mg Oral Daily Dottie Villatoro MD   25 mg at 09/12/18 0912    mirtazapine tablet 15 mg  15 mg Oral QHS Dottie Villatoro MD   15 mg at 09/11/18 2029    nicotine 21 mg/24 hr 1 patch  1 patch Transdermal Daily Dottie Villatoro MD   1 patch at 09/12/18 0912    ondansetron disintegrating tablet 8 mg  8 mg Oral Q8H PRN Dottie Villatoro MD        oxybutynin 24 hr tablet 10 mg  10 mg Oral QHS Dottie Villatoro MD   10 mg at 09/11/18 2028    pantoprazole EC tablet 40 mg  40 mg Oral Daily Dottie Villatoro MD   40 mg at 09/12/18 0912    promethazine tablet 25 mg  25 mg Oral Q6H PRN Dottie Villatoro MD        rivaroxaban tablet 20 mg  20 mg Oral Daily with dinner Dottie Villatoro MD   20 mg at 09/11/18 1907    sodium chloride 0.9% flush 3 mL  3 mL Intravenous PRN Dottie Villatoro MD           Review of Systems   Eyes: Positive for photophobia.   Respiratory: Positive for cough. Negative for shortness of breath.    Cardiovascular: Positive for palpitations (occasional). Negative for chest pain.   Gastrointestinal: Positive for  nausea (with HA) and vomiting (with HA). Negative for constipation and diarrhea.   Genitourinary: Negative for dysuria, frequency and urgency.        +bladder incontinence, chronic   Neurological: Positive for tremors, seizures and headaches (daily, with photophobia, phonophobia, n/v, and daily caffeine 200mg use as selfTx).   Psychiatric/Behavioral: Negative for agitation. The patient is not hyperactive.      Objective:     Vital Signs (Most Recent):  Temp: 97.5 °F (36.4 °C) (09/12/18 0800)  Pulse: 63 (09/12/18 0833)  Resp: 16 (09/12/18 0800)  BP: (!) 112/54 (09/12/18 0800)  SpO2: 98 % (09/12/18 0452) Vital Signs (24h Range):  Temp:  [97.5 °F (36.4 °C)-98.9 °F (37.2 °C)] 97.5 °F (36.4 °C)  Pulse:  [52-68] 63  Resp:  [16-18] 16  SpO2:  [97 %-98 %] 98 %  BP: (106-114)/(54-62) 112/54     Weight: 59 kg (130 lb)  Body mass index is 23.03 kg/m².    Physical Exam   Constitutional: She is oriented to person, place, and time. She appears well-developed and well-nourished. No distress.   HENT:   Head: Normocephalic and atraumatic.   Eyes: EOM are normal.   Cardiovascular: Normal rate.   Pulmonary/Chest: Effort normal.   Musculoskeletal: Normal range of motion.   Neurological: She is alert and oriented to person, place, and time. She has a normal Finger-Nose-Finger Test and a normal Heel to Shin Test.   Reflex Scores:       Bicep reflexes are 2+ on the right side and 2+ on the left side.       Brachioradialis reflexes are 2+ on the right side and 2+ on the left side.       Patellar reflexes are 1+ on the right side and 2+ on the left side.  Skin: Skin is warm and dry. She is not diaphoretic.   Psychiatric: She has a normal mood and affect. Her speech is normal and behavior is normal.   Nursing note and vitals reviewed.      NEUROLOGICAL EXAMINATION:     MENTAL STATUS   Oriented to person, place, and time.   Attention: normal. Concentration: normal.   Speech: speech is normal   Level of consciousness: alert    CRANIAL NERVES      CN III, IV, VI   Extraocular motions are normal.     CN V   Facial sensation intact.     CN VII   Facial expression full, symmetric.     CN VIII   Hearing: intact    CN XI   CN XI normal.     CN XII   CN XII normal.        L homonymous hemianopsia     MOTOR EXAM   Muscle bulk: normal  Overall muscle tone: normal    Strength   Right biceps: 5/5  Left biceps: 5/5  Right triceps: 5/5  Left triceps: 5/5  Right quadriceps: 5/5  Left quadriceps: 5/5  Right hamstrin/5  Left hamstrin/5    REFLEXES     Reflexes   Right brachioradialis: 2+  Left brachioradialis: 2+  Right biceps: 2+  Left biceps: 2+  Right patellar: 1+  Left patellar: 2+    SENSORY EXAM   Light touch normal.     GAIT AND COORDINATION      Coordination   Finger to nose coordination: normal  Heel to shin coordination: normal    Tremor   Resting tremor: absent  Intention tremor: absent      Significant Labs:   Recent Lab Results       184 18  1839      Immature Granulocytes  0.6     Immature Grans (Abs)  0.06  Comment:  Mild elevation in immature granulocytes is non specific and   can be seen in a variety of conditions including stress response,   acute inflammation, trauma and pregnancy. Correlation with other   laboratory and clinical findings is essential.       Albumin  3.6     Alkaline Phosphatase  88     ALT  12     Anion Gap  9     Appearance, UA Clear      AST  21     Baso #  0.07     Basophil%  0.8     Bilirubin (UA) Negative      Total Bilirubin  0.4  Comment:  For infants and newborns, interpretation of results should be based  on gestational age, weight and in agreement with clinical  observations.  Premature Infant recommended reference ranges:  Up to 24 hours.............<8.0 mg/dL  Up to 48 hours............<12.0 mg/dL  3-5 days..................<15.0 mg/dL  6-29 days.................<15.0 mg/dL       BUN, Bld  13     Calcium  9.0     Chloride  108     CO2  24     Color, UA Straw      Creatinine  1.8     Differential  Method  Automated     eGFR if   41.2     eGFR if non   35.7  Comment:  Calculation used to obtain the estimated glomerular filtration  rate (eGFR) is the CKD-EPI equation.        Eos #  0.1     Eosinophil%  0.8     Glucose  109     Glucose, UA Negative      Gran # (ANC)  5.6     Gran%  60.1     Hematocrit  38.7     Hemoglobin  12.6     Ketones, UA Negative      Leukocytes, UA Negative      Lymph #  2.7     Lymph%  29.1     MCH  32.7     MCHC  32.6     MCV  101     Mono #  0.8     Mono%  8.6     MPV  11.8     Nitrite, UA Negative      nRBC  0     Occult Blood UA Negative      pH, UA 8.0      Platelets  170     Potassium  4.7     Total Protein  7.1     Protein, UA Negative  Comment:  Recommend a 24 hour urine protein or a urine   protein/creatinine ratio if globulin induced proteinuria is  clinically suspected.        RBC  3.85     RDW  12.2     Sodium  141     Specific Gravity, UA 1.005      Specimen UA Urine, Clean Catch      Urobilinogen, UA Negative      Valproic Acid Lvl  54.5  Comment:  Valproic Acid (ug/ml)  Toxic:   >100.0 ug/ml       WBC  9.28           Significant Imaging: I have reviewed and interpreted all pertinent imaging results/findings within the past 24 hours.

## 2018-09-12 NOTE — NURSING
EMU SEIZURE EVENT NOTE  Time event started:1945  Duration:see nursing noteDescribe symptoms during event and post-ictal symptoms: stuttering, word finding difficulty, FRANKLIN  Who notified: CN and bedside nurse at [facility]   Intervention: Vital signs taken  during event and post VS taken, capillary blood glucose checked. Standardized neurological assessment completed.     Patient response: Neurological assessment back at baseline, VSS at this time. See flowsheets for more event information.

## 2018-09-13 PROCEDURE — 95951 HC EEG MONITORING/VIDEO RECORD: CPT

## 2018-09-13 PROCEDURE — 20600001 HC STEP DOWN PRIVATE ROOM

## 2018-09-13 PROCEDURE — 95951 PR EEG MONITORING/VIDEORECORD: CPT | Mod: 26,,, | Performed by: PSYCHIATRY & NEUROLOGY

## 2018-09-13 PROCEDURE — 25000003 PHARM REV CODE 250: Performed by: NURSE PRACTITIONER

## 2018-09-13 PROCEDURE — S4991 NICOTINE PATCH NONLEGEND: HCPCS | Performed by: STUDENT IN AN ORGANIZED HEALTH CARE EDUCATION/TRAINING PROGRAM

## 2018-09-13 PROCEDURE — 99233 SBSQ HOSP IP/OBS HIGH 50: CPT | Mod: ,,, | Performed by: PSYCHIATRY & NEUROLOGY

## 2018-09-13 PROCEDURE — 25000003 PHARM REV CODE 250: Performed by: STUDENT IN AN ORGANIZED HEALTH CARE EDUCATION/TRAINING PROGRAM

## 2018-09-13 RX ORDER — CALCIUM CARBONATE 200(500)MG
500 TABLET,CHEWABLE ORAL DAILY PRN
Status: DISCONTINUED | OUTPATIENT
Start: 2018-09-13 | End: 2018-09-17 | Stop reason: HOSPADM

## 2018-09-13 RX ORDER — TRAMADOL HYDROCHLORIDE 50 MG/1
100 TABLET ORAL ONCE
Status: COMPLETED | OUTPATIENT
Start: 2018-09-14 | End: 2018-09-14

## 2018-09-13 RX ORDER — DIPHENHYDRAMINE HCL 50 MG
50 CAPSULE ORAL ONCE
Status: COMPLETED | OUTPATIENT
Start: 2018-09-14 | End: 2018-09-14

## 2018-09-13 RX ADMIN — ACETAMINOPHEN 650 MG: 325 TABLET ORAL at 10:09

## 2018-09-13 RX ADMIN — PANTOPRAZOLE SODIUM 40 MG: 40 TABLET, DELAYED RELEASE ORAL at 09:09

## 2018-09-13 RX ADMIN — METOPROLOL SUCCINATE 25 MG: 25 TABLET, EXTENDED RELEASE ORAL at 09:09

## 2018-09-13 RX ADMIN — MIRTAZAPINE 15 MG: 15 TABLET, FILM COATED ORAL at 08:09

## 2018-09-13 RX ADMIN — CALCIUM CARBONATE (ANTACID) CHEW TAB 500 MG 500 MG: 500 CHEW TAB at 12:09

## 2018-09-13 RX ADMIN — HYDROXYCHLOROQUINE SULFATE 200 MG: 200 TABLET, FILM COATED ORAL at 08:09

## 2018-09-13 RX ADMIN — ARIPIPRAZOLE 10 MG: 5 TABLET ORAL at 09:09

## 2018-09-13 RX ADMIN — RIVAROXABAN 20 MG: 20 TABLET, FILM COATED ORAL at 05:09

## 2018-09-13 RX ADMIN — HYDROXYCHLOROQUINE SULFATE 200 MG: 200 TABLET, FILM COATED ORAL at 09:09

## 2018-09-13 RX ADMIN — FOLIC ACID 2000 MCG: 1 TABLET ORAL at 09:09

## 2018-09-13 RX ADMIN — ATORVASTATIN CALCIUM 10 MG: 10 TABLET, FILM COATED ORAL at 09:09

## 2018-09-13 RX ADMIN — NICOTINE 1 PATCH: 21 PATCH, EXTENDED RELEASE TRANSDERMAL at 09:09

## 2018-09-13 RX ADMIN — OXYBUTYNIN CHLORIDE 10 MG: 5 TABLET, EXTENDED RELEASE ORAL at 08:09

## 2018-09-13 RX ADMIN — PROMETHAZINE HYDROCHLORIDE 25 MG: 12.5 TABLET ORAL at 04:09

## 2018-09-13 NOTE — ASSESSMENT & PLAN NOTE
Conservative measures discussed for outpatient prevention:  Limit all rescue medications to no more than 2x/week (max 3x/week).  Stay well-hydrated.  Eat regularly.  Avoid stress when possible, and try your best to get enough sleep.    Limit caffeine intake, particularly in the afternoon/evenings.  Magnesium oxide 400mg bid

## 2018-09-13 NOTE — PLAN OF CARE
Problem: Patient Care Overview  Goal: Plan of Care Review  Outcome: Ongoing (interventions implemented as appropriate)  POC reviewed with pt at bedside.  Verbalized understanding.  On EEG monitoring. No seizure activity observed  overnight.  Slept well.  Complained of pain and NV.  Pain and NV controlled.  Seizure, fall and aspiration precautions  maintained. Bed alarm set and audible.  Instructed to call staff for mobility.  Will continue to monitor.

## 2018-09-13 NOTE — SUBJECTIVE & OBJECTIVE
Subjective:     Interval History: No clinical or electrographic events.    Current Neurological Medications: Abilify, Remeron    Current Facility-Administered Medications   Medication Dose Route Frequency Provider Last Rate Last Dose    acetaminophen tablet 650 mg  650 mg Oral Q4H PRN Dottie Villatoro MD   650 mg at 09/13/18 1056    ARIPiprazole tablet 10 mg  10 mg Oral Daily Dottie Villatoro MD   10 mg at 09/13/18 0920    atorvastatin tablet 10 mg  10 mg Oral Daily Dottie Villatoro MD   10 mg at 09/13/18 0920    calcium carbonate 200 mg calcium (500 mg) chewable tablet 500 mg  500 mg Oral Daily PRN Olga Pop NP   500 mg at 09/13/18 0049    [START ON 9/14/2018] diphenhydrAMINE capsule 50 mg  50 mg Oral Once Dottie Villatoro MD        docusate sodium capsule 100 mg  100 mg Oral BID PRN Dottie Villatoro MD        folic acid tablet 2,000 mcg  2,000 mcg Oral Daily Dottie Villatoro MD   2,000 mcg at 09/13/18 0920    hydroxychloroquine tablet 200 mg  200 mg Oral BID Dottie Villatoro MD   200 mg at 09/13/18 0920    metoprolol succinate (TOPROL-XL) 24 hr tablet 25 mg  25 mg Oral Daily Dottie Villatoro MD   25 mg at 09/13/18 0920    mirtazapine tablet 15 mg  15 mg Oral QHS Dottie Villatoro MD   15 mg at 09/12/18 2049    nicotine 21 mg/24 hr 1 patch  1 patch Transdermal Daily Dottie Villatoro MD   1 patch at 09/13/18 0921    ondansetron disintegrating tablet 8 mg  8 mg Oral Q8H PRN Dottie Villatoro MD        oxybutynin 24 hr tablet 10 mg  10 mg Oral QHS Dottie Villatoro MD   10 mg at 09/12/18 2048    pantoprazole EC tablet 40 mg  40 mg Oral Daily Dottie Villatoro MD   40 mg at 09/13/18 0921    promethazine tablet 25 mg  25 mg Oral Q6H PRN Dottie Villatoro MD   25 mg at 09/13/18 0455    rivaroxaban tablet 20 mg  20 mg Oral Daily with dinner Dottie Villatoro MD   20 mg at 09/11/18 1909    sodium chloride 0.9% flush 3 mL  3 mL Intravenous PRN Dottie V.  MD Irving        [START ON 9/14/2018] traMADol tablet 100 mg  100 mg Oral Once Dottie VMaryanne Villatoro MD           Review of Systems   Eyes: Positive for photophobia.   Respiratory: Positive for cough. Negative for shortness of breath.    Cardiovascular: Positive for palpitations (occasional). Negative for chest pain.   Gastrointestinal: Positive for nausea (with HA) and vomiting (with HA). Negative for constipation and diarrhea.   Genitourinary: Negative for dysuria, frequency and urgency.        +bladder incontinence, chronic   Neurological: Positive for tremors, seizures and headaches (daily, with photophobia, phonophobia, n/v, and daily caffeine 200mg use as selfTx).   Psychiatric/Behavioral: Negative for agitation. The patient is not hyperactive.      Objective:     Vital Signs (Most Recent):  Temp: 98.2 °F (36.8 °C) (09/13/18 0800)  Pulse: 86 (09/13/18 0800)  Resp: 16 (09/13/18 0800)  BP: 130/82 (09/13/18 0800)  SpO2: 97 % (09/13/18 0800) Vital Signs (24h Range):  Temp:  [97.2 °F (36.2 °C)-98.2 °F (36.8 °C)] 98.2 °F (36.8 °C)  Pulse:  [59-86] 86  Resp:  [16-20] 16  SpO2:  [92 %-98 %] 97 %  BP: (103-130)/(57-82) 130/82     Weight: 59 kg (130 lb)  Body mass index is 23.03 kg/m².    Physical Exam   Constitutional: She is oriented to person, place, and time. She appears well-developed and well-nourished. No distress.   HENT:   Head: Normocephalic and atraumatic.   Eyes: EOM are normal.   Cardiovascular: Normal rate.   Pulmonary/Chest: Effort normal.   Musculoskeletal: Normal range of motion.   Neurological: She is alert and oriented to person, place, and time. She has a normal Finger-Nose-Finger Test and a normal Heel to Shin Test.   Reflex Scores:       Bicep reflexes are 2+ on the right side and 2+ on the left side.       Brachioradialis reflexes are 2+ on the right side and 2+ on the left side.       Patellar reflexes are 1+ on the right side and 2+ on the left side.  Skin: Skin is warm and dry. She is not  diaphoretic.   Psychiatric: She has a normal mood and affect. Her speech is normal and behavior is normal.   Nursing note and vitals reviewed.      NEUROLOGICAL EXAMINATION:     MENTAL STATUS   Oriented to person, place, and time.   Attention: normal. Concentration: normal.   Speech: speech is normal   Level of consciousness: alert    CRANIAL NERVES     CN III, IV, VI   Extraocular motions are normal.     CN V   Facial sensation intact.     CN VII   Facial expression full, symmetric.     CN VIII   Hearing: intact    CN XI   CN XI normal.     CN XII   CN XII normal.        L homonymous hemianopsia     MOTOR EXAM   Muscle bulk: normal  Overall muscle tone: normal    Strength   Right biceps: 5/5  Left biceps: 4/5  Right triceps: 5/5  Left triceps: 4/5  Right quadriceps: 5/5  Left quadriceps: 5/5  Right hamstrin/5  Left hamstrin/5    REFLEXES     Reflexes   Right brachioradialis: 2+  Left brachioradialis: 2+  Right biceps: 2+  Left biceps: 2+  Right patellar: 1+  Left patellar: 2+    SENSORY EXAM   Light touch normal.     GAIT AND COORDINATION      Coordination   Finger to nose coordination: normal  Heel to shin coordination: normal    Tremor   Resting tremor: present  Intention tremor: present       Low amplitude, high frequency tremor of LUE>RUE, present at rest and with movement.       Significant Labs:   Recent Lab Results     None          Significant Imaging: I have reviewed and interpreted all pertinent imaging results/findings within the past 24 hours.

## 2018-09-13 NOTE — PLAN OF CARE
Problem: Fall Risk (Adult)  Intervention: Monitor/Assist with Self Care  Assist with ambulation as pt is a fall risk due to a history of seizure activity. Skid resistant socks on. Assisted with EEG equipment while ambulating. Bed alarm on. Instructed not to get OOB without assistance of staff.

## 2018-09-13 NOTE — PROGRESS NOTES
[facility]  Neurology  Progress Note    Patient Name: Cat Denson  MRN: [id number]  Admission Date: 9/11/2018  Hospital Length of Stay: 2 days  Code Status: Full Code   Attending Provider: Milo Norman Jr., MD  Primary Care Physician: Dayton Dukes MD   Principal Problem:Complex partial epilepsy with generalization and with intractable epilepsy      Subjective:     Interval History: No clinical or electrographic events.    Current Neurological Medications: Abilify, Remeron    Current Facility-Administered Medications   Medication Dose Route Frequency Provider Last Rate Last Dose    acetaminophen tablet 650 mg  650 mg Oral Q4H PRN Dottie Villatoro MD   650 mg at 09/13/18 1056    ARIPiprazole tablet 10 mg  10 mg Oral Daily Dottie Villatoro MD   10 mg at 09/13/18 0920    atorvastatin tablet 10 mg  10 mg Oral Daily Dottie Villatoro MD   10 mg at 09/13/18 0920    calcium carbonate 200 mg calcium (500 mg) chewable tablet 500 mg  500 mg Oral Daily PRN Olga Pop NP   500 mg at 09/13/18 0049    [START ON 9/14/2018] diphenhydrAMINE capsule 50 mg  50 mg Oral Once Dottie Villatoro MD        docusate sodium capsule 100 mg  100 mg Oral BID PRN Dottie Villatoro MD        folic acid tablet 2,000 mcg  2,000 mcg Oral Daily Dottie Villatoro MD   2,000 mcg at 09/13/18 0920    hydroxychloroquine tablet 200 mg  200 mg Oral BID Dottie Villatoro MD   200 mg at 09/13/18 0920    metoprolol succinate (TOPROL-XL) 24 hr tablet 25 mg  25 mg Oral Daily Dottie Villatoro MD   25 mg at 09/13/18 0920    mirtazapine tablet 15 mg  15 mg Oral QHS Dottie Villatoro MD   15 mg at 09/12/18 2049    nicotine 21 mg/24 hr 1 patch  1 patch Transdermal Daily Dottie Villatoro MD   1 patch at 09/13/18 0921    ondansetron disintegrating tablet 8 mg  8 mg Oral Q8H PRN Dottie Villatoro MD        oxybutynin 24 hr tablet 10 mg  10 mg Oral QHS Dottieronaldo Villatoro MD   10 mg at  09/12/18 2048    pantoprazole EC tablet 40 mg  40 mg Oral Daily Dottie Villatoro MD   40 mg at 09/13/18 0921    promethazine tablet 25 mg  25 mg Oral Q6H PRN Dottie Villatoro MD   25 mg at 09/13/18 0455    rivaroxaban tablet 20 mg  20 mg Oral Daily with dinner Dottie Villatoro MD   20 mg at 09/11/18 1907    sodium chloride 0.9% flush 3 mL  3 mL Intravenous PRN Dottie Villatoro MD        [START ON 9/14/2018] traMADol tablet 100 mg  100 mg Oral Once Dottie Villatoro MD           Review of Systems   Eyes: Positive for photophobia.   Respiratory: Positive for cough. Negative for shortness of breath.    Cardiovascular: Positive for palpitations (occasional). Negative for chest pain.   Gastrointestinal: Positive for nausea (with HA) and vomiting (with HA). Negative for constipation and diarrhea.   Genitourinary: Negative for dysuria, frequency and urgency.        +bladder incontinence, chronic   Neurological: Positive for tremors, seizures and headaches (daily, with photophobia, phonophobia, n/v, and daily caffeine 200mg use as selfTx).   Psychiatric/Behavioral: Negative for agitation. The patient is not hyperactive.      Objective:     Vital Signs (Most Recent):  Temp: 98.2 °F (36.8 °C) (09/13/18 0800)  Pulse: 86 (09/13/18 0800)  Resp: 16 (09/13/18 0800)  BP: 130/82 (09/13/18 0800)  SpO2: 97 % (09/13/18 0800) Vital Signs (24h Range):  Temp:  [97.2 °F (36.2 °C)-98.2 °F (36.8 °C)] 98.2 °F (36.8 °C)  Pulse:  [59-86] 86  Resp:  [16-20] 16  SpO2:  [92 %-98 %] 97 %  BP: (103-130)/(57-82) 130/82     Weight: 59 kg (130 lb)  Body mass index is 23.03 kg/m².    Physical Exam   Constitutional: She is oriented to person, place, and time. She appears well-developed and well-nourished. No distress.   HENT:   Head: Normocephalic and atraumatic.   Eyes: EOM are normal.   Cardiovascular: Normal rate.   Pulmonary/Chest: Effort normal.   Musculoskeletal: Normal range of motion.   Neurological: She is alert and oriented  to person, place, and time. She has a normal Finger-Nose-Finger Test and a normal Heel to Shin Test.   Reflex Scores:       Bicep reflexes are 2+ on the right side and 2+ on the left side.       Brachioradialis reflexes are 2+ on the right side and 2+ on the left side.       Patellar reflexes are 1+ on the right side and 2+ on the left side.  Skin: Skin is warm and dry. She is not diaphoretic.   Psychiatric: She has a normal mood and affect. Her speech is normal and behavior is normal.   Nursing note and vitals reviewed.      NEUROLOGICAL EXAMINATION:     MENTAL STATUS   Oriented to person, place, and time.   Attention: normal. Concentration: normal.   Speech: speech is normal   Level of consciousness: alert    CRANIAL NERVES     CN III, IV, VI   Extraocular motions are normal.     CN V   Facial sensation intact.     CN VII   Facial expression full, symmetric.     CN VIII   Hearing: intact    CN XI   CN XI normal.     CN XII   CN XII normal.        L homonymous hemianopsia     MOTOR EXAM   Muscle bulk: normal  Overall muscle tone: normal    Strength   Right biceps: 5/5  Left biceps: 4/5  Right triceps: 5/5  Left triceps: 4/5  Right quadriceps: 5/5  Left quadriceps: 5/5  Right hamstrin/5  Left hamstrin/5    REFLEXES     Reflexes   Right brachioradialis: 2+  Left brachioradialis: 2+  Right biceps: 2+  Left biceps: 2+  Right patellar: 1+  Left patellar: 2+    SENSORY EXAM   Light touch normal.     GAIT AND COORDINATION      Coordination   Finger to nose coordination: normal  Heel to shin coordination: normal    Tremor   Resting tremor: present  Intention tremor: present       Low amplitude, high frequency tremor of LUE>RUE, present at rest and with movement.       Significant Labs:   Recent Lab Results     None          Significant Imaging: I have reviewed and interpreted all pertinent imaging results/findings within the past 24 hours.    Assessment and Plan:     * Complex partial epilepsy with generalization  and with intractable epilepsy    vEEG  HV, PS  Hold Depakote, gabapentin  Seizure precautions  Please call Epilepsy for events>/=5min        Chronic daily headache    Conservative measures discussed for outpatient prevention:  Limit all rescue medications to no more than 2x/week (max 3x/week).  Stay well-hydrated.  Eat regularly.  Avoid stress when possible, and try your best to get enough sleep.    Limit caffeine intake, particularly in the afternoon/evenings.  Magnesium oxide 400mg bid        Cigarette nicotine dependence without complication    Nicotine patch  Patient interested in quitting; plan to DC on tapered nicotine patches, as ineligible for [facility] Quitting Smoking program 2/2 year in which patient started smoking        Gastroesophageal reflux disease without esophagitis    Pantoprazole        History of right MCA stroke    Continue Lipitor, Xarelto        Lupus (systemic lupus erythematosus)    Continue home meds        Bipolar 2 disorder    Hold depakote for EMU admission  Continue AbiLindsey carrilloeron  If events thought to be PNEE, consider alternative medication for bipolar, especially given low bone mineral density, and patient's inability to afford alendronate  Outpatient psych f/u with Dr. Wyatt        Chronic pain    Hold gabapentin for EMU stay            VTE Risk Mitigation (From admission, onward)        Ordered     rivaroxaban tablet 20 mg  With dinner      09/11/18 5415          Dottie Villatoro MD  Neurology  [facility]

## 2018-09-13 NOTE — NURSING
Called Stroke Team pt complained of indigestion.  Team responded and ordered one time dose of Tums.

## 2018-09-14 PROBLEM — N18.9 ACUTE KIDNEY INJURY SUPERIMPOSED ON CHRONIC KIDNEY DISEASE: Status: ACTIVE | Noted: 2018-09-14

## 2018-09-14 PROBLEM — N17.9 ACUTE KIDNEY INJURY SUPERIMPOSED ON CHRONIC KIDNEY DISEASE: Status: ACTIVE | Noted: 2018-09-14

## 2018-09-14 LAB
ALBUMIN SERPL BCP-MCNC: 4.1 G/DL
ALP SERPL-CCNC: 95 U/L
ALT SERPL W/O P-5'-P-CCNC: 28 U/L
ANION GAP SERPL CALC-SCNC: 10 MMOL/L
AST SERPL-CCNC: 35 U/L
BILIRUB SERPL-MCNC: 0.5 MG/DL
BUN SERPL-MCNC: 12 MG/DL
CALCIUM SERPL-MCNC: 9.2 MG/DL
CHLORIDE SERPL-SCNC: 104 MMOL/L
CO2 SERPL-SCNC: 20 MMOL/L
CREAT SERPL-MCNC: 2.2 MG/DL
EST. GFR  (AFRICAN AMERICAN): 32.3 ML/MIN/1.73 M^2
EST. GFR  (NON AFRICAN AMERICAN): 28 ML/MIN/1.73 M^2
GABAPENTIN SERPLBLD-MCNC: 6.2 MCG/ML (ref 2–20)
GLUCOSE SERPL-MCNC: 106 MG/DL
POTASSIUM SERPL-SCNC: 4.3 MMOL/L
PROT SERPL-MCNC: 7.8 G/DL
SODIUM SERPL-SCNC: 134 MMOL/L

## 2018-09-14 PROCEDURE — 99233 SBSQ HOSP IP/OBS HIGH 50: CPT | Mod: ,,, | Performed by: PSYCHIATRY & NEUROLOGY

## 2018-09-14 PROCEDURE — 80053 COMPREHEN METABOLIC PANEL: CPT

## 2018-09-14 PROCEDURE — 36415 COLL VENOUS BLD VENIPUNCTURE: CPT

## 2018-09-14 PROCEDURE — 25000003 PHARM REV CODE 250: Performed by: STUDENT IN AN ORGANIZED HEALTH CARE EDUCATION/TRAINING PROGRAM

## 2018-09-14 PROCEDURE — 95951 HC EEG MONITORING/VIDEO RECORD: CPT

## 2018-09-14 PROCEDURE — 20600001 HC STEP DOWN PRIVATE ROOM

## 2018-09-14 PROCEDURE — 95951 PR EEG MONITORING/VIDEORECORD: CPT | Mod: 26,,, | Performed by: PSYCHIATRY & NEUROLOGY

## 2018-09-14 RX ORDER — LOPERAMIDE HYDROCHLORIDE 2 MG/1
2 CAPSULE ORAL 4 TIMES DAILY PRN
Status: DISCONTINUED | OUTPATIENT
Start: 2018-09-14 | End: 2018-09-17 | Stop reason: HOSPADM

## 2018-09-14 RX ADMIN — PROMETHAZINE HYDROCHLORIDE 25 MG: 12.5 TABLET ORAL at 02:09

## 2018-09-14 RX ADMIN — RIVAROXABAN 20 MG: 20 TABLET, FILM COATED ORAL at 05:09

## 2018-09-14 RX ADMIN — LOPERAMIDE HYDROCHLORIDE 2 MG: 2 CAPSULE ORAL at 02:09

## 2018-09-14 RX ADMIN — MIRTAZAPINE 15 MG: 15 TABLET, FILM COATED ORAL at 09:09

## 2018-09-14 RX ADMIN — TRAMADOL HYDROCHLORIDE 100 MG: 50 TABLET, FILM COATED ORAL at 06:09

## 2018-09-14 RX ADMIN — ARIPIPRAZOLE 10 MG: 5 TABLET ORAL at 10:09

## 2018-09-14 RX ADMIN — METOPROLOL SUCCINATE 25 MG: 25 TABLET, EXTENDED RELEASE ORAL at 10:09

## 2018-09-14 RX ADMIN — ACETAMINOPHEN 650 MG: 325 TABLET ORAL at 03:09

## 2018-09-14 RX ADMIN — HYDROXYCHLOROQUINE SULFATE 200 MG: 200 TABLET, FILM COATED ORAL at 10:09

## 2018-09-14 RX ADMIN — SODIUM CHLORIDE 1000 ML: 0.9 INJECTION, SOLUTION INTRAVENOUS at 10:09

## 2018-09-14 RX ADMIN — FOLIC ACID 2000 MCG: 1 TABLET ORAL at 10:09

## 2018-09-14 RX ADMIN — ATORVASTATIN CALCIUM 10 MG: 10 TABLET, FILM COATED ORAL at 10:09

## 2018-09-14 RX ADMIN — PROMETHAZINE HYDROCHLORIDE 25 MG: 12.5 TABLET ORAL at 10:09

## 2018-09-14 RX ADMIN — OXYBUTYNIN CHLORIDE 10 MG: 5 TABLET, EXTENDED RELEASE ORAL at 09:09

## 2018-09-14 RX ADMIN — DIPHENHYDRAMINE HYDROCHLORIDE 50 MG: 50 CAPSULE ORAL at 06:09

## 2018-09-14 RX ADMIN — HYDROXYCHLOROQUINE SULFATE 200 MG: 200 TABLET, FILM COATED ORAL at 09:09

## 2018-09-14 RX ADMIN — PROMETHAZINE HYDROCHLORIDE 25 MG: 12.5 TABLET ORAL at 07:09

## 2018-09-14 RX ADMIN — PANTOPRAZOLE SODIUM 40 MG: 40 TABLET, DELAYED RELEASE ORAL at 10:09

## 2018-09-14 NOTE — PROGRESS NOTES
Pt vomited moderate amount of yellow vomitus. Phenergan 25 mgs given per pt's request. Will continue to monitor.

## 2018-09-14 NOTE — PLAN OF CARE
Problem: Fall Risk (Adult)  Intervention: Monitor/Assist with Self Care  Fall risk protocol reviewed with patient. Non skid socks on. Bed alarm on. Instructed not to get OOB without assistance of staff.

## 2018-09-14 NOTE — SUBJECTIVE & OBJECTIVE
Subjective:     Interval History: No clinical or electrographic events overnight, despite sleep deprivation last night and benadryl/tramadol this am.    Hospital Course:  9/11:  Admitted to EMU  9/11-13:  No clinical or electrographic events overnight (OVN).  Normal EEG.  9/13-14:  No clinical or electrographic events overnight (OVN).  Normal EEG despite sleep deprivation last night and benadryl/tramadol this am.    Current Neurological Medications: Abilify, Remeron    Current Facility-Administered Medications   Medication Dose Route Frequency Provider Last Rate Last Dose    acetaminophen tablet 650 mg  650 mg Oral Q4H PRN Dottie Villatoro MD   650 mg at 09/13/18 2257    ARIPiprazole tablet 10 mg  10 mg Oral Daily Dottie Villatoro MD   10 mg at 09/13/18 0920    atorvastatin tablet 10 mg  10 mg Oral Daily Dottie Villatoro MD   10 mg at 09/13/18 0920    calcium carbonate 200 mg calcium (500 mg) chewable tablet 500 mg  500 mg Oral Daily PRN Olga Pop NP   500 mg at 09/13/18 0049    docusate sodium capsule 100 mg  100 mg Oral BID PRN Dottie Villatoro MD        folic acid tablet 2,000 mcg  2,000 mcg Oral Daily Dottie Villatoro MD   2,000 mcg at 09/13/18 0920    hydroxychloroquine tablet 200 mg  200 mg Oral BID Dottie Villatoro MD   200 mg at 09/13/18 2057    metoprolol succinate (TOPROL-XL) 24 hr tablet 25 mg  25 mg Oral Daily Dottie Villatoro MD   25 mg at 09/13/18 0920    mirtazapine tablet 15 mg  15 mg Oral QHS Dottie Villatoro MD   15 mg at 09/13/18 2057    nicotine 21 mg/24 hr 1 patch  1 patch Transdermal Daily Dottie Villatoro MD   1 patch at 09/13/18 0921    ondansetron disintegrating tablet 8 mg  8 mg Oral Q8H PRN Dottie Villatoro MD        oxybutynin 24 hr tablet 10 mg  10 mg Oral QHS Dottie Villaotro MD   10 mg at 09/13/18 2057    pantoprazole EC tablet 40 mg  40 mg Oral Daily Dottie Villatoro MD   40 mg at 09/13/18 0921    promethazine tablet 25 mg   25 mg Oral Q6H PRN Dottie Villatoro MD   25 mg at 09/14/18 0758    rivaroxaban tablet 20 mg  20 mg Oral Daily with dinner Dottie Villatoro MD   20 mg at 09/13/18 1736    sodium chloride 0.9% flush 3 mL  3 mL Intravenous PRN Dottie Villatoro MD           Review of Systems   Eyes: Positive for photophobia.   Respiratory: Positive for cough. Negative for shortness of breath.    Cardiovascular: Positive for palpitations (occasional). Negative for chest pain.   Gastrointestinal: Positive for nausea (with HA) and vomiting (with HA). Negative for constipation and diarrhea.   Genitourinary: Negative for dysuria, frequency and urgency.        +bladder incontinence, chronic   Neurological: Positive for tremors, seizures and headaches.   Psychiatric/Behavioral: Negative for agitation. The patient is not hyperactive.      Objective:     Vital Signs (Most Recent):  Temp: 98.2 °F (36.8 °C) (09/14/18 0800)  Pulse: 77 (09/14/18 0800)  Resp: 16 (09/14/18 0800)  BP: (!) 136/91 (09/14/18 0800)  SpO2: 99 % (09/14/18 0800) Vital Signs (24h Range):  Temp:  [96.6 °F (35.9 °C)-98.7 °F (37.1 °C)] 98.2 °F (36.8 °C)  Pulse:  [71-95] 77  Resp:  [16-20] 16  SpO2:  [95 %-99 %] 99 %  BP: (119-136)/(67-91) 136/91     Weight: 59 kg (130 lb)  Body mass index is 23.03 kg/m².    Physical Exam   Constitutional: She is oriented to person, place, and time. She appears well-developed and well-nourished. No distress.   HENT:   Head: Normocephalic and atraumatic.   Eyes: EOM are normal.   Cardiovascular: Normal rate.   Pulmonary/Chest: Effort normal.   Musculoskeletal: Normal range of motion.   Neurological: She is alert and oriented to person, place, and time. She has a normal Finger-Nose-Finger Test and a normal Heel to Shin Test.   Reflex Scores:       Bicep reflexes are 2+ on the right side and 2+ on the left side.       Brachioradialis reflexes are 2+ on the right side and 2+ on the left side.       Patellar reflexes are 1+ on the right side  and 2+ on the left side.  Skin: Skin is warm and dry. She is not diaphoretic.   Psychiatric: She has a normal mood and affect. Her speech is normal and behavior is normal.   Nursing note and vitals reviewed.      NEUROLOGICAL EXAMINATION:     MENTAL STATUS   Oriented to person, place, and time.   Attention: normal. Concentration: normal.   Speech: speech is normal   Level of consciousness: alert    CRANIAL NERVES     CN III, IV, VI   Extraocular motions are normal.     CN V   Facial sensation intact.     CN VII   Facial expression full, symmetric.     CN VIII   Hearing: intact    CN XI   CN XI normal.     CN XII   CN XII normal.        L homonymous hemianopsia     MOTOR EXAM   Muscle bulk: normal  Overall muscle tone: normal    Strength   Right biceps: 5/5  Left biceps: 4/5  Right triceps: 5/5  Left triceps: 4/5  Right quadriceps: 5/5  Left quadriceps: 5/5  Right hamstrin/5  Left hamstrin/5    REFLEXES     Reflexes   Right brachioradialis: 2+  Left brachioradialis: 2+  Right biceps: 2+  Left biceps: 2+  Right patellar: 1+  Left patellar: 2+    SENSORY EXAM   Light touch normal.     GAIT AND COORDINATION      Coordination   Finger to nose coordination: normal  Heel to shin coordination: normal    Tremor   Resting tremor: present  Intention tremor: present       Low amplitude, high frequency tremor of LUE>RUE, present at rest and with movement.       Significant Labs:   Recent Lab Results       18  0824      Albumin 4.1     Alkaline Phosphatase 95     ALT 28     Anion Gap 10     AST 35     Total Bilirubin 0.5  Comment:  For infants and newborns, interpretation of results should be based  on gestational age, weight and in agreement with clinical  observations.  Premature Infant recommended reference ranges:  Up to 24 hours.............<8.0 mg/dL  Up to 48 hours............<12.0 mg/dL  3-5 days..................<15.0 mg/dL  6-29 days.................<15.0 mg/dL       BUN, Bld 12     Calcium 9.2      Chloride 104     CO2 20     Creatinine 2.2     eGFR if  32.3     eGFR if non  28.0  Comment:  Calculation used to obtain the estimated glomerular filtration  rate (eGFR) is the CKD-EPI equation.        Glucose 106     Potassium 4.3     Total Protein 7.8     Sodium 134           Significant Imaging: I have reviewed and interpreted all pertinent imaging results/findings within the past 24 hours.

## 2018-09-14 NOTE — PLAN OF CARE
Problem: Patient Care Overview  Goal: Plan of Care Review  Outcome: Ongoing (interventions implemented as appropriate)  POC reviewed with patient. Verbalized understanding. Answered any questions/concerns. No acute events over night. No seizure activity noted. No neuro changes. EMU monitor in place. Ambulating to restroom with standby assist to void. Pt complained of a headache around 11 pm. Mostly relieved with Tylenol. Sleep deprived until 4AM and tolerating well. Fall and seizure precautions remain in place. Bed in lowest position, call light within reach, bed alarm on, suction and O2 at bedside, side rails padded. Will continue to monitor.

## 2018-09-14 NOTE — PROGRESS NOTES
[facility]  Neurology  Progress Note    Patient Name: Cat Denson  MRN: [id number]  Admission Date: 9/11/2018  Hospital Length of Stay: 3 days  Code Status: Full Code   Attending Provider: Milo Norman Jr., MD  Primary Care Physician: Dayton Dukes MD   Principal Problem:Complex partial epilepsy with generalization and with intractable epilepsy      Subjective:     Interval History: No clinical or electrographic events overnight, despite sleep deprivation last night and benadryl/tramadol this am.    Hospital Course:  9/11:  Admitted to EMU  9/11-13:  No clinical or electrographic events overnight (OVN).  Normal EEG.  9/13-14:  No clinical or electrographic events overnight (OVN).  Normal EEG despite sleep deprivation last night and benadryl/tramadol this am.    Current Neurological Medications: Abilify, Remeron    Current Facility-Administered Medications   Medication Dose Route Frequency Provider Last Rate Last Dose    acetaminophen tablet 650 mg  650 mg Oral Q4H PRN Dottie Villatoro MD   650 mg at 09/13/18 2257    ARIPiprazole tablet 10 mg  10 mg Oral Daily Dottie Villatoro MD   10 mg at 09/13/18 0920    atorvastatin tablet 10 mg  10 mg Oral Daily Dottie Villatoro MD   10 mg at 09/13/18 0920    calcium carbonate 200 mg calcium (500 mg) chewable tablet 500 mg  500 mg Oral Daily PRN Olga Pop, NP   500 mg at 09/13/18 0049    docusate sodium capsule 100 mg  100 mg Oral BID PRN Dottie Villatoro MD        folic acid tablet 2,000 mcg  2,000 mcg Oral Daily Dottie Villatoro MD   2,000 mcg at 09/13/18 0920    hydroxychloroquine tablet 200 mg  200 mg Oral BID Dottie Villatoro MD   200 mg at 09/13/18 2057    metoprolol succinate (TOPROL-XL) 24 hr tablet 25 mg  25 mg Oral Daily Dottie Villatoro MD   25 mg at 09/13/18 0920    mirtazapine tablet 15 mg  15 mg Oral QHS Dottie Villatoro MD   15 mg at 09/13/18 2057    nicotine 21 mg/24 hr 1 patch  1 patch  Transdermal Daily Dottie Villatoro MD   1 patch at 09/13/18 0921    ondansetron disintegrating tablet 8 mg  8 mg Oral Q8H PRN Dottie Villatoro MD        oxybutynin 24 hr tablet 10 mg  10 mg Oral QHS Dottie Villatoro MD   10 mg at 09/13/18 2057    pantoprazole EC tablet 40 mg  40 mg Oral Daily Dottie Villatoro MD   40 mg at 09/13/18 0921    promethazine tablet 25 mg  25 mg Oral Q6H PRN Dottie Villatoro MD   25 mg at 09/14/18 0758    rivaroxaban tablet 20 mg  20 mg Oral Daily with dinner Dottie Villatoro MD   20 mg at 09/13/18 1733    sodium chloride 0.9% flush 3 mL  3 mL Intravenous PRN Dottie Villatoro MD           Review of Systems   Eyes: Positive for photophobia.   Respiratory: Positive for cough. Negative for shortness of breath.    Cardiovascular: Positive for palpitations (occasional). Negative for chest pain.   Gastrointestinal: Positive for nausea (with HA) and vomiting (with HA). Negative for constipation and diarrhea.   Genitourinary: Negative for dysuria, frequency and urgency.        +bladder incontinence, chronic   Neurological: Positive for tremors, seizures and headaches.   Psychiatric/Behavioral: Negative for agitation. The patient is not hyperactive.      Objective:     Vital Signs (Most Recent):  Temp: 98.2 °F (36.8 °C) (09/14/18 0800)  Pulse: 77 (09/14/18 0800)  Resp: 16 (09/14/18 0800)  BP: (!) 136/91 (09/14/18 0800)  SpO2: 99 % (09/14/18 0800) Vital Signs (24h Range):  Temp:  [96.6 °F (35.9 °C)-98.7 °F (37.1 °C)] 98.2 °F (36.8 °C)  Pulse:  [71-95] 77  Resp:  [16-20] 16  SpO2:  [95 %-99 %] 99 %  BP: (119-136)/(67-91) 136/91     Weight: 59 kg (130 lb)  Body mass index is 23.03 kg/m².    Physical Exam   Constitutional: She is oriented to person, place, and time. She appears well-developed and well-nourished. No distress.   HENT:   Head: Normocephalic and atraumatic.   Eyes: EOM are normal.   Cardiovascular: Normal rate.   Pulmonary/Chest: Effort normal.   Musculoskeletal:  Normal range of motion.   Neurological: She is alert and oriented to person, place, and time. She has a normal Finger-Nose-Finger Test and a normal Heel to Shin Test.   Reflex Scores:       Bicep reflexes are 2+ on the right side and 2+ on the left side.       Brachioradialis reflexes are 2+ on the right side and 2+ on the left side.       Patellar reflexes are 1+ on the right side and 2+ on the left side.  Skin: Skin is warm and dry. She is not diaphoretic.   Psychiatric: She has a normal mood and affect. Her speech is normal and behavior is normal.   Nursing note and vitals reviewed.      NEUROLOGICAL EXAMINATION:     MENTAL STATUS   Oriented to person, place, and time.   Attention: normal. Concentration: normal.   Speech: speech is normal   Level of consciousness: alert    CRANIAL NERVES     CN III, IV, VI   Extraocular motions are normal.     CN V   Facial sensation intact.     CN VII   Facial expression full, symmetric.     CN VIII   Hearing: intact    CN XI   CN XI normal.     CN XII   CN XII normal.        L homonymous hemianopsia     MOTOR EXAM   Muscle bulk: normal  Overall muscle tone: normal    Strength   Right biceps: 5/5  Left biceps: 4/5  Right triceps: 5/5  Left triceps: 4/5  Right quadriceps: 5/5  Left quadriceps: 5/5  Right hamstrin/5  Left hamstrin/5    REFLEXES     Reflexes   Right brachioradialis: 2+  Left brachioradialis: 2+  Right biceps: 2+  Left biceps: 2+  Right patellar: 1+  Left patellar: 2+    SENSORY EXAM   Light touch normal.     GAIT AND COORDINATION      Coordination   Finger to nose coordination: normal  Heel to shin coordination: normal    Tremor   Resting tremor: present  Intention tremor: present       Low amplitude, high frequency tremor of LUE>RUE, present at rest and with movement.       Significant Labs:   Recent Lab Results       18  0824      Albumin 4.1     Alkaline Phosphatase 95     ALT 28     Anion Gap 10     AST 35     Total Bilirubin 0.5  Comment:  For  infants and newborns, interpretation of results should be based  on gestational age, weight and in agreement with clinical  observations.  Premature Infant recommended reference ranges:  Up to 24 hours.............<8.0 mg/dL  Up to 48 hours............<12.0 mg/dL  3-5 days..................<15.0 mg/dL  6-29 days.................<15.0 mg/dL       BUN, Bld 12     Calcium 9.2     Chloride 104     CO2 20     Creatinine 2.2     eGFR if  32.3     eGFR if non  28.0  Comment:  Calculation used to obtain the estimated glomerular filtration  rate (eGFR) is the CKD-EPI equation.        Glucose 106     Potassium 4.3     Total Protein 7.8     Sodium 134           Significant Imaging: I have reviewed and interpreted all pertinent imaging results/findings within the past 24 hours.    Assessment and Plan:     * Complex partial epilepsy with generalization and with intractable epilepsy    vEEG  HV, PS  Hold Depakote, gabapentin  Seizure precautions  Please call Epilepsy for events>/=5min        Acute kidney injury superimposed on chronic kidney disease    BUN/Cr uptrending (Cr 2.2 today from baseline Cr approx 1.4)  1L NS bolus        Chronic daily headache    Conservative measures discussed for outpatient prevention:  Limit all rescue medications to no more than 2x/week (max 3x/week).  Stay well-hydrated.  Eat regularly.  Avoid stress when possible, and try your best to get enough sleep.    Limit caffeine intake, particularly in the afternoon/evenings.  Magnesium oxide 400mg bid        Cigarette nicotine dependence without complication    Nicotine patch  Patient interested in quitting; plan to DC on tapered nicotine patches, as ineligible [facility] 2/2 year in which patient started smoking        Gastroesophageal reflux disease without esophagitis    Pantoprazole        History of right MCA stroke    Continue Lipitor, Xarelto        Lupus (systemic lupus erythematosus)     Continue home meds        Bipolar 2 disorder    Hold depakote for EMU admission  Continue Abilify, Remeron  If events thought to be PNEE, consider alternative medication for bipolar, especially given low bone mineral density, and patient's inability to afford alendronate  Outpatient psych f/u with Dr. Wyatt        Chronic pain    Hold gabapentin for EMU stay            VTE Risk Mitigation (From admission, onward)        Ordered     rivaroxaban tablet 20 mg  With dinner      09/11/18 5612          Dottie Villatoro MD  Neurology  [facility]

## 2018-09-15 LAB
ALBUMIN SERPL BCP-MCNC: 3.7 G/DL
ALP SERPL-CCNC: 87 U/L
ALT SERPL W/O P-5'-P-CCNC: 30 U/L
ANION GAP SERPL CALC-SCNC: 10 MMOL/L
AST SERPL-CCNC: 36 U/L
BILIRUB SERPL-MCNC: 0.4 MG/DL
BUN SERPL-MCNC: 11 MG/DL
CALCIUM SERPL-MCNC: 9 MG/DL
CHLORIDE SERPL-SCNC: 111 MMOL/L
CO2 SERPL-SCNC: 17 MMOL/L
CREAT SERPL-MCNC: 1.9 MG/DL
EST. GFR  (AFRICAN AMERICAN): 38.5 ML/MIN/1.73 M^2
EST. GFR  (NON AFRICAN AMERICAN): 33.4 ML/MIN/1.73 M^2
GLUCOSE SERPL-MCNC: 86 MG/DL
POTASSIUM SERPL-SCNC: 4.4 MMOL/L
PROT SERPL-MCNC: 7 G/DL
SODIUM SERPL-SCNC: 138 MMOL/L

## 2018-09-15 PROCEDURE — 95951 HC EEG MONITORING/VIDEO RECORD: CPT

## 2018-09-15 PROCEDURE — 95951 PR EEG MONITORING/VIDEORECORD: CPT | Mod: 26,,, | Performed by: PSYCHIATRY & NEUROLOGY

## 2018-09-15 PROCEDURE — 80053 COMPREHEN METABOLIC PANEL: CPT

## 2018-09-15 PROCEDURE — 36415 COLL VENOUS BLD VENIPUNCTURE: CPT

## 2018-09-15 PROCEDURE — S4991 NICOTINE PATCH NONLEGEND: HCPCS | Performed by: STUDENT IN AN ORGANIZED HEALTH CARE EDUCATION/TRAINING PROGRAM

## 2018-09-15 PROCEDURE — 25000003 PHARM REV CODE 250: Performed by: STUDENT IN AN ORGANIZED HEALTH CARE EDUCATION/TRAINING PROGRAM

## 2018-09-15 PROCEDURE — 20600001 HC STEP DOWN PRIVATE ROOM

## 2018-09-15 PROCEDURE — 99233 SBSQ HOSP IP/OBS HIGH 50: CPT | Mod: ,,, | Performed by: PSYCHIATRY & NEUROLOGY

## 2018-09-15 PROCEDURE — 25000003 PHARM REV CODE 250: Performed by: PSYCHIATRY & NEUROLOGY

## 2018-09-15 RX ADMIN — HYDROXYCHLOROQUINE SULFATE 200 MG: 200 TABLET, FILM COATED ORAL at 09:09

## 2018-09-15 RX ADMIN — RIVAROXABAN 15 MG: 15 TABLET, FILM COATED ORAL at 05:09

## 2018-09-15 RX ADMIN — FOLIC ACID 2000 MCG: 1 TABLET ORAL at 09:09

## 2018-09-15 RX ADMIN — METOPROLOL SUCCINATE 25 MG: 25 TABLET, EXTENDED RELEASE ORAL at 09:09

## 2018-09-15 RX ADMIN — NICOTINE 1 PATCH: 21 PATCH, EXTENDED RELEASE TRANSDERMAL at 09:09

## 2018-09-15 RX ADMIN — MIRTAZAPINE 15 MG: 15 TABLET, FILM COATED ORAL at 09:09

## 2018-09-15 RX ADMIN — ARIPIPRAZOLE 10 MG: 5 TABLET ORAL at 09:09

## 2018-09-15 RX ADMIN — PANTOPRAZOLE SODIUM 40 MG: 40 TABLET, DELAYED RELEASE ORAL at 09:09

## 2018-09-15 RX ADMIN — ATORVASTATIN CALCIUM 10 MG: 10 TABLET, FILM COATED ORAL at 09:09

## 2018-09-15 RX ADMIN — OXYBUTYNIN CHLORIDE 10 MG: 5 TABLET, EXTENDED RELEASE ORAL at 09:09

## 2018-09-15 NOTE — PROGRESS NOTES
[facility]  Neurology  Progress Note    Patient Name: Cat Denson  MRN: [id number]  Admission Date: 9/11/2018  Hospital Length of Stay: 4 days  Code Status: Full Code   Attending Provider: Milo Norman Jr., MD  Primary Care Physician: Dayton Dukes MD (Inactive)   Principal Problem:Complex partial epilepsy with generalization and with intractable epilepsy      Subjective:     Interval History: No clinical or electrographic events overnight, despite sleep deprivation last night and benadryl/tramadol this am.    Hospital Course:  9/11:  Admitted to EMU  9/11-13:  No clinical or electrographic events overnight (OVN).  Normal EEG.  9/13-14:  No clinical or electrographic events overnight (OVN).  Normal EEG despite sleep deprivation last night and benadryl/tramadol this am.    Current Neurological Medications: Abilify, Remeron    Current Facility-Administered Medications   Medication Dose Route Frequency Provider Last Rate Last Dose    acetaminophen tablet 650 mg  650 mg Oral Q4H PRN Dottie Villatoro MD   650 mg at 09/14/18 1527    ARIPiprazole tablet 10 mg  10 mg Oral Daily Dottie Villatoro MD   10 mg at 09/15/18 0913    atorvastatin tablet 10 mg  10 mg Oral Daily Dottie Villatoro MD   10 mg at 09/15/18 0912    calcium carbonate 200 mg calcium (500 mg) chewable tablet 500 mg  500 mg Oral Daily PRN Olga Pop, NP   500 mg at 09/13/18 0049    docusate sodium capsule 100 mg  100 mg Oral BID PRN Dottie Villatoro MD        folic acid tablet 2,000 mcg  2,000 mcg Oral Daily Dottie Villatoro MD   2,000 mcg at 09/15/18 0913    hydroxychloroquine tablet 200 mg  200 mg Oral BID Dottie Villatoro MD   200 mg at 09/15/18 0916    loperamide capsule 2 mg  2 mg Oral QID PRN Dottie Villatoro MD   2 mg at 09/14/18 1427    metoprolol succinate (TOPROL-XL) 24 hr tablet 25 mg  25 mg Oral Daily Dottie Villatoro MD   25 mg at 09/15/18 0912    mirtazapine tablet 15 mg   15 mg Oral QHS Dottie Villatoro MD   15 mg at 09/14/18 2109    nicotine 21 mg/24 hr 1 patch  1 patch Transdermal Daily Dottie Villatoro MD   1 patch at 09/15/18 0916    ondansetron disintegrating tablet 8 mg  8 mg Oral Q8H PRN Dottie Villatoro MD        oxybutynin 24 hr tablet 10 mg  10 mg Oral QHS Dottie Villatoro MD   10 mg at 09/14/18 2109    pantoprazole EC tablet 40 mg  40 mg Oral Daily Dottie Villatoro MD   40 mg at 09/15/18 0916    promethazine tablet 25 mg  25 mg Oral Q6H PRN Dottie Villatoro MD   25 mg at 09/14/18 2209    rivaroxaban tablet 20 mg  20 mg Oral Daily with dinner Dottie Villatoro MD   20 mg at 09/14/18 1729    sodium chloride 0.9% flush 3 mL  3 mL Intravenous PRN Dottie Villatoro MD           Review of Systems   Eyes: Positive for photophobia.   Respiratory: Positive for cough. Negative for shortness of breath.    Cardiovascular: Positive for palpitations (occasional). Negative for chest pain.   Gastrointestinal: Positive for nausea (with HA) and vomiting (with HA). Negative for constipation and diarrhea.   Genitourinary: Negative for dysuria, frequency and urgency.        +bladder incontinence, chronic   Neurological: Positive for tremors, seizures and headaches.   Psychiatric/Behavioral: Negative for agitation. The patient is not hyperactive.      Objective:     Vital Signs (Most Recent):  Temp: 99 °F (37.2 °C) (09/15/18 1149)  Pulse: 66 (09/15/18 1149)  Resp: 17 (09/15/18 1149)  BP: (!) 109/57 (09/15/18 1149)  SpO2: 97 % (09/15/18 1149) Vital Signs (24h Range):  Temp:  [97.5 °F (36.4 °C)-99 °F (37.2 °C)] 99 °F (37.2 °C)  Pulse:  [] 66  Resp:  [17-24] 17  SpO2:  [93 %-97 %] 97 %  BP: (109-173)/(57-89) 109/57     Weight: 59 kg (130 lb)  Body mass index is 23.03 kg/m².    Physical Exam   Constitutional: She is oriented to person, place, and time. She appears well-developed and well-nourished. No distress.   HENT:   Head: Normocephalic and atraumatic.   Eyes:  EOM are normal.   Cardiovascular: Normal rate.   Pulmonary/Chest: Effort normal.   Musculoskeletal: Normal range of motion.   Neurological: She is alert and oriented to person, place, and time. She has a normal Finger-Nose-Finger Test and a normal Heel to Shin Test.   Reflex Scores:       Bicep reflexes are 2+ on the right side and 2+ on the left side.       Brachioradialis reflexes are 2+ on the right side and 2+ on the left side.       Patellar reflexes are 1+ on the right side and 2+ on the left side.  Skin: Skin is warm and dry. She is not diaphoretic.   Psychiatric: She has a normal mood and affect. Her speech is normal and behavior is normal.   Nursing note and vitals reviewed.      NEUROLOGICAL EXAMINATION:     MENTAL STATUS   Oriented to person, place, and time.   Attention: normal. Concentration: normal.   Speech: speech is normal   Level of consciousness: alert    CRANIAL NERVES     CN III, IV, VI   Extraocular motions are normal.     CN V   Facial sensation intact.     CN VII   Facial expression full, symmetric.     CN VIII   Hearing: intact    CN XI   CN XI normal.     CN XII   CN XII normal.        L homonymous hemianopsia     MOTOR EXAM   Muscle bulk: normal  Overall muscle tone: normal    Strength   Right biceps: 5/5  Left biceps: 4/5  Right triceps: 5/5  Left triceps: 4/5  Right quadriceps: 5/5  Left quadriceps: 5/5  Right hamstrin/5  Left hamstrin/5    REFLEXES     Reflexes   Right brachioradialis: 2+  Left brachioradialis: 2+  Right biceps: 2+  Left biceps: 2+  Right patellar: 1+  Left patellar: 2+    SENSORY EXAM   Light touch normal.     GAIT AND COORDINATION      Coordination   Finger to nose coordination: normal  Heel to shin coordination: normal    Tremor   Resting tremor: present  Intention tremor: present       Low amplitude, high frequency tremor of LUE>RUE, present at rest and with movement.       Significant Labs:   Recent Lab Results     None          Significant Imaging: I have  reviewed and interpreted all pertinent imaging results/findings within the past 24 hours.    Assessment and Plan:     * Complex partial epilepsy with generalization and with intractable epilepsy    vEEG  HV, PS  Hold Depakote, gabapentin  Seizure precautions  Please call Epilepsy for events>/=5min        Acute kidney injury superimposed on chronic kidney disease    BUN/Cr uptrending (Cr 2.2 today from baseline Cr approx 1.4)  1L NS bolus 9/14  Will follow CMP        Chronic daily headache    Conservative measures discussed for outpatient prevention:  Limit all rescue medications to no more than 2x/week (max 3x/week).  Stay well-hydrated.  Eat regularly.  Avoid stress when possible, and try your best to get enough sleep.    Limit caffeine intake, particularly in the afternoon/evenings.  Magnesium oxide 400mg bid        Cigarette nicotine dependence without complication    Nicotine patch  Patient interested in quitting; plan to DC on tapered nicotine patches, as ineligible for [facility] program 2/2 year in which patient started smoking        Gastroesophageal reflux disease without esophagitis    Pantoprazole        History of right MCA stroke    Continue Lipitor, Xarelto        Lupus (systemic lupus erythematosus)    Continue home meds        Bipolar 2 disorder    Hold depakote for EMU admission  Continue Abilify, Remeron  If events thought to be PNEE, consider alternative medication for bipolar, especially given low bone mineral density, and patient's inability to afford alendronate  Outpatient psych f/u with Dr. Cruz        Chronic pain    Hold gabapentin for EMU stay            VTE Risk Mitigation (From admission, onward)        Ordered     rivaroxaban tablet 15 mg  With dinner      09/15/18 4849          Milo Norman Jr, MD  Neurology  [facility]

## 2018-09-15 NOTE — NURSING
"EMU SEIZURE EVENT NOTE    Time event started: 2100    Duration: 2104    Describe symptoms during event and post-ictal symptoms: Pt convulsing all over entire body in bed. Head and eyes looked to the left during the episode. Breathing heavily through mouth until seizure event spontaneously ceased. Episode lasted approximately 4 minutes. Post-ictal pt got nauseous and vomited. Promethazine administered per pt request. VSS stable. Neuro status stable. AAOx4. Pt stated she remembered everything that occurred during the event. Also stated she felt like the seizure was more of a "muscle twitch". Before the seizure happened, she felt her LUE tense up.     Intervention: placed pt on side, O2 applied via NC, had suction available if needed, VS recorded during event and after (see flowsheets)    Patient response: Neurological assessment back at baseline, VSS at this time.       "

## 2018-09-15 NOTE — SUBJECTIVE & OBJECTIVE
Subjective:     Interval History: No clinical or electrographic events overnight, despite sleep deprivation last night and benadryl/tramadol this am.    Hospital Course:  9/11:  Admitted to EMU  9/11-13:  No clinical or electrographic events overnight (OVN).  Normal EEG.  9/13-14:  No clinical or electrographic events overnight (OVN).  Normal EEG despite sleep deprivation last night and benadryl/tramadol this am.    Current Neurological Medications: Abilify, Remeron    Current Facility-Administered Medications   Medication Dose Route Frequency Provider Last Rate Last Dose    acetaminophen tablet 650 mg  650 mg Oral Q4H PRN Dottie Villatoro MD   650 mg at 09/14/18 1527    ARIPiprazole tablet 10 mg  10 mg Oral Daily Dottie Villatoro MD   10 mg at 09/15/18 0913    atorvastatin tablet 10 mg  10 mg Oral Daily Dottie Villatoro MD   10 mg at 09/15/18 0912    calcium carbonate 200 mg calcium (500 mg) chewable tablet 500 mg  500 mg Oral Daily PRN Olga Pop NP   500 mg at 09/13/18 0049    docusate sodium capsule 100 mg  100 mg Oral BID PRN Dottie Villatoro MD        folic acid tablet 2,000 mcg  2,000 mcg Oral Daily Dottie Villatoro MD   2,000 mcg at 09/15/18 0913    hydroxychloroquine tablet 200 mg  200 mg Oral BID Dottie Villatoro MD   200 mg at 09/15/18 0916    loperamide capsule 2 mg  2 mg Oral QID PRN Dottie Villatoro MD   2 mg at 09/14/18 1427    metoprolol succinate (TOPROL-XL) 24 hr tablet 25 mg  25 mg Oral Daily Dottie Villatoro MD   25 mg at 09/15/18 0912    mirtazapine tablet 15 mg  15 mg Oral QHS Dottie Villatoro MD   15 mg at 09/14/18 2109    nicotine 21 mg/24 hr 1 patch  1 patch Transdermal Daily Dottie Villatoro MD   1 patch at 09/15/18 0916    ondansetron disintegrating tablet 8 mg  8 mg Oral Q8H PRN Dottie Villatoro MD        oxybutynin 24 hr tablet 10 mg  10 mg Oral QHS Dottie GILDA Villatoro MD   10 mg at 09/14/18 2109    pantoprazole EC tablet 40 mg  40  mg Oral Daily Dottie Villatoro MD   40 mg at 09/15/18 0916    promethazine tablet 25 mg  25 mg Oral Q6H PRN Dottie Villatoro MD   25 mg at 09/14/18 2209    rivaroxaban tablet 20 mg  20 mg Oral Daily with dinner Dottie Villatoro MD   20 mg at 09/14/18 1729    sodium chloride 0.9% flush 3 mL  3 mL Intravenous PRN Dottie Villatoro MD           Review of Systems   Eyes: Positive for photophobia.   Respiratory: Positive for cough. Negative for shortness of breath.    Cardiovascular: Positive for palpitations (occasional). Negative for chest pain.   Gastrointestinal: Positive for nausea (with HA) and vomiting (with HA). Negative for constipation and diarrhea.   Genitourinary: Negative for dysuria, frequency and urgency.        +bladder incontinence, chronic   Neurological: Positive for tremors, seizures and headaches.   Psychiatric/Behavioral: Negative for agitation. The patient is not hyperactive.      Objective:     Vital Signs (Most Recent):  Temp: 99 °F (37.2 °C) (09/15/18 1149)  Pulse: 66 (09/15/18 1149)  Resp: 17 (09/15/18 1149)  BP: (!) 109/57 (09/15/18 1149)  SpO2: 97 % (09/15/18 1149) Vital Signs (24h Range):  Temp:  [97.5 °F (36.4 °C)-99 °F (37.2 °C)] 99 °F (37.2 °C)  Pulse:  [] 66  Resp:  [17-24] 17  SpO2:  [93 %-97 %] 97 %  BP: (109-173)/(57-89) 109/57     Weight: 59 kg (130 lb)  Body mass index is 23.03 kg/m².    Physical Exam   Constitutional: She is oriented to person, place, and time. She appears well-developed and well-nourished. No distress.   HENT:   Head: Normocephalic and atraumatic.   Eyes: EOM are normal.   Cardiovascular: Normal rate.   Pulmonary/Chest: Effort normal.   Musculoskeletal: Normal range of motion.   Neurological: She is alert and oriented to person, place, and time. She has a normal Finger-Nose-Finger Test and a normal Heel to Shin Test.   Reflex Scores:       Bicep reflexes are 2+ on the right side and 2+ on the left side.       Brachioradialis reflexes are 2+ on  the right side and 2+ on the left side.       Patellar reflexes are 1+ on the right side and 2+ on the left side.  Skin: Skin is warm and dry. She is not diaphoretic.   Psychiatric: She has a normal mood and affect. Her speech is normal and behavior is normal.   Nursing note and vitals reviewed.      NEUROLOGICAL EXAMINATION:     MENTAL STATUS   Oriented to person, place, and time.   Attention: normal. Concentration: normal.   Speech: speech is normal   Level of consciousness: alert    CRANIAL NERVES     CN III, IV, VI   Extraocular motions are normal.     CN V   Facial sensation intact.     CN VII   Facial expression full, symmetric.     CN VIII   Hearing: intact    CN XI   CN XI normal.     CN XII   CN XII normal.        L homonymous hemianopsia     MOTOR EXAM   Muscle bulk: normal  Overall muscle tone: normal    Strength   Right biceps: 5/5  Left biceps: 4/5  Right triceps: 5/5  Left triceps: 4/5  Right quadriceps: 5/5  Left quadriceps: 5/5  Right hamstrin/5  Left hamstrin/5    REFLEXES     Reflexes   Right brachioradialis: 2+  Left brachioradialis: 2+  Right biceps: 2+  Left biceps: 2+  Right patellar: 1+  Left patellar: 2+    SENSORY EXAM   Light touch normal.     GAIT AND COORDINATION      Coordination   Finger to nose coordination: normal  Heel to shin coordination: normal    Tremor   Resting tremor: present  Intention tremor: present       Low amplitude, high frequency tremor of LUE>RUE, present at rest and with movement.       Significant Labs:   Recent Lab Results     None          Significant Imaging: I have reviewed and interpreted all pertinent imaging results/findings within the past 24 hours.

## 2018-09-15 NOTE — PLAN OF CARE
Problem: Patient Care Overview  Goal: Plan of Care Review  Outcome: Ongoing (interventions implemented as appropriate)  POC reviewed with patient. Verbalized understanding. Answered any questions/concerns. One spontaneous seizure event last night at 2100 lasting for 4 minutes- see previous note and flowsheets. VSS. Fall precautions and seizure precautions remain in place. Will continue to monitor.

## 2018-09-15 NOTE — ASSESSMENT & PLAN NOTE
Hold depakote for EMU admission  Continue Abilify, Remeron  If events thought to be PNEE, consider alternative medication for bipolar, especially given low bone mineral density, and patient's inability to afford alendronate  Outpatient psych f/u with Dr. Cruz

## 2018-09-16 PROCEDURE — 25000003 PHARM REV CODE 250: Performed by: STUDENT IN AN ORGANIZED HEALTH CARE EDUCATION/TRAINING PROGRAM

## 2018-09-16 PROCEDURE — 95951 HC EEG MONITORING/VIDEO RECORD: CPT

## 2018-09-16 PROCEDURE — 25000003 PHARM REV CODE 250: Performed by: PSYCHIATRY & NEUROLOGY

## 2018-09-16 PROCEDURE — 25000003 PHARM REV CODE 250: Performed by: NURSE PRACTITIONER

## 2018-09-16 PROCEDURE — 20600001 HC STEP DOWN PRIVATE ROOM

## 2018-09-16 PROCEDURE — S4991 NICOTINE PATCH NONLEGEND: HCPCS | Performed by: STUDENT IN AN ORGANIZED HEALTH CARE EDUCATION/TRAINING PROGRAM

## 2018-09-16 PROCEDURE — 95951 PR EEG MONITORING/VIDEORECORD: CPT | Mod: 26,,, | Performed by: PSYCHIATRY & NEUROLOGY

## 2018-09-16 PROCEDURE — 99233 SBSQ HOSP IP/OBS HIGH 50: CPT | Mod: ,,, | Performed by: PSYCHIATRY & NEUROLOGY

## 2018-09-16 RX ADMIN — OXYBUTYNIN CHLORIDE 10 MG: 5 TABLET, EXTENDED RELEASE ORAL at 09:09

## 2018-09-16 RX ADMIN — HYDROXYCHLOROQUINE SULFATE 200 MG: 200 TABLET, FILM COATED ORAL at 08:09

## 2018-09-16 RX ADMIN — METOPROLOL SUCCINATE 25 MG: 25 TABLET, EXTENDED RELEASE ORAL at 08:09

## 2018-09-16 RX ADMIN — PANTOPRAZOLE SODIUM 40 MG: 40 TABLET, DELAYED RELEASE ORAL at 08:09

## 2018-09-16 RX ADMIN — FOLIC ACID 2000 MCG: 1 TABLET ORAL at 08:09

## 2018-09-16 RX ADMIN — RIVAROXABAN 15 MG: 15 TABLET, FILM COATED ORAL at 05:09

## 2018-09-16 RX ADMIN — CALCIUM CARBONATE (ANTACID) CHEW TAB 500 MG 500 MG: 500 CHEW TAB at 10:09

## 2018-09-16 RX ADMIN — HYDROXYCHLOROQUINE SULFATE 200 MG: 200 TABLET, FILM COATED ORAL at 09:09

## 2018-09-16 RX ADMIN — ARIPIPRAZOLE 10 MG: 5 TABLET ORAL at 08:09

## 2018-09-16 RX ADMIN — NICOTINE 1 PATCH: 21 PATCH, EXTENDED RELEASE TRANSDERMAL at 08:09

## 2018-09-16 RX ADMIN — MIRTAZAPINE 15 MG: 15 TABLET, FILM COATED ORAL at 09:09

## 2018-09-16 RX ADMIN — ATORVASTATIN CALCIUM 10 MG: 10 TABLET, FILM COATED ORAL at 08:09

## 2018-09-16 RX ADMIN — ONDANSETRON 8 MG: 8 TABLET, ORALLY DISINTEGRATING ORAL at 09:09

## 2018-09-16 NOTE — PLAN OF CARE
Problem: Patient Care Overview  Goal: Plan of Care Review  Outcome: Ongoing (interventions implemented as appropriate)  Patient awake, alert and responsive, oriented x 4. Vital signs stable. No episode of active seizure this shift. Administered medications as ordered. Patient compliant with calling for assistance going to the bathroom. No incident of this shift.  Reviewed plan of care with patient, verbalized understanding.

## 2018-09-16 NOTE — SUBJECTIVE & OBJECTIVE
Subjective:     Interval History: No clinical or electrographic events overnight, despite sleep deprivation last night and benadryl/tramadol this am.    Hospital Course:  9/11:  Admitted to EMU  9/11-13:  No clinical or electrographic events overnight (OVN).  Normal EEG.  9/13-14:  No clinical or electrographic events overnight (OVN).  Normal EEG despite sleep deprivation last night and benadryl/tramadol this am.  9/14-15: 1 event endorsed as typical with no associated EEG changes though significant artifact was present, particularly on the channels involving the right more so in the lateral leads.  Intermittent right sided slowing.  9/15-16: No events.  Intermittent right sided slowing.    Current Neurological Medications: Abilify, Remeron    Current Facility-Administered Medications   Medication Dose Route Frequency Provider Last Rate Last Dose    acetaminophen tablet 650 mg  650 mg Oral Q4H PRN Dottie Villatoro MD   650 mg at 09/14/18 1527    ARIPiprazole tablet 10 mg  10 mg Oral Daily Dottei Villatoro MD   10 mg at 09/16/18 0853    atorvastatin tablet 10 mg  10 mg Oral Daily Dottie Villatoro MD   10 mg at 09/16/18 0853    calcium carbonate 200 mg calcium (500 mg) chewable tablet 500 mg  500 mg Oral Daily PRN Olga Ppo NP   500 mg at 09/13/18 0049    docusate sodium capsule 100 mg  100 mg Oral BID PRN Dottie Villatoro MD        folic acid tablet 2,000 mcg  2,000 mcg Oral Daily Dottie Villatoro MD   2,000 mcg at 09/16/18 0853    hydroxychloroquine tablet 200 mg  200 mg Oral BID Dottie Villatoro MD   200 mg at 09/16/18 0854    loperamide capsule 2 mg  2 mg Oral QID PRN Dottie Villatoro MD   2 mg at 09/14/18 1427    metoprolol succinate (TOPROL-XL) 24 hr tablet 25 mg  25 mg Oral Daily Dottie Villatoro MD   25 mg at 09/16/18 0854    mirtazapine tablet 15 mg  15 mg Oral QHS Dottie Villatoro MD   15 mg at 09/15/18 2144    nicotine 21 mg/24 hr 1 patch  1 patch Transdermal  Daily Dottie Villatoro MD   1 patch at 09/16/18 0855    ondansetron disintegrating tablet 8 mg  8 mg Oral Q8H PRN Dottie Villatoro MD   8 mg at 09/16/18 0958    oxybutynin 24 hr tablet 10 mg  10 mg Oral QHS Dottie Villatoro MD   10 mg at 09/15/18 2144    pantoprazole EC tablet 40 mg  40 mg Oral Daily Dottie Villatoro MD   40 mg at 09/16/18 0854    promethazine tablet 25 mg  25 mg Oral Q6H PRN Dottie Villatoro MD   25 mg at 09/14/18 2209    rivaroxaban tablet 15 mg  15 mg Oral Daily with dinner Milo Norman Jr., MD   15 mg at 09/15/18 1738    sodium chloride 0.9% flush 3 mL  3 mL Intravenous PRN Dottie Villatoro MD           Review of Systems   Eyes: Positive for photophobia.   Respiratory: Positive for cough. Negative for shortness of breath.    Cardiovascular: Positive for palpitations (occasional). Negative for chest pain.   Gastrointestinal: Positive for nausea (with HA) and vomiting (with HA). Negative for constipation and diarrhea.   Genitourinary: Negative for dysuria, frequency and urgency.        +bladder incontinence, chronic   Neurological: Positive for tremors, seizures and headaches.   Psychiatric/Behavioral: Negative for agitation. The patient is not hyperactive.      Objective:     Vital Signs (Most Recent):  Temp: 97.7 °F (36.5 °C) (09/16/18 1117)  Pulse: 92 (09/16/18 1218)  Resp: 18 (09/16/18 1117)  BP: 122/74 (09/16/18 1117)  SpO2: 97 % (09/16/18 1117) Vital Signs (24h Range):  Temp:  [97.7 °F (36.5 °C)-99 °F (37.2 °C)] 97.7 °F (36.5 °C)  Pulse:  [58-93] 92  Resp:  [17-18] 18  SpO2:  [94 %-99 %] 97 %  BP: (109-122)/(55-74) 122/74     Weight: 59 kg (130 lb)  Body mass index is 23.03 kg/m².    Physical Exam   Constitutional: She is oriented to person, place, and time. She appears well-developed and well-nourished. No distress.   HENT:   Head: Normocephalic and atraumatic.   Eyes: EOM are normal.   Cardiovascular: Normal rate.   Pulmonary/Chest: Effort normal.    Musculoskeletal: Normal range of motion.   Neurological: She is alert and oriented to person, place, and time. She has a normal Finger-Nose-Finger Test and a normal Heel to Shin Test.   Reflex Scores:       Bicep reflexes are 2+ on the right side and 2+ on the left side.       Brachioradialis reflexes are 2+ on the right side and 2+ on the left side.       Patellar reflexes are 1+ on the right side and 2+ on the left side.  Skin: Skin is warm and dry. She is not diaphoretic.   Psychiatric: She has a normal mood and affect. Her speech is normal and behavior is normal.   Nursing note and vitals reviewed.      NEUROLOGICAL EXAMINATION:     MENTAL STATUS   Oriented to person, place, and time.   Attention: normal. Concentration: normal.   Speech: speech is normal   Level of consciousness: alert    CRANIAL NERVES     CN III, IV, VI   Extraocular motions are normal.     CN V   Facial sensation intact.     CN VII   Facial expression full, symmetric.     CN VIII   Hearing: intact    CN XI   CN XI normal.     CN XII   CN XII normal.        L homonymous hemianopsia     MOTOR EXAM   Muscle bulk: normal  Overall muscle tone: normal    Strength   Right biceps: 5/5  Left biceps: 4/5  Right triceps: 5/5  Left triceps: 4/5  Right quadriceps: 5/5  Left quadriceps: 5/5  Right hamstrin/5  Left hamstrin/5    REFLEXES     Reflexes   Right brachioradialis: 2+  Left brachioradialis: 2+  Right biceps: 2+  Left biceps: 2+  Right patellar: 1+  Left patellar: 2+    SENSORY EXAM   Light touch normal.     GAIT AND COORDINATION      Coordination   Finger to nose coordination: normal  Heel to shin coordination: normal    Tremor   Resting tremor: present  Intention tremor: present       Low amplitude, high frequency tremor of LUE>RUE, present at rest and with movement.       Significant Labs:   Recent Lab Results       09/15/18  1508      Albumin 3.7     Alkaline Phosphatase 87     ALT 30     Anion Gap 10     AST 36     Total Bilirubin  0.4  Comment:  For infants and newborns, interpretation of results should be based  on gestational age, weight and in agreement with clinical  observations.  Premature Infant recommended reference ranges:  Up to 24 hours.............<8.0 mg/dL  Up to 48 hours............<12.0 mg/dL  3-5 days..................<15.0 mg/dL  6-29 days.................<15.0 mg/dL       BUN, Bld 11     Calcium 9.0     Chloride 111     CO2 17     Creatinine 1.9     eGFR if African American 38.5     eGFR if non  33.4  Comment:  Calculation used to obtain the estimated glomerular filtration  rate (eGFR) is the CKD-EPI equation.        Glucose 86     Potassium 4.4     Total Protein 7.0     Sodium 138           Significant Imaging: I have reviewed and interpreted all pertinent imaging results/findings within the past 24 hours.

## 2018-09-16 NOTE — PROGRESS NOTES
[facility]  Neurology  Progress Note    Patient Name: Cat Denson  MRN: [id number]  Admission Date: 9/11/2018  Hospital Length of Stay: 5 days  Code Status: Full Code   Attending Provider: Milo Norman Jr., MD  Primary Care Physician: Dayton Dukes MD (Inactive)   Principal Problem:Complex partial epilepsy with generalization and with intractable epilepsy      Subjective:     Interval History: No clinical or electrographic events overnight, despite sleep deprivation last night and benadryl/tramadol this am.    Hospital Course:  9/11:  Admitted to EMU  9/11-13:  No clinical or electrographic events overnight (OVN).  Normal EEG.  9/13-14:  No clinical or electrographic events overnight (OVN).  Normal EEG despite sleep deprivation last night and benadryl/tramadol this am.  9/14-15: 1 event endorsed as typical with no associated EEG changes though significant artifact was present, particularly on the channels involving the right more so in the lateral leads.  Intermittent right sided slowing.  9/15-16: No events.  Intermittent right sided slowing.    Current Neurological Medications: Abilify, Remeron    Current Facility-Administered Medications   Medication Dose Route Frequency Provider Last Rate Last Dose    acetaminophen tablet 650 mg  650 mg Oral Q4H PRN Dottie Villatoro MD   650 mg at 09/14/18 1527    ARIPiprazole tablet 10 mg  10 mg Oral Daily Dottie Villatoro MD   10 mg at 09/16/18 0853    atorvastatin tablet 10 mg  10 mg Oral Daily Dottie Villatoro MD   10 mg at 09/16/18 0853    calcium carbonate 200 mg calcium (500 mg) chewable tablet 500 mg  500 mg Oral Daily PRN Olga Pop NP   500 mg at 09/13/18 0049    docusate sodium capsule 100 mg  100 mg Oral BID PRN Dottie Villatoro MD        folic acid tablet 2,000 mcg  2,000 mcg Oral Daily Dottie Villatoro MD   2,000 mcg at 09/16/18 0853    hydroxychloroquine tablet 200 mg  200 mg Oral BID Dottie VO  MD Irving   200 mg at 09/16/18 0854    loperamide capsule 2 mg  2 mg Oral QID PRN Dottie Villatoro MD   2 mg at 09/14/18 1427    metoprolol succinate (TOPROL-XL) 24 hr tablet 25 mg  25 mg Oral Daily Dottie Villatoro MD   25 mg at 09/16/18 0854    mirtazapine tablet 15 mg  15 mg Oral QHS Dottie Villatoro MD   15 mg at 09/15/18 2144    nicotine 21 mg/24 hr 1 patch  1 patch Transdermal Daily Dottie Villatoro MD   1 patch at 09/16/18 0855    ondansetron disintegrating tablet 8 mg  8 mg Oral Q8H PRN Dottie Villatoro MD   8 mg at 09/16/18 0958    oxybutynin 24 hr tablet 10 mg  10 mg Oral QHS Dottie Villatoro MD   10 mg at 09/15/18 2144    pantoprazole EC tablet 40 mg  40 mg Oral Daily Dottie Villatoro MD   40 mg at 09/16/18 0854    promethazine tablet 25 mg  25 mg Oral Q6H PRN Dottie Villatoro MD   25 mg at 09/14/18 2209    rivaroxaban tablet 15 mg  15 mg Oral Daily with dinner Milo Norman Jr., MD   15 mg at 09/15/18 1738    sodium chloride 0.9% flush 3 mL  3 mL Intravenous PRN Dottie Villatoro MD           Review of Systems   Eyes: Positive for photophobia.   Respiratory: Positive for cough. Negative for shortness of breath.    Cardiovascular: Positive for palpitations (occasional). Negative for chest pain.   Gastrointestinal: Positive for nausea (with HA) and vomiting (with HA). Negative for constipation and diarrhea.   Genitourinary: Negative for dysuria, frequency and urgency.        +bladder incontinence, chronic   Neurological: Positive for tremors, seizures and headaches.   Psychiatric/Behavioral: Negative for agitation. The patient is not hyperactive.      Objective:     Vital Signs (Most Recent):  Temp: 97.7 °F (36.5 °C) (09/16/18 1117)  Pulse: 92 (09/16/18 1218)  Resp: 18 (09/16/18 1117)  BP: 122/74 (09/16/18 1117)  SpO2: 97 % (09/16/18 1117) Vital Signs (24h Range):  Temp:  [97.7 °F (36.5 °C)-99 °F (37.2 °C)] 97.7 °F (36.5 °C)  Pulse:  [58-93] 92  Resp:  [17-18]  18  SpO2:  [94 %-99 %] 97 %  BP: (109-122)/(55-74) 122/74     Weight: 59 kg (130 lb)  Body mass index is 23.03 kg/m².    Physical Exam   Constitutional: She is oriented to person, place, and time. She appears well-developed and well-nourished. No distress.   HENT:   Head: Normocephalic and atraumatic.   Eyes: EOM are normal.   Cardiovascular: Normal rate.   Pulmonary/Chest: Effort normal.   Musculoskeletal: Normal range of motion.   Neurological: She is alert and oriented to person, place, and time. She has a normal Finger-Nose-Finger Test and a normal Heel to Shin Test.   Reflex Scores:       Bicep reflexes are 2+ on the right side and 2+ on the left side.       Brachioradialis reflexes are 2+ on the right side and 2+ on the left side.       Patellar reflexes are 1+ on the right side and 2+ on the left side.  Skin: Skin is warm and dry. She is not diaphoretic.   Psychiatric: She has a normal mood and affect. Her speech is normal and behavior is normal.   Nursing note and vitals reviewed.      NEUROLOGICAL EXAMINATION:     MENTAL STATUS   Oriented to person, place, and time.   Attention: normal. Concentration: normal.   Speech: speech is normal   Level of consciousness: alert    CRANIAL NERVES     CN III, IV, VI   Extraocular motions are normal.     CN V   Facial sensation intact.     CN VII   Facial expression full, symmetric.     CN VIII   Hearing: intact    CN XI   CN XI normal.     CN XII   CN XII normal.        L homonymous hemianopsia     MOTOR EXAM   Muscle bulk: normal  Overall muscle tone: normal    Strength   Right biceps: 5/5  Left biceps: 4/5  Right triceps: 5/5  Left triceps: 4/5  Right quadriceps: 5/5  Left quadriceps: 5/5  Right hamstrin/5  Left hamstrin/5    REFLEXES     Reflexes   Right brachioradialis: 2+  Left brachioradialis: 2+  Right biceps: 2+  Left biceps: 2+  Right patellar: 1+  Left patellar: 2+    SENSORY EXAM   Light touch normal.     GAIT AND COORDINATION      Coordination    Finger to nose coordination: normal  Heel to shin coordination: normal    Tremor   Resting tremor: present  Intention tremor: present       Low amplitude, high frequency tremor of LUE>RUE, present at rest and with movement.       Significant Labs:   Recent Lab Results       09/15/18  1508      Albumin 3.7     Alkaline Phosphatase 87     ALT 30     Anion Gap 10     AST 36     Total Bilirubin 0.4  Comment:  For infants and newborns, interpretation of results should be based  on gestational age, weight and in agreement with clinical  observations.  Premature Infant recommended reference ranges:  Up to 24 hours.............<8.0 mg/dL  Up to 48 hours............<12.0 mg/dL  3-5 days..................<15.0 mg/dL  6-29 days.................<15.0 mg/dL       BUN, Bld 11     Calcium 9.0     Chloride 111     CO2 17     Creatinine 1.9     eGFR if African American 38.5     eGFR if non  33.4  Comment:  Calculation used to obtain the estimated glomerular filtration  rate (eGFR) is the CKD-EPI equation.        Glucose 86     Potassium 4.4     Total Protein 7.0     Sodium 138           Significant Imaging: I have reviewed and interpreted all pertinent imaging results/findings within the past 24 hours.    Assessment and Plan:     * Complex partial epilepsy with generalization and with intractable epilepsy    vEEG  HV, PS  Hold Depakote, gabapentin  Seizure precautions  Please call Epilepsy for events>/=5min        Acute kidney injury superimposed on chronic kidney disease    Cr=1.9 (Cr 2.2 previously from baseline Cr approx 1.4)  1L NS bolus 9/14  Will follow CMP        Chronic daily headache    Conservative measures discussed for outpatient prevention:  Limit all rescue medications to no more than 2x/week (max 3x/week).  Stay well-hydrated.  Eat regularly.  Avoid stress when possible, and try your best to get enough sleep.    Limit caffeine intake, particularly in the afternoon/evenings.  Magnesium oxide 400mg  bid        Cigarette nicotine dependence without complication    Nicotine patch  Patient interested in quitting; plan to DC on tapered nicotine patches, as ineligible for [facility] program 2/2 year in which patient started smoking        Gastroesophageal reflux disease without esophagitis    Pantoprazole        History of right MCA stroke    Continue Lipitor, Xarelto        Lupus (systemic lupus erythematosus)    Continue home meds        Bipolar 2 disorder    Hold depakote for EMU admission  Continue Abilify, Remeron  If events thought to be PNEE, consider alternative medication for bipolar, especially given low bone mineral density, and patient's inability to afford alendronate  Outpatient psych f/u with Dr. Cruz        Chronic pain    Hold gabapentin for EMU stay            VTE Risk Mitigation (From admission, onward)        Ordered     rivaroxaban tablet 15 mg  With dinner      09/15/18 2703          Milo Norman Jr, MD  Neurology  [facility]

## 2018-09-17 VITALS
OXYGEN SATURATION: 96 % | SYSTOLIC BLOOD PRESSURE: 122 MMHG | HEART RATE: 87 BPM | RESPIRATION RATE: 17 BRPM | BODY MASS INDEX: 23.04 KG/M2 | TEMPERATURE: 99 F | WEIGHT: 130 LBS | DIASTOLIC BLOOD PRESSURE: 74 MMHG | HEIGHT: 63 IN

## 2018-09-17 LAB
ALBUMIN SERPL BCP-MCNC: 3.7 G/DL
ALP SERPL-CCNC: 94 U/L
ALT SERPL W/O P-5'-P-CCNC: 34 U/L
ANION GAP SERPL CALC-SCNC: 11 MMOL/L
AST SERPL-CCNC: 32 U/L
BASOPHILS # BLD AUTO: 0.07 K/UL
BASOPHILS NFR BLD: 0.8 %
BILIRUB SERPL-MCNC: 0.5 MG/DL
BUN SERPL-MCNC: 11 MG/DL
CALCIUM SERPL-MCNC: 9.2 MG/DL
CHLORIDE SERPL-SCNC: 109 MMOL/L
CO2 SERPL-SCNC: 17 MMOL/L
CREAT SERPL-MCNC: 1.8 MG/DL
DIFFERENTIAL METHOD: ABNORMAL
EOSINOPHIL # BLD AUTO: 0.1 K/UL
EOSINOPHIL NFR BLD: 0.5 %
ERYTHROCYTE [DISTWIDTH] IN BLOOD BY AUTOMATED COUNT: 11.9 %
EST. GFR  (AFRICAN AMERICAN): 41.2 ML/MIN/1.73 M^2
EST. GFR  (NON AFRICAN AMERICAN): 35.7 ML/MIN/1.73 M^2
GLUCOSE SERPL-MCNC: 102 MG/DL
HCT VFR BLD AUTO: 39.9 %
HGB BLD-MCNC: 13.7 G/DL
IMM GRANULOCYTES # BLD AUTO: 0.03 K/UL
IMM GRANULOCYTES NFR BLD AUTO: 0.3 %
LYMPHOCYTES # BLD AUTO: 2.8 K/UL
LYMPHOCYTES NFR BLD: 30.4 %
MCH RBC QN AUTO: 32.9 PG
MCHC RBC AUTO-ENTMCNC: 34.3 G/DL
MCV RBC AUTO: 96 FL
MONOCYTES # BLD AUTO: 1.2 K/UL
MONOCYTES NFR BLD: 13 %
NEUTROPHILS # BLD AUTO: 5.1 K/UL
NEUTROPHILS NFR BLD: 55 %
NRBC BLD-RTO: 0 /100 WBC
PLATELET # BLD AUTO: 234 K/UL
PMV BLD AUTO: 11.7 FL
POTASSIUM SERPL-SCNC: 4.1 MMOL/L
PROT SERPL-MCNC: 7 G/DL
RBC # BLD AUTO: 4.17 M/UL
SODIUM SERPL-SCNC: 137 MMOL/L
WBC # BLD AUTO: 9.29 K/UL

## 2018-09-17 PROCEDURE — 99233 SBSQ HOSP IP/OBS HIGH 50: CPT | Mod: ,,, | Performed by: PSYCHIATRY & NEUROLOGY

## 2018-09-17 PROCEDURE — 36415 COLL VENOUS BLD VENIPUNCTURE: CPT

## 2018-09-17 PROCEDURE — 25000003 PHARM REV CODE 250: Performed by: STUDENT IN AN ORGANIZED HEALTH CARE EDUCATION/TRAINING PROGRAM

## 2018-09-17 PROCEDURE — 95813 EEG EXTND MNTR 61-119 MIN: CPT | Mod: 26,,, | Performed by: PSYCHIATRY & NEUROLOGY

## 2018-09-17 PROCEDURE — 80053 COMPREHEN METABOLIC PANEL: CPT

## 2018-09-17 PROCEDURE — 85025 COMPLETE CBC W/AUTO DIFF WBC: CPT

## 2018-09-17 PROCEDURE — S4991 NICOTINE PATCH NONLEGEND: HCPCS | Performed by: STUDENT IN AN ORGANIZED HEALTH CARE EDUCATION/TRAINING PROGRAM

## 2018-09-17 RX ORDER — IBUPROFEN 200 MG
1 TABLET ORAL DAILY
Qty: 7 PATCH | Refills: 0 | COMMUNITY
Start: 2018-09-17 | End: 2018-09-24

## 2018-09-17 RX ORDER — NICOTINE 7MG/24HR
1 PATCH, TRANSDERMAL 24 HOURS TRANSDERMAL DAILY
Refills: 0 | COMMUNITY
Start: 2018-09-17 | End: 2018-09-24

## 2018-09-17 RX ORDER — DIVALPROEX SODIUM 500 MG/1
1000 TABLET, FILM COATED, EXTENDED RELEASE ORAL DAILY
Qty: 90 TABLET | Refills: 0 | Status: SHIPPED | OUTPATIENT
Start: 2018-09-17 | End: 2019-05-10 | Stop reason: SDUPTHER

## 2018-09-17 RX ADMIN — PANTOPRAZOLE SODIUM 40 MG: 40 TABLET, DELAYED RELEASE ORAL at 10:09

## 2018-09-17 RX ADMIN — ATORVASTATIN CALCIUM 10 MG: 10 TABLET, FILM COATED ORAL at 10:09

## 2018-09-17 RX ADMIN — FOLIC ACID 2000 MCG: 1 TABLET ORAL at 10:09

## 2018-09-17 RX ADMIN — METOPROLOL SUCCINATE 25 MG: 25 TABLET, EXTENDED RELEASE ORAL at 10:09

## 2018-09-17 RX ADMIN — NICOTINE 1 PATCH: 21 PATCH, EXTENDED RELEASE TRANSDERMAL at 10:09

## 2018-09-17 RX ADMIN — HYDROXYCHLOROQUINE SULFATE 200 MG: 200 TABLET, FILM COATED ORAL at 10:09

## 2018-09-17 RX ADMIN — ARIPIPRAZOLE 10 MG: 5 TABLET ORAL at 10:09

## 2018-09-17 NOTE — PROCEDURES
DATE OF STUDY:  09/11/2018, through 09/16/2018    EEG NUMBERS:  EMU--1, EMU--2, EMU--3, EMU--4 and   EMU--5.    EPILEPSY MONITORING UNIT  EEG AND VIDEO TELEMETRY REPORT    METHODOLOGY:  Electroencephalographic (EEG) is recorded with electrodes placed   according to the International 10-20 placement system.  Thirty Two (32) channels   of digital signal, including T1 and T2 electrodes, are simultaneously recorded   from the scalp and may also include EKG, EMG and/or eye movement monitors.    Recording band pass was 0.1 to 512 Hz.  Digital video recording of the patient   is simultaneously recorded with the EEG.  The patient is instructed to report   clinical symptoms which may occur during the recording session.  EEG and video   recording are stored and archived in digital format.  Activation procedures,   which include photic stimulation, hyperventilation and instructing patients to   perform simple tasks, are done in selected patients.    The EEG is displayed on a monitor screen and can be reformatted into different   montages for evaluation.  The entire recoding is submitted for computer-assisted   analysis to detect spike and electrographic seizure activity.  The entire   recording is visually reviewed, and the times identified by computer analysis as   being spikes or seizures are reviewed again.    Compressed spectral analysis (CSA) is also performed on the activity recorded   from each individual channel.  This is displayed as a power display of   frequencies from 0 to 30 Hz over time.  The CSA analysis is done and displayed   continuously.  This is reviewed for asymmetries in power between homologous   areas of the scalp and for presence of changes in power which can be seen when   seizures occur.  Sections of suspected abnormalities on the CSA are then   compared with the original EEG recording.    Dogi software was also utilized in the review of this study.  This software    suite analyzes the EEG recording in multiple domains.  Coherence and rhythmicity   are computed to identify EEG sections which may contain organized seizures.    Each channel undergoes analysis to detect presence of spike and sharp waves   which have special and morphological characteristics of epileptic activity.  The   routine EEG recording is converted from special into frequency domain.  This is   then displayed comparing homologous areas to identify areas of significant   asymmetry.  Algorithm to identify non-cortically generated artifact is used to   separate artifact from the EEG.    RECORDING TIMES:  Start on 09/11/2018, at 17:54:51.  Stop on 09/12/2018, at 06:59:59.  Start on 09/12/2018, at 07:00:35.  Stop on 09/13/2018, at 07:00:00.  Start on 09/13/2018, at 07:00:34.  Stop on 09/14/2018, at 07:00:02.  Start on 09/14/2018, at 07:00:35.  Stop on 09/15/2018, at 07:00:00.  Start on 09/15/2018, at 07:00:33.  Stop on 09/16/2018, at 07:00:03.  A total of 108 hours, 29 minutes and 33 seconds of video/EEG telemetry was   recorded.    CLINICAL EVENTS:  Event #1:  Start on 09/14/2018, at 21:54:28 and stop at 21:58:51.    FINDINGS:  The patient's background consists of a posteriorly dominant alpha   rhythm with a frequency of approximately 10 Hz.  It is well formed, well   modulated and abolishes with eye opening.  Anteriorly, the patient's background   consists mainly of beta range frequencies.  There is some intermittent right   hemispheric slowing observed.  There are no focal, lateralized or epileptiform   transients seen.  At various times during the study, the patient is noted to be   in the awake, drowsy and asleep states with features of stage N2 sleep   architecture being appreciated.  Provocative maneuvers including   hyperventilation and photic stimulation do not produce pathologic changes in the   patient's EEG.  The patient's EKG demonstrates sinus rhythm.    The patient had a single clinical event,  which began on 09/14/2018, at 21:54:28.    At that time, the patient began having rather irregular non-purposeful   movements of her left arm while lying on her right side.  By 21:55:03, the arm   movements had become more pronounced.  By 21:55:18, the patient was holding the   left arm adducted at the shoulder and flexed at the elbow.  The movements of the   ____ upper extremity became increasingly chaotic and chaotic movements emerged   in the left lower extremity as well.  By 21:55:27, the patient had tonic flexion   of the right upper extremity and tonic extension of the left lower extremity.    By 21:56:04, the patient had rapid low amplitude and somewhat irregular clonic   movements, which certainly involved the left upper and lower extremity.  As she   was lying on her right side, it was difficult to see if they also involved the   right upper and/or lower extremity.  At approximately 21:56:14, the patient   actually does follow the movements of a staff member with her head and eyes.  At   21:56:25, facial twitching is noted.  The patient's tone/movements gradually   subside and by 21:58:51, she is no longer having such movements or tone and   appears to be interacting with staff.  From an electrographic perspective, there   was no clear EEG correlate at the onset of the event though it does bear   mentioning that as the patient was lying on the right side, there was   significant artifact most notably in T4, T6 and O2.  By 21:55:18, the artifact   had worsened to the point that the patient's EEG was essentially unreadable.  By   21:58:43, the degree of artifact lessened and it appeared that the patient had   some underlying diffuse slowing with some superimposed faster range frequencies.    INTERPRETATION:  This is an abnormal long-term video EEG monitoring study.  It   does demonstrate some right hemispheric slowing, which likely indicates the   presence of cortical dysfunction that is symptomatic of the  patient's prior   stroke.  The patient's clinical event did not have a clear EEG correlate.    However, it does bear mentioning that the quality of the EEG at that time was   compromised by the presence of artifact largely caused by the fact that the   patient was lying with the right side of her head on the pillow and then had a   significant amount of motor movements subsequently.  I do suspect that the   patient's clinical event was in fact a seizure, possibly arising from the right   frontal region.  As the quality of the EEG during this timeframe that was rather   marginal, the study was subsequently continued.  Please see Dr. Foss' note for   the portions of the study subsequent to that noted above.      TG/IN  dd: 09/16/2018 14:08:07 (CDT)  td: 09/16/2018 23:54:06 (CDT)  Doc ID   #[id number]  Job ID #[id number]    CC:

## 2018-09-17 NOTE — PHYSICIAN QUERY
PT Name: Cat Denson  MR #: [id number]  Physician Query Form - CKD Clarification     CDS: Mary Noguera RN, CCDS       Contact information: [email or web address]  This form is a permanent document in the medical record.     Query Date: September 17, 2018    By submitting this query, we are merely seeking further clarification of documentation. Please utilize your independent clinical judgment when addressing the question(s) below.    The Medical record contains the following:     Indicators   Supporting Clinical Findings   Location in Medical Record   X CKD or Chronic Kidney (Renal) Failure / Disease Acute kidney injury superimposed on chronic kidney disease  Cr=1.8 (Cr 2.2 earlier this admission; baseline Cr approx 1.4)  1L NS bolus 9/14  Encouraged po hydration   9/17-PN   X  X  X BUN/Creatinine         GFR BUN=13, Cr=1.8, GFR=35.7  BUN=12, Cr=2.2, GFR=28.0  BUN=11, Cr=1.8, GFR=35.7  9/11-Labs  9/14-Labs  9/17-Labs    Dehydration      Nausea / Vomiting      Dialysis / CRRT      Medication      Treatment      Other Chronic Conditions      Other       Provider, please further specify the stage of CKD.      [  ] Chronic Kidney Disease (CKD) (please specify stage below)       [employer] Definitions  Stage Description  eGFR (mL/min)   [X  ]     III Moderately reduced kidney function 30-59   [  ]    IV Severely reduced kidney function 15-29     [  ] Other (please specify): ________________________  [  ] Clinically Undetermined    Please document in your progress notes daily for the duration of treatment until resolved and include in your discharge summary.

## 2018-09-17 NOTE — PLAN OF CARE
Pt anxious but cooperative; states anxious to discharge today hopefully. Fall risk and seizure precautions discussed with pt, no complaints of pt pain. Will continue to monitor for fall risk, seizure activity, and neuro checks.

## 2018-09-17 NOTE — ASSESSMENT & PLAN NOTE
Cr=1.8 (Cr 2.2 earlier this admission; baseline Cr approx 1.4)  1L NS bolus 9/14  Encouraged po hydration

## 2018-09-17 NOTE — PROGRESS NOTES
[facility]  Neurology  Progress Note    Patient Name: Cat Denson  MRN: [id number]  Admission Date: 9/11/2018  Hospital Length of Stay: 6 days  Code Status: Full Code   Attending Provider: Milo Norman Jr., MD  Primary Care Physician: Dayton Dukes MD (Inactive)   Principal Problem:Complex partial epilepsy with generalization and with intractable epilepsy      Subjective:     Interval History: No clinical or electrographic events overnight.    Hospital Course:  9/11:  Admitted to EMU  9/11-13:  No clinical or electrographic events overnight (OVN).  Normal EEG.  9/13-14:  No clinical or electrographic events overnight (OVN).  Normal EEG despite sleep deprivation last night and benadryl/tramadol this am.  9/14-15: 1 event endorsed as typical with no associated EEG changes though significant artifact was present, particularly on the channels involving the right more so in the lateral leads.  Intermittent right sided slowing.  9/15-17: No events.  Intermittent right sided slowing.    Current Neurological Medications: Abilify, Remeron    Current Facility-Administered Medications   Medication Dose Route Frequency Provider Last Rate Last Dose    acetaminophen tablet 650 mg  650 mg Oral Q4H PRN Dottie Villatoro MD   650 mg at 09/14/18 1527    ARIPiprazole tablet 10 mg  10 mg Oral Daily Dottie Villatoro MD   10 mg at 09/17/18 1025    atorvastatin tablet 10 mg  10 mg Oral Daily Dottie Villatoro MD   10 mg at 09/17/18 1025    calcium carbonate 200 mg calcium (500 mg) chewable tablet 500 mg  500 mg Oral Daily PRN Olga Pop NP   500 mg at 09/16/18 2256    docusate sodium capsule 100 mg  100 mg Oral BID PRN Dottie Villatoro MD        folic acid tablet 2,000 mcg  2,000 mcg Oral Daily Dottie Villatoro MD   2,000 mcg at 09/17/18 1024    hydroxychloroquine tablet 200 mg  200 mg Oral BID Dottie Villatoro MD   200 mg at 09/17/18 1024    loperamide capsule 2 mg  2 mg  Oral QID PRN Dottie Villatoro MD   2 mg at 09/14/18 1427    metoprolol succinate (TOPROL-XL) 24 hr tablet 25 mg  25 mg Oral Daily Dottie Villatoro MD   25 mg at 09/17/18 1026    mirtazapine tablet 15 mg  15 mg Oral QHS Dottie Villatoro MD   15 mg at 09/16/18 2114    nicotine 21 mg/24 hr 1 patch  1 patch Transdermal Daily Dottie Villatoro MD   1 patch at 09/17/18 1027    ondansetron disintegrating tablet 8 mg  8 mg Oral Q8H PRN Dottie Villatoro MD   8 mg at 09/16/18 0958    oxybutynin 24 hr tablet 10 mg  10 mg Oral QHS Dottie Villatoro MD   10 mg at 09/16/18 2114    pantoprazole EC tablet 40 mg  40 mg Oral Daily Dottie Villatoro MD   40 mg at 09/17/18 1026    promethazine tablet 25 mg  25 mg Oral Q6H PRN Dottie Villatoro MD   25 mg at 09/14/18 2209    rivaroxaban tablet 15 mg  15 mg Oral Daily with dinner Milo Norman Jr., MD   15 mg at 09/16/18 1715    sodium chloride 0.9% flush 3 mL  3 mL Intravenous PRN Dottie Villatoro MD           Review of Systems   Eyes: Positive for photophobia.   Respiratory: Positive for cough. Negative for shortness of breath.    Cardiovascular: Positive for palpitations (occasional). Negative for chest pain.   Gastrointestinal: Negative for constipation and diarrhea. Nausea: with HA. Vomiting: with HA.   Genitourinary: Negative for dysuria, frequency and urgency.        +bladder incontinence, chronic   Neurological: Positive for tremors, seizures and headaches.   Psychiatric/Behavioral: Negative for agitation. The patient is not hyperactive.      Objective:     Vital Signs (Most Recent):  Temp: 98.6 °F (37 °C) (09/17/18 0825)  Pulse: 92 (09/17/18 0825)  Resp: 15 (09/17/18 0825)  BP: (!) 118/58 (09/17/18 0825)  SpO2: 99 % (09/17/18 0825) Vital Signs (24h Range):  Temp:  [98 °F (36.7 °C)-98.6 °F (37 °C)] 98.6 °F (37 °C)  Pulse:  [52-92] 92  Resp:  [15-18] 15  SpO2:  [93 %-99 %] 99 %  BP: (107-118)/(58-79) 118/58     Weight: 59 kg (130 lb)  Body mass  index is 23.03 kg/m².    Physical Exam   Constitutional: She is oriented to person, place, and time. She appears well-developed and well-nourished. No distress.   HENT:   Head: Normocephalic and atraumatic.   Eyes: EOM are normal.   Cardiovascular: Normal rate.   Pulmonary/Chest: Effort normal.   Musculoskeletal: Normal range of motion.   Neurological: She is alert and oriented to person, place, and time. She has a normal Finger-Nose-Finger Test and a normal Heel to Shin Test.   Reflex Scores:       Bicep reflexes are 2+ on the right side and 2+ on the left side.       Brachioradialis reflexes are 2+ on the right side and 2+ on the left side.       Patellar reflexes are 1+ on the right side and 2+ on the left side.  Skin: Skin is warm and dry. She is not diaphoretic.   Psychiatric: She has a normal mood and affect. Her speech is normal and behavior is normal.   Nursing note and vitals reviewed.      NEUROLOGICAL EXAMINATION:     MENTAL STATUS   Oriented to person, place, and time.   Attention: normal. Concentration: normal.   Speech: speech is normal   Level of consciousness: alert    CRANIAL NERVES     CN III, IV, VI   Extraocular motions are normal.     CN V   Facial sensation intact.     CN VII   Facial expression full, symmetric.     CN VIII   Hearing: intact    CN XI   CN XI normal.     CN XII   CN XII normal.        L homonymous hemianopsia     MOTOR EXAM   Muscle bulk: normal  Overall muscle tone: normal    Strength   Right biceps: 5/5  Left biceps: 4/5  Right triceps: 5/5  Left triceps: 4/5  Right quadriceps: 5/5  Left quadriceps: 5/5  Right hamstrin/5  Left hamstrin/5    REFLEXES     Reflexes   Right brachioradialis: 2+  Left brachioradialis: 2+  Right biceps: 2+  Left biceps: 2+  Right patellar: 1+  Left patellar: 2+    SENSORY EXAM   Light touch normal.     GAIT AND COORDINATION      Coordination   Finger to nose coordination: normal  Heel to shin coordination: normal    Tremor   Resting tremor:  present  Intention tremor: present       Low amplitude, high frequency tremor of LUE>RUE, present at rest and with movement.       Significant Labs:   Recent Lab Results       09/17/18  0953 09/17/18  0524      Immature Granulocytes 0.3      Immature Grans (Abs) 0.03  Comment:  Mild elevation in immature granulocytes is non specific and   can be seen in a variety of conditions including stress response,   acute inflammation, trauma and pregnancy. Correlation with other   laboratory and clinical findings is essential.        Albumin  3.7     Alkaline Phosphatase  94     ALT  34     Anion Gap  11     AST  32     Baso # 0.07      Basophil% 0.8      Total Bilirubin  0.5  Comment:  For infants and newborns, interpretation of results should be based  on gestational age, weight and in agreement with clinical  observations.  Premature Infant recommended reference ranges:  Up to 24 hours.............<8.0 mg/dL  Up to 48 hours............<12.0 mg/dL  3-5 days..................<15.0 mg/dL  6-29 days.................<15.0 mg/dL       BUN, Bld  11     Calcium  9.2     Chloride  109     CO2  17     Creatinine  1.8     Differential Method Automated      eGFR if   41.2     eGFR if non   35.7  Comment:  Calculation used to obtain the estimated glomerular filtration  rate (eGFR) is the CKD-EPI equation.        Eos # 0.1      Eosinophil% 0.5      Glucose  102     Gran # (ANC) 5.1      Gran% 55.0      Hematocrit 39.9      Hemoglobin 13.7      Lymph # 2.8      Lymph% 30.4      MCH 32.9      MCHC 34.3      MCV 96      Mono # 1.2      Mono% 13.0      MPV 11.7      nRBC 0      Platelets 234      Potassium  4.1     Total Protein  7.0     RBC 4.17      RDW 11.9      Sodium  137     WBC 9.29            Significant Imaging: I have reviewed and interpreted all pertinent imaging results/findings within the past 24 hours.    Assessment and Plan:     * Complex partial epilepsy with generalization and with  intractable epilepsy    vEEG  HV, PS  Hold Depakote, gabapentin  Seizure precautions  Please call Epilepsy for events>/=5min        Acute kidney injury superimposed on chronic kidney disease    Cr=1.8 (Cr 2.2 earlier this admission; baseline Cr approx 1.4)  1L NS bolus 9/14  Encouraged po hydration        Chronic daily headache    Conservative measures discussed for outpatient prevention:  Limit all rescue medications to no more than 2x/week (max 3x/week).  Stay well-hydrated.  Eat regularly.  Avoid stress when possible, and try your best to get enough sleep.    Limit caffeine intake, particularly in the afternoon/evenings.  Magnesium oxide 400mg bid        Cigarette nicotine dependence without complication    Nicotine patch  Patient interested in quitting; plan to DC on tapered nicotine patches, as ineligible for [facility] program 2/2 year in which patient started smoking        Gastroesophageal reflux disease without esophagitis    Pantoprazole        History of right MCA stroke    Continue Lipitor, Xarelto        Lupus (systemic lupus erythematosus)    Continue home meds        Bipolar 2 disorder    Hold depakote for EMU admission  Continue Abilify, Remeron  Outpatient psych f/u with Dr. Cruz        Chronic pain    Hold gabapentin for EMU stay            VTE Risk Mitigation (From admission, onward)        Ordered     rivaroxaban tablet 15 mg  With dinner      09/15/18 5193          Dottie Villatoro MD  Neurology  [facility]

## 2018-09-17 NOTE — PLAN OF CARE
Problem: Patient Care Overview  Goal: Plan of Care Review  Outcome: Ongoing (interventions implemented as appropriate)  POC reviewed with patient. Verbalized understanding. No acute events over night. VSS. No seizure activity noted. EMU monitor in place. Pt complaints of indigestion last night. Administered Ca carbonate per order. Fall precautions and seizure precautions remain in place. Bed in lowest setting, call light within reach, bed alarm on, suction and O2 at bedside, side rails padded. Will continue to monitor.

## 2018-09-17 NOTE — PROCEDURES
Procedures     EPILEPSY MONITORING UNIT  EEG/VIDEO TELEMETRY REPORT  DATE OF SERVICE: 9/16/18-9/17/19  EEG NUMBER: -6,[id number]  REQUESTED BY: Ester   LOCATION OF SERVICE: [facility]    METHODOLOGY      Electroencephalographic (EEG) is recorded with electrodes placed according to the International 10-20 placement system.  Thirty Two (32) channels of digital signal including the T1 and T2 electrodes are simultaneously recorded from the scalp and also including EKG, EMG  and/or eye movement monitors.  Recording band pass was 0.1 to 512 hz.  Digital video recording of the patient is simultaneously recorded with the EEG.  The patient is instructed report clinical symptoms which may occur during the recording session.  EEG and video recording is stored and archived in digital format. Activation procedures which include photic stimulation, hyperventilation and instructing patients to perform simple task are done in selected patients.         The EEG is displayed on a monitor screen and can be reformatted into different montages for evaluation.  The entire recoding is submitted for computer assisted analysis to detect spike and electrographic seizure activity.  The entire recording is visually reviewed and the times identified by computer analysis as being spikes or seizures are reviewed again.  Compresses spectral analysis (CSA) is also performed on the activity recorded from each individual channel.  This is displayed as a power display of frequencies from 0 to 30 Hz over time.   The CSA analysis is done and displayed continuously.  This is reviewed for asymmetries in power between homologous areas of the scalp and for presence of changes in power which canbe seen when seizures occur.  Sections of suspected abnormalities on the CSA is then compared with the original EEG recording.      [employer] software was also utilized in the review of this study.  This software suite analyzes the EEG recording in multiple domains.  Coherence and  rhythmicity is computed to identify EEG sections which may contain organized seizures.  Each channel undergoes analysis to detect presence of spike and sharp waves which have special and morphological characteristic of epileptic activity.  The routine EEG recording is converted from spacial into frequency domain.  This is then displayed comparing homologous areas to identify areas of significant asymmetry.  Algorithm to identify non-cortically generated artifact is used to separate eye movement, EMG and other artifact from the EEG.      Recording Times  Start on 9/16/18 at 07:00:35  Stop on 9/17/18 at 11:17:15  A total of 28 hours of EEG/Video telemetry was recorded.     ELECTROENCEPHALOGRAM  INTERICTAL:  The record shows a good  organization at rest, consisting of a 10  Hz posterior dominant rhythm with good  reactivity. There is moderate bilateral beta activity.    Drowsiness is characterized by attenuation of the background, vertex waves, and bilateral theta slowing. Stage II sleep is characterized by slowing, vertex waves, and symmetric sleep spindles. Slow wave and REM sleep are recorded.    Provocative maneuvers including hyperventilation and photic stimulation were performed.     EKG recording shows a sinus rhythm.    There is one push button or clinical event.    CLINICAL DESCRIPTION OF EVENTS:    Events Recorded  Event 1: At 15:03:19 on 9/16/18, the patient exhibits an atypical episode of L hand posturing with no definite electrographic correlate. Significant artifact is seen during this time.     FINAL SUMMARY  ELECTROENCEPHALOGRAM: Normal study captured in awake, asleep, and drowsy states. A single atypical patient event is captured without clear electrographic correlate. A typical patient event is not captured during this portion of the recording. Please note that a normal background EEG does not rule out an underlying epilepsy.     Yaakov Foss MD  Department of Neurology  [facility]

## 2018-09-17 NOTE — PLAN OF CARE
Patient discharging home today with no needs. Family will provide transportation.     09/17/18 1117   Final Note   Assessment Type Final Discharge Note   Discharge Disposition Home   Right Care Referral Info   Post Acute Recommendation No Care

## 2018-09-17 NOTE — SUBJECTIVE & OBJECTIVE
Subjective:     Interval History: No clinical or electrographic events overnight.    Hospital Course:  9/11:  Admitted to EMU  9/11-13:  No clinical or electrographic events overnight (OVN).  Normal EEG.  9/13-14:  No clinical or electrographic events overnight (OVN).  Normal EEG despite sleep deprivation last night and benadryl/tramadol this am.  9/14-15: 1 event endorsed as typical with no associated EEG changes though significant artifact was present, particularly on the channels involving the right more so in the lateral leads.  Intermittent right sided slowing.  9/15-16: No events.  Intermittent right sided slowing.    Current Neurological Medications: Abilify, Remeron    Current Facility-Administered Medications   Medication Dose Route Frequency Provider Last Rate Last Dose    acetaminophen tablet 650 mg  650 mg Oral Q4H PRN Dottie Villatoro MD   650 mg at 09/14/18 1527    ARIPiprazole tablet 10 mg  10 mg Oral Daily Dottie Villatoro MD   10 mg at 09/17/18 1025    atorvastatin tablet 10 mg  10 mg Oral Daily Dottie Villatoro MD   10 mg at 09/17/18 1025    calcium carbonate 200 mg calcium (500 mg) chewable tablet 500 mg  500 mg Oral Daily PRN Olga Pop NP   500 mg at 09/16/18 2256    docusate sodium capsule 100 mg  100 mg Oral BID PRN Dottie Villatoro MD        folic acid tablet 2,000 mcg  2,000 mcg Oral Daily Dottie Villatoro MD   2,000 mcg at 09/17/18 1024    hydroxychloroquine tablet 200 mg  200 mg Oral BID Dottie Villatoro MD   200 mg at 09/17/18 1024    loperamide capsule 2 mg  2 mg Oral QID PRN Dottie Villatoro MD   2 mg at 09/14/18 1427    metoprolol succinate (TOPROL-XL) 24 hr tablet 25 mg  25 mg Oral Daily Dottie Villatoro MD   25 mg at 09/17/18 1026    mirtazapine tablet 15 mg  15 mg Oral QHS Dottie Villatoro MD   15 mg at 09/16/18 2114    nicotine 21 mg/24 hr 1 patch  1 patch Transdermal Daily Dottie Villatoro MD   1 patch at 09/17/18 5098     ondansetron disintegrating tablet 8 mg  8 mg Oral Q8H PRN Dottie Villatoro MD   8 mg at 09/16/18 0958    oxybutynin 24 hr tablet 10 mg  10 mg Oral QHS Dottie Villatoro MD   10 mg at 09/16/18 2114    pantoprazole EC tablet 40 mg  40 mg Oral Daily Dottie Villatoro MD   40 mg at 09/17/18 1026    promethazine tablet 25 mg  25 mg Oral Q6H PRN Dottie Villatoro MD   25 mg at 09/14/18 2209    rivaroxaban tablet 15 mg  15 mg Oral Daily with dinner Milo Norman Jr., MD   15 mg at 09/16/18 1715    sodium chloride 0.9% flush 3 mL  3 mL Intravenous PRN Dottie Villatoro MD           Review of Systems   Eyes: Positive for photophobia.   Respiratory: Positive for cough. Negative for shortness of breath.    Cardiovascular: Positive for palpitations (occasional). Negative for chest pain.   Gastrointestinal: Negative for constipation and diarrhea. Nausea: with HA. Vomiting: with HA.   Genitourinary: Negative for dysuria, frequency and urgency.        +bladder incontinence, chronic   Neurological: Positive for tremors, seizures and headaches.   Psychiatric/Behavioral: Negative for agitation. The patient is not hyperactive.      Objective:     Vital Signs (Most Recent):  Temp: 98.6 °F (37 °C) (09/17/18 0825)  Pulse: 92 (09/17/18 0825)  Resp: 15 (09/17/18 0825)  BP: (!) 118/58 (09/17/18 0825)  SpO2: 99 % (09/17/18 0825) Vital Signs (24h Range):  Temp:  [98 °F (36.7 °C)-98.6 °F (37 °C)] 98.6 °F (37 °C)  Pulse:  [52-92] 92  Resp:  [15-18] 15  SpO2:  [93 %-99 %] 99 %  BP: (107-118)/(58-79) 118/58     Weight: 59 kg (130 lb)  Body mass index is 23.03 kg/m².    Physical Exam   Constitutional: She is oriented to person, place, and time. She appears well-developed and well-nourished. No distress.   HENT:   Head: Normocephalic and atraumatic.   Eyes: EOM are normal.   Cardiovascular: Normal rate.   Pulmonary/Chest: Effort normal.   Musculoskeletal: Normal range of motion.   Neurological: She is alert and oriented to person,  place, and time. She has a normal Finger-Nose-Finger Test and a normal Heel to Shin Test.   Reflex Scores:       Bicep reflexes are 2+ on the right side and 2+ on the left side.       Brachioradialis reflexes are 2+ on the right side and 2+ on the left side.       Patellar reflexes are 1+ on the right side and 2+ on the left side.  Skin: Skin is warm and dry. She is not diaphoretic.   Psychiatric: She has a normal mood and affect. Her speech is normal and behavior is normal.   Nursing note and vitals reviewed.      NEUROLOGICAL EXAMINATION:     MENTAL STATUS   Oriented to person, place, and time.   Attention: normal. Concentration: normal.   Speech: speech is normal   Level of consciousness: alert    CRANIAL NERVES     CN III, IV, VI   Extraocular motions are normal.     CN V   Facial sensation intact.     CN VII   Facial expression full, symmetric.     CN VIII   Hearing: intact    CN XI   CN XI normal.     CN XII   CN XII normal.        L homonymous hemianopsia     MOTOR EXAM   Muscle bulk: normal  Overall muscle tone: normal    Strength   Right biceps: 5/5  Left biceps: 4/5  Right triceps: 5/5  Left triceps: 4/5  Right quadriceps: 5/5  Left quadriceps: 5/5  Right hamstrin/5  Left hamstrin/5    REFLEXES     Reflexes   Right brachioradialis: 2+  Left brachioradialis: 2+  Right biceps: 2+  Left biceps: 2+  Right patellar: 1+  Left patellar: 2+    SENSORY EXAM   Light touch normal.     GAIT AND COORDINATION      Coordination   Finger to nose coordination: normal  Heel to shin coordination: normal    Tremor   Resting tremor: present  Intention tremor: present       Low amplitude, high frequency tremor of LUE>RUE, present at rest and with movement.       Significant Labs:   Recent Lab Results       18  0953 18  0524      Immature Granulocytes 0.3      Immature Grans (Abs) 0.03  Comment:  Mild elevation in immature granulocytes is non specific and   can be seen in a variety of conditions including  stress response,   acute inflammation, trauma and pregnancy. Correlation with other   laboratory and clinical findings is essential.        Albumin  3.7     Alkaline Phosphatase  94     ALT  34     Anion Gap  11     AST  32     Baso # 0.07      Basophil% 0.8      Total Bilirubin  0.5  Comment:  For infants and newborns, interpretation of results should be based  on gestational age, weight and in agreement with clinical  observations.  Premature Infant recommended reference ranges:  Up to 24 hours.............<8.0 mg/dL  Up to 48 hours............<12.0 mg/dL  3-5 days..................<15.0 mg/dL  6-29 days.................<15.0 mg/dL       BUN, Bld  11     Calcium  9.2     Chloride  109     CO2  17     Creatinine  1.8     Differential Method Automated      eGFR if   41.2     eGFR if non   35.7  Comment:  Calculation used to obtain the estimated glomerular filtration  rate (eGFR) is the CKD-EPI equation.        Eos # 0.1      Eosinophil% 0.5      Glucose  102     Gran # (ANC) 5.1      Gran% 55.0      Hematocrit 39.9      Hemoglobin 13.7      Lymph # 2.8      Lymph% 30.4      MCH 32.9      MCHC 34.3      MCV 96      Mono # 1.2      Mono% 13.0      MPV 11.7      nRBC 0      Platelets 234      Potassium  4.1     Total Protein  7.0     RBC 4.17      RDW 11.9      Sodium  137     WBC 9.29            Significant Imaging: I have reviewed and interpreted all pertinent imaging results/findings within the past 24 hours.

## 2018-09-17 NOTE — DISCHARGE SUMMARY
[facility]  Discharge Summary      Admit Date: 9/11/2018    Discharge Date and Time: 09/17/2018     Attending Physician: Milo Norman Jr., MD     Reason for Admission:  EMU characterization of events     Procedures Performed: * No surgery found *    Hospital Course (synopsis of major diagnoses, care, treatment, and services provided during the course of the hospital stay):   9/11:  Admitted to EMU  9/11-13:  No clinical or electrographic events overnight (OVN).  Normal EEG.  9/13-14:  No clinical or electrographic events overnight (OVN).  Normal EEG despite sleep deprivation last night and benadryl/tramadol this am.  9/14-15: 1 event endorsed as typical with no associated EEG changes though significant artifact was present, particularly on the channels involving the right more so in the lateral leads.  Intermittent right sided slowing.  9/15-17: No events.  Intermittent right sided slowing.    Patient restarted on Depakote (for bipolar d/o and suspected seizures), but dose increased to 1g bid.  Gabapentin restarted for pain.  Nicotine patches ordered at ID.  Plan for outpatient f/u at [facility].    Significant Diagnostic Studies: EEG as above    Final Diagnoses:    Principal Problem: Complex partial epilepsy with generalization and with intractable epilepsy   Secondary Diagnoses:   Active Hospital Problems    Diagnosis  POA    *Complex partial epilepsy with generalization and with intractable epilepsy [G40.219]  Yes     Priority: 1 - High    Acute kidney injury superimposed on chronic kidney disease [N17.9, N18.9]  Unknown    Chronic daily headache [R51]  Unknown     Chronic    Cigarette nicotine dependence without complication [F17.210]  Yes    Gastroesophageal reflux disease without esophagitis [K21.9]  Yes    History of right MCA stroke [Z86.73]  Not Applicable     Chronic    Lupus (systemic lupus erythematosus) [M32.9]  Yes    Bipolar 2 disorder [F31.81]  Yes     Chronic     Chronic pain [G89.29]  Yes     Chronic      Resolved Hospital Problems   No resolved problems to display.       Discharged Condition: stable    Disposition: Home or Self Care    Follow Up/Patient Instructions:     Medications:  Reconciled Home Medications:      Medication List      START taking these medications    * nicotine 14 mg/24 hr  Commonly known as:  NICODERM CQ  Place 1 patch onto the skin once daily. for 7 days     * nicotine 7 mg/24 hr  Commonly known as:  NICODERM CQ  Place 1 patch onto the skin once daily. Starting the day after the last 14mg nicotine patch. for 7 days         * This list has 2 medication(s) that are the same as other medications prescribed for you. Read the directions carefully, and ask your doctor or other care provider to review them with you.            CHANGE how you take these medications    divalproex  MG Tb24  Commonly known as:  DEPAKOTE  Take 2 tablets (1,000 mg total) by mouth once daily. 500 mg every and 1000 mg every PM  What changed:    · how much to take  · how to take this  · when to take this     loratadine 10 mg tablet  Commonly known as:  CLARITIN  Take 1 tablet (10 mg total) by mouth once daily.  What changed:  additional instructions     ranitidine 300 MG tablet  Commonly known as:  ZANTAC  TAKE 1 TABLET(300 MG) BY MOUTH EVERY DAY  What changed:  additional instructions        CONTINUE taking these medications    alendronate 35 MG tablet  Commonly known as:  FOSAMAX  Take 1 tablet (35 mg total) by mouth every 7 days.     ARIPiprazole 10 MG Tab  Commonly known as:  ABILIFY  Take one tablet PO in morning     atorvastatin 10 MG tablet  Commonly known as:  LIPITOR  Take one tablet daily.     chlorhexidine 0.12 % solution  Commonly known as:  PERIDEX  RINSE WITH 1/2 OZ BID AFTER BRUSHING AND FLOSSING     folic acid 1 MG tablet  Commonly known as:  FOLVITE  Take 2 tablets daily.     gabapentin 800 MG tablet  Commonly known as:  NEURONTIN  TAKE 1 TABLET(800 MG)  BY MOUTH TWICE DAILY     hydroxychloroquine 200 mg tablet  Commonly known as:  PLAQUENIL  Take 1 tablet (200 mg total) by mouth 2 (two) times daily.     metoprolol succinate 25 MG 24 hr tablet  Commonly known as:  TOPROL-XL  TAKE 1 TABLET(25 MG) BY MOUTH EVERY DAY     mirtazapine 15 MG tablet  Commonly known as:  REMERON  Take 15 mg by mouth every evening.     oxybutynin 10 MG 24 hr tablet  Commonly known as:  DITROPAN-XL  Take 1 tablet (10 mg total) by mouth once daily.     pantoprazole 40 MG tablet  Commonly known as:  PROTONIX  Take 40 mg by mouth once daily.     PREVIDENT 5000 BOOSTER PLUS 1.1 % Pste  Generic drug:  fluoride (sodium)  BRUSH ON NIGHTLY AFTER NORMAL HYGIENE REGIMAN. SPIT OUT EXCESS DO NOT RINSE     promethazine 25 MG tablet  Commonly known as:  PHENERGAN  TAKE ONE TABLET BY MOUTH EVERY 4 HOURS     rivaroxaban 20 mg Tab  Commonly known as:  XARELTO  Take 1 tablet (20 mg total) by mouth daily with dinner or evening meal.          No discharge procedures on file.

## 2018-09-20 ENCOUNTER — OFFICE VISIT (OUTPATIENT)
Dept: UROLOGY | Facility: CLINIC | Age: 38
End: 2018-09-20
Payer: MEDICARE

## 2018-09-20 VITALS
HEIGHT: 63 IN | DIASTOLIC BLOOD PRESSURE: 67 MMHG | HEART RATE: 74 BPM | TEMPERATURE: 99 F | SYSTOLIC BLOOD PRESSURE: 110 MMHG | RESPIRATION RATE: 18 BRPM | WEIGHT: 130.06 LBS | BODY MASS INDEX: 23.04 KG/M2

## 2018-09-20 DIAGNOSIS — D17.9 ANGIOMYOLIPOMA: ICD-10-CM

## 2018-09-20 DIAGNOSIS — N32.81 OAB (OVERACTIVE BLADDER): ICD-10-CM

## 2018-09-20 DIAGNOSIS — N28.89 URETEROCELE: ICD-10-CM

## 2018-09-20 DIAGNOSIS — N39.41 URGE INCONTINENCE OF URINE: Primary | ICD-10-CM

## 2018-09-20 LAB
BILIRUB SERPL-MCNC: NORMAL MG/DL
BLOOD URINE, POC: NORMAL
COLOR, POC UA: NORMAL
GLUCOSE UR QL STRIP: NORMAL
KETONES UR QL STRIP: NORMAL
LEUKOCYTE ESTERASE URINE, POC: NORMAL
NITRITE, POC UA: NORMAL
PH, POC UA: 8
PROTEIN, POC: NORMAL
SPECIFIC GRAVITY, POC UA: 1.01
UROBILINOGEN, POC UA: NORMAL

## 2018-09-20 PROCEDURE — 99999 PR PBB SHADOW E&M-EST. PATIENT-LVL IV: CPT | Mod: PBBFAC,,, | Performed by: UROLOGY

## 2018-09-20 PROCEDURE — 99214 OFFICE O/P EST MOD 30 MIN: CPT | Mod: PBBFAC,PN | Performed by: UROLOGY

## 2018-09-20 PROCEDURE — 99499 UNLISTED E&M SERVICE: CPT | Mod: S$GLB,,, | Performed by: UROLOGY

## 2018-09-20 PROCEDURE — 99213 OFFICE O/P EST LOW 20 MIN: CPT | Mod: S$PBB,,, | Performed by: UROLOGY

## 2018-09-20 PROCEDURE — 81002 URINALYSIS NONAUTO W/O SCOPE: CPT | Mod: PBBFAC,PN | Performed by: UROLOGY

## 2018-09-20 PROCEDURE — 3008F BODY MASS INDEX DOCD: CPT | Mod: CPTII,,, | Performed by: UROLOGY

## 2018-09-20 NOTE — PROGRESS NOTES
[facility] Note - [place]  Staff: MD Jerry    Referring provider and please cc: Sagar  PCP: Dr.Darrin Jackson [facility]    Chief Complaint: oab     Subjective:        HPI: Cat Denson is a 38 y.o. female presents with     Overactive bladder and urge incontinence  -she has a h/o frequency q20 minutes-30 minutes and has been leaking on the way to the bathroom with urge. This has been occurring for the last 2 years but the nocturnal and urge incontinence has been worsening over the last 2 months. She wears 3 depends and a day and has to change her clothes 1x a day due to this. She has been having some dysuria for the past few day. Found to have proteus in urine, dysuria.  She is not typically prone to uti's.  -she has a h/o stroke in 2015 as a result of chain smoking and depo.reisdual SE include L arm, weakness, memory problems, change in vision in left arm and oab. No issues prior to stroke. At some point she was started on flomax around that time. She has never tried an OAB med and she does take jet alert in the morning, green teas and a soda/coffee in the afternoon in addition to 2 to 3 bottles per day. Denies any constipation.   -she saw emilio on 5/11/18 who ordered a renal ultrasound which showed no hydro and residual 6cc, bilateral renal cysts and right ureterocele, which she has never known about. She denies any uti symptoms. CTRSS 12/2017 showed no stones in her kidney or ureters. Of note she has CKD Stage III since a 20 and her most recent cr is 1.8.   -she has multiple other medical problems including chronic pain, fibromyalgia (no pain medicine), bipolar, srogens, RTA, suicide attempt in addition tot he stroke she experienced in 2015. rbus 6/13/18 showed pvr 8cc and 2 small left AML.   -seen by me on 6/14/18 and I discontinued her flomax and started her on ditropan 10mg XL and she is now voiding every 2-3 hours and still wearing depends for safety, not  wet. No longer changing clothes. Last time she leaked was with ERNESTO. , no longer having UUI. Still has urgency but less so. +dry mouth but srogens. No constipation.     -RF for uti: depends and ureterocele.  -RF for incontinence: flomax, neurogenic bladder from stroke and caffeine intake.     Ua void: negative.   Urine history:  18 E.coli, void:nit+/3+lG0, P0euk/1+blood, 46 wbc   18 p.mirabilis, cath: tr leuk, 8 wbc  - c/o dysuria  18 No cx,  void: nit+/3+leuk  18 Cath: negative   18 No cx, void: tr blood/1+leuk  18  No cx, void: neg  17 No cx, void: nit+/tr leuk  3/12/17 No cx, void: neg  3/3/17  No cx, void: tr ketones  3/2/17  No cx, void: tr ketoens/1+bili  17 E.coli  7/15/14 ng    ECOG Status: 0          REVIEW OF SYSTEMS:  General ROS: no fevers, no chills  Psychological ROS: no depression  Endocrine ROS: no heat or cold  Respiratory ROS: no SOB  Cardiovascular ROS: no CP  Gastrointestinal ROS: no abdominal pain, no constipation, no diarrhea, nBRBPR  Musculoskeletal ROS: no muscle pain  Neurological ROS: no headaches  Dermatological ROS: no rashes  HEENT: +glasses, no sinus   ROS: per HPI     PMHx:  Past Medical History:   Diagnosis Date    Acid reflux     Allergy     Anemia     Anticoagulated 2015    Anxiety     Asthma     Bipolar 1 disorder     Chronic daily headache 2018    Chronic kidney disease     Chronic pain     Depression     bipolar 2    Fibromyalgia     Gastroparesis     History of right MCA stroke 2015    Hx of psychiatric care     Lactose intolerance     Lupus     Psychiatric problem     Renal tubular acidosis     Seizure     Sjogren's syndrome     Smoker     Stroke 2015    Suicide attempt     overdosed on a bottle of paxil, muscle relaxers, & zoloft    Therapy      Kidney stones: No    PSHx:  Past Surgical History:   Procedure Laterality Date    CHOLECYSTECTOMY      COLONOSCOPY  2014      Cristi, isaac at 52 years old for screening    COLONOSCOPY N/A 11/12/2014    Performed by Scout [facility]ble [facility]    ESOPHAGOGASTRODUODENOSCOPY (EGD) N/A 3/3/2017    Performed by David Harris MD at [facility] ENDO    ESOPHAGOGASTRODUODENOSCOPY (EGD) N/A 10/16/2014    Performed by Scout Kimble MD at [facility] ENDO    UPPER GASTROINTESTINAL ENDOSCOPY  03/03/2017    Dr. Harris     Urologic or Gynecologic Surgery: no    Stents/Valves/Foreign Bodies: No  Cardiac Evaluation: Yes     Screening Studies  Colonoscopy: yes    Fam Hx:   malignancies: No , gyn malignancies: maternal aunts with breast cancer . Mother alive healthy. Father alive and healthy  kidney stones: No     Soc Hx:  +tobacco.  1 pk per day x 20 year  No alcohol  Lives in [place]  :unmarried  Children: 0  Occupation: disability     Allergies:  Reglan [metoclopramide hcl]; Amoxil [amoxicillin]; Augmentin [amoxicillin-pot clavulanate]; Avelox [moxifloxacin]; Benadryl [diphenhydramine hcl]; Quinazolinones; Ultram [tramadol]; and Wellbutrin [bupropion hcl]   augmentin - diarrhea     Medications: reviewed   Anticoagulation: Yes - eliquis    Objective:     Vitals:    09/20/18 1345   BP: 110/67   Pulse: 74   Resp: 18   Temp: 98.6 °F (37 °C)       General:WDWN in NAD  Eyes: PERRLA, normal conjunctiva  Respiratory: no increased work on breathing. No wheezing.   Cardiovascular: No obvious extremity edema. Warm and well perfused.   GI: no palpation of masses. No tenderness. No hepatosplenomegaly to palpation.  Musculoskeletal: normal range of motion of bilateral upper extremities. Normal muscle strength and tone.  Skin: no obvious rashes or lesions. No tightening of skin noted.  Neurologic: CN grossly normal. Normal sensation.   Psychiatric: awake, alert and oriented x 3. Mood and affect normal. Cooperative.    In and out cath done by me 6/14/18  sent for ua and culture- will plan to treat.     LABS REVIEW:    Cr:   Lab Results   Component Value  Date    CREATININE 1.8 (H) 09/17/2018       PATHOLOGY REVIEW:  none    RADIOGRAPHIC REVIEW:  rbus  6/13/18  1. Right kidney- There is no hydronephrosis.  There is no renal cyst.  The resistive index is elevated in both kidneys suggesting some degree of medical renal disease.  2. Left kidney- Two small hyperechoic foci in the lateral midpole of the left kidney likely represent small angiomyolipomas.  3. Bilateral urine jets are visualized.  4. There is a right ureterocele  5. Multiple cysts are identified in the right ovary.  The largest measures 3.4 x 2.8 x 2.4 cm.Prevoid bladder measures 8.5 x 5.4 x 8.8 cm with a prevoid volume of 210.4 mL.    ctrss 6/12/17  The adrenal glands are not enlarged.  The kidneys are of normal size, contour and CT density for a noncontrast study.  No mass, cyst or hydronephrosis is noted. No stones are identified   Prior cholecystectomy.  2.4 cm heterogeneous right ovary.  Recommend ultrasound evaluation.  Long skinny appendix is noted, this is the structure seen on the prior CT.    ctrss 3/12/17  The kidneys show no evidence of stones, hydronephrosis, or solid mass.  The ureters are normal in caliber and course without definite stones appreciated.  The urinary bladder shows mild wall thickening anteriorly, nonspecific.  This could be due to nondistention; however, cystitis cannot be excluded.  Please correlate with urinalysis  1.  Motion limited study.  A tubular structure containing air without surrounding inflammatory changes visible in the right lower quadrant of the abdomen and is favored to represent the appendix.  No surrounding inflammatory changes appreciated to suggest acute appendicitis.  2.  18 mm right ovarian hyperdensity, possibly a hemorrhagic cyst/follicle.  Endometrioma could produce a similar appearance.  The right ovary could be further evaluated with pelvic ultrasound as deemed clinically appropriate.  3.  Possible cystitis.  Consider correlation with urinalysis  4.   Status post cholecystectomy.      Assessment:       1. Urge incontinence of urine    2. OAB (overactive bladder)    3. Ureterocele    4. Angiomyolipoma          Plan:     OAB, possibly neurogenic   -she has a h/o stroke in 2015 and has had issues with OAB and incontinence since now improved on ditropan.   -continue ditropan 10mg XL/oxybutynin 10mg XL once a day. Doing well off flomax and on ditorpan , no longer wetting bed. No side effects.   -continue to avoid caffeine  -avoid pads or can get recurrent uti. No longer leaking, only wearing for safety.     AML, left - 5mm and 3mm  -b9 tumors of the kidney, not confirmed bc to small but can continue to watch with an ultrasound once a year for now and discontinue screening if no change in size.     Dysuria, resolved    Right ureterocele  -could cause uti but unlikely. Has had all her life and no problems before.   -no hydro seen on rbus. No stones seen on ct.     F/u 1 year or sooner if having any worsening of symptoms        Karmen Edwards MD

## 2018-09-20 NOTE — PATIENT INSTRUCTIONS
OAB, possibly neurogenic   -she has a h/o stroke in 2015 and has had issues with OAB and incontinence since now improved on ditropan.   -continue ditropan 10mg XL/oxybutynin 10mg XL once a day. Doing well off flomax and on ditorpan , no longer wetting bed. No side effects.   -continue to avoid caffeine  -avoid pads or can get recurrent uti. No longer leaking, only wearing for safety.     AML, left - 5mm and 3mm  -b9 tumors of the kidney, not confirmed bc to small but can continue to watch with an ultrasound once a year for now and discontinue screening if no change in size.     Dysuria, resolved    Right ureterocele  -could cause uti but unlikely. Has had all her life and no problems before.   -no hydro seen on rbus. No stones seen on ct.     F/u 1 year or sooner if having any worsening of symptoms

## 2018-09-24 RX ORDER — GABAPENTIN 800 MG/1
TABLET ORAL
Qty: 60 TABLET | Refills: 0 | OUTPATIENT
Start: 2018-09-24

## 2018-10-05 ENCOUNTER — TELEPHONE (OUTPATIENT)
Dept: NEUROLOGY | Facility: CLINIC | Age: 38
End: 2018-10-05

## 2018-10-05 ENCOUNTER — LAB VISIT (OUTPATIENT)
Dept: LAB | Facility: HOSPITAL | Age: 38
End: 2018-10-05
Attending: PSYCHIATRY & NEUROLOGY
Payer: MEDICARE

## 2018-10-05 ENCOUNTER — OFFICE VISIT (OUTPATIENT)
Dept: NEUROLOGY | Facility: CLINIC | Age: 38
End: 2018-10-05
Payer: MEDICARE

## 2018-10-05 VITALS
HEART RATE: 78 BPM | DIASTOLIC BLOOD PRESSURE: 64 MMHG | SYSTOLIC BLOOD PRESSURE: 111 MMHG | HEIGHT: 63 IN | BODY MASS INDEX: 23.57 KG/M2 | RESPIRATION RATE: 16 BRPM | WEIGHT: 133 LBS

## 2018-10-05 DIAGNOSIS — Z79.899 HIGH RISK MEDICATION USE: ICD-10-CM

## 2018-10-05 DIAGNOSIS — M89.9 BONE DISEASE: ICD-10-CM

## 2018-10-05 DIAGNOSIS — M85.80 OSTEOPENIA, UNSPECIFIED LOCATION: ICD-10-CM

## 2018-10-05 DIAGNOSIS — G40.219 LOCALIZATION-RELATED (FOCAL) (PARTIAL) SYMPTOMATIC EPILEPSY AND EPILEPTIC SYNDROMES WITH COMPLEX PARTIAL SEIZURES, INTRACTABLE, WITHOUT STATUS EPILEPTICUS: Primary | ICD-10-CM

## 2018-10-05 DIAGNOSIS — R51.9 CHRONIC DAILY HEADACHE: Chronic | ICD-10-CM

## 2018-10-05 DIAGNOSIS — Z86.73 HISTORY OF RIGHT MCA STROKE: Chronic | ICD-10-CM

## 2018-10-05 DIAGNOSIS — G40.219 LOCALIZATION-RELATED (FOCAL) (PARTIAL) SYMPTOMATIC EPILEPSY AND EPILEPTIC SYNDROMES WITH COMPLEX PARTIAL SEIZURES, INTRACTABLE, WITHOUT STATUS EPILEPTICUS: ICD-10-CM

## 2018-10-05 DIAGNOSIS — G40.219 COMPLEX PARTIAL EPILEPSY WITH GENERALIZATION AND WITH INTRACTABLE EPILEPSY: ICD-10-CM

## 2018-10-05 LAB
ALBUMIN SERPL BCP-MCNC: 3.6 G/DL
ALP SERPL-CCNC: 90 U/L
ALT SERPL W/O P-5'-P-CCNC: 8 U/L
ANION GAP SERPL CALC-SCNC: 10 MMOL/L
AST SERPL-CCNC: 17 U/L
BASOPHILS # BLD AUTO: 0.04 K/UL
BASOPHILS NFR BLD: 0.4 %
BILIRUB SERPL-MCNC: 0.2 MG/DL
BUN SERPL-MCNC: 10 MG/DL
CALCIUM SERPL-MCNC: 8.9 MG/DL
CHLORIDE SERPL-SCNC: 112 MMOL/L
CO2 SERPL-SCNC: 17 MMOL/L
CREAT SERPL-MCNC: 1.7 MG/DL
DIFFERENTIAL METHOD: ABNORMAL
EOSINOPHIL # BLD AUTO: 0.1 K/UL
EOSINOPHIL NFR BLD: 0.8 %
ERYTHROCYTE [DISTWIDTH] IN BLOOD BY AUTOMATED COUNT: 11.9 %
EST. GFR  (AFRICAN AMERICAN): 44.1 ML/MIN/1.73 M^2
EST. GFR  (NON AFRICAN AMERICAN): 38.3 ML/MIN/1.73 M^2
GLUCOSE SERPL-MCNC: 100 MG/DL
HCT VFR BLD AUTO: 38.2 %
HGB BLD-MCNC: 12.6 G/DL
IMM GRANULOCYTES # BLD AUTO: 0.07 K/UL
IMM GRANULOCYTES NFR BLD AUTO: 0.7 %
LYMPHOCYTES # BLD AUTO: 2.8 K/UL
LYMPHOCYTES NFR BLD: 29.3 %
MCH RBC QN AUTO: 33.2 PG
MCHC RBC AUTO-ENTMCNC: 33 G/DL
MCV RBC AUTO: 101 FL
MONOCYTES # BLD AUTO: 1 K/UL
MONOCYTES NFR BLD: 10.2 %
NEUTROPHILS # BLD AUTO: 5.7 K/UL
NEUTROPHILS NFR BLD: 58.6 %
NRBC BLD-RTO: 0 /100 WBC
PLATELET # BLD AUTO: 129 K/UL
PMV BLD AUTO: 13 FL
POTASSIUM SERPL-SCNC: 4.3 MMOL/L
PROT SERPL-MCNC: 7 G/DL
RBC # BLD AUTO: 3.8 M/UL
SODIUM SERPL-SCNC: 139 MMOL/L
VALPROATE SERPL-MCNC: 49.3 UG/ML
WBC # BLD AUTO: 9.68 K/UL

## 2018-10-05 PROCEDURE — 36415 COLL VENOUS BLD VENIPUNCTURE: CPT | Mod: PO

## 2018-10-05 PROCEDURE — 80164 ASSAY DIPROPYLACETIC ACD TOT: CPT

## 2018-10-05 PROCEDURE — 3008F BODY MASS INDEX DOCD: CPT | Mod: CPTII,,, | Performed by: PSYCHIATRY & NEUROLOGY

## 2018-10-05 PROCEDURE — 85025 COMPLETE CBC W/AUTO DIFF WBC: CPT

## 2018-10-05 PROCEDURE — 80053 COMPREHEN METABOLIC PANEL: CPT

## 2018-10-05 PROCEDURE — 99999 PR PBB SHADOW E&M-EST. PATIENT-LVL III: CPT | Mod: PBBFAC,,, | Performed by: PSYCHIATRY & NEUROLOGY

## 2018-10-05 PROCEDURE — 99215 OFFICE O/P EST HI 40 MIN: CPT | Mod: S$PBB,,, | Performed by: PSYCHIATRY & NEUROLOGY

## 2018-10-05 PROCEDURE — 99213 OFFICE O/P EST LOW 20 MIN: CPT | Mod: PBBFAC,PN | Performed by: PSYCHIATRY & NEUROLOGY

## 2018-10-05 NOTE — PROGRESS NOTES
"Date of service:  10/5/2018    Chief complaint:  Seizures    Interval history:  The patient is a 38 y.o. female seen previously for seizures.  Since leaving the EMU, she has had no further events.  She reports she is tolerating her increased dose of Depakote well.    The patient was also started on magnesium for headache prophylaxis in the hospital.  She notes no issues with this.  She has not seen a significant improvement on this front.  She takes Tylenol and caffeine for rescue.  This is partially effective.    History of present illness:  The patient is a 38 y.o. female referred for evaluation of episodes suspicious for seizures.  She saw Dr. Muir in the hospital in June.  This is my first time seeing her.  The history available is marginal as both the patient and her  have limited insight into certain points.  These began "a couple of years back," "I'm not exactly sure when."  She is not aware of any aura.  Her seizure is characterized by left sided "muscle spasming."  It is not clear how long the events last for.  Afterwards, she reports some confusion.  The patient's frequency of events is roughly once every couple of years.    In the past, she has been told that she has NEE.  This diagnosis was apparently made somewhere in [place].  The patient is not sure where.    She also reports ongoing issues with muscle spasms on the left side.  She does have a spastic left hemiparesis stemming from an R MCA stroke in 2015.    Epilepsy risk factors:  Pregnancy/Labor/Delivery: None  Febrile seizures: None  Head injury: None  CNS infection: None     Stroke: +  Family Hx of Sz: None  Developmental delay: None    Current AEDs:  Depakote 1000 mg BID (also being used for bipolar disorder)    Prior AEDs:  Keppra    AEDs not tried:  acetazolamide (Diamox, AZM)  amantadine  brivaracetam (Briviact)  carbamazepine (Tegretol, CBZ)  clobezam (Onfi or Frizium, CLB)  ethosuximide (Zarontin, ESM)  eslicarbazine (Aptiom, " ESL)  felbamate (felbatol, FBM)  gabapentin (Neurontin, GPN)  lacosamide (Vimpat, LCS)   lamotrigine (Lamictal, LTG)   methsuximide (Celontin, MSM)  methyphenytoin (Mesantion, MHT)  oxcarbazepine (Trileptal OXC)  perampanel (Fycompa, FCP)   phenobarbital (Pb)  phenytoin (Dilantin, PHT)  pregabalin (Lyrica, PGB)  primidone (Mysoline, PRM)  retigabine (Potiga, RTG)  rufinamide (Banzel, RUF)  tiagabine (Gabatril,  TGB)  topiramate (Topamax, TPM)  viagabatrin, (Sabril, VGB)  vagal nerve stimulator (VNS)  zonisamide (Zonegran, ZNA)  Benzodiazepines  diazepam - rectal (Diastatl)  diazepam - oral (Valium, DZ)  clonazepam (Klonopin, CZP)  clorazepate (Tranxene, CLZ)  Ativan      Past Medical History:   Diagnosis Date    Acid reflux     Allergy     Anemia     Anticoagulated 12/29/2015    Anxiety     Asthma     Bipolar 1 disorder     Chronic daily headache 9/11/2018    Chronic kidney disease     Chronic pain     Depression     bipolar 2    Fibromyalgia     Gastroparesis     History of right MCA stroke 11/25/2015    Hx of psychiatric care     Lactose intolerance     Lupus     Psychiatric problem     Renal tubular acidosis     Seizure     Sjogren's syndrome     Smoker     Stroke 11-    Suicide attempt     overdosed on a bottle of paxil, muscle relaxers, & zoloft    Therapy        Past Surgical History:   Procedure Laterality Date    CHOLECYSTECTOMY      COLONOSCOPY  11/12/2014    Dr. Kimble, repeat at 52 years old for screening    COLONOSCOPY N/A 11/12/2014    Performed by Scout [facility] at [facility]    ESOPHAGOGASTRODUODENOSCOPY (EGD) N/A 3/3/2017    Performed by David Harris MD at [facility]    ESOPHAGOGASTRODUODENOSCOPY (EGD) N/A 10/16/2014    Performed by Scout [facility]ble [facility] at [facility]    UPPER GASTROINTESTINAL ENDOSCOPY  03/03/2017    Dr. Harris       Family History   Problem Relation Age of Onset    Hypertension Mother     Hyperlipidemia Mother     Psoriasis Mother      Thyroid disease Mother     No Known Problems Father     Post-traumatic stress disorder Brother     Drug abuse Brother     Diabetes Maternal Uncle     Hypertension Maternal Uncle     Alcohol abuse Maternal Uncle     Stroke Maternal Uncle     Scoliosis Paternal Aunt     Heart disease Paternal Uncle     Mental illness Maternal Grandmother     Cancer Maternal Grandmother         lung / brain - smoker    Drug abuse Maternal Grandmother     Hypertension Maternal Grandmother     Hyperlipidemia Maternal Grandmother     Diabetes Maternal Grandmother     Rheum arthritis Maternal Grandmother     Diabetes Mellitus Maternal Grandmother     Heart disease Maternal Grandfather     Hypertension Maternal Grandfather     Alcohol abuse Maternal Grandfather     Osteoarthritis Maternal Grandfather     No Known Problems Paternal Grandmother     Mental illness Paternal Grandfather     Early death Paternal Grandfather         self    Colon cancer Neg Hx     Colon polyps Neg Hx     Crohn's disease Neg Hx     Ulcerative colitis Neg Hx     Celiac disease Neg Hx     Lupus Neg Hx     Kidney disease Neg Hx     Inflammatory bowel disease Neg Hx     Depression Neg Hx     COPD Neg Hx     Asthma Neg Hx     Macular degeneration Neg Hx     Retinal detachment Neg Hx     Glaucoma Neg Hx        Social History     Socioeconomic History    Marital status: Single     Spouse name: Not on file    Number of children: 0    Years of education: Not on file    Highest education level: Not on file   Social Needs    Financial resource strain: Not on file    Food insecurity - worry: Not on file    Food insecurity - inability: Not on file    Transportation needs - medical: Not on file    Transportation needs - non-medical: Not on file   Occupational History    Not on file   Tobacco Use    Smoking status: Current Every Day Smoker     Packs/day: 1.50     Years: 15.00     Pack years: 22.50     Types: Cigarettes     Start  date: 11/25/2015    Smokeless tobacco: Never Used    Tobacco comment: quit smoking after the stroke   Substance and Sexual Activity    Alcohol use: No     Alcohol/week: 0.0 oz    Drug use: Yes     Types: Marijuana     Comment: smokes occasionally, helps with pain and appetite    Sexual activity: No     Partners: Female     Comment: usually female, but currently with her boyfriend   Other Topics Concern    Patient feels they ought to cut down on drinking/drug use Not Asked    Patient annoyed by others criticizing their drinking/drug use Not Asked    Patient has felt bad or guilty about drinking/drug use Not Asked    Patient has had a drink/used drugs as an eye opener in the AM Not Asked   Social History Narrative    Doesn't drive since the stroke, mother drives her and she lives with her mother.        Review of patient's allergies indicates:   Allergen Reactions    Reglan [metoclopramide hcl]      Involuntary mouth movements     Amoxil [amoxicillin]      hives    Augmentin [amoxicillin-pot clavulanate]     Avelox [moxifloxacin]      itching    Benadryl [diphenhydramine hcl]      Lowers seizure threshold     Quinazolinones      Lowers seizure threshold     Ultram [tramadol]      Lowers seizure threshold     Wellbutrin [bupropion hcl]      Lowers seizure threshold         Review of Systems  General/Constitutional:  No unintentional weight loss, No change in appetite  Eyes/Vision:  No change in vision, No double vision  ENT:  No frequent nose bleeds, No ringing in the ears  Respiratory:  No cough, No wheezing  Cardiovascular:  No chest pain, No palpitations  Gastrointestinal:  No jaundice, No nausea/vomiting  Genitourinary:  No incontinence, No burning with urination  Hematologic/Lymphatic:  No easy bruising/bleeding, No night sweats  Neurological:  No numbness, No weakness  Endocrine:  No fatigue, No heat/cold intolerance  Allergy/Immunologic:  No fevers, No chills  Musculoskeletal:  + muscle pain, No  "joint pain   Psychiatric:  No thoughts of harming self/others, No depression  Integumentary:  No rashes, No sores that do not heal     Physical exam:  /64 (BP Location: Right arm, Patient Position: Sitting, BP Method: Medium (Automatic))   Pulse 78   Resp 16   Ht 5' 3" (1.6 m)   Wt 60.3 kg (133 lb)   LMP 09/27/2018   BMI 23.56 kg/m²   General: Well developed, well nourished.  No acute distress.  HEENT: Atraumatic, normocephalic.  Neck: Supple, trachea midline.  Cardiovascular: Regular rate and rhythm.  Pulmonary: No increased work of breathing.  Abdomen/GI: No guarding.  Musculoskeletal: No obvious joint deformities, moves all extremities well.    Neurological exam:  Mental status: Awake and alert.  Oriented x4.  Speech fluent and appropriate.  Recent and remote memory appear to be intact.  Fund of knowledge normal.  Cranial nerves: Pupils equal round and reactive to light, extraocular movements intact, facial strength and sensation intact bilaterally, palate and tongue midline, hearing grossly intact bilaterally.  Motor: 5 out of 5 strength throughout the upper and lower extremities on the right, spastic hemiparesis on left. Normal bulk and tone on the right.  Sensation: Intact to light touch and temperature bilaterally.  DTR: 2+ at the knees and biceps on the right, 3+ on the left.  Coordination: Finger-nose-finger testing intact on the right, limited on the left by prior stroke.  Gait: Cautious gait.    Data base:  Chart reviewed.  Briefly summarized, the patient was seen in the hospital by Dr. Muir, who felt that her breakthrough seizures were likely related to AED noncompliance and UTI.    MRI brain (3/17):  "1.  No acute intracranial abnormality is seen.  Specifically, no reduced diffusion to suggest acute ischemia.  No abnormal contrast enhancement.  2.  Chronic right cerebral hemisphere infarcts including large MCA territory infarcts and smaller territory in the right frontoparietal PORSHA " "territory.  Associated ipsilateral Wallerian degeneration and mild cortical laminar necrosis noted.  Confluent white matter disease in the right cerebral hemisphere consistent with associated gliosis."  I independently visualized and interpreted this study.     CT brain (6/18):  "1. There is no acute abnormality.  There is no intracranial hemorrhage, mass, acute infarction.  2. There is no acute skull fracture.  3. There is encephalomalacia and gliosis throughout the right frontal parietal lobes from remote middle and anterior cerebral artery vascular territory infarctions."  I independently visualized and interpreted this study.     EEG (6/18):  "IMPRESSION:  This is an abnormal EEG during wakefulness, drowsiness and sleep.  Diffuse disorganized slowing of the background was noted.   CLINICAL CORRELATION:  The patient is a 38-year-old female with a history of multiple medical problems who is currently maintained on gabapentin and valproic acid.  This is an abnormal EEG during wakefulness, drowsiness and sleep.  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  There is no evidence of an epileptic process on this recording.  No seizures were recorded during this study."    vEEG (9/18):  Right sided slowing.  1 event captured without clear EEG correlate though semiology suggested right frontal seizure focus.    DEXA (7/18):  "Osteopenia."    Assessment and plan:  The patient is a 38 y.o. female referred for evaluation for events worrisome for seizures. The differential includes focal onset seizures arising from the right hemisphere symptomatic of her prior stroke.  She has also, apparently, been also diagnosed with NEE in the past.  The EMU admission here did appear to confirm that she does have epilepsy (most likely right FLE), so she may have both epileptic seizures and NEE.  As far as medications go, we will continue " her Depakote (also on this for bipolar disorder), which we increased following her vEEG.  We will check labs for medication monitoring purposes.  She will also need a DEXA scans periodically as she was found to have osteopenia and Depakote can contribute to this.  Medication side effects were discussed with the patient.  Teratogenicity of AEDs were discussed, including the manner in which Depakote is particularly problematic.  She was instructed to make sure she was utilizing a reliable means of birth control.  Should she decide to become pregnant, she is to contact us first, so we can determine what medication adjustments, if any, are appropriate.  The patient was counseled that the risks posed by uncontrolled seizures typically exceeds that posed by the medications themselves. Consequently, the patient was advised of the importance of continuing her anticonvulsant medication should she become pregnant.  She was advised to take supplemental folate.  State law as it pertains to driving for individuals with seizures was discussed.  The patient was also counseled on seizure safety.     I have instructed her to work with her PCP on the osteopenia.  If this worsens even on treatment, we may have to consider alternatives to Depakote.    Regarding the patient's chronic headaches, we will arrange for her to be seen in headache clinic.  Hopefully, her increased dose of Depakote will help from a prophylactic perspective.  Her history of stroke/anticoagulation does limit our options for abortive agents.  She will continue using Tylenol for now.  Appropriate medication use has been discussed.    We will plan on seeing the patient back in a few weeks.

## 2018-10-05 NOTE — TELEPHONE ENCOUNTER
Called and spoke with patient. Offered appointment for 10/26 with Dr. Smyth. Patient declined. Informed patient next appointment would not be until March. Patient requested an appointment in March instead. Advised patient to call back in 1 week as we are awaiting Dr. Smyth's schedule to open. Patient also placed on wait list. Patient verbalized understanding.

## 2018-10-05 NOTE — TELEPHONE ENCOUNTER
This pt has been referred to the headache clinic by Dr Norman and would like to see her book when you have a free spot. She is medicaid and he requests that Dr Smyth will consider taking her considering her complex hx. Please contact the pt to schedule an appt. Thank you so much.

## 2018-10-08 ENCOUNTER — TELEPHONE (OUTPATIENT)
Dept: NEUROLOGY | Facility: CLINIC | Age: 38
End: 2018-10-08

## 2018-10-08 NOTE — TELEPHONE ENCOUNTER
----- Message from Milo Norman Jr., MD sent at 10/8/2018  1:08 PM CDT -----  Depakote level is somewhat low.  Verify how she is taking it.    Platelets are a bit low.  Will need to follow this.    Creatinine elevated but not more so than in the last couple of months.  Will need to follow with her PCP for this.

## 2018-10-08 NOTE — TELEPHONE ENCOUNTER
Spoke with the pts mother, instructed her to have the pt call Dr Recinos office to discuss how she is taking her Depakote and to receive lab results. Vebalized understanding. Message also sent through the pt portal.

## 2018-10-15 PROBLEM — M85.80 OSTEOPENIA: Status: ACTIVE | Noted: 2018-10-15

## 2018-10-23 DIAGNOSIS — K31.84 GASTROPARESIS: ICD-10-CM

## 2018-10-23 DIAGNOSIS — E78.5 HYPERLIPIDEMIA, UNSPECIFIED HYPERLIPIDEMIA TYPE: Primary | ICD-10-CM

## 2018-10-23 DIAGNOSIS — F31.81 BIPOLAR 2 DISORDER: Chronic | ICD-10-CM

## 2018-10-23 NOTE — PROGRESS NOTES
Refill Authorization Note     is requesting a refill authorization.    Brief assessment and rationale for refill: DEFER; not on protocol/due for labs  Amount/Quantity of medication ordered: 90d        Refills Authorized: Yes  If authorized number of refills: 0        Medication-related problems identified: Requires labs  Medication Therapy Plan: Has pcp visit scheduled; NTBO (Lipids); defer to you, not on protocol  Name and strength of medication: ARIPiprazole (ABILIFY) 10 MG Tab  How patient will take medication: t1t qam  Medication reconciliation completed: No        Comments:

## 2018-10-23 NOTE — PROGRESS NOTES
Refill Authorization Note     is requesting a refill authorization.    Brief assessment and rationale for refill: DEFER: dx not on protocol/accessed labs and pcp visit in prevoius refill request for abilify  Amount/Quantity of medication ordered: 90d        Refills Authorized: Yes  If authorized number of refills: 0           Medication Therapy Plan: defer to you, not on protocol  Name and strength of medication: ranitidine (ZANTAC) 300 MG tablet  How patient will take medication: t1t qd  Medication reconciliation completed: No        Comments:

## 2018-10-24 RX ORDER — ARIPIPRAZOLE 10 MG/1
TABLET ORAL
Qty: 90 TABLET | Refills: 0 | Status: SHIPPED | OUTPATIENT
Start: 2018-10-24 | End: 2019-03-21 | Stop reason: SDUPTHER

## 2018-10-24 NOTE — TELEPHONE ENCOUNTER
Please reach out to the patient and advise her that I have refilled her Abilify and to offer to schedule a follow up appt in future.  thanks

## 2018-10-24 NOTE — TELEPHONE ENCOUNTER
She has not seen me since 2/18.  No appt is currently scheduled. Are you comfortable with reaching out to her to see if she plans to reschedule with us?

## 2018-10-26 ENCOUNTER — OFFICE VISIT (OUTPATIENT)
Dept: NEPHROLOGY | Facility: CLINIC | Age: 38
End: 2018-10-26
Payer: MEDICARE

## 2018-10-26 VITALS
SYSTOLIC BLOOD PRESSURE: 98 MMHG | WEIGHT: 133.19 LBS | DIASTOLIC BLOOD PRESSURE: 66 MMHG | BODY MASS INDEX: 23.6 KG/M2 | HEART RATE: 50 BPM | OXYGEN SATURATION: 100 % | HEIGHT: 63 IN

## 2018-10-26 DIAGNOSIS — N18.30 CHRONIC KIDNEY DISEASE, STAGE III (MODERATE): Primary | ICD-10-CM

## 2018-10-26 PROCEDURE — 3008F BODY MASS INDEX DOCD: CPT | Mod: CPTII,,, | Performed by: INTERNAL MEDICINE

## 2018-10-26 PROCEDURE — 99213 OFFICE O/P EST LOW 20 MIN: CPT | Mod: PBBFAC,PO | Performed by: INTERNAL MEDICINE

## 2018-10-26 PROCEDURE — 99214 OFFICE O/P EST MOD 30 MIN: CPT | Mod: S$PBB,,, | Performed by: INTERNAL MEDICINE

## 2018-10-26 PROCEDURE — 99999 PR PBB SHADOW E&M-EST. PATIENT-LVL III: CPT | Mod: PBBFAC,,, | Performed by: INTERNAL MEDICINE

## 2018-10-26 NOTE — Clinical Note
Hello.  Are you ok with stopping the metoprolol?  She has a bit of a low blood pressure and slow pulse.  She states she was started on this after her stroke.  Thanks!

## 2018-10-29 ENCOUNTER — TELEPHONE (OUTPATIENT)
Dept: NEUROLOGY | Facility: CLINIC | Age: 38
End: 2018-10-29

## 2018-10-29 NOTE — TELEPHONE ENCOUNTER
----- Message from Jasmeet Lovell MD sent at 10/26/2018  6:23 PM CDT -----  Hello.  Are you ok with stopping the metoprolol?  She has a bit of a low blood pressure and slow pulse.  She states she was started on this after her stroke.  Thanks!

## 2018-10-30 NOTE — TELEPHONE ENCOUNTER
Dr. Norman is out of the office.  In reviewing her records, her stroke appears to be from an embolic event.  Her blood pressure did not appear to be a related mechanism.  Given the low blood pressure and low heart rate, it would seem reasonable to stop this medication and monitor how she does without it.

## 2018-10-31 NOTE — TELEPHONE ENCOUNTER
Spoke with the patient and instructed her that, she could stop the Metoprolol.  Instructed her to monitor her BP and Pulse.  She verbalized understanding.

## 2018-11-18 ENCOUNTER — PATIENT MESSAGE (OUTPATIENT)
Dept: FAMILY MEDICINE | Facility: CLINIC | Age: 38
End: 2018-11-18

## 2018-11-19 DIAGNOSIS — M79.2 NEUROPATHIC PAIN: Primary | ICD-10-CM

## 2018-11-19 RX ORDER — GABAPENTIN 800 MG/1
800 TABLET ORAL 2 TIMES DAILY
Qty: 60 TABLET | Refills: 0 | Status: SHIPPED | OUTPATIENT
Start: 2018-11-19 | End: 2018-12-16 | Stop reason: SDUPTHER

## 2018-11-19 RX ORDER — PROMETHAZINE HYDROCHLORIDE 25 MG/1
25 TABLET ORAL EVERY 4 HOURS
Qty: 60 TABLET | Refills: 0 | Status: SHIPPED | OUTPATIENT
Start: 2018-11-19 | End: 2019-05-28 | Stop reason: SDUPTHER

## 2018-11-19 NOTE — PROGRESS NOTES
Refill Authorization Note     is requesting a refill authorization.    Brief assessment and rationale for refill: REVIEW- DEFER;outside protocol  Amount/Quantity of medication ordered: 90d  Date of last appointment: 4/10/2018     Refills Authorized: Yes  If authorized number of refills: 0           Medication Therapy Plan: hx of seizures/lupus/chronic pain; Nov-11/27/18 ([facility]); future labs needed were not evaulated by [facility]; Labs-Cr, elevated pt also hx of CKD; See flowsheet of past 18 months; Current CrCl-44- recommended TDD of gabapentin-1,400mg/day please review; Outside of protocol Defer to you   Name and strength of medication: gabapentin (NEURONTIN) 800 MG tablet  How patient will take medication: t1t po bid  Medication reconciliation completed: No        Comments:       Lab Results   Component Value Date    CREATININE 1.7 (H) 10/05/2018    BUN 10 10/05/2018     10/05/2018    K 4.3 10/05/2018     (H) 10/05/2018    CO2 17 (L) 10/05/2018     Electrolytes Cr   Latest Ref Rng & Units 0.5 - 1.4 mg/dL   3/2/2017 2.3 (H)   3/3/2017 1.7 (H)   3/12/2017 1.9 (H)   7/18/2017 1.4   9/29/2017 1.6 (H)   1/8/2018 1.5 (H)   2/20/2018 1.8 (H)   6/13/2018 1.8 (H)   6/13/2018 1.8 (H)   6/27/2018 2.0 (H)   6/28/2018 1.8 (H)   6/29/2018 1.3   7/25/2018 1.6 (H)   9/11/2018 1.8 (H)   9/14/2018 2.2 (H)   9/15/2018 1.9 (H)   9/17/2018 1.8 (H)

## 2018-11-19 NOTE — TELEPHONE ENCOUNTER
DEFER: Gabapentin 800 mg twice daily. Uses for pain and noted non-adherence. Agree with renal dose adjustment recommendation below considering CKD with eCrCl 40-50mL/min. However, gabapentin level on 9/18 WNL (6.2). Defer to you.

## 2018-11-26 ENCOUNTER — PATIENT MESSAGE (OUTPATIENT)
Dept: UROLOGY | Facility: CLINIC | Age: 38
End: 2018-11-26

## 2018-11-27 ENCOUNTER — LAB VISIT (OUTPATIENT)
Dept: LAB | Facility: HOSPITAL | Age: 38
End: 2018-11-27
Attending: NURSE PRACTITIONER
Payer: MEDICARE

## 2018-11-27 ENCOUNTER — OFFICE VISIT (OUTPATIENT)
Dept: FAMILY MEDICINE | Facility: CLINIC | Age: 38
End: 2018-11-27
Payer: MEDICARE

## 2018-11-27 VITALS
SYSTOLIC BLOOD PRESSURE: 111 MMHG | DIASTOLIC BLOOD PRESSURE: 71 MMHG | HEIGHT: 63 IN | BODY MASS INDEX: 22.5 KG/M2 | TEMPERATURE: 98 F | HEART RATE: 61 BPM | WEIGHT: 127 LBS

## 2018-11-27 DIAGNOSIS — Z86.73 HISTORY OF RIGHT MCA STROKE: Primary | Chronic | ICD-10-CM

## 2018-11-27 DIAGNOSIS — M79.7 FIBROMYALGIA: Chronic | ICD-10-CM

## 2018-11-27 DIAGNOSIS — E78.2 MIXED HYPERLIPIDEMIA: ICD-10-CM

## 2018-11-27 DIAGNOSIS — N18.30 CKD (CHRONIC KIDNEY DISEASE) STAGE 3, GFR 30-59 ML/MIN: ICD-10-CM

## 2018-11-27 DIAGNOSIS — R56.9 SEIZURE: ICD-10-CM

## 2018-11-27 DIAGNOSIS — Z79.899 LONG-TERM USE OF HYDROXYCHLOROQUINE: ICD-10-CM

## 2018-11-27 DIAGNOSIS — Z72.0 TOBACCO USE: ICD-10-CM

## 2018-11-27 DIAGNOSIS — Z23 NEED FOR INFLUENZA VACCINATION: ICD-10-CM

## 2018-11-27 DIAGNOSIS — F31.81 BIPOLAR 2 DISORDER: Chronic | ICD-10-CM

## 2018-11-27 DIAGNOSIS — Z79.01 LONG TERM CURRENT USE OF ANTICOAGULANT THERAPY: ICD-10-CM

## 2018-11-27 DIAGNOSIS — I69.354 HEMIPARESIS AFFECTING LEFT SIDE AS LATE EFFECT OF CEREBROVASCULAR ACCIDENT: ICD-10-CM

## 2018-11-27 DIAGNOSIS — M32.9 SYSTEMIC LUPUS ERYTHEMATOSUS, UNSPECIFIED SLE TYPE, UNSPECIFIED ORGAN INVOLVEMENT STATUS: ICD-10-CM

## 2018-11-27 DIAGNOSIS — Z23 NEED FOR 23-POLYVALENT PNEUMOCOCCAL POLYSACCHARIDE VACCINE: ICD-10-CM

## 2018-11-27 DIAGNOSIS — K21.9 GASTROESOPHAGEAL REFLUX DISEASE WITHOUT ESOPHAGITIS: ICD-10-CM

## 2018-11-27 DIAGNOSIS — K31.84 GASTROPARESIS: ICD-10-CM

## 2018-11-27 LAB
CHOLEST SERPL-MCNC: 156 MG/DL
CHOLEST/HDLC SERPL: 4.1 {RATIO}
HDLC SERPL-MCNC: 38 MG/DL
HDLC SERPL: 24.4 %
LDLC SERPL CALC-MCNC: 96.8 MG/DL
NONHDLC SERPL-MCNC: 118 MG/DL
TRIGL SERPL-MCNC: 106 MG/DL

## 2018-11-27 PROCEDURE — 36415 COLL VENOUS BLD VENIPUNCTURE: CPT | Mod: PO

## 2018-11-27 PROCEDURE — 99499 UNLISTED E&M SERVICE: CPT | Mod: S$GLB,,, | Performed by: NURSE PRACTITIONER

## 2018-11-27 PROCEDURE — 90732 PPSV23 VACC 2 YRS+ SUBQ/IM: CPT | Mod: S$GLB,,, | Performed by: NURSE PRACTITIONER

## 2018-11-27 PROCEDURE — 3008F BODY MASS INDEX DOCD: CPT | Mod: CPTII,S$GLB,, | Performed by: NURSE PRACTITIONER

## 2018-11-27 PROCEDURE — 99214 OFFICE O/P EST MOD 30 MIN: CPT | Mod: 25,S$GLB,, | Performed by: NURSE PRACTITIONER

## 2018-11-27 PROCEDURE — 80061 LIPID PANEL: CPT

## 2018-11-27 PROCEDURE — G0008 ADMIN INFLUENZA VIRUS VAC: HCPCS | Mod: S$GLB,,, | Performed by: NURSE PRACTITIONER

## 2018-11-27 PROCEDURE — 99999 PR PBB SHADOW E&M-EST. PATIENT-LVL V: CPT | Mod: PBBFAC,,, | Performed by: NURSE PRACTITIONER

## 2018-11-27 PROCEDURE — G0009 ADMIN PNEUMOCOCCAL VACCINE: HCPCS | Mod: S$GLB,,, | Performed by: NURSE PRACTITIONER

## 2018-11-27 PROCEDURE — 90686 IIV4 VACC NO PRSV 0.5 ML IM: CPT | Mod: S$GLB,,, | Performed by: NURSE PRACTITIONER

## 2018-11-27 RX ORDER — IBUPROFEN 200 MG
1 TABLET ORAL DAILY
Qty: 28 PATCH | Refills: 0 | Status: SHIPPED | OUTPATIENT
Start: 2018-11-27 | End: 2019-05-10

## 2018-11-27 RX ORDER — MIRTAZAPINE 15 MG/1
15 TABLET, FILM COATED ORAL NIGHTLY
Qty: 30 TABLET | Refills: 5 | Status: SHIPPED | OUTPATIENT
Start: 2018-11-27 | End: 2019-05-10 | Stop reason: SDUPTHER

## 2018-11-27 NOTE — PATIENT INSTRUCTIONS
Prevention Guidelines, Women Ages 18 to 39  Screening tests and vaccines are an important part of managing your health. Health counseling is essential, too. Below are guidelines for these, for women ages 18 to 39. Talk with your healthcare provider to make sure youre up-to-date on what you need.  Screening Who needs it How often   Alcohol misuse All women in this age group At routine exams   Blood pressure All women in this age group Every 2 years if your blood pressure is less than 120/80 mm Hg; yearly if your systolic blood pressure is 120 to 139 mm Hg, or your diastolic blood pressure reading is 80 to 89 mm Hg   Breast cancer All women in this age group should talk with their healthcare providers about the need for clinical breast exams (CBE)1 Clinical breast exam every 3 years1   Cervical cancer Women ages 21 and older Women between ages 21 and 29 should have a Pap test every 3 years; women between ages 30 and 65 are advised to have a Pap test plus an HPV test every 5 years   Chlamydia Sexually active women ages 24 and younger, and women at increased risk for infection Every 3 years if you're at risk or have symptoms   Depression All women in this age group At routine exams   Diabetes mellitus, type 2 Adults with no symptoms who are overweight or obese and have 1 or more other risk factors for diabetes At least every 3 years   Gonorrhea Sexually active women at increased risk for infection At routine exams   Hepatitis C Anyone at increased risk At routine exams   HIV All women At routine exams   Obesity All women in this age group At routine exams   Syphilis Women at increased risk for infection - talk with your healthcare provider At routine exams   Tuberculosis Women at increased risk for infection - talk with your healthcare provider Ask your healthcare provider   Vision All women in this age group At least 1 complete exam in your 20s, and 2 in your 30s   Vaccine2 Who needs it How often   Chickenpox  (varicella) All women in this age group who have no record of this infection or vaccine 2 doses; the second dose should be given 4 to 8 weeks after the first dose   Hepatitis A Women at increased risk for infection - talk with your healthcare provider 2 doses given at least 6 months apart   Hepatitis B Women at increased risk for infection - talk with your healthcare provider 3 doses over 6 months; second dose should be given 1 month after the first dose; the third dose should be given at least 2 months after the second dose and at least 4 months after the first dose   Haemophilus influenzae Type B (HIB) Women at increased risk for infection - talk with your healthcare provider 1 to 3 doses   Human papillomavirus (HPV) All women in this age group up to age 26 3 doses; the second dose should be given 1 to 2 months after the first dose and the third dose given 6 months after the first dose   Influenza (flu) All women in this age group Once a year   Measles, mumps, rubella (MMR) All women in this age group who have no record of these infections or vaccines 1 or 2 doses   Meningococcal Women at increased risk for infection - talk with your healthcare provider 1 or more doses   Pneumococcal conjugate vaccine (PCV13) and pneumococcal polysaccharide vaccine (PPSV23) Women at increased risk for infection - talk with your healthcare provider PCV13: 1 dose ages 19 to 65 (protects against 13 types of pneumococcal bacteria)  PPSV23: 1 to 2 doses through age 64, or 1 dose at 65 or older (protects against 23 types of pneumococcal bacteria)      Tetanus/diphtheria/pertussis (Td/Tdap) booster All women in this age group Td every 10 years, or a one-time dose of Tdap instead of a Td booster after age 18, then Td every 10 years   Counseling Who needs it How often   BRCA gene mutation testing for breast and ovarian cancer susceptibility Women with increased risk for having gene mutation When your risk is known   Breast cancer and  chemoprevention Women at high risk for breast cancer When your risk is known   Diet and exercise Women who are overweight or obese When diagnosed, and then at routine exams   Domestic violence Women at the age in which they are able to have children At routine exams   Sexually transmitted infection prevention Women who are sexually active At routine exams   Skin cancer Prevention of skin cancer in fair-skinned adults through age 24 At routine exams   Use of tobacco and the health effects it can cause All women in this age group Every visit   1According to the ACS, women ages 20 to 39 years should have a clinical breast exam (CBE) as part of their routine health exam every 3 years. Breast self-exams are an option for women starting in their 20s. But the  USPSTF does not recommend CBE.  2Those who are 18 years old and not up-to-date on their childhood vaccines should get all appropriate catch-up vaccines recommended by the CDC.  Date Last Reviewed: 8/27/2015  © 4743-0690 [employer]. [address] [place] [address]. All rights reserved. This information is not intended as a substitute for professional medical care. Always follow your healthcare professional's instructions.        How to Quit Smoking  Smoking is one of the hardest habits to break. About half of all people who have ever smoked have been able to quit. Most people who still smoke want to quit. Here are some of the best ways to stop smoking.    Keep trying  Most smokers make many attempts at quitting before they are successful. Its important not to give up.  Go cold turkey  Most former smokers quit cold turkey (all at once). Trying to cut back gradually doesn't seem to work as well, perhaps because it continues the smoking habit. Also, it is possible to inhale more while smoking fewer cigarettes. This results in the same amount of nicotine in your body.  Get support  Support programs can be a big help, especially for heavy smokers.  These groups offer lectures, ways to change behavior, and peer support. Here are some ways to find a support program:  · Free national quitline: 800-QUIT-NOW (072-135-4313).  · Hospital quit-smoking programs.  · American Lung Association: (733.952.3739).  · American Cancer Society (454-824-2977).  Support at home is important too. Nonsmokers can offer praise and encouragement. If the smoker in your life finds it hard to quit, encourage them to keep trying.  Over-the-counter medicines  Nicotine replacement therapy may make quitting easier. Certain aids, such as the nicotine patch, gum, and lozenges, are available without a prescription. It is best to use these under a doctors care, though. The skin patch provides a steady supply of nicotine. Nicotine gum and lozenges give temporary bursts of low levels of nicotine. Both methods reduce the craving for cigarettes. Warning: If you have nausea, vomiting, dizziness, weakness, or a fast heartbeat, stop using these products and see your doctor.  Prescription medicines  After reviewing your smoking patterns and past attempts to quit, your doctor may offer a prescription medicine such as bupropion, varenicline, a nicotine inhaler, or nasal spray. Each has advantages and side effects. Your doctor can review these with you.  Health benefits of quitting  The benefits of quitting start right away and keep improving the longer you go without smoking. These benefits occur at any age.  So whether you are 17 or 70, quitting is a good decision. Some of the benefits include:  · 20 minutes: Blood pressure and pulse return to normal.  · 8 hours: Oxygen levels return to normal.  · 2 days: Ability to smell and taste begin to improve as damaged nerves regrow.  · 2 to 3 weeks: Circulation and lung function improve.  · 1 to 9 months: Coughing, congestion, and shortness of breath decrease; tiredness decreases.  · 1 year: Risk of heart attack decreases by half.  · 5 years: Risk of lung cancer  "decreases by half; risk of stroke becomes the same as a nonsmokers.  For more on how to quit smoking, try these online resources:   · Smokefree.gov  · "Clearing the Air" booklet from the National Cancer [place]: smokefree.gov/sites/default/files/pdf/clearing-the-air-accessible.pdf  Date Last Reviewed: 3/1/2017  © 9989-0731 [employer]. [address] [place] [address], [place] [address]. All rights reserved. This information is not intended as a substitute for professional medical care. Always follow your healthcare professional's instructions.        Planning to Quit Smoking  Your healthcare provider may have told you that you need to give up tobacco. Only you can decide if and when you are ready to quit. Quitting is hard to do. But the benefits will be worth it. When you  decide to quit, come up with a plan thats right for you. Discuss your plan with your healthcare provider. And talk to your provider about medicines to help you quit.    Line up support  To quit smoking, youll need a plan and some help. Pick a date within the next 2 to 4 weeks to quit. Use the time between now and that date to arrange for support.  · Classes and counselors. Quit-smoking classes  people like you through the process. Get to know others in a class, and support each other beyond the class. Telephone counseling also helps you keep on track. Ask your healthcare provider, local hospital, or public health department to put you in touch with a class and a phone counselor.  · Family and friends. Tell your family and friends about your quit date. Ask them to support your change. If they smoke, arrange to see them in smoke-free places. Forbid smoking in your home and car.     Finding something to replace cigarettes may be hard to do. Be aware that some things you choose may be as harmful as cigarettes:  · Smokeless (chewing) tobacco is just as harmful as regular tobacco. Tobacco should not be used as a substitute for " cigarettes.  · Herbal medicines or teas may affect how your body handles nicotine. Talk to your healthcare provider before using these products.  · E-cigarettes have less toxins than the smoke from a regular cigarette. But the FDA says that these devices may still have substances that can cause cancer. E-cigarettes are not well regulated. They have not been studied enough to know if they are a good aid to help you stop smoking. Talk with your healthcare provider before using these products.      Quit-smoking products  Many products can help you quit smoking. Some are prescription medicines that help curb your cravings and withdrawal symptoms. Other products slowly lessen the level of nicotine your body absorbs. Nicotine is the highly addictive substance found in cigarettes, cigars, and chewing tobacco. Nicotine replacement products can help get your body used to slowly decreasing amounts of nicotine after you quit smoking. These products include a nicotine patch, gum, lozenge, nasal spray, and inhaler. Be sure you follow the directions for your medicine or product carefully. Your healthcare provider may tell you to start taking the prescription medicine a week before you plan to quit. Do not smoke while you use nicotine products. Doing so can be very harmful to your health.  For more information  · https://smokefree.gov/uwvw-cv-rm-expert  · National Cancer [place] Smoking Quitline: 877-44U-QUIT (186-336-9055)   Date Last Reviewed: 2/1/2017 © 2000-2017 [employer]. [address] [place] [address], [place] [address]. All rights reserved. This information is not intended as a substitute for professional medical care. Always follow your healthcare professional's instructions.

## 2018-11-27 NOTE — PROGRESS NOTES
Subjective:       Patient ID: Cat Denson is a 38 y.o. female.    Chief Complaint: Follow-up (Annual )    Chief Complaint  Chief Complaint   Patient presents with    Follow-up     Annual        HPI  Cat Denson is a 38 y.o. female with medical diagnoses as listed in the medical history and problem list that presents for a routine follow up. She is a previous patient of Dr. Dukes and will need a new PCP. Patient is regularly treated for hyperlipidemia, CKD3, gastroparesis, GERD, seizure disorder, anxiety, depression, and bipolar disorder, lupus and fibromyalgia, and overactive bladder. Patient has a history of a CVA in November 2015. Patient is regularly followed by Gastroenterology for gastroporesis, Neurology for seizures last Valproic Acid 49.3 on 10/5/18 and CVA on 11/25/15, with residual left hemiparesis. CVA felt to be caused by use of depoprovera birth control with tobacco use.  Followed by nephrology for CKD 3 last GFR 38.3 and Cr 1.7 on 10/5/18, Urology for OAB, Rheumatology for SLE, and Psychiatry for Bipolar disorder and depression. Patient currently smokes 1 ppd and would like to quit smoking.  Patient has tried Chantix in the past and would like to try that again. Due to patient's PMH, medication, and drug and disease contraindications/interactions the Nicoderm patches were recommended. Patient is willing to try the patches at this time. Blood pressure today is 111/71. BMI today is 22.50 and patient has lost 2 pounds since last visit on 4/10/18.         PAST MEDICAL HISTORY:  Past Medical History:   Diagnosis Date    Acid reflux     Allergy     Anemia     Anticoagulated 12/29/2015    Anxiety     Asthma     Bipolar 1 disorder     Bronchospasm with bronchitis, acute     Chronic daily headache 9/11/2018    Chronic kidney disease     Chronic pain     Depression     bipolar 2    Fibromyalgia     Fibromyalgia     Gastroparesis     History of right MCA stroke 11/25/2015     Hx of psychiatric care     Lactose intolerance     Lupus     Psychiatric problem     Renal tubular acidosis     Seizure     Sjogren's syndrome     Smoker     Stroke 11-    Suicide attempt     overdosed on a bottle of paxil, muscle relaxers, & zoloft    Therapy        PAST SURGICAL HISTORY:  Past Surgical History:   Procedure Laterality Date    CHOLECYSTECTOMY      COLONOSCOPY  11/12/2014    Dr. Kimble, repeat at 52 years old for screening    COLONOSCOPY N/A 11/12/2014    Performed by Scout [facility] at [facility]    ESOPHAGOGASTRODUODENOSCOPY (EGD) N/A 3/3/2017    Performed by David Harris [facility] at [facility]    ESOPHAGOGASTRODUODENOSCOPY (EGD) N/A 10/16/2014    [facility] at [facility]    UPPER GASTROINTESTINAL ENDOSCOPY  03/03/2017    Dr. Harris       SOCIAL HISTORY:  Social History     Socioeconomic History    Marital status: Single     Spouse name: Not on file    Number of children: 0    Years of education: Not on file    Highest education level: Not on file   Social Needs    Financial resource strain: Not on file    Food insecurity - worry: Not on file    Food insecurity - inability: Not on file    Transportation needs - medical: Not on file    Transportation needs - non-medical: Not on file   Occupational History    Not on file   Tobacco Use    Smoking status: Current Every Day Smoker     Packs/day: 1.00     Years: 15.00     Pack years: 15.00     Types: Cigarettes     Start date: 11/25/2015    Smokeless tobacco: Never Used   Substance and Sexual Activity    Alcohol use: No     Alcohol/week: 0.0 oz    Drug use: Yes     Types: Marijuana     Comment: smokes occasionally, helps with pain and appetite    Sexual activity: No     Partners: Female     Comment: usually female, but currently with her boyfriend   Other Topics Concern    Patient feels they ought to cut down on drinking/drug use Not Asked    Patient annoyed by others criticizing their  drinking/drug use Not Asked    Patient has felt bad or guilty about drinking/drug use Not Asked    Patient has had a drink/used drugs as an eye opener in the AM Not Asked   Social History Narrative    Doesn't drive since the stroke, mother drives her and she lives with her mother.       FAMILY HISTORY:  Family History   Problem Relation Age of Onset    Hypertension Mother     Hyperlipidemia Mother     Psoriasis Mother     Thyroid disease Mother     No Known Problems Father     Post-traumatic stress disorder Brother     Drug abuse Brother     Diabetes Maternal Uncle     Hypertension Maternal Uncle     Alcohol abuse Maternal Uncle     Stroke Maternal Uncle     Scoliosis Paternal Aunt     Heart disease Paternal Uncle     Mental illness Maternal Grandmother     Cancer Maternal Grandmother         lung / brain - smoker    Drug abuse Maternal Grandmother     Hypertension Maternal Grandmother     Hyperlipidemia Maternal Grandmother     Diabetes Maternal Grandmother     Rheum arthritis Maternal Grandmother     Diabetes Mellitus Maternal Grandmother     Heart disease Maternal Grandfather     Hypertension Maternal Grandfather     Alcohol abuse Maternal Grandfather     Osteoarthritis Maternal Grandfather     No Known Problems Paternal Grandmother     Mental illness Paternal Grandfather     Early death Paternal Grandfather         self    Colon cancer Neg Hx     Colon polyps Neg Hx     Crohn's disease Neg Hx     Ulcerative colitis Neg Hx     Celiac disease Neg Hx     Lupus Neg Hx     Kidney disease Neg Hx     Inflammatory bowel disease Neg Hx     Depression Neg Hx     COPD Neg Hx     Asthma Neg Hx     Macular degeneration Neg Hx     Retinal detachment Neg Hx     Glaucoma Neg Hx        ALLERGIES AND MEDICATIONS: updated and reviewed.  Review of patient's allergies indicates:   Allergen Reactions    Reglan [metoclopramide hcl]      Involuntary mouth movements     Amoxil [amoxicillin]       hives    Augmentin [amoxicillin-pot clavulanate]     Avelox [moxifloxacin]      itching    Benadryl [diphenhydramine hcl]      Lowers seizure threshold     Quinazolinones      Lowers seizure threshold     Ultram [tramadol]      Lowers seizure threshold     Wellbutrin [bupropion hcl]      Lowers seizure threshold      Current Outpatient Medications   Medication Sig Dispense Refill    ARIPiprazole (ABILIFY) 10 MG Tab Take one tablet PO in morning 90 tablet 0    atorvastatin (LIPITOR) 10 MG tablet Take one tablet daily.  11    chlorhexidine (PERIDEX) 0.12 % solution RINSE WITH 1/2 OZ BID AFTER BRUSHING AND FLOSSING  6    divalproex ER (DEPAKOTE) 500 MG Tb24 Take 2 tablets (1,000 mg total) by mouth once daily. 500 mg every and 1000 mg every PM (Patient taking differently: Take 1,000 mg by mouth 2 (two) times daily. 1000mg BID) 90 tablet 0    folic acid (FOLVITE) 1 MG tablet Take 2 tablets daily.  10    gabapentin (NEURONTIN) 800 MG tablet Take 1 tablet (800 mg total) by mouth 2 (two) times daily. 60 tablet 0    hydroxychloroquine (PLAQUENIL) 200 mg tablet Take 1 tablet (200 mg total) by mouth 2 (two) times daily. 60 tablet 5    loratadine (CLARITIN) 10 mg tablet Take 1 tablet (10 mg total) by mouth once daily. (Patient taking differently: Take 10 mg by mouth once daily. Patient takes seasonally as needed) 30 tablet 11    mirtazapine (REMERON) 15 MG tablet Take 1 tablet (15 mg total) by mouth every evening. 30 tablet 5    oxybutynin (DITROPAN-XL) 10 MG 24 hr tablet Take 1 tablet (10 mg total) by mouth once daily. 90 tablet 3    pantoprazole (PROTONIX) 40 MG tablet Take 40 mg by mouth once daily.   6    PREVIDENT 5000 BOOSTER PLUS 1.1 % Pste BRUSH ON NIGHTLY AFTER NORMAL HYGIENE REGIMAN. SPIT OUT EXCESS DO NOT RINSE  4    promethazine (PHENERGAN) 25 MG tablet Take 1 tablet (25 mg total) by mouth every 4 (four) hours. 60 tablet 0    ranitidine (ZANTAC) 300 MG tablet TAKE 1 TABLET(300 MG) BY MOUTH  EVERY DAY 90 tablet 0    rivaroxaban (XARELTO) 20 mg Tab Take 1 tablet (20 mg total) by mouth daily with dinner or evening meal. 30 tablet 5    nicotine (NICODERM CQ) 21 mg/24 hr Place 1 patch onto the skin once daily. 28 patch 0     No current facility-administered medications for this visit.        I have reviewed the patient's medical history in detail and updated the computerized patient record.    Review of Systems   Constitutional: Negative for activity change, appetite change, chills, fatigue, fever and unexpected weight change.        Patient denies any daily exercise.    HENT: Negative for congestion, ear discharge, ear pain, postnasal drip, sinus pressure, sinus pain and sore throat.    Eyes: Positive for visual disturbance. Negative for pain, discharge and itching.        Last eye exam with Dr. Buchanan. Wears prescription glasses.    Respiratory: Positive for cough. Negative for chest tightness, shortness of breath and wheezing.         Patient reports a chronic productive smokers cough with tan color phlegm.    Cardiovascular: Negative for chest pain, palpitations and leg swelling.        Patient had one episode of feeling like heart was racing the other night when she got up to use the restroom that went away after sitting down.    Gastrointestinal: Negative for abdominal pain, constipation, diarrhea, nausea and vomiting.        Patient reports some nausea and vomiting yesterday that went away after taking phenergan.    Genitourinary: Positive for urgency. Negative for difficulty urinating, flank pain and hematuria.        Patient reports urgency and takes oxybutin with some relief.    Musculoskeletal: Negative for arthralgias, back pain, joint swelling, myalgias, neck pain and neck stiffness.   Skin: Negative for rash and wound.   Neurological: Positive for seizures. Negative for dizziness, weakness, light-headedness, numbness and headaches.        Has hx of seizures and last seizure was in September  "2018.    Hematological: Bruises/bleeds easily.        Feels like she bruises easily.    Psychiatric/Behavioral: Positive for dysphoric mood and sleep disturbance. The patient is nervous/anxious.         Patient has trouble falling asleep but once she is asleep she stays asleep. Patient takes Abilify for depression and anxiety that is helping.          Objective:      Vitals:    11/27/18 0930   BP: 111/71   BP Location: Right arm   Patient Position: Sitting   BP Method: Large (Automatic)   Pulse: 61   Temp: 98.4 °F (36.9 °C)   TempSrc: Oral   Weight: 57.6 kg (127 lb)   Height: 5' 3" (1.6 m)     Physical Exam   Constitutional: She is oriented to person, place, and time. Vital signs are normal. She appears well-developed. No distress.   Blood pressure: 111/71   HENT:   Head: Normocephalic and atraumatic.   Right Ear: Tympanic membrane, external ear and ear canal normal.   Left Ear: Tympanic membrane, external ear and ear canal normal.   Nose: Nose normal. No mucosal edema.   Mouth/Throat: Uvula is midline, oropharynx is clear and moist and mucous membranes are normal. No oropharyngeal exudate.   Eyes: Conjunctivae and lids are normal. Pupils are equal, round, and reactive to light. Right eye exhibits no discharge. Left eye exhibits no discharge. Right conjunctiva is not injected. Left conjunctiva is not injected.   Neck: Normal range of motion. Neck supple. Normal carotid pulses present. Carotid bruit is not present.   Cardiovascular: Normal rate, regular rhythm, S1 normal, S2 normal, normal heart sounds, intact distal pulses and normal pulses.   No murmur heard.  Pulses:       Carotid pulses are 2+ on the right side, and 2+ on the left side.       Radial pulses are 2+ on the right side, and 2+ on the left side.        Posterior tibial pulses are 2+ on the right side, and 2+ on the left side.   No edema noted.    Pulmonary/Chest: Effort normal and breath sounds normal. No respiratory distress. She has no decreased " breath sounds. She has no wheezes. She has no rhonchi. She has no rales.   Abdominal: Soft. Normal appearance, normal aorta and bowel sounds are normal. She exhibits no distension, no abdominal bruit, no pulsatile midline mass and no mass. There is no hepatosplenomegaly. There is no tenderness. No hernia.   Musculoskeletal: Normal range of motion. She exhibits no edema, tenderness or deformity.   Lymphadenopathy:     She has no cervical adenopathy.        Right: No supraclavicular adenopathy present.        Left: No supraclavicular adenopathy present.   Neurological: She is alert and oriented to person, place, and time. She has normal strength.   Skin: Skin is warm, dry and intact. No rash noted. She is not diaphoretic. No erythema. No pallor.   Psychiatric: She has a normal mood and affect. Her speech is normal and behavior is normal. Judgment and thought content normal. Her mood appears not anxious. Cognition and memory are normal. She does not exhibit a depressed mood.   Nursing note and vitals reviewed.        Assessment:       1. History of right MCA stroke    2. Hemiparesis affecting left side as late effect of cerebrovascular accident    3. Long term current use of anticoagulant therapy    4. Seizure    5. Mixed hyperlipidemia    6. CKD (chronic kidney disease) stage 3, GFR 30-59 ml/min    7. Gastroparesis    8. Gastroesophageal reflux disease without esophagitis    9. Systemic lupus erythematosus, unspecified SLE type, unspecified organ involvement status    10. Long-term use of hydroxychloroquine    11. Fibromyalgia    12. Bipolar 2 disorder    13. Tobacco use    14. BMI 22.0-22.9, adult    15. Need for influenza vaccination    16. Need for 23-polyvalent pneumococcal polysaccharide vaccine          Plan:       Cat was seen today for follow-up.  Health maintenance, medical diagnoses reviewed.  Recommended and necessary blood work and screening exams/referrals ordered.  Medication list reconciled.  Educational handouts provided. Prevention Guidelines for Women, Ages 18-39.  Diagnoses and all orders for this visit:    History of right MCA stroke   History of CVA on 11/25/15.    Continue to follow up with Dr. Norman   Stable, chronic condition.  Will continue to maximize risk factor reduction and adjust medication as needed.     Hemiparesis affecting left side as late effect of cerebrovascular accident   Patient had left sided weakness and unable to walk or use left arm following stroke on 11/25/15. Patient went to physical therapy and is now able to walk and use left arm.    Long term current use of anticoagulant therapy   Stable on xarelto 20 mg daily, continue medication  Seizure   Last Seizure reported in 09/2018.   Continue to follow up with Dr. Norman   Continue taking Depakote as prescribed.   Lab Results   Component Value Date    VALPROATE 49.3 (L) 10/05/2018     Mixed hyperlipidemia  -     Lipid panel; Future  - Continue taking Lipitor 10 mg daily.   Lab Results   Component Value Date    CHOL 156 11/27/2018    CHOL 129 07/18/2017    CHOL 164 02/23/2017     Lab Results   Component Value Date    HDL 38 (L) 11/27/2018    HDL 35 (L) 07/18/2017    HDL 32 (L) 02/23/2017     Lab Results   Component Value Date    LDLCALC 96.8 11/27/2018    LDLCALC 75.6 07/18/2017    LDLCALC 110.8 02/23/2017     Lab Results   Component Value Date    TRIG 106 11/27/2018    TRIG 92 07/18/2017    TRIG 106 02/23/2017     Lab Results   Component Value Date    CHOLHDL 24.4 11/27/2018    CHOLHDL 27.1 07/18/2017    CHOLHDL 19.5 (L) 02/23/2017     CKD (chronic kidney disease) stage 3, GFR 30-59 ml/min   Continue to follow up with Dr. Lovell.  CMP  Sodium   Date Value Ref Range Status   10/05/2018 139 136 - 145 mmol/L Final     Potassium   Date Value Ref Range Status   10/05/2018 4.3 3.5 - 5.1 mmol/L Final     Chloride   Date Value Ref Range Status   10/05/2018 112 (H) 95 - 110 mmol/L Final     CO2   Date Value Ref Range Status   10/05/2018  17 (L) 23 - 29 mmol/L Final     Glucose   Date Value Ref Range Status   10/05/2018 100 70 - 110 mg/dL Final     BUN, Bld   Date Value Ref Range Status   10/05/2018 10 6 - 20 mg/dL Final     Creatinine   Date Value Ref Range Status   10/05/2018 1.7 (H) 0.5 - 1.4 mg/dL Final     Calcium   Date Value Ref Range Status   10/05/2018 8.9 8.7 - 10.5 mg/dL Final     Total Protein   Date Value Ref Range Status   10/05/2018 7.0 6.0 - 8.4 g/dL Final     Albumin   Date Value Ref Range Status   10/05/2018 3.6 3.5 - 5.2 g/dL Final     Total Bilirubin   Date Value Ref Range Status   10/05/2018 0.2 0.1 - 1.0 mg/dL Final     Comment:     For infants and newborns, interpretation of results should be based  on gestational age, weight and in agreement with clinical  observations.  Premature Infant recommended reference ranges:  Up to 24 hours.............<8.0 mg/dL  Up to 48 hours............<12.0 mg/dL  3-5 days..................<15.0 mg/dL  6-29 days.................<15.0 mg/dL       Alkaline Phosphatase   Date Value Ref Range Status   10/05/2018 90 55 - 135 U/L Final     AST   Date Value Ref Range Status   10/05/2018 17 10 - 40 U/L Final     ALT   Date Value Ref Range Status   10/05/2018 8 (L) 10 - 44 U/L Final     Anion Gap   Date Value Ref Range Status   10/05/2018 10 8 - 16 mmol/L Final     eGFR if    Date Value Ref Range Status   10/05/2018 44.1 (A) >60 mL/min/1.73 m^2 Final     eGFR if non    Date Value Ref Range Status   10/05/2018 38.3 (A) >60 mL/min/1.73 m^2 Final     Comment:     Calculation used to obtain the estimated glomerular filtration  rate (eGFR) is the CKD-EPI equation.        Gastroparesis  -     mirtazapine (REMERON) 15 MG tablet; Take 1 tablet (15 mg total) by mouth every evening.  - Follow up with Dr. Kimble     Gastroesophageal reflux disease without esophagitis   Continue taking Protonix 40 mg daily   Continue taking Zantac 300 mg daily   Follow up with Dr. Kimble     Systemic  lupus erythematosus, unspecified SLE type, unspecified organ involvement status   Continue to follow up with Dr. Benoit   Continue taking Plaquenil 200 mg twice daily     Long-term use of hydroxychloroquine   Continue to follow up with Dr. Benoit   Continue taking Plaquenil 200 mg twice daily   Discussed need for routine eye exams    Fibromyalgia   Continue to follow up with Dr. Benoit    Continue to take Gabapentin 800 mg twice daily    Bipolar 2 disorder   Continue to follow up with Dr. Merino   Continue to take Abilify 10 mg in the morning   Stable on current medciation     Tobacco use  -     nicotine (NICODERM CQ) 21 mg/24 hr; Place 1 patch onto the skin once daily, will call if effective for second month prescription of 14 mg/hr patches  - Chantix contraindicated due to drug/disease interactions  - Not eligible for smoking cessation program due to age  - Patient smokes 1 ppd and wants to quit smoking.   - Educational handouts provided. How to Quit Smoking and Planning to Quit Smoking.     BMI 22.0-22.9, adult   BMI today is 22.50 and patient has lost 2 pounds since last visit on 4/10/18.   Maintain healthy weight with heathy diet and exercise/active lifestyle    Need for influenza vaccination  -     Influenza - Quadrivalent (3 years & older) (PF)    Need for 23-polyvalent pneumococcal polysaccharide vaccine  -     (In Office Administered) Pneumococcal Polysaccharide Vaccine (23 Valent) (SQ/IM)      Follow-up in about 6 months (around 5/27/2019) for to establish care with PCP.

## 2018-11-27 NOTE — PROGRESS NOTES
Identified pt using name and . FluZone Quad was administered in left deltoid. Sterile technique was used. No bleeding noted at injection site. Pneumococal 23 vaccine was administered in right deltoid. Sterile technique was used. No bleeding noted at injection site.

## 2018-11-28 PROBLEM — R79.1 SUPRATHERAPEUTIC INTERNATIONAL NORMALIZED RATIO (INR): Status: RESOLVED | Noted: 2017-03-02 | Resolved: 2018-11-28

## 2018-11-28 PROBLEM — K31.84 GASTROPARESIS: Status: ACTIVE | Noted: 2018-11-28

## 2018-11-28 PROBLEM — E78.2 MIXED HYPERLIPIDEMIA: Status: ACTIVE | Noted: 2018-11-28

## 2018-11-28 PROBLEM — Z72.0 TOBACCO USE: Status: ACTIVE | Noted: 2018-11-28

## 2018-11-28 RX ORDER — DIVALPROEX SODIUM 500 MG/1
TABLET, FILM COATED, EXTENDED RELEASE ORAL
Qty: 270 TABLET | Refills: 0 | Status: SHIPPED | OUTPATIENT
Start: 2018-11-28 | End: 2019-02-25 | Stop reason: SDUPTHER

## 2018-11-28 RX ORDER — DIVALPROEX SODIUM 500 MG/1
TABLET, FILM COATED, EXTENDED RELEASE ORAL
Qty: 90 TABLET | Refills: 0 | Status: SHIPPED | OUTPATIENT
Start: 2018-11-28 | End: 2018-11-28 | Stop reason: SDUPTHER

## 2018-12-16 DIAGNOSIS — M79.2 NEUROPATHIC PAIN: ICD-10-CM

## 2018-12-17 RX ORDER — GABAPENTIN 800 MG/1
TABLET ORAL
Qty: 60 TABLET | Refills: 0 | Status: SHIPPED | OUTPATIENT
Start: 2018-12-17 | End: 2019-01-15 | Stop reason: SDUPTHER

## 2019-01-15 DIAGNOSIS — M79.2 NEUROPATHIC PAIN: ICD-10-CM

## 2019-01-15 RX ORDER — PANTOPRAZOLE SODIUM 40 MG/1
40 TABLET, DELAYED RELEASE ORAL DAILY
Qty: 90 TABLET | Refills: 0 | Status: SHIPPED | OUTPATIENT
Start: 2019-01-15 | End: 2019-02-25

## 2019-01-15 RX ORDER — GABAPENTIN 800 MG/1
TABLET ORAL
Qty: 60 TABLET | Refills: 0 | Status: SHIPPED | OUTPATIENT
Start: 2019-01-15 | End: 2019-02-19 | Stop reason: SDUPTHER

## 2019-01-15 NOTE — PROGRESS NOTES
Refill Authorization Note     is requesting a refill authorization.    Brief assessment and rationale for refill: DEFER: Complicated GERD w/ CKD  Amount/Quantity of medication ordered: 90d         Refills Authorized: Yes  If authorized number of refills: 0        Medication-related problems identified:   Drug-drug interaction  Medication education needs  Medication Therapy Plan: Recently seen by you; commented Dr. Colin is following the pt; of note pt also has conflicting strengths of divalproex on the chart.  Unclear what is the correct dosage as patient reports 2g total daily dose but it is consistency prescribed as 1500 mg total daily dose  Name and strength of medication: pantoprazole (PROTONIX) 40 MG tablet  How patient will take medication: t1t to daily   Medication reconciliation completed: No        Comments:   Lab Results   Component Value Date    WBC 9.68 10/05/2018    HGB 12.6 10/05/2018    RBC 3.80 (L) 10/05/2018    HCT 38.2 10/05/2018     (H) 10/05/2018     (L) 10/05/2018     Lab Results   Component Value Date    MG 1.9 06/29/2018     Lab Results   Component Value Date    [id number] 729 09/08/2015

## 2019-01-21 DIAGNOSIS — F31.81 BIPOLAR 2 DISORDER: Chronic | ICD-10-CM

## 2019-01-21 DIAGNOSIS — K31.84 GASTROPARESIS: ICD-10-CM

## 2019-01-21 RX ORDER — ARIPIPRAZOLE 10 MG/1
TABLET ORAL
Qty: 90 TABLET | Refills: 0 | OUTPATIENT
Start: 2019-01-21

## 2019-01-21 NOTE — TELEPHONE ENCOUNTER
Pt is requesting a refill request of Abilify 10 mg.  Has not been seen since 2/18. No appts currently scheduled.  Please reach out to the patient to offer to schedule follow up appt and explain that she will need to be seen for med refills.  If she schedules, I can bridge her Rx, but we will need to first confirm her current psychiatric meds - thank you

## 2019-01-22 ENCOUNTER — PATIENT MESSAGE (OUTPATIENT)
Dept: PSYCHIATRY | Facility: CLINIC | Age: 39
End: 2019-01-22

## 2019-01-22 NOTE — TELEPHONE ENCOUNTER
Tried to call patient again and no answer and unable to leave a voice mail as it was full.  Sent patient my chart message informing her of needing to make an appointment. Refilled denied

## 2019-01-30 ENCOUNTER — TELEPHONE (OUTPATIENT)
Dept: SPINE | Facility: CLINIC | Age: 39
End: 2019-01-30

## 2019-01-30 NOTE — TELEPHONE ENCOUNTER
----- Message from Sarina Miller sent at 2019  9:38 AM CST -----  Contact: Attention Point request  More Detail >>  Appointment Request  Cta Denson  MRN: [id number] : 1980  Pt Home: [phone]    Entered: [phone]      Sent:  10:52 AM  To: [facility]  Message     ----- Message from [facility], System Message sent at 2019 10:52 AM CST -----    Appointment Request From: Cat Denson    With Provider: Cy    Preferred Date Range: Any    Preferred Times: Any time    Reason for visit: Existing Patient    Comments:  Talk about plaquinel

## 2019-02-18 ENCOUNTER — PATIENT MESSAGE (OUTPATIENT)
Dept: FAMILY MEDICINE | Facility: CLINIC | Age: 39
End: 2019-02-18

## 2019-02-19 DIAGNOSIS — M79.2 NEUROPATHIC PAIN: ICD-10-CM

## 2019-02-19 NOTE — PROGRESS NOTES
Refill Authorization Note     is requesting a refill authorization.    Brief assessment and rationale for refill: ROUTE: op  Name and strength of medication: rivaroxaban (XARELTO) 20 mg Tab  Medication-related problems identified: Non-adherence (knowledge deficit) non-intentional         Medication reconciliation completed: No                         Comments: APPOINTMENTS (past 12 m or future 3m authorizing provider)  LAST VISIT DATE  Neela Barrett MD Visit date not found         NEXT VISIT DATE  Neela Barrett MD 2/25/2019

## 2019-02-21 RX ORDER — GABAPENTIN 800 MG/1
TABLET ORAL
Qty: 60 TABLET | Refills: 0 | Status: SHIPPED | OUTPATIENT
Start: 2019-02-21 | End: 2019-02-25

## 2019-02-25 ENCOUNTER — LAB VISIT (OUTPATIENT)
Dept: LAB | Facility: HOSPITAL | Age: 39
End: 2019-02-25
Attending: INTERNAL MEDICINE
Payer: MEDICARE

## 2019-02-25 ENCOUNTER — OFFICE VISIT (OUTPATIENT)
Dept: FAMILY MEDICINE | Facility: CLINIC | Age: 39
End: 2019-02-25
Payer: MEDICARE

## 2019-02-25 VITALS
SYSTOLIC BLOOD PRESSURE: 109 MMHG | DIASTOLIC BLOOD PRESSURE: 67 MMHG | RESPIRATION RATE: 12 BRPM | HEIGHT: 63 IN | OXYGEN SATURATION: 98 % | TEMPERATURE: 98 F | HEART RATE: 54 BPM | BODY MASS INDEX: 21.48 KG/M2 | WEIGHT: 121.25 LBS

## 2019-02-25 DIAGNOSIS — E56.9 VITAMIN DEFICIENCY: ICD-10-CM

## 2019-02-25 DIAGNOSIS — B19.9: ICD-10-CM

## 2019-02-25 DIAGNOSIS — Z79.01 LONG TERM CURRENT USE OF ANTICOAGULANT THERAPY: ICD-10-CM

## 2019-02-25 DIAGNOSIS — E46 PROTEIN-CALORIE MALNUTRITION, UNSPECIFIED SEVERITY: ICD-10-CM

## 2019-02-25 DIAGNOSIS — N18.30 CKD (CHRONIC KIDNEY DISEASE) STAGE 3, GFR 30-59 ML/MIN: Chronic | ICD-10-CM

## 2019-02-25 DIAGNOSIS — R79.83 HOMOCYSTEINEMIA: ICD-10-CM

## 2019-02-25 DIAGNOSIS — M79.2 NEUROPATHIC PAIN: ICD-10-CM

## 2019-02-25 DIAGNOSIS — K31.84 GASTROPARESIS: ICD-10-CM

## 2019-02-25 DIAGNOSIS — F31.81 BIPOLAR 2 DISORDER: Chronic | ICD-10-CM

## 2019-02-25 DIAGNOSIS — Z86.73 HISTORY OF RIGHT MCA STROKE: Chronic | ICD-10-CM

## 2019-02-25 DIAGNOSIS — N18.30 CHRONIC KIDNEY DISEASE, STAGE III (MODERATE): ICD-10-CM

## 2019-02-25 DIAGNOSIS — E55.9 VITAMIN D DEFICIENCY: ICD-10-CM

## 2019-02-25 DIAGNOSIS — M32.9 SYSTEMIC LUPUS ERYTHEMATOSUS, UNSPECIFIED SLE TYPE, UNSPECIFIED ORGAN INVOLVEMENT STATUS: Primary | ICD-10-CM

## 2019-02-25 DIAGNOSIS — I69.354 HEMIPARESIS AFFECTING LEFT SIDE AS LATE EFFECT OF CEREBROVASCULAR ACCIDENT: ICD-10-CM

## 2019-02-25 DIAGNOSIS — E78.2 MIXED HYPERLIPIDEMIA: ICD-10-CM

## 2019-02-25 DIAGNOSIS — D50.9 IRON DEFICIENCY ANEMIA, UNSPECIFIED IRON DEFICIENCY ANEMIA TYPE: ICD-10-CM

## 2019-02-25 DIAGNOSIS — K21.9 GASTROESOPHAGEAL REFLUX DISEASE WITHOUT ESOPHAGITIS: ICD-10-CM

## 2019-02-25 PROBLEM — N17.9 ACUTE KIDNEY INJURY SUPERIMPOSED ON CHRONIC KIDNEY DISEASE: Status: RESOLVED | Noted: 2018-09-14 | Resolved: 2019-02-25

## 2019-02-25 PROBLEM — E87.20 METABOLIC ACIDOSIS: Status: RESOLVED | Noted: 2017-03-02 | Resolved: 2019-02-25

## 2019-02-25 PROBLEM — N39.0 UTI (URINARY TRACT INFECTION): Status: RESOLVED | Noted: 2018-06-28 | Resolved: 2019-02-25

## 2019-02-25 PROBLEM — N18.9 ACUTE KIDNEY INJURY SUPERIMPOSED ON CHRONIC KIDNEY DISEASE: Status: RESOLVED | Noted: 2018-09-14 | Resolved: 2019-02-25

## 2019-02-25 LAB
ALBUMIN SERPL BCP-MCNC: 3.5 G/DL
ANION GAP SERPL CALC-SCNC: 11 MMOL/L
BUN SERPL-MCNC: 14 MG/DL
CALCIUM SERPL-MCNC: 8.8 MG/DL
CHLORIDE SERPL-SCNC: 106 MMOL/L
CO2 SERPL-SCNC: 21 MMOL/L
CREAT SERPL-MCNC: 1.7 MG/DL
EST. GFR  (AFRICAN AMERICAN): 43.8 ML/MIN/1.73 M^2
EST. GFR  (NON AFRICAN AMERICAN): 38 ML/MIN/1.73 M^2
GLUCOSE SERPL-MCNC: 72 MG/DL
PHOSPHATE SERPL-MCNC: 3.5 MG/DL
POTASSIUM SERPL-SCNC: 4.3 MMOL/L
PTH-INTACT SERPL-MCNC: 107 PG/ML
SODIUM SERPL-SCNC: 138 MMOL/L

## 2019-02-25 PROCEDURE — 83970 ASSAY OF PARATHORMONE: CPT

## 2019-02-25 PROCEDURE — 99999 PR PBB SHADOW E&M-EST. PATIENT-LVL III: ICD-10-PCS | Mod: PBBFAC,,, | Performed by: FAMILY MEDICINE

## 2019-02-25 PROCEDURE — 80069 RENAL FUNCTION PANEL: CPT

## 2019-02-25 PROCEDURE — 36415 COLL VENOUS BLD VENIPUNCTURE: CPT | Mod: PO

## 2019-02-25 PROCEDURE — 99214 PR OFFICE/OUTPT VISIT, EST, LEVL IV, 30-39 MIN: ICD-10-PCS | Mod: S$GLB,,, | Performed by: FAMILY MEDICINE

## 2019-02-25 PROCEDURE — 99499 RISK ADDL DX/OHS AUDIT: ICD-10-PCS | Mod: S$GLB,,, | Performed by: FAMILY MEDICINE

## 2019-02-25 PROCEDURE — 99999 PR PBB SHADOW E&M-EST. PATIENT-LVL III: CPT | Mod: PBBFAC,,, | Performed by: FAMILY MEDICINE

## 2019-02-25 PROCEDURE — 3008F PR BODY MASS INDEX (BMI) DOCUMENTED: ICD-10-PCS | Mod: CPTII,S$GLB,, | Performed by: FAMILY MEDICINE

## 2019-02-25 PROCEDURE — 99214 OFFICE O/P EST MOD 30 MIN: CPT | Mod: S$GLB,,, | Performed by: FAMILY MEDICINE

## 2019-02-25 PROCEDURE — 99499 UNLISTED E&M SERVICE: CPT | Mod: S$GLB,,, | Performed by: FAMILY MEDICINE

## 2019-02-25 PROCEDURE — 3008F BODY MASS INDEX DOCD: CPT | Mod: CPTII,S$GLB,, | Performed by: FAMILY MEDICINE

## 2019-02-25 RX ORDER — GABAPENTIN 800 MG/1
TABLET ORAL
Qty: 180 TABLET | Refills: 3 | Status: SHIPPED | OUTPATIENT
Start: 2019-02-25 | End: 2020-03-03 | Stop reason: SDUPTHER

## 2019-02-25 RX ORDER — OXYBUTYNIN CHLORIDE 10 MG/1
10 TABLET, EXTENDED RELEASE ORAL DAILY
Qty: 90 TABLET | Refills: 3 | Status: SHIPPED | OUTPATIENT
Start: 2019-02-25 | End: 2019-11-04 | Stop reason: DRUGHIGH

## 2019-02-25 RX ORDER — ATORVASTATIN CALCIUM 10 MG/1
10 TABLET, FILM COATED ORAL DAILY
Qty: 90 TABLET | Refills: 3 | Status: ON HOLD | OUTPATIENT
Start: 2019-02-25 | End: 2022-10-08

## 2019-02-25 RX ORDER — PANTOPRAZOLE SODIUM 40 MG/1
40 TABLET, DELAYED RELEASE ORAL DAILY
Qty: 90 TABLET | Refills: 3 | Status: SHIPPED | OUTPATIENT
Start: 2019-02-25 | End: 2019-05-10 | Stop reason: SDUPTHER

## 2019-02-25 RX ORDER — FOLIC ACID 1 MG/1
2000 TABLET ORAL DAILY
Qty: 180 TABLET | Refills: 3 | Status: SHIPPED | OUTPATIENT
Start: 2019-02-25 | End: 2019-12-17

## 2019-02-25 NOTE — PROGRESS NOTES
Subjective:       Patient ID: Cat Denson is a 39 y.o. female.    Chief Complaint: Establish Care    HPI  Review of Systems   Constitutional: Negative for fatigue and unexpected weight change.   Respiratory: Negative for chest tightness and shortness of breath.    Cardiovascular: Negative for chest pain, palpitations and leg swelling.   Gastrointestinal: Negative for abdominal pain.   Musculoskeletal: Negative for arthralgias.   Neurological: Negative for dizziness, syncope, light-headedness and headaches.       Patient Active Problem List   Diagnosis    Chronic pain    Bipolar 2 disorder    Fibromyalgia    Lupus (systemic lupus erythematosus)    Seizure    Chronic asthma without complication    Hemiparesis affecting left side as late effect of cerebrovascular accident    History of right MCA stroke    Hemiplegic migraine    CKD (chronic kidney disease) stage 3, GFR 30-59 ml/min    Gastroesophageal reflux disease without esophagitis    Long term current use of anticoagulant therapy    Functional urinary incontinence    Primary insomnia    Iron deficiency anemia    Long-term use of hydroxychloroquine    Hepatitis, fulminant    Cigarette nicotine dependence without complication    History of fracture of left ankle 11/16    Homocysteinemia    Recurrent seizures    Complex partial epilepsy with generalization and with intractable epilepsy    Chronic daily headache    Osteopenia    Mixed hyperlipidemia    Gastroparesis    Tobacco use     Patient is here for a chronic conditions follow up.    H/o grand mal seizures- on depakote . Started > 13 years ago.  Also h/o psuedoseizures.  Was in inpatient epilepsy monitoring unit 9/18. That was last sz activity. Neurology Dr. Norman    Psych Dr. Cruz- bipolar 2.      Rheum Dr. Benoit- SLE    Has had CVA 2015-embolic from heart.  On xarelto since then.      Nephrology Dr. Lovell CKD stage 3    Optometry Dr. Buchanan  Objective:      Physical Exam    Constitutional: She is oriented to person, place, and time. She appears well-developed and well-nourished.   Cardiovascular: Normal rate, regular rhythm and normal heart sounds.   Pulmonary/Chest: Effort normal and breath sounds normal.   Musculoskeletal: She exhibits no edema.   Neurological: She is alert and oriented to person, place, and time.   Skin: Skin is warm and dry.   Psychiatric: She has a normal mood and affect.   Nursing note and vitals reviewed.      Assessment:       1. Systemic lupus erythematosus, unspecified SLE type, unspecified organ involvement status    2. Long term current use of anticoagulant therapy    3. Iron deficiency anemia, unspecified iron deficiency anemia type    4. Homocysteinemia    5. Gastroesophageal reflux disease without esophagitis    6. Hepatitis, fulminant    7. Bipolar 2 disorder    8. Mixed hyperlipidemia    9. CKD (chronic kidney disease) stage 3, GFR 30-59 ml/min    10. History of right MCA stroke    11. Hemiparesis affecting left side as late effect of cerebrovascular accident    12. Gastroparesis    13. Neuropathic pain    14. Vitamin deficiency    15. Protein-calorie malnutrition, unspecified severity     16. Vitamin D deficiency        Plan:         1. Systemic lupus erythematosus, unspecified SLE type, unspecified organ involvement status  Cont rheum care    2. Long term current use of anticoagulant therapy  Cont monitoring. Bleeding precautions    3. Iron deficiency anemia, unspecified iron deficiency anemia type  Screen and treat as indicated:    - Iron and TIBC; Future  - Ferritin; Future    4. Homocysteinemia  Screen and treat as indicated:    - Homocysteine, serum; Future    5. Gastroesophageal reflux disease without esophagitis  Controlled on current medications.  Continue current medications.      6. Hepatitis, fulminant  Resolved. Cont to monitor    7. Bipolar 2 disorder  Cont psych care    8. Mixed hyperlipidemia  Stable condition.  Continue current  medications.  Will adjust based on lab findings or if condition changes.    - Lipid panel; Future  - Comprehensive metabolic panel; Future    9. CKD (chronic kidney disease) stage 3, GFR 30-59 ml/min  Stable and chronic.  Will continue to monitor q3-6 months and control chronic conditions as optimally as possible to preserve function.      10. History of right MCA stroke  Stable and chronic residual    11. Hemiparesis affecting left side as late effect of cerebrovascular accident  Chronic and stable residual    12. Gastroparesis  continue  - ranitidine (ZANTAC) 300 MG tablet; Take 1 tablet (300 mg total) by mouth nightly.  Dispense: 90 tablet; Refill: 3    13. Neuropathic pain  continue  - gabapentin (NEURONTIN) 800 MG tablet; TAKE 1 TABLET(800 MG) BY MOUTH TWICE DAILY  Dispense: 180 tablet; Refill: 3    14. Vitamin deficiency  Screen and treat as indicated:    - Folate; Future  - Vitamin B12; Future  - Vitamin D; Future    15. Protein-calorie malnutrition, unspecified severity   Screen and treat as indicated:    - Folate; Future  - Vitamin B12; Future    16. Vitamin D deficiency  Screen and treat as indicated:    - Vitamin D; Future    Time spent with patient: 20 minutes    Patient with be reevaluated in  3 and 6 months or sooner prn    Greater than 50% of this visit was spent counseling as described in above documentation:Yes

## 2019-03-21 DIAGNOSIS — F31.81 BIPOLAR 2 DISORDER: Chronic | ICD-10-CM

## 2019-03-21 DIAGNOSIS — Z79.899 HIGH RISK MEDICATION USE: Primary | ICD-10-CM

## 2019-03-21 RX ORDER — ARIPIPRAZOLE 10 MG/1
TABLET ORAL
Qty: 30 TABLET | Refills: 0 | Status: SHIPPED | OUTPATIENT
Start: 2019-03-21 | End: 2019-05-10 | Stop reason: SDUPTHER

## 2019-03-25 ENCOUNTER — LAB VISIT (OUTPATIENT)
Dept: LAB | Facility: HOSPITAL | Age: 39
End: 2019-03-25
Attending: PSYCHIATRY & NEUROLOGY
Payer: MEDICARE

## 2019-03-25 DIAGNOSIS — Z79.899 HIGH RISK MEDICATION USE: ICD-10-CM

## 2019-03-25 LAB
ALBUMIN SERPL BCP-MCNC: 3.7 G/DL (ref 3.5–5.2)
ALP SERPL-CCNC: 87 U/L (ref 55–135)
ALT SERPL W/O P-5'-P-CCNC: 19 U/L (ref 10–44)
ANION GAP SERPL CALC-SCNC: 10 MMOL/L (ref 8–16)
AST SERPL-CCNC: 23 U/L (ref 10–40)
BASOPHILS # BLD AUTO: 0.06 K/UL (ref 0–0.2)
BASOPHILS NFR BLD: 1 % (ref 0–1.9)
BILIRUB SERPL-MCNC: 0.3 MG/DL (ref 0.1–1)
BUN SERPL-MCNC: 12 MG/DL (ref 6–20)
CALCIUM SERPL-MCNC: 9.3 MG/DL (ref 8.7–10.5)
CHLORIDE SERPL-SCNC: 110 MMOL/L (ref 95–110)
CO2 SERPL-SCNC: 20 MMOL/L (ref 23–29)
CREAT SERPL-MCNC: 1.5 MG/DL (ref 0.5–1.4)
DIFFERENTIAL METHOD: ABNORMAL
EOSINOPHIL # BLD AUTO: 0.1 K/UL (ref 0–0.5)
EOSINOPHIL NFR BLD: 1.5 % (ref 0–8)
ERYTHROCYTE [DISTWIDTH] IN BLOOD BY AUTOMATED COUNT: 12.4 % (ref 11.5–14.5)
EST. GFR  (AFRICAN AMERICAN): 50.9 ML/MIN/1.73 M^2
EST. GFR  (NON AFRICAN AMERICAN): 44.2 ML/MIN/1.73 M^2
ESTIMATED AVG GLUCOSE: 97 MG/DL (ref 68–131)
GLUCOSE SERPL-MCNC: 80 MG/DL (ref 70–110)
HBA1C MFR BLD HPLC: 5 % (ref 4–5.6)
HCT VFR BLD AUTO: 41.1 % (ref 37–48.5)
HGB BLD-MCNC: 12.7 G/DL (ref 12–16)
IMM GRANULOCYTES # BLD AUTO: 0.02 K/UL (ref 0–0.04)
IMM GRANULOCYTES NFR BLD AUTO: 0.3 % (ref 0–0.5)
LYMPHOCYTES # BLD AUTO: 2 K/UL (ref 1–4.8)
LYMPHOCYTES NFR BLD: 32.8 % (ref 18–48)
MCH RBC QN AUTO: 33 PG (ref 27–31)
MCHC RBC AUTO-ENTMCNC: 30.9 G/DL (ref 32–36)
MCV RBC AUTO: 107 FL (ref 82–98)
MONOCYTES # BLD AUTO: 0.5 K/UL (ref 0.3–1)
MONOCYTES NFR BLD: 7.5 % (ref 4–15)
NEUTROPHILS # BLD AUTO: 3.4 K/UL (ref 1.8–7.7)
NEUTROPHILS NFR BLD: 56.9 % (ref 38–73)
NRBC BLD-RTO: 0 /100 WBC
PLATELET # BLD AUTO: 185 K/UL (ref 150–350)
PMV BLD AUTO: 11.8 FL (ref 9.2–12.9)
POTASSIUM SERPL-SCNC: 4.3 MMOL/L (ref 3.5–5.1)
PROT SERPL-MCNC: 7.3 G/DL (ref 6–8.4)
RBC # BLD AUTO: 3.85 M/UL (ref 4–5.4)
SODIUM SERPL-SCNC: 140 MMOL/L (ref 136–145)
VALPROATE SERPL-MCNC: 76.4 UG/ML (ref 50–100)
WBC # BLD AUTO: 5.98 K/UL (ref 3.9–12.7)

## 2019-03-25 PROCEDURE — 36415 COLL VENOUS BLD VENIPUNCTURE: CPT

## 2019-03-25 PROCEDURE — 83036 HEMOGLOBIN GLYCOSYLATED A1C: CPT

## 2019-03-25 PROCEDURE — 80053 COMPREHEN METABOLIC PANEL: CPT

## 2019-03-25 PROCEDURE — 85025 COMPLETE CBC W/AUTO DIFF WBC: CPT

## 2019-03-25 PROCEDURE — 80164 ASSAY DIPROPYLACETIC ACD TOT: CPT

## 2019-03-26 ENCOUNTER — TELEPHONE (OUTPATIENT)
Dept: PSYCHIATRY | Facility: CLINIC | Age: 39
End: 2019-03-26

## 2019-03-26 NOTE — TELEPHONE ENCOUNTER
Please advise the patient:   I have reviewed and released her lab results on My Chart.       1. hemoglobin A1c is within normal limits. (3 month blood sugar average).   2.  Metabolic panel:  kidney function is reduced, but stable.   3. Depakote level is in normal/good range.   4. CBC -  white blood cells and platelets are normal, there is a mild decrease in her red blood cells and her red blood cell volume is increased.  I recommend discussing this more in depth with her PCP.

## 2019-04-18 RX ORDER — PANTOPRAZOLE SODIUM 40 MG/1
TABLET, DELAYED RELEASE ORAL
Qty: 90 TABLET | Refills: 3 | Status: SHIPPED | OUTPATIENT
Start: 2019-04-18 | End: 2020-03-03 | Stop reason: SDUPTHER

## 2019-04-18 RX ORDER — DIVALPROEX SODIUM 500 MG/1
TABLET, FILM COATED, EXTENDED RELEASE ORAL
Qty: 270 TABLET | Refills: 0 | Status: SHIPPED | OUTPATIENT
Start: 2019-04-18 | End: 2019-05-10 | Stop reason: DRUGHIGH

## 2019-05-10 ENCOUNTER — OFFICE VISIT (OUTPATIENT)
Dept: PSYCHIATRY | Facility: CLINIC | Age: 39
End: 2019-05-10
Payer: COMMERCIAL

## 2019-05-10 VITALS
DIASTOLIC BLOOD PRESSURE: 57 MMHG | WEIGHT: 114 LBS | BODY MASS INDEX: 20.2 KG/M2 | HEIGHT: 63 IN | HEART RATE: 58 BPM | SYSTOLIC BLOOD PRESSURE: 109 MMHG

## 2019-05-10 DIAGNOSIS — F12.20 CANNABIS USE DISORDER, MODERATE, DEPENDENCE: ICD-10-CM

## 2019-05-10 DIAGNOSIS — F41.9 ANXIETY: ICD-10-CM

## 2019-05-10 DIAGNOSIS — K31.84 GASTROPARESIS: ICD-10-CM

## 2019-05-10 DIAGNOSIS — F31.9 BIPOLAR 1 DISORDER, DEPRESSED: ICD-10-CM

## 2019-05-10 PROCEDURE — 99214 PR OFFICE/OUTPT VISIT, EST, LEVL IV, 30-39 MIN: ICD-10-PCS | Mod: S$GLB,,, | Performed by: PSYCHIATRY & NEUROLOGY

## 2019-05-10 PROCEDURE — 99499 UNLISTED E&M SERVICE: CPT | Mod: S$GLB,,, | Performed by: PSYCHIATRY & NEUROLOGY

## 2019-05-10 PROCEDURE — 99214 OFFICE O/P EST MOD 30 MIN: CPT | Mod: S$GLB,,, | Performed by: PSYCHIATRY & NEUROLOGY

## 2019-05-10 PROCEDURE — 3008F BODY MASS INDEX DOCD: CPT | Mod: CPTII,S$GLB,, | Performed by: PSYCHIATRY & NEUROLOGY

## 2019-05-10 PROCEDURE — 99999 PR PBB SHADOW E&M-EST. PATIENT-LVL III: CPT | Mod: PBBFAC,,, | Performed by: PSYCHIATRY & NEUROLOGY

## 2019-05-10 PROCEDURE — 3008F PR BODY MASS INDEX (BMI) DOCUMENTED: ICD-10-PCS | Mod: CPTII,S$GLB,, | Performed by: PSYCHIATRY & NEUROLOGY

## 2019-05-10 PROCEDURE — 99499 RISK ADDL DX/OHS AUDIT: ICD-10-PCS | Mod: S$GLB,,, | Performed by: PSYCHIATRY & NEUROLOGY

## 2019-05-10 PROCEDURE — 99999 PR PBB SHADOW E&M-EST. PATIENT-LVL III: ICD-10-PCS | Mod: PBBFAC,,, | Performed by: PSYCHIATRY & NEUROLOGY

## 2019-05-10 RX ORDER — DIVALPROEX SODIUM 500 MG/1
TABLET, FILM COATED, EXTENDED RELEASE ORAL
Qty: 60 TABLET | Refills: 2 | Status: SHIPPED | OUTPATIENT
Start: 2019-05-10 | End: 2019-07-03 | Stop reason: SDUPTHER

## 2019-05-10 RX ORDER — ARIPIPRAZOLE 10 MG/1
TABLET ORAL
Qty: 30 TABLET | Refills: 2 | Status: SHIPPED | OUTPATIENT
Start: 2019-05-10 | End: 2019-07-03 | Stop reason: DRUGHIGH

## 2019-05-10 RX ORDER — MIRTAZAPINE 15 MG/1
15 TABLET, FILM COATED ORAL NIGHTLY
Qty: 30 TABLET | Refills: 2 | Status: SHIPPED | OUTPATIENT
Start: 2019-05-10 | End: 2019-07-03 | Stop reason: SDUPTHER

## 2019-05-10 RX ORDER — ARIPIPRAZOLE 10 MG/1
10 TABLET ORAL
COMMUNITY
End: 2019-05-10 | Stop reason: SDUPTHER

## 2019-05-10 NOTE — PROGRESS NOTES
Outpatient Psychiatry Follow-Up Visit (MD/NP)    5/10/2019    Clinical Status of Patient:  Outpatient (Ambulatory)    Chief Complaint:  Cat Denson is a 39 y.o. female who presents today for follow-up of mood disorder and anxiety.  Met with patient alone  Interval History and Content of Current Session:  Interim Events/Subjective Report/Content of Current Session:  38 yo disabled female presents to clinic for follow up treatment of  follow up appt of bipolar I disorder, panic disorder, cannabis use disorder, and cognitive disorder.   She has a prior hx of chronic pain, nausea, and emesis.  Has been having health issues since she was a child, dx with Lupus at 10 yo. She missed a lot of school.  Has impulsive tendencies with hx of cutting, shaving her head (shaved today).  She has prior hx of suicide attempt at 20 yo.  Multiple prior admissions for depression, SI.   Pt had a CVA in November 2015 - Embolic stroke involving right middle cerebral artery; she is hypercoagulable due to lupus.  She is currently taking Coumadin.  Pt has residual left hemiparesis.   She has had balance problems, forgetfulness, and severe focusing issue.    She is under the care of Rheumatology - Dr Benoit, Neurology, PCP: Hector Dukes MD    Neuropsychological testing 2/17:   Personality test data suggested the presence of mild depression. Neuropsychological test results reveal impairment in immediate and delayed visual memory, visual attention, temporal orientation, visuospatial/constructional abilities, abstract reasoning, psychomotor speed, and mental flexibility; and variability in immediate auditory/verbal memory (high average and mildly impaired performances) and verbal fluency (low average and moderately impaired performances); with mild depression. The pattern of impairment is consistent with right MCA CVA. She will continue to see Dr. Cruz and Mrs. Wiley for psychiatric care. She was encouraged to resume PT, OT, and  Speech Therapy for further stroke rehabilitation.     Past Psychiatric Hx:  Dx Bipolar 2 Disorder (dx over 10 yrs ago), Anxiety, Impulse Control Disorder, hx pseudoseizures with possible epileptic seizures   Prior IOP: [facility] [facility]   Hx Suicide attempt at 20 yo  Hx suicidal ideations with admit to [facility] January 2015, [facility] for SI 2015,   First admit: 26 yo: for SI - [facility]; most recent [facility]. 2017 - for w/d affect, psychosis, poor memory recall, not sleeping x days  5 prior psych admissions  Hx self harm at age 17 yo     Past psychiatric hx: Wellbutrin for smoking cessation, helped to reduce, Zoloft, Trazodone (taken off due to tiredness one month ago), Lexapro, Paxil, Cymbalta (no pain benefit), Geodon, Trileptal, Lamictal, Seroquel, Chantix (makes her vomit), Olanzapine, Risperdal, Effexor, ritalin, depakote (was taken off ? Cannot recall why)    Past medical history:  Lupus   Sjogren's   CKD  Fibromyalgia, chronic pain   Seizure Disorder   Bronchospasm   Hx CVA  Gastroparesis   Fibromyalgia     Interim Hx:   Pt last seen 2/18.  Pt had a period of noncompliance in interim.  Off of Abilify for months.  Has remained of Depakote.   Recently restarted Abilify. Presents today with make friend who used to be her boyfriend (he remained in [employer]).  Pt still living in [place] with GF - spends a lot of time watching her daughter when partner is working - which is hard on her.   Pt reports her health is ok.   Admitted to [facility] in 10/18 - depakote is used to also control seizures.  Most recent level:   Lab Results   Component Value Date    VALPROATE 76.4 03/25/2019     Chief complaint is fatigue. A little down lately. Out of Abilify just this week.  Does well on this med combo, but has noticeable difference off of it.   Sleeps during day 2-3 naps daily for hrs. Appetite is stable. Self care is decreased.   She denies depressed mood.  Denies suicidal/homicidal ideations.   Has occasional fleeting thoughts of suicide   Appetite is improved.   Uses marijuana daily per patient   No self harm.  No violence.  No further seizures since titration of Depakote.   No recent panic attacks.  Denies symptoms of bowen/psychosis.   No significant irritable, no anger, no violence.   She is under the care of Monique Deleon LCSW - has not seen recently     Uses marijuana dauly     Psychotherapy:   · Target symptoms: depression, anxiety  · Why chosen therapy is appropriate versus another modality: relevant to diagnosis, patient responds to this modality  · Outcome monitoring methods: self-report, observation  · Therapeutic intervention type: supportive psychotherapy, brief CBT  · Topics discussed/themes: building skills sets for symptom management, symptom recognition, substance concerns,   · The patient's response to the intervention is accepting. The patient's progress toward treatment goals is positive progress.  · Duration of intervention: 20 minutes    Review of Systems   · PSYCHIATRIC: Pertinant items are noted in the narrative.  · CONSTITUTIONAL: weight gain   · MUSCULOSKELETAL: back and LE pain improved 0/10   · CV: no chest pain, no tachycardia  · NEURO: left HP, + seizures    Past Medical, Family and Social History: The patient's past medical, family and social history have been reviewed and updated as appropriate within the electronic medical record - see encounter notes.    Medications:   Abilify 10 mg daily   Depakote ER 1000 mg nightly   Mirtazapine 15 mg nightly - helps GI ussue     Compliance:  Depakote previously increased by Neurologist, pt had period of time when he had lost all historical lab works     Side effects:  fatigue    Risk Parameters:  Patient reports no current suicidal ideation  Patient reports no homicidal ideation  Patient reports no self-injurious behavior  Patient reports no violent behavior    Exam (detailed: at least 9 elements; comprehensive: all 15 elements)  "  Constitutional  Vitals:  Most recent vital signs, dated less than 90 days prior to this appointment, were reviewed.   Vitals:    05/10/19 1036   BP: (!) 109/57   Pulse: (!) 58   Weight: 51.7 kg (113 lb 15.7 oz)   Height: 5' 3" (1.6 m)   repeat       General:  age appropriate, dark clothes, adequate grooming, fair eye contact     Musculoskeletal  Muscle Strength/Tone:  Slight right tremor with action    Gait & Station:  Ataxic      Psychiatric  Speech:  no latency; no press, decreased spontaneity    Mood & Affect:  "ok"  Blunted    Thought Process:  Linear with questions, somewhat slowed    Associations:  intact   Thought Content:  no current suicidality, no homicidality, delusions, or paranoia   Insight:  Fair    Judgement: Fair    Orientation:  Grossly intact for content of interview, alert and oriented x 3    Memory: fair recall of recent hx   Language: grossly intact, can repeat    Attention Span & Concentration:  Grossly intact to interview    Fund of Knowledge:  intact and appropriate to age and level of education, familiar with aspects of current personal life      Assessment and Diagnosis   Status/Progress: Based on the examination today, the patient's problem(s) is/are under reasonable control.  New problems have not been presented today.   Co-morbidities are complicating management of the primary condition.      General Impression: pt had CVA Nov 2015 with significantly impaired focus, memory lapses, worsening depression, anxiety - improved with ritalin and on current medications (previously ok'd ritalin use with PCP).  Pt had episode of acute hepatitis, liver enzymes improved, but now with declining renal function; pt also resumed smoking; she is also using marijuana. Recent admission to [facility] and earlier admission for supratherapeutic INR. Mother managing medications now. Pt under care of Neurology; Neuropsychological testing done and consistent with her MCA CVA.    She " continues to smoke tobacco and marijuana.  She complains of poor focus/motivation.  Mother feels pt is not depressed - improved with Abilify. Pt continues to lose weight with persistent n/v.   Given hospitalization earlier this yr with manic symptoms, will change dx to Bipolar 1 disorder which may be more appropriate. Pt using cannabis on daily basis again.  She was hospitalized for seizures in interim.  Pt now taking Depakote BID.  Mood/anxiety symptoms are mostly stable.   Pt presents today following over 2 yr hiatus, with periods of nagitac    Bipolar 1 Disorder, MRE depressed, partial remission   Panic Disorder with Agoraphobia   Cognitive Disorder due to CVA  Insomnia Disorder  Cannabis Use Disorder. Moderate    Lupus, migraines, CVA, hx hepatitis, declining renal function, gastroparesis, dyskinesias, seizure disorder     GAF: 62   Intervention/Counseling/Treatment Plan   · Medication Management: The risks and benefits of medication were discussed with the patient.    - continue Abilify 10 mg daily for now. Typical RAJWINDER's reviewed including weight gain, abnormal movements, EPS, TD, metabolic side effects. May give added benefit for focus issues in addition to use as mood stabilizer; continue to monitor mouth movements which are improving off of Reglan.      - Encourage psychotherapy with Monique Deleon LCSW    - refrain from cannabis use - psychoeducation today about risks of use, pt counseled today on health risks associated with use.     - Pt instructed to go to ER with thoughts of harming self, others; Call to report any worsening of symptoms or problems with the medication    - Continue Depakote ER 1000 mg nightly     - Continue Mirtazapine 15 mg nightly (targetting GI issues she reports)     - recent lab work reviewed with pt     Return to Clinic: 12 weeks or sooner PRN

## 2019-05-13 ENCOUNTER — TELEPHONE (OUTPATIENT)
Dept: PSYCHIATRY | Facility: CLINIC | Age: 39
End: 2019-05-13

## 2019-05-13 NOTE — TELEPHONE ENCOUNTER
I tried to reach the patient's partner Quynh Adames at [phone] for collateral information.  - no answer, did not leave message. IOC obtained.  Will attempt to reach her at a later time.

## 2019-05-14 NOTE — TELEPHONE ENCOUNTER
I tried to reach Quynh Adames ([facility] on chart) for collateral info.  No answer, left brief voicemail requesting call back at her convenience.

## 2019-05-20 ENCOUNTER — LAB VISIT (OUTPATIENT)
Dept: LAB | Facility: HOSPITAL | Age: 39
End: 2019-05-20
Attending: FAMILY MEDICINE
Payer: MEDICARE

## 2019-05-20 DIAGNOSIS — E55.9 VITAMIN D DEFICIENCY: ICD-10-CM

## 2019-05-20 DIAGNOSIS — E46 PROTEIN-CALORIE MALNUTRITION, UNSPECIFIED SEVERITY: ICD-10-CM

## 2019-05-20 DIAGNOSIS — E56.9 VITAMIN DEFICIENCY: ICD-10-CM

## 2019-05-20 DIAGNOSIS — R79.83 HOMOCYSTEINEMIA: ICD-10-CM

## 2019-05-20 DIAGNOSIS — D50.9 IRON DEFICIENCY ANEMIA, UNSPECIFIED IRON DEFICIENCY ANEMIA TYPE: ICD-10-CM

## 2019-05-20 DIAGNOSIS — E78.2 MIXED HYPERLIPIDEMIA: ICD-10-CM

## 2019-05-20 LAB
ALBUMIN SERPL BCP-MCNC: 3.6 G/DL (ref 3.5–5.2)
ALP SERPL-CCNC: 72 U/L (ref 55–135)
ALT SERPL W/O P-5'-P-CCNC: 7 U/L (ref 10–44)
ANION GAP SERPL CALC-SCNC: 9 MMOL/L (ref 8–16)
AST SERPL-CCNC: 16 U/L (ref 10–40)
BILIRUB SERPL-MCNC: 0.3 MG/DL (ref 0.1–1)
BUN SERPL-MCNC: 7 MG/DL (ref 6–20)
CALCIUM SERPL-MCNC: 9 MG/DL (ref 8.7–10.5)
CHLORIDE SERPL-SCNC: 114 MMOL/L (ref 95–110)
CHOLEST SERPL-MCNC: 122 MG/DL (ref 120–199)
CHOLEST/HDLC SERPL: 3.6 {RATIO} (ref 2–5)
CO2 SERPL-SCNC: 18 MMOL/L (ref 23–29)
CREAT SERPL-MCNC: 1.4 MG/DL (ref 0.5–1.4)
EST. GFR  (AFRICAN AMERICAN): 55.4 ML/MIN/1.73 M^2
EST. GFR  (NON AFRICAN AMERICAN): 48 ML/MIN/1.73 M^2
FERRITIN SERPL-MCNC: 134 NG/ML (ref 20–300)
GLUCOSE SERPL-MCNC: 89 MG/DL (ref 70–110)
HDLC SERPL-MCNC: 34 MG/DL (ref 40–75)
HDLC SERPL: 27.9 % (ref 20–50)
IRON SERPL-MCNC: 66 UG/DL (ref 30–160)
LDLC SERPL CALC-MCNC: 70.6 MG/DL (ref 63–159)
NONHDLC SERPL-MCNC: 88 MG/DL
POTASSIUM SERPL-SCNC: 4.4 MMOL/L (ref 3.5–5.1)
PROT SERPL-MCNC: 7 G/DL (ref 6–8.4)
SATURATED IRON: 23 % (ref 20–50)
SODIUM SERPL-SCNC: 141 MMOL/L (ref 136–145)
TOTAL IRON BINDING CAPACITY: 293 UG/DL (ref 250–450)
TRANSFERRIN SERPL-MCNC: 198 MG/DL (ref 200–375)
TRIGL SERPL-MCNC: 87 MG/DL (ref 30–150)

## 2019-05-20 PROCEDURE — 83090 ASSAY OF HOMOCYSTEINE: CPT

## 2019-05-20 PROCEDURE — 82746 ASSAY OF FOLIC ACID SERUM: CPT

## 2019-05-20 PROCEDURE — 82607 VITAMIN B-12: CPT

## 2019-05-20 PROCEDURE — 82306 VITAMIN D 25 HYDROXY: CPT

## 2019-05-20 PROCEDURE — 80061 LIPID PANEL: CPT

## 2019-05-20 PROCEDURE — 82728 ASSAY OF FERRITIN: CPT

## 2019-05-20 PROCEDURE — 36415 COLL VENOUS BLD VENIPUNCTURE: CPT | Mod: PO

## 2019-05-20 PROCEDURE — 80053 COMPREHEN METABOLIC PANEL: CPT

## 2019-05-20 PROCEDURE — 83540 ASSAY OF IRON: CPT

## 2019-05-21 LAB
25(OH)D3+25(OH)D2 SERPL-MCNC: 18 NG/ML (ref 30–96)
FOLATE SERPL-MCNC: 36.7 NG/ML (ref 4–24)
HCYS SERPL-SCNC: 9 UMOL/L (ref 4–15.5)
VIT B12 SERPL-MCNC: 851 PG/ML (ref 210–950)

## 2019-05-28 ENCOUNTER — OFFICE VISIT (OUTPATIENT)
Dept: FAMILY MEDICINE | Facility: CLINIC | Age: 39
End: 2019-05-28
Payer: MEDICARE

## 2019-05-28 ENCOUNTER — LAB VISIT (OUTPATIENT)
Dept: LAB | Facility: HOSPITAL | Age: 39
End: 2019-05-28
Attending: NURSE PRACTITIONER
Payer: MEDICARE

## 2019-05-28 VITALS
HEART RATE: 51 BPM | HEIGHT: 63 IN | TEMPERATURE: 98 F | DIASTOLIC BLOOD PRESSURE: 65 MMHG | SYSTOLIC BLOOD PRESSURE: 111 MMHG | WEIGHT: 115.5 LBS | OXYGEN SATURATION: 98 % | BODY MASS INDEX: 20.46 KG/M2

## 2019-05-28 DIAGNOSIS — E87.8 HIGH SERUM CHLORIDE: ICD-10-CM

## 2019-05-28 DIAGNOSIS — M79.2 NEUROPATHIC PAIN: ICD-10-CM

## 2019-05-28 DIAGNOSIS — R39.81 FUNCTIONAL URINARY INCONTINENCE: ICD-10-CM

## 2019-05-28 DIAGNOSIS — E78.2 MIXED HYPERLIPIDEMIA: ICD-10-CM

## 2019-05-28 DIAGNOSIS — Z79.01 LONG TERM CURRENT USE OF ANTICOAGULANT THERAPY: ICD-10-CM

## 2019-05-28 DIAGNOSIS — F31.81 BIPOLAR 2 DISORDER: Chronic | ICD-10-CM

## 2019-05-28 DIAGNOSIS — Z72.0 TOBACCO USE: ICD-10-CM

## 2019-05-28 DIAGNOSIS — M32.9 SYSTEMIC LUPUS ERYTHEMATOSUS, UNSPECIFIED SLE TYPE, UNSPECIFIED ORGAN INVOLVEMENT STATUS: ICD-10-CM

## 2019-05-28 DIAGNOSIS — D50.9 IRON DEFICIENCY ANEMIA, UNSPECIFIED IRON DEFICIENCY ANEMIA TYPE: Primary | ICD-10-CM

## 2019-05-28 DIAGNOSIS — E56.9 VITAMIN DEFICIENCY: ICD-10-CM

## 2019-05-28 DIAGNOSIS — N18.30 CKD (CHRONIC KIDNEY DISEASE) STAGE 3, GFR 30-59 ML/MIN: Chronic | ICD-10-CM

## 2019-05-28 DIAGNOSIS — E46 PROTEIN-CALORIE MALNUTRITION, UNSPECIFIED SEVERITY: ICD-10-CM

## 2019-05-28 DIAGNOSIS — E55.9 VITAMIN D DEFICIENCY: ICD-10-CM

## 2019-05-28 DIAGNOSIS — R11.0 NAUSEA: ICD-10-CM

## 2019-05-28 LAB
ALBUMIN SERPL BCP-MCNC: 3.4 G/DL (ref 3.5–5.2)
ALP SERPL-CCNC: 72 U/L (ref 55–135)
ALT SERPL W/O P-5'-P-CCNC: 11 U/L (ref 10–44)
ANION GAP SERPL CALC-SCNC: 8 MMOL/L (ref 8–16)
AST SERPL-CCNC: 19 U/L (ref 10–40)
BILIRUB SERPL-MCNC: 0.2 MG/DL (ref 0.1–1)
BUN SERPL-MCNC: 8 MG/DL (ref 6–20)
CALCIUM SERPL-MCNC: 8.6 MG/DL (ref 8.7–10.5)
CHLORIDE SERPL-SCNC: 111 MMOL/L (ref 95–110)
CO2 SERPL-SCNC: 22 MMOL/L (ref 23–29)
CREAT SERPL-MCNC: 1.4 MG/DL (ref 0.5–1.4)
EST. GFR  (AFRICAN AMERICAN): 55.4 ML/MIN/1.73 M^2
EST. GFR  (NON AFRICAN AMERICAN): 48 ML/MIN/1.73 M^2
GLUCOSE SERPL-MCNC: 72 MG/DL (ref 70–110)
POTASSIUM SERPL-SCNC: 4.5 MMOL/L (ref 3.5–5.1)
PROT SERPL-MCNC: 6.7 G/DL (ref 6–8.4)
SODIUM SERPL-SCNC: 141 MMOL/L (ref 136–145)

## 2019-05-28 PROCEDURE — 99999 PR PBB SHADOW E&M-EST. PATIENT-LVL IV: CPT | Mod: PBBFAC,,, | Performed by: NURSE PRACTITIONER

## 2019-05-28 PROCEDURE — 3008F PR BODY MASS INDEX (BMI) DOCUMENTED: ICD-10-PCS | Mod: CPTII,S$GLB,, | Performed by: NURSE PRACTITIONER

## 2019-05-28 PROCEDURE — 36415 COLL VENOUS BLD VENIPUNCTURE: CPT | Mod: PO

## 2019-05-28 PROCEDURE — 99214 OFFICE O/P EST MOD 30 MIN: CPT | Mod: S$GLB,,, | Performed by: NURSE PRACTITIONER

## 2019-05-28 PROCEDURE — 3008F BODY MASS INDEX DOCD: CPT | Mod: CPTII,S$GLB,, | Performed by: NURSE PRACTITIONER

## 2019-05-28 PROCEDURE — 99214 PR OFFICE/OUTPT VISIT, EST, LEVL IV, 30-39 MIN: ICD-10-PCS | Mod: S$GLB,,, | Performed by: NURSE PRACTITIONER

## 2019-05-28 PROCEDURE — 99999 PR PBB SHADOW E&M-EST. PATIENT-LVL IV: ICD-10-PCS | Mod: PBBFAC,,, | Performed by: NURSE PRACTITIONER

## 2019-05-28 PROCEDURE — 80053 COMPREHEN METABOLIC PANEL: CPT

## 2019-05-28 RX ORDER — PROMETHAZINE HYDROCHLORIDE 25 MG/1
25 TABLET ORAL EVERY 4 HOURS
Qty: 30 TABLET | Refills: 0 | Status: SHIPPED | OUTPATIENT
Start: 2019-05-28 | End: 2020-09-22 | Stop reason: SDUPTHER

## 2019-05-28 RX ORDER — ERGOCALCIFEROL 1.25 MG/1
50000 CAPSULE ORAL
Qty: 12 CAPSULE | Refills: 0 | Status: SHIPPED | OUTPATIENT
Start: 2019-05-28 | End: 2019-09-14 | Stop reason: SDUPTHER

## 2019-05-28 NOTE — PROGRESS NOTES
Subjective:       Patient ID: Cat Denson is a 39 y.o. female.    Chief Complaint: Foot Pain (toes)    Ms. Denson presents today for a chronic conditions F/U. She is new to me. She complains of pain in her toes. Worse with ambulation, no redness or swelling. Has known neuropathic pain. Is taking medications as prescribed. Recent labs reviewed in detail. Under the care of Dr. Norman for neurology, reports no recent seizures. Sees Dr. Cruz for psychiatry and Dr. Lovell for nephrology.     Patient Active Problem List   Diagnosis    Chronic pain    Bipolar 2 disorder    Fibromyalgia    Lupus (systemic lupus erythematosus)    Seizure    Chronic asthma without complication    Hemiparesis affecting left side as late effect of cerebrovascular accident    History of right MCA stroke    Hemiplegic migraine    CKD (chronic kidney disease) stage 3, GFR 30-59 ml/min    Gastroesophageal reflux disease without esophagitis    Long term current use of anticoagulant therapy    Functional urinary incontinence    Primary insomnia    Iron deficiency anemia    Long-term use of hydroxychloroquine    Hepatitis, fulminant    Cigarette nicotine dependence without complication    History of fracture of left ankle 11/16    Homocysteinemia    Recurrent seizures    Complex partial epilepsy with generalization and with intractable epilepsy    Chronic daily headache    Osteopenia    Mixed hyperlipidemia    Gastroparesis    Tobacco use     Review of Systems   Constitutional: Positive for activity change. Negative for unexpected weight change.   HENT: Negative for hearing loss, rhinorrhea and trouble swallowing.    Eyes: Negative for discharge and visual disturbance.   Respiratory: Negative for chest tightness and wheezing.    Cardiovascular: Negative for chest pain and palpitations.   Gastrointestinal: Negative for blood in stool, constipation, diarrhea and vomiting.   Endocrine: Negative for polydipsia and  polyuria.   Genitourinary: Negative for difficulty urinating, dysuria, hematuria and menstrual problem.   Musculoskeletal: Positive for arthralgias. Negative for joint swelling and neck pain.   Neurological: Positive for headaches. Negative for weakness.   Psychiatric/Behavioral: Positive for dysphoric mood. Negative for confusion.       Lab Results   Component Value Date    WBC 5.98 03/25/2019    HGB 12.7 03/25/2019    HCT 41.1 03/25/2019     03/25/2019    CHOL 122 05/20/2019    TRIG 87 05/20/2019    HDL 34 (L) 05/20/2019    ALT 7 (L) 05/20/2019    AST 16 05/20/2019     05/20/2019    K 4.4 05/20/2019     (H) 05/20/2019    CREATININE 1.4 05/20/2019    BUN 7 05/20/2019    CO2 18 (L) 05/20/2019    TSH 1.023 03/12/2017    INR 1.1 03/04/2017    HGBA1C 5.0 03/25/2019       Objective:      Physical Exam   Constitutional: She is oriented to person, place, and time. She appears well-developed.   Cardiovascular: Normal rate, regular rhythm and normal heart sounds.   Pulses:       Dorsalis pedis pulses are 2+ on the right side, and 2+ on the left side.   Pulmonary/Chest: Effort normal and breath sounds normal.   Musculoskeletal: She exhibits no edema.        Right foot: There is normal range of motion and no deformity.        Left foot: There is normal range of motion and no deformity.   Feet:   Right Foot:   Skin Integrity: Positive for callus and dry skin. Negative for skin breakdown.   Left Foot:   Skin Integrity: Positive for dry skin. Negative for skin breakdown.   Neurological: She is alert and oriented to person, place, and time.   Skin: Skin is warm and dry.   Psychiatric: She has a normal mood and affect.   Nursing note and vitals reviewed.      Assessment:       1. Iron deficiency anemia, unspecified iron deficiency anemia type    2. Vitamin D deficiency    3. Mixed hyperlipidemia    4. Neuropathic pain    5. Tobacco use    6. Nausea    7. Bipolar 2 disorder    8. CKD (chronic kidney disease)  stage 3, GFR 30-59 ml/min    9. Functional urinary incontinence    10. Systemic lupus erythematosus, unspecified SLE type, unspecified organ involvement status    11. Long term current use of anticoagulant therapy    12. Vitamin deficiency    13. Protein-calorie malnutrition, unspecified severity     14. High serum chloride        Plan:       Cat was seen today for foot pain.    Diagnoses and all orders for this visit:    Iron deficiency anemia, unspecified iron deficiency anemia type  -     Iron and TIBC; Future  -     Ferritin; Future  Stable condition.  Continue current medications.  Will adjust based on lab findings or if condition changes.    Vitamin D deficiency  -     ergocalciferol (ERGOCALCIFEROL) 50,000 unit Cap; Take 1 capsule (50,000 Units total) by mouth every 7 days.  -     Vitamin D; Future    Mixed hyperlipidemia  -     CBC auto differential; Future  -     Comprehensive metabolic panel; Future  -     Lipid panel; Future  Stable condition.  Continue current medications.  Will adjust based on lab findings or if condition changes.    Neuropathic pain  -     Ambulatory referral to Podiatry  Continue gabapentin     Tobacco use  encouraged smoking cessation    Nausea  -     promethazine (PHENERGAN) 25 MG tablet; Take 1 tablet (25 mg total) by mouth every 4 (four) hours.  Use PRN    Bipolar 2 disorder  Continue under the care of psychiatry    CKD (chronic kidney disease) stage 3, GFR 30-59 ml/min  Stable and chronic.  Will continue to monitor q3-6 months and control chronic conditions as optimally as possible to preserve function.  Continue under the care of nephrology    Functional urinary incontinence  Continue under the care of urology     Systemic lupus erythematosus, unspecified SLE type, unspecified organ involvement status  Continue under the care of rheumatology     Long term current use of anticoagulant therapy  CVA in 2015    Vitamin deficiency  -     Folate; Future  Decrease folic acid to  every other day, repeat labs    High serum chloride  -     Comprehensive metabolic panel; Future  Repeat labs     Protein-calorie malnutrition, unspecified severity   -     Folate; Future  See above     F/U 3 months, fasting labs prior

## 2019-06-12 ENCOUNTER — PATIENT MESSAGE (OUTPATIENT)
Dept: FAMILY MEDICINE | Facility: CLINIC | Age: 39
End: 2019-06-12

## 2019-06-14 NOTE — PROGRESS NOTES
"CHIEF COMPLAINT:    Mrs. Denson is a 39 y.o. female presenting as a self referred patient for urge incontinence, nocturia    PRESENTING ILLNESS:    Cat Denson is a 39 y.o. female who has a a history of urgency, frequency, nocturia and urge incontinence.  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  She still smokes.  She wears a pull up and changes it 2-3 times a day.  She also wears one at night and once every two weeks, it overflows the diaper onto the bed.  She denies any gross hematuria or recurrent UTI's.  She has a known ureterocele.  She has residual left sided weakness after the CVA.  She used to live in [place] and saw Dr. Edwards, who prescribed oxybutynin XL 10 mg which helps her symptoms but not enough that she feels comfortable without a pullup.  Renal ultrasound in the past revealed an angiomyolipoma on the left side about 5 mm and another "small" one.     G0, uterus in place, not sexually active, bowels are normal.      REVIEW OF SYSTEMS:    Review of Systems   Constitutional: Negative.    HENT: Negative.    Eyes: Negative.    Respiratory: Positive for cough.    Cardiovascular: Negative.    Gastrointestinal: Negative.    Genitourinary: Positive for frequency and urgency.        Urge incontinence   Musculoskeletal: Negative.    Skin: Negative.    Neurological: Positive for seizures and weakness.   Endo/Heme/Allergies: Negative.    Psychiatric/Behavioral:        Bipolar       PATIENT HISTORY:    Past Medical History:   Diagnosis Date    Acid reflux     Allergy     Anemia     Anticoagulated 12/29/2015    Anxiety     Asthma     Bipolar 1 disorder     Bronchospasm with bronchitis, acute     Chronic daily headache 9/11/2018    Chronic kidney disease     Chronic pain     Depression     bipolar 2    Fibromyalgia     Fibromyalgia     Gastroparesis     History of right MCA stroke 11/25/2015    " Hx of psychiatric care     Lactose intolerance     Lupus     Mixed hyperlipidemia 11/28/2018    Psychiatric problem     Renal tubular acidosis     Seizure     Sjogren's syndrome     Smoker     Stroke 11-    Suicide attempt     overdosed on a bottle of paxil, muscle relaxers, & zoloft    Therapy        Past Surgical History:   Procedure Laterality Date    CHOLECYSTECTOMY      COLONOSCOPY  11/12/2014    Dr. Kimble, repeat at 52 years old for screening    COLONOSCOPY N/A 11/12/2014    Performed by [facility] at [facility]    ESOPHAGOGASTRODUODENOSCOPY (EGD) N/A 3/3/2017    Performed by David Harris [facility] at [facility]    ESOPHAGOGASTRODUODENOSCOPY (EGD) N/A 10/16/2014    [facility] at [facility]    UPPER GASTROINTESTINAL ENDOSCOPY  03/03/2017    Dr. Harris       Family History   Problem Relation Age of Onset    Hypertension Mother     Hyperlipidemia Mother     Psoriasis Mother     Thyroid disease Mother     No Known Problems Father     Post-traumatic stress disorder Brother     Drug abuse Brother     Diabetes Maternal Uncle     Hypertension Maternal Uncle     Alcohol abuse Maternal Uncle     Stroke Maternal Uncle     Scoliosis Paternal Aunt     Heart disease Paternal Uncle     Mental illness Maternal Grandmother     Cancer Maternal Grandmother         lung / brain - smoker    Drug abuse Maternal Grandmother     Hypertension Maternal Grandmother     Hyperlipidemia Maternal Grandmother     Diabetes Maternal Grandmother     Rheum arthritis Maternal Grandmother     Diabetes Mellitus Maternal Grandmother     Heart disease Maternal Grandfather     Hypertension Maternal Grandfather     Alcohol abuse Maternal Grandfather     Osteoarthritis Maternal Grandfather     No Known Problems Paternal Grandmother     Mental illness Paternal Grandfather     Early death Paternal Grandfather         self     Socioeconomic History    Marital status: Single     Number of children: 0   Occupational History    Not on file   Social Needs    Financial resource strain: Somewhat hard    Food insecurity:     Worry: Sometimes true     Inability: Sometimes true    Transportation needs:     Medical: No     Non-medical: No   Tobacco Use    Smoking status: Current Every Day Smoker     Packs/day: 1.00     Years: 15.00     Pack years: 15.00     Types: Cigarettes     Start date: 11/25/2015    Smokeless tobacco: Never Used   Substance and Sexual Activity    Alcohol use: No     Alcohol/week: 0.0 oz     Frequency: Never     Binge frequency: Never    Drug use: Yes     Types: Marijuana     Comment: smokes occasionally, helps with pain and appetite    Sexual activity: Never     Partners: Female     Comment: usually female, but currently with her boyfriend   Lifestyle    Physical activity:     Days per week: 0 days     Minutes per session: 0 min    Stress: Very much   Relationships    Social connections:     Talks on phone: Twice a week     Gets together: Twice a week     Attends [employer] service: Not on file     Active member of club or organization: No     Attends meetings of clubs or organizations: Never     Relationship status: Living with partner   Social History Narrative    Doesn't drive since the stroke, mother drives her and she lives with her mother.       Allergies:  Metoclopramide hcl; Amoxicillin-pot clavulanate; Amoxil [amoxicillin]; Avelox [moxifloxacin]; Benadryl [diphenhydramine hcl]; Quinazolinones; Ultram [tramadol]; and Wellbutrin [bupropion hcl]    Medications:  Outpatient Encounter Medications as of 6/17/2019   Medication Sig Dispense Refill    ARIPiprazole (ABILIFY) 10 MG Tab Take one tablet PO in morning 30 tablet 2    atorvastatin (LIPITOR) 10 MG tablet Take 1 tablet (10 mg total) by mouth once daily. 90 tablet 3    chlorhexidine (PERIDEX) 0.12 % solution RINSE WITH 1/2 OZ BID AFTER BRUSHING AND FLOSSING  6    divalproex ER (DEPAKOTE) 500 MG Tb24  Take two tablets PO nightly 60 tablet 2    ergocalciferol (ERGOCALCIFEROL) 50,000 unit Cap Take 1 capsule (50,000 Units total) by mouth every 7 days. 12 capsule 0    folic acid (FOLVITE) 1 MG tablet Take 2 tablets (2,000 mcg total) by mouth once daily. (Patient taking differently: Take 1,000 mcg by mouth every other day. ) 180 tablet 3    gabapentin (NEURONTIN) 800 MG tablet TAKE 1 TABLET(800 MG) BY MOUTH TWICE DAILY 180 tablet 3    hydroxychloroquine (PLAQUENIL) 200 mg tablet Take 1 tablet (200 mg total) by mouth 2 (two) times daily. 60 tablet 5    loratadine (CLARITIN) 10 mg tablet Take 1 tablet (10 mg total) by mouth once daily. (Patient taking differently: Take 10 mg by mouth once daily. Patient takes seasonally as needed) 30 tablet 11    mirtazapine (REMERON) 15 MG tablet Take 1 tablet (15 mg total) by mouth every evening. 30 tablet 2    oxybutynin (DITROPAN-XL) 10 MG 24 hr tablet Take 1 tablet (10 mg total) by mouth once daily. 90 tablet 3    pantoprazole (PROTONIX) 40 MG tablet TAKE 1 TABLET(40 MG) BY MOUTH EVERY DAY 90 tablet 3    PREVIDENT 5000 BOOSTER PLUS 1.1 % Pste BRUSH ON NIGHTLY AFTER NORMAL HYGIENE REGIMAN. SPIT OUT EXCESS DO NOT RINSE  4    promethazine (PHENERGAN) 25 MG tablet Take 1 tablet (25 mg total) by mouth every 4 (four) hours. 30 tablet 0    ranitidine (ZANTAC) 300 MG tablet Take 1 tablet (300 mg total) by mouth nightly. 90 tablet 3    rivaroxaban (XARELTO) 20 mg Tab Take 1 tablet (20 mg total) by mouth daily with dinner or evening meal. 90 tablet 3     No facility-administered encounter medications on file as of 6/17/2019.          PHYSICAL EXAMINATION:    The patient generally appears in good health, is appropriately interactive, and is in no apparent distress.    Skin: No lesions.    Mental: Cooperative with normal affect.    Neuro: Grossly intact.    HEENT: Normal. No evidence of lymphadenopathy.    Chest:  normal inspiratory effort.    Abdomen:  Soft, non-tender. No masses  or organomegaly. Bladder is not palpable. No evidence of flank discomfort. No evidence of inguinal hernia.    Extremities: No clubbing, cyanosis, or edema    Normal external female genitalia  Urethral meatus is normal  Urethra and bladder are nontender to bimanual exam  Well supported anteriorly and posteriorly   Uterus and cervix are normal  No adnexal masses  PVR by catheterization was 30 ml  LABS:    Lab Results   Component Value Date    BUN 8 05/28/2019    CREATININE 1.4 05/28/2019     UA 1.000, pH 6, otherwise, negative    IMPRESSION:    Encounter Diagnoses   Name Primary?    Urge incontinence Yes    Angiomyolipoma of left kidney     Tobacco use        PLAN:    1.  FUDS/Cysto  2.  Continue the oxybutynin XL 10 mg  3.  counseled to quit smoking.   4.  Renal ultrasound ordered for follow up of the angiomyolipoma.    I spent 40 minutes with the patient of which more than half was spent in direct consultation with the patient in regards to our treatment and plan.      Copy to:  Dr. Alla Edwards

## 2019-06-17 ENCOUNTER — OFFICE VISIT (OUTPATIENT)
Dept: UROLOGY | Facility: CLINIC | Age: 39
End: 2019-06-17
Payer: MEDICARE

## 2019-06-17 ENCOUNTER — TELEPHONE (OUTPATIENT)
Dept: UROLOGY | Facility: CLINIC | Age: 39
End: 2019-06-17

## 2019-06-17 VITALS
HEART RATE: 58 BPM | BODY MASS INDEX: 20.27 KG/M2 | WEIGHT: 114.44 LBS | DIASTOLIC BLOOD PRESSURE: 76 MMHG | SYSTOLIC BLOOD PRESSURE: 124 MMHG

## 2019-06-17 DIAGNOSIS — N39.41 URGE INCONTINENCE: Primary | ICD-10-CM

## 2019-06-17 DIAGNOSIS — N32.81 OAB (OVERACTIVE BLADDER): Primary | ICD-10-CM

## 2019-06-17 DIAGNOSIS — D17.71 ANGIOMYOLIPOMA OF LEFT KIDNEY: ICD-10-CM

## 2019-06-17 DIAGNOSIS — Z72.0 TOBACCO USE: ICD-10-CM

## 2019-06-17 DIAGNOSIS — N39.41 URGE INCONTINENCE: ICD-10-CM

## 2019-06-17 PROCEDURE — 99999 PR PBB SHADOW E&M-EST. PATIENT-LVL IV: CPT | Mod: PBBFAC,,, | Performed by: UROLOGY

## 2019-06-17 PROCEDURE — 51701 INSERT BLADDER CATHETER: CPT | Mod: S$GLB,,, | Performed by: UROLOGY

## 2019-06-17 PROCEDURE — 81002 PR URINALYSIS NONAUTO W/O SCOPE: ICD-10-PCS | Mod: S$GLB,,, | Performed by: UROLOGY

## 2019-06-17 PROCEDURE — 81002 URINALYSIS NONAUTO W/O SCOPE: CPT | Mod: S$GLB,,, | Performed by: UROLOGY

## 2019-06-17 PROCEDURE — 99999 PR PBB SHADOW E&M-EST. PATIENT-LVL IV: ICD-10-PCS | Mod: PBBFAC,,, | Performed by: UROLOGY

## 2019-06-17 PROCEDURE — 51701 PR INSERTION OF NON-INDWELLING BLADDER CATHETERIZATION FOR RESIDUAL UR: ICD-10-PCS | Mod: S$GLB,,, | Performed by: UROLOGY

## 2019-06-17 PROCEDURE — 99215 OFFICE O/P EST HI 40 MIN: CPT | Mod: 25,S$GLB,, | Performed by: UROLOGY

## 2019-06-17 PROCEDURE — 99215 PR OFFICE/OUTPT VISIT, EST, LEVL V, 40-54 MIN: ICD-10-PCS | Mod: 25,S$GLB,, | Performed by: UROLOGY

## 2019-06-17 PROCEDURE — 3008F BODY MASS INDEX DOCD: CPT | Mod: CPTII,S$GLB,, | Performed by: UROLOGY

## 2019-06-17 PROCEDURE — 3008F PR BODY MASS INDEX (BMI) DOCUMENTED: ICD-10-PCS | Mod: CPTII,S$GLB,, | Performed by: UROLOGY

## 2019-06-17 NOTE — PATIENT INSTRUCTIONS
Urodynamics Studies     The bladder holds urine until it leaves the body through the urethra.     Urodynamics studies are a series of tests that give your doctor a close look at the working of your bladder and urethra. The tests can help your doctor learn about any problems storing urine or voiding (eliminating) urine from your body.  Understanding the lower urinary tract  The lower part of the urinary tract has several parts.  · The bladder stores urine until youre ready to release it.  · The urethra is the tube that carries urine from the bladder out of the body.  · The sphincter is made up of muscles around the opening of the bladder. The sphincter muscles tighten to hold urine in the bladder. They relax to let urine flow. Signals from the brain tell the sphincter when to tighten and relax. These signals also tell the bladder when to contract to let urine flow out of the body.  Why you need a urodynamics study  This test may be ordered if you:  · Are incontinent (leak urine)  · Have a bladder that does not empty all the way.  · Have symptoms such as the need to urinate often or a constant strong need to urinate  · Have intermittent or weak urine stream  · Have persistent urinary tract infections  Preparing for the study  · Tell your doctor about any medicine youre taking. Ask if you should stop them before the study.  · Keep a diary of your bathroom habits. Do this for a few days before the study. This diary can be a helpful part of the evaluation.  · Ask if you need to arrive for the study with a full bladder.  Date Last Reviewed: 1/1/2017  © 8037-4312 [employer]. [address] [place] [address], [place] [address]. All rights reserved. This information is not intended as a substitute for professional medical care. Always follow your healthcare professional's instructions.        Urodynamic Studies     The equipment used for the study varies depending upon the facility and what tests are done.      Urodynamic studies may be done in your doctors office, a clinic, or a hospital. The studies may take up to an hour or more. This depends on which tests your doctor does. The tests are generally painless. You wont need sedating medicine.  Tests that may be done  Uroflowmetry. This measures the amount and speed of urine you void from your bladder. You urinate into a funnel. Its attached to a computer that records your urine flow over time. The amount of urine left in your bladder after you void may also be measured right after this test.  Cystometry. This test evaluates how much your bladder can hold. It also measures how strong your bladder muscle is and how well the signals work that tell you when your bladder is full. Your healthcare provider fills your bladder with sterile water or saline solution, through a catheter. Your doctor will instruct you to report any sensations you feel. Mention if theyre similar to symptoms youve felt at home. Your doctor may ask you to cough, stand and walk, or bear down during this test.  Electromyogram. This helps evaluate the muscle contractions that control urination, such as sphincter muscle contractions. Your healthcare provider may place electrode patches or wires near your rectum or urethra to make the recording. He or she may ask you to try to tighten or relax your sphincter muscles during this test.  Pressure flow study. This test measures your detrusor, urethral, and abdominal pressures. Detrusor is the muscle surrounding the bladder walls that relaxes to allow your bladder to fill, and and contracts to squeeze out urine. A pressure flow study is often done after cystometry. Youre asked to urinate while a probe in your urethra measures pressures.  Video cystourethrography. This takes video pictures of urine flow through your urinary tract. It can help identify blockages or other problems. The bladder is filled with an X-ray contrast fluid. Then X-ray video  pictures are taken as the fluid is urinated out. Ultrasound imaging may also be combined with routine urodynamic studies.  Ambulatory urodynamics. This test can be used to evaluate you while doing usual activities.  Getting your results  After the study, youll get dressed and return to the consultation room. Test results may be ready soon after the study is finished. Or, you may return to your doctors office in a few days for your results. Your doctor can talk with you about the study report and your options.   Date Last Reviewed: 1/1/2017  © 1205-4389 [employer]. [address] [place] [id number]. All rights reserved. This information is not intended as a substitute for professional medical care. Always follow your healthcare professional's instructions.

## 2019-06-26 NOTE — ED NOTES
"Asked pt if she wanted anything to drink and she said yes. I asked her what she would like and she said "blood". Boyfriend says they have been watching vampire diaries. I said thank you as i excited the room and she appropriately said " your welcome ".   "
No noted change in condition. Pt remains awake watching TV. Remains cooperative.   
Pt continues to follow my commands and answers questions appropriately with yes and no. Pt twice pointed at my name jackie and grabbed at it. Pt also continually pointed and touched attached initial ornament on badge. Pt offers no conversation. Boyfriend remains at bedside.   
Pt presents with bizarre behavior. Pt stares at you. Her boyfriend is with her and says since yesterday she just stares and follows you with her eyes. Inappropriate verbal responses. Her boyfriend asked her what his name is and her response sounded like CLEMENTINA which is a TV show he says they are watching. She also is unable to give me her name. She had same response with her name as she did with hers. Pt able to follow commands. Pt says NO that she does not know where she is. TORO well. Pt grasping with fingers in the air towards a danger sign in the room and boyfriend says she even got up and went and pointed at the sign. Pt has had prior stoke. Has a psychiatric history but he does not know what it is. Pt says her family says they have never seen her act like this before. Bilateral  in hands are equal. Symmetrical smile.   
Pt to CT  
Breath sounds clear and equal bilaterally.

## 2019-07-03 ENCOUNTER — OFFICE VISIT (OUTPATIENT)
Dept: PSYCHIATRY | Facility: CLINIC | Age: 39
End: 2019-07-03
Payer: COMMERCIAL

## 2019-07-03 VITALS
DIASTOLIC BLOOD PRESSURE: 85 MMHG | WEIGHT: 111 LBS | SYSTOLIC BLOOD PRESSURE: 148 MMHG | BODY MASS INDEX: 19.67 KG/M2 | HEART RATE: 61 BPM | HEIGHT: 63 IN

## 2019-07-03 DIAGNOSIS — F40.01 PANIC DISORDER WITH AGORAPHOBIA: ICD-10-CM

## 2019-07-03 DIAGNOSIS — F12.20 CANNABIS USE DISORDER, MODERATE, DEPENDENCE: ICD-10-CM

## 2019-07-03 DIAGNOSIS — K31.84 GASTROPARESIS: ICD-10-CM

## 2019-07-03 DIAGNOSIS — Z79.899 HIGH RISK MEDICATION USE: ICD-10-CM

## 2019-07-03 DIAGNOSIS — F31.9 BIPOLAR 1 DISORDER, DEPRESSED: ICD-10-CM

## 2019-07-03 PROCEDURE — 99213 PR OFFICE/OUTPT VISIT, EST, LEVL III, 20-29 MIN: ICD-10-PCS | Mod: S$GLB,,, | Performed by: PSYCHIATRY & NEUROLOGY

## 2019-07-03 PROCEDURE — 99999 PR PBB SHADOW E&M-EST. PATIENT-LVL III: ICD-10-PCS | Mod: PBBFAC,,, | Performed by: PSYCHIATRY & NEUROLOGY

## 2019-07-03 PROCEDURE — 99213 OFFICE O/P EST LOW 20 MIN: CPT | Mod: S$GLB,,, | Performed by: PSYCHIATRY & NEUROLOGY

## 2019-07-03 PROCEDURE — 90833 PSYTX W PT W E/M 30 MIN: CPT | Mod: S$GLB,,, | Performed by: PSYCHIATRY & NEUROLOGY

## 2019-07-03 PROCEDURE — 99999 PR PBB SHADOW E&M-EST. PATIENT-LVL III: CPT | Mod: PBBFAC,,, | Performed by: PSYCHIATRY & NEUROLOGY

## 2019-07-03 PROCEDURE — 3008F BODY MASS INDEX DOCD: CPT | Mod: CPTII,S$GLB,, | Performed by: PSYCHIATRY & NEUROLOGY

## 2019-07-03 PROCEDURE — 3008F PR BODY MASS INDEX (BMI) DOCUMENTED: ICD-10-PCS | Mod: CPTII,S$GLB,, | Performed by: PSYCHIATRY & NEUROLOGY

## 2019-07-03 PROCEDURE — 90833 PR PSYCHOTHERAPY W/PATIENT W/E&M, 30 MIN (ADD ON): ICD-10-PCS | Mod: S$GLB,,, | Performed by: PSYCHIATRY & NEUROLOGY

## 2019-07-03 RX ORDER — ARIPIPRAZOLE 15 MG/1
15 TABLET ORAL DAILY
Qty: 30 TABLET | Refills: 1 | Status: SHIPPED | OUTPATIENT
Start: 2019-07-03 | End: 2019-08-26 | Stop reason: SDUPTHER

## 2019-07-03 RX ORDER — MIRTAZAPINE 15 MG/1
TABLET, FILM COATED ORAL
Qty: 90 TABLET | Refills: 1 | OUTPATIENT
Start: 2019-07-03

## 2019-07-03 RX ORDER — DIVALPROEX SODIUM 500 MG/1
TABLET, FILM COATED, EXTENDED RELEASE ORAL
Qty: 180 TABLET | Refills: 1 | OUTPATIENT
Start: 2019-07-03

## 2019-07-03 RX ORDER — MIRTAZAPINE 15 MG/1
15 TABLET, FILM COATED ORAL NIGHTLY
Qty: 30 TABLET | Refills: 1 | Status: SHIPPED | OUTPATIENT
Start: 2019-07-03 | End: 2019-08-26 | Stop reason: DRUGHIGH

## 2019-07-03 RX ORDER — ARIPIPRAZOLE 15 MG/1
TABLET ORAL
Qty: 90 TABLET | Refills: 1 | OUTPATIENT
Start: 2019-07-03

## 2019-07-03 RX ORDER — DIVALPROEX SODIUM 500 MG/1
TABLET, FILM COATED, EXTENDED RELEASE ORAL
Qty: 60 TABLET | Refills: 1 | Status: SHIPPED | OUTPATIENT
Start: 2019-07-03 | End: 2019-08-26 | Stop reason: SDUPTHER
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
## 2019-07-08 ENCOUNTER — HOSPITAL ENCOUNTER (OUTPATIENT)
Dept: RADIOLOGY | Facility: HOSPITAL | Age: 39
Discharge: HOME | End: 2019-07-08
Attending: UROLOGY
Payer: MEDICARE

## 2019-07-08 ENCOUNTER — OFFICE VISIT (OUTPATIENT)
Dept: PODIATRY | Facility: CLINIC | Age: 39
End: 2019-07-08
Payer: MEDICARE

## 2019-07-08 VITALS
SYSTOLIC BLOOD PRESSURE: 106 MMHG | HEIGHT: 63 IN | HEART RATE: 57 BPM | WEIGHT: 111 LBS | BODY MASS INDEX: 19.67 KG/M2 | DIASTOLIC BLOOD PRESSURE: 61 MMHG

## 2019-07-08 DIAGNOSIS — D17.71 ANGIOMYOLIPOMA OF LEFT KIDNEY: ICD-10-CM

## 2019-07-08 DIAGNOSIS — G58.9 NERVE ENTRAPMENT SYNDROME: Primary | ICD-10-CM

## 2019-07-08 PROCEDURE — 76770 US EXAM ABDO BACK WALL COMP: CPT | Mod: 26,,, | Performed by: RADIOLOGY

## 2019-07-08 PROCEDURE — 99203 OFFICE O/P NEW LOW 30 MIN: CPT | Mod: 25,S$GLB,, | Performed by: PODIATRIST

## 2019-07-08 PROCEDURE — 76770 US RETROPERITONEAL COMPLETE: ICD-10-PCS | Mod: 26,,, | Performed by: RADIOLOGY

## 2019-07-08 PROCEDURE — 3008F BODY MASS INDEX DOCD: CPT | Mod: CPTII,S$GLB,, | Performed by: PODIATRIST

## 2019-07-08 PROCEDURE — 76770 US EXAM ABDO BACK WALL COMP: CPT | Mod: TC

## 2019-07-08 PROCEDURE — 64450 NJX AA&/STRD OTHER PN/BRANCH: CPT | Mod: LT,S$GLB,, | Performed by: PODIATRIST

## 2019-07-08 PROCEDURE — 99999 PR PBB SHADOW E&M-EST. PATIENT-LVL III: CPT | Mod: PBBFAC,,, | Performed by: PODIATRIST

## 2019-07-08 PROCEDURE — 3008F PR BODY MASS INDEX (BMI) DOCUMENTED: ICD-10-PCS | Mod: CPTII,S$GLB,, | Performed by: PODIATRIST

## 2019-07-08 PROCEDURE — 99999 PR PBB SHADOW E&M-EST. PATIENT-LVL III: ICD-10-PCS | Mod: PBBFAC,,, | Performed by: PODIATRIST

## 2019-07-08 PROCEDURE — 64450 PR NERVE BLOCK INJ, ANES/STEROID, OTHER PERIPHERAL: ICD-10-PCS | Mod: LT,S$GLB,, | Performed by: PODIATRIST

## 2019-07-08 PROCEDURE — 99203 PR OFFICE/OUTPT VISIT, NEW, LEVL III, 30-44 MIN: ICD-10-PCS | Mod: 25,S$GLB,, | Performed by: PODIATRIST

## 2019-07-08 NOTE — LETTER
July 9, 2019      Lamar Teague, NP  [address] [facility] [address]           [facility] Podiat[facility]  [address]  [place] [address]  Phone: [phone]          Patient: Cat Denson   MR Number: [id number]   YOB: 1980   Date of Visit: 7/8/2019       Dear Lamar Teague:    Thank you for referring Cat Denson to me for evaluation. Attached you will find relevant portions of my assessment and plan of care.    If you have questions, please do not hesitate to call me. I look forward to following Cat Denson along with you.    Sincerely,    Anil Harmon, RUBY    Enclosure  CC:  No Recipients    If you would like to receive this communication electronically, please contact [email or web address] or [phone] to request more information on [employer] access.    For providers and/or their staff who would like to refer a patient to [facility], please contact us through our one-stop-shop provider referral line, [facility] , at [phone].    If you feel you have received this communication in error or would no longer like to receive these types of communications, please e-mail [email or web address]

## 2019-07-10 NOTE — PROGRESS NOTES
Subjective:      Patient ID: Cat Denson is a 39 y.o. female.    Chief Complaint: Foot Pain (b/l)    Cat is a 39 y.o. female who presents to the podiatry clinic  with complaint of  bilateral foot pain. Onset of the symptoms was several months ago. Precipitating event: none known. Current symptoms include: ability to bear weight, but with some pain. Aggravating factors: inactivity. Symptoms have gradually worsened. Patient has had no prior foot problems. Evaluation to date: none. Treatment to date: none. Patients rates pain 9/10 on pain scale.        Review of Systems   Constitution: Negative for chills and fever.   HENT: Negative for congestion and tinnitus.    Eyes: Negative for double vision and visual disturbance.   Cardiovascular: Negative for chest pain and claudication.   Respiratory: Negative for hemoptysis and shortness of breath.    Endocrine: Negative for cold intolerance and heat intolerance.   Hematologic/Lymphatic: Negative for adenopathy and bleeding problem.   Skin: Positive for color change, dry skin and nail changes.   Musculoskeletal: Positive for stiffness. Negative for myalgias.   Gastrointestinal: Negative for nausea and vomiting.   Genitourinary: Negative for dysuria and hematuria.   Neurological: Positive for numbness and paresthesias.   Psychiatric/Behavioral: Negative for altered mental status and suicidal ideas.   Allergic/Immunologic: Negative for environmental allergies and persistent infections.           Objective:      Physical Exam   Constitutional: She is oriented to person, place, and time. She appears well-developed and well-nourished.   Cardiovascular:   Pulses:       Dorsalis pedis pulses are 1+ on the right side, and 1+ on the left side.        Posterior tibial pulses are 1+ on the right side, and 1+ on the left side.   Pulmonary/Chest: Effort normal.   Musculoskeletal: Normal range of motion.   Anterior, lateral, and posterior muscle groups bilateral lower  extremities show strength 4 over 5 symmetrically. Inspection and palpation of the joints and bones reveal no crepitus or joint effusion. No tenderness upon palpation. Mild plantar flexor contractures noted to digits 2 through 5 bilaterally.  Angle and base of gait are normal.  Left dorsiflexors noted to be 3/5 in terms of muscle strength remainder  4/5 as mention.   Feet:   Right Foot:   Skin Integrity: Positive for callus and dry skin.   Left Foot:   Skin Integrity: Positive for callus and dry skin.   Neurological: She is alert and oriented to person, place, and time. She displays atrophy and abnormal reflex. A sensory deficit is present.   Reflex Scores:       Patellar reflexes are 1+ on the right side and 1+ on the left side.       Achilles reflexes are 1+ on the right side and 1+ on the left side.  Sharp, dull, light touch, and vibratory sensation are diminished bilaterally. Proprioceptive sensation is intact to both lower extremities. Joy Jaye monofilament exam shows loss of protective sensation to plantar toes 1 through 5 bilaterally. Deep tendon reflexes to the patellar tendons is 1 over 4 bilaterally symmetrical. Deep tendon reflexes to the Achilles tendon is 0 over 4 bilaterally symmetrical. No ankle clonus or Babinski reflex noted bilaterally. Coordination is fair to both lower extremities.  Patient admits to intermittent burning and tingling in the feet.   Skin: Skin is warm and dry. Capillary refill takes 2 to 3 seconds. There is pallor.   Skin bilateral lower extremities noted to be thin, dry, and atrophic.     Psychiatric: She has a normal mood and affect.   Vitals reviewed.            Assessment:       Encounter Diagnosis   Name Primary?    Nerve entrapment syndrome Yes         Plan:       Cat was seen today for foot pain.    Diagnoses and all orders for this visit:    Nerve entrapment syndrome      I counseled the patient on her conditions, their implications and medical  management.    The area overlying the left saphenous and superficial peroneal nerve(s) was sterilely prepped, verbal consent was obtained, 2 cc's of 0.5% Marcaine plain was infiltrated around the affected nerve(s) for a diagnostic nerve block.  This was well tolerated with no complications.    .  Conservative and surgical options discussed in detail.  Follow-up in 2 weeks to discuss the effectiveness of the nerve blocks.

## 2019-07-15 ENCOUNTER — TELEPHONE (OUTPATIENT)
Dept: UROLOGY | Facility: CLINIC | Age: 39
End: 2019-07-15

## 2019-07-15 DIAGNOSIS — N28.89 LEFT RENAL MASS: Primary | ICD-10-CM

## 2019-07-15 NOTE — TELEPHONE ENCOUNTER
Left message on cell phone voice mail that there is a small mass on the left kidney that was not present before.  Ordered a BMP and CT abdomen with and without IV contrast.

## 2019-07-17 ENCOUNTER — TELEPHONE (OUTPATIENT)
Dept: UROLOGY | Facility: CLINIC | Age: 39
End: 2019-07-17

## 2019-07-22 ENCOUNTER — OFFICE VISIT (OUTPATIENT)
Dept: PODIATRY | Facility: CLINIC | Age: 39
End: 2019-07-22
Payer: MEDICARE

## 2019-07-22 ENCOUNTER — HOSPITAL ENCOUNTER (OUTPATIENT)
Dept: RADIOLOGY | Facility: HOSPITAL | Age: 39
Discharge: HOME | End: 2019-07-22
Attending: UROLOGY
Payer: MEDICARE

## 2019-07-22 VITALS
SYSTOLIC BLOOD PRESSURE: 132 MMHG | DIASTOLIC BLOOD PRESSURE: 68 MMHG | HEART RATE: 46 BPM | HEIGHT: 63 IN | BODY MASS INDEX: 19.66 KG/M2

## 2019-07-22 DIAGNOSIS — N28.89 LEFT RENAL MASS: ICD-10-CM

## 2019-07-22 DIAGNOSIS — G58.9 NERVE ENTRAPMENT SYNDROME: Primary | ICD-10-CM

## 2019-07-22 PROCEDURE — 99999 PR PBB SHADOW E&M-EST. PATIENT-LVL III: ICD-10-PCS | Mod: PBBFAC,,, | Performed by: PODIATRIST

## 2019-07-22 PROCEDURE — 99214 PR OFFICE/OUTPT VISIT, EST, LEVL IV, 30-39 MIN: ICD-10-PCS | Mod: 25,S$GLB,, | Performed by: PODIATRIST

## 2019-07-22 PROCEDURE — 99214 OFFICE O/P EST MOD 30 MIN: CPT | Mod: 25,S$GLB,, | Performed by: PODIATRIST

## 2019-07-22 PROCEDURE — 74170 CT ABD WO CNTRST FLWD CNTRST: CPT | Mod: TC

## 2019-07-22 PROCEDURE — 64450 NJX AA&/STRD OTHER PN/BRANCH: CPT | Mod: LT,S$GLB,, | Performed by: PODIATRIST

## 2019-07-22 PROCEDURE — 99999 PR PBB SHADOW E&M-EST. PATIENT-LVL III: CPT | Mod: PBBFAC,,, | Performed by: PODIATRIST

## 2019-07-22 PROCEDURE — 74170 CT ABD WO CNTRST FLWD CNTRST: CPT | Mod: 26,,, | Performed by: RADIOLOGY

## 2019-07-22 PROCEDURE — 64450 PR NERVE BLOCK INJ, ANES/STEROID, OTHER PERIPHERAL: ICD-10-PCS | Mod: LT,S$GLB,, | Performed by: PODIATRIST

## 2019-07-22 PROCEDURE — 25500020 PHARM REV CODE 255: Performed by: UROLOGY

## 2019-07-22 PROCEDURE — 3008F BODY MASS INDEX DOCD: CPT | Mod: CPTII,S$GLB,, | Performed by: PODIATRIST

## 2019-07-22 PROCEDURE — 74170 CT ABDOMEN W WO CONTRAST: ICD-10-PCS | Mod: 26,,, | Performed by: RADIOLOGY

## 2019-07-22 PROCEDURE — 3008F PR BODY MASS INDEX (BMI) DOCUMENTED: ICD-10-PCS | Mod: CPTII,S$GLB,, | Performed by: PODIATRIST

## 2019-07-22 RX ADMIN — IOHEXOL 75 ML: 350 INJECTION, SOLUTION INTRAVENOUS at 06:07

## 2019-07-23 ENCOUNTER — TELEPHONE (OUTPATIENT)
Dept: UROLOGY | Facility: CLINIC | Age: 39
End: 2019-07-23

## 2019-07-23 ENCOUNTER — HOSPITAL ENCOUNTER (OUTPATIENT)
Facility: HOSPITAL | Age: 39
Discharge: HOME | End: 2019-07-23
Attending: UROLOGY | Admitting: UROLOGY
Payer: MEDICARE

## 2019-07-23 VITALS
SYSTOLIC BLOOD PRESSURE: 140 MMHG | BODY MASS INDEX: 22.15 KG/M2 | WEIGHT: 125 LBS | HEART RATE: 56 BPM | HEIGHT: 63 IN | RESPIRATION RATE: 17 BRPM | DIASTOLIC BLOOD PRESSURE: 68 MMHG | OXYGEN SATURATION: 100 % | TEMPERATURE: 98 F

## 2019-07-23 DIAGNOSIS — N32.9 BLADDER DISORDER: ICD-10-CM

## 2019-07-23 PROCEDURE — 27200973 HC CYSTO SUPPLY IV (URODYNAMICS): Performed by: UROLOGY

## 2019-07-23 PROCEDURE — 36000705 HC OR TIME LEV I EA ADD 15 MIN: Performed by: UROLOGY

## 2019-07-23 PROCEDURE — 36000704 HC OR TIME LEV I 1ST 15 MIN: Performed by: UROLOGY

## 2019-07-23 PROCEDURE — 51741 PR UROFLOWMETRY, COMPLEX: ICD-10-PCS | Mod: 26,51,, | Performed by: UROLOGY

## 2019-07-23 PROCEDURE — 51784 PR ANAL/URINARY MUSCLE STUDY: ICD-10-PCS | Mod: 26,51,, | Performed by: UROLOGY

## 2019-07-23 PROCEDURE — 51797 PR VOIDING PRESS STUDY INTRA-ABDOMINAL VOID: ICD-10-PCS | Mod: 26,,, | Performed by: UROLOGY

## 2019-07-23 PROCEDURE — 51797 INTRAABDOMINAL PRESSURE TEST: CPT | Mod: 26,,, | Performed by: UROLOGY

## 2019-07-23 PROCEDURE — 76000 FLUOROSCOPY <1 HR PHYS/QHP: CPT | Mod: 26,,, | Performed by: UROLOGY

## 2019-07-23 PROCEDURE — 51728 CYSTOMETROGRAM W/VP: CPT | Mod: 26,,, | Performed by: UROLOGY

## 2019-07-23 PROCEDURE — 51728 PR COMPLEX CYSTOMETROGRAM VOIDING PRESSURE STUDIES: ICD-10-PCS | Mod: 26,,, | Performed by: UROLOGY

## 2019-07-23 PROCEDURE — 51741 ELECTRO-UROFLOWMETRY FIRST: CPT | Mod: 26,51,, | Performed by: UROLOGY

## 2019-07-23 PROCEDURE — 76000 PR  FLUOROSCOPE EXAMINATION: ICD-10-PCS | Mod: 26,,, | Performed by: UROLOGY

## 2019-07-23 PROCEDURE — 51784 ANAL/URINARY MUSCLE STUDY: CPT | Mod: 26,51,, | Performed by: UROLOGY

## 2019-07-23 NOTE — OP NOTE
Date of procedure:  7/28/2019    Fluoro Urodynamic Report    Indication:  Urgency and urge incontinence in a patient with history of CVA, seizure disorder    Patient was taken to the Urodynamic Suite with a comfortably full bladder and asked to perform a free uroflow.  Next, the patient was prepped and the urinary residual was drained with a 14 Fr catheter.  A 7 Fr dual lumen catheter was placed to measure intravesical pressures.  A 10 Fr balloon manometer was placed into the rectum for abdominal pressure measurements.  Patch EMG electrodes were placed on the perineum.  The patient was connected to the CO Everywhere Urodynamic machine, using a multichannel technique.  The bladder was filled with Cysto ConRay at room temperature at a rate of 30 ml/min.  Patient is filled to urgency.  Filling is performed with the patient in the seated position.  Abdominal leak point pressures are checked at 1st desire, then serially at 50cc increments first with Valsalva then with coughing.  The patient was then asked to sit and void for a pressure flow study.    The following are the results of the study:  1.  Uroflow       Q max:  13.1 ml/sec       Voided volume:  77.6 ml       Pattern of the curve:  continuous    2.  PVR:  0 ml    3.  CMG       Sensation:         First Desire:  241.7 ml         Normal Desire:  224 ml         Strong Desire:  233.5 ml         Urgency:  193.9 ml       Capacity:  344 ml        Abnormal Contractions:  none       Compliance:  normal    4.  Abdominal Leak Point Pressure:  No stress incontinence    5.  EMG:  No change in EMG    6.  Voiding phase       Q max:  16.6 ml/sec       P det at Q max:  33.5 cm H2O       Pattern of the curve:  Attenuated, prolonged       Voided volume:  314.6 ml       PVR:  30 ml    7.  Fluoroscopy:  Bladder was smooth, there was no VU reflux, has DESD    8.  Analysis:  Increased sensation, capacity is on the small side, DESD noted    9.  Recommendations:       A.  Recommended  cystoscopy, with Botox injection of the sphincter.  (she does have an aspect of back pain.)       B.  Number for Judah given       C.  Discussed this is done under sedation, needs someone to drive her.  Takes several days to become effective.

## 2019-07-23 NOTE — SUBJECTIVE & OBJECTIVE
Past Medical History:   Diagnosis Date    Acid reflux     Allergy     Anemia     Anticoagulated 12/29/2015    Anxiety     Asthma     Bipolar 1 disorder     Bronchospasm with bronchitis, acute     Chronic daily headache 9/11/2018    Chronic kidney disease     Chronic pain     Depression     bipolar 2    Fibromyalgia     Fibromyalgia     Gastroparesis     History of right MCA stroke 11/25/2015    Hx of psychiatric care     Lactose intolerance     Lupus     Mixed hyperlipidemia 11/28/2018    Psychiatric problem     Renal tubular acidosis     Seizure     Sjogren's syndrome     Smoker     Stroke 11-    Suicide attempt     overdosed on a bottle of paxil, muscle relaxers, & zoloft    Therapy        Past Surgical History:   Procedure Laterality Date    CHOLECYSTECTOMY      COLONOSCOPY  11/12/2014    Dr. Kimble, repeat at 52 years old for screening    COLONOSCOPY N/A 11/12/2014    [facility]    ESOPHAGOGASTRODUODENOSCOPY (EGD) N/A 3/3/2017    [facility] by David [facility] at [facility]    ESOPHAGOGASTRODUODENOSCOPY (EGD) N/A 10/16/2014    [facility] at [facility]    UPPER GASTROINTESTINAL ENDOSCOPY  03/03/2017    Dr. Harris       Review of patient's allergies indicates:   Allergen Reactions    Metoclopramide hcl Anaphylaxis     Involuntary mouth movements     Amoxicillin-pot clavulanate      2 other medications    Amoxil [amoxicillin]      hives    Avelox [moxifloxacin]      itching    Benadryl [diphenhydramine hcl]      Lowers seizure threshold     Quinazolinones      Lowers seizure threshold     Ultram [tramadol]      Lowers seizure threshold     Wellbutrin [bupropion hcl]      Lowers seizure threshold        Family History     Problem Relation (Age of Onset)    Alcohol abuse Maternal Uncle, Maternal Grandfather    Cancer Maternal Grandmother    Diabetes Maternal Uncle, Maternal Grandmother    Diabetes Mellitus  Maternal Grandmother    Drug abuse Brother, Maternal Grandmother    Early death Paternal Grandfather    Heart disease Paternal Uncle, Maternal Grandfather    Hyperlipidemia Mother, Maternal Grandmother    Hypertension Mother, Maternal Uncle, Maternal Grandmother, Maternal Grandfather    Mental illness Maternal Grandmother, Paternal Grandfather    No Known Problems Father, Paternal Grandmother    Osteoarthritis Maternal Grandfather    Post-traumatic stress disorder Brother    Psoriasis Mother    Rheum arthritis Maternal Grandmother    Scoliosis Paternal Aunt    Stroke Maternal Uncle    Thyroid disease Mother          Tobacco Use    Smoking status: Current Every Day Smoker     Packs/day: 1.00     Years: 15.00     Pack years: 15.00     Types: Cigarettes     Start date: 11/25/2015    Smokeless tobacco: Never Used   Substance and Sexual Activity    Alcohol use: No     Alcohol/week: 0.0 oz     Frequency: Never     Binge frequency: Never    Drug use: Yes     Types: Marijuana     Comment: smokes occasionally, helps with pain and appetite    Sexual activity: Never     Partners: Female     Comment: usually female, but currently with her boyfriend       Review of Systems   Constitutional: Negative.    HENT: Negative.    Eyes: Negative.    Respiratory: Negative.    Cardiovascular: Negative.    Gastrointestinal: Negative.    Genitourinary: Positive for frequency and urgency.   Musculoskeletal: Positive for myalgias.   Skin: Negative.    Neurological: Positive for seizures.       Objective:           There is no height or weight on file to calculate BMI.    No intake/output data recorded.     Physical Exam   Constitutional: She appears well-developed and well-nourished.   HENT:   Head: Normocephalic and atraumatic.   Eyes: Conjunctivae and EOM are normal. Pupils are equal, round, and reactive to light.   Neck: Normal range of motion. Neck supple.   Cardiovascular: Normal rate.    Pulmonary/Chest: Effort normal.   Abdominal:  Soft.   Neurological: She is alert.   Skin: Skin is warm and dry.     Psychiatric: She has a normal mood and affect. Her behavior is normal. Judgment and thought content normal.       BMP:  Recent Labs   Lab 07/22/19  1021      K 3.9   *   CO2 22*   BUN 10   CREATININE 1.5*   CALCIUM 9.9

## 2019-07-23 NOTE — HPI
"CHIEF COMPLAINT:     Mrs. Denson is a 39 y.o. female presenting as a self referred patient for urge incontinence, nocturia     PRESENTING ILLNESS:     Cat Denson is a 39 y.o. female who has a a history of urgency, frequency, nocturia and urge incontinence.  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  She still smokes.  She wears a pull up and changes it 2-3 times a day.  She also wears one at night and once every two weeks, it overflows the diaper onto the bed.  She denies any gross hematuria or recurrent UTI's.  She has a known ureterocele.  She has residual left sided weakness after the CVA.  She used to live in [place] and saw Dr. Edwards, who prescribed oxybutynin XL 10 mg which helps her symptoms but not enough that she feels comfortable without a pullup.  Renal ultrasound in the past revealed an angiomyolipoma on the left side about 5 mm and another "small" one.      G0, uterus in place, not sexually active, bowels are normal.         "

## 2019-07-23 NOTE — H&P
"[facility]  Urology  History & Physical    Patient Name: Cat Denson  MRN: [id number]  Admission Date: 7/23/2019  Code Status: Prior   Attending Provider: Vanna Martinez MD  Primary Care Physician: Neela Barrett MD  Principal Problem:<principal problem not specified>    Subjective:     HPI:  CHIEF COMPLAINT:     Mrs. Denson is a 39 y.o. female presenting as a self referred patient for urge incontinence, nocturia     PRESENTING ILLNESS:     Cat Denson is a 39 y.o. female who has a a history of urgency, frequency, nocturia and urge incontinence.  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  She still smokes.  She wears a pull up and changes it 2-3 times a day.  She also wears one at night and once every two weeks, it overflows the diaper onto the bed.  She denies any gross hematuria or recurrent UTI's.  She has a known ureterocele.  She has residual left sided weakness after the CVA.  She used to live in [place] and saw Dr. Edwards, who prescribed oxybutynin XL 10 mg which helps her symptoms but not enough that she feels comfortable without a pullup.  Renal ultrasound in the past revealed an angiomyolipoma on the left side about 5 mm and another "small" one.      G0, uterus in place, not sexually active, bowels are normal.           No new subjective & objective note has been filed under this hospital service since the last note was generated.      Assessment and Plan:     No notes have been filed under this hospital service.  Service: Urology    Proceed with planned FUDS    Vanna Martinez MD  Urology  [facility]  "

## 2019-07-23 NOTE — PROGRESS NOTES
"[facility]  Urology  Progress Note    Patient Name: Cat Denson  MRN: [id number]  Admission Date: 7/23/2019  Hospital Length of Stay: 0 days  Code Status: Prior   Attending Provider: Vanna Martinez MD  Primary Care Physician: Neela Barrett MD    Subjective:     HPI:  CHIEF COMPLAINT:     Mrs. Denson is a 39 y.o. female presenting as a self referred patient for urge incontinence, nocturia     PRESENTING ILLNESS:     Cat Denson is a 39 y.o. female who has a a history of urgency, frequency, nocturia and urge incontinence.  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  She still smokes.  She wears a pull up and changes it 2-3 times a day.  She also wears one at night and once every two weeks, it overflows the diaper onto the bed.  She denies any gross hematuria or recurrent UTI's.  She has a known ureterocele.  She has residual left sided weakness after the CVA.  She used to live in [place] and saw Dr. Edwards, who prescribed oxybutynin XL 10 mg which helps her symptoms but not enough that she feels comfortable without a pullup.  Renal ultrasound in the past revealed an angiomyolipoma on the left side about 5 mm and another "small" one.      G0, uterus in place, not sexually active, bowels are normal.           Past Medical History:   Diagnosis Date    Acid reflux     Allergy     Anemia     Anticoagulated 12/29/2015    Anxiety     Asthma     Bipolar 1 disorder     Bronchospasm with bronchitis, acute     Chronic daily headache 9/11/2018    Chronic kidney disease     Chronic pain     Depression     bipolar 2    Fibromyalgia     Fibromyalgia     Gastroparesis     History of right MCA stroke 11/25/2015    Hx of psychiatric care     Lactose intolerance     Lupus     Mixed hyperlipidemia 11/28/2018    Psychiatric problem     Renal tubular acidosis     Seizure     Sjogren's " syndrome     Smoker     Stroke 11-    Suicide attempt     overdosed on a bottle of paxil, muscle relaxers, & zoloft    Therapy        Past Surgical History:   Procedure Laterality Date    CHOLECYSTECTOMY      COLONOSCOPY  11/12/2014    Dr. Kimble, repeat at 52 years old for screening    COLONOSCOPY N/A 11/12/2014    Performed by Scout [facility] at [facility]    ESOPHAGOGASTRODUODENOSCOPY (EGD) N/A 3/3/2017    Performed by David Harris [facility] at [facility]    ESOPHAGOGASTRODUODENOSCOPY (EGD) N/A 10/16/2014    Performed by Scout [facility] at [facility]    UPPER GASTROINTESTINAL ENDOSCOPY  03/03/2017    Dr. Harris       Review of patient's allergies indicates:   Allergen Reactions    Metoclopramide hcl Anaphylaxis     Involuntary mouth movements     Amoxicillin-pot clavulanate      2 other medications    Amoxil [amoxicillin]      hives    Avelox [moxifloxacin]      itching    Benadryl [diphenhydramine hcl]      Lowers seizure threshold     Quinazolinones      Lowers seizure threshold     Ultram [tramadol]      Lowers seizure threshold     Wellbutrin [bupropion hcl]      Lowers seizure threshold        Family History     Problem Relation (Age of Onset)    Alcohol abuse Maternal Uncle, Maternal Grandfather    Cancer Maternal Grandmother    Diabetes Maternal Uncle, Maternal Grandmother    Diabetes Mellitus Maternal Grandmother    Drug abuse Brother, Maternal Grandmother    Early death Paternal Grandfather    Heart disease Paternal Uncle, Maternal Grandfather    Hyperlipidemia Mother, Maternal Grandmother    Hypertension Mother, Maternal Uncle, Maternal Grandmother, Maternal Grandfather    Mental illness Maternal Grandmother, Paternal Grandfather    No Known Problems Father, Paternal Grandmother    Osteoarthritis Maternal Grandfather    Post-traumatic stress disorder Brother    Psoriasis Mother    Rheum arthritis Maternal Grandmother    Scoliosis Paternal Aunt    Stroke Maternal Uncle     Thyroid disease Mother          Tobacco Use    Smoking status: Current Every Day Smoker     Packs/day: 1.00     Years: 15.00     Pack years: 15.00     Types: Cigarettes     Start date: 11/25/2015    Smokeless tobacco: Never Used   Substance and Sexual Activity    Alcohol use: No     Alcohol/week: 0.0 oz     Frequency: Never     Binge frequency: Never    Drug use: Yes     Types: Marijuana     Comment: smokes occasionally, helps with pain and appetite    Sexual activity: Never     Partners: Female     Comment: usually female, but currently with her boyfriend       Review of Systems   Constitutional: Negative.    HENT: Negative.    Eyes: Negative.    Respiratory: Negative.    Cardiovascular: Negative.    Gastrointestinal: Negative.    Genitourinary: Positive for frequency and urgency.   Musculoskeletal: Positive for myalgias.   Skin: Negative.    Neurological: Positive for seizures.       Objective:           There is no height or weight on file to calculate BMI.    No intake/output data recorded.     Physical Exam   Constitutional: She appears well-developed and well-nourished.   HENT:   Head: Normocephalic and atraumatic.   Eyes: Conjunctivae and EOM are normal. Pupils are equal, round, and reactive to light.   Neck: Normal range of motion. Neck supple.   Cardiovascular: Normal rate.    Pulmonary/Chest: Effort normal.   Abdominal: Soft.   Neurological: She is alert.   Skin: Skin is warm and dry.     Psychiatric: She has a normal mood and affect. Her behavior is normal. Judgment and thought content normal.       BMP:  Recent Labs   Lab 07/22/19  1021      K 3.9   *   CO2 22*   BUN 10   CREATININE 1.5*   CALCIUM 9.9                     Assessment/Plan:     No notes have been filed under this hospital service.  Service: Urology      VTE Risk Mitigation (From admission, onward)    None          Vanna Martinez MD  Urology  [facility]

## 2019-07-25 NOTE — PROGRESS NOTES
Subjective:      Patient ID: Cat Denson is a 39 y.o. female.    Chief Complaint: Foot Problem (bilateral); Follow-up; and Foot Pain    Cat is a 39 y.o. female who presents to the podiatry clinic  with complaint of  bilateral foot pain. Onset of the symptoms was several months ago. Precipitating event: none known. Current symptoms include: ability to bear weight, but with some pain. Aggravating factors: inactivity. Symptoms have gradually worsened. Patient has had no prior foot problems. Evaluation to date: none. Treatment to date: none. Patients rates pain 9/10 on pain scale.  He responded well to the nerve blocks to left superficial peroneal nerve and saphenous nerves.  She did however continued to have discomfort to the plantar aspect of the left foot which was tingling and burning.        Review of Systems   Constitution: Negative for chills and fever.   HENT: Negative for congestion and tinnitus.    Eyes: Negative for double vision and visual disturbance.   Cardiovascular: Negative for chest pain and claudication.   Respiratory: Negative for hemoptysis and shortness of breath.    Endocrine: Negative for cold intolerance and heat intolerance.   Hematologic/Lymphatic: Negative for adenopathy and bleeding problem.   Skin: Positive for color change, dry skin and nail changes.   Musculoskeletal: Positive for stiffness. Negative for myalgias.   Gastrointestinal: Negative for nausea and vomiting.   Genitourinary: Negative for dysuria and hematuria.   Neurological: Positive for numbness and paresthesias.   Psychiatric/Behavioral: Negative for altered mental status and suicidal ideas.   Allergic/Immunologic: Negative for environmental allergies and persistent infections.           Objective:      Physical Exam   Constitutional: She is oriented to person, place, and time. She appears well-developed and well-nourished.   Cardiovascular:   Pulses:       Dorsalis pedis pulses are 1+ on the right side, and 1+  on the left side.        Posterior tibial pulses are 1+ on the right side, and 1+ on the left side.   Pulmonary/Chest: Effort normal.   Musculoskeletal: Normal range of motion.   Anterior, lateral, and posterior muscle groups bilateral lower extremities show strength 4 over 5 symmetrically. Inspection and palpation of the joints and bones reveal no crepitus or joint effusion. No tenderness upon palpation. Mild plantar flexor contractures noted to digits 2 through 5 bilaterally.  Angle and base of gait are normal.  Left dorsiflexors noted to be 3/5 in terms of muscle strength remainder  4/5 as mention.   Feet:   Right Foot:   Skin Integrity: Positive for callus and dry skin.   Left Foot:   Skin Integrity: Positive for callus and dry skin.   Neurological: She is alert and oriented to person, place, and time. She displays atrophy and abnormal reflex. A sensory deficit is present.   Reflex Scores:       Patellar reflexes are 1+ on the right side and 1+ on the left side.       Achilles reflexes are 1+ on the right side and 1+ on the left side.  Sharp, dull, light touch, and vibratory sensation are diminished bilaterally. Proprioceptive sensation is intact to both lower extremities. [place] Jaye monofilament exam shows loss of protective sensation to plantar toes 1 through 5 bilaterally. Deep tendon reflexes to the patellar tendons is 1 over 4 bilaterally symmetrical. Deep tendon reflexes to the Achilles tendon is 0 over 4 bilaterally symmetrical. No ankle clonus or Babinski reflex noted bilaterally. Coordination is fair to both lower extremities.  Patient admits to intermittent burning and tingling in the feet.  Positive provocation sign and Tinel sign noted to the left saphenous and superficial peroneal nerves as well as the posterior tibial nerve   Skin: Skin is warm and dry. Capillary refill takes 2 to 3 seconds. There is pallor.   Skin bilateral lower extremities noted to be thin, dry, and atrophic.      Psychiatric: She has a normal mood and affect.   Vitals reviewed.            Assessment:       Encounter Diagnosis   Name Primary?    Nerve entrapment syndrome Yes         Plan:       Cat was seen today for foot problem, follow-up and foot pain.    Diagnoses and all orders for this visit:    Nerve entrapment syndrome      I counseled the patient on her conditions, their implications and medical management.    The area overlying the left posterior tibial/tarsal tunnel nerve(s) was sterilely prepped, verbal consent was obtained, 2 cc's of 0.5% Marcaine plain was infiltrated around the affected nerve(s) for a diagnostic nerve block.  This was well tolerated with no complications.    .  Conservative and surgical options discussed in detail.  Follow-up in 2 weeks to discuss the effectiveness of the nerve blocks.

## 2019-08-26 ENCOUNTER — LAB VISIT (OUTPATIENT)
Dept: LAB | Facility: HOSPITAL | Age: 39
End: 2019-08-26
Payer: MEDICARE

## 2019-08-26 ENCOUNTER — OFFICE VISIT (OUTPATIENT)
Dept: PSYCHIATRY | Facility: CLINIC | Age: 39
End: 2019-08-26
Payer: MEDICARE

## 2019-08-26 ENCOUNTER — OFFICE VISIT (OUTPATIENT)
Dept: PODIATRY | Facility: CLINIC | Age: 39
End: 2019-08-26
Payer: MEDICARE

## 2019-08-26 VITALS
HEART RATE: 57 BPM | BODY MASS INDEX: 18.07 KG/M2 | DIASTOLIC BLOOD PRESSURE: 65 MMHG | WEIGHT: 102 LBS | SYSTOLIC BLOOD PRESSURE: 109 MMHG | HEIGHT: 63 IN

## 2019-08-26 VITALS
WEIGHT: 102.88 LBS | DIASTOLIC BLOOD PRESSURE: 70 MMHG | SYSTOLIC BLOOD PRESSURE: 112 MMHG | BODY MASS INDEX: 18.23 KG/M2 | HEIGHT: 63 IN | HEART RATE: 50 BPM

## 2019-08-26 DIAGNOSIS — E87.8 HIGH SERUM CHLORIDE: ICD-10-CM

## 2019-08-26 DIAGNOSIS — F40.01 PANIC DISORDER WITH AGORAPHOBIA: ICD-10-CM

## 2019-08-26 DIAGNOSIS — E78.2 MIXED HYPERLIPIDEMIA: ICD-10-CM

## 2019-08-26 DIAGNOSIS — Z79.899 HIGH RISK MEDICATION USE: ICD-10-CM

## 2019-08-26 DIAGNOSIS — F09 COGNITIVE DISORDER: ICD-10-CM

## 2019-08-26 DIAGNOSIS — E55.9 VITAMIN D DEFICIENCY: ICD-10-CM

## 2019-08-26 DIAGNOSIS — E46 PROTEIN-CALORIE MALNUTRITION, UNSPECIFIED SEVERITY: ICD-10-CM

## 2019-08-26 DIAGNOSIS — F31.32 BIPOLAR 1 DISORDER, DEPRESSED, MODERATE: ICD-10-CM

## 2019-08-26 DIAGNOSIS — E56.9 VITAMIN DEFICIENCY: ICD-10-CM

## 2019-08-26 DIAGNOSIS — F12.20 CANNABIS USE DISORDER, MODERATE, DEPENDENCE: ICD-10-CM

## 2019-08-26 DIAGNOSIS — D50.9 IRON DEFICIENCY ANEMIA, UNSPECIFIED IRON DEFICIENCY ANEMIA TYPE: ICD-10-CM

## 2019-08-26 DIAGNOSIS — G57.53 TARSAL TUNNEL SYNDROME OF BOTH LOWER EXTREMITIES: ICD-10-CM

## 2019-08-26 DIAGNOSIS — G58.9 NERVE ENTRAPMENT SYNDROME: Primary | ICD-10-CM

## 2019-08-26 LAB
ALBUMIN SERPL BCP-MCNC: 4.4 G/DL (ref 3.5–5.2)
ALP SERPL-CCNC: 73 U/L (ref 55–135)
ALT SERPL W/O P-5'-P-CCNC: 9 U/L (ref 10–44)
ANION GAP SERPL CALC-SCNC: 10 MMOL/L (ref 8–16)
AST SERPL-CCNC: 12 U/L (ref 10–40)
BASOPHILS # BLD AUTO: 0.05 K/UL (ref 0–0.2)
BASOPHILS NFR BLD: 0.7 % (ref 0–1.9)
BILIRUB SERPL-MCNC: 0.3 MG/DL (ref 0.1–1)
BUN SERPL-MCNC: 11 MG/DL (ref 6–20)
CALCIUM SERPL-MCNC: 9.6 MG/DL (ref 8.7–10.5)
CHLORIDE SERPL-SCNC: 107 MMOL/L (ref 95–110)
CO2 SERPL-SCNC: 22 MMOL/L (ref 23–29)
CREAT SERPL-MCNC: 1.4 MG/DL (ref 0.5–1.4)
DIFFERENTIAL METHOD: NORMAL
EOSINOPHIL # BLD AUTO: 0.1 K/UL (ref 0–0.5)
EOSINOPHIL NFR BLD: 2 % (ref 0–8)
ERYTHROCYTE [DISTWIDTH] IN BLOOD BY AUTOMATED COUNT: 12.5 % (ref 11.5–14.5)
EST. GFR  (AFRICAN AMERICAN): 55 ML/MIN/1.73 M^2
EST. GFR  (NON AFRICAN AMERICAN): 48 ML/MIN/1.73 M^2
FERRITIN SERPL-MCNC: 115 NG/ML (ref 20–300)
FOLATE SERPL-MCNC: 10.9 NG/ML (ref 4–24)
GLUCOSE SERPL-MCNC: 76 MG/DL (ref 70–110)
HCT VFR BLD AUTO: 40.4 % (ref 37–48.5)
HGB BLD-MCNC: 13.1 G/DL (ref 12–16)
IMM GRANULOCYTES # BLD AUTO: 0.03 K/UL (ref 0–0.04)
LYMPHOCYTES # BLD AUTO: 2.2 K/UL (ref 1–4.8)
LYMPHOCYTES NFR BLD: 32.3 % (ref 18–48)
MCH RBC QN AUTO: 31 PG (ref 27–31)
MCHC RBC AUTO-ENTMCNC: 32.4 G/DL (ref 32–36)
MCV RBC AUTO: 96 FL (ref 82–98)
MONOCYTES # BLD AUTO: 0.5 K/UL (ref 0.3–1)
MONOCYTES NFR BLD: 7.2 % (ref 4–15)
NEUTROPHILS # BLD AUTO: 3.9 K/UL (ref 1.8–7.7)
NEUTROPHILS NFR BLD: 57.4 % (ref 38–73)
NRBC BLD-RTO: 0 /100 WBC
PLATELET # BLD AUTO: 170 K/UL (ref 150–350)
PMV BLD AUTO: 12.3 FL (ref 9.2–12.9)
POTASSIUM SERPL-SCNC: 4.7 MMOL/L (ref 3.5–5.1)
PROT SERPL-MCNC: 7.5 G/DL (ref 6–8.4)
RBC # BLD AUTO: 4.23 M/UL (ref 4–5.4)
SODIUM SERPL-SCNC: 139 MMOL/L (ref 136–145)
VALPROATE SERPL-MCNC: 67.4 UG/ML (ref 50–100)
WBC # BLD AUTO: 6.84 K/UL (ref 3.9–12.7)

## 2019-08-26 PROCEDURE — 3008F BODY MASS INDEX DOCD: CPT | Mod: HCNC,CPTII,S$GLB, | Performed by: PODIATRIST

## 2019-08-26 PROCEDURE — 90833 PR PSYCHOTHERAPY W/PATIENT W/E&M, 30 MIN (ADD ON): ICD-10-PCS | Mod: HCNC,S$GLB,, | Performed by: PSYCHIATRY & NEUROLOGY

## 2019-08-26 PROCEDURE — 64450 PR NERVE BLOCK INJ, ANES/STEROID, OTHER PERIPHERAL: ICD-10-PCS | Mod: 50,HCNC,S$GLB, | Performed by: PODIATRIST

## 2019-08-26 PROCEDURE — 99999 PR PBB SHADOW E&M-EST. PATIENT-LVL III: CPT | Mod: PBBFAC,HCNC,, | Performed by: PODIATRIST

## 2019-08-26 PROCEDURE — 99213 OFFICE O/P EST LOW 20 MIN: CPT | Mod: HCNC,S$GLB,, | Performed by: PSYCHIATRY & NEUROLOGY

## 2019-08-26 PROCEDURE — 83540 ASSAY OF IRON: CPT | Mod: HCNC

## 2019-08-26 PROCEDURE — 99213 PR OFFICE/OUTPT VISIT, EST, LEVL III, 20-29 MIN: ICD-10-PCS | Mod: HCNC,S$GLB,, | Performed by: PSYCHIATRY & NEUROLOGY

## 2019-08-26 PROCEDURE — 90833 PSYTX W PT W E/M 30 MIN: CPT | Mod: HCNC,S$GLB,, | Performed by: PSYCHIATRY & NEUROLOGY

## 2019-08-26 PROCEDURE — 3008F PR BODY MASS INDEX (BMI) DOCUMENTED: ICD-10-PCS | Mod: HCNC,CPTII,S$GLB, | Performed by: PODIATRIST

## 2019-08-26 PROCEDURE — 99214 PR OFFICE/OUTPT VISIT, EST, LEVL IV, 30-39 MIN: ICD-10-PCS | Mod: 25,HCNC,S$GLB, | Performed by: PODIATRIST

## 2019-08-26 PROCEDURE — 99999 PR PBB SHADOW E&M-EST. PATIENT-LVL III: ICD-10-PCS | Mod: PBBFAC,HCNC,, | Performed by: PODIATRIST

## 2019-08-26 PROCEDURE — 3008F BODY MASS INDEX DOCD: CPT | Mod: HCNC,CPTII,S$GLB, | Performed by: PSYCHIATRY & NEUROLOGY

## 2019-08-26 PROCEDURE — 99999 PR PBB SHADOW E&M-EST. PATIENT-LVL III: ICD-10-PCS | Mod: PBBFAC,HCNC,, | Performed by: PSYCHIATRY & NEUROLOGY

## 2019-08-26 PROCEDURE — 3008F PR BODY MASS INDEX (BMI) DOCUMENTED: ICD-10-PCS | Mod: HCNC,CPTII,S$GLB, | Performed by: PSYCHIATRY & NEUROLOGY

## 2019-08-26 PROCEDURE — 64450 NJX AA&/STRD OTHER PN/BRANCH: CPT | Mod: 50,HCNC,S$GLB, | Performed by: PODIATRIST

## 2019-08-26 PROCEDURE — 80164 ASSAY DIPROPYLACETIC ACD TOT: CPT | Mod: HCNC

## 2019-08-26 PROCEDURE — 99999 PR PBB SHADOW E&M-EST. PATIENT-LVL III: CPT | Mod: PBBFAC,HCNC,, | Performed by: PSYCHIATRY & NEUROLOGY

## 2019-08-26 PROCEDURE — 82728 ASSAY OF FERRITIN: CPT | Mod: HCNC

## 2019-08-26 PROCEDURE — 85025 COMPLETE CBC W/AUTO DIFF WBC: CPT | Mod: HCNC

## 2019-08-26 PROCEDURE — 36415 COLL VENOUS BLD VENIPUNCTURE: CPT | Mod: HCNC

## 2019-08-26 PROCEDURE — 80053 COMPREHEN METABOLIC PANEL: CPT | Mod: HCNC

## 2019-08-26 PROCEDURE — 99214 OFFICE O/P EST MOD 30 MIN: CPT | Mod: 25,HCNC,S$GLB, | Performed by: PODIATRIST

## 2019-08-26 PROCEDURE — 82306 VITAMIN D 25 HYDROXY: CPT | Mod: HCNC

## 2019-08-26 PROCEDURE — 82746 ASSAY OF FOLIC ACID SERUM: CPT | Mod: HCNC

## 2019-08-26 RX ORDER — MIRTAZAPINE 30 MG/1
30 TABLET, FILM COATED ORAL NIGHTLY
Qty: 30 TABLET | Refills: 1 | Status: SHIPPED | OUTPATIENT
Start: 2019-08-26 | End: 2019-11-13 | Stop reason: SDUPTHER

## 2019-08-26 RX ORDER — DIVALPROEX SODIUM 500 MG/1
TABLET, FILM COATED, EXTENDED RELEASE ORAL
Qty: 60 TABLET | Refills: 1 | Status: SHIPPED | OUTPATIENT
Start: 2019-08-26 | End: 2019-11-13 | Stop reason: SDUPTHER

## 2019-08-26 RX ORDER — ARIPIPRAZOLE 15 MG/1
15 TABLET ORAL DAILY
Qty: 30 TABLET | Refills: 1 | Status: SHIPPED | OUTPATIENT
Start: 2019-08-26 | End: 2019-12-17 | Stop reason: SDUPTHER

## 2019-08-27 LAB
25(OH)D3+25(OH)D2 SERPL-MCNC: 37 NG/ML (ref 30–96)
IRON SERPL-MCNC: 59 UG/DL (ref 30–160)
SATURATED IRON: 20 % (ref 20–50)
TOTAL IRON BINDING CAPACITY: 299 UG/DL (ref 250–450)
TRANSFERRIN SERPL-MCNC: 202 MG/DL (ref 200–375)

## 2019-08-28 NOTE — PROGRESS NOTES
Subjective:      Patient ID: Cat Denson is a 39 y.o. female.    Chief Complaint: Nerve entrapment syndrome (both feet)    Cat is a 39 y.o. female who presents to the podiatry clinic  with complaint of  bilateral foot pain. Onset of the symptoms was several months ago. Precipitating event: none known. Current symptoms include: ability to bear weight, but with some pain. Aggravating factors: inactivity. Symptoms have gradually worsened. Patient has had no prior foot problems. Evaluation to date: none. Treatment to date: none. Patients rates pain 9/10 on pain scale.  He responded well to the nerve blocks to left superficial peroneal nerve and saphenous nerves.  She did however continued to have discomfort to the plantar aspect of the left foot which was tingling and burning.  Since then she has had mostly tingling burning and pain to the bottoms of both feet.  She had a left tarsal tunnel block which helped her significantly and would like to have both performed today potentially and then a follow-up to discuss possible nerve decompression.        Review of Systems   Constitution: Negative for chills and fever.   HENT: Negative for congestion and tinnitus.    Eyes: Negative for double vision and visual disturbance.   Cardiovascular: Negative for chest pain and claudication.   Respiratory: Negative for hemoptysis and shortness of breath.    Endocrine: Negative for cold intolerance and heat intolerance.   Hematologic/Lymphatic: Negative for adenopathy and bleeding problem.   Skin: Positive for color change, dry skin and nail changes.   Musculoskeletal: Positive for stiffness. Negative for myalgias.   Gastrointestinal: Negative for nausea and vomiting.   Genitourinary: Negative for dysuria and hematuria.   Neurological: Positive for numbness and paresthesias.   Psychiatric/Behavioral: Negative for altered mental status and suicidal ideas.   Allergic/Immunologic: Negative for environmental allergies and  persistent infections.           Objective:      Physical Exam   Constitutional: She is oriented to person, place, and time. She appears well-developed and well-nourished.   Cardiovascular:   Pulses:       Dorsalis pedis pulses are 1+ on the right side, and 1+ on the left side.        Posterior tibial pulses are 1+ on the right side, and 1+ on the left side.   Pulmonary/Chest: Effort normal.   Musculoskeletal: Normal range of motion.   Anterior, lateral, and posterior muscle groups bilateral lower extremities show strength 4 over 5 symmetrically. Inspection and palpation of the joints and bones reveal no crepitus or joint effusion. No tenderness upon palpation. Mild plantar flexor contractures noted to digits 2 through 5 bilaterally.  Angle and base of gait are normal.  Left dorsiflexors noted to be 3/5 in terms of muscle strength remainder  4/5 as mention.   Feet:   Right Foot:   Skin Integrity: Positive for callus and dry skin.   Left Foot:   Skin Integrity: Positive for callus and dry skin.   Neurological: She is alert and oriented to person, place, and time. She displays atrophy and abnormal reflex. A sensory deficit is present.   Reflex Scores:       Patellar reflexes are 1+ on the right side and 1+ on the left side.       Achilles reflexes are 1+ on the right side and 1+ on the left side.  Sharp, dull, light touch, and vibratory sensation are diminished bilaterally. Proprioceptive sensation is intact to both lower extremities. [place] Jaye monofilament exam shows loss of protective sensation to plantar toes 1 through 5 bilaterally. Deep tendon reflexes to the patellar tendons is 1 over 4 bilaterally symmetrical. Deep tendon reflexes to the Achilles tendon is 0 over 4 bilaterally symmetrical. No ankle clonus or Babinski reflex noted bilaterally. Coordination is fair to both lower extremities.  Patient admits to intermittent burning and tingling in the feet.  Positive provocation sign and Tinel sign noted to  the left saphenous and superficial peroneal nerves as well as the posterior tibial nerves.   Skin: Skin is warm and dry. Capillary refill takes 2 to 3 seconds. There is pallor.   Skin bilateral lower extremities noted to be thin, dry, and atrophic.     Psychiatric: She has a normal mood and affect.   Vitals reviewed.            Assessment:       Encounter Diagnoses   Name Primary?    Nerve entrapment syndrome Yes    Tarsal tunnel syndrome of both lower extremities          Plan:       Cat was seen today for nerve entrapment syndrome.    Diagnoses and all orders for this visit:    Nerve entrapment syndrome    Tarsal tunnel syndrome of both lower extremities      I counseled the patient on her conditions, their implications and medical management.    The area overlying the bilateral posterior tibial/tarsal tunnel nerve(s) was sterilely prepped, verbal consent was obtained, 2 cc's of 0.5% Marcaine plain was infiltrated around the affected nerve(s) for a diagnostic nerve block.  This was well tolerated with no complications.  Follow-up in 4 weeks.  .

## 2019-08-30 NOTE — TELEPHONE ENCOUNTER
Called pt regarding below message per Dr. Cruz. Informed pt of below information per Dr. Cruz. Informed pt that she will not be able to get refills if she does not make her appt. Pt confirmed she is currently taking Depakote 1000 mg BID. Informed pt of necessary labs. Appt date, time, and location given. Pt verbalized understanding with no further questions.   
Called pt regarding below message. Pt is requesting refill on Abilify. Informed pt that it has been over a year since she has been seen in clinic. Recommended scheduling an appt and sending request to Dr. Cruz. Scheduled pt for first available appt. Appt date, time, and location given. Informed pt that if she does not make the appt no further refills can be provided. Pt verbalized understanding with no further questions.   
Noted.   
Please add pt to wait list for sooner appt.  Please advise pt that medication cannot be refilled if pt does not keep follow up appointment.      Please confirm if pt is also taking depakote?   I have ordered HbA1c, CBC, Depakote level, CMP - lab work required on Depakote.  Please scheduled fasting lab work prior to appointment - labs should be drawn prior to taking AM medications.     Thank you  
Pt left v/m at 1209 requesting a callback re: medication. [phone]  
AICD (Automatic Cardioverter/Defibrillator) Present  inserted in Aug, 2008. Due for battery change in 1 month. ( [employer]) . Inserted by Dr Duffy  S/P cholecystectomy

## 2019-09-09 ENCOUNTER — TELEPHONE (OUTPATIENT)
Dept: FAMILY MEDICINE | Facility: CLINIC | Age: 39
End: 2019-09-09

## 2019-09-09 ENCOUNTER — OFFICE VISIT (OUTPATIENT)
Dept: FAMILY MEDICINE | Facility: CLINIC | Age: 39
End: 2019-09-09
Payer: MEDICARE

## 2019-09-09 ENCOUNTER — TELEPHONE (OUTPATIENT)
Dept: NEUROLOGY | Facility: CLINIC | Age: 39
End: 2019-09-09

## 2019-09-09 VITALS
BODY MASS INDEX: 18.67 KG/M2 | HEART RATE: 47 BPM | SYSTOLIC BLOOD PRESSURE: 120 MMHG | DIASTOLIC BLOOD PRESSURE: 60 MMHG | RESPIRATION RATE: 15 BRPM | TEMPERATURE: 98 F | OXYGEN SATURATION: 99 % | HEIGHT: 63 IN | WEIGHT: 105.38 LBS

## 2019-09-09 DIAGNOSIS — Z86.73 HISTORY OF RIGHT MCA STROKE: Chronic | ICD-10-CM

## 2019-09-09 DIAGNOSIS — G40.219 COMPLEX PARTIAL EPILEPSY WITH GENERALIZATION AND WITH INTRACTABLE EPILEPSY: ICD-10-CM

## 2019-09-09 DIAGNOSIS — G57.93 NEUROPATHY OF BOTH FEET: Primary | ICD-10-CM

## 2019-09-09 DIAGNOSIS — M32.9 SYSTEMIC LUPUS ERYTHEMATOSUS, UNSPECIFIED SLE TYPE, UNSPECIFIED ORGAN INVOLVEMENT STATUS: ICD-10-CM

## 2019-09-09 DIAGNOSIS — G57.93 NEUROPATHY OF BOTH FEET: ICD-10-CM

## 2019-09-09 DIAGNOSIS — F17.210 CIGARETTE NICOTINE DEPENDENCE WITHOUT COMPLICATION: ICD-10-CM

## 2019-09-09 DIAGNOSIS — F31.81 BIPOLAR 2 DISORDER: Chronic | ICD-10-CM

## 2019-09-09 DIAGNOSIS — N18.30 CKD (CHRONIC KIDNEY DISEASE) STAGE 3, GFR 30-59 ML/MIN: Primary | ICD-10-CM

## 2019-09-09 DIAGNOSIS — K31.84 GASTROPARESIS: ICD-10-CM

## 2019-09-09 DIAGNOSIS — N32.9 BLADDER DISORDER: ICD-10-CM

## 2019-09-09 DIAGNOSIS — G40.909 RECURRENT SEIZURES: ICD-10-CM

## 2019-09-09 PROCEDURE — 3008F PR BODY MASS INDEX (BMI) DOCUMENTED: ICD-10-PCS | Mod: HCNC,CPTII,S$GLB, | Performed by: PHYSICIAN ASSISTANT

## 2019-09-09 PROCEDURE — 3008F BODY MASS INDEX DOCD: CPT | Mod: HCNC,CPTII,S$GLB, | Performed by: PHYSICIAN ASSISTANT

## 2019-09-09 PROCEDURE — 99214 PR OFFICE/OUTPT VISIT, EST, LEVL IV, 30-39 MIN: ICD-10-PCS | Mod: 25,HCNC,S$GLB, | Performed by: PHYSICIAN ASSISTANT

## 2019-09-09 PROCEDURE — G0008 ADMIN INFLUENZA VIRUS VAC: HCPCS | Mod: HCNC,S$GLB,, | Performed by: PHYSICIAN ASSISTANT

## 2019-09-09 PROCEDURE — 99999 PR PBB SHADOW E&M-EST. PATIENT-LVL V: CPT | Mod: PBBFAC,HCNC,, | Performed by: PHYSICIAN ASSISTANT

## 2019-09-09 PROCEDURE — 99214 OFFICE O/P EST MOD 30 MIN: CPT | Mod: 25,HCNC,S$GLB, | Performed by: PHYSICIAN ASSISTANT

## 2019-09-09 PROCEDURE — G0008 FLU VACCINE (QUAD) GREATER THAN OR EQUAL TO 3YO PRESERVATIVE FREE IM: ICD-10-PCS | Mod: HCNC,S$GLB,, | Performed by: PHYSICIAN ASSISTANT

## 2019-09-09 PROCEDURE — 90686 IIV4 VACC NO PRSV 0.5 ML IM: CPT | Mod: HCNC,S$GLB,, | Performed by: PHYSICIAN ASSISTANT

## 2019-09-09 PROCEDURE — 90686 FLU VACCINE (QUAD) GREATER THAN OR EQUAL TO 3YO PRESERVATIVE FREE IM: ICD-10-PCS | Mod: HCNC,S$GLB,, | Performed by: PHYSICIAN ASSISTANT

## 2019-09-09 PROCEDURE — 99999 PR PBB SHADOW E&M-EST. PATIENT-LVL V: ICD-10-PCS | Mod: PBBFAC,HCNC,, | Performed by: PHYSICIAN ASSISTANT

## 2019-09-09 RX ORDER — ALCOHOL 2.38 KG/3.79L
1 GEL TOPICAL DAILY
Qty: 90 CAPSULE | Refills: 3 | Status: SHIPPED | OUTPATIENT
Start: 2019-09-09 | End: 2019-12-17

## 2019-09-09 NOTE — TELEPHONE ENCOUNTER
Please make patient a 3 month ( 40 min) follow up with PCP team. I forgot to include this in check out note when patient left today. Thanks!

## 2019-09-09 NOTE — TELEPHONE ENCOUNTER
Returned pt call and scheduled f/u with Dr. Norman for seizures; date/time/location confirmed; verbalized understanding; added to waiting list; appt slip mailed.

## 2019-09-09 NOTE — TELEPHONE ENCOUNTER
Please advise patient that she should not increase her gabapentin to three times daily due to her renal function.  did send in a supplement that should help with her neuropathy. Please follow up with neurology and podiatry regarding neuropathy.

## 2019-09-09 NOTE — TELEPHONE ENCOUNTER
----- Message from Lorenza Garcia PA-C sent at 9/9/2019 11:06 AM CDT -----  Dr. Barrett, Ms. Denson was seen today. She self increased her gabapentin to 800mg tid; this was previously 800mg bid. This was due to worsening neuropathy in her feet. She requested refill today. I wanted to be sure you were okay with this increase prior to sending in refill. Please advise.

## 2019-09-09 NOTE — TELEPHONE ENCOUNTER
INcrease above the 800mg bid is not recommended gabapentin is not recommended due to her CKD.  Recommend neurology referral and adding Metanx or similar supplements for neuropathy

## 2019-09-09 NOTE — TELEPHONE ENCOUNTER
----- Message from Jazmine Lovell sent at 9/9/2019  9:04 AM CDT -----  Pt needs an appt for History of right MCA stroke  Recurrent seizures  Has seen dr mares in the past  Please call her @ [phone]  Thanks

## 2019-09-09 NOTE — PROGRESS NOTES
Subjective:       Patient ID: Cat Denson is a 39 y.o. female.    Chief Complaint: Follow-up (3 month f/u)    Ms. Denson comes to clinic today for routine follow up. The patient is followed by several specialists including psychiatrist, Dr. Cruz; urologist, Dr. Martinez; nephrologist, Dr. Lovell, and neurologist, Dr. Norman. The patient sustained a CVA at the age of 35. The patient has a seizure disorder, managed by Dr. Norman. The patient also has a bladder disorder for which she is seeing Dr. Martinez and CKD 3 for which she is seeing Dr. Lovell. The patient has Lupus and was previously a patient of Dr. Benoit; she will need to establish with a new rheumatologist. The patient has gastroparesis; the patient reports she has recently had problems with diarrhea and constipation alternating. The patient has not had a gastric emptying study since 2017. The patient has not been followed by GI in sometime. She was previously seen by Dr. Arevalo of [facility]. The patient continues to smoke; she desires to quit but she is unable to take wellbutrin or chantix. She has tried patches with little improvement. The patient does have neuropathy in her feet; she is followed by podiatrist, Dr. Harmon. The patient has recently increased her gabapentin to TID due to the worsening neuropathy. This was not under the direction of a physician.     Review of patient's allergies indicates:   Allergen Reactions    Metoclopramide hcl Anaphylaxis     Involuntary mouth movements     Amoxicillin-pot clavulanate      2 other medications    Amoxil [amoxicillin]      hives    Avelox [moxifloxacin]      itching    Benadryl [diphenhydramine hcl]      Lowers seizure threshold     Quinazolinones      Lowers seizure threshold     Ultram [tramadol]      Lowers seizure threshold     Wellbutrin [bupropion hcl]      Lowers seizure threshold          Current Outpatient Medications:     ARIPiprazole (ABILIFY) 15 MG Tab, Take 1 tablet (15 mg total)  by mouth once daily., Disp: 30 tablet, Rfl: 1    atorvastatin (LIPITOR) 10 MG tablet, Take 1 tablet (10 mg total) by mouth once daily., Disp: 90 tablet, Rfl: 3    chlorhexidine (PERIDEX) 0.12 % solution, RINSE WITH 1/2 OZ BID AFTER BRUSHING AND FLOSSING, Disp: , Rfl: 6    divalproex ER (DEPAKOTE) 500 MG Tb24, Take two tablets PO nightly, Disp: 60 tablet, Rfl: 1    ergocalciferol (ERGOCALCIFEROL) 50,000 unit Cap, Take 1 capsule (50,000 Units total) by mouth every 7 days., Disp: 12 capsule, Rfl: 0    folic acid (FOLVITE) 1 MG tablet, Take 2 tablets (2,000 mcg total) by mouth once daily. (Patient taking differently: Take 1,000 mcg by mouth every other day. ), Disp: 180 tablet, Rfl: 3    gabapentin (NEURONTIN) 800 MG tablet, TAKE 1 TABLET(800 MG) BY MOUTH TWICE DAILY, Disp: 180 tablet, Rfl: 3    hydroxychloroquine (PLAQUENIL) 200 mg tablet, Take 1 tablet (200 mg total) by mouth 2 (two) times daily., Disp: 60 tablet, Rfl: 5    loratadine (CLARITIN) 10 mg tablet, Take 1 tablet (10 mg total) by mouth once daily. (Patient taking differently: Take 10 mg by mouth once daily. Patient takes seasonally as needed), Disp: 30 tablet, Rfl: 11    mirtazapine (REMERON) 30 MG tablet, Take 1 tablet (30 mg total) by mouth every evening., Disp: 30 tablet, Rfl: 1    oxybutynin (DITROPAN-XL) 10 MG 24 hr tablet, Take 1 tablet (10 mg total) by mouth once daily., Disp: 90 tablet, Rfl: 3    pantoprazole (PROTONIX) 40 MG tablet, TAKE 1 TABLET(40 MG) BY MOUTH EVERY DAY, Disp: 90 tablet, Rfl: 3    PREVIDENT 5000 BOOSTER PLUS 1.1 % Pste, BRUSH ON NIGHTLY AFTER NORMAL HYGIENE REGIMAN. SPIT OUT EXCESS DO NOT RINSE, Disp: , Rfl: 4    promethazine (PHENERGAN) 25 MG tablet, Take 1 tablet (25 mg total) by mouth every 4 (four) hours., Disp: 30 tablet, Rfl: 0    ranitidine (ZANTAC) 300 MG tablet, Take 1 tablet (300 mg total) by mouth nightly., Disp: 90 tablet, Rfl: 3    rivaroxaban (XARELTO) 20 mg Tab, Take 1 tablet (20 mg total) by mouth  daily with dinner or evening meal., Disp: 90 tablet, Rfl: 3    Lab Results   Component Value Date    WBC 6.84 08/26/2019    HGB 13.1 08/26/2019    HCT 40.4 08/26/2019     08/26/2019    CHOL 122 05/20/2019    TRIG 87 05/20/2019    HDL 34 (L) 05/20/2019    ALT 9 (L) 08/26/2019    AST 12 08/26/2019     08/26/2019    K 4.7 08/26/2019     08/26/2019    CREATININE 1.4 08/26/2019    BUN 11 08/26/2019    CO2 22 (L) 08/26/2019    TSH 1.023 03/12/2017    INR 1.1 03/04/2017    HGBA1C 5.0 03/25/2019       Review of Systems   Constitutional: Negative for activity change, appetite change and fever.   HENT: Negative for postnasal drip, rhinorrhea and sinus pressure.    Eyes: Negative for visual disturbance.   Respiratory: Negative for cough and shortness of breath.    Cardiovascular: Negative for chest pain.   Gastrointestinal: Positive for constipation and diarrhea. Negative for abdominal distention and abdominal pain.   Genitourinary: Positive for difficulty urinating and frequency. Negative for dysuria.   Musculoskeletal: Negative for arthralgias and myalgias.   Neurological: Positive for weakness, numbness and headaches.   Hematological: Negative for adenopathy.   Psychiatric/Behavioral: The patient is not nervous/anxious.        Objective:      Physical Exam   Constitutional: She is oriented to person, place, and time.   Cardiovascular: Normal rate and regular rhythm.   Pulmonary/Chest: Effort normal and breath sounds normal. She has no wheezes.   Abdominal: Soft. Bowel sounds are normal. She exhibits no distension. There is no tenderness.   Musculoskeletal: She exhibits no edema.   Neurological: She is alert and oriented to person, place, and time.   Left upper extremity weakness   Skin: No erythema.   Psychiatric: Her behavior is normal.       Assessment:       1. CKD (chronic kidney disease) stage 3, GFR 30-59 ml/min    2. Bladder disorder    3. History of right MCA stroke    4. Recurrent seizures    5.  Systemic lupus erythematosus, unspecified SLE type, unspecified organ involvement status    6. Gastroparesis    7. Bipolar 2 disorder    8. Cigarette nicotine dependence without complication    9. Complex partial epilepsy with generalization and with intractable epilepsy    10. Neuropathy of both feet        Plan:   Cat was seen today for follow-up.    Diagnoses and all orders for this visit:    CKD (chronic kidney disease) stage 3, GFR 30-59 ml/min  -     Ambulatory referral to Nephrology  Patient is due for follow up with Dr. Lovell; we will arrange follow up  Bladder disorder  Continue follow up with Dr. Martinez  History of right MCA stroke  -     Ambulatory referral to Neurology  Schedule follow up with Dr. Norman  Recurrent seizures  -     Ambulatory referral to Neurology  Schedule follow up with Dr. Norman  Systemic lupus erythematosus, unspecified SLE type, unspecified organ involvement status  -     Ambulatory referral to Rheumatology  Patient needs to establish with new rheumatologist   Gastroparesis  -     NM Gastric Emptying; Future    Bipolar 2 disorder  Continue follow up with Dr. Cruz  Cigarette nicotine dependence without complication  Chronic    Complex partial epilepsy with generalization and with intractable epilepsy  Schedule follow up with Dr. Norman  Neuropathy of both feet  Continue follow up with podiatry. Will discuss patient's increase in gabapentin with Dr. Barrett  Other orders  -     Influenza - Quadrivalent (3 years & older) (PF)

## 2019-09-09 NOTE — Clinical Note
Dr. Barrett, Ms. Jarett was seen today. She self increased her gabapentin to 800mg tid; this was previously 800mg bid. This was due to worsening neuropathy in her feet. She requested refill today. I wanted to be sure you were okay with this increase prior to sending in refill. Please advise.

## 2019-09-10 ENCOUNTER — DOCUMENTATION ONLY (OUTPATIENT)
Dept: FAMILY MEDICINE | Facility: CLINIC | Age: 39
End: 2019-09-10

## 2019-09-14 DIAGNOSIS — E55.9 VITAMIN D DEFICIENCY: ICD-10-CM

## 2019-09-16 RX ORDER — ERGOCALCIFEROL 1.25 MG/1
CAPSULE ORAL
Qty: 12 CAPSULE | Refills: 0 | Status: SHIPPED | OUTPATIENT
Start: 2019-09-16 | End: 2019-11-25 | Stop reason: ALTCHOICE

## 2019-09-23 ENCOUNTER — OFFICE VISIT (OUTPATIENT)
Dept: NEPHROLOGY | Facility: CLINIC | Age: 39
End: 2019-09-23
Payer: MEDICARE

## 2019-09-23 VITALS
DIASTOLIC BLOOD PRESSURE: 66 MMHG | BODY MASS INDEX: 18.32 KG/M2 | HEART RATE: 57 BPM | WEIGHT: 103.38 LBS | HEIGHT: 63 IN | SYSTOLIC BLOOD PRESSURE: 110 MMHG | OXYGEN SATURATION: 99 %

## 2019-09-23 DIAGNOSIS — N28.1 ACQUIRED CYST OF KIDNEY: ICD-10-CM

## 2019-09-23 DIAGNOSIS — N18.30 CHRONIC KIDNEY DISEASE, STAGE III (MODERATE): Primary | ICD-10-CM

## 2019-09-23 DIAGNOSIS — D17.71 ANGIOMYOLIPOMA OF BOTH KIDNEYS: ICD-10-CM

## 2019-09-23 DIAGNOSIS — N20.0 CALCULUS OF KIDNEY: ICD-10-CM

## 2019-09-23 PROCEDURE — 99214 OFFICE O/P EST MOD 30 MIN: CPT | Mod: HCNC,S$GLB,, | Performed by: INTERNAL MEDICINE

## 2019-09-23 PROCEDURE — 99999 PR PBB SHADOW E&M-EST. PATIENT-LVL III: ICD-10-PCS | Mod: PBBFAC,HCNC,, | Performed by: INTERNAL MEDICINE

## 2019-09-23 PROCEDURE — 3008F BODY MASS INDEX DOCD: CPT | Mod: HCNC,CPTII,S$GLB, | Performed by: INTERNAL MEDICINE

## 2019-09-23 PROCEDURE — 99214 PR OFFICE/OUTPT VISIT, EST, LEVL IV, 30-39 MIN: ICD-10-PCS | Mod: HCNC,S$GLB,, | Performed by: INTERNAL MEDICINE

## 2019-09-23 PROCEDURE — 3008F PR BODY MASS INDEX (BMI) DOCUMENTED: ICD-10-PCS | Mod: HCNC,CPTII,S$GLB, | Performed by: INTERNAL MEDICINE

## 2019-09-23 PROCEDURE — 99999 PR PBB SHADOW E&M-EST. PATIENT-LVL III: CPT | Mod: PBBFAC,HCNC,, | Performed by: INTERNAL MEDICINE

## 2019-09-23 NOTE — PROGRESS NOTES
"Subjective:       Patient ID: Cat Denson is a 39 y.o. White female who presents for return patient evaluation for chronic renal failure.    She has no uremic symptoms.  There have been no recent hospitalizations or procedures.  She recently saw Dr. Martinez who will plan to do a botox injection soon.      Review of Systems   Constitutional: Positive for unexpected weight change. Negative for appetite change, chills and fever.   Eyes: Positive for visual disturbance (L eye).   Respiratory: Negative for cough and shortness of breath.    Cardiovascular: Negative for chest pain and leg swelling.   Gastrointestinal: Negative for diarrhea, nausea and vomiting.   Endocrine: Positive for cold intolerance.   Genitourinary: Positive for difficulty urinating (incontinence since cva). Negative for dysuria and hematuria.   Musculoskeletal: Positive for arthralgias. Negative for myalgias.   Skin: Negative for rash.   Neurological: Positive for weakness (L arm and leg since cva) and headaches.   Psychiatric/Behavioral: Positive for decreased concentration (memory since cva). Negative for sleep disturbance.       The past medical, family and social histories were reviewed for this encounter.     /66 (BP Location: Right arm, Patient Position: Sitting)   Pulse (!) 57   Ht 5' 3" (1.6 m)   Wt 46.9 kg (103 lb 6.3 oz)   LMP 09/09/2019   SpO2 99%   BMI 18.32 kg/m²     Objective:      Physical Exam   Constitutional: She is oriented to person, place, and time. She appears well-developed and well-nourished. No distress.   HENT:   Head: Normocephalic and atraumatic.   Eyes: Conjunctivae are normal.   Neck: Neck supple. No JVD present.   Cardiovascular: Normal rate, regular rhythm and normal heart sounds. Exam reveals no gallop and no friction rub.   No murmur heard.  Pulmonary/Chest: Effort normal and breath sounds normal. No respiratory distress. She has no wheezes. She has no rales.   Abdominal: Soft. Bowel sounds are " normal. She exhibits no distension. There is no tenderness.   Musculoskeletal: She exhibits no edema.   Neurological: She is alert and oriented to person, place, and time.   Skin: Skin is warm and dry. No rash noted.   Psychiatric: She has a normal mood and affect.   Vitals reviewed.      Assessment:       1. Chronic kidney disease, stage III (moderate)        Plan:   Return to clinic in 12 months.  Labs for next visit include in 6 months and 1 year rp, pth.  Baseline creatinine is 1.3-1.5 since 2013.  PTH is 128 with a calcium of 8.5.  Blood pressure is controlled on the current regimen.  She has a bland urine sediment with normal kidneys structurally and relatively stable function.  She does not appear to have any active SLE disease and does not apparently have a history of renal involvement.  Her ds DNA and complement levels are normal.  Renal ultrasound shows R 8.6 cm, L 8.8 cm.

## 2019-09-23 NOTE — LETTER
September 23, 2019      Lorenza Garcia PA-C  [address] [facility] [address]           [facility] [address] [place] [address]  Phone: [phone]          Patient: Cat Denson   MR Number: [id number]   YOB: 1980   Date of Visit: 9/23/2019       Dear Lorenza Garcia:    Thank you for referring Cat Denson to me for evaluation. Attached you will find relevant portions of my assessment and plan of care.    If you have questions, please do not hesitate to call me. I look forward to following Cat Denson along with you.    Sincerely,    Jasmeet Lovell MD    Enclosure  CC:  No Recipients    If you would like to receive this communication electronically, please contact [email or web address] or [phone] to request more information on [employer] Link access.    For providers and/or their staff who would like to refer a patient to [facility], please contact us through our one-stop-shop provider referral line, [facility], at [phone].    If you feel you have received this communication in error or would no longer like to receive these types of communications, please e-mail [email or web address]

## 2019-10-29 NOTE — PROGRESS NOTES
CHIEF COMPLAINT:    Mrs. Denson is a 39 y.o. female presenting for a follow up on urinary urgency, urge incontinence    PRESENTING ILLNESS:    Cat Denson is a 39 y.o. female who returns for follow up.  She was found to have DESD on video urodynamics however, denies a history of hesitancy, intermittency.  She has nocturia and urge incontinence with the first void.  The oxybutynin works somewhat well for her if she takes it late enough in the evening.  She is on 10 mg XL.  She is accompanied by her friend.      REVIEW OF SYSTEMS:    Review of Systems   Constitutional: Negative.    HENT: Negative.    Eyes: Negative.    Respiratory: Negative.    Cardiovascular: Negative.    Genitourinary: Positive for urgency.        Nocturia   Musculoskeletal: Negative.    Skin: Negative.    Neurological: Positive for seizures (history of disorder).   Endo/Heme/Allergies: Negative.    Psychiatric/Behavioral:        History of bipolar disorder but doing well now.        PATIENT HISTORY:    Past Medical History:   Diagnosis Date    Acid reflux     Allergy     Anemia     Anticoagulated 12/29/2015    Anxiety     Asthma     Bipolar 1 disorder     Bronchospasm with bronchitis, acute     Chronic daily headache 9/11/2018    Chronic kidney disease     Chronic pain     Depression     bipolar 2    Fibromyalgia     Fibromyalgia     Gastroparesis     History of right MCA stroke 11/25/2015    Hx of psychiatric care     Lactose intolerance     Lupus     Mixed hyperlipidemia 11/28/2018    Psychiatric problem     Renal tubular acidosis     Seizure     Sjogren's syndrome     Smoker     Stroke 11-    Suicide attempt     overdosed on a bottle of paxil, muscle relaxers, & zoloft    Therapy        Past Surgical History:   Procedure Laterality Date    CHOLECYSTECTOMY      COLONOSCOPY  11/12/2014    Dr. Colin, repeat at 52 years old for screening    FLUOROSCOPIC URODYNAMIC STUDY N/A 7/23/2019    Procedure:  URODYNAMIC STUDY, FLUOROSCOPIC;  Surgeon: Vanna Martinez MD;  Location: [facility] OR [address] [place] [address];  Service: Urology;  Laterality: N/A;  1 hour    UPPER GASTROINTESTINAL ENDOSCOPY  03/03/2017    Dr. Harris       Family History   Problem Relation Age of Onset    Hypertension Mother     Hyperlipidemia Mother     Psoriasis Mother     Thyroid disease Mother     No Known Problems Father     Post-traumatic stress disorder Brother     Drug abuse Brother     Diabetes Maternal Uncle     Hypertension Maternal Uncle     Alcohol abuse Maternal Uncle     Stroke Maternal Uncle     Scoliosis Paternal Aunt     Heart disease Paternal Uncle     Mental illness Maternal Grandmother     Cancer Maternal Grandmother         lung / brain - smoker    Drug abuse Maternal Grandmother     Hypertension Maternal Grandmother     Hyperlipidemia Maternal Grandmother     Diabetes Maternal Grandmother     Rheum arthritis Maternal Grandmother     Diabetes Mellitus Maternal Grandmother     Heart disease Maternal Grandfather     Hypertension Maternal Grandfather     Alcohol abuse Maternal Grandfather     Osteoarthritis Maternal Grandfather     No Known Problems Paternal Grandmother     Mental illness Paternal Grandfather     Early death Paternal Grandfather         self     Socioeconomic History    Marital status: Single    Number of children: 0   Social Needs    Financial resource strain: Somewhat hard    Food insecurity:     Worry: Sometimes true     Inability: Sometimes true    Transportation needs:     Medical: No     Non-medical: No   Tobacco Use    Smoking status: Current Every Day Smoker     Packs/day: 1.00     Years: 15.00     Pack years: 15.00     Types: Cigarettes     Start date: 11/25/2015    Smokeless tobacco: Never Used   Substance and Sexual Activity    Alcohol use: No     Alcohol/week: 0.0 standard drinks     Frequency: Never     Binge frequency: Never    Drug use: Yes     Types: Marijuana     Comment: smokes  occasionally, helps with pain and appetite    Sexual activity: Never     Partners: Female     Comment: usually female, but currently with her boyfriend   Lifestyle    Physical activity:     Days per week: 0 days     Minutes per session: 0 min    Stress: Very much   Relationships    Social connections:     Talks on phone: Twice a week     Gets together: Twice a week     Attends Sabianism service: Not on file     Active member of club or organization: No     Attends meetings of clubs or organizations: Never     Relationship status: Living with partner   Social History Narrative    Doesn't drive since the stroke, mother drives her and she lives with her mother.       Allergies:  Metoclopramide hcl; Amoxicillin-pot clavulanate; Amoxil [amoxicillin]; Avelox [moxifloxacin]; Benadryl [diphenhydramine hcl]; Quinazolinones; Ultram [tramadol]; and Wellbutrin [bupropion hcl]    Medications:  Outpatient Encounter Medications as of 11/4/2019   Medication Sig Dispense Refill    ARIPiprazole (ABILIFY) 15 MG Tab Take 1 tablet (15 mg total) by mouth once daily. 30 tablet 1    atorvastatin (LIPITOR) 10 MG tablet Take 1 tablet (10 mg total) by mouth once daily. 90 tablet 3    chlorhexidine (PERIDEX) 0.12 % solution RINSE WITH 1/2 OZ BID AFTER BRUSHING AND FLOSSING  6    divalproex ER (DEPAKOTE) 500 MG Tb24 Take two tablets PO nightly 60 tablet 1    ergocalciferol (ERGOCALCIFEROL) 50,000 unit Cap TAKE 1 CAPSULE BY MOUTH EVERY 7 DAYS 12 capsule 0    folic acid (FOLVITE) 1 MG tablet Take 2 tablets (2,000 mcg total) by mouth once daily. (Patient taking differently: Take 1,000 mcg by mouth every other day. ) 180 tablet 3    gabapentin (NEURONTIN) 800 MG tablet TAKE 1 TABLET(800 MG) BY MOUTH TWICE DAILY 180 tablet 3    hydroxychloroquine (PLAQUENIL) 200 mg tablet Take 1 tablet (200 mg total) by mouth 2 (two) times daily. 60 tablet 5    [id number]-algal oil (METANX/FOLTANX RF) 3 mg-35 mg-2 mg -90.314 mg Cap Take 1  capsule by mouth once daily. 90 capsule 3    loratadine (CLARITIN) 10 mg tablet Take 1 tablet (10 mg total) by mouth once daily. 30 tablet 11    mirtazapine (REMERON) 30 MG tablet Take 1 tablet (30 mg total) by mouth every evening. 30 tablet 1    pantoprazole (PROTONIX) 40 MG tablet TAKE 1 TABLET(40 MG) BY MOUTH EVERY DAY 90 tablet 3    PREVIDENT 5000 BOOSTER PLUS 1.1 % Pste BRUSH ON NIGHTLY AFTER NORMAL HYGIENE REGIMAN. SPIT OUT EXCESS DO NOT RINSE  4    promethazine (PHENERGAN) 25 MG tablet Take 1 tablet (25 mg total) by mouth every 4 (four) hours. 30 tablet 0    ranitidine (ZANTAC) 300 MG tablet Take 1 tablet (300 mg total) by mouth nightly. 90 tablet 3    rivaroxaban (XARELTO) 20 mg Tab Take 1 tablet (20 mg total) by mouth daily with dinner or evening meal. 90 tablet 3    [DISCONTINUED] oxybutynin (DITROPAN-XL) 10 MG 24 hr tablet Take 1 tablet (10 mg total) by mouth once daily. 90 tablet 3    oxybutynin (DITROPAN-XL) 5 MG TR24 Take 1 tablet (5 mg total) by mouth once daily. 30 tablet 11     No facility-administered encounter medications on file as of 11/4/2019.          PHYSICAL EXAMINATION:    The patient generally appears in good health, is appropriately interactive, and is in no apparent distress.    Skin: No lesions.    Mental: Cooperative with normal affect.    Neuro: Grossly intact.    HEENT: Normal. No evidence of lymphadenopathy.    Chest:  normal inspiratory effort.    Abdomen:  Soft, non-tender. No masses or organomegaly. Bladder is not palpable. No evidence of flank discomfort. No evidence of inguinal hernia.    Extremities: No clubbing, cyanosis, or edema      LABS:    Lab Results   Component Value Date    BUN 11 08/26/2019    CREATININE 1.4 08/26/2019     UA 1.005, pH 7, 50 blood, otherwise, negative (on menses)    IMPRESSION:    Encounter Diagnoses   Name Primary?    Urge incontinence Yes    Urinary urgency     Nocturia        PLAN:    1.  She has a lot of oxybutynin XL 10 mg.  Will  give her 5 mg XL to bring the total to 15 mg  2.  When she is ready for the 15 mg, she will let me know  3.  Follow up 6 months so she does not get lost to follow up.

## 2019-10-30 ENCOUNTER — PATIENT OUTREACH (OUTPATIENT)
Dept: ADMINISTRATIVE | Facility: OTHER | Age: 39
End: 2019-10-30

## 2019-11-04 ENCOUNTER — OFFICE VISIT (OUTPATIENT)
Dept: UROLOGY | Facility: CLINIC | Age: 39
End: 2019-11-04
Payer: MEDICARE

## 2019-11-04 VITALS
HEART RATE: 72 BPM | WEIGHT: 97 LBS | BODY MASS INDEX: 17.19 KG/M2 | HEIGHT: 63 IN | SYSTOLIC BLOOD PRESSURE: 95 MMHG | DIASTOLIC BLOOD PRESSURE: 62 MMHG

## 2019-11-04 DIAGNOSIS — R39.15 URINARY URGENCY: ICD-10-CM

## 2019-11-04 DIAGNOSIS — R35.1 NOCTURIA: ICD-10-CM

## 2019-11-04 DIAGNOSIS — N39.41 URGE INCONTINENCE: Primary | ICD-10-CM

## 2019-11-04 PROCEDURE — 99213 OFFICE O/P EST LOW 20 MIN: CPT | Mod: HCNC,25,S$GLB, | Performed by: UROLOGY

## 2019-11-04 PROCEDURE — 3008F PR BODY MASS INDEX (BMI) DOCUMENTED: ICD-10-PCS | Mod: HCNC,CPTII,S$GLB, | Performed by: UROLOGY

## 2019-11-04 PROCEDURE — 3008F BODY MASS INDEX DOCD: CPT | Mod: HCNC,CPTII,S$GLB, | Performed by: UROLOGY

## 2019-11-04 PROCEDURE — 81002 PR URINALYSIS NONAUTO W/O SCOPE: ICD-10-PCS | Mod: HCNC,S$GLB,, | Performed by: UROLOGY

## 2019-11-04 PROCEDURE — 81002 URINALYSIS NONAUTO W/O SCOPE: CPT | Mod: HCNC,S$GLB,, | Performed by: UROLOGY

## 2019-11-04 PROCEDURE — 99213 PR OFFICE/OUTPT VISIT, EST, LEVL III, 20-29 MIN: ICD-10-PCS | Mod: HCNC,25,S$GLB, | Performed by: UROLOGY

## 2019-11-04 PROCEDURE — 99999 PR PBB SHADOW E&M-EST. PATIENT-LVL IV: ICD-10-PCS | Mod: PBBFAC,HCNC,, | Performed by: UROLOGY

## 2019-11-04 PROCEDURE — 99999 PR PBB SHADOW E&M-EST. PATIENT-LVL IV: CPT | Mod: PBBFAC,HCNC,, | Performed by: UROLOGY

## 2019-11-04 RX ORDER — OXYBUTYNIN CHLORIDE 5 MG/1
5 TABLET, EXTENDED RELEASE ORAL DAILY
Qty: 30 TABLET | Refills: 11 | Status: SHIPPED | OUTPATIENT
Start: 2019-11-04 | End: 2020-12-02 | Stop reason: SDUPTHER

## 2019-11-13 RX ORDER — DIVALPROEX SODIUM 500 MG/1
TABLET, FILM COATED, EXTENDED RELEASE ORAL
Qty: 60 TABLET | Refills: 1 | Status: SHIPPED | OUTPATIENT
Start: 2019-11-13 | End: 2019-12-17 | Stop reason: SDUPTHER

## 2019-11-13 RX ORDER — MIRTAZAPINE 30 MG/1
TABLET, FILM COATED ORAL
Qty: 30 TABLET | Refills: 1 | Status: SHIPPED | OUTPATIENT
Start: 2019-11-13 | End: 2019-12-17 | Stop reason: SDUPTHER

## 2019-11-25 ENCOUNTER — OFFICE VISIT (OUTPATIENT)
Dept: FAMILY MEDICINE | Facility: CLINIC | Age: 39
End: 2019-11-25
Payer: MEDICARE

## 2019-11-25 VITALS
WEIGHT: 109.81 LBS | HEIGHT: 63 IN | OXYGEN SATURATION: 98 % | TEMPERATURE: 98 F | BODY MASS INDEX: 19.46 KG/M2 | SYSTOLIC BLOOD PRESSURE: 100 MMHG | RESPIRATION RATE: 12 BRPM | DIASTOLIC BLOOD PRESSURE: 70 MMHG | HEART RATE: 90 BPM

## 2019-11-25 DIAGNOSIS — I69.354 HEMIPARESIS AFFECTING LEFT SIDE AS LATE EFFECT OF CEREBROVASCULAR ACCIDENT: ICD-10-CM

## 2019-11-25 DIAGNOSIS — M32.9 SYSTEMIC LUPUS ERYTHEMATOSUS, UNSPECIFIED SLE TYPE, UNSPECIFIED ORGAN INVOLVEMENT STATUS: ICD-10-CM

## 2019-11-25 DIAGNOSIS — Z79.01 LONG TERM CURRENT USE OF ANTICOAGULANT THERAPY: ICD-10-CM

## 2019-11-25 DIAGNOSIS — D50.9 IRON DEFICIENCY ANEMIA, UNSPECIFIED IRON DEFICIENCY ANEMIA TYPE: ICD-10-CM

## 2019-11-25 DIAGNOSIS — Z86.73 HISTORY OF RIGHT MCA STROKE: Chronic | ICD-10-CM

## 2019-11-25 DIAGNOSIS — G40.219 COMPLEX PARTIAL EPILEPSY WITH GENERALIZATION AND WITH INTRACTABLE EPILEPSY: ICD-10-CM

## 2019-11-25 DIAGNOSIS — M26.621 ARTHRALGIA OF RIGHT TEMPOROMANDIBULAR JOINT: ICD-10-CM

## 2019-11-25 DIAGNOSIS — E78.2 MIXED HYPERLIPIDEMIA: ICD-10-CM

## 2019-11-25 DIAGNOSIS — J45.909 CHRONIC ASTHMA WITHOUT COMPLICATION, UNSPECIFIED ASTHMA SEVERITY, UNSPECIFIED WHETHER PERSISTENT: ICD-10-CM

## 2019-11-25 DIAGNOSIS — H53.452 PERIPHERAL VISION LOSS, LEFT: Primary | ICD-10-CM

## 2019-11-25 DIAGNOSIS — N18.30 CKD (CHRONIC KIDNEY DISEASE) STAGE 3, GFR 30-59 ML/MIN: Chronic | ICD-10-CM

## 2019-11-25 PROCEDURE — 99999 PR PBB SHADOW E&M-EST. PATIENT-LVL IV: ICD-10-PCS | Mod: PBBFAC,HCNC,, | Performed by: FAMILY MEDICINE

## 2019-11-25 PROCEDURE — 99214 OFFICE O/P EST MOD 30 MIN: CPT | Mod: HCNC,S$GLB,, | Performed by: FAMILY MEDICINE

## 2019-11-25 PROCEDURE — 3008F BODY MASS INDEX DOCD: CPT | Mod: HCNC,CPTII,S$GLB, | Performed by: FAMILY MEDICINE

## 2019-11-25 PROCEDURE — 3008F PR BODY MASS INDEX (BMI) DOCUMENTED: ICD-10-PCS | Mod: HCNC,CPTII,S$GLB, | Performed by: FAMILY MEDICINE

## 2019-11-25 PROCEDURE — 99214 PR OFFICE/OUTPT VISIT, EST, LEVL IV, 30-39 MIN: ICD-10-PCS | Mod: HCNC,S$GLB,, | Performed by: FAMILY MEDICINE

## 2019-11-25 PROCEDURE — 99999 PR PBB SHADOW E&M-EST. PATIENT-LVL IV: CPT | Mod: PBBFAC,HCNC,, | Performed by: FAMILY MEDICINE

## 2019-11-25 RX ORDER — TIZANIDINE 4 MG/1
4 TABLET ORAL EVERY 6 HOURS PRN
Qty: 30 TABLET | Refills: 0 | Status: SHIPPED | OUTPATIENT
Start: 2019-11-25 | End: 2019-12-05

## 2019-11-25 RX ORDER — OXYBUTYNIN CHLORIDE 10 MG/1
10 TABLET, EXTENDED RELEASE ORAL DAILY
COMMUNITY
End: 2022-01-31

## 2019-11-25 NOTE — PROGRESS NOTES
Subjective:       Patient ID: Cat Denson is a 39 y.o. female.    Chief Complaint: Jaw Pain    Patient Active Problem List   Diagnosis    Chronic pain    Bipolar 2 disorder    Fibromyalgia    Lupus (systemic lupus erythematosus)    Seizure    Chronic asthma without complication    Hemiparesis affecting left side as late effect of cerebrovascular accident    History of right MCA stroke    Hemiplegic migraine    CKD (chronic kidney disease) stage 3, GFR 30-59 ml/min    Gastroesophageal reflux disease without esophagitis    Long term current use of anticoagulant therapy    Urge incontinence    Primary insomnia    Iron deficiency anemia    Long-term use of hydroxychloroquine    Hepatitis, fulminant    Cigarette nicotine dependence without complication    History of fracture of left ankle 11/16    Homocysteinemia    Recurrent seizures    Complex partial epilepsy with generalization and with intractable epilepsy    Chronic daily headache    Osteopenia    Mixed hyperlipidemia    Gastroparesis    Tobacco use    Bladder disorder    Urinary urgency   Patient c/o right jaw pain after having all upper and lower teeth extracted at once a month ago. Now has dentures. Gums have healed but has ache in right jaw.  Hurts slightly to open jaw and chew. Has h/o tmj sx.  Sleeps without teeth but has more sx in am.      C/o loss of left eye peripheral vision since stroke 2015.  Not new.  Has been documented on last eye exam 4/18.      H/o grand mal seizures- on depakote . Started > 13 years ago.  Also h/o psuedoseizures.  Was in inpatient epilepsy monitoring unit 9/18. That was last sz activity. Neurology Dr. Norman     Psych Dr. Cruz- bipolar 2.       Rheum Dr. Benoit- SLE     Has had CVA 2015-embolic from heart. First on coumadin and got toxic. On xarelto since then.chronic left hemiplegia.          Nephrology Dr. Lovell CKD stage 3     Optometry Dr. Buchanan  HPI  Review of Systems   Constitutional:  Negative for fatigue and unexpected weight change.   HENT: Negative for ear discharge, ear pain, facial swelling, hearing loss, sinus pressure, sneezing, sore throat and trouble swallowing.    Respiratory: Negative for chest tightness and shortness of breath.    Cardiovascular: Negative for chest pain, palpitations and leg swelling.   Gastrointestinal: Negative for abdominal pain.   Musculoskeletal: Negative for arthralgias.   Neurological: Negative for dizziness, syncope, light-headedness and headaches.       Objective:      Physical Exam   Constitutional: She is oriented to person, place, and time. She appears well-developed and well-nourished.   HENT:   Right Ear: Tympanic membrane and ear canal normal.   Left Ear: Tympanic membrane and ear canal normal.   Nose: Nose normal.   Mouth/Throat: She has dentures.   Tender right tmj and with jaw opening   Cardiovascular: Normal rate, regular rhythm and normal heart sounds.   Pulmonary/Chest: Effort normal and breath sounds normal.   Musculoskeletal: She exhibits no edema.   Neurological: She is alert and oriented to person, place, and time.   Skin: Skin is warm and dry.   Psychiatric: She has a normal mood and affect.   Nursing note and vitals reviewed.      Assessment:       1. Peripheral vision loss, left    2. Arthralgia of right temporomandibular joint    3. History of right MCA stroke    4. Hemiparesis affecting left side as late effect of cerebrovascular accident    5. Complex partial epilepsy with generalization and with intractable epilepsy    6. Chronic asthma without complication, unspecified asthma severity, unspecified whether persistent    7. Mixed hyperlipidemia    8. CKD (chronic kidney disease) stage 3, GFR 30-59 ml/min    9. Systemic lupus erythematosus, unspecified SLE type, unspecified organ involvement status    10. Long term current use of anticoagulant therapy    11. Iron deficiency anemia, unspecified iron deficiency anemia type        Plan:        1. Peripheral vision loss, left  Chronic and stable. Refer for eye exam  - Ambulatory referral to Ophthalmology    2. Arthralgia of right temporomandibular joint  RICE/heat, soft mechanical diet. Use prn qhs  - tiZANidine (ZANAFLEX) 4 MG tablet; Take 1 tablet (4 mg total) by mouth every 6 (six) hours as needed.  Dispense: 30 tablet; Refill: 0  F/u with dentist    3. History of right MCA stroke  Cont current precautions     4. Hemiparesis affecting left side as late effect of cerebrovascular accident  Chronic and stable.      5. Complex partial epilepsy with generalization and with intractable epilepsy  Controlled on current medications.  Continue current medications.  Cont neuro consult    6. Chronic asthma without complication, unspecified asthma severity, unspecified whether persistent  Controlled on current medications.  Continue current medications.      7. Mixed hyperlipidemia  Stable condition.  Continue current medications.  Will adjust based on lab findings or if condition changes.      8. CKD (chronic kidney disease) stage 3, GFR 30-59 ml/min  Stable and chronic.  Will continue to monitor q3-6 months and control chronic conditions as optimally as possible to preserve function.      9. Systemic lupus erythematosus, unspecified SLE type, unspecified organ involvement status  Cont rheum care and consult    10. Long term current use of anticoagulant therapy  Cont xarelto    11. Iron deficiency anemia, unspecified iron deficiency anemia type  Stable condition.  Continue current medications.  Will adjust based on lab findings or if condition changes.    Reeval 6 months or sooner prn

## 2019-12-09 ENCOUNTER — OFFICE VISIT (OUTPATIENT)
Dept: OPHTHALMOLOGY | Facility: CLINIC | Age: 39
End: 2019-12-09
Payer: MEDICARE

## 2019-12-09 DIAGNOSIS — M32.9 SYSTEMIC LUPUS ERYTHEMATOSUS, UNSPECIFIED SLE TYPE, UNSPECIFIED ORGAN INVOLVEMENT STATUS: ICD-10-CM

## 2019-12-09 DIAGNOSIS — H53.462 HOMONYMOUS BILATERAL FIELD DEFECTS IN VISUAL FIELD, LEFT: Primary | ICD-10-CM

## 2019-12-09 DIAGNOSIS — Z79.899 HIGH RISK MEDICATION USE: ICD-10-CM

## 2019-12-09 DIAGNOSIS — H52.7 REFRACTIVE ERROR: ICD-10-CM

## 2019-12-09 PROCEDURE — 99999 PR PBB SHADOW E&M-EST. PATIENT-LVL III: CPT | Mod: PBBFAC,HCNC,, | Performed by: OPHTHALMOLOGY

## 2019-12-09 PROCEDURE — 92014 COMPRE OPH EXAM EST PT 1/>: CPT | Mod: HCNC,S$GLB,, | Performed by: OPHTHALMOLOGY

## 2019-12-09 PROCEDURE — 92014 PR EYE EXAM, EST PATIENT,COMPREHESV: ICD-10-PCS | Mod: HCNC,S$GLB,, | Performed by: OPHTHALMOLOGY

## 2019-12-09 PROCEDURE — 99999 PR PBB SHADOW E&M-EST. PATIENT-LVL III: ICD-10-PCS | Mod: PBBFAC,HCNC,, | Performed by: OPHTHALMOLOGY

## 2019-12-09 NOTE — LETTER
December 9, 2019      Neela Barrett MD  [address]  [place] [address]           [facility] [address]  [facility] [address]  Phone: [phone]          Patient: Cat Denson   MR Number: [id number]   YOB: 1980   Date of Visit: 12/9/2019       Dear Dr. Neela Barrett:    Thank you for referring Cat Denson to me for evaluation. Attached you will find relevant portions of my assessment and plan of care.    If you have questions, please do not hesitate to call me. I look forward to following Cat Denson along with you.    Sincerely,    Kim Weber MD    Enclosure  CC:  No Recipients    If you would like to receive this communication electronically, please contact [email or web address] or [phone] to request more information on [facility] access.    For providers and/or their staff who would like to refer a patient to [facility], please contact us through our one-stop-shop provider referral line, [facility], at [phone].    If you feel you have received this communication in error or would no longer like to receive these types of communications, please e-mail [email or web address]

## 2019-12-09 NOTE — PROGRESS NOTES
HPI     New pt here for comprehensive eye exam for peripheral vision loss OS. Pt   states can not tell if OS has changed any. States not having any new   complaints. Denies pain in eyes today. Denies F/F. Not using any gtts. Pt   states stopped taking plaquenil last year. Was told by another dr that she   had signs of toxicity and was told to stop using medication. Pt states   stopped taking medication but lupus dr does not know she stopped   medication. Pt states has not seen lupus dr in a while.     Agree with above. Visual field loss to left was after stroke in 2015.   Stopped Plaquenil around April 2018    Last edited by Kim Weber MD on 12/9/2019  3:21 PM.   (History)        ROS     Positive for: Constitutional (SLE), Neurological (Hx R MCA CVA 2015),   Eyes (Hx Plaquenil with possible toxicity), Heme/Lymph (Xarelto)    Negative for: Endocrine (denies DM), Cardiovascular (denies HTN)    Last edited by Kim Weber MD on 12/9/2019  3:21 PM.   (History)        Assessment /Plan     For exam results, see Encounter Report.    Homonymous bilateral field defects in visual field, left    Systemic lupus erythematosus, unspecified SLE type, unspecified organ involvement status    High risk medication use    Refractive error        Since CVA 2015    Hx Plaquenil off and on since age 11. Currently off.     RTC for MRx.

## 2019-12-17 ENCOUNTER — OFFICE VISIT (OUTPATIENT)
Dept: PSYCHIATRY | Facility: CLINIC | Age: 39
End: 2019-12-17
Payer: MEDICARE

## 2019-12-17 VITALS
SYSTOLIC BLOOD PRESSURE: 109 MMHG | BODY MASS INDEX: 19.39 KG/M2 | DIASTOLIC BLOOD PRESSURE: 74 MMHG | WEIGHT: 109.44 LBS | HEART RATE: 73 BPM | HEIGHT: 63 IN

## 2019-12-17 DIAGNOSIS — F12.20 CANNABIS USE DISORDER, MODERATE, DEPENDENCE: ICD-10-CM

## 2019-12-17 DIAGNOSIS — F09 COGNITIVE DISORDER: ICD-10-CM

## 2019-12-17 DIAGNOSIS — F31.75 BIPOLAR 1 DISORDER, DEPRESSED, PARTIAL REMISSION: ICD-10-CM

## 2019-12-17 DIAGNOSIS — F40.01 PANIC DISORDER WITH AGORAPHOBIA: ICD-10-CM

## 2019-12-17 DIAGNOSIS — Z79.899 HIGH RISK MEDICATION USE: ICD-10-CM

## 2019-12-17 PROCEDURE — 99999 PR PBB SHADOW E&M-EST. PATIENT-LVL III: ICD-10-PCS | Mod: PBBFAC,HCNC,, | Performed by: PSYCHIATRY & NEUROLOGY

## 2019-12-17 PROCEDURE — 99999 PR PBB SHADOW E&M-EST. PATIENT-LVL III: CPT | Mod: PBBFAC,HCNC,, | Performed by: PSYCHIATRY & NEUROLOGY

## 2019-12-17 PROCEDURE — 99214 PR OFFICE/OUTPT VISIT, EST, LEVL IV, 30-39 MIN: ICD-10-PCS | Mod: HCNC,S$GLB,, | Performed by: PSYCHIATRY & NEUROLOGY

## 2019-12-17 PROCEDURE — 3008F PR BODY MASS INDEX (BMI) DOCUMENTED: ICD-10-PCS | Mod: HCNC,CPTII,S$GLB, | Performed by: PSYCHIATRY & NEUROLOGY

## 2019-12-17 PROCEDURE — 3008F BODY MASS INDEX DOCD: CPT | Mod: HCNC,CPTII,S$GLB, | Performed by: PSYCHIATRY & NEUROLOGY

## 2019-12-17 PROCEDURE — 99214 OFFICE O/P EST MOD 30 MIN: CPT | Mod: HCNC,S$GLB,, | Performed by: PSYCHIATRY & NEUROLOGY

## 2019-12-17 RX ORDER — MIRTAZAPINE 30 MG/1
TABLET, FILM COATED ORAL
Qty: 30 TABLET | Refills: 3 | Status: SHIPPED | OUTPATIENT
Start: 2019-12-17 | End: 2020-03-16 | Stop reason: SDUPTHER

## 2019-12-17 RX ORDER — DIVALPROEX SODIUM 500 MG/1
TABLET, FILM COATED, EXTENDED RELEASE ORAL
Qty: 60 TABLET | Refills: 3 | Status: SHIPPED | OUTPATIENT
Start: 2019-12-17 | End: 2020-02-14

## 2019-12-17 RX ORDER — ARIPIPRAZOLE 15 MG/1
15 TABLET ORAL DAILY
Qty: 30 TABLET | Refills: 3 | Status: SHIPPED | OUTPATIENT
Start: 2019-12-17 | End: 2020-03-16 | Stop reason: SDUPTHER

## 2019-12-17 NOTE — PROGRESS NOTES
Outpatient Psychiatry Follow-Up Visit (MD/NP)    12/17/2019    Clinical Status of Patient:  Outpatient (Ambulatory)    Chief Complaint:  Cat Denson is a 39 y.o. female who presents today for follow-up of mood disorder and anxiety.  Met with patient alone and pt with her mother.     Interval History and Content of Current Session:  Interim Events/Subjective Report/Content of Current Session:  38 yo disabled female presents to clinic for follow up treatment of  follow up appt of bipolar I disorder, panic disorder, cannabis use disorder, and cognitive disorder.   She has a prior hx of chronic pain, nausea, and emesis.  Has been having health issues since she was a child, dx with Lupus at 10 yo. She missed a lot of school.  Has impulsive tendencies with hx of cutting, shaving her head (shaved today).  She has prior hx of suicide attempt at 22 yo.  Multiple prior admissions for depression, SI.   Pt had a CVA in November 2015 - Embolic stroke involving right middle cerebral artery; she is hypercoagulable due to lupus.  She is currently taking Coumadin.  Pt has residual left hemiparesis.   She has had balance problems, forgetfulness, and severe focusing issue.    She is under the care of Rheumatology - Dr Benoit, Neurology, PCP: Hector Dukes MD    Neuropsychological testing 2/17:   Personality test data suggested the presence of mild depression. Neuropsychological test results reveal impairment in immediate and delayed visual memory, visual attention, temporal orientation, visuospatial/constructional abilities, abstract reasoning, psychomotor speed, and mental flexibility; and variability in immediate auditory/verbal memory (high average and mildly impaired performances) and verbal fluency (low average and moderately impaired performances); with mild depression. The pattern of impairment is consistent with right MCA CVA. She will continue to see Dr. Cruz and Mrs. Wiley for psychiatric care. She was  "encouraged to resume PT, OT, and Speech Therapy for further stroke rehabilitation.     Past Psychiatric Hx:  Dx Bipolar 2 Disorder (dx over 10 yrs ago), Anxiety, Impulse Control Disorder, hx pseudoseizures with possible epileptic seizures   Prior IOP: [facility]   Hx Suicide attempt at 22 yo  Hx suicidal ideations with admit to [facility] January 2015, [facility] for SI 2015,   First admit: 24 yo: for SI - [facility]; most recent [facility]. 2017 - for w/d affect, psychosis, poor memory recall, not sleeping x days  5 prior psych admissions  Hx self harm at age 17 yo     Past psychiatric hx: Wellbutrin for smoking cessation, helped to reduce, Zoloft, Trazodone (taken off due to tiredness one month ago), Lexapro, Paxil, Cymbalta (no pain benefit), Geodon, Trileptal, Lamictal, Seroquel, Chantix (makes her vomit), Olanzapine, Risperdal, Effexor, ritalin, depakote (was taken off ? Cannot recall why)    Past medical history:  Lupus   Sjogren's   CKD  Fibromyalgia, chronic pain   Seizure Disorder   Bronchospasm   Hx CVA  Gastroparesis   Fibromyalgia     Interim Hx:   Pt is compliant with Abilify 15 mg daily. She is compliant with Depakote, Mirtazapine.   She has returned to live in [place] a couple of weeks ago. She reports she and partner had a big fight, and she told pt to leave. Pt ready to do so, living with mom. She and partner were in 2 yr relationship.    No hospitalizations, no seizures.   Feels positive.hard to motivate, no goals. Chronic boredom.  Spends days playing video games, beats the same game over and over.  Does not leave home often.   Very anxious.  Does not like to leave home, "Just nervous and restless.'   No panic attacks or worry.   Not depressed.  Jumps from thing to thing.  Focus is hard.   No cutting, no self harm.  Denies suicidal/homicidal ideations.  Can stay awake for 2 days, playing on video games.  No risk taking.    Denies symptoms of " bowen/psychosis.  Reports she does not focus on weight, no crying spells.   Sleeps half the day     HAROLDO-7 Score: (P) 3  Interpretation: (P) Normal    GAD7 12/17/2019   Feeling nervous, anxious, on edge Several days   Not being able to stop or control worrying Not at all   Worrying too much about different things Not at all   Trouble relaxing Not at all   Being so restless that its hard to sit still Several days   Becoming easily annoyed or irritable Several days   Feeling afraid as if something awful might happen Not at all   If you marked you are experiencing any of the aforementioned problems, how difficult have these made it for you to do your work, take care of things at home, or get along with other people? Somewhat difficult   HAROLDO-7 Score 3       Trouble falling or staying asleep, or sleeping too much: (P) Not at all  Feeling tired or having little energy: (P) More than half the days  Poor appetite or overeating: (P) Several days  Feeling bad about yourself - or that you are a failure or have let yourself or your family down: (P) Not at all  Trouble concentrating on things, such as reading the newspaper or watching television: (P) Nearly every day  Moving or speaking so slowly that other people could have noticed. Or the opposite - being so fidgety or restless that you have been moving around a lot more than usual: (P) More than half the days  Thoughts that you would be better off dead, or of hurting yourself in some way: (P) Not at all  PHQ-9 Total Score: (P) 9  If you checked off any problems, how difficult have these problems made it for you to do your work, take care of things at home, or get along with other people?: (P) Not difficult at all    Appetite: ok; couple bites of something.  Drinks a lot - chocolate milk, gatorade, OJ.  Some nausea and diarrhea.  No recent emesis, binging or purging.     Still takes caffeine pill in 200 mg, no other sources of caffeine.   Marijuana - 3 grams a week, daily,  "multiple times a week - the only way she has an appetite.  Wants to get medical marijuana. Cannot afford.   Alcohol: none, no other drug use.   Tobacco - cigars, 1 pack a day.  Would like to quit.     Per mother - same things, stays in here room, sometimes, comes outside and lays with kids, her normal self.  Glad to have her home.     MOCA 12/17/19:  25/30 (+1 point for less than 12 yrs of education). Lost points for cube copy, visuospatial/trailblazing, digits/attention, serial 7s, fluency).     Review of Systems   · PSYCHIATRIC: Pertinant items are noted in the narrative.  · CONSTITUTIONAL: weight 7 lb gain, Body mass index is 19.39 kg/m².  · MUSCULOSKELETAL:  No pain   · CV: no chest pain, no tachycardia  · NEURO: left HP, no seizures in interim, denies recent falls, + balance issues.     Past Medical, Family and Social History: The patient's past medical, family and social history have been reviewed and updated as appropriate within the electronic medical record - see encounter notes.    Medications:   Abilify 15  mg daily   Depakote ER 1000 mg nightly   Mirtazapine 30 mg nightly - helps GI ussue     Compliance:  She reports yes      Side effects:  Denies today     Risk Parameters:  Patient reports no suicidal ideation   Patient reports no homicidal ideation  Patient reports no self-injurious behavior  Patient reports no violent behavior    Exam (detailed: at least 9 elements; comprehensive: all 15 elements)   Constitutional  Vitals:  Most recent vital signs, dated less than 90 days prior to this appointment, were reviewed.   Vitals:    12/17/19 1035   BP: 109/74   Pulse: 73   Weight: 49.6 kg (109 lb 7.3 oz)   Height: 5' 3" (1.6 m)        General:  age appropriate, dark clothes, adequate grooming, improved eye contact, hair dyed blue      Musculoskeletal  Muscle Strength/Tone:  Slight right hand tremor, left sided hemiparesis, tapping her RLE    Gait & Station:  steady     Psychiatric  Speech:  no latency; no " "press, decreased spontaneity    Mood & Affect:  "ok"   Blunted    Thought Process:  Linear with questions, somewhat slowed    Associations:  intact   Thought Content:  no current suicidality, no homicidality, delusions, or paranoia   Insight:  Fair    Judgement: Fair (taking meds, came to appt)   Orientation:  Grossly intact for content of interview, alert and oriented x 3    Memory: Able to recall of recent hx   Language: grossly intact, able to repeat    Attention Span & Concentration:  Grossly intact to interview    Fund of Knowledge:  intact and appropriate to age and level of education, familiar with aspects of current personal life      Assessment and Diagnosis   Status/Progress: Based on the examination today, the patient's problem(s) is/are under inadequate control.  New problems have not been presented today.  Co-morbidities are complicating management of the primary condition.      General Impression: pt had CVA Nov 2015 with significantly impaired focus, memory lapses, worsening depression, anxiety - improved with ritalin and on current medications (previously ok'd ritalin use with PCP).  Pt had episode of acute hepatitis, liver enzymes improved, but now with declining renal function; pt also resumed smoking; she is also using marijuana. Recent admission to [facility] and earlier admission for supratherapeutic INR. Mother managing medications now. Pt under care of Neurology; Neuropsychological testing done and consistent with her MCA CVA.    She continues to smoke tobacco and marijuana.  She complains of poor focus/motivation.  Mother feels pt is not depressed - improved with Abilify. Pt continues to lose weight with persistent n/v.   Given hospitalization earlier this yr with manic symptoms, will change dx to Bipolar 1 disorder which may be more appropriate. Pt using cannabis on daily basis again.  She was hospitalized for seizures in interim.  Pt now taking Depakote BID.  Mood/anxiety " symptoms are mostly stable.   Pt presented last visit following 2 yr hiatus. Improved on Abilify  At previous visit, pt reported she is improved, but feels depressed at times and struggles to cope during arguments, still periods of emotional dysregulation   At last visit, pt reported she has been bored, withdrawn, isolative, anxious at times, losing weight.  Home life is stressor, but she is reluctant to make changes.  Friend [employer] provides good support and willing to help. Declined IOP.  Pt now living in [place] with mother again.  Denies significant depression, but apathetic, lacks motivation, using cannabis multiple times daily.     Bipolar 1 Disorder, MRE depressed, in partial remission   Panic Disorder with Agoraphobia   Cognitive Disorder due to CVA  Insomnia Disorder  Cannabis Use Disorder, Moderate    Lupus, migraines, CVA, hx hepatitis, declining renal function, gastroparesis, dyskinesias, seizure disorder     GAF: 55   Intervention/Counseling/Treatment Plan   · Medication Management: The risks and benefits of medication were discussed with the patient.    - Continue Abilify 15 mg daily.   Typical RAJWINDER's reviewed including weight gain, abnormal movements, EPS, TD, metabolic side effects. continue to monitor mouth movements which are improved off of Reglan, but still occur when she uses her left arm per her report.  Monitor for worsening seizures.     - Encourage she continue psychotherapy; pt has vanita reluctant     - pt encouraged to refrain from cannabis use - psychoeducation today about risks of use, pt counseled again today on health risks associated with use.  Pt does not demonstrate motivation to stop using    - Pt instructed to go to ER with thoughts of harming self, others; Call to report any worsening of symptoms or problems with the medication    - Continue Depakote ER 1000 mg nightly     - Continue Mirtazapine 30 mg nightly (sleep, mood, anxiety, appetite targeted)    - continue to monitor cognition      - high risk medication:  CBC, hepatic function panel, VPA level     Return to Clinic: 3 months

## 2019-12-18 ENCOUNTER — OFFICE VISIT (OUTPATIENT)
Dept: OPHTHALMOLOGY | Facility: CLINIC | Age: 39
End: 2019-12-18
Payer: MEDICARE

## 2019-12-18 DIAGNOSIS — H53.462 HOMONYMOUS BILATERAL FIELD DEFECTS IN VISUAL FIELD, LEFT: Primary | ICD-10-CM

## 2019-12-18 DIAGNOSIS — H52.7 REFRACTIVE ERROR: ICD-10-CM

## 2019-12-18 DIAGNOSIS — Z79.899 HIGH RISK MEDICATION USE: ICD-10-CM

## 2019-12-18 DIAGNOSIS — M32.9 SYSTEMIC LUPUS ERYTHEMATOSUS, UNSPECIFIED SLE TYPE, UNSPECIFIED ORGAN INVOLVEMENT STATUS: ICD-10-CM

## 2019-12-18 PROCEDURE — 99999 PR PBB SHADOW E&M-EST. PATIENT-LVL III: CPT | Mod: PBBFAC,HCNC,, | Performed by: OPHTHALMOLOGY

## 2019-12-18 PROCEDURE — 92015 PR REFRACTION: ICD-10-PCS | Mod: HCNC,S$GLB,, | Performed by: OPHTHALMOLOGY

## 2019-12-18 PROCEDURE — 99499 UNLISTED E&M SERVICE: CPT | Mod: HCNC,S$GLB,, | Performed by: OPHTHALMOLOGY

## 2019-12-18 PROCEDURE — 92015 DETERMINE REFRACTIVE STATE: CPT | Mod: HCNC,S$GLB,, | Performed by: OPHTHALMOLOGY

## 2019-12-18 PROCEDURE — 99999 PR PBB SHADOW E&M-EST. PATIENT-LVL III: ICD-10-PCS | Mod: PBBFAC,HCNC,, | Performed by: OPHTHALMOLOGY

## 2019-12-18 PROCEDURE — 99499 NO LOS: ICD-10-PCS | Mod: HCNC,S$GLB,, | Performed by: OPHTHALMOLOGY

## 2019-12-18 NOTE — PROGRESS NOTES
HPI     Pt here for MRx check. Shanad specs the other day. States can not see   without them. No new complaints     Last edited by Sarina Borden on 12/18/2019 11:01 AM. (History)            Assessment /Plan     For exam results, see Encounter Report.    Homonymous bilateral field defects in visual field, left    Systemic lupus erythematosus, unspecified SLE type, unspecified organ involvement status    High risk medication use    Refractive error            Since CVA 2015    Hx Plaquenil off and on since age 11. Currently off.     MRx given today

## 2019-12-22 PROBLEM — Z79.899 HIGH RISK MEDICATION USE: Status: ACTIVE | Noted: 2019-12-22

## 2019-12-22 PROBLEM — F31.75 BIPOLAR 1 DISORDER, DEPRESSED, PARTIAL REMISSION: Status: ACTIVE | Noted: 2019-12-22

## 2019-12-22 PROBLEM — F40.01 PANIC DISORDER WITH AGORAPHOBIA: Status: ACTIVE | Noted: 2019-12-22

## 2019-12-22 PROBLEM — F12.20 CANNABIS USE DISORDER, MODERATE, DEPENDENCE: Status: ACTIVE | Noted: 2019-12-22

## 2019-12-22 PROBLEM — F09 COGNITIVE DISORDER: Status: ACTIVE | Noted: 2019-12-22

## 2020-01-17 ENCOUNTER — TELEPHONE (OUTPATIENT)
Dept: PSYCHIATRY | Facility: CLINIC | Age: 40
End: 2020-01-17

## 2020-01-20 ENCOUNTER — LAB VISIT (OUTPATIENT)
Dept: LAB | Facility: HOSPITAL | Age: 40
End: 2020-01-20
Attending: PSYCHIATRY & NEUROLOGY
Payer: MEDICARE

## 2020-01-20 DIAGNOSIS — Z79.899 HIGH RISK MEDICATION USE: ICD-10-CM

## 2020-01-20 LAB
ALBUMIN SERPL BCP-MCNC: 4.8 G/DL (ref 3.5–5.2)
ALP SERPL-CCNC: 85 U/L (ref 55–135)
ALT SERPL W/O P-5'-P-CCNC: 11 U/L (ref 10–44)
AST SERPL-CCNC: 16 U/L (ref 10–40)
BASOPHILS # BLD AUTO: 0.04 K/UL (ref 0–0.2)
BASOPHILS NFR BLD: 0.5 % (ref 0–1.9)
BILIRUB DIRECT SERPL-MCNC: 0.2 MG/DL (ref 0.1–0.3)
BILIRUB SERPL-MCNC: 0.3 MG/DL (ref 0.1–1)
DIFFERENTIAL METHOD: ABNORMAL
EOSINOPHIL # BLD AUTO: 0.1 K/UL (ref 0–0.5)
EOSINOPHIL NFR BLD: 1.3 % (ref 0–8)
ERYTHROCYTE [DISTWIDTH] IN BLOOD BY AUTOMATED COUNT: 12.9 % (ref 11.5–14.5)
HCT VFR BLD AUTO: 45.3 % (ref 37–48.5)
HGB BLD-MCNC: 14.9 G/DL (ref 12–16)
IMM GRANULOCYTES # BLD AUTO: 0.02 K/UL (ref 0–0.04)
IMM GRANULOCYTES NFR BLD AUTO: 0.3 % (ref 0–0.5)
LYMPHOCYTES # BLD AUTO: 2.4 K/UL (ref 1–4.8)
LYMPHOCYTES NFR BLD: 30.6 % (ref 18–48)
MCH RBC QN AUTO: 31.8 PG (ref 27–31)
MCHC RBC AUTO-ENTMCNC: 32.9 G/DL (ref 32–36)
MCV RBC AUTO: 97 FL (ref 82–98)
MONOCYTES # BLD AUTO: 0.4 K/UL (ref 0.3–1)
MONOCYTES NFR BLD: 5.3 % (ref 4–15)
NEUTROPHILS # BLD AUTO: 4.9 K/UL (ref 1.8–7.7)
NEUTROPHILS NFR BLD: 62 % (ref 38–73)
NRBC BLD-RTO: 0 /100 WBC
PLATELET # BLD AUTO: 171 K/UL (ref 150–350)
PMV BLD AUTO: 13.6 FL (ref 9.2–12.9)
PROT SERPL-MCNC: 8.8 G/DL (ref 6–8.4)
RBC # BLD AUTO: 4.69 M/UL (ref 4–5.4)
VALPROATE SERPL-MCNC: <12.5 UG/ML (ref 50–100)
WBC # BLD AUTO: 7.88 K/UL (ref 3.9–12.7)

## 2020-01-20 PROCEDURE — 80164 ASSAY DIPROPYLACETIC ACD TOT: CPT | Mod: HCNC

## 2020-01-20 PROCEDURE — 85025 COMPLETE CBC W/AUTO DIFF WBC: CPT | Mod: HCNC

## 2020-01-20 PROCEDURE — 36415 COLL VENOUS BLD VENIPUNCTURE: CPT | Mod: HCNC,PO

## 2020-01-20 PROCEDURE — 80076 HEPATIC FUNCTION PANEL: CPT | Mod: HCNC

## 2020-01-23 ENCOUNTER — TELEPHONE (OUTPATIENT)
Dept: PSYCHIATRY | Facility: CLINIC | Age: 40
End: 2020-01-23

## 2020-01-23 DIAGNOSIS — Z79.899 HIGH RISK MEDICATION USE: Primary | ICD-10-CM

## 2020-01-23 NOTE — TELEPHONE ENCOUNTER
Noted.  Pt has follow up 3/16/2020.  Suspect noncompliance is a larger issue.  Will repeat VPA level in 2 weeks. Order placed.

## 2020-01-23 NOTE — TELEPHONE ENCOUNTER
called pt regarding below message. Advised pt of need for recheck in 2 wks. Expressed the need to take medication daily. Scheduled lab appt with pt. Pt verbalized understanding with no further questions.

## 2020-02-01 DIAGNOSIS — K31.84 GASTROPARESIS: ICD-10-CM

## 2020-02-06 ENCOUNTER — LAB VISIT (OUTPATIENT)
Dept: LAB | Facility: HOSPITAL | Age: 40
End: 2020-02-06
Attending: PSYCHIATRY & NEUROLOGY
Payer: MEDICARE

## 2020-02-06 DIAGNOSIS — Z79.899 HIGH RISK MEDICATION USE: ICD-10-CM

## 2020-02-06 PROCEDURE — 36415 COLL VENOUS BLD VENIPUNCTURE: CPT | Mod: HCNC,PO

## 2020-02-06 PROCEDURE — 80164 ASSAY DIPROPYLACETIC ACD TOT: CPT | Mod: HCNC

## 2020-02-07 ENCOUNTER — PATIENT MESSAGE (OUTPATIENT)
Dept: PSYCHIATRY | Facility: CLINIC | Age: 40
End: 2020-02-07

## 2020-02-07 LAB — VALPROATE SERPL-MCNC: <12.5 UG/ML (ref 50–100)

## 2020-02-14 ENCOUNTER — TELEPHONE (OUTPATIENT)
Dept: PSYCHIATRY | Facility: CLINIC | Age: 40
End: 2020-02-14

## 2020-02-14 RX ORDER — DIVALPROEX SODIUM 500 MG/1
TABLET, FILM COATED, EXTENDED RELEASE ORAL
Qty: 1 TABLET | Refills: 0
Start: 2020-02-14 | End: 2020-03-16

## 2020-02-15 NOTE — TELEPHONE ENCOUNTER
----- Message from Cat Carreno LPN sent at 2/7/2020  5:01 PM CST -----  Called pt regarding results and recommendations per Dr. Cruz. Informed pt of results and recommendations. Pt states she has been taking two tablets nightly about 3-4 times a week. Pt states sometimes she goes to bed before she takes her medication. Advise pt I will send this information to Dr. Cruz for review upon her return to clinic. Pt verbalized understanding with no further questions.

## 2020-02-15 NOTE — TELEPHONE ENCOUNTER
I spoke to the patient.  She reports falling asleep in evening prior to taking Depakote.  Agreeable to try taking both tabs in am.  If too sedating, she will reduce to one tab in am.  Does not feel overmedicated currently.   Pt to update me on how she is tolerating this change in dosing

## 2020-03-03 DIAGNOSIS — M79.2 NEUROPATHIC PAIN: ICD-10-CM

## 2020-03-03 RX ORDER — PANTOPRAZOLE SODIUM 40 MG/1
40 TABLET, DELAYED RELEASE ORAL DAILY
Qty: 90 TABLET | Refills: 3 | Status: SHIPPED | OUTPATIENT
Start: 2020-03-03 | End: 2021-03-13

## 2020-03-03 RX ORDER — GABAPENTIN 800 MG/1
TABLET ORAL
Qty: 180 TABLET | Refills: 3 | Status: SHIPPED | OUTPATIENT
Start: 2020-03-03 | End: 2020-12-16 | Stop reason: SDUPTHER

## 2020-03-16 ENCOUNTER — OFFICE VISIT (OUTPATIENT)
Dept: PSYCHIATRY | Facility: CLINIC | Age: 40
End: 2020-03-16
Payer: MEDICARE

## 2020-03-16 ENCOUNTER — LAB VISIT (OUTPATIENT)
Dept: LAB | Facility: HOSPITAL | Age: 40
End: 2020-03-16
Attending: INTERNAL MEDICINE
Payer: MEDICARE

## 2020-03-16 VITALS
SYSTOLIC BLOOD PRESSURE: 157 MMHG | TEMPERATURE: 98 F | WEIGHT: 105.25 LBS | BODY MASS INDEX: 18.65 KG/M2 | HEART RATE: 85 BPM | HEIGHT: 63 IN | DIASTOLIC BLOOD PRESSURE: 94 MMHG

## 2020-03-16 DIAGNOSIS — F09 COGNITIVE DISORDER: ICD-10-CM

## 2020-03-16 DIAGNOSIS — F40.01 PANIC DISORDER WITH AGORAPHOBIA: ICD-10-CM

## 2020-03-16 DIAGNOSIS — N18.30 CHRONIC KIDNEY DISEASE, STAGE III (MODERATE): ICD-10-CM

## 2020-03-16 DIAGNOSIS — F41.9 ANXIETY: ICD-10-CM

## 2020-03-16 DIAGNOSIS — F12.20 CANNABIS USE DISORDER, MODERATE, DEPENDENCE: ICD-10-CM

## 2020-03-16 DIAGNOSIS — F31.75 BIPOLAR 1 DISORDER, DEPRESSED, PARTIAL REMISSION: ICD-10-CM

## 2020-03-16 LAB
ALBUMIN SERPL BCP-MCNC: 4.1 G/DL (ref 3.5–5.2)
ANION GAP SERPL CALC-SCNC: 10 MMOL/L (ref 8–16)
BUN SERPL-MCNC: 16 MG/DL (ref 6–20)
CALCIUM SERPL-MCNC: 8.8 MG/DL (ref 8.7–10.5)
CHLORIDE SERPL-SCNC: 109 MMOL/L (ref 95–110)
CO2 SERPL-SCNC: 20 MMOL/L (ref 23–29)
CREAT SERPL-MCNC: 1.9 MG/DL (ref 0.5–1.4)
EST. GFR  (AFRICAN AMERICAN): 38 ML/MIN/1.73 M^2
EST. GFR  (NON AFRICAN AMERICAN): 33 ML/MIN/1.73 M^2
GLUCOSE SERPL-MCNC: 103 MG/DL (ref 70–110)
PHOSPHATE SERPL-MCNC: 3.5 MG/DL (ref 2.7–4.5)
POTASSIUM SERPL-SCNC: 4.3 MMOL/L (ref 3.5–5.1)
PTH-INTACT SERPL-MCNC: 130.8 PG/ML (ref 9–77)
SODIUM SERPL-SCNC: 139 MMOL/L (ref 136–145)

## 2020-03-16 PROCEDURE — 80069 RENAL FUNCTION PANEL: CPT | Mod: HCNC

## 2020-03-16 PROCEDURE — 99999 PR PBB SHADOW E&M-EST. PATIENT-LVL III: ICD-10-PCS | Mod: PBBFAC,HCNC,, | Performed by: PSYCHIATRY & NEUROLOGY

## 2020-03-16 PROCEDURE — 99499 UNLISTED E&M SERVICE: CPT | Mod: HCNC,S$GLB,, | Performed by: PSYCHIATRY & NEUROLOGY

## 2020-03-16 PROCEDURE — 83970 ASSAY OF PARATHORMONE: CPT | Mod: HCNC

## 2020-03-16 PROCEDURE — 90833 PSYTX W PT W E/M 30 MIN: CPT | Mod: HCNC,S$GLB,, | Performed by: PSYCHIATRY & NEUROLOGY

## 2020-03-16 PROCEDURE — 3008F PR BODY MASS INDEX (BMI) DOCUMENTED: ICD-10-PCS | Mod: HCNC,CPTII,S$GLB, | Performed by: PSYCHIATRY & NEUROLOGY

## 2020-03-16 PROCEDURE — 36415 COLL VENOUS BLD VENIPUNCTURE: CPT | Mod: HCNC

## 2020-03-16 PROCEDURE — 99499 RISK ADDL DX/OHS AUDIT: ICD-10-PCS | Mod: HCNC,S$GLB,, | Performed by: PSYCHIATRY & NEUROLOGY

## 2020-03-16 PROCEDURE — 99213 PR OFFICE/OUTPT VISIT, EST, LEVL III, 20-29 MIN: ICD-10-PCS | Mod: HCNC,S$GLB,, | Performed by: PSYCHIATRY & NEUROLOGY

## 2020-03-16 PROCEDURE — 3008F BODY MASS INDEX DOCD: CPT | Mod: HCNC,CPTII,S$GLB, | Performed by: PSYCHIATRY & NEUROLOGY

## 2020-03-16 PROCEDURE — 99213 OFFICE O/P EST LOW 20 MIN: CPT | Mod: HCNC,S$GLB,, | Performed by: PSYCHIATRY & NEUROLOGY

## 2020-03-16 PROCEDURE — 99999 PR PBB SHADOW E&M-EST. PATIENT-LVL III: CPT | Mod: PBBFAC,HCNC,, | Performed by: PSYCHIATRY & NEUROLOGY

## 2020-03-16 PROCEDURE — 90833 PR PSYCHOTHERAPY W/PATIENT W/E&M, 30 MIN (ADD ON): ICD-10-PCS | Mod: HCNC,S$GLB,, | Performed by: PSYCHIATRY & NEUROLOGY

## 2020-03-16 RX ORDER — DIVALPROEX SODIUM 500 MG/1
TABLET, FILM COATED, EXTENDED RELEASE ORAL
Qty: 60 TABLET | Refills: 1
Start: 2020-03-16 | End: 2020-06-05

## 2020-03-16 RX ORDER — ARIPIPRAZOLE 15 MG/1
15 TABLET ORAL DAILY
Qty: 30 TABLET | Refills: 3 | Status: SHIPPED | OUTPATIENT
Start: 2020-03-16 | End: 2020-07-08 | Stop reason: DRUGHIGH

## 2020-03-16 RX ORDER — MIRTAZAPINE 30 MG/1
TABLET, FILM COATED ORAL
Qty: 30 TABLET | Refills: 3 | Status: SHIPPED | OUTPATIENT
Start: 2020-03-16 | End: 2020-07-08 | Stop reason: ALTCHOICE

## 2020-03-16 RX ORDER — CLONIDINE HYDROCHLORIDE 0.1 MG/1
0.1 TABLET ORAL NIGHTLY
Qty: 30 TABLET | Refills: 1 | Status: SHIPPED | OUTPATIENT
Start: 2020-03-16 | End: 2020-05-04

## 2020-03-16 RX ORDER — CLONIDINE HYDROCHLORIDE 0.1 MG/1
TABLET ORAL
Qty: 90 TABLET | OUTPATIENT
Start: 2020-03-16

## 2020-03-16 NOTE — PROGRESS NOTES
Outpatient Psychiatry Follow-Up Visit (MD/NP)    3/16/2020    Clinical Status of Patient:  Outpatient (Ambulatory)    Chief Complaint:  Cat Denson is a 40 y.o. female who presents today for follow-up of mood disorder and anxiety.  Met with patient alone.     Interval History and Content of Current Session:  Interim Events/Subjective Report/Content of Current Session:  39 yo disabled female presents to clinic for follow up treatment of  follow up appt of bipolar I disorder, panic disorder, cannabis use disorder, and cognitive disorder.   She has a prior hx of chronic pain, nausea, and emesis.  Has been having health issues since she was a child, dx with Lupus at 12 yo. She missed a lot of school.  Has impulsive tendencies with hx of cutting, shaving her head (shaved today).  She has prior hx of suicide attempt at 20 yo.  Multiple prior admissions for depression, SI.   Pt had a CVA in November 2015 - Embolic stroke involving right middle cerebral artery; she is hypercoagulable due to lupus.  She is currently taking Coumadin.  Pt has residual left hemiparesis.   She has had balance problems, forgetfulness, and severe focusing issue.    She is under the care of Rheumatology - Dr Benoit, Neurology, PCP: Hector Dukes MD    Neuropsychological testing 2/17:   Personality test data suggested the presence of mild depression. Neuropsychological test results reveal impairment in immediate and delayed visual memory, visual attention, temporal orientation, visuospatial/constructional abilities, abstract reasoning, psychomotor speed, and mental flexibility; and variability in immediate auditory/verbal memory (high average and mildly impaired performances) and verbal fluency (low average and moderately impaired performances); with mild depression. The pattern of impairment is consistent with right MCA CVA. She will continue to see Dr. Cruz and Mrs. Wiley for psychiatric care. She was encouraged to resume PT, OT,  "and Speech Therapy for further stroke rehabilitation.     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 2017 - for w/d affect, psychosis, poor memory recall, not sleeping x days  5 prior psych admissions  Hx self harm at age 17 yo     Past psychiatric hx: Wellbutrin for smoking cessation, helped to reduce, Zoloft, Trazodone (taken off due to tiredness one month ago), Lexapro, Paxil, Cymbalta (no pain benefit), Geodon, Trileptal, Lamictal, Seroquel, Chantix (makes her vomit), Olanzapine, Risperdal, Effexor, ritalin, depakote (was taken off ? Cannot recall why)    Past medical history:  Lupus   Sjogren's   CKD  Fibromyalgia, chronic pain   Seizure Disorder   Bronchospasm   Hx CVA  Gastroparesis   Fibromyalgia     Interim Hx:   The patient reports she is doing "ok."  Reports she is med compliant.  VPA in interim suggested she was not.    Lab Results   Component Value Date    VALPROATE <12.5 (L) 02/06/2020     The patient reports she is now taking Depakote 1000 mg in am so she will remember to take it.   Fell in ant pile yesterday.  Bites on her back.  Lost her balance.  Fell over.  No SOB.   Living in [place] with mom.  Health has been ok.    Had some GI issues this am ->  Mild diarrhea, yesterday bad.   Vomiting this am.    Having some sinus issues, Took some Dayquil this am.  BP elevated.     She has been getting anger fits -> stuff is frustrating her, starts screaming at it when her game crashes.  Starts cursing.   Not physically aggressive, yells at others.  In [employer], she panicked and starts screaming out loud.  Scout tells her to calm down.  "I have to get out " "of here."  Screams "ahhhh."   This occurs daily.   Sometimes, she has panic attacks -> twice a week, [employer] will trigger, crowds and people  Not really depressed.  Greater issue is anger, boredom, frustration.    Sleep: with depakote during day, sleeping more during day.    Appetite:  So/so. Wt loss 4 lbs.   No self harm.  "I know that if I cut myself I will bleed on Xarelto."  Does have urges to cut.  "I smoke cigars when I feel like this (no longer smoking cigarettes)."   Smokes 5-10 cigars daily.   Smokes marijuana almost daily, several times a day.  Depressed when she runs out, it helps with her appetite/anxiety.  Last used yesterday.   Denies other drug use/alcohol use.    Taking friend's Clonidine to help her sleep at night -> she takes four tabs nightly, the last 2 weeks.  She read   It could take edge off when she does not have marijuana.  She thinks she was taking 0.3 mg tabs -> last dose one week ago.  Advised not to do this - this is high dose, should not take other's medications, can results in serious medical issue.     Denies SI/HI.   Bored with life.    Most migel - cannot state what makes her feel migel, plays bingo with nieces/nephews.  They live in home (5 and 7 yo).     Energy: fair.    Denies symptoms of bowen/psychosis.   Hygiene: baths twice a week, normal, afraid by herself, she slips, friend Scout catches her.  They are having a sexual relationship.  States it is confusing.     Alcohol: occasional, [place] - less than weekly. No binge eating   No binge eating, purging, or restricting.    Glad to be living with her mother and not in relationship which she described as unhealthy.       GAD7 3/16/2020   1. Feeling nervous, anxious, or on edge? 2   2. Not being able to stop or control worrying? 2   3. Worrying too much about different things? 0   4. Trouble relaxing? 2   5. Being so restless that it is hard to sit still? 1   6. Becoming easily annoyed or irritable? 2   7. Feeling afraid as if " something awful might happen? 0   8. If you checked off any problems, how difficult have these problems made it for you to do your work, take care of things at home, or get along with other people? 1   HAROLDO-7 Score 9       Trouble falling or staying asleep, or sleeping too much: (P) Several days  Feeling tired or having little energy: (P) More than half the days  Poor appetite or overeating: (P) Nearly every day  Feeling bad about yourself - or that you are a failure or have let yourself or your family down: (P) Not at all  Trouble concentrating on things, such as reading the newspaper or watching television: (P) More than half the days  Moving or speaking so slowly that other people could have noticed. Or the opposite - being so fidgety or restless that you have been moving around a lot more than usual: (P) Several days  Thoughts that you would be better off dead, or of hurting yourself in some way: (P) Not at all  PHQ-9 Total Score: (P) 10  If you checked off any problems, how difficult have these problems made it for you to do your work, take care of things at home, or get along with other people?: (P) Somewhat difficult    MOCA 12/17/19:  25/30 (+1 point for less than 12 yrs of education). Lost points for cube copy, visuospatial/trailblazing, digits/attention, serial 7s, fluency).     Psychotherapy:   · Target symptoms: depression, anxiety, cannabis use   · Why chosen therapy is appropriate versus another modality: relevant to diagnosis, patient responds to this modality  · Outcome monitoring methods: self-report, observation  · Therapeutic intervention type: supportive psychotherapy, insight oriented   · Topics discussed/themes: building skills sets for symptom management, symptom recognition, nutrition, exercise, abstinence   · The patient's response to the intervention is accepting. The patient's progress toward treatment goals is limited progress.  · Duration of intervention: 20 minutes    Review of Systems  "  · PSYCHIATRIC: Pertinant items are noted in the narrative.  · CONSTITUTIONAL: weight loss   · MUSCULOSKELETAL:  No pain   · CV: no chest pain, no tachycardia  · NEURO: left HP, no seizures in interim, denies recent falls, + balance issues.     Past Medical, Family and Social History: The patient's past medical, family and social history have been reviewed and updated as appropriate within the electronic medical record - see encounter notes.    Medications:   Abilify 15 mg daily   Depakote ER 1000 mg nightly   Mirtazapine 30 mg nightly - helps GI ussue     Compliance:  She reports yes      Side effects:  Denies today     Risk Parameters:  Patient reports no suicidal ideation   Patient reports no homicidal ideation  Patient reports no self-injurious behavior  Patient reports no violent behavior    Exam (detailed: at least 9 elements; comprehensive: all 15 elements)   Constitutional  Vitals:  Most recent vital signs, dated less than 90 days prior to this appointment, were reviewed.   Vitals:    03/16/20 1039   BP: (!) 157/94   Pulse: 85   Weight: 47.7 kg (105 lb 4.3 oz)   Height: 5' 3" (1.6 m)        General:  age appropriate, dark clothes, adequate grooming, improved eye contact, hair dyed blue      Musculoskeletal  Muscle Strength/Tone:  Slight right hand tremor, left sided hemiparesis, tapping her RLE    Gait & Station:  steady     Psychiatric  Speech:  no latency; no press, decreased spontaneity    Mood & Affect:  "ok"   Blunted    Thought Process:  Linear with questions, somewhat slowed    Associations:  intact   Thought Content:  no current suicidality, no homicidality, delusions, or paranoia   Insight:  Fair    Judgement: Fair to limited    Orientation:  Grossly intact for content of interview, alert and oriented x 3    Memory: Able to recall of recent hx   Language: grossly intact, able to repeat    Attention Span & Concentration:  Grossly intact to interview    Fund of Knowledge:  intact and appropriate to " age and level of education, familiar with aspects of current personal life      Assessment and Diagnosis   Status/Progress: Based on the examination today, the patient's problem(s) is/are under inadequate control.  New problems have not been presented today.  Co-morbidities are complicating management of the primary condition.      General Impression: pt had CVA Nov 2015 with significantly impaired focus, memory lapses, worsening depression, anxiety - improved with ritalin and on current medications (previously ok'd ritalin use with PCP).  Pt had episode of acute hepatitis, liver enzymes improved, but now with declining renal function; pt also resumed smoking; she is also using marijuana. Recent admission to [facility] and earlier admission for supratherapeutic INR. Mother managing medications now. Pt under care of Neurology; Neuropsychological testing done and consistent with her MCA CVA.    She continues to smoke tobacco and marijuana.  She complains of poor focus/motivation.  Mother feels pt is not depressed - improved with Abilify. Pt continues to lose weight with persistent n/v.   Given hospitalization earlier this yr with manic symptoms, will change dx to Bipolar 1 disorder which may be more appropriate. Pt using cannabis on daily basis again.  She was hospitalized for seizures in interim.  Pt now taking Depakote BID.  Mood/anxiety symptoms are mostly stable.   Pt presented last visit following 2 yr hiatus. Improved on Abilify  At previous visit, pt reported she is improved, but feels depressed at times and struggles to cope during arguments, still periods of emotional dysregulation   At last visit, pt reported she has been bored, withdrawn, isolative, anxious at times, losing weight.  Home life is stressor, but she is reluctant to make changes.  Friend Scout provides good support and willing to help. Declined IOP.  Pt now living in [place] with mother again.  Denies significant depression, but  apathetic, lacks motivation, using cannabis multiple times daily.     Bipolar 1 Disorder, MRE depressed, in partial remission   Panic Disorder with Agoraphobia   Cognitive Disorder due to CVA  Insomnia Disorder  Cannabis Use Disorder, Moderate    Lupus, migraines, CVA, hx hepatitis, declining renal function, gastroparesis, dyskinesias, seizure disorder     GAF: 55   Intervention/Counseling/Treatment Plan   · Medication Management: The risks and benefits of medication were discussed with the patient.    - Continue Abilify 15 mg daily.   Typical RAJWINDER's reviewed including weight gain, abnormal movements, EPS, TD, metabolic side effects. continue to monitor mouth movements which are improved off of Reglan, but still occur when she uses her left arm per her report.  Monitor for worsening seizures.     - Encourage she continue psychotherapy; pt has vanita reluctant     - pt encouraged to refrain from cannabis use - psychoeducation today about risks of use, pt counseled again today on health risks associated with use.  Pt does not demonstrate motivation to stop using    - Pt instructed to go to ER with thoughts of harming self, others; Call to report any worsening of symptoms or problems with the medication    - Continue Depakote ER - she is willing to give an effort to taking 500 mg BID.  Advised I am not comfortable tapering due to her seizure disorder. I recommend her scheduling an appt with Neuro Dr. Norman to discuss as well.     - Continue Mirtazapine 30 mg nightly (sleep, mood, anxiety, appetite targeted)    - continue to monitor cognition     Return to Clinic: 6 weeks, pt to update me in 2 weeks on how she is doing.

## 2020-04-10 NOTE — PROGRESS NOTES
"Pt complaining of discomfort to infiltrated rt forearm IV site. Warm wash cloth placed to site. Pt requesting wash cloth be heated up until it is "lava hot". Attempted to explain that wash cloth could not be heated any further due to possibility of burning her. Pt verbalized she wanted it to burn her. Pt also requesting a "fire stick" be placed to IV site. Pt in repetitive pattern fixating on lava and fire burning her arm. Pt verbalized she would turn nurse into a "blood virgin" if we didn't make the wash cloth "lava hot".   " Neurosurgery is requesting patient have updated imaging prior to an upcoming appointment.    Requesting orders as outlined below to be completed when COVID-19 restrictions are lifted.    Patient's appointment is scheduled for 5/4/2020 with Dr. Gil Nogueira.    Please enter orders if agreeable for:    CT Cervical Spine   CT Thoracic Spine   MRI Cervical Spine   MRI Thoracic Spine (upper, as fusion extends to T2)   Upright AP/lateral/flexion/extension XRs of fusion area (Cervical-T2)

## 2020-04-28 RX ORDER — RIVAROXABAN 20 MG/1
TABLET, FILM COATED ORAL
Qty: 90 TABLET | Refills: 3 | Status: SHIPPED | OUTPATIENT
Start: 2020-04-28 | End: 2021-07-06

## 2020-05-04 RX ORDER — CLONIDINE HYDROCHLORIDE 0.1 MG/1
TABLET ORAL
Qty: 30 TABLET | Refills: 1 | Status: SHIPPED | OUTPATIENT
Start: 2020-05-04 | End: 2020-06-05 | Stop reason: ALTCHOICE

## 2020-05-04 RX ORDER — CLONIDINE HYDROCHLORIDE 0.1 MG/1
TABLET ORAL
Qty: 90 TABLET | OUTPATIENT
Start: 2020-05-04

## 2020-05-12 ENCOUNTER — PATIENT MESSAGE (OUTPATIENT)
Dept: PSYCHIATRY | Facility: CLINIC | Age: 40
End: 2020-05-12

## 2020-05-21 RX ORDER — CLONIDINE HYDROCHLORIDE 0.1 MG/1
TABLET ORAL
Qty: 30 TABLET | Refills: 1 | Status: CANCELLED | OUTPATIENT
Start: 2020-05-21

## 2020-05-22 ENCOUNTER — PATIENT MESSAGE (OUTPATIENT)
Dept: PSYCHIATRY | Facility: CLINIC | Age: 40
End: 2020-05-22

## 2020-06-05 ENCOUNTER — PATIENT MESSAGE (OUTPATIENT)
Dept: PSYCHIATRY | Facility: CLINIC | Age: 40
End: 2020-06-05

## 2020-06-05 ENCOUNTER — TELEPHONE (OUTPATIENT)
Dept: PSYCHIATRY | Facility: CLINIC | Age: 40
End: 2020-06-05

## 2020-06-05 RX ORDER — TRAZODONE HYDROCHLORIDE 100 MG/1
TABLET ORAL
Qty: 30 TABLET | Refills: 0 | Status: SHIPPED | OUTPATIENT
Start: 2020-06-05 | End: 2020-07-08 | Stop reason: SDUPTHER

## 2020-06-05 RX ORDER — DIVALPROEX SODIUM 500 MG/1
TABLET, FILM COATED, EXTENDED RELEASE ORAL
Qty: 60 TABLET | Refills: 1
Start: 2020-06-05 | End: 2020-07-08 | Stop reason: DRUGHIGH

## 2020-06-05 NOTE — TELEPHONE ENCOUNTER
"I spoke to the patient in response to her [employer] message.  The patient reports having taken clonidine at night for sleep, up to 4 tabs nightly before running out of the medication over 2 weeks ago.  She did experience symptoms of dizziness, but no cardiovascular symptoms or side effects.  The patient was advised of the risks of taking this dose of medication and the medication will not be continued.  She reports issues with insomnia for the last several months, she falls asleep early around 5-6 p.m. sleep for few hours, then wakes up around 9:00 p.m..  She is up throughout the night.  She has been taking over-the-counter medications containing chamomile, Lavender, NyQuil, melatonin and doses of 40-50 mg at night.  She has also been trying to drink am a meal T at night without success.   She reports her mood has actually been stable, she has been getting out more with her aunt visiting her cousin who also has lupus and trying to eat more healthy food options.    Denies caffeine.  No energy drinks.  Mother tells her she is irritable.    No benadryl use.  Having a lot of headaches, no seizures, doing pretty good.  No self harm. Denies suicidal/homicidal ideations.    Very happy, doing good. Couple of nights ago, she fell asleep - her friend gave her a "blue Xanax."   She woke up outside with mosquito bites covering her body.  She denies alcohol use and last used marijuana 2 weeks ago.  She tried CBD without success.   Was using old trazodone 100 mg Rx and it worked well.  She has run out of this medication.    The patient is advised to stop over-the-counter sleeping aids/supplements.  She is out of clonidine now.  This medication will not be restarted.  Discussed the risks of taking excessive amount of melatonin as well as clonidine, other people's controlled substances or medications, and herbal remedies.  I have agreed to refill trazodone  mg nightly p.r.n. insomnia use lowest effective dose and not to self " titrate medication.  The patient is otherwise medication compliant with her psychiatric meds.  Will ask staff to schedule follow-up appointment for patient, she recently missed scheduled appointment.

## 2020-06-08 NOTE — TELEPHONE ENCOUNTER
Called pt regarding below message. Scheduled pts f/u appt. Placed pt on wait list for any cancellations. Pt verbalized understanding with no further questions.

## 2020-06-18 NOTE — PROGRESS NOTES
Name: Roseline Evans  Outpatient Psychiatry Follow-Up Visit (MD/NP)    2/28/2018    Clinical Status of Patient:  Outpatient (Ambulatory)    Chief Complaint:  Cat Denson is a 38 y.o. female who presents today for follow-up of mood disorder and anxiety.  Met with patient alone  Interval History and Content of Current Session:  Interim Events/Subjective Report/Content of Current Session:  39 yo disabled female presents to clinic for follow up treatment of  follow up appt of bipolar I disorder, panic disorder, cannabis use disorder, and cognitive disorder.   She has a prior hx of chronic pain, nausea, and emesis.  Has been having health issues since she was a child, dx with Lupus at 12 yo. She missed a lot of school.  Has impulsive tendencies with hx of cutting, shaving her head (shaved today).  She has prior hx of suicide attempt at 22 yo.  Multiple prior admissions for depression, SI.   Pt had a CVA in November 2015 - Embolic stroke involving right middle cerebral artery; she is hypercoagulable due to lupus.  She is currently taking Coumadin.  Pt has residual left hemiparesis.   She has had balance problems, forgetfulness, and severe focusing issue.    She is under the care of Rheumatology - Dr Benoit, Neurology, PCP: Hector Dukes MD    Neuropsychological testing 2/17:   Personality test data suggested the presence of mild depression. Neuropsychological test results reveal impairment in immediate and delayed visual memory, visual attention, temporal orientation, visuospatial/constructional abilities, abstract reasoning, psychomotor speed, and mental flexibility; and variability in immediate auditory/verbal memory (high average and mildly impaired performances) and verbal fluency (low average and moderately impaired performances); with mild depression. The pattern of impairment is consistent with right MCA CVA. She will continue to see Dr. Cruz and Mrs. Wiley for psychiatric care. She was encouraged to resume PT, OT, and  Speech Therapy for further stroke rehabilitation.     Past Psychiatric Hx:  Dx Bipolar 2 Disorder (dx over 10 yrs ago), Anxiety, Impulse Control Disorder, hx pseudoseizures with possible epileptic seizures   Prior IOP: [facility]   Hx Suicide attempt at 22 yo  Hx suicidal ideations with admit to [facility] January 2015, [facility] for SI 2015,   First admit: 26 yo: for SI - [facility]; most recent [facility]. 2017 - for w/d affect, psychosis, poor memory recall, not sleeping x days  5 prior psych admissions  Hx self harm at age 17 yo     Past psychiatric hx: Wellbutrin for smoking cessation, helped to reduce, Zoloft, Trazodone (taken off due to tiredness one month ago), Lexapro, Paxil, Cymbalta (no pain benefit), Geodon, Trileptal, Lamictal, Seroquel, Chantix (makes her vomit), Olanzapine, Risperdal, Effexor, ritalin, depakote (was taken off ? Cannot recall why)    Past medical history:  Lupus   Sjogren's   CKD  Fibromyalgia, chronic pain   Seizure Disorder   Bronchospasm   Hx CVA  Gastroparesis   Fibromyalgia     Interim Hx:   Pt last seen 11/28/17.  Pt reports her Depakote was increased to 500 mg BID following a hospitalization at [facility] for seizures a few weeks ago (lasted > 5 mins).  She continues to take Mirtazapine 15 mg nightly (GI condition), and Abilify 10 mg daily.    Pt reports GI issues are much improved.  Body mass index is 20.89 kg/m². she cannot recall the last time she vomited.  Pt recently saw her Nephrologist.   Pt has had swelling in her LLE due to walking more.  She has gotten back together with a former GF who lives in [place] and recently had a baby.  Pt continues to live with her mother.  She has maintained contact with ex TERRELL (Brent) who is a good friend to her.    She completed [facility] in interim.     She has been feeling well overall.  She is tired today, but overall happy.  She is not smoking cigarettes, but vaping and also smoking marijuana daily.   twice a week., Disp: , Rfl:      Allergies:   No Known Allergies    Social History:   Social History    Socioeconomic History      Marital status:       Spouse name: Not on file      Number of children: Not on file      Years of education: Not on fi She takes a caffeine pill every morning.  This improves her energy and prevents HA from caffeine w/d she reports.  She denies alcohol use.  She denies anxiety except a few weeks ago she dropped her GF's  when she was trying to take her out of a swing.  She fell a small distance onto carpet - she reports her GF took the child to her doctor and everything is ok.  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  She now has a  for the child and she only holds the baby sitting down.   She denies depressed mood.  No problems with sleep, motivation. Energy is improved. Denies suicidal/homicidal ideations.  No self harm.  No violence.  No further seizures since titration of Depakote.   No recent panic attacks.  Denies symptoms of bowen/psychosis.   No significant irritable, no anger, no violence.   She is under the care of Monique Deleon LCSW    Review of Systems   · PSYCHIATRIC: Pertinant items are noted in the narrative.  · CONSTITUTIONAL: + weight gain   · MUSCULOSKELETAL: back and LE pain improved 0/10   · CV: no chest pain, no tachycardia  · NEURO: left HP, + seizures    Past Medical, Family and Social History: The patient's past medical, family and social history have been reviewed and updated as appropriate within the electronic medical record - see encounter notes.    Medications:   Abilify 10 mg daily   Depakote  mg BID   Mirtazapine 15 mg nightly     Compliance:  Depakote increased by Neurologist     Side effects:  None currently     Risk Parameters:  Patient reports no current suicidal ideation  Patient reports no homicidal ideation  Patient reports no self-injurious behavior  Patient reports no violent behavior    Exam (detailed: at least 9 elements; comprehensive: all 15 elements)   Constitutional  Vitals:  Most recent vital signs, dated less than 90 days prior to this appointment, were reviewed.   Vitals:    18 0859   BP:  "104/69   Pulse: 86   Weight: 53.5 kg (117 lb 15.1 oz)   Height: 5' 3" (1.6 m)   repeat       General:  age appropriate, dark clothes, adequate grooming, good eye contact     Musculoskeletal  Muscle Strength/Tone:  Slight right tremor with action    Gait & Station:  Ataxic      Psychiatric  Speech:  no latency; no press, decreased spontaneity    Mood & Affect:  "good"  Restricted, but reactive, smiles    Thought Process:  Linear with questions, somewhat slowed    Associations:  intact   Thought Content:  no current suicidality, no homicidality, delusions, or paranoia   Insight:  Fair    Judgement: Fair    Orientation:  Grossly intact for content of interview, alert and oriented x 3    Memory: fair recall of recent hx   Language: grossly intact, can repeat    Attention Span & Concentration:  Grossly intact to interview    Fund of Knowledge:  intact and appropriate to age and level of education, familiar with aspects of current personal life      Assessment and Diagnosis   Status/Progress: Based on the examination today, the patient's problem(s) is/are under reasonable control.  New problems have not been presented today.   Co-morbidities are complicating management of the primary condition.      General Impression: pt had CVA Nov 2015 with significantly impaired focus, memory lapses, worsening depression, anxiety - improved with ritalin and on current medications (previously ok'd ritalin use with PCP).  Pt had episode of acute hepatitis, liver enzymes improved, but now with declining renal function; pt also resumed smoking; she is also using marijuana. Recent admission to [facility] and earlier admission for supratherapeutic INR. Mother managing medications now. Pt under care of Neurology; Neuropsychological testing done and consistent with her MCA CVA.    She continues to smoke tobacco and marijuana.  She complains of poor focus/motivation.  Mother feels pt is not depressed - improved with " Abilify. Pt continues to lose weight with persistent n/v.   Given hospitalization earlier this yr with manic symptoms, will change dx to Bipolar 1 disorder which may be more appropriate. Pt using cannabis on daily basis again.  She was hospitalized for seizures in interim.  Pt now taking Depakote BID.  Mood/anxiety symptoms are mostly stable.     Bipolar 1 Disorder, MRE depressed, partial remission   Panic Disorder with Agoraphobia   Cognitive Disorder due to CVA  Insomnia Disorder  Cannabis Use Disorder. Moderate    Lupus, migraines, CVA, hx hepatitis, declining renal function, gastroparesis, dyskinesias, seizure disorder     GAF: 62   Intervention/Counseling/Treatment Plan   · Medication Management: The risks and benefits of medication were discussed with the patient.    - continue Abilify 10 mg daily for now. Typical RAJWINDER's reviewed including weight gain, abnormal movements, EPS, TD, metabolic side effects. May give added benefit for focus issues in addition to use as mood stabilizer; continue to monitor mouth movements which are improving off of Reglan.      - Continue psychotherapy with Monique Deleon LCSW    - refrain from cannabis use - psychoeducation today about risks of use, pt counseled today on health risks associated with use.     - Pt instructed to go to ER with thoughts of harming self, others; Call to report any worsening of symptoms or problems with the medication    - Continue Depakote  mg BID     - Continue Mirtazapine 15 mg nightly (targetting GI issues she reports)     - Labs: CBC, CMP, VPA level     Return to Clinic: 12 weeks or sooner PRN

## 2020-07-08 ENCOUNTER — OFFICE VISIT (OUTPATIENT)
Dept: PSYCHIATRY | Facility: CLINIC | Age: 40
End: 2020-07-08
Payer: MEDICARE

## 2020-07-08 VITALS
DIASTOLIC BLOOD PRESSURE: 62 MMHG | HEART RATE: 74 BPM | TEMPERATURE: 98 F | SYSTOLIC BLOOD PRESSURE: 91 MMHG | WEIGHT: 97.13 LBS | HEIGHT: 63 IN | BODY MASS INDEX: 17.21 KG/M2

## 2020-07-08 DIAGNOSIS — F41.9 ANXIETY: ICD-10-CM

## 2020-07-08 DIAGNOSIS — F09 COGNITIVE DISORDER: ICD-10-CM

## 2020-07-08 DIAGNOSIS — F31.32 BIPOLAR 1 DISORDER, DEPRESSED, MODERATE: ICD-10-CM

## 2020-07-08 DIAGNOSIS — M32.9 LUPUS: ICD-10-CM

## 2020-07-08 DIAGNOSIS — Z79.899 HIGH RISK MEDICATION USE: ICD-10-CM

## 2020-07-08 PROCEDURE — 99499 UNLISTED E&M SERVICE: CPT | Mod: S$PBB,,, | Performed by: PSYCHIATRY & NEUROLOGY

## 2020-07-08 PROCEDURE — 3008F BODY MASS INDEX DOCD: CPT | Mod: HCNC,CPTII,S$GLB, | Performed by: PSYCHIATRY & NEUROLOGY

## 2020-07-08 PROCEDURE — 99999 PR PBB SHADOW E&M-EST. PATIENT-LVL IV: ICD-10-PCS | Mod: PBBFAC,HCNC,, | Performed by: PSYCHIATRY & NEUROLOGY

## 2020-07-08 PROCEDURE — 99214 PR OFFICE/OUTPT VISIT, EST, LEVL IV, 30-39 MIN: ICD-10-PCS | Mod: HCNC,S$GLB,, | Performed by: PSYCHIATRY & NEUROLOGY

## 2020-07-08 PROCEDURE — 99999 PR PBB SHADOW E&M-EST. PATIENT-LVL IV: CPT | Mod: PBBFAC,HCNC,, | Performed by: PSYCHIATRY & NEUROLOGY

## 2020-07-08 PROCEDURE — 3008F PR BODY MASS INDEX (BMI) DOCUMENTED: ICD-10-PCS | Mod: HCNC,CPTII,S$GLB, | Performed by: PSYCHIATRY & NEUROLOGY

## 2020-07-08 PROCEDURE — 99214 OFFICE O/P EST MOD 30 MIN: CPT | Mod: HCNC,S$GLB,, | Performed by: PSYCHIATRY & NEUROLOGY

## 2020-07-08 PROCEDURE — 99499 RISK ADDL DX/OHS AUDIT: ICD-10-PCS | Mod: S$PBB,,, | Performed by: PSYCHIATRY & NEUROLOGY

## 2020-07-08 RX ORDER — ARIPIPRAZOLE 5 MG/1
5 TABLET ORAL DAILY
Qty: 30 TABLET | Refills: 0 | Status: SHIPPED | OUTPATIENT
Start: 2020-07-08 | End: 2020-08-09

## 2020-07-08 RX ORDER — DIVALPROEX SODIUM 250 MG/1
TABLET, DELAYED RELEASE ORAL
Qty: 90 TABLET | Refills: 0 | Status: SHIPPED | OUTPATIENT
Start: 2020-07-08 | End: 2020-08-09

## 2020-07-08 RX ORDER — TRAZODONE HYDROCHLORIDE 100 MG/1
TABLET ORAL
Qty: 60 TABLET | Refills: 0 | Status: SHIPPED | OUTPATIENT
Start: 2020-07-08 | End: 2020-08-09

## 2020-07-08 NOTE — PROGRESS NOTES
Outpatient Psychiatry Follow-Up Visit (MD/NP)    7/8/2020    Clinical Status of Patient:  Outpatient (Ambulatory)    Chief Complaint:  Cat Denson is a 40 y.o. female who presents today for follow-up of mood disorder and anxiety.  Met with patient alone and then patient and her mother.     Interval History and Content of Current Session:  Interim Events/Subjective Report/Content of Current Session:  39 yo disabled female presents to clinic for follow up treatment of  follow up appt of bipolar I disorder, panic disorder, cannabis use disorder, and cognitive disorder.   She has a prior hx of chronic pain, nausea, and emesis.  Has been having health issues since she was a child, dx with Lupus at 10 yo. She missed a lot of school.  Has impulsive tendencies with hx of cutting, shaving her head (shaved today).  She has prior hx of suicide attempt at 20 yo.  Multiple prior admissions for depression, SI.   Pt had a CVA in November 2015 - Embolic stroke involving right middle cerebral artery; she is hypercoagulable due to lupus.  She is currently taking Coumadin.  Pt has residual left hemiparesis.   She has had balance problems, forgetfulness, and severe focusing issue.    She is under the care of Rheumatology - Dr Benoit, Neurology, PCP: Hector Dukes MD    Neuropsychological testing 2/17:   Personality test data suggested the presence of mild depression. Neuropsychological test results reveal impairment in immediate and delayed visual memory, visual attention, temporal orientation, visuospatial/constructional abilities, abstract reasoning, psychomotor speed, and mental flexibility; and variability in immediate auditory/verbal memory (high average and mildly impaired performances) and verbal fluency (low average and moderately impaired performances); with mild depression. The pattern of impairment is consistent with right MCA CVA. She will continue to see Dr. Cruz and Mrs. Wiley for psychiatric care. She  was encouraged to resume PT, OT, and Speech Therapy for further stroke rehabilitation.     Past Psychiatric Hx:  Dx Bipolar 2 Disorder (dx over 10 yrs ago), Anxiety, Impulse Control Disorder, hx pseudoseizures with possible epileptic seizures   Prior IOP: [place]   Hx Suicide attempt at 20 yo  Hx suicidal ideations with admit to [facility] January 2015, [facility] for SI 2015,   First admit: 26 yo: for SI - [facility]; most recent [facility]. 2017 - for w/d affect, psychosis, poor memory recall, not sleeping x days  5 prior psych admissions  Hx self harm at age 17 yo     Past psychiatric hx: Wellbutrin for smoking cessation, helped to reduce, Zoloft, Trazodone (taken off due to tiredness one month ago), Lexapro, Paxil, Cymbalta (no pain benefit), Geodon, Trileptal, Lamictal, Seroquel, Chantix (makes her vomit), Olanzapine, Risperdal, Effexor, ritalin, depakote (was taken off ? Cannot recall why)    Past medical history:  Lupus   Sjogren's   CKD  Fibromyalgia, chronic pain   Seizure Disorder   Bronchospasm   Hx CVA  Gastroparesis   Fibromyalgia     Interim Hx:   The patient presents for follow-up visit.  She has been noncompliant with all of her medications, including her psychiatric medications.  She cannot recall the last time she took them.  Patient states her mother tells her she is to angry all the time.  Patient reports getting aggravated when her family is sleeping, and she cannot.  Patient states, there have been death threats.  When I ask her to clarify, she states she is joking.  She denies any suicidal or homicidal ideations.  She denies any recent aggression or violence. No self harm or violence.   She took 200 mg trazodone last night.  And she reports taking her friend's clonidine, 0.2 mg for sleep.  Patient called in the interim and reported that she was taking additional clonidine at night, up to 0.4 mg nightly.  She was experiencing dizziness.   "Trazodone was restarted in the interim.  I declined to refill clonidine because the patient's misuse and dangers of this lowering her blood pressure and increasing her fall risk.  Trazodone helps her maintain sleep,  not fall asleep.  She does not feel that Remeron helped her.  She denies any access to firearms.    She denies feeling depressed.  She states she is staying awake during the day.  She spends the majority of her day watching TV and playing video games. She does not feel isolated, mood is okay, motivation is fair.  She averages 1-2 meals a day, is still having symptoms of chronic abdominal pain and emesis, history of gastroparesis. She can tolerate a few things, including soups in tune and crackers  Focus is okay.  She reports everything makes her angry.  She gives example of her family painting her room, and she felt angry for them asking her to sleep in a different room.  She denies symptoms of bowen or psychosis.  When agitated she yells and curses at family.    She smokes marijuana, but lately has not been every day.  She smokes 5 cigars daily.  She also reports having started using Kratom a few weeks ago, she cannot state how much, but states about 4-5 of them every morning along with 100 mg of caffeine in tablet form.  She denies any other drug use.  She reports that she has been hurting chronically.  Personal hygiene has declined.  Close friend Brent has moved out of the home, he was the patient's caretaker.  Denies symptoms of bowen/psychosis.     Mother reports patient was involved in [employer], thought she was in a relationship with someone, patient lost significant amount of money.  This has been very difficult for her, she does not want to talk about this today.    PHQ-9:  Score of 10, somewhat difficult.  Symptoms include anhedonia, trouble sleeping, fatigue, poor focus, moving slowly.  HAROLDO-7: score of 6, very difficult. Anxious, worry, trouble relaxing, irritable.     "I know that if I " "cut myself I will bleed on Xarelto."     No binge eating, purging, or restricting.    MOCA 12/17/19:  25/30 (+1 point for less than 12 yrs of education). Lost points for cube copy, visuospatial/trailblazing, digits/attention, serial 7s, fluency).       Review of Systems   · PSYCHIATRIC: Pertinant items are noted in the narrative.  · CONSTITUTIONAL: weight loss 7 lbs  · MUSCULOSKELETAL:  No pain currently, chronic diffuse pain   · CV: no chest pain, no tachycardia  · NEURO: left HP, no seizures in interim, denies recent falls, + balance issues.  · GI: constipation  · : recent yeast infection     Past Medical, Family and Social History: The patient's past medical, family and social history have been reviewed and updated as appropriate within the electronic medical record - see encounter notes.    Medications:   Abilify 15 mg daily   Depakote ER 1000 mg nightly   Mirtazapine 30 mg nightly - helps GI ussue     Compliance:  No     Side effects:  Denies today     Risk Parameters:  Patient reports no suicidal ideation   Patient reports no homicidal ideation  Patient reports no self-injurious behavior  Patient reports no violent behavior    Exam (detailed: at least 9 elements; comprehensive: all 15 elements)   Constitutional  Vitals:  Most recent vital signs, dated more than 90 days prior to this appointment, were reviewed.   Vitals:    07/08/20 1415   BP: 91/62   Pulse: 74   Temp: 98.2 °F (36.8 °C)   Weight: 44 kg (97 lb 1.8 oz)   Height: 5' 3" (1.6 m)        General:  age appropriate, dark clothes, fair grooming, reduced eye contact, thinner     Musculoskeletal  Muscle Strength/Tone:  left sided hemiparesis, no tremor today   Gait & Station:  steady     Psychiatric  Speech:  no latency; no press, decreased spontaneity    Mood & Affect:  "Angry "   Blunted    Thought Process:  Linear with questions, slowed    Associations:  intact   Thought Content:  no current suicidality, no homicidality, delusions, or paranoia "   Insight:  Fair    Judgement: limited    Orientation:  Grossly intact for content of interview, alert and oriented x 3    Memory: Poor historian   Language: grossly intact, can repeat   Attention Span & Concentration:  Distracted   Fund of Knowledge:  intact and appropriate to age and level of education, familiar with aspects of current personal life      Assessment and Diagnosis   Status/Progress: Based on the examination today, the patient's problem(s) is/are under inadequate control.  New problems have not been presented today.  Co-morbidities are complicating management of the primary condition.      General Impression: pt had CVA Nov 2015 with significantly impaired focus, memory lapses, worsening depression, anxiety - improved with ritalin and on current medications (previously ok'd ritalin use with PCP).  Pt had episode of acute hepatitis, liver enzymes improved, but now with declining renal function; pt also resumed smoking; she is also using marijuana. Recent admission to [facility] and earlier admission for supratherapeutic INR. Mother managing medications now. Pt under care of Neurology; Neuropsychological testing done and consistent with her MCA CVA.    She continues to smoke tobacco and marijuana.  She complains of poor focus/motivation.  Mother feels pt is not depressed - improved with Abilify. Pt continues to lose weight with persistent n/v.   Given hospitalization earlier this yr with manic symptoms, will change dx to Bipolar 1 disorder which may be more appropriate. Pt using cannabis on daily basis again.  She was hospitalized for seizures in interim.  Pt now taking Depakote BID.  Mood/anxiety symptoms are mostly stable.   Pt presented last visit following 2 yr hiatus. Improved on Abilify  At previous visit, pt reported she is improved, but feels depressed at times and struggles to cope during arguments, still periods of emotional dysregulation   At last visit, pt reported she has  been bored, withdrawn, isolative, anxious at times, losing weight.  Home life is stressor, but she is reluctant to make changes.  Friend Scout provides good support and willing to help. Declined IOP.  Pt now living in [place] with mother again.  Denies significant depression, but apathetic, lacks motivation, using cannabis, Kratom, med noncompliance.    Bipolar 1 Disorder, MRE depressed, moderate  Panic Disorder with Agoraphobia   Cognitive Disorder due to CVA  Insomnia Disorder  Cannabis Use Disorder, Moderate    Lupus, migraines, CVA, hx hepatitis, declining renal function, gastroparesis, dyskinesias, seizure disorder     GAF: 55   Intervention/Counseling/Treatment Plan   · Medication Management: The risks and benefits of medication were discussed with the patient.    - Restart Abilify 5 mg daily.   Typical RAJWINDER's reviewed including weight gain, abnormal movements, EPS, TD, metabolic side effects.  May lower seizure threshold.    - continue trazodone 100 - 200 mg nightly p.r.n. insomnia    - Encourage she continue psychotherapy; pt has vanita reluctant     - pt encouraged to refrain from cannabis and Kratom use - psychoeducation today about risks of use, pt counseled on health risks associated with use, discussed how weight loss, mood swings, and constipation may be related to Kratum.    - Pt instructed to go to ER with thoughts of harming self, others; Call to report any worsening of symptoms or problems with the medication    - restart Depakote  mg: Take one tablet PO in the morning and two tablets nightly,     - continue to monitor cognition     - High risk medication monitoring: VPA level, CBC, CMP, HbA1c, Lipid panel     - Follow up w/ PCP for health concerns.     - Referral placed for Rheumatology per pt and mother request     - Discussed nonpharmacologic interventions for anxiety including regular exercise, healthy diet, practice of mindfulness, social relatedness, discussed importance of healthy  diet    Return to Clinic: 6 weeks, pt to update me in 2 weeks on how she is doing.      This note was created using [employer] voice recognition software that occasionally misinterpreted phrases or words.

## 2020-07-29 ENCOUNTER — LAB VISIT (OUTPATIENT)
Dept: LAB | Facility: HOSPITAL | Age: 40
End: 2020-07-29
Attending: PSYCHIATRY & NEUROLOGY
Payer: MEDICARE

## 2020-07-29 DIAGNOSIS — Z79.899 HIGH RISK MEDICATION USE: ICD-10-CM

## 2020-07-29 LAB
ALBUMIN SERPL BCP-MCNC: 3.8 G/DL (ref 3.5–5.2)
ALP SERPL-CCNC: 134 U/L (ref 55–135)
ALT SERPL W/O P-5'-P-CCNC: 51 U/L (ref 10–44)
ANION GAP SERPL CALC-SCNC: 13 MMOL/L (ref 8–16)
AST SERPL-CCNC: 67 U/L (ref 10–40)
BASOPHILS # BLD AUTO: 0.05 K/UL (ref 0–0.2)
BASOPHILS NFR BLD: 0.6 % (ref 0–1.9)
BILIRUB SERPL-MCNC: 0.2 MG/DL (ref 0.1–1)
BUN SERPL-MCNC: 9 MG/DL (ref 6–20)
CALCIUM SERPL-MCNC: 9.3 MG/DL (ref 8.7–10.5)
CHLORIDE SERPL-SCNC: 102 MMOL/L (ref 95–110)
CHOLEST SERPL-MCNC: 144 MG/DL (ref 120–199)
CHOLEST/HDLC SERPL: 3.5 {RATIO} (ref 2–5)
CO2 SERPL-SCNC: 21 MMOL/L (ref 23–29)
CREAT SERPL-MCNC: 1.6 MG/DL (ref 0.5–1.4)
DIFFERENTIAL METHOD: ABNORMAL
EOSINOPHIL # BLD AUTO: 0.1 K/UL (ref 0–0.5)
EOSINOPHIL NFR BLD: 0.9 % (ref 0–8)
ERYTHROCYTE [DISTWIDTH] IN BLOOD BY AUTOMATED COUNT: 13.1 % (ref 11.5–14.5)
EST. GFR  (AFRICAN AMERICAN): 46.8 ML/MIN/1.73 M^2
EST. GFR  (NON AFRICAN AMERICAN): 40.6 ML/MIN/1.73 M^2
ESTIMATED AVG GLUCOSE: 88 MG/DL (ref 68–131)
GLUCOSE SERPL-MCNC: 67 MG/DL (ref 70–110)
HBA1C MFR BLD HPLC: 4.7 % (ref 4–5.6)
HCT VFR BLD AUTO: 42.5 % (ref 37–48.5)
HDLC SERPL-MCNC: 41 MG/DL (ref 40–75)
HDLC SERPL: 28.5 % (ref 20–50)
HGB BLD-MCNC: 13.6 G/DL (ref 12–16)
IMM GRANULOCYTES # BLD AUTO: 0.05 K/UL (ref 0–0.04)
IMM GRANULOCYTES NFR BLD AUTO: 0.6 % (ref 0–0.5)
LDLC SERPL CALC-MCNC: 85 MG/DL (ref 63–159)
LYMPHOCYTES # BLD AUTO: 2.9 K/UL (ref 1–4.8)
LYMPHOCYTES NFR BLD: 33.6 % (ref 18–48)
MCH RBC QN AUTO: 30.8 PG (ref 27–31)
MCHC RBC AUTO-ENTMCNC: 32 G/DL (ref 32–36)
MCV RBC AUTO: 96 FL (ref 82–98)
MONOCYTES # BLD AUTO: 0.7 K/UL (ref 0.3–1)
MONOCYTES NFR BLD: 8.2 % (ref 4–15)
NEUTROPHILS # BLD AUTO: 4.8 K/UL (ref 1.8–7.7)
NEUTROPHILS NFR BLD: 56.1 % (ref 38–73)
NONHDLC SERPL-MCNC: 103 MG/DL
NRBC BLD-RTO: 0 /100 WBC
PLATELET # BLD AUTO: 244 K/UL (ref 150–350)
PMV BLD AUTO: 13 FL (ref 9.2–12.9)
POTASSIUM SERPL-SCNC: 5.6 MMOL/L (ref 3.5–5.1)
PROT SERPL-MCNC: 7.4 G/DL (ref 6–8.4)
RBC # BLD AUTO: 4.41 M/UL (ref 4–5.4)
SODIUM SERPL-SCNC: 136 MMOL/L (ref 136–145)
TRIGL SERPL-MCNC: 90 MG/DL (ref 30–150)
VALPROATE SERPL-MCNC: 37.2 UG/ML (ref 50–100)
WBC # BLD AUTO: 8.51 K/UL (ref 3.9–12.7)

## 2020-07-29 PROCEDURE — 80164 ASSAY DIPROPYLACETIC ACD TOT: CPT | Mod: HCNC

## 2020-07-29 PROCEDURE — 36415 COLL VENOUS BLD VENIPUNCTURE: CPT | Mod: HCNC,PO

## 2020-07-29 PROCEDURE — 80061 LIPID PANEL: CPT | Mod: HCNC

## 2020-07-29 PROCEDURE — 80053 COMPREHEN METABOLIC PANEL: CPT | Mod: HCNC

## 2020-07-29 PROCEDURE — 85025 COMPLETE CBC W/AUTO DIFF WBC: CPT | Mod: HCNC

## 2020-07-29 PROCEDURE — 83036 HEMOGLOBIN GLYCOSYLATED A1C: CPT | Mod: HCNC

## 2020-07-30 ENCOUNTER — TELEPHONE (OUTPATIENT)
Dept: PSYCHIATRY | Facility: CLINIC | Age: 40
End: 2020-07-30

## 2020-07-30 DIAGNOSIS — Z79.899 HIGH RISK MEDICATION USE: Primary | ICD-10-CM

## 2020-07-30 NOTE — TELEPHONE ENCOUNTER
Called pt regarding below message. Advised pt of need to repeat labs. Lab appt scheduled. Date, time, and location given. Pt tried to schedule an appt with PCP but first available is Sept 24 th. Pt will send a message to staff to further discuss. Pt states no further needs.

## 2020-07-30 NOTE — TELEPHONE ENCOUNTER
Order for repeat lab (CMP) to assess electrolytes and liver functions - please schedule for early next week.  Does not need to be fasting.  Please also advise her to follow up with PCP.

## 2020-08-03 ENCOUNTER — LAB VISIT (OUTPATIENT)
Dept: LAB | Facility: HOSPITAL | Age: 40
End: 2020-08-03
Attending: PSYCHIATRY & NEUROLOGY
Payer: MEDICARE

## 2020-08-03 DIAGNOSIS — Z79.899 HIGH RISK MEDICATION USE: ICD-10-CM

## 2020-08-03 LAB
ALBUMIN SERPL BCP-MCNC: 3.5 G/DL (ref 3.5–5.2)
ALP SERPL-CCNC: 92 U/L (ref 55–135)
ALT SERPL W/O P-5'-P-CCNC: 17 U/L (ref 10–44)
ANION GAP SERPL CALC-SCNC: 8 MMOL/L (ref 8–16)
AST SERPL-CCNC: 15 U/L (ref 10–40)
BILIRUB SERPL-MCNC: 0.2 MG/DL (ref 0.1–1)
BUN SERPL-MCNC: 16 MG/DL (ref 6–20)
CALCIUM SERPL-MCNC: 8.8 MG/DL (ref 8.7–10.5)
CHLORIDE SERPL-SCNC: 106 MMOL/L (ref 95–110)
CO2 SERPL-SCNC: 25 MMOL/L (ref 23–29)
CREAT SERPL-MCNC: 1.4 MG/DL (ref 0.5–1.4)
EST. GFR  (AFRICAN AMERICAN): 55 ML/MIN/1.73 M^2
EST. GFR  (NON AFRICAN AMERICAN): 47.7 ML/MIN/1.73 M^2
GLUCOSE SERPL-MCNC: 93 MG/DL (ref 70–110)
POTASSIUM SERPL-SCNC: 4.9 MMOL/L (ref 3.5–5.1)
PROT SERPL-MCNC: 6.9 G/DL (ref 6–8.4)
SODIUM SERPL-SCNC: 139 MMOL/L (ref 136–145)

## 2020-08-03 PROCEDURE — 36415 COLL VENOUS BLD VENIPUNCTURE: CPT | Mod: HCNC,PO

## 2020-08-03 PROCEDURE — 80053 COMPREHEN METABOLIC PANEL: CPT | Mod: HCNC

## 2020-08-18 ENCOUNTER — OFFICE VISIT (OUTPATIENT)
Dept: FAMILY MEDICINE | Facility: CLINIC | Age: 40
End: 2020-08-18
Payer: MEDICARE

## 2020-08-18 VITALS
SYSTOLIC BLOOD PRESSURE: 90 MMHG | WEIGHT: 97.25 LBS | HEART RATE: 108 BPM | TEMPERATURE: 99 F | DIASTOLIC BLOOD PRESSURE: 60 MMHG | HEIGHT: 63 IN | OXYGEN SATURATION: 97 % | BODY MASS INDEX: 17.23 KG/M2 | RESPIRATION RATE: 12 BRPM

## 2020-08-18 DIAGNOSIS — Z11.4 ENCOUNTER FOR SCREENING FOR HIV: ICD-10-CM

## 2020-08-18 DIAGNOSIS — R74.8 ELEVATED LIVER ENZYMES: Primary | ICD-10-CM

## 2020-08-18 DIAGNOSIS — G89.29 CHRONIC INTRACTABLE HEADACHE, UNSPECIFIED HEADACHE TYPE: ICD-10-CM

## 2020-08-18 DIAGNOSIS — R94.5 ABNORMAL RESULTS OF LIVER FUNCTION STUDIES: ICD-10-CM

## 2020-08-18 DIAGNOSIS — F19.10 SUBSTANCE ABUSE: ICD-10-CM

## 2020-08-18 DIAGNOSIS — R51.9 CHRONIC INTRACTABLE HEADACHE, UNSPECIFIED HEADACHE TYPE: ICD-10-CM

## 2020-08-18 PROCEDURE — 3008F BODY MASS INDEX DOCD: CPT | Mod: HCNC,CPTII,S$GLB, | Performed by: FAMILY MEDICINE

## 2020-08-18 PROCEDURE — 99214 PR OFFICE/OUTPT VISIT, EST, LEVL IV, 30-39 MIN: ICD-10-PCS | Mod: HCNC,S$GLB,, | Performed by: FAMILY MEDICINE

## 2020-08-18 PROCEDURE — 3008F PR BODY MASS INDEX (BMI) DOCUMENTED: ICD-10-PCS | Mod: HCNC,CPTII,S$GLB, | Performed by: FAMILY MEDICINE

## 2020-08-18 PROCEDURE — 99999 PR PBB SHADOW E&M-EST. PATIENT-LVL IV: ICD-10-PCS | Mod: PBBFAC,HCNC,, | Performed by: FAMILY MEDICINE

## 2020-08-18 PROCEDURE — 99999 PR PBB SHADOW E&M-EST. PATIENT-LVL IV: CPT | Mod: PBBFAC,HCNC,, | Performed by: FAMILY MEDICINE

## 2020-08-18 PROCEDURE — 99214 OFFICE O/P EST MOD 30 MIN: CPT | Mod: HCNC,S$GLB,, | Performed by: FAMILY MEDICINE

## 2020-08-18 RX ORDER — PROPRANOLOL HYDROCHLORIDE 60 MG/1
60 CAPSULE, EXTENDED RELEASE ORAL DAILY
Qty: 30 CAPSULE | Refills: 5 | Status: SHIPPED | OUTPATIENT
Start: 2020-08-18 | End: 2020-10-07 | Stop reason: SDUPTHER

## 2020-08-18 NOTE — PROGRESS NOTES
Subjective:       Patient ID: Cat Denson is a 40 y.o. female.    Chief Complaint: Discuss test results    HPI  Review of Systems   Constitutional: Negative for fatigue and unexpected weight change.   Respiratory: Negative for chest tightness and shortness of breath.    Cardiovascular: Negative for chest pain, palpitations and leg swelling.   Gastrointestinal: Negative for abdominal pain.   Musculoskeletal: Negative for arthralgias.   Neurological: Negative for dizziness, syncope, light-headedness and headaches.       Patient Active Problem List   Diagnosis    Chronic pain    Fibromyalgia    Lupus (systemic lupus erythematosus)    Seizure    Chronic asthma without complication    Hemiparesis affecting left side as late effect of cerebrovascular accident    History of right MCA stroke    Hemiplegic migraine    CKD (chronic kidney disease) stage 3, GFR 30-59 ml/min    Gastroesophageal reflux disease without esophagitis    Long term current use of anticoagulant therapy    Urge incontinence    Primary insomnia    Iron deficiency anemia    Long-term use of hydroxychloroquine    Hepatitis, fulminant    Cigarette nicotine dependence without complication    History of fracture of left ankle 11/16    Homocysteinemia    Recurrent seizures    Complex 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    Had lab abnormalities 7/29/20 including elevated K, and elevated LFTs.   Has been on depakote and lipitor chronically for years. No changes .  Repeated 8/3/2020  and abnormalities had resolved.  CT abd 7/19 no abnl of liver noted. Is s/p cholecystectomy. ADMITS SHE HAS BEEN USING KRATOM OTC DAILY.  States she takes it due to daily migraines as a pain reliever    Under care of Dr. Cruz for psych. Is on depakote chronically    H/o grand mal seizures- on depakote . Started > 13 years ago.  Also h/o psuedoseizures.  Was in inpatient epilepsy monitoring unit 9/18. That was last sz activity. Neurology Dr. Norman     Psych Dr. Cruz- bipolar 1     Rheum Dr. Benoit- SLE     Has had CVA 2015-embolic from heart. First on coumadin and got toxic. On xarelto since then.chronic left hemiplegia.          Nephrology Dr. Loevll CKD stage 3     Optometry Dr. Buchanan/Gus for homonymous chikis field defects  Objective:      Physical Exam  Vitals signs and nursing note reviewed.   Constitutional:       Appearance: She is well-developed and underweight.   Cardiovascular:      Rate and Rhythm: Normal rate and regular rhythm.      Heart sounds: Normal heart sounds.   Pulmonary:      Effort: Pulmonary effort is normal.      Breath sounds: Normal breath sounds.   Skin:     General: Skin is warm and dry.   Neurological:      Mental Status: She is alert and oriented to person, place, and time.         Assessment:       1. Elevated liver enzymes    2. Chronic intractable headache, unspecified headache type    3. Abnormal results of liver function studies     4. Encounter for screening for HIV    5. Substance abuse-kratom daily use        Plan:         1. Elevated liver enzymes  Unclear etiology.  ? Depakote, ? Drug related kratom liver inflammation.  Monitor for worsening symptoms and return to clinic or go to the ER if these occur: fever > 100.4, severe abdominal pain, intractable vomiting, bleeding from the rectum or black tarry stools, dehydration, lethargy or other worsening symptoms.    - US Abdomen Limited; Future  - Comprehensive metabolic panel; Future  - Hepatitis Panel, Acute; Future    2.  Chronic intractable headache, unspecified headache type  Add  - propranoloL (INDERAL LA) 60 MG 24 hr capsule; Take 1 capsule (60 mg total) by mouth once daily.  Dispense: 30 capsule; Refill: 5    3. Abnormal results of liver function studies   Screen and treat as indicated:    - Hepatitis Panel, Acute; Future    4. Encounter for screening for HIV  Screen and treat as indicated:    - HIV 1/2 Ag/Ab (4th Gen); Future    5. Substance abuse-kratom daily use  Counseled patient on dangers of kratom use-seizures, low appetite, liver injury        Time spent with patient: 20 minutes    Patient with be reevaluated in 4 weeks or sooner prn    Greater than 50% of this visit was spent counseling as described in above documentation:Yes

## 2020-08-19 ENCOUNTER — HOSPITAL ENCOUNTER (OUTPATIENT)
Dept: RADIOLOGY | Facility: HOSPITAL | Age: 40
Discharge: HOME | End: 2020-08-19
Attending: FAMILY MEDICINE
Payer: MEDICARE

## 2020-08-19 DIAGNOSIS — R74.8 ELEVATED LIVER ENZYMES: ICD-10-CM

## 2020-08-19 PROCEDURE — 76705 ECHO EXAM OF ABDOMEN: CPT | Mod: TC,HCNC

## 2020-08-19 PROCEDURE — 76705 US ABDOMEN LIMITED: ICD-10-PCS | Mod: 26,HCNC,, | Performed by: RADIOLOGY

## 2020-08-19 PROCEDURE — 76705 ECHO EXAM OF ABDOMEN: CPT | Mod: 26,HCNC,, | Performed by: RADIOLOGY

## 2020-08-29 ENCOUNTER — OFFICE VISIT (OUTPATIENT)
Dept: FAMILY MEDICINE | Facility: CLINIC | Age: 40
End: 2020-08-29
Payer: MEDICARE

## 2020-08-29 ENCOUNTER — HOSPITAL ENCOUNTER (EMERGENCY)
Facility: HOSPITAL | Age: 40
Discharge: HOME | End: 2020-08-29
Attending: EMERGENCY MEDICINE
Payer: MEDICARE

## 2020-08-29 VITALS
RESPIRATION RATE: 18 BRPM | OXYGEN SATURATION: 99 % | HEIGHT: 63 IN | SYSTOLIC BLOOD PRESSURE: 122 MMHG | DIASTOLIC BLOOD PRESSURE: 83 MMHG | TEMPERATURE: 98 F | WEIGHT: 100 LBS | BODY MASS INDEX: 17.72 KG/M2 | HEART RATE: 87 BPM

## 2020-08-29 DIAGNOSIS — L03.032 CELLULITIS OF LEFT TOE: ICD-10-CM

## 2020-08-29 DIAGNOSIS — L03.031 PARONYCHIA OF TOE OF RIGHT FOOT: Primary | ICD-10-CM

## 2020-08-29 DIAGNOSIS — G57.32 LEFT PERONEAL NERVE PALSY: ICD-10-CM

## 2020-08-29 DIAGNOSIS — L60.0 INGROWN TOENAIL OF LEFT FOOT: Primary | ICD-10-CM

## 2020-08-29 PROCEDURE — 25000003 PHARM REV CODE 250: Mod: HCNC | Performed by: EMERGENCY MEDICINE

## 2020-08-29 PROCEDURE — 99211 OFF/OP EST MAY X REQ PHY/QHP: CPT | Mod: HCNC,95,, | Performed by: FAMILY MEDICINE

## 2020-08-29 PROCEDURE — 11765 WEDGE EXCISION SKN NAIL FOLD: CPT | Mod: TA,HCNC

## 2020-08-29 PROCEDURE — 99211 PR OFFICE/OUTPT VISIT, EST, LEVL I: ICD-10-PCS | Mod: HCNC,95,, | Performed by: FAMILY MEDICINE

## 2020-08-29 PROCEDURE — 99284 EMERGENCY DEPT VISIT MOD MDM: CPT | Mod: 25,HCNC

## 2020-08-29 RX ORDER — HYDROCODONE BITARTRATE AND ACETAMINOPHEN 5; 325 MG/1; MG/1
1 TABLET ORAL EVERY 8 HOURS PRN
Qty: 6 TABLET | Refills: 0 | Status: SHIPPED | OUTPATIENT
Start: 2020-08-29 | End: 2020-08-31

## 2020-08-29 RX ORDER — ACETAMINOPHEN AND CODEINE PHOSPHATE 300; 30 MG/1; MG/1
1 TABLET ORAL EVERY 6 HOURS PRN
Qty: 10 TABLET | Refills: 0 | OUTPATIENT
Start: 2020-08-29 | End: 2020-09-04

## 2020-08-29 RX ORDER — SULFAMETHOXAZOLE AND TRIMETHOPRIM 800; 160 MG/1; MG/1
1 TABLET ORAL 2 TIMES DAILY
Qty: 10 TABLET | Refills: 0 | Status: SHIPPED | OUTPATIENT
Start: 2020-08-29 | End: 2020-09-08

## 2020-08-29 RX ORDER — MUPIROCIN 20 MG/G
1 OINTMENT TOPICAL
Status: COMPLETED | OUTPATIENT
Start: 2020-08-29 | End: 2020-08-29

## 2020-08-29 RX ORDER — LIDOCAINE HYDROCHLORIDE 10 MG/ML
10 INJECTION INFILTRATION; PERINEURAL
Status: COMPLETED | OUTPATIENT
Start: 2020-08-29 | End: 2020-08-29

## 2020-08-29 RX ORDER — SULFAMETHOXAZOLE AND TRIMETHOPRIM 800; 160 MG/1; MG/1
1 TABLET ORAL
Status: COMPLETED | OUTPATIENT
Start: 2020-08-29 | End: 2020-08-29

## 2020-08-29 RX ADMIN — LIDOCAINE HYDROCHLORIDE 10 ML: 10 INJECTION, SOLUTION INFILTRATION; PERINEURAL at 12:08

## 2020-08-29 RX ADMIN — MUPIROCIN 22 G: 20 OINTMENT TOPICAL at 12:08

## 2020-08-29 RX ADMIN — SULFAMETHOXAZOLE AND TRIMETHOPRIM 1 TABLET: 800; 160 TABLET ORAL at 12:08

## 2020-08-29 NOTE — PROGRESS NOTES
Subjective:       Patient ID: Cat Denson is a 40 y.o. female.    Chief Complaint: No chief complaint on file.    Audio Only Telehealth Visit     The patient location is: home  The chief complaint leading to consultation is: trauma to rt toe  Visit type: Virtual visit with audio only (telephone)  Total time spent with patient: 15    min  The reason for the audio only service rather than synchronous audio and video virtual visit was related to technical difficulties or patient preference/necessity.    40-year-old female acute trauma to right toe.  She refers that stump the foot against the floor Ms Denson had a previous stroke, with peroneal palsy.  Therefore she had difficulty walking.  Now she has painful weight-bearing.  The foot is swollen with blood coming from the toenail.  Is also painful to walk.  There is moderate pain with passive manipulation of the distal phalanx.  She is taking blood thinners and she is unable to take NSAID.  She is unable take tramadol did not high risk for seizures; since she has previous strokes.  She denies dizziness loss of consciousness hypotension no toxicology.     Each patient to whom I provide medical services by telemedicine is:  (1) informed of the relationship between the physician and patient and the respective role of any other health care provider with respect to management of the patient; and (2) notified that they may decline to receive medical services by telemedicine and may withdraw from such care at any time. Patient verbally consented to receive this service via voice-only telephone call.       HPI:  Painful toe     Assessment and plan:  Acute trauma to right toe                        This service was not originating from a related E/M service provided within the previous 7 days nor will  to an E/M service or procedure within the next 24 hours or my soonest available appointment.  Prevailing standard of care was able to be met in this  audio-only visit.        Toe Injury  This is a new problem. The current episode started today. The problem has been unchanged. Pertinent negatives include no abdominal pain, anorexia or arthralgias.     Review of Systems   Gastrointestinal: Negative for abdominal pain and anorexia.   Musculoskeletal: Negative for arthralgias.       Patient Active Problem List   Diagnosis    Chronic pain    Fibromyalgia    Lupus (systemic lupus erythematosus)    Seizure    Chronic asthma without complication    Hemiparesis affecting left side as late effect of cerebrovascular accident    History of right MCA stroke    Hemiplegic migraine    CKD (chronic kidney disease) stage 3, GFR 30-59 ml/min    Gastroesophageal reflux disease without esophagitis    Long term current use of anticoagulant therapy    Urge incontinence    Primary insomnia    Iron deficiency anemia    Long-term use of hydroxychloroquine    Hepatitis, fulminant    Cigarette nicotine dependence without complication    History of fracture of left ankle 11/16    Homocysteinemia    Recurrent seizures    Complex partial epilepsy with generalization and with intractable epilepsy    Chronic daily headache    Osteopenia    Mixed hyperlipidemia    Gastroparesis    Tobacco use    Bladder disorder    Urinary urgency    Bipolar 1 disorder, depressed, partial remission    Panic disorder with agoraphobia    Cognitive disorder    High risk medication use    Cannabis use disorder, moderate, dependence       Objective:      Physical Exam  Musculoskeletal:        Feet:          Lab Results   Component Value Date    WBC 8.51 07/29/2020    HGB 13.6 07/29/2020    HCT 42.5 07/29/2020     07/29/2020    CHOL 144 07/29/2020    TRIG 90 07/29/2020    HDL 41 07/29/2020    ALT 17 08/03/2020    AST 15 08/03/2020     08/03/2020    K 4.9 08/03/2020     08/03/2020    CREATININE 1.4 08/03/2020    BUN 16 08/03/2020    CO2 25 08/03/2020    TSH 1.023  03/12/2017    INR 1.1 03/04/2017    HGBA1C 4.7 07/29/2020     The ASCVD Risk score (Brinktownjoycelyn JUAN Jr., et al., 2013) failed to calculate for the following reasons:    The 2013 ASCVD risk score is only valid for ages 40 to 79    The patient has a prior MI or stroke diagnosis    Assessment:       1. Paronychia of toe of right foot    2. Left peroneal nerve palsy        Plan:       Paronychia of toe of right foot    Left peroneal nerve palsy     Patient has painful weight-bearing, when able to walk on right foot.  Referred to urgent care for x-ray.  Apply a compressive ACE bandage. Rest and elevate the affected painful area.  Apply cold compresses intermittently as needed.  As pain recedes, begin normal activities slowly as tolerated.  Call if symptoms persist.  Patient to avoid weight-bearing for the next 72 hr.  Hard sole shoe.  Advised to soak and some salt for half an hour in lukewarm water for the next 72 hr.  Advised to call back directly if there are further questions, or if these symptoms fail to improve as anticipated or worsen.

## 2020-08-29 NOTE — ED NOTES
Presents to room 14 with complaints of stubbing left great toe yesterday states had a virtual visit and was told to come in for Xray NAD noted no obvious deformity noted

## 2020-08-29 NOTE — ED PROVIDER NOTES
Encounter Date: 8/29/2020    SCRIBE #1 NOTE: I, Tj Albarran, am scribing for, and in the presence of, Dr. Lazar.       History     Chief Complaint   Patient presents with    Toe Pain     left great toe     Time seen by provider: 11:40 AM on 08/29/2020      Cat Denson is a 40 y.o. female with a PMHx of anxiety, chronic pain, CKD, CVA due to Depo and smoking who presents to the ED for left great toe pain that started 1 day ago. The patient states that she has residual weakness and paraesthesia on her left side secondary to CVA. Patient report that she stubbed her left great toe and has had increasing pain since. Patient states that she had a virtual visit today, who referred her to the ED. Patient denies great bleeding, discoloration, fever, or any other complaint at this time.The patient has a PSHx of cholecystectomy and upper GI endoscopy.    The history is provided by the patient.     Review of patient's allergies indicates:   Allergen Reactions    Metoclopramide hcl Anaphylaxis     Involuntary mouth movements     Amoxicillin-pot clavulanate      2 other medications    Amoxil [amoxicillin]      hives    Avelox [moxifloxacin]      itching    Benadryl [diphenhydramine hcl]      Lowers seizure threshold     Quinazolinones      Lowers seizure threshold     Ultram [tramadol]      Lowers seizure threshold     Wellbutrin [bupropion hcl]      Lowers seizure threshold      Past Medical History:   Diagnosis Date    Acid reflux     Allergy     Anemia     Anticoagulated 12/29/2015    Anxiety     Asthma     Bipolar 1 disorder     Bronchospasm with bronchitis, acute     Chronic daily headache 9/11/2018    Chronic kidney disease     Chronic pain     Depression     bipolar 2    Fibromyalgia     Fibromyalgia     Gastroparesis     History of right MCA stroke 11/25/2015    Hx of psychiatric care     Lactose intolerance     Lupus     Mixed hyperlipidemia 11/28/2018    Psychiatric problem      Renal tubular acidosis     Seizure     Sjogren's syndrome     Smoker     Stroke 11-    Suicide attempt     overdosed on a bottle of paxil, muscle relaxers, & zoloft    Therapy      Past Surgical History:   Procedure Laterality Date    CHOLECYSTECTOMY      COLONOSCOPY  11/12/2014    Dr. Colin, repeat at 52 years old for screening    FLUOROSCOPIC URODYNAMIC STUDY N/A 7/23/2019    Procedure: URODYNAMIC STUDY, FLUOROSCOPIC;  Surgeon: Vanna Martinez MD;  Location: [facility] OR [address] [place] [address];  Service: Urology;  Laterality: N/A;  1 hour    UPPER GASTROINTESTINAL ENDOSCOPY  03/03/2017    Dr. Harris     Family History   Problem Relation Age of Onset    Hypertension Mother     Hyperlipidemia Mother     Psoriasis Mother     Thyroid disease Mother     No Known Problems Father     Post-traumatic stress disorder Brother     Drug abuse Brother     Diabetes Maternal Uncle     Hypertension Maternal Uncle     Alcohol abuse Maternal Uncle     Stroke Maternal Uncle     Scoliosis Paternal Aunt     Heart disease Paternal Uncle     Mental illness Maternal Grandmother     Cancer Maternal Grandmother         lung / brain - smoker    Drug abuse Maternal Grandmother     Hypertension Maternal Grandmother     Hyperlipidemia Maternal Grandmother     Diabetes Maternal Grandmother     Rheum arthritis Maternal Grandmother     Diabetes Mellitus Maternal Grandmother     Heart disease Maternal Grandfather     Hypertension Maternal Grandfather     Alcohol abuse Maternal Grandfather     Osteoarthritis Maternal Grandfather     No Known Problems Paternal Grandmother     Mental illness Paternal Grandfather     Early death Paternal Grandfather         self    Colon cancer Neg Hx     Colon polyps Neg Hx     Crohn's disease Neg Hx     Ulcerative colitis Neg Hx     Celiac disease Neg Hx     Lupus Neg Hx     Kidney disease Neg Hx     Inflammatory bowel disease Neg Hx     Depression Neg Hx     COPD Neg Hx      Asthma Neg Hx     Macular degeneration Neg Hx     Retinal detachment Neg Hx     Glaucoma Neg Hx      Social History     Tobacco Use    Smoking status: Current Every Day Smoker     Packs/day: 1.00     Years: 15.00     Pack years: 15.00     Types: Cigarettes     Start date: 11/25/2015    Smokeless tobacco: Never Used   Substance Use Topics    Alcohol use: No     Alcohol/week: 0.0 standard drinks     Frequency: Never     Drinks per session: Patient refused     Binge frequency: Never    Drug use: Yes     Types: Marijuana     Comment: smokes occasionally, helps with pain and appetite     Review of Systems   Constitutional: Negative for appetite change, chills, fever and unexpected weight change.   HENT: Negative for congestion, ear pain, hearing loss and sore throat.    Eyes: Negative for pain and visual disturbance.   Respiratory: Negative for cough, shortness of breath and wheezing.    Cardiovascular: Negative for chest pain, palpitations and leg swelling.   Gastrointestinal: Negative for abdominal pain, blood in stool, diarrhea, nausea and vomiting.   Genitourinary: Negative for dysuria and hematuria.   Musculoskeletal: Positive for arthralgias. Negative for back pain, gait problem, myalgias, neck pain and neck stiffness.   Skin: Positive for wound. Negative for rash.   Neurological: Negative for syncope, weakness, numbness and headaches.   Hematological: Does not bruise/bleed easily.       Physical Exam     Initial Vitals [08/29/20 1128]   BP Pulse Resp Temp SpO2   122/83 87 18 98 °F (36.7 °C) 99 %      MAP       --         Physical Exam    Nursing note and vitals reviewed.  Constitutional: She appears well-developed.  Non-toxic appearance.   Nontoxic, well appearing female.    HENT:   Head: Normocephalic and atraumatic.   Eyes: EOM are normal. Pupils are equal, round, and reactive to light.   Neck: Neck supple.   Cardiovascular: Intact distal pulses.   Pulmonary/Chest: No respiratory distress. She has no  decreased breath sounds.   Musculoskeletal: Normal range of motion.      Comments: Left great toe has ingrown toenail on medial side. Purulence and draining with overgrown skin over the left medial toenail. Toe is not diffusely cellulitic   Neurological: She is alert and oriented to person, place, and time.   Chronic left sided suddle paraesthesia and weakness   Skin: Skin is warm and dry.   Psychiatric: She has a normal mood and affect.         ED Course   Nail Removal    Date/Time: 8/29/2020 1:11 PM  Performed by: Tj Lazar MD  Authorized by: Tj Lazar MD   Consent Done: Not Needed  Location: left foot  Location details: left big toe  Anesthesia: digital block    Anesthesia:  Local Anesthetic: lidocaine 1% without epinephrine  Anesthetic total: 6 mL  Patient sedated: no  Preparation: skin prepped with Betadine  Amount removed: 1/5  Wedge excision of skin of nail fold: yes  Nail bed sutured: no  Nail matrix removed: none  Removed nail replaced and anchored: no  Dressing: 4x4, antibiotic ointment and gauze roll  Patient tolerance: Patient tolerated the procedure well with no immediate complications  Comments: Post op shoe        Labs Reviewed - No data to display       Imaging Results          X-Ray Toe 2 or More Views Left (In process)                  Medical Decision Making:   History:   Old Medical Records: I decided to obtain old medical records.  Clinical Tests:   Radiological Study: Ordered and Reviewed  Ingrown toenail excised in the emergency room and will start topical antibiotics oral antibiotics and referred to Podiatry.  Stable for discharge.  Return precautions given.            Scribe Attestation:   Scribe #1: I performed the above scribed service and the documentation accurately describes the services I performed. I attest to the accuracy of the note.    I, Dr. Tj Lazar personally performed the services described in this documentation. All medical record entries made by the  scribe were at my direction and in my presence.  I have reviewed the chart and agree that the record reflects my personal performance and is accurate and complete. Tj Lazar MD.  1:12 PM 08/29/2020    DISCLAIMER: This note was prepared with Dragon NaturallySpeaking voice recognition transcription software. Garbled syntax, mangled pronouns, and other bizarre constructions may be attributed to that software system         ED Course as of Aug 29 1311   Sat Aug 29, 2020   1306 Patient appears have ingrown toenail.  I removed the ingrown toenail and will place her on Bactroban with Bactrim.  She will do Epsom salts and refer to podiatry.  X-ray shows no acute fracture.    [JS]      ED Course User Index  [JS] Tj Lazar MD                Clinical Impression:       ICD-10-CM ICD-9-CM   1. Ingrown toenail of left foot  L60.0 703.0   2. Cellulitis of left toe  L03.032 681.10                                Tj Lazar MD  08/29/20 1312

## 2020-08-29 NOTE — PATIENT INSTRUCTIONS

## 2020-08-30 PROBLEM — F19.10 SUBSTANCE ABUSE: Status: ACTIVE | Noted: 2020-08-30

## 2020-08-31 ENCOUNTER — PATIENT OUTREACH (OUTPATIENT)
Dept: ADMINISTRATIVE | Facility: OTHER | Age: 40
End: 2020-08-31

## 2020-08-31 ENCOUNTER — PATIENT MESSAGE (OUTPATIENT)
Dept: PSYCHIATRY | Facility: CLINIC | Age: 40
End: 2020-08-31

## 2020-08-31 NOTE — PROGRESS NOTES
LINKS immunization registry updated  Care Everywhere updated  Health Maintenance updated  Chart reviewed for overdue Proactive [facility] Encounters (ANA M) health maintenance testing (CRS, Breast Ca, Diabetic Eye Exam)   Orders entered:N/A

## 2020-09-01 ENCOUNTER — OFFICE VISIT (OUTPATIENT)
Dept: PODIATRY | Facility: CLINIC | Age: 40
End: 2020-09-01
Payer: MEDICARE

## 2020-09-01 VITALS
SYSTOLIC BLOOD PRESSURE: 117 MMHG | BODY MASS INDEX: 17.73 KG/M2 | HEART RATE: 85 BPM | DIASTOLIC BLOOD PRESSURE: 83 MMHG | WEIGHT: 100.06 LBS

## 2020-09-01 DIAGNOSIS — S90.422A BLISTER OF LEFT GREAT TOE, INITIAL ENCOUNTER: Primary | ICD-10-CM

## 2020-09-01 DIAGNOSIS — S90.112A CONTUSION OF LEFT GREAT TOE WITHOUT DAMAGE TO NAIL, INITIAL ENCOUNTER: ICD-10-CM

## 2020-09-01 PROCEDURE — 99999 PR PBB SHADOW E&M-EST. PATIENT-LVL III: CPT | Mod: PBBFAC,HCNC,, | Performed by: PODIATRIST

## 2020-09-01 PROCEDURE — 3008F PR BODY MASS INDEX (BMI) DOCUMENTED: ICD-10-PCS | Mod: HCNC,CPTII,S$GLB, | Performed by: PODIATRIST

## 2020-09-01 PROCEDURE — 99214 OFFICE O/P EST MOD 30 MIN: CPT | Mod: HCNC,S$GLB,, | Performed by: PODIATRIST

## 2020-09-01 PROCEDURE — 99999 PR PBB SHADOW E&M-EST. PATIENT-LVL III: ICD-10-PCS | Mod: PBBFAC,HCNC,, | Performed by: PODIATRIST

## 2020-09-01 PROCEDURE — 3008F BODY MASS INDEX DOCD: CPT | Mod: HCNC,CPTII,S$GLB, | Performed by: PODIATRIST

## 2020-09-01 PROCEDURE — 99214 PR OFFICE/OUTPT VISIT, EST, LEVL IV, 30-39 MIN: ICD-10-PCS | Mod: HCNC,S$GLB,, | Performed by: PODIATRIST

## 2020-09-01 RX ORDER — OXYCODONE AND ACETAMINOPHEN 5; 325 MG/1; MG/1
1 TABLET ORAL EVERY 8 HOURS PRN
Qty: 30 TABLET | Refills: 0 | Status: SHIPPED | OUTPATIENT
Start: 2020-09-01 | End: 2020-09-22

## 2020-09-01 RX ORDER — LIDOCAINE HYDROCHLORIDE 20 MG/ML
JELLY TOPICAL
Qty: 30 ML | Refills: 2 | Status: SHIPPED | OUTPATIENT
Start: 2020-09-01 | End: 2020-09-22

## 2020-09-02 NOTE — PROGRESS NOTES
Subjective:      Patient ID: Cat Denson is a 40 y.o. female.    Chief Complaint: Foot Pain (left )    Cat is a 40 y.o. female who presents to the podiatry clinic  with complaint of  a new issue of a left great toe contusion and mild laceration/blister formation.  She was seen in urgent care given an antibiotic as well as an x-ray.  She is complaining of moderate to severe discomfort.  She states that previous nerve injections have helped her as well however she is here today for the left great toe issue.      Review of Systems   Constitution: Negative for chills and fever.   HENT: Negative for congestion and tinnitus.    Eyes: Negative for double vision and visual disturbance.   Cardiovascular: Negative for chest pain and claudication.   Respiratory: Negative for hemoptysis and shortness of breath.    Endocrine: Negative for cold intolerance and heat intolerance.   Hematologic/Lymphatic: Negative for adenopathy and bleeding problem.   Skin: Positive for color change, dry skin and nail changes.   Musculoskeletal: Positive for stiffness. Negative for myalgias.   Gastrointestinal: Negative for nausea and vomiting.   Genitourinary: Negative for dysuria and hematuria.   Neurological: Positive for numbness and paresthesias.   Psychiatric/Behavioral: Negative for altered mental status and suicidal ideas.   Allergic/Immunologic: Negative for environmental allergies and persistent infections.           Objective:      Physical Exam  Vitals signs reviewed.   Constitutional:       Appearance: She is well-developed.   Cardiovascular:      Pulses:           Dorsalis pedis pulses are 1+ on the right side and 1+ on the left side.        Posterior tibial pulses are 1+ on the right side and 1+ on the left side.   Pulmonary:      Effort: Pulmonary effort is normal.   Musculoskeletal: Normal range of motion.      Comments: Anterior, lateral, and posterior muscle groups bilateral lower extremities show strength 4 over  5 symmetrically. Inspection and palpation of the joints and bones reveal no crepitus or joint effusion. No tenderness upon palpation. Mild plantar flexor contractures noted to digits 2 through 5 bilaterally.  Angle and base of gait are normal.  Left dorsiflexors noted to be 3/5 in terms of muscle strength remainder  4/5 as mention.  Tenderness to the left 1st digit distally/distal phalanx of the hallux.   Feet:      Right foot:      Skin integrity: Callus and dry skin present.      Left foot:      Skin integrity: Callus and dry skin present.   Skin:     General: Skin is warm and dry.      Capillary Refill: Capillary refill takes 2 to 3 seconds.      Coloration: Skin is pale.      Comments: Left hallux noted to have a superficial ulceration/de roofed blister without infection noted.     Neurological:      Mental Status: She is alert and oriented to person, place, and time.      Sensory: Sensory deficit present.      Motor: Atrophy present.      Deep Tendon Reflexes: Reflexes abnormal.      Reflex Scores:       Patellar reflexes are 1+ on the right side and 1+ on the left side.       Achilles reflexes are 1+ on the right side and 1+ on the left side.     Comments: Sharp, dull, light touch, and vibratory sensation are diminished bilaterally. Proprioceptive sensation is intact to both lower extremities. [place] monofilament exam shows loss of protective sensation to plantar toes 1 through 5 bilaterally. Deep tendon reflexes to the patellar tendons is 1 over 4 bilaterally symmetrical. Deep tendon reflexes to the Achilles tendon is 0 over 4 bilaterally symmetrical. No ankle clonus or Babinski reflex noted bilaterally. Coordination is fair to both lower extremities.  Patient admits to intermittent burning and tingling in the feet.  Positive provocation sign and Tinel sign noted to the left saphenous and superficial peroneal nerves as well as the posterior tibial nerves.               Assessment:       Encounter  Diagnoses   Name Primary?    Blister of left great toe, initial encounter Yes    Contusion of left great toe without damage to nail, initial encounter          Plan:       Cat was seen today for foot pain.    Diagnoses and all orders for this visit:    Blister of left great toe, initial encounter    Contusion of left great toe without damage to nail, initial encounter    Other orders  -     oxyCODONE-acetaminophen (PERCOCET) 5-325 mg per tablet; Take 1 tablet by mouth every 8 (eight) hours as needed for Pain.  -     lidocaine HCL 2% (XYLOCAINE) 2 % jelly; Apply topically as needed. Apply topically once nightly to affected part of foot/feet.      I counseled the patient on her conditions, their implications and medical management.    Local wound care discussed in detail, continue antibiotic ointment with dry sterile dressing to be changed daily as well as sterile saline cleanse.  Pain medication dispensed as well as lidocaine jelly.  Follow-up in 4 weeks.  .

## 2020-09-04 ENCOUNTER — HOSPITAL ENCOUNTER (EMERGENCY)
Facility: HOSPITAL | Age: 40
Discharge: HOME | End: 2020-09-04
Attending: EMERGENCY MEDICINE
Payer: MEDICARE

## 2020-09-04 ENCOUNTER — PATIENT MESSAGE (OUTPATIENT)
Dept: FAMILY MEDICINE | Facility: CLINIC | Age: 40
End: 2020-09-04

## 2020-09-04 VITALS
HEIGHT: 63 IN | BODY MASS INDEX: 17.72 KG/M2 | DIASTOLIC BLOOD PRESSURE: 71 MMHG | WEIGHT: 100 LBS | RESPIRATION RATE: 16 BRPM | SYSTOLIC BLOOD PRESSURE: 113 MMHG | TEMPERATURE: 99 F | HEART RATE: 97 BPM | OXYGEN SATURATION: 99 %

## 2020-09-04 DIAGNOSIS — G43.009 MIGRAINE WITHOUT AURA AND WITHOUT STATUS MIGRAINOSUS, NOT INTRACTABLE: Primary | ICD-10-CM

## 2020-09-04 LAB
B-HCG UR QL: NEGATIVE
CTP QC/QA: YES

## 2020-09-04 PROCEDURE — 81025 URINE PREGNANCY TEST: CPT | Mod: HCNC | Performed by: EMERGENCY MEDICINE

## 2020-09-04 PROCEDURE — 96375 TX/PRO/DX INJ NEW DRUG ADDON: CPT | Mod: HCNC

## 2020-09-04 PROCEDURE — 25000003 PHARM REV CODE 250: Mod: HCNC | Performed by: EMERGENCY MEDICINE

## 2020-09-04 PROCEDURE — 96374 THER/PROPH/DIAG INJ IV PUSH: CPT | Mod: HCNC

## 2020-09-04 PROCEDURE — 96361 HYDRATE IV INFUSION ADD-ON: CPT | Mod: HCNC

## 2020-09-04 PROCEDURE — 99284 EMERGENCY DEPT VISIT MOD MDM: CPT | Mod: 25,HCNC

## 2020-09-04 PROCEDURE — 63600175 PHARM REV CODE 636 W HCPCS: Mod: HCNC | Performed by: EMERGENCY MEDICINE

## 2020-09-04 RX ORDER — SODIUM CHLORIDE 9 MG/ML
1000 INJECTION, SOLUTION INTRAVENOUS
Status: COMPLETED | OUTPATIENT
Start: 2020-09-04 | End: 2020-09-04

## 2020-09-04 RX ORDER — BUTORPHANOL TARTRATE 10 MG/ML
1 SPRAY NASAL EVERY 4 HOURS PRN
Qty: 2.5 ML | Refills: 0 | Status: SHIPPED | OUTPATIENT
Start: 2020-09-04 | End: 2020-09-14

## 2020-09-04 RX ORDER — ONDANSETRON HYDROCHLORIDE 8 MG/1
8 TABLET, FILM COATED ORAL EVERY 12 HOURS PRN
Qty: 8 TABLET | Refills: 1 | Status: SHIPPED | OUTPATIENT
Start: 2020-09-04 | End: 2020-10-27

## 2020-09-04 RX ORDER — ONDANSETRON 2 MG/ML
8 INJECTION INTRAMUSCULAR; INTRAVENOUS
Status: COMPLETED | OUTPATIENT
Start: 2020-09-04 | End: 2020-09-04

## 2020-09-04 RX ORDER — BUTORPHANOL TARTRATE 1 MG/ML
2 INJECTION INTRAMUSCULAR; INTRAVENOUS
Status: COMPLETED | OUTPATIENT
Start: 2020-09-04 | End: 2020-09-04

## 2020-09-04 RX ORDER — HALOPERIDOL 5 MG/ML
5 INJECTION INTRAMUSCULAR
Status: COMPLETED | OUTPATIENT
Start: 2020-09-04 | End: 2020-09-04

## 2020-09-04 RX ORDER — DEXAMETHASONE SODIUM PHOSPHATE 4 MG/ML
8 INJECTION, SOLUTION INTRA-ARTICULAR; INTRALESIONAL; INTRAMUSCULAR; INTRAVENOUS; SOFT TISSUE
Status: COMPLETED | OUTPATIENT
Start: 2020-09-04 | End: 2020-09-04

## 2020-09-04 RX ORDER — ACETAMINOPHEN 325 MG/1
650 TABLET ORAL
Status: COMPLETED | OUTPATIENT
Start: 2020-09-04 | End: 2020-09-04

## 2020-09-04 RX ORDER — KETOROLAC TROMETHAMINE 30 MG/ML
15 INJECTION, SOLUTION INTRAMUSCULAR; INTRAVENOUS
Status: COMPLETED | OUTPATIENT
Start: 2020-09-04 | End: 2020-09-04

## 2020-09-04 RX ORDER — ONDANSETRON 4 MG/1
4 TABLET, ORALLY DISINTEGRATING ORAL EVERY 8 HOURS PRN
Qty: 10 TABLET | Refills: 0 | Status: SHIPPED | OUTPATIENT
Start: 2020-09-04 | End: 2020-10-27

## 2020-09-04 RX ORDER — BUTALBITAL, ACETAMINOPHEN AND CAFFEINE 50; 325; 40 MG/1; MG/1; MG/1
1 TABLET ORAL
Status: COMPLETED | OUTPATIENT
Start: 2020-09-04 | End: 2020-09-04

## 2020-09-04 RX ADMIN — SODIUM CHLORIDE 1000 ML: 0.9 INJECTION, SOLUTION INTRAVENOUS at 03:09

## 2020-09-04 RX ADMIN — ONDANSETRON 8 MG: 2 INJECTION INTRAMUSCULAR; INTRAVENOUS at 03:09

## 2020-09-04 RX ADMIN — DEXAMETHASONE SODIUM PHOSPHATE 8 MG: 4 INJECTION, SOLUTION INTRAMUSCULAR; INTRAVENOUS at 03:09

## 2020-09-04 RX ADMIN — BUTALBITAL, ACETAMINOPHEN AND CAFFEINE 1 TABLET: 50; 325; 40 TABLET ORAL at 03:09

## 2020-09-04 RX ADMIN — ACETAMINOPHEN 650 MG: 325 TABLET ORAL at 03:09

## 2020-09-04 RX ADMIN — KETOROLAC TROMETHAMINE 15 MG: 30 INJECTION, SOLUTION INTRAMUSCULAR at 03:09

## 2020-09-04 RX ADMIN — HALOPERIDOL LACTATE 5 MG: 5 INJECTION, SOLUTION INTRAMUSCULAR at 03:09

## 2020-09-04 RX ADMIN — BUTORPHANOL TARTRATE 2 MG: 1 INJECTION, SOLUTION INTRAMUSCULAR; INTRAVENOUS at 05:09

## 2020-09-04 NOTE — ED PROVIDER NOTES
Encounter Date: 9/4/2020       History     Chief Complaint   Patient presents with    Nausea     x 1 week    Emesis     Chief complaint:  Headache with nausea vomiting    HPI:  40-year-old female with a history of recurrent headache status post CVA due to lupus anticoagulant presents with a 2 day history of a left-sided headache with associated nausea vomiting.  She has had no fever or neck stiffness.  She has left-sided paralysis which is unchanged.  She denies any abdominal pain and has no melena, hematochezia or hematemesis.        Review of patient's allergies indicates:   Allergen Reactions    Metoclopramide hcl Anaphylaxis     Involuntary mouth movements     Amoxicillin-pot clavulanate      2 other medications    Amoxil [amoxicillin]      hives    Avelox [moxifloxacin]      itching    Benadryl [diphenhydramine hcl]      Lowers seizure threshold     Quinazolinones      Lowers seizure threshold     Ultram [tramadol]      Lowers seizure threshold     Wellbutrin [bupropion hcl]      Lowers seizure threshold      Past Medical History:   Diagnosis Date    Acid reflux     Allergy     Anemia     Anticoagulated 12/29/2015    Anxiety     Asthma     Bipolar 1 disorder     Bronchospasm with bronchitis, acute     Chronic daily headache 9/11/2018    Chronic kidney disease     Chronic pain     Depression     bipolar 2    Fibromyalgia     Fibromyalgia     Gastroparesis     History of right MCA stroke 11/25/2015    Hx of psychiatric care     Lactose intolerance     Lupus     Mixed hyperlipidemia 11/28/2018    Psychiatric problem     Renal tubular acidosis     Seizure     Sjogren's syndrome     Smoker     Stroke 11-    Substance abuse-kratom daily use 8/30/2020    Suicide attempt     overdosed on a bottle of paxil, muscle relaxers, & zoloft    Therapy      Past Surgical History:   Procedure Laterality Date    CHOLECYSTECTOMY      COLONOSCOPY  11/12/2014    Dr. Colin, repeat at  52 years old for screening    FLUOROSCOPIC URODYNAMIC STUDY N/A 7/23/2019    Procedure: URODYNAMIC STUDY, FLUOROSCOPIC;  Surgeon: Vanna Martinez MD;  Location: [facility] OR [address] [place] [address];  Service: Urology;  Laterality: N/A;  1 hour    UPPER GASTROINTESTINAL ENDOSCOPY  03/03/2017    Dr. Harris     Family History   Problem Relation Age of Onset    Hypertension Mother     Hyperlipidemia Mother     Psoriasis Mother     Thyroid disease Mother     No Known Problems Father     Post-traumatic stress disorder Brother     Drug abuse Brother     Diabetes Maternal Uncle     Hypertension Maternal Uncle     Alcohol abuse Maternal Uncle     Stroke Maternal Uncle     Scoliosis Paternal Aunt     Heart disease Paternal Uncle     Mental illness Maternal Grandmother     Cancer Maternal Grandmother         lung / brain - smoker    Drug abuse Maternal Grandmother     Hypertension Maternal Grandmother     Hyperlipidemia Maternal Grandmother     Diabetes Maternal Grandmother     Rheum arthritis Maternal Grandmother     Diabetes Mellitus Maternal Grandmother     Heart disease Maternal Grandfather     Hypertension Maternal Grandfather     Alcohol abuse Maternal Grandfather     Osteoarthritis Maternal Grandfather     No Known Problems Paternal Grandmother     Mental illness Paternal Grandfather     Early death Paternal Grandfather         self    Colon cancer Neg Hx     Colon polyps Neg Hx     Crohn's disease Neg Hx     Ulcerative colitis Neg Hx     Celiac disease Neg Hx     Lupus Neg Hx     Kidney disease Neg Hx     Inflammatory bowel disease Neg Hx     Depression Neg Hx     COPD Neg Hx     Asthma Neg Hx     Macular degeneration Neg Hx     Retinal detachment Neg Hx     Glaucoma Neg Hx      Social History     Tobacco Use    Smoking status: Current Every Day Smoker     Packs/day: 1.00     Years: 15.00     Pack years: 15.00     Types: Cigarettes     Start date: 11/25/2015    Smokeless tobacco: Never Used    Substance Use Topics    Alcohol use: No     Alcohol/week: 0.0 standard drinks     Frequency: Never     Drinks per session: Patient refused     Binge frequency: Never    Drug use: Yes     Types: Marijuana     Comment: smokes occasionally, helps with pain and appetite     Review of Systems   Constitutional: Negative for activity change, appetite change, chills, fatigue and fever.   Eyes: Negative for visual disturbance.   Respiratory: Negative for apnea and shortness of breath.    Cardiovascular: Negative for chest pain and palpitations.   Gastrointestinal: Positive for nausea and vomiting. Negative for abdominal distention and abdominal pain.   Genitourinary: Negative for difficulty urinating.   Musculoskeletal: Negative for neck pain.   Skin: Negative for pallor and rash.   Neurological: Positive for headaches.   Hematological: Does not bruise/bleed easily.   Psychiatric/Behavioral: Negative for agitation.       Physical Exam     Initial Vitals [09/04/20 1425]   BP Pulse Resp Temp SpO2   (!) 165/98 100 16 98.5 °F (36.9 °C) 97 %      MAP       --         Physical Exam    Nursing note and vitals reviewed.  Constitutional: She appears well-developed and well-nourished.   HENT:   Head: Normocephalic and atraumatic.   Eyes: Conjunctivae are normal.   Neck: Normal range of motion. Neck supple.   Cardiovascular: Normal rate, regular rhythm and normal heart sounds. Exam reveals no gallop and no friction rub.    No murmur heard.  Pulmonary/Chest: Effort normal and breath sounds normal. No respiratory distress. She has no wheezes. She has no rhonchi. She has no rales.   Abdominal: Soft. She exhibits no distension. There is no abdominal tenderness.   Musculoskeletal: Normal range of motion.   Neurological: She is alert and oriented to person, place, and time. No cranial nerve deficit. GCS score is 15. GCS eye subscore is 4. GCS verbal subscore is 5. GCS motor subscore is 6.   Skin: Skin is warm and dry. No erythema.    Psychiatric: She has a normal mood and affect.         ED Course   Procedures  Labs Reviewed - No data to display       Imaging Results    None          Medical Decision Making:   ED Management:  40-year-old female status post CVA presents with a one-week history of nausea vomiting and unilateral left-sided headache.  She has no new focal deficits with intracranial hemorrhage unlikely.  She has had no fever or neck stiffness with infectious etiology unlikely.  The symptoms are most likely associated with a migraine headache.  She has relief with state all and Zofran.  She is encouraged to return for worsening headache or fever.       APC / Resident Notes:   I, Dr. Abner Ahmadi III, personally performed the services described in this documentation. All medical record entries made by the scribe were at my direction and in my presence.  I have reviewed the chart and agree that the record reflects my personal performance and is accurate and complete                            Clinical Impression:       ICD-10-CM ICD-9-CM   1. Migraine without aura and without status migrainosus, not intractable  G43.009 346.10                                Abner Ahmadi III, MD  09/04/20 [id number]

## 2020-09-04 NOTE — TELEPHONE ENCOUNTER
Patient states she was seen at ER related to Cellulitis of toe. States she was placed on an antibiotic that is causing severe nausea/vomiting. Patient is requesting prescription for Zofran or Phenergan. Please advise. Thank you.

## 2020-09-08 ENCOUNTER — PATIENT OUTREACH (OUTPATIENT)
Dept: ADMINISTRATIVE | Facility: HOSPITAL | Age: 40
End: 2020-09-08

## 2020-09-08 NOTE — PROGRESS NOTES
Chart review completed 2020.  Care Everywhere updates requested and reviewed.  Immunizations reconciled. Media reports reviewed.  Duplicate HM overrides and  orders removed.  Overdue HM topic chart audit and/or requested.  Overdue lab testing linked to upcoming lab appointments if applies.    Lab helene, and Savelli reviewed   Pap Smear and Lab testing      Portal message sent regarding HM Due      Health Maintenance Due   Topic Date Due    HIV Screening  1997    TETANUS VACCINE  2000    Influenza Vaccine (1) 2020    Cervical Cancer Screening  08/10/2020

## 2020-09-18 ENCOUNTER — LAB VISIT (OUTPATIENT)
Dept: LAB | Facility: HOSPITAL | Age: 40
End: 2020-09-18
Attending: FAMILY MEDICINE
Payer: MEDICARE

## 2020-09-18 DIAGNOSIS — Z11.4 ENCOUNTER FOR SCREENING FOR HIV: ICD-10-CM

## 2020-09-18 DIAGNOSIS — R94.5 ABNORMAL RESULTS OF LIVER FUNCTION STUDIES: ICD-10-CM

## 2020-09-18 DIAGNOSIS — R74.8 ELEVATED LIVER ENZYMES: ICD-10-CM

## 2020-09-18 DIAGNOSIS — N18.30 CHRONIC KIDNEY DISEASE, STAGE III (MODERATE): ICD-10-CM

## 2020-09-18 PROCEDURE — 36415 COLL VENOUS BLD VENIPUNCTURE: CPT | Mod: HCNC,PO

## 2020-09-18 PROCEDURE — 83970 ASSAY OF PARATHORMONE: CPT | Mod: HCNC

## 2020-09-18 PROCEDURE — 80074 ACUTE HEPATITIS PANEL: CPT | Mod: HCNC

## 2020-09-18 PROCEDURE — 80053 COMPREHEN METABOLIC PANEL: CPT | Mod: HCNC

## 2020-09-18 PROCEDURE — 86703 HIV-1/HIV-2 1 RESULT ANTBDY: CPT | Mod: HCNC

## 2020-09-18 PROCEDURE — 80069 RENAL FUNCTION PANEL: CPT | Mod: HCNC

## 2020-09-19 LAB
ALBUMIN SERPL BCP-MCNC: 4.6 G/DL (ref 3.5–5.2)
ALBUMIN SERPL BCP-MCNC: 4.7 G/DL (ref 3.5–5.2)
ALP SERPL-CCNC: 136 U/L (ref 55–135)
ALT SERPL W/O P-5'-P-CCNC: 104 U/L (ref 10–44)
ANION GAP SERPL CALC-SCNC: 14 MMOL/L (ref 8–16)
ANION GAP SERPL CALC-SCNC: 15 MMOL/L (ref 8–16)
AST SERPL-CCNC: 88 U/L (ref 10–40)
BILIRUB SERPL-MCNC: 0.4 MG/DL (ref 0.1–1)
BUN SERPL-MCNC: 8 MG/DL (ref 6–20)
BUN SERPL-MCNC: 9 MG/DL (ref 6–20)
CALCIUM SERPL-MCNC: 9.3 MG/DL (ref 8.7–10.5)
CALCIUM SERPL-MCNC: 9.7 MG/DL (ref 8.7–10.5)
CHLORIDE SERPL-SCNC: 100 MMOL/L (ref 95–110)
CHLORIDE SERPL-SCNC: 99 MMOL/L (ref 95–110)
CO2 SERPL-SCNC: 23 MMOL/L (ref 23–29)
CO2 SERPL-SCNC: 24 MMOL/L (ref 23–29)
CREAT SERPL-MCNC: 1.7 MG/DL (ref 0.5–1.4)
CREAT SERPL-MCNC: 1.7 MG/DL (ref 0.5–1.4)
EST. GFR  (AFRICAN AMERICAN): 43.5 ML/MIN/1.73 M^2
EST. GFR  (AFRICAN AMERICAN): 43.5 ML/MIN/1.73 M^2
EST. GFR  (NON AFRICAN AMERICAN): 37.7 ML/MIN/1.73 M^2
EST. GFR  (NON AFRICAN AMERICAN): 37.7 ML/MIN/1.73 M^2
GLUCOSE SERPL-MCNC: 93 MG/DL (ref 70–110)
GLUCOSE SERPL-MCNC: 95 MG/DL (ref 70–110)
PHOSPHATE SERPL-MCNC: 3.4 MG/DL (ref 2.7–4.5)
POTASSIUM SERPL-SCNC: 3.6 MMOL/L (ref 3.5–5.1)
POTASSIUM SERPL-SCNC: 3.7 MMOL/L (ref 3.5–5.1)
PROT SERPL-MCNC: 8.7 G/DL (ref 6–8.4)
PTH-INTACT SERPL-MCNC: 62 PG/ML (ref 9–77)
SODIUM SERPL-SCNC: 137 MMOL/L (ref 136–145)
SODIUM SERPL-SCNC: 138 MMOL/L (ref 136–145)

## 2020-09-21 ENCOUNTER — PATIENT MESSAGE (OUTPATIENT)
Dept: FAMILY MEDICINE | Facility: CLINIC | Age: 40
End: 2020-09-21

## 2020-09-21 ENCOUNTER — OFFICE VISIT (OUTPATIENT)
Dept: NEPHROLOGY | Facility: CLINIC | Age: 40
End: 2020-09-21
Payer: MEDICARE

## 2020-09-21 DIAGNOSIS — N28.1 ACQUIRED CYST OF KIDNEY: ICD-10-CM

## 2020-09-21 DIAGNOSIS — N18.30 CHRONIC KIDNEY DISEASE, STAGE III (MODERATE): Primary | ICD-10-CM

## 2020-09-21 DIAGNOSIS — N20.0 CALCULUS OF KIDNEY: ICD-10-CM

## 2020-09-21 DIAGNOSIS — R74.8 ELEVATED LIVER ENZYMES: Primary | ICD-10-CM

## 2020-09-21 DIAGNOSIS — D17.71 ANGIOMYOLIPOMA OF BOTH KIDNEYS: ICD-10-CM

## 2020-09-21 LAB — HIV 1+2 AB+HIV1 P24 AG SERPL QL IA: NEGATIVE

## 2020-09-21 PROCEDURE — 99499 RISK ADDL DX/OHS AUDIT: ICD-10-PCS | Mod: HCNC,95,, | Performed by: INTERNAL MEDICINE

## 2020-09-21 PROCEDURE — 99214 OFFICE O/P EST MOD 30 MIN: CPT | Mod: HCNC,95,, | Performed by: INTERNAL MEDICINE

## 2020-09-21 PROCEDURE — 99214 PR OFFICE/OUTPT VISIT, EST, LEVL IV, 30-39 MIN: ICD-10-PCS | Mod: HCNC,95,, | Performed by: INTERNAL MEDICINE

## 2020-09-21 PROCEDURE — 99499 UNLISTED E&M SERVICE: CPT | Mod: HCNC,95,, | Performed by: INTERNAL MEDICINE

## 2020-09-21 NOTE — PROGRESS NOTES
Subjective:       Patient ID: Cat Denson is a 40 y.o. White female who presents for return patient evaluation for chronic renal failure.    The patient location is:  Patient Home   The chief complaint leading to consultation is: ckd  Visit type: Virtual visit with synchronous audio and video  Total time spent with patient: 11 minutes  Each patient to whom he or she provides medical services by telemedicine is:  (1) informed of the relationship between the physician and patient and the respective role of any other health care provider with respect to management of the patient; and (2) notified that he or she may decline to receive medical services by telemedicine and may withdraw from such care at any time.       She has no uremic symptoms.  There have been no recent hospitalizations or procedures.  She recently had an evaluation for mild transaminitis.      Review of Systems   Constitutional: Positive for unexpected weight change. Negative for appetite change, chills and fever.   Eyes: Positive for visual disturbance (L eye).   Respiratory: Negative for cough and shortness of breath.    Cardiovascular: Negative for chest pain and leg swelling.   Gastrointestinal: Negative for diarrhea, nausea and vomiting.   Endocrine: Positive for cold intolerance.   Genitourinary: Positive for difficulty urinating (incontinence since cva). Negative for dysuria and hematuria.   Musculoskeletal: Positive for arthralgias. Negative for myalgias.   Skin: Negative for rash.   Neurological: Positive for weakness (L arm and leg since cva) and headaches.   Psychiatric/Behavioral: Positive for decreased concentration (memory since cva). Negative for sleep disturbance.       The past medical, family and social histories were reviewed for this encounter.     There were no vitals taken for this visit.    Objective:      Physical Exam  Vitals signs reviewed.   Constitutional:       General: She is not in acute distress.      Appearance: She is well-developed.   HENT:      Head: Normocephalic and atraumatic.   Eyes:      General: No scleral icterus.  Pulmonary:      Effort: Pulmonary effort is normal.   Neurological:      Mental Status: She is alert and oriented to person, place, and time.   Psychiatric:         Mood and Affect: Mood normal.         Behavior: Behavior normal.         Assessment:       1. Chronic kidney disease, stage III (moderate)    2. Calculus of kidney    3. Acquired cyst of kidney    4. Angiomyolipoma of both kidneys        Plan:   Return to clinic in 12 months.  Labs for next visit include in 6 months and 1 year rp.  Baseline creatinine is 1.3-1.5 since 2013.  PTH is 62 with a calcium of 9.3.  Blood pressure is controlled on the current regimen.  She has a bland urine sediment with normal kidneys structurally and relatively stable function.  She does not appear to have any active SLE disease and does not apparently have a history of renal involvement.  Her ds DNA and complement levels are normal.  Renal ultrasound shows R 8.6 cm, L 8.8 cm.

## 2020-09-22 ENCOUNTER — OFFICE VISIT (OUTPATIENT)
Dept: FAMILY MEDICINE | Facility: CLINIC | Age: 40
End: 2020-09-22
Payer: MEDICARE

## 2020-09-22 VITALS — RESPIRATION RATE: 16 BRPM | HEART RATE: 80 BPM | SYSTOLIC BLOOD PRESSURE: 105 MMHG | DIASTOLIC BLOOD PRESSURE: 70 MMHG

## 2020-09-22 DIAGNOSIS — F19.939 WITHDRAWAL FROM OTHER PSYCHOACTIVE SUBSTANCE: Primary | ICD-10-CM

## 2020-09-22 DIAGNOSIS — R11.10 VOMITING, INTRACTABILITY OF VOMITING NOT SPECIFIED, PRESENCE OF NAUSEA NOT SPECIFIED, UNSPECIFIED VOMITING TYPE: ICD-10-CM

## 2020-09-22 DIAGNOSIS — R11.0 NAUSEA: ICD-10-CM

## 2020-09-22 LAB
HAV IGM SERPL QL IA: NEGATIVE
HBV CORE IGM SERPL QL IA: NEGATIVE
HBV SURFACE AG SERPL QL IA: NEGATIVE
HCV AB SERPL QL IA: NEGATIVE

## 2020-09-22 PROCEDURE — 99499 UNLISTED E&M SERVICE: CPT | Mod: 95,,, | Performed by: FAMILY MEDICINE

## 2020-09-22 PROCEDURE — 99214 OFFICE O/P EST MOD 30 MIN: CPT | Mod: HCNC,95,, | Performed by: FAMILY MEDICINE

## 2020-09-22 PROCEDURE — 99214 PR OFFICE/OUTPT VISIT, EST, LEVL IV, 30-39 MIN: ICD-10-PCS | Mod: HCNC,95,, | Performed by: FAMILY MEDICINE

## 2020-09-22 PROCEDURE — 99499 RISK ADDL DX/OHS AUDIT: ICD-10-PCS | Mod: 95,,, | Performed by: FAMILY MEDICINE

## 2020-09-22 PROCEDURE — 99499 RISK ADDL DX/OHS AUDIT: ICD-10-PCS | Mod: S$PBB,,, | Performed by: FAMILY MEDICINE

## 2020-09-22 PROCEDURE — 99499 UNLISTED E&M SERVICE: CPT | Mod: S$PBB,,, | Performed by: FAMILY MEDICINE

## 2020-09-22 RX ORDER — CLONIDINE HYDROCHLORIDE 0.1 MG/1
0.1 TABLET ORAL EVERY 6 HOURS PRN
Qty: 60 TABLET | Refills: 0 | Status: SHIPPED | OUTPATIENT
Start: 2020-09-22 | End: 2020-10-27

## 2020-09-22 RX ORDER — PROMETHAZINE HYDROCHLORIDE 25 MG/1
25 TABLET ORAL EVERY 4 HOURS PRN
Qty: 20 TABLET | Refills: 0 | Status: SHIPPED | OUTPATIENT
Start: 2020-09-22 | End: 2020-11-09 | Stop reason: SDUPTHER

## 2020-09-22 NOTE — TELEPHONE ENCOUNTER
Virtual appointment scheduled for today's date (9-). Patient agreed to appointment date and time.

## 2020-09-22 NOTE — PROGRESS NOTES
The patient location is: [place], [place]  The chief complaint leading to consultation is: Kratom withdrawal, Nausea/Vomiting    Visit type: audiovisual    Face to Face time with patient: 25 minutes  45 minutes of total time spent on the encounter, which includes face to face time and non-face to face time preparing to see the patient (eg, review of tests), Obtaining and/or reviewing separately obtained history, Documenting clinical information in the electronic or other health record, Independently interpreting results (not separately reported) and communicating results to the patient/family/caregiver, or Care coordination (not separately reported).         Each patient to whom he or she provides medical services by telemedicine is:  (1) informed of the relationship between the physician and patient and the respective role of any other health care provider with respect to management of the patient; and (2) notified that he or she may decline to receive medical services by telemedicine and may withdraw from such care at any time.    Notes:   Subjective:       Patient ID: Cat Denson is a 40 y.o. female.    Chief Complaint: Withdrawal, Nausea, Emesis, and Generalized Body Aches    Ms. Denson is a 41 y/o F who has a virtual visit today secondary to suspected withdrawal symptoms from Trying to detox from kratom.    She had labwork a few days ago which revealed elevated AST, ALT and alkaline phosphatase. She tells me that her physician informed her that she needed to stop the kratom. She was taking 4 pills of kratom a night for sleep. She stopped three days ago and has been experiencing the following:  Nausea vomiting  Diarrhea  Body aches  Chills  Occasional goosebumps  No RUQ pain    She was able to get a set of vital signs for me and together we completed the clinical opiate withdrawal scale for symptoms in the last half hour.   Resting pulse rate: 80- 0 points  GI Upset: nausea or loose stool- 2  points  Sweating: Subjective report of chills or flushing- 1 point  Tremor: Observing outstretched hands- no tremor- no tremor felt by patient- 0 points  Restlessness: Able to sit still- 0 points  Yawning: no yawning during exam- 0 points  Pupil size: Limited by confines of virtual visit but possibly larger than normal for room lighting- 1 point  Anxiety or Irritability: patient reported increase- 1 point  Bone or Joint aches: mild diffuse comfort but able to sit still - 1 point  Gooseflesh skin: Skin is smooth- 0 points  Runny nose or tearin not present      Review of Systems   Constitutional: Positive for chills and diaphoresis. Negative for fever.   Eyes: Negative for discharge.   Respiratory: Negative for chest tightness and shortness of breath.    Cardiovascular: Negative for chest pain.   Gastrointestinal: Positive for diarrhea, nausea and vomiting. Negative for abdominal pain.   Endocrine: Positive for cold intolerance and heat intolerance.   Genitourinary: Positive for bladder incontinence (chronic).   Musculoskeletal: Positive for arthralgias.   Integumentary:         goosebumps   Neurological: Negative for tremors.   Psychiatric/Behavioral: Positive for agitation.         Objective:      Physical Exam  Constitutional:       Appearance: She is ill-appearing. She is not toxic-appearing or diaphoretic.   Eyes:      Comments: No notable pupil changes though limited by the confines of virtual medicine   Pulmonary:      Effort: Pulmonary effort is normal.   Musculoskeletal: Normal range of motion.   Skin:     General: Skin is dry.   Neurological:      Mental Status: She is alert and oriented to person, place, and time.   Psychiatric:         Mood and Affect: Mood normal.         Behavior: Behavior normal.         Thought Content: Thought content normal.         Assessment:       1. Withdrawal from other psychoactive substance    2. Nausea    3. Vomiting, intractability of vomiting not specified, presence of  nausea not specified, unspecified vomiting type        Plan:       Problem List Items Addressed This Visit     None      Visit Diagnoses     Withdrawal from other psychoactive substance    -  Primary    Kratom is opioid like. Minimal literature to suggest management of withdrawal. Clinical Opiate Withdrawal Scale completed and will be scanned. Score 6. Mild withdrawal symptoms at the time of the visit. Patient given information about the scale and advised to seek emergent/urgent medical care if symptoms into the high moderate or moderately severe category. In the meantime- phenergan for nausea and clonidine for vasomotor symptoms. Differential includes obstructive biliary pathology but patient has no RUQ pain nor jaundice at this time. Repeat labs have been ordered and patient is aware. Needs close follow up with PCP/BRIANA    Relevant Medications    promethazine (PHENERGAN) 25 MG tablet    cloNIDine (CATAPRES) 0.1 MG tablet    Nausea        Relevant Medications    promethazine (PHENERGAN) 25 MG tablet    Vomiting, intractability of vomiting not specified, presence of nausea not specified, unspecified vomiting type        Relevant Medications    promethazine (PHENERGAN) 25 MG tablet           New problem that is high risk. Prescription drug management. Recent labwork reviewed and summarized. New labwork already ordered. Moderate MDM.
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
## 2020-09-22 NOTE — PATIENT INSTRUCTIONS
"You should utilize the "subjective opiate withdrawal scale" to monitor your symptoms and as we discussed do not hesitate to get urgent or emergent care should your symptoms become unmanageable. This can be found on a google search but one location is here:     https://www.asam.org/docs/default-source/education-docs/ihgd_3-.pdf?dreikq=[email or web address]_[email or web address]    Thank you for allowing me to participate in your care today. It is an honor to be a part of your healthcare team at [facility]. If you had labs ordered today, you will receive notification via [employer], phone call or mailed letter regarding your results within 7 days. If you have any questions or concerns regarding your visit today, please do not hesitate to contact us.  Sincerely,   Elisha Mcgraw M.D.    "

## 2020-09-24 ENCOUNTER — PATIENT MESSAGE (OUTPATIENT)
Dept: PODIATRY | Facility: CLINIC | Age: 40
End: 2020-09-24

## 2020-09-28 ENCOUNTER — OFFICE VISIT (OUTPATIENT)
Dept: PODIATRY | Facility: CLINIC | Age: 40
End: 2020-09-28
Payer: MEDICARE

## 2020-09-28 VITALS — BODY MASS INDEX: 17.71 KG/M2 | RESPIRATION RATE: 16 BRPM | HEIGHT: 63 IN | TEMPERATURE: 100 F

## 2020-09-28 DIAGNOSIS — L85.1 ACQUIRED KERATODERMA: Primary | ICD-10-CM

## 2020-09-28 DIAGNOSIS — G60.9 IDIOPATHIC PERIPHERAL NEUROPATHY: ICD-10-CM

## 2020-09-28 DIAGNOSIS — M79.675 PAIN OF TOE OF LEFT FOOT: ICD-10-CM

## 2020-09-28 PROCEDURE — 11055 PARING/CUTG B9 HYPRKER LES 1: CPT | Mod: PBBFAC | Performed by: PODIATRIST

## 2020-09-28 PROCEDURE — 11055 PARING/CUTG B9 HYPRKER LES 1: CPT | Mod: Q9,S$GLB,, | Performed by: PODIATRIST

## 2020-09-28 PROCEDURE — 99214 PR OFFICE/OUTPT VISIT, EST, LEVL IV, 30-39 MIN: ICD-10-PCS | Mod: 25,S$GLB,, | Performed by: PODIATRIST

## 2020-09-28 PROCEDURE — 99214 OFFICE O/P EST MOD 30 MIN: CPT | Mod: 25,S$GLB,, | Performed by: PODIATRIST

## 2020-09-28 PROCEDURE — 11055 PR TRIM HYPERKERATOTIC SKIN LESION, ONE: ICD-10-PCS | Mod: Q9,S$GLB,, | Performed by: PODIATRIST

## 2020-09-28 RX ORDER — TRAZODONE HYDROCHLORIDE 100 MG/1
TABLET ORAL
Qty: 30 TABLET | Status: CANCELLED | OUTPATIENT
Start: 2020-09-28

## 2020-09-28 RX ORDER — TRAZODONE HYDROCHLORIDE 100 MG/1
TABLET ORAL
Qty: 60 TABLET | Refills: 0 | Status: SHIPPED | OUTPATIENT
Start: 2020-09-28 | End: 2020-11-01

## 2020-09-28 NOTE — PROGRESS NOTES
[address]  [place] [address]  Phone: [phone]   [phone]    Patient's PCP:Neela Barrett MD - date last seen 8/30/2020  Referring Provider: No ref. provider found    Subjective:      Chief Complaint:: Toe Pain (left 1st toenail problem & bruisd on medial aspect)    FAMILIA Denson is a 40 y.o. female who presents with a complaint of left 1st toenail pain lasting for about 1 week. Current symptoms include stabbing pains.  Aggravating factors are weightbearing. Treatment to date have included soaking, saw Dr. Harmon who Rx pain medicine to get through the pain until ingrown nail healed. Patients rates pain 5/10 on pain scale.  Patient states she had ingrown removed and since then noticed contusion on medial aspect of left 1st toe.       Vitals:    09/28/20 1028   Resp: 16   Temp: 99.6 °F (37.6 °C)     Shoe Size: 5    Past Surgical History:   Procedure Laterality Date    CHOLECYSTECTOMY      COLONOSCOPY  11/12/2014    Dr. Colin, repeat at 52 years old for screening    FLUOROSCOPIC URODYNAMIC STUDY N/A 7/23/2019    Procedure: URODYNAMIC STUDY, FLUOROSCOPIC;  Surgeon: Vanna Martniez MD;  Location: [facility] OR [address] [place] [address];  Service: Urology;  Laterality: N/A;  1 hour    UPPER GASTROINTESTINAL ENDOSCOPY  03/03/2017    Dr. Harris     Past Medical History:   Diagnosis Date    Acid reflux     Allergy     Anemia     Anticoagulated 12/29/2015    Anxiety     Asthma     Bipolar 1 disorder     Bronchospasm with bronchitis, acute     Chronic daily headache 9/11/2018    Chronic kidney disease     Chronic pain     Depression     bipolar 2    Fibromyalgia     Fibromyalgia     Gastroparesis     History of right MCA stroke 11/25/2015    Hx of psychiatric care     Lactose intolerance     Lupus     Mixed hyperlipidemia 11/28/2018    Psychiatric problem     Renal tubular acidosis     Seizure     Sjogren's syndrome     Smoker     Stroke 11-    Substance  abuse-kratom daily use 8/30/2020    Suicide attempt     overdosed on a bottle of paxil, muscle relaxers, & zoloft    Therapy      Family History   Problem Relation Age of Onset    Hypertension Mother     Hyperlipidemia Mother     Psoriasis Mother     Thyroid disease Mother     No Known Problems Father     Post-traumatic stress disorder Brother     Drug abuse Brother     Diabetes Maternal Uncle     Hypertension Maternal Uncle     Alcohol abuse Maternal Uncle     Stroke Maternal Uncle     Scoliosis Paternal Aunt     Heart disease Paternal Uncle     Mental illness Maternal Grandmother     Cancer Maternal Grandmother         lung / brain - smoker    Drug abuse Maternal Grandmother     Hypertension Maternal Grandmother     Hyperlipidemia Maternal Grandmother     Diabetes Maternal Grandmother     Rheum arthritis Maternal Grandmother     Diabetes Mellitus Maternal Grandmother     Heart disease Maternal Grandfather     Hypertension Maternal Grandfather     Alcohol abuse Maternal Grandfather     Osteoarthritis Maternal Grandfather     No Known Problems Paternal Grandmother     Mental illness Paternal Grandfather     Early death Paternal Grandfather         self    Colon cancer Neg Hx     Colon polyps Neg Hx     Crohn's disease Neg Hx     Ulcerative colitis Neg Hx     Celiac disease Neg Hx     Lupus Neg Hx     Kidney disease Neg Hx     Inflammatory bowel disease Neg Hx     Depression Neg Hx     COPD Neg Hx     Asthma Neg Hx     Macular degeneration Neg Hx     Retinal detachment Neg Hx     Glaucoma Neg Hx         Social History:   Marital Status: Single  Alcohol History:  reports no history of alcohol use.  Tobacco History:  reports that she has been smoking cigarettes. She started smoking about 5 years ago. She has a 15.00 pack-year smoking history. She has never used smokeless tobacco.  Drug History:  reports current drug use. Drug: Marijuana.    Review of patient's allergies  indicates:   Allergen Reactions    Metoclopramide hcl Anaphylaxis     Involuntary mouth movements     Amoxicillin-pot clavulanate      2 other medications    Amoxil [amoxicillin]      hives    Avelox [moxifloxacin]      itching    Benadryl [diphenhydramine hcl]      Lowers seizure threshold     Quinazolinones      Lowers seizure threshold     Ultram [tramadol]      Lowers seizure threshold     Wellbutrin [bupropion hcl]      Lowers seizure threshold        Current Outpatient Medications   Medication Sig Dispense Refill    atorvastatin (LIPITOR) 10 MG tablet Take 1 tablet (10 mg total) by mouth once daily. 90 tablet 3    gabapentin (NEURONTIN) 800 MG tablet TAKE 1 TABLET(800 MG) BY MOUTH TWICE DAILY 180 tablet 3    oxybutynin (DITROPAN XL) 10 MG 24 hr tablet Take 10 mg by mouth once daily.      pantoprazole (PROTONIX) 40 MG tablet Take 1 tablet (40 mg total) by mouth once daily. 90 tablet 3    XARELTO 20 mg Tab TAKE 1 TABLET(20 MG) BY MOUTH DAILY WITH EVENING MEAL OR DINNER 90 tablet 3    ARIPiprazole (ABILIFY) 5 MG Tab Take one-half tablet PO daily 1 tablet 0    divalproex (DEPAKOTE) 250 MG EC tablet TAKE 1 TABLET BY MOUTH EVERY MORNING AND TAKE 2 TABLETS BY MOUTH EVERY NIGHT AT BEDTIME 270 tablet 0    mirtazapine (REMERON) 30 MG tablet TAKE 1 TABLET(30 MG) BY MOUTH EVERY EVENING 30 tablet 1    oxybutynin (DITROPAN-XL) 5 MG TR24 Take 1 tablet (5 mg total) by mouth once daily. 30 tablet 0    promethazine (PHENERGAN) 25 MG tablet Take 1 tablet (25 mg total) by mouth every 4 (four) hours as needed for Nausea. 20 tablet 0    propranoloL (INDERAL LA) 80 MG 24 hr capsule Take 1 capsule (80 mg total) by mouth once daily. 30 capsule 5    QUEtiapine (SEROQUEL) 25 MG Tab Take 1 tablet (25 mg total) by mouth every evening. 30 tablet 0    ranitidine (ZANTAC) 300 MG tablet Take 1 tablet (300 mg total) by mouth once daily. 90 tablet 3    traZODone (DESYREL) 100 MG tablet TAKE 1 TO 2 TABLETS BY MOUTH EVERY  NIGHT AS NEEDED FOR INSOMNIA 60 tablet 0     No current facility-administered medications for this visit.        Review of Systems   Constitutional: Positive for chills. Negative for fatigue, fever and unexpected weight change.   HENT: Negative for hearing loss and trouble swallowing.    Eyes: Negative for photophobia and visual disturbance.   Respiratory: Negative for cough, shortness of breath and wheezing.    Cardiovascular: Negative for chest pain, palpitations and leg swelling.   Gastrointestinal: Negative for abdominal pain and nausea.   Genitourinary: Negative for dysuria and frequency.   Musculoskeletal: Negative for arthralgias, back pain and joint swelling.   Skin: Negative for rash.   Neurological: Positive for headaches. Negative for tremors, seizures, weakness and numbness.   Hematological: Does not bruise/bleed easily.         Objective:        Physical Exam:   Foot Exam    General  Orientation: alert and oriented to person, place, and time   Affect: appropriate   Gait: antalgic       Right Foot/Ankle     Inspection and Palpation  Ecchymosis: none  Tenderness: none   Swelling: none   Arch: normal  Skin Exam: callus;     Neurovascular  Dorsalis pedis: 2+  Posterior tibial: 2+  Saphenous nerve sensation: diminished  Tibial nerve sensation: diminished  Superficial peroneal nerve sensation: diminished  Deep peroneal nerve sensation: diminished  Sural nerve sensation: diminished    Muscle Strength  Ankle dorsiflexion: 4  Ankle plantar flexion: 4  Ankle inversion: 4  Ankle eversion: 4  Great toe extension: 4  Great toe flexion: 4      Left Foot/Ankle      Inspection and Palpation  Ecchymosis: none  Tenderness: (Medial great toe)  Swelling: none   Arch: normal  Skin Exam: callus; no ulcer and no erythema     Neurovascular  Dorsalis pedis: 2+  Posterior tibial: 2+  Saphenous nerve sensation: diminished  Tibial nerve sensation: diminished  Superficial peroneal nerve sensation: diminished  Deep peroneal nerve  sensation: diminished  Sural nerve sensation: diminished    Muscle Strength  Ankle dorsiflexion: 4  Ankle plantar flexion: 4  Ankle inversion: 4  Ankle eversion: 4  Great toe extension: 4  Great toe flexion: 4    Comments  Thick keratosis is present on the medial aspect of the great toe.  There is some dark discoloration due to underlying dried blood.  Following debridement of the keratosis no underlying ulceration is seen.  There is intact epidermis.    Physical Exam   Cardiovascular:   Pulses:       Dorsalis pedis pulses are 2+ on the right side and 2+ on the left side.        Posterior tibial pulses are 2+ on the right side and 2+ on the left side.   Feet:   Right Foot:   Skin Integrity: Positive for callus.   Left Foot:   Skin Integrity: Positive for callus. Negative for ulcer or erythema.       Imaging: none            Assessment:       1. Acquired keratoderma    2. Pain of toe of left foot    3. Idiopathic peripheral neuropathy      Plan:   Acquired keratoderma    Pain of toe of left foot    Idiopathic peripheral neuropathy      Follow up if symptoms worsen or fail to improve.    Procedures - utilizing a 15.  Blade the keratosis on the medial aspect of the left great toe was debrided full-thickness to healthy supple skin.  Patient tolerated the procedure well and stated relief following the procedure.    Recommend skin softening creams and pumice stone daily to the bilateral great toe keratosis.  Also recommend accommodative padding if the patient is going to be doing any excessive walking.    Counseling:     I provided patient education verbally regarding:   Patient diagnosis, treatment options, as well as alternatives, risks, and benefits.     This note was created using Dragon voice recognition software that occasionally misinterpreted phrases or words.

## 2020-10-03 ENCOUNTER — PATIENT MESSAGE (OUTPATIENT)
Dept: PSYCHIATRY | Facility: CLINIC | Age: 40
End: 2020-10-03

## 2020-10-03 ENCOUNTER — PATIENT MESSAGE (OUTPATIENT)
Dept: FAMILY MEDICINE | Facility: CLINIC | Age: 40
End: 2020-10-03

## 2020-10-03 DIAGNOSIS — R51.9 CHRONIC INTRACTABLE HEADACHE, UNSPECIFIED HEADACHE TYPE: ICD-10-CM

## 2020-10-03 DIAGNOSIS — G89.29 CHRONIC INTRACTABLE HEADACHE, UNSPECIFIED HEADACHE TYPE: ICD-10-CM

## 2020-10-05 ENCOUNTER — PATIENT MESSAGE (OUTPATIENT)
Dept: PSYCHIATRY | Facility: CLINIC | Age: 40
End: 2020-10-05

## 2020-10-07 RX ORDER — PROPRANOLOL HYDROCHLORIDE 80 MG/1
80 CAPSULE, EXTENDED RELEASE ORAL DAILY
Qty: 30 CAPSULE | Refills: 5 | Status: SHIPPED | OUTPATIENT
Start: 2020-10-07 | End: 2021-02-25 | Stop reason: SDUPTHER

## 2020-10-27 ENCOUNTER — OFFICE VISIT (OUTPATIENT)
Dept: PSYCHIATRY | Facility: CLINIC | Age: 40
End: 2020-10-27
Payer: MEDICARE

## 2020-10-27 ENCOUNTER — TELEPHONE (OUTPATIENT)
Dept: ORTHOPEDICS | Facility: CLINIC | Age: 40
End: 2020-10-27

## 2020-10-27 ENCOUNTER — LAB VISIT (OUTPATIENT)
Dept: LAB | Facility: HOSPITAL | Age: 40
End: 2020-10-27
Attending: PSYCHIATRY & NEUROLOGY
Payer: MEDICARE

## 2020-10-27 VITALS
SYSTOLIC BLOOD PRESSURE: 127 MMHG | TEMPERATURE: 98 F | HEART RATE: 84 BPM | WEIGHT: 102.31 LBS | DIASTOLIC BLOOD PRESSURE: 92 MMHG | HEIGHT: 63 IN | BODY MASS INDEX: 18.13 KG/M2

## 2020-10-27 DIAGNOSIS — I69.359 CVA, OLD, HEMIPARESIS: ICD-10-CM

## 2020-10-27 DIAGNOSIS — F12.20 CANNABIS USE DISORDER, MODERATE, DEPENDENCE: ICD-10-CM

## 2020-10-27 DIAGNOSIS — F41.9 ANXIETY: ICD-10-CM

## 2020-10-27 DIAGNOSIS — Z79.899 HIGH RISK MEDICATION USE: ICD-10-CM

## 2020-10-27 DIAGNOSIS — G47.00 INSOMNIA, UNSPECIFIED TYPE: ICD-10-CM

## 2020-10-27 DIAGNOSIS — F31.32 BIPOLAR 1 DISORDER, DEPRESSED, MODERATE: ICD-10-CM

## 2020-10-27 LAB
ALBUMIN SERPL BCP-MCNC: 4.4 G/DL (ref 3.5–5.2)
ALP SERPL-CCNC: 69 U/L (ref 55–135)
ALT SERPL W/O P-5'-P-CCNC: 19 U/L (ref 10–44)
AST SERPL-CCNC: 17 U/L (ref 10–40)
BASOPHILS # BLD AUTO: 0.06 K/UL (ref 0–0.2)
BASOPHILS NFR BLD: 0.9 % (ref 0–1.9)
BILIRUB DIRECT SERPL-MCNC: 0.1 MG/DL (ref 0.1–0.3)
BILIRUB SERPL-MCNC: 0.8 MG/DL (ref 0.1–1)
DIFFERENTIAL METHOD: ABNORMAL
EOSINOPHIL # BLD AUTO: 0.2 K/UL (ref 0–0.5)
EOSINOPHIL NFR BLD: 2.3 % (ref 0–8)
ERYTHROCYTE [DISTWIDTH] IN BLOOD BY AUTOMATED COUNT: 13.6 % (ref 11.5–14.5)
HCT VFR BLD AUTO: 39.6 % (ref 37–48.5)
HGB BLD-MCNC: 13 G/DL (ref 12–16)
IMM GRANULOCYTES # BLD AUTO: 0.06 K/UL (ref 0–0.04)
IMM GRANULOCYTES NFR BLD AUTO: 0.9 % (ref 0–0.5)
LYMPHOCYTES # BLD AUTO: 2.4 K/UL (ref 1–4.8)
LYMPHOCYTES NFR BLD: 33.9 % (ref 18–48)
MCH RBC QN AUTO: 31.6 PG (ref 27–31)
MCHC RBC AUTO-ENTMCNC: 32.8 G/DL (ref 32–36)
MCV RBC AUTO: 96 FL (ref 82–98)
MONOCYTES # BLD AUTO: 0.7 K/UL (ref 0.3–1)
MONOCYTES NFR BLD: 10 % (ref 4–15)
NEUTROPHILS # BLD AUTO: 3.6 K/UL (ref 1.8–7.7)
NEUTROPHILS NFR BLD: 52 % (ref 38–73)
NRBC BLD-RTO: 0 /100 WBC
PLATELET # BLD AUTO: 282 K/UL (ref 150–350)
PMV BLD AUTO: 10.9 FL (ref 9.2–12.9)
PROT SERPL-MCNC: 8.1 G/DL (ref 6–8.4)
RBC # BLD AUTO: 4.12 M/UL (ref 4–5.4)
VALPROATE SERPL-MCNC: 33.9 UG/ML (ref 50–100)
WBC # BLD AUTO: 6.99 K/UL (ref 3.9–12.7)

## 2020-10-27 PROCEDURE — 85025 COMPLETE CBC W/AUTO DIFF WBC: CPT

## 2020-10-27 PROCEDURE — 99999 PR PBB SHADOW E&M-EST. PATIENT-LVL V: CPT | Mod: PBBFAC,HCNC,, | Performed by: PSYCHIATRY & NEUROLOGY

## 2020-10-27 PROCEDURE — 99214 PR OFFICE/OUTPT VISIT, EST, LEVL IV, 30-39 MIN: ICD-10-PCS | Mod: HCNC,S$GLB,, | Performed by: PSYCHIATRY & NEUROLOGY

## 2020-10-27 PROCEDURE — 36415 COLL VENOUS BLD VENIPUNCTURE: CPT

## 2020-10-27 PROCEDURE — 80164 ASSAY DIPROPYLACETIC ACD TOT: CPT

## 2020-10-27 PROCEDURE — 99499 RISK ADDL DX/OHS AUDIT: ICD-10-PCS | Mod: S$GLB,,, | Performed by: PSYCHIATRY & NEUROLOGY

## 2020-10-27 PROCEDURE — 3008F BODY MASS INDEX DOCD: CPT | Mod: HCNC,CPTII,S$GLB, | Performed by: PSYCHIATRY & NEUROLOGY

## 2020-10-27 PROCEDURE — 99999 PR PBB SHADOW E&M-EST. PATIENT-LVL V: ICD-10-PCS | Mod: PBBFAC,HCNC,, | Performed by: PSYCHIATRY & NEUROLOGY

## 2020-10-27 PROCEDURE — 99214 OFFICE O/P EST MOD 30 MIN: CPT | Mod: HCNC,S$GLB,, | Performed by: PSYCHIATRY & NEUROLOGY

## 2020-10-27 PROCEDURE — 80076 HEPATIC FUNCTION PANEL: CPT

## 2020-10-27 PROCEDURE — 99499 UNLISTED E&M SERVICE: CPT | Mod: S$GLB,,, | Performed by: PSYCHIATRY & NEUROLOGY

## 2020-10-27 PROCEDURE — 3008F PR BODY MASS INDEX (BMI) DOCUMENTED: ICD-10-PCS | Mod: HCNC,CPTII,S$GLB, | Performed by: PSYCHIATRY & NEUROLOGY

## 2020-10-27 RX ORDER — MIRTAZAPINE 30 MG/1
30 TABLET, FILM COATED ORAL NIGHTLY
Qty: 30 TABLET | Refills: 0 | Status: SHIPPED | OUTPATIENT
Start: 2020-10-27 | End: 2020-11-23

## 2020-10-27 RX ORDER — CLONIDINE HYDROCHLORIDE 0.1 MG/1
TABLET ORAL
Qty: 60 TABLET | Refills: 0 | Status: SHIPPED | OUTPATIENT
Start: 2020-10-27 | End: 2020-12-11
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
## 2020-10-29 ENCOUNTER — TELEPHONE (OUTPATIENT)
Dept: ORTHOPEDICS | Facility: CLINIC | Age: 40
End: 2020-10-29

## 2020-10-29 ENCOUNTER — TELEPHONE (OUTPATIENT)
Dept: PSYCHIATRY | Facility: CLINIC | Age: 40
End: 2020-10-29

## 2020-10-29 NOTE — TELEPHONE ENCOUNTER
----- Message from Sakina Cruz MD sent at 10/28/2020  2:30 PM CDT -----  Please advise the patient:     I have reviewed lab results and released on My Chart.  To summarize:    1. Hepatic (liver) function - within normal limits which is improved and good news   2. Depakote level - running on low side   3. CBC - blood counts are acceptable     Labs look overall ok - you may continue taking Depakote and other medications for now.  Please keep me posted if the Clonidine is helping her to sleep.

## 2020-11-02 ENCOUNTER — TELEPHONE (OUTPATIENT)
Dept: PSYCHIATRY | Facility: CLINIC | Age: 40
End: 2020-11-02

## 2020-11-07 ENCOUNTER — PATIENT MESSAGE (OUTPATIENT)
Dept: FAMILY MEDICINE | Facility: CLINIC | Age: 40
End: 2020-11-07

## 2020-11-07 DIAGNOSIS — R11.0 NAUSEA: ICD-10-CM

## 2020-11-07 DIAGNOSIS — F19.939 WITHDRAWAL FROM OTHER PSYCHOACTIVE SUBSTANCE: ICD-10-CM

## 2020-11-07 DIAGNOSIS — R11.10 VOMITING, INTRACTABILITY OF VOMITING NOT SPECIFIED, PRESENCE OF NAUSEA NOT SPECIFIED, UNSPECIFIED VOMITING TYPE: ICD-10-CM

## 2020-11-09 RX ORDER — PROMETHAZINE HYDROCHLORIDE 25 MG/1
25 TABLET ORAL EVERY 4 HOURS PRN
Qty: 20 TABLET | Refills: 0 | Status: SHIPPED | OUTPATIENT
Start: 2020-11-09 | End: 2021-02-02 | Stop reason: SDUPTHER

## 2020-11-11 ENCOUNTER — PATIENT MESSAGE (OUTPATIENT)
Dept: FAMILY MEDICINE | Facility: CLINIC | Age: 40
End: 2020-11-11

## 2020-11-11 NOTE — TELEPHONE ENCOUNTER
Called patient and scheduled follow up appointment virtually with Dr Barrett today at 4pm. Patient verbalized understanding.

## 2020-11-12 ENCOUNTER — TELEPHONE (OUTPATIENT)
Dept: PSYCHIATRY | Facility: CLINIC | Age: 40
End: 2020-11-12

## 2020-11-12 NOTE — TELEPHONE ENCOUNTER
[facility] called regarding pt. Pt has been admitted to IOP program. Curt notes pt has a Cannabis use disorder and would like to know if this should be incorporated in her treatment there. Advised Curt Cruz is out until Monday but I will return her call at that time with the additional information. Curt verbalized understanding with no further questions.

## 2020-11-13 ENCOUNTER — OFFICE VISIT (OUTPATIENT)
Dept: FAMILY MEDICINE | Facility: CLINIC | Age: 40
End: 2020-11-13
Payer: MEDICARE

## 2020-11-13 VITALS
WEIGHT: 101.19 LBS | DIASTOLIC BLOOD PRESSURE: 68 MMHG | OXYGEN SATURATION: 97 % | SYSTOLIC BLOOD PRESSURE: 118 MMHG | TEMPERATURE: 97 F | BODY MASS INDEX: 17.93 KG/M2 | HEART RATE: 101 BPM | HEIGHT: 63 IN

## 2020-11-13 DIAGNOSIS — E46 PROTEIN-CALORIE MALNUTRITION, UNSPECIFIED SEVERITY: ICD-10-CM

## 2020-11-13 DIAGNOSIS — Z86.73 HISTORY OF RIGHT MCA STROKE: ICD-10-CM

## 2020-11-13 DIAGNOSIS — M54.41 CHRONIC LOW BACK PAIN WITH BILATERAL SCIATICA, UNSPECIFIED BACK PAIN LATERALITY: Primary | ICD-10-CM

## 2020-11-13 DIAGNOSIS — N18.30 STAGE 3 CHRONIC KIDNEY DISEASE, UNSPECIFIED WHETHER STAGE 3A OR 3B CKD: ICD-10-CM

## 2020-11-13 DIAGNOSIS — Z23 FLU VACCINE NEED: ICD-10-CM

## 2020-11-13 DIAGNOSIS — M54.42 CHRONIC LOW BACK PAIN WITH BILATERAL SCIATICA, UNSPECIFIED BACK PAIN LATERALITY: Primary | ICD-10-CM

## 2020-11-13 DIAGNOSIS — E78.2 MIXED HYPERLIPIDEMIA: ICD-10-CM

## 2020-11-13 DIAGNOSIS — J45.909 CHRONIC ASTHMA WITHOUT COMPLICATION, UNSPECIFIED ASTHMA SEVERITY, UNSPECIFIED WHETHER PERSISTENT: ICD-10-CM

## 2020-11-13 DIAGNOSIS — G89.29 CHRONIC LOW BACK PAIN WITH BILATERAL SCIATICA, UNSPECIFIED BACK PAIN LATERALITY: Primary | ICD-10-CM

## 2020-11-13 PROCEDURE — 90686 FLU VACCINE (QUAD) GREATER THAN OR EQUAL TO 3YO PRESERVATIVE FREE IM: ICD-10-PCS | Mod: HCNC,S$GLB,, | Performed by: NURSE PRACTITIONER

## 2020-11-13 PROCEDURE — 96372 PR INJECTION,THERAP/PROPH/DIAG2ST, IM OR SUBCUT: ICD-10-PCS | Mod: 59,HCNC,S$GLB, | Performed by: NURSE PRACTITIONER

## 2020-11-13 PROCEDURE — 3008F PR BODY MASS INDEX (BMI) DOCUMENTED: ICD-10-PCS | Mod: HCNC,CPTII,S$GLB, | Performed by: NURSE PRACTITIONER

## 2020-11-13 PROCEDURE — G0008 FLU VACCINE (QUAD) GREATER THAN OR EQUAL TO 3YO PRESERVATIVE FREE IM: ICD-10-PCS | Mod: HCNC,S$GLB,, | Performed by: NURSE PRACTITIONER

## 2020-11-13 PROCEDURE — 99499 UNLISTED E&M SERVICE: CPT | Mod: S$GLB,,, | Performed by: NURSE PRACTITIONER

## 2020-11-13 PROCEDURE — 99999 PR PBB SHADOW E&M-EST. PATIENT-LVL IV: ICD-10-PCS | Mod: PBBFAC,HCNC,, | Performed by: NURSE PRACTITIONER

## 2020-11-13 PROCEDURE — 96372 THER/PROPH/DIAG INJ SC/IM: CPT | Mod: 59,HCNC,S$GLB, | Performed by: NURSE PRACTITIONER

## 2020-11-13 PROCEDURE — 99214 PR OFFICE/OUTPT VISIT, EST, LEVL IV, 30-39 MIN: ICD-10-PCS | Mod: 25,HCNC,S$GLB, | Performed by: NURSE PRACTITIONER

## 2020-11-13 PROCEDURE — 90686 IIV4 VACC NO PRSV 0.5 ML IM: CPT | Mod: HCNC,S$GLB,, | Performed by: NURSE PRACTITIONER

## 2020-11-13 PROCEDURE — 99999 PR PBB SHADOW E&M-EST. PATIENT-LVL IV: CPT | Mod: PBBFAC,HCNC,, | Performed by: NURSE PRACTITIONER

## 2020-11-13 PROCEDURE — 1125F AMNT PAIN NOTED PAIN PRSNT: CPT | Mod: HCNC,S$GLB,, | Performed by: NURSE PRACTITIONER

## 2020-11-13 PROCEDURE — 99499 RISK ADDL DX/OHS AUDIT: ICD-10-PCS | Mod: S$GLB,,, | Performed by: NURSE PRACTITIONER

## 2020-11-13 PROCEDURE — 99214 OFFICE O/P EST MOD 30 MIN: CPT | Mod: 25,HCNC,S$GLB, | Performed by: NURSE PRACTITIONER

## 2020-11-13 PROCEDURE — G0008 ADMIN INFLUENZA VIRUS VAC: HCPCS | Mod: HCNC,S$GLB,, | Performed by: NURSE PRACTITIONER

## 2020-11-13 PROCEDURE — 3008F BODY MASS INDEX DOCD: CPT | Mod: HCNC,CPTII,S$GLB, | Performed by: NURSE PRACTITIONER

## 2020-11-13 PROCEDURE — 1125F PR PAIN SEVERITY QUANTIFIED, PAIN PRESENT: ICD-10-PCS | Mod: HCNC,S$GLB,, | Performed by: NURSE PRACTITIONER

## 2020-11-13 RX ORDER — METHYLPREDNISOLONE ACETATE 40 MG/ML
40 INJECTION, SUSPENSION INTRA-ARTICULAR; INTRALESIONAL; INTRAMUSCULAR; SOFT TISSUE
Status: COMPLETED | OUTPATIENT
Start: 2020-11-13 | End: 2020-11-13

## 2020-11-13 RX ADMIN — METHYLPREDNISOLONE ACETATE 40 MG: 40 INJECTION, SUSPENSION INTRA-ARTICULAR; INTRALESIONAL; INTRAMUSCULAR; SOFT TISSUE at 11:11

## 2020-11-13 NOTE — PROGRESS NOTES
Subjective:       Patient ID: Cat Denson is a 40 y.o. female.    Chief Complaint: Back Pain, Leg Pain, Immunizations (Flu and Pnumonia ), and Pneumonia    Back Pain  This is a chronic problem. The current episode started more than 1 year ago. The problem occurs constantly. The problem has been waxing and waning since onset. The pain is present in the gluteal. The quality of the pain is described as aching. The pain radiates to the left thigh and right thigh. The pain is at a severity of 6/10. The pain is moderate. The pain is the same all the time. The symptoms are aggravated by sitting. Stiffness is present all day. Associated symptoms include leg pain. Pertinent negatives include no abdominal pain, bladder incontinence, bowel incontinence, chest pain, dysuria, fever, headaches, numbness, paresis, paresthesias, pelvic pain, perianal numbness, tingling, weakness or weight loss. She has tried analgesics, bed rest and muscle relaxant for the symptoms. The treatment provided moderate relief.       Past Medical History:   Diagnosis Date    Acid reflux     Allergy     Anemia     Anticoagulated 12/29/2015    Anxiety     Asthma     Bipolar 1 disorder     Bronchospasm with bronchitis, acute     Chronic daily headache 9/11/2018    Chronic kidney disease     Chronic pain     Depression     bipolar 2    Fibromyalgia     Fibromyalgia     Gastroparesis     History of right MCA stroke 11/25/2015    Hx of psychiatric care     Lactose intolerance     Lupus     Mixed hyperlipidemia 11/28/2018    Psychiatric problem     Renal tubular acidosis     Seizure     Sjogren's syndrome     Smoker     Stroke 11-    Substance abuse-kratom daily use 8/30/2020    Suicide attempt     overdosed on a bottle of paxil, muscle relaxers, & zoloft    Therapy        Review of patient's allergies indicates:   Allergen Reactions    Metoclopramide hcl Anaphylaxis     Involuntary mouth movements      Amoxicillin-pot clavulanate      2 other medications    Amoxil [amoxicillin]      hives    Avelox [moxifloxacin]      itching    Benadryl [diphenhydramine hcl]      Lowers seizure threshold     Quinazolinones      Lowers seizure threshold     Ultram [tramadol]      Lowers seizure threshold     Wellbutrin [bupropion hcl]      Lowers seizure threshold          Current Outpatient Medications:     ARIPiprazole (ABILIFY) 5 MG Tab, TAKE 1 TABLET(5 MG) BY MOUTH EVERY DAY, Disp: 90 tablet, Rfl: 0    atorvastatin (LIPITOR) 10 MG tablet, Take 1 tablet (10 mg total) by mouth once daily., Disp: 90 tablet, Rfl: 3    cloNIDine (CATAPRES) 0.1 MG tablet, Take one tablet PO nightly.  Ok to increase to two tablets PO nightly if needed for sleep after one week, Disp: 60 tablet, Rfl: 0    divalproex (DEPAKOTE) 250 MG EC tablet, TAKE 1 TABLET BY MOUTH EVERY MORNING AND TAKE 2 TABLETS BY MOUTH EVERY NIGHT AT BEDTIME, Disp: 270 tablet, Rfl: 0    gabapentin (NEURONTIN) 800 MG tablet, TAKE 1 TABLET(800 MG) BY MOUTH TWICE DAILY, Disp: 180 tablet, Rfl: 3    mirtazapine (REMERON) 30 MG tablet, Take 1 tablet (30 mg total) by mouth every evening., Disp: 30 tablet, Rfl: 0    oxybutynin (DITROPAN XL) 10 MG 24 hr tablet, Take 10 mg by mouth once daily., Disp: , Rfl:     oxybutynin (DITROPAN-XL) 5 MG TR24, Take 1 tablet (5 mg total) by mouth once daily., Disp: 30 tablet, Rfl: 11    pantoprazole (PROTONIX) 40 MG tablet, Take 1 tablet (40 mg total) by mouth once daily., Disp: 90 tablet, Rfl: 3    promethazine (PHENERGAN) 25 MG tablet, Take 1 tablet (25 mg total) by mouth every 4 (four) hours as needed for Nausea., Disp: 20 tablet, Rfl: 0    propranoloL (INDERAL LA) 80 MG 24 hr capsule, Take 1 capsule (80 mg total) by mouth once daily., Disp: 30 capsule, Rfl: 5    traZODone (DESYREL) 100 MG tablet, TAKE 1 TO 2 TABLETS BY MOUTH EVERY NIGHT AS NEEDED FOR INSOMNIA, Disp: 60 tablet, Rfl: 0    XARELTO 20 mg Tab, TAKE 1 TABLET(20 MG) BY  "MOUTH DAILY WITH EVENING MEAL OR DINNER, Disp: 90 tablet, Rfl: 3    ranitidine (ZANTAC) 300 MG tablet, Take 1 tablet (300 mg total) by mouth once daily., Disp: 90 tablet, Rfl: 3    Review of Systems   Constitutional: Negative for fever and weight loss.   Cardiovascular: Negative for chest pain.   Gastrointestinal: Negative for abdominal pain and bowel incontinence.   Genitourinary: Negative for bladder incontinence, dysuria, hematuria and pelvic pain.   Musculoskeletal: Positive for back pain.   Neurological: Negative for tingling, weakness, numbness, headaches and paresthesias.       Objective:      /68 (BP Location: Right arm, Patient Position: Sitting, BP Method: Small (Manual))   Pulse 101   Temp 97.2 °F (36.2 °C) (Temporal)   Ht 5' 3" (1.6 m)   Wt 45.9 kg (101 lb 3.1 oz)   SpO2 97%   BMI 17.93 kg/m²   Physical Exam  Constitutional:       Appearance: She is well-developed.   Eyes:      Pupils: Pupils are equal, round, and reactive to light.   Neck:      Musculoskeletal: Normal range of motion.   Cardiovascular:      Rate and Rhythm: Normal rate and regular rhythm.      Heart sounds: Normal heart sounds.   Pulmonary:      Effort: Pulmonary effort is normal.      Breath sounds: Normal breath sounds.   Abdominal:      General: Bowel sounds are normal.      Palpations: Abdomen is soft.   Musculoskeletal:      Lumbar back: She exhibits decreased range of motion and tenderness.   Skin:     General: Skin is warm and dry.   Neurological:      Mental Status: She is alert and oriented to person, place, and time.   Psychiatric:         Behavior: Behavior normal.         Thought Content: Thought content normal.         Judgment: Judgment normal.         Assessment:       1. Chronic low back pain with bilateral sciatica, unspecified back pain laterality    2. Mixed hyperlipidemia    3. Chronic asthma without complication, unspecified asthma severity, unspecified whether persistent    4. History of right MCA " stroke    5. Stage 3 chronic kidney disease, unspecified whether stage 3a or 3b CKD    6. Flu vaccine need    7. Protein-calorie malnutrition, unspecified severity        Plan:       Chronic low back pain with bilateral sciatica, unspecified back pain laterality  -     methylPREDNISolone acetate injection 40 mg  -     Ambulatory referral[facility]consult to [facility]; Future; Expected date: 11/20/2020    Mixed hyperlipidemia  Stable, on Lipitor  Chronic asthma without complication, unspecified asthma severity, unspecified whether persistent  Stable, continue mangement  History of right MCA stroke  Stable, on Xarelto  Stage 3 chronic kidney disease, unspecified whether stage 3a or 3b CKD  Stable and chronic.  Will continue to monitor q3-6 months and control chronic conditions as optimally as possible to preserve function.    Flu vaccine need  -     Influenza - Quadrivalent (PF)    Protein-calorie malnutrition, unspecified severity  Ensure 3 times daily        Time spent with patient: 20 minutes    Patient with be reevaluated in 6 months or sooner prn    Greater than 50% of this visit was spent counseling as described in above documentation:Yes

## 2020-11-13 NOTE — PATIENT INSTRUCTIONS
Back Care Tips    Caring for your back  These are things you can do to prevent a recurrence of acute back pain and to reduce symptoms from chronic back pain:  · Maintain a healthy weight. If you are overweight, losing weight will help most types of back pain.  · Exercise is an important part of recovery from most types of back pain. The muscles behind and in front of the spine support the back. This means strengthening both the back muscles and the abdominal muscles will provide better support for your spine.   · Swimming and brisk walking are good overall exercises to improve your fitness level.  · Practice safe lifting methods (below).  · Practice good posture when sitting, standing and walking. Avoid prolonged sitting. This puts more stress on the lower back than standing or walking.  · Wear quality shoes with sufficient arch support. Foot and ankle alignment can affect back symptoms. Women should avoid wearing high heels.  · Therapeutic massage can help relax the back muscles without stretching them.  · During the first 24 to 72 hours after an acute injury or flare-up of chronic back pain, apply an ice pack to the painful area for 20 minutes and then remove it for 20 minutes, over a period of 60 to 90 minutes, or several times a day. As a safety precaution, do not use a heating pad at bedtime. Sleeping on a heating pad can lead to skin burns or tissue damage.  · You can alternate ice and heat therapies.  Medications  Talk to your healthcare provider before using medicines, especially if you have other medical problems or are taking other medicines.  · You may use acetaminophen or ibuprofen to control pain, unless your healthcare provider prescribed other pain medicine. If you have chronic conditions like diabetes, liver or kidney disease, stomach ulcers, or gastrointestinal bleeding, or are taking blood thinners, talk with your healthcare provider before taking any medicines.  · Be careful if you are given  prescription pain medicines, narcotics, or medicine for muscle spasm. They can cause drowsiness, affect your coordination, reflexes, and judgment. Do not drive or operate heavy machinery while taking these types of medicines. Take prescription pain medicine only as prescribed by your healthcare provider.  Lumbar stretch  Here is a simple stretching exercise that will help relax muscle spasm and keep your back more limber. If exercise makes your back pain worse, dont do it.  · Lie on your back with your knees bent and both feet on the ground.  · Slowly raise your left knee to your chest as you flatten your lower back against the floor. Hold for 5 seconds.  · Relax and repeat the exercise with your right knee.  · Do 10 of these exercises for each leg.  Safe lifting method  · Dont bend over at the waist to lift an object off the floor.  Instead, bend your knees and hips in a squat.   · Keep your back and head upright  · Hold the object close to your body, directly in front of you.  · Straighten your legs to lift the object.   · Lower the object to the floor in the reverse fashion.  · If you must slide something across the floor, push it.  Posture tips  Sitting  Sit in chairs with straight backs or low-back support. Keep your knees lower than your hips, with your feet flat on the floor.  When driving, sit up straight. Adjust the seat forward so you are not leaning toward the steering wheel.  A small pillow or rolled towel behind your lower back may help if you are driving long distances.   Standing  When standing for long periods, shift most of your weight to one leg at a time. Alternate legs every few minutes.   Sleeping  The best way to sleep is on your side with your knees bent. Put a low pillow under your head to support your neck in a neutral spine position. Avoid thick pillows that bend your neck to one side. Put a pillow between your legs to further relax your lower back. If you sleep on your back, put pillows  under your knees to support your legs in a slightly flexed position. Use a firm mattress. If your mattress sags, replace it, or use a 1/2-inch plywood board under the mattress to add support.  Follow-up care  Follow up with your healthcare provider, or as advised.  If X-rays, a CT scan or an MRI scan were taken, they will be reviewed by a radiologist. You will be notified of any new findings that may affect your care.  Call 911  Seek emergency medical care if any of the following occur:  · Trouble breathing  · Confusion  · Very drowsy  · Fainting or loss of consciousness  · Rapid or very slow heart rate  · Loss of  bowel or bladder control  When to seek medical care  Call your healthcare provider if any of the following occur:  · Pain becomes worse or spreads to your arms or legs  · Weakness or numbness in one or both arms or legs  · Numbness in the groin area  Date Last Reviewed: 6/1/2016  © 3312-1454 [employer]. [address] [place] [address]. All rights reserved. This information is not intended as a substitute for professional medical care. Always follow your healthcare professional's instructions.        Exercises to Strengthen Your Lower Back  Strong lower back and abdominal muscles work together to support your spine. The exercises below will help strengthen the lower back. It is important that you begin exercising slowly and increase levels gradually.  Always begin any exercise program with stretching. If you feel pain while doing any of these exercises, stop and talk to your doctor about a more specific exercise program that better suits your condition.   Low back stretch  The point of stretching is to make you more flexible and increase your range of motion. Stretch only as much as you are able. Stretch slowly. Do not push your stretch to the limit. If at any point you feel pain while stretching, this is your (temporary) limit.  · Lie on your back with your knees bent and both  feet on the ground.  · Slowly raise your left knee to your chest as you flatten your lower back against the floor. Hold for 5 seconds.  · Relax and repeat the exercise with your right knee.  · Do 10 of these exercises for each leg.  · Repeat hugging both knees to your chest at the same time.  Building lower back strength  Start your exercise routine with 10 to 30 minutes a day, 1 to 3 times a day.  Initial exercises  Lying on your back:  1. Ankle pumps: Move your foot up and down, towards your head, and then away. Repeat 10 times with each foot.  2. Heel slides: Slowly bend your knee, drawing the heel of your foot towards you. Then slide your heel/foot from you, straightening your knee. Do not lift your foot off the floor (this is not a leg lift).  3. Abdominal contraction: Bend your knees and put your hands on your stomach. Tighten your stomach muscles. Hold for 5 seconds, then relax. Repeat 10 times.  4. Straight leg raise: Bend one leg at the knee and keep the other leg straight. Tighten your stomach muscles. Slowly lift your straight leg 6 to 12 inches off the floor and hold for up to 5 seconds. Repeat 10 times on each side.  Standin. Wall squats: Stand with your back against the wall. Move your feet about 12 inches away from the wall. Tighten your stomach muscles, and slowly bend your knees until they are at about a 45 degree angle. Do not go down too far. Hold about 5 seconds. Then slowly return to your starting position. Repeat 10 times.  2. Heel raises: Stand facing the wall. Slowly raise the heels of your feet up and down, while keeping your toes on the floor. If you have trouble balancing, you can touch the wall with your hands. Repeat 10 times.  More advanced exercises  When you feel comfortable enough, try these exercises.  1. Kneeling lumbar extension: Begin on your hands and knees. At the same time, raise and straighten your right arm and left leg until they are parallel to the ground. Hold for 2  seconds and come back slowly to a starting position. Repeat with left arm and right leg, alternating 10 times.  2. Prone lumbar extension: Lie face down, arms extended overhead, palms on the floor. At the same time, raise your right arm and left leg as high as comfortably possible. Hold for 10 seconds and slowly return to start. Repeat with left arm and right leg, alternating 10 times. Gradually build up to 20 times. (Advanced: Repeat this exercise raising both arms and both legs a few inches off the floor at the same time. Hold for 5 seconds and release.)  3. Pelvic tilt: Lie on the floor on your back with your knees bent at 90 degrees. Your feet should be flat on the floor. Inhale, exhale, then slowly contract your abdominal muscles bringing your navel toward your spine. Let your pelvis rock back until your lower back is flat on the floor. Hold for 10 seconds while breathing smoothly.  4. Abdominal crunch: Perform a pelvic tilt (above) flattening your lower back against the floor. Holding the tension in your abdominal muscles, take another breath and raise your shoulder blades off the ground (this is not a full sit-up). Keep your head in line with your body (dont bend your neck forward). Hold for 2 seconds, then slowly lower.  Date Last Reviewed: 6/1/2016  © 0050-7745 [employer]. [address] [place] [address], [place] [address]. All rights reserved. This information is not intended as a substitute for professional medical care. Always follow your healthcare professional's instructions.

## 2020-11-13 NOTE — PROGRESS NOTES
2 patient identifiers used (name and ). Administered Flu vaccine IM. Patient tolerated well, no bleeding at insertion site noted. Pain 0 on scale 0/10. Aseptic technique maintained. Immunization information given to patient. Advised patient to remain in clinic for 15 minutes to monitor for reaction. No AR noted. 2 patient identifiers used (name & ). Administered 40 mg Depo Medrol IM. Patient tolerated well. No bleeding at insertion site noted. Pain 0 on scale 0/10. Aseptic technique maintained.

## 2020-11-14 ENCOUNTER — PATIENT MESSAGE (OUTPATIENT)
Dept: FAMILY MEDICINE | Facility: CLINIC | Age: 40
End: 2020-11-14

## 2020-11-16 NOTE — TELEPHONE ENCOUNTER
Please advise Curt that I would like for treatment of her cannabis use disorder to be incorporated into IOP treatment. Pt has been ambivalent about quitting.  Thank you for checking

## 2020-11-16 NOTE — TELEPHONE ENCOUNTER
Called Curt regarding below message. Advised Curt of below information per Dr. Cruz. Curt verbalized understanding with no further questions.

## 2020-11-16 NOTE — TELEPHONE ENCOUNTER
Please inform patient that I informed her to take OTC tylenol. The medication I was going to seen is contraindicated due to her CKD.

## 2020-11-18 ENCOUNTER — PATIENT OUTREACH (OUTPATIENT)
Dept: ADMINISTRATIVE | Facility: OTHER | Age: 40
End: 2020-11-18

## 2020-11-18 NOTE — PROGRESS NOTES
Chart was reviewed for overdue Proactive [facility] Encounters (ANA M)  topics  Updates were requested from care everywhere  Health Maintenance has been updated  [facility] immunization registry triggered

## 2020-11-19 ENCOUNTER — OFFICE VISIT (OUTPATIENT)
Dept: SPINE | Facility: CLINIC | Age: 40
End: 2020-11-19
Payer: MEDICARE

## 2020-11-19 VITALS — BODY MASS INDEX: 17.93 KG/M2 | HEIGHT: 63 IN | WEIGHT: 101.19 LBS

## 2020-11-19 DIAGNOSIS — M54.42 CHRONIC LOW BACK PAIN WITH BILATERAL SCIATICA, UNSPECIFIED BACK PAIN LATERALITY: ICD-10-CM

## 2020-11-19 DIAGNOSIS — M54.41 CHRONIC LOW BACK PAIN WITH BILATERAL SCIATICA, UNSPECIFIED BACK PAIN LATERALITY: ICD-10-CM

## 2020-11-19 DIAGNOSIS — M54.9 DORSALGIA, UNSPECIFIED: ICD-10-CM

## 2020-11-19 DIAGNOSIS — G89.29 CHRONIC LOW BACK PAIN WITH BILATERAL SCIATICA, UNSPECIFIED BACK PAIN LATERALITY: ICD-10-CM

## 2020-11-19 PROBLEM — E46 PROTEIN-CALORIE MALNUTRITION: Status: ACTIVE | Noted: 2020-11-19

## 2020-11-19 PROCEDURE — 1125F PR PAIN SEVERITY QUANTIFIED, PAIN PRESENT: ICD-10-PCS | Mod: HCNC,S$GLB,, | Performed by: PHYSICAL MEDICINE & REHABILITATION

## 2020-11-19 PROCEDURE — 99204 PR OFFICE/OUTPT VISIT, NEW, LEVL IV, 45-59 MIN: ICD-10-PCS | Mod: HCNC,S$GLB,, | Performed by: PHYSICAL MEDICINE & REHABILITATION

## 2020-11-19 PROCEDURE — 3008F BODY MASS INDEX DOCD: CPT | Mod: HCNC,CPTII,S$GLB, | Performed by: PHYSICAL MEDICINE & REHABILITATION

## 2020-11-19 PROCEDURE — 1125F AMNT PAIN NOTED PAIN PRSNT: CPT | Mod: HCNC,S$GLB,, | Performed by: PHYSICAL MEDICINE & REHABILITATION

## 2020-11-19 PROCEDURE — 3008F PR BODY MASS INDEX (BMI) DOCUMENTED: ICD-10-PCS | Mod: HCNC,CPTII,S$GLB, | Performed by: PHYSICAL MEDICINE & REHABILITATION

## 2020-11-19 PROCEDURE — 99204 OFFICE O/P NEW MOD 45 MIN: CPT | Mod: HCNC,S$GLB,, | Performed by: PHYSICAL MEDICINE & REHABILITATION

## 2020-11-19 NOTE — LETTER
November 19, 2020      Yaniv Dukes, ORTEGA  [address]  [place] [address]             [facility]  [address] [place] [address] [address]  [place] [address]  Phone: [phone]  Fax: [phone]          Patient: Cat Dneson   MR Number: [id number]   YOB: 1980   Date of Visit: 11/19/2020       Dear Yaniv Dukes:    Thank you for referring Cat Denson to me for evaluation. Attached you will find relevant portions of my assessment and plan of care.    If you have questions, please do not hesitate to call me. I look forward to following Cat Denson along with you.    Sincerely,    Monster Amin MD    Enclosure  CC:  No Recipients    If you would like to receive this communication electronically, please contact [email or web address] or [phone] to request more information on [employer] access.    For providers and/or their staff who would like to refer a patient to [facility], please contact us through our one-stop-shop provider referral line, [facility], at [phone].    If you feel you have received this communication in error or would no longer like to receive these types of communications, please e-mail [email or web address]

## 2020-11-19 NOTE — PROGRESS NOTES
SUBJECTIVE:    Patient ID: Cat Denson is a 40 y.o. female.    Chief Complaint: Back Pain    This is a 40-year-old woman who sees Dr. Barrett for primary care.  Referred by Yaniv Dukes nurse practitioner for evaluation of chronic low back pain.  History of bipolar disorder hyperlipidemia chronic kidney disease stage 3 and right MCA stroke in 2015 which left her with residual left hemiparesis.  She says the stroke was results of hormone therapy and smoking.  Long history of low back pain going back to childhood.  Presents with complaints of low back pain at the lumbosacral junction with numbness and tingling involving both legs circumferentially and the entirety of both feet.  Aside from residual deficits from the stroke no additional weakness.  No bowel or bladder dysfunction fever chills sweats or unexpected weight loss.  Pain level is 5 out 10.  She says she has had no specific treatment for her back.  I reviewed CTs of the lumbar spine from 2015 and recent CT of the abdomen from 2019 which show mild degenerative changes.        Past Medical History:   Diagnosis Date    Acid reflux     Allergy     Anemia     Anticoagulated 12/29/2015    Anxiety     Asthma     Bipolar 1 disorder     Bronchospasm with bronchitis, acute     Chronic daily headache 9/11/2018    Chronic kidney disease     Chronic pain     Depression     bipolar 2    Fibromyalgia     Fibromyalgia     Gastroparesis     History of right MCA stroke 11/25/2015    Hx of psychiatric care     Lactose intolerance     Lupus     Mixed hyperlipidemia 11/28/2018    Psychiatric problem     Renal tubular acidosis     Seizure     Sjogren's syndrome     Smoker     Stroke 11-    Substance abuse-kratom daily use 8/30/2020    Suicide attempt     overdosed on a bottle of paxil, muscle relaxers, & zoloft    Therapy      Social History     Socioeconomic History    Marital status: Single     Spouse name: Not on file     Number of children: 0    Years of education: Not on file    Highest education level: Not on file   Occupational History    Not on file   Social Needs    Financial resource strain: Somewhat hard    Food insecurity     Worry: Sometimes true     Inability: Never true    Transportation needs     Medical: Yes     Non-medical: Yes   Tobacco Use    Smoking status: Current Every Day Smoker     Packs/day: 1.00     Years: 15.00     Pack years: 15.00     Types: Cigarettes     Start date: 11/25/2015    Smokeless tobacco: Never Used   Substance and Sexual Activity    Alcohol use: No     Alcohol/week: 0.0 standard drinks     Frequency: Never     Drinks per session: Patient refused     Binge frequency: Never    Drug use: Yes     Types: Marijuana     Comment: smokes occasionally, helps with pain and appetite    Sexual activity: Never     Partners: Female     Comment: usually female, but currently with her boyfriend   Lifestyle    Physical activity     Days per week: 0 days     Minutes per session: 0 min    Stress: Only a little   Relationships    Social connections     Talks on phone: Once a week     Gets together: Once a week     Attends [employer] service: Not on file     Active member of club or organization: No     Attends meetings of clubs or organizations: Never     Relationship status: Never    Other Topics Concern    Patient feels they ought to cut down on drinking/drug use Not Asked    Patient annoyed by others criticizing their drinking/drug use Not Asked    Patient has felt bad or guilty about drinking/drug use Not Asked    Patient has had a drink/used drugs as an eye opener in the AM Not Asked   Social History Narrative    Doesn't drive since the stroke, mother drives her and she lives with her mother.     Past Surgical History:   Procedure Laterality Date    CHOLECYSTECTOMY      COLONOSCOPY  11/12/2014    Dr. Colin, repeat at 52 years old for screening    FLUOROSCOPIC URODYNAMIC STUDY N/A  "7/23/2019    Procedure: URODYNAMIC STUDY, FLUOROSCOPIC;  Surgeon: Vanna Martinez MD;  Location: [facility] OR [address] [place] [address];  Service: Urology;  Laterality: N/A;  1 hour    UPPER GASTROINTESTINAL ENDOSCOPY  03/03/2017    Dr. Harris     Family History   Problem Relation Age of Onset    Hypertension Mother     Hyperlipidemia Mother     Psoriasis Mother     Thyroid disease Mother     No Known Problems Father     Post-traumatic stress disorder Brother     Drug abuse Brother     Diabetes Maternal Uncle     Hypertension Maternal Uncle     Alcohol abuse Maternal Uncle     Stroke Maternal Uncle     Scoliosis Paternal Aunt     Heart disease Paternal Uncle     Mental illness Maternal Grandmother     Cancer Maternal Grandmother         lung / brain - smoker    Drug abuse Maternal Grandmother     Hypertension Maternal Grandmother     Hyperlipidemia Maternal Grandmother     Diabetes Maternal Grandmother     Rheum arthritis Maternal Grandmother     Diabetes Mellitus Maternal Grandmother     Heart disease Maternal Grandfather     Hypertension Maternal Grandfather     Alcohol abuse Maternal Grandfather     Osteoarthritis Maternal Grandfather     No Known Problems Paternal Grandmother     Mental illness Paternal Grandfather     Early death Paternal Grandfather         self    Colon cancer Neg Hx     Colon polyps Neg Hx     Crohn's disease Neg Hx     Ulcerative colitis Neg Hx     Celiac disease Neg Hx     Lupus Neg Hx     Kidney disease Neg Hx     Inflammatory bowel disease Neg Hx     Depression Neg Hx     COPD Neg Hx     Asthma Neg Hx     Macular degeneration Neg Hx     Retinal detachment Neg Hx     Glaucoma Neg Hx      Vitals:    11/19/20 0848   Weight: 45.9 kg (101 lb 3.1 oz)   Height: 5' 3" (1.6 m)       Review of Systems   Constitutional: Negative for chills, diaphoresis, fatigue, fever and unexpected weight change.   HENT: Negative for trouble swallowing.    Eyes: Negative for visual " disturbance.   Respiratory: Negative for shortness of breath.    Cardiovascular: Negative for chest pain.   Gastrointestinal: Negative for abdominal pain, constipation, nausea and vomiting.   Genitourinary: Negative for difficulty urinating.   Musculoskeletal: Negative for arthralgias, back pain, gait problem, joint swelling, myalgias, neck pain and neck stiffness.   Neurological: Negative for dizziness, speech difficulty, weakness, light-headedness, numbness and headaches.          Objective:      Physical Exam  Neurological:      Mental Status: She is alert and oriented to person, place, and time.      Comments: She is awake and in no acute distress  No point tenderness or palpable masses about the lumbar spine  Forward flexion and extension are normal and painless  Left arm is held in of flexed and internally rotated position  Deep tendon reflexes are +3 at the left knee +2 at the right and +2 at both ankles  Strength is normal in both lower extremities  Straight leg raise on the right causes right-sided low back pain.  Straight leg raise on left negative             Assessment:       1. Chronic low back pain with bilateral sciatica, unspecified back pain laterality    2. Dorsalgia, unspecified           Plan:     she has chronic low back pain on basis of degenerative disc disease.  Possible bilateral radicular leg pain with no evidence of nerve root dysfunction.  I recommend updated x-rays and outpatient physical therapy.  Consider MRI and subsequent epidural steroid injections.  Follow-up here 6 weeks or as needed      Chronic low back pain with bilateral sciatica, unspecified back pain laterality  -     Ambulatory referral/consult to [facility]  -     Ambulatory referral/consult to Physical/Occupational Therapy; Future; Expected date: 11/26/2020    Dorsalgia, unspecified  -     X-Ray Lumbar Complete With Flex And Ext; Future; Expected date: 11/19/2020

## 2020-11-21 ENCOUNTER — HOSPITAL ENCOUNTER (OUTPATIENT)
Dept: RADIOLOGY | Facility: HOSPITAL | Age: 40
Discharge: HOME | End: 2020-11-21
Attending: PHYSICAL MEDICINE & REHABILITATION
Payer: MEDICARE

## 2020-11-21 DIAGNOSIS — M54.9 DORSALGIA, UNSPECIFIED: ICD-10-CM

## 2020-11-21 PROCEDURE — 72110 XR LUMBAR SPINE 5 VIEW WITH FLEX AND EXT: ICD-10-PCS | Mod: 26,HCNC,, | Performed by: RADIOLOGY

## 2020-11-21 PROCEDURE — 72110 X-RAY EXAM L-2 SPINE 4/>VWS: CPT | Mod: 26,HCNC,, | Performed by: RADIOLOGY

## 2020-11-21 PROCEDURE — 72110 X-RAY EXAM L-2 SPINE 4/>VWS: CPT | Mod: TC,HCNC,FY

## 2020-11-23 ENCOUNTER — PATIENT MESSAGE (OUTPATIENT)
Dept: PSYCHIATRY | Facility: CLINIC | Age: 40
End: 2020-11-23

## 2020-11-23 ENCOUNTER — TELEPHONE (OUTPATIENT)
Dept: PSYCHIATRY | Facility: CLINIC | Age: 40
End: 2020-11-23

## 2020-11-23 ENCOUNTER — TELEPHONE (OUTPATIENT)
Dept: SPINE | Facility: CLINIC | Age: 40
End: 2020-11-23

## 2020-11-23 NOTE — TELEPHONE ENCOUNTER
----- Message from Viktoriya Workman sent at 11/23/2020  1:23 PM CST -----  Contact: Alisha from [facility]  Patient: Cat Denson  Calling: Alisha from [facility] -[phone]    Alisha calling to let Dr. Cruz know that pt was non compliant with attendance. States pt showed up one day last week and didn't come the rest of the week, and told Alisha she would attend today but never showed up. Please call Alisha with any questions. Thank you

## 2020-12-02 DIAGNOSIS — N39.41 URGE INCONTINENCE: ICD-10-CM

## 2020-12-02 RX ORDER — OXYBUTYNIN CHLORIDE 5 MG/1
5 TABLET, EXTENDED RELEASE ORAL DAILY
Qty: 30 TABLET | Refills: 0 | Status: SHIPPED | OUTPATIENT
Start: 2020-12-02 | End: 2022-01-31

## 2020-12-03 ENCOUNTER — OFFICE VISIT (OUTPATIENT)
Dept: OPTOMETRY | Facility: CLINIC | Age: 40
End: 2020-12-03
Payer: MEDICARE

## 2020-12-03 DIAGNOSIS — H53.40 VISUAL FIELD DEFECT: Primary | ICD-10-CM

## 2020-12-03 DIAGNOSIS — H52.7 REFRACTIVE ERROR: ICD-10-CM

## 2020-12-03 PROCEDURE — 92014 COMPRE OPH EXAM EST PT 1/>: CPT | Mod: HCNC,S$GLB,, | Performed by: OPTOMETRIST

## 2020-12-03 PROCEDURE — 92015 DETERMINE REFRACTIVE STATE: CPT | Mod: HCNC,S$GLB,, | Performed by: OPTOMETRIST

## 2020-12-03 PROCEDURE — 99999 PR PBB SHADOW E&M-EST. PATIENT-LVL III: CPT | Mod: PBBFAC,HCNC,, | Performed by: OPTOMETRIST

## 2020-12-03 PROCEDURE — 92014 PR EYE EXAM, EST PATIENT,COMPREHESV: ICD-10-PCS | Mod: HCNC,S$GLB,, | Performed by: OPTOMETRIST

## 2020-12-03 PROCEDURE — 99999 PR PBB SHADOW E&M-EST. PATIENT-LVL III: ICD-10-PCS | Mod: PBBFAC,HCNC,, | Performed by: OPTOMETRIST

## 2020-12-03 PROCEDURE — 92015 PR REFRACTION: ICD-10-PCS | Mod: HCNC,S$GLB,, | Performed by: OPTOMETRIST

## 2020-12-03 PROCEDURE — 1126F AMNT PAIN NOTED NONE PRSNT: CPT | Mod: HCNC,S$GLB,, | Performed by: OPTOMETRIST

## 2020-12-03 PROCEDURE — 1126F PR PAIN SEVERITY QUANTIFIED, NO PAIN PRESENT: ICD-10-PCS | Mod: HCNC,S$GLB,, | Performed by: OPTOMETRIST

## 2020-12-03 NOTE — PROGRESS NOTES
HPI     39 YO female presents today for an annual eye exam. She states that she   is doing well, no problems or complaints. No longer taking Plaquenil.     OTC tears OU PRN     Last edited by Lorna Oconnor, PCT on 12/3/2020  8:52 AM. (History)            Assessment /Plan     For exam results, see Encounter Report.    Visual field defect    Refractive error      Stable visual field defect secondary to CVA x 2015. Monitor yearly.    No longer on plaquenil x 1 year.     Dispensed updated spectacle Rx. Discussed various spectacle lens options. Discussed adaptation period to new specs.  Demonstrated new spec Rx vs current specs in phoropter with patient satisfaction.      RTC in 1 year for comprehensive eye exam, or sooner prn.

## 2020-12-11 ENCOUNTER — HOSPITAL ENCOUNTER (OUTPATIENT)
Dept: PREADMISSION TESTING | Facility: HOSPITAL | Age: 40
Discharge: HOME | End: 2020-12-11
Attending: PSYCHIATRY & NEUROLOGY
Payer: MEDICARE

## 2020-12-11 ENCOUNTER — OFFICE VISIT (OUTPATIENT)
Dept: PSYCHIATRY | Facility: CLINIC | Age: 40
End: 2020-12-11
Payer: MEDICARE

## 2020-12-11 ENCOUNTER — TELEPHONE (OUTPATIENT)
Dept: PSYCHIATRY | Facility: CLINIC | Age: 40
End: 2020-12-11

## 2020-12-11 VITALS
DIASTOLIC BLOOD PRESSURE: 91 MMHG | BODY MASS INDEX: 17.8 KG/M2 | TEMPERATURE: 98 F | HEIGHT: 63 IN | WEIGHT: 100.44 LBS | SYSTOLIC BLOOD PRESSURE: 124 MMHG | HEART RATE: 110 BPM

## 2020-12-11 DIAGNOSIS — F41.9 ANXIETY: ICD-10-CM

## 2020-12-11 DIAGNOSIS — G47.00 INSOMNIA, UNSPECIFIED TYPE: ICD-10-CM

## 2020-12-11 DIAGNOSIS — Z79.899 HIGH RISK MEDICATION USE: ICD-10-CM

## 2020-12-11 DIAGNOSIS — R00.0 TACHYCARDIA: ICD-10-CM

## 2020-12-11 DIAGNOSIS — F09 COGNITIVE DISORDER: ICD-10-CM

## 2020-12-11 DIAGNOSIS — F31.31 BIPOLAR 1 DISORDER, DEPRESSED, MILD: ICD-10-CM

## 2020-12-11 PROCEDURE — 99499 RISK ADDL DX/OHS AUDIT: ICD-10-PCS | Mod: S$GLB,,, | Performed by: PSYCHIATRY & NEUROLOGY

## 2020-12-11 PROCEDURE — 93010 EKG 12-LEAD: ICD-10-PCS | Mod: ,,, | Performed by: INTERNAL MEDICINE

## 2020-12-11 PROCEDURE — 3008F PR BODY MASS INDEX (BMI) DOCUMENTED: ICD-10-PCS | Mod: HCNC,CPTII,S$GLB, | Performed by: PSYCHIATRY & NEUROLOGY

## 2020-12-11 PROCEDURE — 1125F AMNT PAIN NOTED PAIN PRSNT: CPT | Mod: HCNC,S$GLB,, | Performed by: PSYCHIATRY & NEUROLOGY

## 2020-12-11 PROCEDURE — 99499 UNLISTED E&M SERVICE: CPT | Mod: S$GLB,,, | Performed by: PSYCHIATRY & NEUROLOGY

## 2020-12-11 PROCEDURE — 1125F PR PAIN SEVERITY QUANTIFIED, PAIN PRESENT: ICD-10-PCS | Mod: HCNC,S$GLB,, | Performed by: PSYCHIATRY & NEUROLOGY

## 2020-12-11 PROCEDURE — 3008F BODY MASS INDEX DOCD: CPT | Mod: HCNC,CPTII,S$GLB, | Performed by: PSYCHIATRY & NEUROLOGY

## 2020-12-11 PROCEDURE — 99999 PR PBB SHADOW E&M-EST. PATIENT-LVL V: CPT | Mod: PBBFAC,HCNC,, | Performed by: PSYCHIATRY & NEUROLOGY

## 2020-12-11 PROCEDURE — 93005 ELECTROCARDIOGRAM TRACING: CPT | Performed by: INTERNAL MEDICINE

## 2020-12-11 PROCEDURE — 99214 PR OFFICE/OUTPT VISIT, EST, LEVL IV, 30-39 MIN: ICD-10-PCS | Mod: HCNC,S$GLB,, | Performed by: PSYCHIATRY & NEUROLOGY

## 2020-12-11 PROCEDURE — 93010 ELECTROCARDIOGRAM REPORT: CPT | Mod: ,,, | Performed by: INTERNAL MEDICINE

## 2020-12-11 PROCEDURE — 99999 PR PBB SHADOW E&M-EST. PATIENT-LVL V: ICD-10-PCS | Mod: PBBFAC,HCNC,, | Performed by: PSYCHIATRY & NEUROLOGY

## 2020-12-11 PROCEDURE — 99214 OFFICE O/P EST MOD 30 MIN: CPT | Mod: HCNC,S$GLB,, | Performed by: PSYCHIATRY & NEUROLOGY

## 2020-12-11 RX ORDER — QUETIAPINE FUMARATE 25 MG/1
25 TABLET, FILM COATED ORAL NIGHTLY
Qty: 30 TABLET | Refills: 0 | Status: SHIPPED | OUTPATIENT
Start: 2020-12-11 | End: 2021-01-08

## 2020-12-11 RX ORDER — ARIPIPRAZOLE 5 MG/1
TABLET ORAL
Qty: 1 TABLET | Refills: 0
Start: 2020-12-11 | End: 2021-01-29

## 2020-12-11 NOTE — PROGRESS NOTES
Outpatient Psychiatry Follow-Up Visit (MD/NP)    12/11/2020    Clinical Status of Patient:  Outpatient (Ambulatory)    Chief Complaint:  Cat Denson is a 40 y.o. female who presents today for follow-up of mood disorder and anxiety.  Met with patient alone.     Interval History and Content of Current Session:  Interim Events/Subjective Report/Content of Current Session:  39 yo disabled female presents to clinic for follow up treatment of  follow up appt of bipolar I disorder, panic disorder, cannabis use disorder, and cognitive disorder.   She has a prior hx of chronic pain, nausea, and emesis.  Has been having health issues since she was a child, dx with Lupus at 12 yo. She missed a lot of school.  Has impulsive tendencies with hx of cutting, shaving her head (shaved today).  She has prior hx of suicide attempt at 20 yo. Multiple prior admissions for depression, SI.   Pt had a CVA in November 2015 - Embolic stroke involving right middle cerebral artery; she is hypercoagulable due to lupus.  She is currently taking Coumadin.  Pt has residual left hemiparesis.   She has had balance problems, forgetfulness, and severe focusing issue.      Neuropsychological testing 2/17:   Personality test data suggested the presence of mild depression. Neuropsychological test results reveal impairment in immediate and delayed visual memory, visual attention, temporal orientation, visuospatial/constructional abilities, abstract reasoning, psychomotor speed, and mental flexibility; and variability in immediate auditory/verbal memory (high average and mildly impaired performances) and verbal fluency (low average and moderately impaired performances); with mild depression. The pattern of impairment is consistent with right MCA CVA. She will continue to see Dr. Cruz and Mrs. Wiley for psychiatric care. She was encouraged to resume PT, OT, and Speech Therapy for further stroke rehabilitation.     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 2017 - for w/d affect, psychosis, poor memory recall, not sleeping x days  5 prior psych admissions  Hx self harm at age 17 yo     Past psychiatric hx: Wellbutrin for smoking cessation, helped to reduce, Zoloft, Trazodone (taken off due to tiredness one month ago), Lexapro, Paxil, Cymbalta (no pain benefit), Geodon, Trileptal, Lamictal, Seroquel, Chantix (makes her vomit), Olanzapine, Risperdal, Effexor, ritalin, depakote (was taken off ? Cannot recall why)    Past medical history:  Lupus   Sjogren's   CKD  Fibromyalgia, chronic pain   Seizure Disorder   Bronchospasm   Hx CVA  Gastroparesis   Fibromyalgia     Interim Hx:   The patient presents for follow-up visit.    BP and HR are elevated today.     Has been having a lot of headaches.  Used to have migraines. Was told she has rebound headaches from taking caffeine - they have been happening daily. Sleep helps.     Worst headache was stroke - this is not same  + runny nose   Frontal HA - sudafed, resolved, then returned this am. 5/10.  No other neuro symptoms, mild nausea with emesis this am.  Used to have migraines as a child. Mild photosensitivity, constant pain.   Does not feel like she has to go to ER.  Yesterday, she had a sneezing fit, nasal congestion, then HA started. Does sound congested today. + clear drainage. Does not usually have allergies.   HR is high, did not take caffeine this am.  Usually does in am, 200 mg pill in am.  Not today though, no other caffeine, no energy drinks.    No awareness of palpitations, no chest pain.     Live with Brent, mother in car.    She has not had  "COVID -> denies loss of taste/smell.  Has a chronic smoker's cough.  Has not taken Clonidine in 2 weeks -> she would take 4 tabs for sleep, ran out 2 weeks ago, did ok. Plan to avoid.  Sleep is not good -> some nights, sleep onset is delayed until 4 am, wide awake. Wakes up 6-7 am.  Awake all day.    She goes to [facility] - 3 days a week.  This has been helpful.  They provide her transportation     No [place], does smoke marijuana -> couple times a day, does not want to quit.  Only thing helps her stomach and maintain appetite.   Black Mild cigars - 1 to 2 packs a day. Was told she could not take Chantix by PCP (lowers seizure threshold, last time was in EMU 9/11/2019).  Maybe will meet with smoking cessation.  Tried patches -dd not help her.   Denies alcohol.     Mood has been good.  Wants to sleep.  Sometimes, she cannot. Brain won't shut down. Denies bowen, psychosis.   Denies suicidal/homicidal ideations. No self harm or violence.   Pretty level - not really irritable.  Denies depressed mood.  Plays video games, likes living situation.   Scout tells her she is ok.   Appetite is ok.   Feels on edge a little.  Worried about Scout going to work with COVID + employees.  Sometimes, she feels panicky, no clear agoraphobia.   Can focus focus for video games.  Memory is "ok."   ST memory is fair - sometimes, she can recall conversations.   Scout helps with meds and finances, does not drive.  Motivation and energy not great.     Has started a woman one a day, super B complex     GAD7 12/11/2020 5/11/2020 3/16/2020   1. Feeling nervous, anxious, or on edge? 3 2 2   2. Not being able to stop or control worrying? 3 0 2   3. Worrying too much about different things? 3 1 0   4. Trouble relaxing? 3 2 2   5. Being so restless that it is hard to sit still? 0 1 1   6. Becoming easily annoyed or irritable? 1 1 2   7. Feeling afraid as if something awful might happen? 0 1 0   8. If you checked off any problems, how difficult have " "these problems made it for you to do your work, take care of things at home, or get along with other people? 1 1 1   HAROLDO-7 Score 13 8 9     PHQ9 12/11/2020   Little interest or pleasure in doing things: More than half the days   Feeling down, depressed or hopeless: Not at all   Trouble falling asleep, staying asleep, or sleeping too much: Nearly every day   Feeling tired or having little energy: More than half the days   Poor appetite or overeating: Not at all   Feeling bad about yourself- or that you are a failure or have let yourself or family down Not at all   Trouble concentrating on things, such as reading the newspaper or watching television: Nearly every day   Moving or speaking so slowly that other people could have noticed. Or the opposite- being so fidgety or restless that you have been moving around a lot more than usual: Not at all   Thoughts that you would be better off dead or hurting yourself in some way: Not at all   If you indicated you have experienced any of the aforementioned problems, how difficult have these problems made it for you to do your work, take care of things at home or get along with other people? Somewhat difficult   Total Score 10     No access to firearms. Last suicide attempt years - overdose on paxil, zoloft, muscle relaxants -emesis for a few days, did not tell anyone. Prior to CVA in 2015.   No recent self harm - stopped with CVA when started on blood thinners.   No HI. No violence.     "I know that if I cut myself I will bleed on Xarelto."    No self harm or violence.   Denies binge eating, purging, or restricting.    MOCA 12/17/19:  25/30 (+1 point for less than 12 yrs of education). Lost points for cube copy, visuospatial/trailblazing, digits/attention, serial 7s, fluency).       Review of Systems   · PSYCHIATRIC: Pertinant items are noted in the narrative.  · CONSTITUTIONAL: weight loss 2 lbs  · MUSCULOSKELETAL: chronic diffuse pain   · CV: no chest pain, + " "tachycardia  · NEURO: left HP, no seizures in interim, denies recent falls, + balance issues. + headache     Past Medical, Family and Social History: The patient's past medical, family and social history have been reviewed and updated as appropriate within the electronic medical record - see encounter notes.    Medications:   Abilify 15 mg daily   Depakote  mg in am and 500 mg nightly   Mirtazapine 30 mg nightly - helps GI issue   Trazodone 100 mg to 200 mg nightly prn insomnia     Compliance:  Ran out of clonidine, misuse      Side effects:  Denies today     Risk Parameters:  Patient reports no suicidal ideation   Patient reports no homicidal ideation  Patient reports no self-injurious behavior  Patient reports no violent behavior    Exam (detailed: at least 9 elements; comprehensive: all 15 elements)   Constitutional  Vitals:  Most recent vital signs, dated less than 90 days prior to this appointment, were reviewed.   Vitals:    12/11/20 0946   BP: (!) 139/90   Pulse: (!) 138   Temp: 97.8 °F (36.6 °C)   Weight: 45.5 kg (100 lb 6.7 oz)   Height: 5' 3" (1.6 m)        General:  age appropriate, dark clothes, fair grooming, improved eye contact      Musculoskeletal  Muscle Strength/Tone:  left sided hemiparesis, no tremor today   Gait & Station:  steady     Psychiatric  Speech:  no latency; no press, decreased spontaneity    Mood & Affect:  "ok"  Blunted    Thought Process:  Linear with questions, slowed    Associations:  intact   Thought Content:  no current suicidality, no homicidality, delusions, or paranoia   Insight:  Fair    Judgement: limited    Orientation:  Grossly intact for content of interview, alert and oriented x 3   Memory: Able to recall recent events     Language: grossly intact, can repeat   Attention Span & Concentration:  Intact    Fund of Knowledge:  intact and appropriate to age and level of education, familiar with aspects of current personal life     Assessment and Diagnosis "   Status/Progress: Based on the examination today, the patient's problem(s) is/are under inadequate control.  New problems have not been presented today.  Co-morbidities are complicating management of the primary condition.      General Impression: pt had CVA Nov 2015 with significantly impaired focus, memory lapses, worsening depression, anxiety.  Pt had episode of acute hepatitis, liver enzymes improved, but now with declining renal function; pt also resumed smoking; she is also using marijuana.  Pt had admission to [facility]. Neuropsychological testing done and consistent with her MCA CVA.      Friend Scout provides good support and willing to help.  Hx of apathetic, lacks motivation, using cannabis, Kratom, med noncompliance.  She has discontinued Kratom. C/o significant sleep disturbance/ + misuse of clonidine.      Bipolar 1 Disorder, MRE depressed, mild   Panic Disorder with Agoraphobia   Cognitive Disorder due to CVA  Insomnia Disorder  Cannabis Use Disorder, Moderate    Lupus, migraines, CVA, hx hepatitis, declining renal function, gastroparesis, dyskinesias, seizure disorder     GAF: 55   Intervention/Counseling/Treatment Plan   · Medication Management: The risks and benefits of medication were discussed with the patient.    - Continue Abilify 5 mg daily.   Typical RAJWINDER's reviewed including weight gain, abnormal movements, EPS, TD, metabolic side effects.  Consider change to Seroquel for better coverage of insomnia.  Will first check ECG    - she has run out of clonidine; this med will not be refilled due to misuse     - EKG today; advised pt to go to ER with worsening headache, cardiac symptoms; she declines now.  Advised to follow up with PCP.     - Continue trazodone 100 - 200 mg nightly p.r.n. insomnia    - continue Mirtazapine 30 mg nightly    - Continue [place] [facility]     - pt encouraged to refrain from cannabis and Kratom use - psychoeducation today about risks of use, pt counseled on  health risks associated with use,     - Pt instructed to go to ER with thoughts of harming self, others; Call to report any worsening of symptoms or problems with the medication    - Continue Depakote  mg: Take one tablet PO in the morning and two tablets nightly.   Lab Results   Component Value Date    VALPROATE 33.9 (L) 10/27/2020       - Continue to monitor cognition     - Discussed nonpharmacologic interventions for anxiety including regular exercise, healthy diet, practice of mindfulness, social relatedness, discussed importance of healthy diet    Return to Clinic: 02/19/2021

## 2020-12-12 NOTE — TELEPHONE ENCOUNTER
I spoke to the patient, reviewed results of her ECG, including new finding of bi-atrial enlargement.  Pt's QTc interval is 442 ms. Hr 94. NSR with sinus arrhythmia.   Spoke to pt's mother first when I called first number on pt's chart.   Pt reports HA has resolved this evening.   Sleep is chronic issue and is impacting her functioning.   She has failed Trazodone, Mirtazapine, unable to control her use of Clonidine (took # 4 tabs nightly until she ran out 2 weeks ago), reports Gabapentin was not helpful (also out of this med), not comfortable prescribing controlled substances, concern for TCA use with tachycardia today in clinic and risks of med interactions. Will plan to transition from Abilify to Seroquel.   Abilify lowered to 2.5 mg daily and will start Seroquel 25 mg nightly.  Will ask staff to follow up with pt next week -> Seroquel will likely need titration  Will forward results of ECG to pt's PCP.

## 2020-12-13 RX ORDER — DIVALPROEX SODIUM 250 MG/1
TABLET, DELAYED RELEASE ORAL
Qty: 270 TABLET | Refills: 0 | Status: SHIPPED | OUTPATIENT
Start: 2020-12-13 | End: 2021-02-19 | Stop reason: SDUPTHER

## 2020-12-13 RX ORDER — DIVALPROEX SODIUM 500 MG/1
TABLET, FILM COATED, EXTENDED RELEASE ORAL
Qty: 60 TABLET | Refills: 3 | OUTPATIENT
Start: 2020-12-13

## 2020-12-15 ENCOUNTER — PATIENT MESSAGE (OUTPATIENT)
Dept: FAMILY MEDICINE | Facility: CLINIC | Age: 40
End: 2020-12-15

## 2020-12-15 DIAGNOSIS — M79.2 NEUROPATHIC PAIN: ICD-10-CM

## 2020-12-15 RX ORDER — GABAPENTIN 800 MG/1
TABLET ORAL
Qty: 180 TABLET | Refills: 3 | Status: CANCELLED | OUTPATIENT
Start: 2020-12-15

## 2020-12-15 NOTE — TELEPHONE ENCOUNTER
Prescription refill request.   LOV: 11/13/2020  NOV: 05/14/2021  LDR: 03/03/2020  Last Labs: 10/27/2020

## 2020-12-16 ENCOUNTER — PATIENT MESSAGE (OUTPATIENT)
Dept: FAMILY MEDICINE | Facility: CLINIC | Age: 40
End: 2020-12-16

## 2020-12-16 ENCOUNTER — TELEPHONE (OUTPATIENT)
Dept: PSYCHIATRY | Facility: CLINIC | Age: 40
End: 2020-12-16

## 2020-12-16 ENCOUNTER — TELEPHONE (OUTPATIENT)
Dept: FAMILY MEDICINE | Facility: CLINIC | Age: 40
End: 2020-12-16

## 2020-12-16 RX ORDER — GABAPENTIN 800 MG/1
TABLET ORAL
Qty: 180 TABLET | Refills: 3 | Status: SHIPPED | OUTPATIENT
Start: 2020-12-16 | End: 2021-09-04 | Stop reason: SDUPTHER

## 2020-12-16 NOTE — TELEPHONE ENCOUNTER
Called pt regarding below message. Left voicemail with return number.     ----- Message from Cat Carreno LPN sent at 12/14/2020  8:38 AM CST -----  Status update

## 2020-12-16 NOTE — TELEPHONE ENCOUNTER
Letter sent to patient regarding appointment on 5/14/2021 with Dr. Barrett. Patient was informed that Dr. Barrett will be out on the 14 of May and that her appointment needs to be rescheduled. Message sent to patient portal as well.

## 2021-02-02 ENCOUNTER — PATIENT MESSAGE (OUTPATIENT)
Dept: FAMILY MEDICINE | Facility: CLINIC | Age: 41
End: 2021-02-02

## 2021-02-02 RX ORDER — QUETIAPINE FUMARATE 25 MG/1
25 TABLET, FILM COATED ORAL NIGHTLY
Qty: 30 TABLET | Refills: 2 | Status: SHIPPED | OUTPATIENT
Start: 2021-02-02 | End: 2021-02-19 | Stop reason: DRUGHIGH

## 2021-02-19 ENCOUNTER — OFFICE VISIT (OUTPATIENT)
Dept: PSYCHIATRY | Facility: CLINIC | Age: 41
End: 2021-02-19
Payer: MEDICARE

## 2021-02-19 VITALS
SYSTOLIC BLOOD PRESSURE: 100 MMHG | WEIGHT: 112.31 LBS | TEMPERATURE: 99 F | DIASTOLIC BLOOD PRESSURE: 74 MMHG | HEIGHT: 63 IN | HEART RATE: 87 BPM | BODY MASS INDEX: 19.9 KG/M2

## 2021-02-19 DIAGNOSIS — Z79.899 HIGH RISK MEDICATION USE: ICD-10-CM

## 2021-02-19 DIAGNOSIS — F31.31 BIPOLAR 1 DISORDER, DEPRESSED, MILD: ICD-10-CM

## 2021-02-19 DIAGNOSIS — F12.20 CANNABIS USE DISORDER, MODERATE, DEPENDENCE: ICD-10-CM

## 2021-02-19 DIAGNOSIS — G47.00 INSOMNIA, UNSPECIFIED TYPE: ICD-10-CM

## 2021-02-19 DIAGNOSIS — M32.9 LUPUS: ICD-10-CM

## 2021-02-19 DIAGNOSIS — F41.9 ANXIETY: ICD-10-CM

## 2021-02-19 DIAGNOSIS — I69.359 CVA, OLD, HEMIPARESIS: ICD-10-CM

## 2021-02-19 PROCEDURE — 99999 PR PBB SHADOW E&M-EST. PATIENT-LVL V: CPT | Mod: PBBFAC,,, | Performed by: PSYCHIATRY & NEUROLOGY

## 2021-02-19 PROCEDURE — 90833 PR PSYCHOTHERAPY W/PATIENT W/E&M, 30 MIN (ADD ON): ICD-10-PCS | Mod: S$GLB,,, | Performed by: PSYCHIATRY & NEUROLOGY

## 2021-02-19 PROCEDURE — 3008F BODY MASS INDEX DOCD: CPT | Mod: CPTII,S$GLB,, | Performed by: PSYCHIATRY & NEUROLOGY

## 2021-02-19 PROCEDURE — 1126F PR PAIN SEVERITY QUANTIFIED, NO PAIN PRESENT: ICD-10-PCS | Mod: S$GLB,,, | Performed by: PSYCHIATRY & NEUROLOGY

## 2021-02-19 PROCEDURE — 1126F AMNT PAIN NOTED NONE PRSNT: CPT | Mod: S$GLB,,, | Performed by: PSYCHIATRY & NEUROLOGY

## 2021-02-19 PROCEDURE — 99999 PR PBB SHADOW E&M-EST. PATIENT-LVL V: ICD-10-PCS | Mod: PBBFAC,,, | Performed by: PSYCHIATRY & NEUROLOGY

## 2021-02-19 PROCEDURE — 99214 PR OFFICE/OUTPT VISIT, EST, LEVL IV, 30-39 MIN: ICD-10-PCS | Mod: S$GLB,,, | Performed by: PSYCHIATRY & NEUROLOGY

## 2021-02-19 PROCEDURE — 99214 OFFICE O/P EST MOD 30 MIN: CPT | Mod: S$GLB,,, | Performed by: PSYCHIATRY & NEUROLOGY

## 2021-02-19 PROCEDURE — 3008F PR BODY MASS INDEX (BMI) DOCUMENTED: ICD-10-PCS | Mod: CPTII,S$GLB,, | Performed by: PSYCHIATRY & NEUROLOGY

## 2021-02-19 PROCEDURE — 99499 UNLISTED E&M SERVICE: CPT | Mod: S$GLB,,, | Performed by: PSYCHIATRY & NEUROLOGY

## 2021-02-19 PROCEDURE — 99499 RISK ADDL DX/OHS AUDIT: ICD-10-PCS | Mod: S$GLB,,, | Performed by: PSYCHIATRY & NEUROLOGY

## 2021-02-19 PROCEDURE — 90833 PSYTX W PT W E/M 30 MIN: CPT | Mod: S$GLB,,, | Performed by: PSYCHIATRY & NEUROLOGY

## 2021-02-19 RX ORDER — TRAZODONE HYDROCHLORIDE 100 MG/1
TABLET ORAL
Qty: 60 TABLET | Refills: 2 | Status: SHIPPED | OUTPATIENT
Start: 2021-02-19 | End: 2021-04-27 | Stop reason: SDUPTHER

## 2021-02-19 RX ORDER — DIVALPROEX SODIUM 250 MG/1
TABLET, DELAYED RELEASE ORAL
Qty: 90 TABLET | Refills: 2 | Status: SHIPPED | OUTPATIENT
Start: 2021-02-19 | End: 2021-04-27 | Stop reason: SDUPTHER

## 2021-02-19 RX ORDER — MIRTAZAPINE 30 MG/1
TABLET, FILM COATED ORAL
Qty: 30 TABLET | Refills: 2 | Status: SHIPPED | OUTPATIENT
Start: 2021-02-19 | End: 2021-04-27 | Stop reason: SDUPTHER

## 2021-02-19 RX ORDER — QUETIAPINE FUMARATE 50 MG/1
50 TABLET, FILM COATED ORAL NIGHTLY
Qty: 30 TABLET | Refills: 2 | Status: SHIPPED | OUTPATIENT
Start: 2021-02-19 | End: 2021-04-27 | Stop reason: DRUGHIGH

## 2021-02-23 ENCOUNTER — TELEPHONE (OUTPATIENT)
Dept: ORTHOPEDICS | Facility: CLINIC | Age: 41
End: 2021-02-23

## 2021-02-25 DIAGNOSIS — F19.939 WITHDRAWAL FROM OTHER PSYCHOACTIVE SUBSTANCE: ICD-10-CM

## 2021-02-25 DIAGNOSIS — R51.9 CHRONIC INTRACTABLE HEADACHE, UNSPECIFIED HEADACHE TYPE: ICD-10-CM

## 2021-02-25 DIAGNOSIS — R11.0 NAUSEA: ICD-10-CM

## 2021-02-25 DIAGNOSIS — R11.10 VOMITING, INTRACTABILITY OF VOMITING NOT SPECIFIED, PRESENCE OF NAUSEA NOT SPECIFIED, UNSPECIFIED VOMITING TYPE: ICD-10-CM

## 2021-02-25 DIAGNOSIS — G89.29 CHRONIC INTRACTABLE HEADACHE, UNSPECIFIED HEADACHE TYPE: ICD-10-CM

## 2021-02-25 RX ORDER — PROMETHAZINE HYDROCHLORIDE 25 MG/1
TABLET ORAL
Qty: 20 TABLET | Refills: 0 | Status: SHIPPED | OUTPATIENT
Start: 2021-02-25 | End: 2021-03-05

## 2021-02-25 RX ORDER — PROPRANOLOL HYDROCHLORIDE 80 MG/1
80 CAPSULE, EXTENDED RELEASE ORAL DAILY
Qty: 30 CAPSULE | Refills: 5 | Status: SHIPPED | OUTPATIENT
Start: 2021-02-25 | End: 2021-08-08 | Stop reason: SDUPTHER

## 2021-02-25 RX ORDER — PROPRANOLOL HYDROCHLORIDE 80 MG/1
80 CAPSULE, EXTENDED RELEASE ORAL DAILY
Qty: 30 CAPSULE | Refills: 11 | OUTPATIENT
Start: 2021-02-25 | End: 2022-02-25

## 2021-02-26 ENCOUNTER — LAB VISIT (OUTPATIENT)
Dept: LAB | Facility: HOSPITAL | Age: 41
End: 2021-02-26
Attending: PSYCHIATRY & NEUROLOGY
Payer: MEDICARE

## 2021-02-26 DIAGNOSIS — Z79.899 HIGH RISK MEDICATION USE: ICD-10-CM

## 2021-02-26 LAB
ALBUMIN SERPL BCP-MCNC: 4.4 G/DL (ref 3.5–5.2)
ALP SERPL-CCNC: 67 U/L (ref 55–135)
ALT SERPL W/O P-5'-P-CCNC: 15 U/L (ref 10–44)
ANION GAP SERPL CALC-SCNC: 10 MMOL/L (ref 8–16)
AST SERPL-CCNC: 21 U/L (ref 10–40)
BASOPHILS # BLD AUTO: 0.04 K/UL (ref 0–0.2)
BASOPHILS NFR BLD: 0.7 % (ref 0–1.9)
BILIRUB SERPL-MCNC: 0.3 MG/DL (ref 0.1–1)
BUN SERPL-MCNC: 10 MG/DL (ref 6–20)
CALCIUM SERPL-MCNC: 8.9 MG/DL (ref 8.7–10.5)
CHLORIDE SERPL-SCNC: 109 MMOL/L (ref 95–110)
CO2 SERPL-SCNC: 19 MMOL/L (ref 23–29)
CREAT SERPL-MCNC: 1.5 MG/DL (ref 0.5–1.4)
DIFFERENTIAL METHOD: ABNORMAL
EOSINOPHIL # BLD AUTO: 0 K/UL (ref 0–0.5)
EOSINOPHIL NFR BLD: 0.3 % (ref 0–8)
ERYTHROCYTE [DISTWIDTH] IN BLOOD BY AUTOMATED COUNT: 12 % (ref 11.5–14.5)
EST. GFR  (AFRICAN AMERICAN): 50 ML/MIN/1.73 M^2
EST. GFR  (NON AFRICAN AMERICAN): 44 ML/MIN/1.73 M^2
GLUCOSE SERPL-MCNC: 90 MG/DL (ref 70–110)
HCT VFR BLD AUTO: 39.2 % (ref 37–48.5)
HGB BLD-MCNC: 13.1 G/DL (ref 12–16)
IMM GRANULOCYTES # BLD AUTO: 0.06 K/UL (ref 0–0.04)
IMM GRANULOCYTES NFR BLD AUTO: 1 % (ref 0–0.5)
LYMPHOCYTES # BLD AUTO: 1.4 K/UL (ref 1–4.8)
LYMPHOCYTES NFR BLD: 24.2 % (ref 18–48)
MCH RBC QN AUTO: 30.9 PG (ref 27–31)
MCHC RBC AUTO-ENTMCNC: 33.4 G/DL (ref 32–36)
MCV RBC AUTO: 93 FL (ref 82–98)
MONOCYTES # BLD AUTO: 0.4 K/UL (ref 0.3–1)
MONOCYTES NFR BLD: 7.1 % (ref 4–15)
NEUTROPHILS # BLD AUTO: 3.9 K/UL (ref 1.8–7.7)
NEUTROPHILS NFR BLD: 66.7 % (ref 38–73)
NRBC BLD-RTO: 0 /100 WBC
PLATELET # BLD AUTO: 250 K/UL (ref 150–350)
PMV BLD AUTO: 10.4 FL (ref 9.2–12.9)
POTASSIUM SERPL-SCNC: 4.6 MMOL/L (ref 3.5–5.1)
PROT SERPL-MCNC: 8.3 G/DL (ref 6–8.4)
RBC # BLD AUTO: 4.24 M/UL (ref 4–5.4)
SODIUM SERPL-SCNC: 138 MMOL/L (ref 136–145)
TSH SERPL DL<=0.005 MIU/L-ACNC: 0.75 UIU/ML (ref 0.4–4)
VALPROATE SERPL-MCNC: 29 UG/ML (ref 50–100)
WBC # BLD AUTO: 5.91 K/UL (ref 3.9–12.7)

## 2021-02-26 PROCEDURE — 80164 ASSAY DIPROPYLACETIC ACD TOT: CPT

## 2021-02-26 PROCEDURE — 36415 COLL VENOUS BLD VENIPUNCTURE: CPT

## 2021-02-26 PROCEDURE — 80053 COMPREHEN METABOLIC PANEL: CPT

## 2021-02-26 PROCEDURE — 85025 COMPLETE CBC W/AUTO DIFF WBC: CPT

## 2021-02-26 PROCEDURE — 84443 ASSAY THYROID STIM HORMONE: CPT

## 2021-03-08 ENCOUNTER — PATIENT MESSAGE (OUTPATIENT)
Dept: FAMILY MEDICINE | Facility: CLINIC | Age: 41
End: 2021-03-08

## 2021-03-09 ENCOUNTER — OFFICE VISIT (OUTPATIENT)
Dept: FAMILY MEDICINE | Facility: CLINIC | Age: 41
End: 2021-03-09
Payer: MEDICARE

## 2021-03-09 VITALS
OXYGEN SATURATION: 97 % | WEIGHT: 102.31 LBS | BODY MASS INDEX: 18.13 KG/M2 | HEART RATE: 72 BPM | HEIGHT: 63 IN | SYSTOLIC BLOOD PRESSURE: 122 MMHG | DIASTOLIC BLOOD PRESSURE: 86 MMHG | TEMPERATURE: 98 F

## 2021-03-09 DIAGNOSIS — R11.2 NAUSEA AND VOMITING, INTRACTABILITY OF VOMITING NOT SPECIFIED, UNSPECIFIED VOMITING TYPE: ICD-10-CM

## 2021-03-09 DIAGNOSIS — R19.7 DIARRHEA, UNSPECIFIED TYPE: Primary | ICD-10-CM

## 2021-03-09 PROCEDURE — 1126F PR PAIN SEVERITY QUANTIFIED, NO PAIN PRESENT: ICD-10-PCS | Mod: S$GLB,,, | Performed by: NURSE PRACTITIONER

## 2021-03-09 PROCEDURE — 3008F BODY MASS INDEX DOCD: CPT | Mod: CPTII,S$GLB,, | Performed by: NURSE PRACTITIONER

## 2021-03-09 PROCEDURE — 1126F AMNT PAIN NOTED NONE PRSNT: CPT | Mod: S$GLB,,, | Performed by: NURSE PRACTITIONER

## 2021-03-09 PROCEDURE — 99214 OFFICE O/P EST MOD 30 MIN: CPT | Mod: S$GLB,,, | Performed by: NURSE PRACTITIONER

## 2021-03-09 PROCEDURE — 3008F PR BODY MASS INDEX (BMI) DOCUMENTED: ICD-10-PCS | Mod: CPTII,S$GLB,, | Performed by: NURSE PRACTITIONER

## 2021-03-09 PROCEDURE — 99999 PR PBB SHADOW E&M-EST. PATIENT-LVL V: CPT | Mod: PBBFAC,,, | Performed by: NURSE PRACTITIONER

## 2021-03-09 PROCEDURE — 99214 PR OFFICE/OUTPT VISIT, EST, LEVL IV, 30-39 MIN: ICD-10-PCS | Mod: S$GLB,,, | Performed by: NURSE PRACTITIONER

## 2021-03-09 PROCEDURE — 99999 PR PBB SHADOW E&M-EST. PATIENT-LVL V: ICD-10-PCS | Mod: PBBFAC,,, | Performed by: NURSE PRACTITIONER

## 2021-03-09 RX ORDER — LOPERAMIDE HYDROCHLORIDE 2 MG/1
2 CAPSULE ORAL 4 TIMES DAILY PRN
Qty: 40 CAPSULE | Refills: 0 | Status: SHIPPED | OUTPATIENT
Start: 2021-03-09 | End: 2021-03-19

## 2021-03-11 ENCOUNTER — PATIENT MESSAGE (OUTPATIENT)
Dept: RHEUMATOLOGY | Facility: CLINIC | Age: 41
End: 2021-03-11

## 2021-03-12 ENCOUNTER — LAB VISIT (OUTPATIENT)
Dept: LAB | Facility: HOSPITAL | Age: 41
End: 2021-03-12
Attending: NURSE PRACTITIONER
Payer: MEDICARE

## 2021-03-12 DIAGNOSIS — R19.7 DIARRHEA, UNSPECIFIED TYPE: ICD-10-CM

## 2021-03-12 DIAGNOSIS — R11.2 NAUSEA AND VOMITING, INTRACTABILITY OF VOMITING NOT SPECIFIED, UNSPECIFIED VOMITING TYPE: ICD-10-CM

## 2021-03-12 LAB
C DIFF GDH STL QL: NEGATIVE
C DIFF TOX A+B STL QL IA: NEGATIVE

## 2021-03-12 PROCEDURE — 89125 SPECIMEN FAT STAIN: CPT | Performed by: NURSE PRACTITIONER

## 2021-03-12 PROCEDURE — 87449 NOS EACH ORGANISM AG IA: CPT | Performed by: NURSE PRACTITIONER

## 2021-03-12 PROCEDURE — 87324 CLOSTRIDIUM AG IA: CPT | Performed by: NURSE PRACTITIONER

## 2021-03-12 PROCEDURE — 87427 SHIGA-LIKE TOXIN AG IA: CPT | Performed by: NURSE PRACTITIONER

## 2021-03-12 PROCEDURE — 82272 OCCULT BLD FECES 1-3 TESTS: CPT | Performed by: NURSE PRACTITIONER

## 2021-03-12 PROCEDURE — 82656 EL-1 FECAL QUAL/SEMIQ: CPT | Performed by: NURSE PRACTITIONER

## 2021-03-12 PROCEDURE — 87425 ROTAVIRUS AG IA: CPT | Performed by: NURSE PRACTITIONER

## 2021-03-12 PROCEDURE — 87338 HPYLORI STOOL AG IA: CPT | Performed by: NURSE PRACTITIONER

## 2021-03-12 PROCEDURE — 89055 LEUKOCYTE ASSESSMENT FECAL: CPT | Performed by: NURSE PRACTITIONER

## 2021-03-12 PROCEDURE — 83993 ASSAY FOR CALPROTECTIN FECAL: CPT | Performed by: NURSE PRACTITIONER

## 2021-03-12 PROCEDURE — 87329 GIARDIA AG IA: CPT | Performed by: NURSE PRACTITIONER

## 2021-03-12 PROCEDURE — 87209 SMEAR COMPLEX STAIN: CPT | Performed by: NURSE PRACTITIONER

## 2021-03-12 PROCEDURE — 87045 FECES CULTURE AEROBIC BACT: CPT | Performed by: NURSE PRACTITIONER

## 2021-03-12 PROCEDURE — 87046 STOOL CULTR AEROBIC BACT EA: CPT | Mod: 59 | Performed by: NURSE PRACTITIONER

## 2021-03-13 LAB
OB PNL STL: NEGATIVE
RV AG STL QL IA.RAPID: NEGATIVE
WBC #/AREA STL HPF: NORMAL /[HPF]

## 2021-03-15 LAB
CRYPTOSP AG STL QL IA: NEGATIVE
E COLI SXT1 STL QL IA: NEGATIVE
E COLI SXT2 STL QL IA: NEGATIVE
ELASTASE 1, FECAL: 462 MCG/G
G LAMBLIA AG STL QL IA: NEGATIVE
O+P STL MICRO: NORMAL

## 2021-03-16 LAB
BACTERIA STL CULT: NORMAL
FAT STL SUDAN IV STN: NORMAL

## 2021-03-17 LAB — CALPROTECTIN STL-MCNT: 183 MCG/G

## 2021-03-18 LAB — H PYLORI AG STL QL IA: NOT DETECTED

## 2021-04-14 ENCOUNTER — PES CALL (OUTPATIENT)
Dept: ADMINISTRATIVE | Facility: CLINIC | Age: 41
End: 2021-04-14

## 2021-04-27 ENCOUNTER — OFFICE VISIT (OUTPATIENT)
Dept: PSYCHIATRY | Facility: CLINIC | Age: 41
End: 2021-04-27
Payer: MEDICARE

## 2021-04-27 VITALS
DIASTOLIC BLOOD PRESSURE: 81 MMHG | BODY MASS INDEX: 18.29 KG/M2 | HEIGHT: 63 IN | SYSTOLIC BLOOD PRESSURE: 104 MMHG | HEART RATE: 99 BPM | WEIGHT: 103.19 LBS

## 2021-04-27 DIAGNOSIS — F41.9 ANXIETY: ICD-10-CM

## 2021-04-27 DIAGNOSIS — Z79.899 HIGH RISK MEDICATION USE: Primary | ICD-10-CM

## 2021-04-27 DIAGNOSIS — F12.20 CANNABIS USE DISORDER, SEVERE, DEPENDENCE: ICD-10-CM

## 2021-04-27 DIAGNOSIS — G47.00 INSOMNIA, UNSPECIFIED TYPE: ICD-10-CM

## 2021-04-27 DIAGNOSIS — F31.75 BIPOLAR 1 DISORDER, DEPRESSED, PARTIAL REMISSION: ICD-10-CM

## 2021-04-27 PROCEDURE — 99999 PR PBB SHADOW E&M-EST. PATIENT-LVL III: ICD-10-PCS | Mod: PBBFAC,,, | Performed by: PSYCHIATRY & NEUROLOGY

## 2021-04-27 PROCEDURE — 3008F BODY MASS INDEX DOCD: CPT | Mod: CPTII,S$GLB,, | Performed by: PSYCHIATRY & NEUROLOGY

## 2021-04-27 PROCEDURE — 99214 PR OFFICE/OUTPT VISIT, EST, LEVL IV, 30-39 MIN: ICD-10-PCS | Mod: S$GLB,,, | Performed by: PSYCHIATRY & NEUROLOGY

## 2021-04-27 PROCEDURE — 99499 RISK ADDL DX/OHS AUDIT: ICD-10-PCS | Mod: S$GLB,,, | Performed by: PSYCHIATRY & NEUROLOGY

## 2021-04-27 PROCEDURE — 99214 OFFICE O/P EST MOD 30 MIN: CPT | Mod: S$GLB,,, | Performed by: PSYCHIATRY & NEUROLOGY

## 2021-04-27 PROCEDURE — 1126F AMNT PAIN NOTED NONE PRSNT: CPT | Mod: S$GLB,,, | Performed by: PSYCHIATRY & NEUROLOGY

## 2021-04-27 PROCEDURE — 1126F PR PAIN SEVERITY QUANTIFIED, NO PAIN PRESENT: ICD-10-PCS | Mod: S$GLB,,, | Performed by: PSYCHIATRY & NEUROLOGY

## 2021-04-27 PROCEDURE — 3008F PR BODY MASS INDEX (BMI) DOCUMENTED: ICD-10-PCS | Mod: CPTII,S$GLB,, | Performed by: PSYCHIATRY & NEUROLOGY

## 2021-04-27 PROCEDURE — 99999 PR PBB SHADOW E&M-EST. PATIENT-LVL III: CPT | Mod: PBBFAC,,, | Performed by: PSYCHIATRY & NEUROLOGY

## 2021-04-27 PROCEDURE — 99499 UNLISTED E&M SERVICE: CPT | Mod: S$GLB,,, | Performed by: PSYCHIATRY & NEUROLOGY

## 2021-04-27 RX ORDER — DIVALPROEX SODIUM 250 MG/1
TABLET, DELAYED RELEASE ORAL
Qty: 90 TABLET | Refills: 2 | Status: SHIPPED | OUTPATIENT
Start: 2021-04-27 | End: 2021-11-18 | Stop reason: SDUPTHER

## 2021-04-27 RX ORDER — TRAZODONE HYDROCHLORIDE 100 MG/1
TABLET ORAL
Qty: 60 TABLET | Refills: 2 | Status: SHIPPED | OUTPATIENT
Start: 2021-04-27 | End: 2021-08-24

## 2021-04-27 RX ORDER — QUETIAPINE FUMARATE 25 MG/1
25 TABLET, FILM COATED ORAL NIGHTLY
Qty: 30 TABLET | Refills: 0 | Status: SHIPPED | OUTPATIENT
Start: 2021-04-27 | End: 2021-06-06

## 2021-04-27 RX ORDER — MIRTAZAPINE 30 MG/1
TABLET, FILM COATED ORAL
Qty: 30 TABLET | Refills: 2 | Status: SHIPPED | OUTPATIENT
Start: 2021-04-27 | End: 2021-11-18 | Stop reason: SDUPTHER

## 2021-04-30 DIAGNOSIS — R11.0 NAUSEA: ICD-10-CM

## 2021-04-30 DIAGNOSIS — R11.10 VOMITING, INTRACTABILITY OF VOMITING NOT SPECIFIED, PRESENCE OF NAUSEA NOT SPECIFIED, UNSPECIFIED VOMITING TYPE: ICD-10-CM

## 2021-04-30 DIAGNOSIS — F19.939 WITHDRAWAL FROM OTHER PSYCHOACTIVE SUBSTANCE: ICD-10-CM

## 2021-05-01 ENCOUNTER — PATIENT MESSAGE (OUTPATIENT)
Dept: FAMILY MEDICINE | Facility: CLINIC | Age: 41
End: 2021-05-01

## 2021-05-03 RX ORDER — PROMETHAZINE HYDROCHLORIDE 25 MG/1
25 TABLET ORAL EVERY 6 HOURS PRN
Qty: 20 TABLET | Refills: 0 | Status: SHIPPED | OUTPATIENT
Start: 2021-05-03 | End: 2021-05-27 | Stop reason: SDUPTHER

## 2021-05-04 ENCOUNTER — TELEPHONE (OUTPATIENT)
Dept: RHEUMATOLOGY | Facility: CLINIC | Age: 41
End: 2021-05-04

## 2021-05-06 ENCOUNTER — PATIENT MESSAGE (OUTPATIENT)
Dept: FAMILY MEDICINE | Facility: CLINIC | Age: 41
End: 2021-05-06

## 2021-05-07 ENCOUNTER — OFFICE VISIT (OUTPATIENT)
Dept: FAMILY MEDICINE | Facility: CLINIC | Age: 41
End: 2021-05-07
Payer: MEDICARE

## 2021-05-07 VITALS
OXYGEN SATURATION: 97 % | WEIGHT: 97.25 LBS | HEART RATE: 121 BPM | SYSTOLIC BLOOD PRESSURE: 100 MMHG | HEIGHT: 63 IN | DIASTOLIC BLOOD PRESSURE: 62 MMHG | BODY MASS INDEX: 17.23 KG/M2

## 2021-05-07 DIAGNOSIS — F17.200 CURRENT SMOKER: ICD-10-CM

## 2021-05-07 DIAGNOSIS — N76.0 VAGINAL INFECTION: ICD-10-CM

## 2021-05-07 DIAGNOSIS — N89.8 VAGINAL DISCHARGE: Primary | ICD-10-CM

## 2021-05-07 PROCEDURE — 3008F BODY MASS INDEX DOCD: CPT | Mod: CPTII,S$GLB,, | Performed by: NURSE PRACTITIONER

## 2021-05-07 PROCEDURE — 99999 PR PBB SHADOW E&M-EST. PATIENT-LVL IV: CPT | Mod: PBBFAC,,, | Performed by: NURSE PRACTITIONER

## 2021-05-07 PROCEDURE — 99999 PR PBB SHADOW E&M-EST. PATIENT-LVL IV: ICD-10-PCS | Mod: PBBFAC,,, | Performed by: NURSE PRACTITIONER

## 2021-05-07 PROCEDURE — 99214 PR OFFICE/OUTPT VISIT, EST, LEVL IV, 30-39 MIN: ICD-10-PCS | Mod: S$GLB,,, | Performed by: NURSE PRACTITIONER

## 2021-05-07 PROCEDURE — 99214 OFFICE O/P EST MOD 30 MIN: CPT | Mod: S$GLB,,, | Performed by: NURSE PRACTITIONER

## 2021-05-07 PROCEDURE — 3008F PR BODY MASS INDEX (BMI) DOCUMENTED: ICD-10-PCS | Mod: CPTII,S$GLB,, | Performed by: NURSE PRACTITIONER

## 2021-05-07 PROCEDURE — 1126F AMNT PAIN NOTED NONE PRSNT: CPT | Mod: S$GLB,,, | Performed by: NURSE PRACTITIONER

## 2021-05-07 PROCEDURE — 87491 CHLMYD TRACH DNA AMP PROBE: CPT | Mod: 59 | Performed by: NURSE PRACTITIONER

## 2021-05-07 PROCEDURE — 87591 N.GONORRHOEAE DNA AMP PROB: CPT | Performed by: NURSE PRACTITIONER

## 2021-05-07 PROCEDURE — 1126F PR PAIN SEVERITY QUANTIFIED, NO PAIN PRESENT: ICD-10-PCS | Mod: S$GLB,,, | Performed by: NURSE PRACTITIONER

## 2021-05-07 PROCEDURE — 87481 CANDIDA DNA AMP PROBE: CPT | Mod: 59 | Performed by: NURSE PRACTITIONER

## 2021-05-07 RX ORDER — METRONIDAZOLE 500 MG/1
500 TABLET ORAL EVERY 12 HOURS
Qty: 14 TABLET | Refills: 0 | Status: SHIPPED | OUTPATIENT
Start: 2021-05-07 | End: 2021-05-14

## 2021-05-10 LAB
BACTERIAL VAGINOSIS DNA: POSITIVE
C TRACH DNA SPEC QL NAA+PROBE: NOT DETECTED
CANDIDA GLABRATA DNA: NEGATIVE
CANDIDA KRUSEI DNA: NEGATIVE
CANDIDA RRNA VAG QL PROBE: NEGATIVE
N GONORRHOEA DNA SPEC QL NAA+PROBE: NOT DETECTED
T VAGINALIS RRNA GENITAL QL PROBE: NEGATIVE

## 2021-05-26 ENCOUNTER — PATIENT MESSAGE (OUTPATIENT)
Dept: FAMILY MEDICINE | Facility: CLINIC | Age: 41
End: 2021-05-26

## 2021-05-28 ENCOUNTER — TELEPHONE (OUTPATIENT)
Dept: PSYCHIATRY | Facility: CLINIC | Age: 41
End: 2021-05-28

## 2021-06-01 ENCOUNTER — PATIENT MESSAGE (OUTPATIENT)
Dept: PSYCHIATRY | Facility: CLINIC | Age: 41
End: 2021-06-01

## 2021-06-01 ENCOUNTER — TELEPHONE (OUTPATIENT)
Dept: PSYCHIATRY | Facility: CLINIC | Age: 41
End: 2021-06-01

## 2021-07-06 RX ORDER — RIVAROXABAN 20 MG/1
TABLET, FILM COATED ORAL
Qty: 90 TABLET | Refills: 0 | Status: SHIPPED | OUTPATIENT
Start: 2021-07-06 | End: 2021-10-05

## 2021-08-03 ENCOUNTER — PES CALL (OUTPATIENT)
Dept: ADMINISTRATIVE | Facility: CLINIC | Age: 41
End: 2021-08-03

## 2021-08-05 DIAGNOSIS — R11.0 NAUSEA: ICD-10-CM

## 2021-08-05 DIAGNOSIS — F19.939 WITHDRAWAL FROM OTHER PSYCHOACTIVE SUBSTANCE: ICD-10-CM

## 2021-08-05 DIAGNOSIS — R11.10 VOMITING, INTRACTABILITY OF VOMITING NOT SPECIFIED, PRESENCE OF NAUSEA NOT SPECIFIED, UNSPECIFIED VOMITING TYPE: ICD-10-CM

## 2021-08-07 DIAGNOSIS — G89.29 CHRONIC INTRACTABLE HEADACHE, UNSPECIFIED HEADACHE TYPE: ICD-10-CM

## 2021-08-07 DIAGNOSIS — R51.9 CHRONIC INTRACTABLE HEADACHE, UNSPECIFIED HEADACHE TYPE: ICD-10-CM

## 2021-08-12 RX ORDER — PROMETHAZINE HYDROCHLORIDE 25 MG/1
TABLET ORAL
Qty: 20 TABLET | Refills: 0 | OUTPATIENT
Start: 2021-08-12

## 2021-08-12 RX ORDER — PROPRANOLOL HYDROCHLORIDE 80 MG/1
CAPSULE, EXTENDED RELEASE ORAL
Qty: 30 CAPSULE | Refills: 5 | Status: SHIPPED | OUTPATIENT
Start: 2021-08-12 | End: 2021-08-18 | Stop reason: SDUPTHER

## 2021-08-18 ENCOUNTER — OFFICE VISIT (OUTPATIENT)
Dept: FAMILY MEDICINE | Facility: CLINIC | Age: 41
End: 2021-08-18
Payer: MEDICARE

## 2021-08-18 VITALS
WEIGHT: 89.31 LBS | BODY MASS INDEX: 15.82 KG/M2 | HEIGHT: 63 IN | SYSTOLIC BLOOD PRESSURE: 88 MMHG | DIASTOLIC BLOOD PRESSURE: 60 MMHG | TEMPERATURE: 98 F | OXYGEN SATURATION: 97 % | RESPIRATION RATE: 12 BRPM | HEART RATE: 71 BPM

## 2021-08-18 DIAGNOSIS — Z12.4 PAP SMEAR FOR CERVICAL CANCER SCREENING: ICD-10-CM

## 2021-08-18 DIAGNOSIS — N89.8 VAGINAL DISCHARGE: ICD-10-CM

## 2021-08-18 DIAGNOSIS — Z13.220 LIPID SCREENING: ICD-10-CM

## 2021-08-18 DIAGNOSIS — B96.89 BACTERIAL VAGINOSIS: Primary | ICD-10-CM

## 2021-08-18 DIAGNOSIS — N76.0 BACTERIAL VAGINOSIS: Primary | ICD-10-CM

## 2021-08-18 DIAGNOSIS — M32.9 SYSTEMIC LUPUS ERYTHEMATOSUS, UNSPECIFIED SLE TYPE, UNSPECIFIED ORGAN INVOLVEMENT STATUS: ICD-10-CM

## 2021-08-18 DIAGNOSIS — N18.9 CHRONIC KIDNEY DISEASE, UNSPECIFIED: ICD-10-CM

## 2021-08-18 DIAGNOSIS — I63.9 CEREBROVASCULAR ACCIDENT (CVA) WITH INVOLVEMENT OF LEFT SIDE OF BODY: ICD-10-CM

## 2021-08-18 PROCEDURE — 1126F PR PAIN SEVERITY QUANTIFIED, NO PAIN PRESENT: ICD-10-PCS | Mod: CPTII,S$GLB,, | Performed by: FAMILY MEDICINE

## 2021-08-18 PROCEDURE — 3078F DIAST BP <80 MM HG: CPT | Mod: CPTII,S$GLB,, | Performed by: FAMILY MEDICINE

## 2021-08-18 PROCEDURE — 3074F SYST BP LT 130 MM HG: CPT | Mod: CPTII,S$GLB,, | Performed by: FAMILY MEDICINE

## 2021-08-18 PROCEDURE — 1159F PR MEDICATION LIST DOCUMENTED IN MEDICAL RECORD: ICD-10-PCS | Mod: CPTII,S$GLB,, | Performed by: FAMILY MEDICINE

## 2021-08-18 PROCEDURE — 99214 OFFICE O/P EST MOD 30 MIN: CPT | Mod: S$GLB,,, | Performed by: FAMILY MEDICINE

## 2021-08-18 PROCEDURE — 3008F PR BODY MASS INDEX (BMI) DOCUMENTED: ICD-10-PCS | Mod: CPTII,S$GLB,, | Performed by: FAMILY MEDICINE

## 2021-08-18 PROCEDURE — 99999 PR PBB SHADOW E&M-EST. PATIENT-LVL IV: CPT | Mod: PBBFAC,,, | Performed by: FAMILY MEDICINE

## 2021-08-18 PROCEDURE — 99214 PR OFFICE/OUTPT VISIT, EST, LEVL IV, 30-39 MIN: ICD-10-PCS | Mod: S$GLB,,, | Performed by: FAMILY MEDICINE

## 2021-08-18 PROCEDURE — 87661 TRICHOMONAS VAGINALIS AMPLIF: CPT | Mod: 59 | Performed by: FAMILY MEDICINE

## 2021-08-18 PROCEDURE — 1159F MED LIST DOCD IN RCRD: CPT | Mod: CPTII,S$GLB,, | Performed by: FAMILY MEDICINE

## 2021-08-18 PROCEDURE — 1160F PR REVIEW ALL MEDS BY PRESCRIBER/CLIN PHARMACIST DOCUMENTED: ICD-10-PCS | Mod: CPTII,S$GLB,, | Performed by: FAMILY MEDICINE

## 2021-08-18 PROCEDURE — 3078F PR MOST RECENT DIASTOLIC BLOOD PRESSURE < 80 MM HG: ICD-10-PCS | Mod: CPTII,S$GLB,, | Performed by: FAMILY MEDICINE

## 2021-08-18 PROCEDURE — 1126F AMNT PAIN NOTED NONE PRSNT: CPT | Mod: CPTII,S$GLB,, | Performed by: FAMILY MEDICINE

## 2021-08-18 PROCEDURE — 99999 PR PBB SHADOW E&M-EST. PATIENT-LVL IV: ICD-10-PCS | Mod: PBBFAC,,, | Performed by: FAMILY MEDICINE

## 2021-08-18 PROCEDURE — 3074F PR MOST RECENT SYSTOLIC BLOOD PRESSURE < 130 MM HG: ICD-10-PCS | Mod: CPTII,S$GLB,, | Performed by: FAMILY MEDICINE

## 2021-08-18 PROCEDURE — 1160F RVW MEDS BY RX/DR IN RCRD: CPT | Mod: CPTII,S$GLB,, | Performed by: FAMILY MEDICINE

## 2021-08-18 PROCEDURE — 3008F BODY MASS INDEX DOCD: CPT | Mod: CPTII,S$GLB,, | Performed by: FAMILY MEDICINE

## 2021-08-18 PROCEDURE — 87481 CANDIDA DNA AMP PROBE: CPT | Mod: 59 | Performed by: FAMILY MEDICINE

## 2021-08-23 LAB
BACTERIAL VAGINOSIS DNA: POSITIVE
CANDIDA GLABRATA DNA: NEGATIVE
CANDIDA KRUSEI DNA: NEGATIVE
CANDIDA RRNA VAG QL PROBE: POSITIVE
T VAGINALIS RRNA GENITAL QL PROBE: NEGATIVE

## 2021-08-25 ENCOUNTER — PATIENT MESSAGE (OUTPATIENT)
Dept: FAMILY MEDICINE | Facility: CLINIC | Age: 41
End: 2021-08-25

## 2021-08-25 ENCOUNTER — TELEPHONE (OUTPATIENT)
Dept: PHYSICAL MEDICINE AND REHAB | Facility: CLINIC | Age: 41
End: 2021-08-25

## 2021-08-27 ENCOUNTER — TELEPHONE (OUTPATIENT)
Dept: PHYSICAL MEDICINE AND REHAB | Facility: CLINIC | Age: 41
End: 2021-08-27

## 2021-09-06 ENCOUNTER — PATIENT MESSAGE (OUTPATIENT)
Dept: FAMILY MEDICINE | Facility: CLINIC | Age: 41
End: 2021-09-06

## 2021-09-06 DIAGNOSIS — N89.8 VAGINAL DISCHARGE: Primary | ICD-10-CM

## 2021-09-08 DIAGNOSIS — N39.41 URGE INCONTINENCE: ICD-10-CM

## 2021-09-08 RX ORDER — OXYBUTYNIN CHLORIDE 5 MG/1
5 TABLET, EXTENDED RELEASE ORAL DAILY
Qty: 30 TABLET | Refills: 0 | OUTPATIENT
Start: 2021-09-08

## 2021-09-10 ENCOUNTER — TELEPHONE (OUTPATIENT)
Dept: PHYSICAL MEDICINE AND REHAB | Facility: CLINIC | Age: 41
End: 2021-09-10

## 2021-09-13 ENCOUNTER — LAB VISIT (OUTPATIENT)
Dept: LAB | Facility: HOSPITAL | Age: 41
End: 2021-09-13
Attending: INTERNAL MEDICINE
Payer: MEDICARE

## 2021-09-13 DIAGNOSIS — N18.9 CHRONIC KIDNEY DISEASE, UNSPECIFIED: ICD-10-CM

## 2021-09-13 DIAGNOSIS — Z13.220 LIPID SCREENING: ICD-10-CM

## 2021-09-13 DIAGNOSIS — N18.30 CHRONIC KIDNEY DISEASE, STAGE III (MODERATE): ICD-10-CM

## 2021-09-13 LAB
ALBUMIN SERPL BCP-MCNC: 3.9 G/DL (ref 3.5–5.2)
ANION GAP SERPL CALC-SCNC: 12 MMOL/L (ref 8–16)
BUN SERPL-MCNC: 10 MG/DL (ref 6–20)
CALCIUM SERPL-MCNC: 9.1 MG/DL (ref 8.7–10.5)
CHLORIDE SERPL-SCNC: 105 MMOL/L (ref 95–110)
CHOLEST SERPL-MCNC: 159 MG/DL (ref 120–199)
CHOLEST/HDLC SERPL: 4.2 {RATIO} (ref 2–5)
CO2 SERPL-SCNC: 25 MMOL/L (ref 23–29)
CREAT SERPL-MCNC: 1.2 MG/DL (ref 0.5–1.4)
EST. GFR  (AFRICAN AMERICAN): >60 ML/MIN/1.73 M^2
EST. GFR  (NON AFRICAN AMERICAN): 57.1 ML/MIN/1.73 M^2
GLUCOSE SERPL-MCNC: 81 MG/DL (ref 70–110)
HDLC SERPL-MCNC: 38 MG/DL (ref 40–75)
HDLC SERPL: 23.9 % (ref 20–50)
LDLC SERPL CALC-MCNC: 98.2 MG/DL (ref 63–159)
NONHDLC SERPL-MCNC: 121 MG/DL
PHOSPHATE SERPL-MCNC: 3.3 MG/DL (ref 2.7–4.5)
POTASSIUM SERPL-SCNC: 3.6 MMOL/L (ref 3.5–5.1)
SODIUM SERPL-SCNC: 142 MMOL/L (ref 136–145)
TRIGL SERPL-MCNC: 114 MG/DL (ref 30–150)

## 2021-09-13 PROCEDURE — 80061 LIPID PANEL: CPT | Mod: HCNC | Performed by: FAMILY MEDICINE

## 2021-09-13 PROCEDURE — 36415 COLL VENOUS BLD VENIPUNCTURE: CPT | Mod: HCNC,PO | Performed by: INTERNAL MEDICINE

## 2021-09-13 PROCEDURE — 80069 RENAL FUNCTION PANEL: CPT | Mod: HCNC | Performed by: INTERNAL MEDICINE

## 2021-09-16 ENCOUNTER — PATIENT MESSAGE (OUTPATIENT)
Dept: FAMILY MEDICINE | Facility: CLINIC | Age: 41
End: 2021-09-16

## 2021-09-17 ENCOUNTER — PATIENT MESSAGE (OUTPATIENT)
Dept: FAMILY MEDICINE | Facility: CLINIC | Age: 41
End: 2021-09-17

## 2021-09-17 ENCOUNTER — PATIENT OUTREACH (OUTPATIENT)
Dept: ADMINISTRATIVE | Facility: OTHER | Age: 41
End: 2021-09-17

## 2021-09-18 ENCOUNTER — HOSPITAL ENCOUNTER (EMERGENCY)
Facility: HOSPITAL | Age: 41
Discharge: HOME | End: 2021-09-18
Attending: EMERGENCY MEDICINE
Payer: MEDICARE

## 2021-09-18 VITALS
BODY MASS INDEX: 15.77 KG/M2 | DIASTOLIC BLOOD PRESSURE: 66 MMHG | HEIGHT: 63 IN | HEART RATE: 78 BPM | TEMPERATURE: 99 F | OXYGEN SATURATION: 98 % | WEIGHT: 89 LBS | SYSTOLIC BLOOD PRESSURE: 111 MMHG | RESPIRATION RATE: 20 BRPM

## 2021-09-18 DIAGNOSIS — I63.9 STROKE: ICD-10-CM

## 2021-09-18 DIAGNOSIS — T50.901A ACCIDENTAL OVERDOSE, INITIAL ENCOUNTER: Primary | ICD-10-CM

## 2021-09-18 PROBLEM — T42.6X1A GABAPENTIN OVERDOSE: Status: ACTIVE | Noted: 2021-09-18

## 2021-09-18 LAB
ALBUMIN SERPL BCP-MCNC: 3.8 G/DL (ref 3.5–5.2)
ALP SERPL-CCNC: 68 U/L (ref 55–135)
ALT SERPL W/O P-5'-P-CCNC: 29 U/L (ref 10–44)
ANION GAP SERPL CALC-SCNC: 13 MMOL/L (ref 8–16)
AST SERPL-CCNC: 31 U/L (ref 10–40)
BASOPHILS # BLD AUTO: 0.04 K/UL (ref 0–0.2)
BASOPHILS NFR BLD: 0.6 % (ref 0–1.9)
BILIRUB SERPL-MCNC: 0.2 MG/DL (ref 0.1–1)
BUN SERPL-MCNC: 33 MG/DL (ref 6–20)
CALCIUM SERPL-MCNC: 8.6 MG/DL (ref 8.7–10.5)
CHLORIDE SERPL-SCNC: 104 MMOL/L (ref 95–110)
CHOLEST SERPL-MCNC: 161 MG/DL (ref 120–199)
CHOLEST/HDLC SERPL: 3.7 {RATIO} (ref 2–5)
CO2 SERPL-SCNC: 20 MMOL/L (ref 23–29)
CREAT SERPL-MCNC: 1.7 MG/DL (ref 0.5–1.4)
DIFFERENTIAL METHOD: ABNORMAL
EOSINOPHIL # BLD AUTO: 0 K/UL (ref 0–0.5)
EOSINOPHIL NFR BLD: 0.4 % (ref 0–8)
ERYTHROCYTE [DISTWIDTH] IN BLOOD BY AUTOMATED COUNT: 13.5 % (ref 11.5–14.5)
EST. GFR  (AFRICAN AMERICAN): 43 ML/MIN/1.73 M^2
EST. GFR  (NON AFRICAN AMERICAN): 37 ML/MIN/1.73 M^2
GLUCOSE SERPL-MCNC: 143 MG/DL (ref 70–110)
HCT VFR BLD AUTO: 35.4 % (ref 37–48.5)
HDLC SERPL-MCNC: 43 MG/DL (ref 40–75)
HDLC SERPL: 26.7 % (ref 20–50)
HGB BLD-MCNC: 11.6 G/DL (ref 12–16)
IMM GRANULOCYTES # BLD AUTO: 0.03 K/UL (ref 0–0.04)
IMM GRANULOCYTES NFR BLD AUTO: 0.4 % (ref 0–0.5)
INR PPP: 1 (ref 0.8–1.2)
LDLC SERPL CALC-MCNC: 104.8 MG/DL (ref 63–159)
LYMPHOCYTES # BLD AUTO: 1.3 K/UL (ref 1–4.8)
LYMPHOCYTES NFR BLD: 19 % (ref 18–48)
MCH RBC QN AUTO: 32.6 PG (ref 27–31)
MCHC RBC AUTO-ENTMCNC: 32.8 G/DL (ref 32–36)
MCV RBC AUTO: 99 FL (ref 82–98)
MONOCYTES # BLD AUTO: 0.4 K/UL (ref 0.3–1)
MONOCYTES NFR BLD: 5.9 % (ref 4–15)
NEUTROPHILS # BLD AUTO: 5 K/UL (ref 1.8–7.7)
NEUTROPHILS NFR BLD: 73.7 % (ref 38–73)
NONHDLC SERPL-MCNC: 118 MG/DL
NRBC BLD-RTO: 0 /100 WBC
PLATELET # BLD AUTO: 234 K/UL (ref 150–450)
PMV BLD AUTO: 10.8 FL (ref 9.2–12.9)
POCT GLUCOSE: 149 MG/DL (ref 70–110)
POTASSIUM SERPL-SCNC: 4.8 MMOL/L (ref 3.5–5.1)
PROT SERPL-MCNC: 7.4 G/DL (ref 6–8.4)
PROTHROMBIN TIME: 10.9 SEC (ref 9–12.5)
RBC # BLD AUTO: 3.56 M/UL (ref 4–5.4)
SODIUM SERPL-SCNC: 137 MMOL/L (ref 136–145)
TRIGL SERPL-MCNC: 66 MG/DL (ref 30–150)
TSH SERPL DL<=0.005 MIU/L-ACNC: 0.84 UIU/ML (ref 0.4–4)
WBC # BLD AUTO: 6.75 K/UL (ref 3.9–12.7)

## 2021-09-18 PROCEDURE — G0427 PR INPT TELEHEALTH CON 70/>M: ICD-10-PCS | Mod: HCNC,95,, | Performed by: PSYCHIATRY & NEUROLOGY

## 2021-09-18 PROCEDURE — 82962 GLUCOSE BLOOD TEST: CPT | Mod: HCNC

## 2021-09-18 PROCEDURE — 93005 ELECTROCARDIOGRAM TRACING: CPT | Mod: HCNC

## 2021-09-18 PROCEDURE — 36415 COLL VENOUS BLD VENIPUNCTURE: CPT | Mod: HCNC | Performed by: EMERGENCY MEDICINE

## 2021-09-18 PROCEDURE — 84443 ASSAY THYROID STIM HORMONE: CPT | Mod: HCNC | Performed by: EMERGENCY MEDICINE

## 2021-09-18 PROCEDURE — 99285 EMERGENCY DEPT VISIT HI MDM: CPT | Mod: 25,HCNC

## 2021-09-18 PROCEDURE — G0427 INPT/ED TELECONSULT70: HCPCS | Mod: HCNC,95,, | Performed by: PSYCHIATRY & NEUROLOGY

## 2021-09-18 PROCEDURE — 85610 PROTHROMBIN TIME: CPT | Mod: HCNC | Performed by: EMERGENCY MEDICINE

## 2021-09-18 PROCEDURE — 80061 LIPID PANEL: CPT | Mod: HCNC | Performed by: EMERGENCY MEDICINE

## 2021-09-18 PROCEDURE — 80053 COMPREHEN METABOLIC PANEL: CPT | Mod: HCNC | Performed by: EMERGENCY MEDICINE

## 2021-09-18 PROCEDURE — 93010 EKG 12-LEAD: ICD-10-PCS | Mod: HCNC,,, | Performed by: INTERNAL MEDICINE

## 2021-09-18 PROCEDURE — 93010 ELECTROCARDIOGRAM REPORT: CPT | Mod: HCNC,,, | Performed by: INTERNAL MEDICINE

## 2021-09-18 PROCEDURE — 85025 COMPLETE CBC W/AUTO DIFF WBC: CPT | Mod: HCNC | Performed by: EMERGENCY MEDICINE

## 2021-09-18 RX ORDER — SODIUM CHLORIDE 9 MG/ML
INJECTION, SOLUTION INTRAVENOUS
Status: DISCONTINUED | OUTPATIENT
Start: 2021-09-18 | End: 2021-09-18 | Stop reason: HOSPADM

## 2021-09-19 ENCOUNTER — PATIENT MESSAGE (OUTPATIENT)
Dept: OBSTETRICS AND GYNECOLOGY | Facility: CLINIC | Age: 41
End: 2021-09-19

## 2021-09-22 ENCOUNTER — OFFICE VISIT (OUTPATIENT)
Dept: PHYSICAL MEDICINE AND REHAB | Facility: CLINIC | Age: 41
End: 2021-09-22
Payer: MEDICARE

## 2021-09-22 VITALS
HEIGHT: 63 IN | SYSTOLIC BLOOD PRESSURE: 137 MMHG | DIASTOLIC BLOOD PRESSURE: 86 MMHG | WEIGHT: 89 LBS | BODY MASS INDEX: 15.77 KG/M2 | HEART RATE: 130 BPM

## 2021-09-22 DIAGNOSIS — I63.9 CEREBROVASCULAR ACCIDENT (CVA) WITH INVOLVEMENT OF LEFT SIDE OF BODY: ICD-10-CM

## 2021-09-22 DIAGNOSIS — R25.2 SPASTICITY: ICD-10-CM

## 2021-09-22 DIAGNOSIS — M21.379 FOOT-DROP, UNSPECIFIED LATERALITY: Primary | ICD-10-CM

## 2021-09-22 PROCEDURE — 99499 RISK ADDL DX/OHS AUDIT: ICD-10-PCS | Mod: HCNC,S$GLB,, | Performed by: PHYSICAL MEDICINE & REHABILITATION

## 2021-09-22 PROCEDURE — 3075F PR MOST RECENT SYSTOLIC BLOOD PRESS GE 130-139MM HG: ICD-10-PCS | Mod: HCNC,CPTII,S$GLB, | Performed by: PHYSICAL MEDICINE & REHABILITATION

## 2021-09-22 PROCEDURE — 3008F PR BODY MASS INDEX (BMI) DOCUMENTED: ICD-10-PCS | Mod: HCNC,CPTII,S$GLB, | Performed by: PHYSICAL MEDICINE & REHABILITATION

## 2021-09-22 PROCEDURE — 1159F PR MEDICATION LIST DOCUMENTED IN MEDICAL RECORD: ICD-10-PCS | Mod: HCNC,CPTII,S$GLB, | Performed by: PHYSICAL MEDICINE & REHABILITATION

## 2021-09-22 PROCEDURE — 99499 UNLISTED E&M SERVICE: CPT | Mod: HCNC,S$GLB,, | Performed by: PHYSICAL MEDICINE & REHABILITATION

## 2021-09-22 PROCEDURE — 3079F PR MOST RECENT DIASTOLIC BLOOD PRESSURE 80-89 MM HG: ICD-10-PCS | Mod: HCNC,CPTII,S$GLB, | Performed by: PHYSICAL MEDICINE & REHABILITATION

## 2021-09-22 PROCEDURE — 99214 OFFICE O/P EST MOD 30 MIN: CPT | Mod: HCNC,S$GLB,, | Performed by: PHYSICAL MEDICINE & REHABILITATION

## 2021-09-22 PROCEDURE — 1159F MED LIST DOCD IN RCRD: CPT | Mod: HCNC,CPTII,S$GLB, | Performed by: PHYSICAL MEDICINE & REHABILITATION

## 2021-09-22 PROCEDURE — 3075F SYST BP GE 130 - 139MM HG: CPT | Mod: HCNC,CPTII,S$GLB, | Performed by: PHYSICAL MEDICINE & REHABILITATION

## 2021-09-22 PROCEDURE — 99999 PR PBB SHADOW E&M-EST. PATIENT-LVL III: CPT | Mod: PBBFAC,HCNC,, | Performed by: PHYSICAL MEDICINE & REHABILITATION

## 2021-09-22 PROCEDURE — 3008F BODY MASS INDEX DOCD: CPT | Mod: HCNC,CPTII,S$GLB, | Performed by: PHYSICAL MEDICINE & REHABILITATION

## 2021-09-22 PROCEDURE — 99999 PR PBB SHADOW E&M-EST. PATIENT-LVL III: ICD-10-PCS | Mod: PBBFAC,HCNC,, | Performed by: PHYSICAL MEDICINE & REHABILITATION

## 2021-09-22 PROCEDURE — 3079F DIAST BP 80-89 MM HG: CPT | Mod: HCNC,CPTII,S$GLB, | Performed by: PHYSICAL MEDICINE & REHABILITATION

## 2021-09-22 PROCEDURE — 99214 PR OFFICE/OUTPT VISIT, EST, LEVL IV, 30-39 MIN: ICD-10-PCS | Mod: HCNC,S$GLB,, | Performed by: PHYSICAL MEDICINE & REHABILITATION

## 2021-09-27 ENCOUNTER — OFFICE VISIT (OUTPATIENT)
Dept: OBSTETRICS AND GYNECOLOGY | Facility: CLINIC | Age: 41
End: 2021-09-27
Payer: MEDICARE

## 2021-09-27 VITALS
SYSTOLIC BLOOD PRESSURE: 118 MMHG | DIASTOLIC BLOOD PRESSURE: 72 MMHG | RESPIRATION RATE: 10 BRPM | HEIGHT: 63 IN | BODY MASS INDEX: 15.41 KG/M2 | WEIGHT: 87 LBS

## 2021-09-27 DIAGNOSIS — N89.8 VAGINAL DISCHARGE: ICD-10-CM

## 2021-09-27 DIAGNOSIS — N93.9 ABNORMAL UTERINE BLEEDING (AUB): ICD-10-CM

## 2021-09-27 DIAGNOSIS — Z12.31 ENCOUNTER FOR SCREENING MAMMOGRAM FOR MALIGNANT NEOPLASM OF BREAST: ICD-10-CM

## 2021-09-27 DIAGNOSIS — N94.6 DYSMENORRHEA, UNSPECIFIED: ICD-10-CM

## 2021-09-27 DIAGNOSIS — Z01.411 ENCOUNTER FOR GYNECOLOGICAL EXAMINATION (GENERAL) (ROUTINE) WITH ABNORMAL FINDINGS: Primary | ICD-10-CM

## 2021-09-27 DIAGNOSIS — Z12.4 CERVICAL CANCER SCREENING: ICD-10-CM

## 2021-09-27 PROCEDURE — G0101 CA SCREEN;PELVIC/BREAST EXAM: HCPCS | Mod: HCNC,S$GLB,, | Performed by: OBSTETRICS & GYNECOLOGY

## 2021-09-27 PROCEDURE — 1159F MED LIST DOCD IN RCRD: CPT | Mod: HCNC,CPTII,S$GLB, | Performed by: OBSTETRICS & GYNECOLOGY

## 2021-09-27 PROCEDURE — 3074F SYST BP LT 130 MM HG: CPT | Mod: HCNC,CPTII,S$GLB, | Performed by: OBSTETRICS & GYNECOLOGY

## 2021-09-27 PROCEDURE — 1160F PR REVIEW ALL MEDS BY PRESCRIBER/CLIN PHARMACIST DOCUMENTED: ICD-10-PCS | Mod: HCNC,CPTII,S$GLB, | Performed by: OBSTETRICS & GYNECOLOGY

## 2021-09-27 PROCEDURE — 3008F BODY MASS INDEX DOCD: CPT | Mod: HCNC,CPTII,S$GLB, | Performed by: OBSTETRICS & GYNECOLOGY

## 2021-09-27 PROCEDURE — 1160F RVW MEDS BY RX/DR IN RCRD: CPT | Mod: HCNC,CPTII,S$GLB, | Performed by: OBSTETRICS & GYNECOLOGY

## 2021-09-27 PROCEDURE — 87481 CANDIDA DNA AMP PROBE: CPT | Mod: 59,HCNC | Performed by: OBSTETRICS & GYNECOLOGY

## 2021-09-27 PROCEDURE — 3074F PR MOST RECENT SYSTOLIC BLOOD PRESSURE < 130 MM HG: ICD-10-PCS | Mod: HCNC,CPTII,S$GLB, | Performed by: OBSTETRICS & GYNECOLOGY

## 2021-09-27 PROCEDURE — 88175 CYTOPATH C/V AUTO FLUID REDO: CPT | Mod: HCNC | Performed by: OBSTETRICS & GYNECOLOGY

## 2021-09-27 PROCEDURE — 99999 PR PBB SHADOW E&M-EST. PATIENT-LVL V: ICD-10-PCS | Mod: PBBFAC,HCNC,, | Performed by: OBSTETRICS & GYNECOLOGY

## 2021-09-27 PROCEDURE — 87491 CHLMYD TRACH DNA AMP PROBE: CPT | Mod: HCNC | Performed by: OBSTETRICS & GYNECOLOGY

## 2021-09-27 PROCEDURE — 3078F DIAST BP <80 MM HG: CPT | Mod: HCNC,CPTII,S$GLB, | Performed by: OBSTETRICS & GYNECOLOGY

## 2021-09-27 PROCEDURE — G0101 PR CA SCREEN;PELVIC/BREAST EXAM: ICD-10-PCS | Mod: HCNC,S$GLB,, | Performed by: OBSTETRICS & GYNECOLOGY

## 2021-09-27 PROCEDURE — 87591 N.GONORRHOEAE DNA AMP PROB: CPT | Mod: HCNC | Performed by: OBSTETRICS & GYNECOLOGY

## 2021-09-27 PROCEDURE — 87661 TRICHOMONAS VAGINALIS AMPLIF: CPT | Mod: HCNC,59 | Performed by: OBSTETRICS & GYNECOLOGY

## 2021-09-27 PROCEDURE — 3008F PR BODY MASS INDEX (BMI) DOCUMENTED: ICD-10-PCS | Mod: HCNC,CPTII,S$GLB, | Performed by: OBSTETRICS & GYNECOLOGY

## 2021-09-27 PROCEDURE — 87624 HPV HI-RISK TYP POOLED RSLT: CPT | Mod: HCNC | Performed by: OBSTETRICS & GYNECOLOGY

## 2021-09-27 PROCEDURE — 1159F PR MEDICATION LIST DOCUMENTED IN MEDICAL RECORD: ICD-10-PCS | Mod: HCNC,CPTII,S$GLB, | Performed by: OBSTETRICS & GYNECOLOGY

## 2021-09-27 PROCEDURE — 99999 PR PBB SHADOW E&M-EST. PATIENT-LVL V: CPT | Mod: PBBFAC,HCNC,, | Performed by: OBSTETRICS & GYNECOLOGY

## 2021-09-27 PROCEDURE — 3078F PR MOST RECENT DIASTOLIC BLOOD PRESSURE < 80 MM HG: ICD-10-PCS | Mod: HCNC,CPTII,S$GLB, | Performed by: OBSTETRICS & GYNECOLOGY

## 2021-09-28 LAB
BACTERIAL VAGINOSIS DNA: NEGATIVE
C TRACH DNA SPEC QL NAA+PROBE: NOT DETECTED
CANDIDA GLABRATA DNA: NEGATIVE
CANDIDA KRUSEI DNA: NEGATIVE
CANDIDA RRNA VAG QL PROBE: NEGATIVE
N GONORRHOEA DNA SPEC QL NAA+PROBE: NOT DETECTED
T VAGINALIS RRNA GENITAL QL PROBE: NEGATIVE

## 2021-09-29 ENCOUNTER — HOSPITAL ENCOUNTER (OUTPATIENT)
Dept: RADIOLOGY | Facility: HOSPITAL | Age: 41
Discharge: HOME | End: 2021-09-29
Attending: OBSTETRICS & GYNECOLOGY
Payer: MEDICARE

## 2021-09-29 DIAGNOSIS — N93.9 ABNORMAL UTERINE BLEEDING (AUB): ICD-10-CM

## 2021-09-29 PROCEDURE — 76856 US PELVIS COMP WITH TRANSVAG NON-OB (XPD): ICD-10-PCS | Mod: 26,HCNC,, | Performed by: RADIOLOGY

## 2021-09-29 PROCEDURE — 76856 US EXAM PELVIC COMPLETE: CPT | Mod: 26,HCNC,, | Performed by: RADIOLOGY

## 2021-09-29 PROCEDURE — 76830 US PELVIS COMP WITH TRANSVAG NON-OB (XPD): ICD-10-PCS | Mod: 26,HCNC,, | Performed by: RADIOLOGY

## 2021-09-29 PROCEDURE — 76856 US EXAM PELVIC COMPLETE: CPT | Mod: TC,HCNC

## 2021-09-29 PROCEDURE — 76830 TRANSVAGINAL US NON-OB: CPT | Mod: 26,HCNC,, | Performed by: RADIOLOGY

## 2021-10-02 LAB
CLINICAL INFO: NORMAL
CYTO CVX: NORMAL
CYTOLOGIST CVX/VAG CYTO: NORMAL
CYTOLOGIST CVX/VAG CYTO: NORMAL
CYTOLOGY CMNT CVX/VAG CYTO-IMP: NORMAL
CYTOLOGY PAP THIN PREP EXPLANATION: NORMAL
DATE OF PREVIOUS PAP: NO
DATE PREVIOUS BX: NO
GEN CATEG CVX/VAG CYTO-IMP: NORMAL
HPV I/H RISK 4 DNA CVX QL NAA+PROBE: NOT DETECTED
LMP START DATE: NORMAL
MICROORGANISM CVX/VAG CYTO: NORMAL
PATHOLOGIST CVX/VAG CYTO: NORMAL
SERVICE CMNT-IMP: NORMAL
SPECIMEN SOURCE CVX/VAG CYTO: NORMAL
STAT OF ADQ CVX/VAG CYTO-IMP: NORMAL

## 2021-10-07 RX ORDER — QUETIAPINE FUMARATE 25 MG/1
25 TABLET, FILM COATED ORAL NIGHTLY
Qty: 30 TABLET | Refills: 1 | OUTPATIENT
Start: 2021-10-07

## 2021-10-07 RX ORDER — QUETIAPINE FUMARATE 25 MG/1
TABLET, FILM COATED ORAL
Qty: 30 TABLET | Refills: 1 | Status: SHIPPED | OUTPATIENT
Start: 2021-10-07 | End: 2021-11-08

## 2021-10-12 DIAGNOSIS — N39.41 URGE INCONTINENCE: ICD-10-CM

## 2021-10-12 DIAGNOSIS — F19.939 WITHDRAWAL FROM OTHER PSYCHOACTIVE SUBSTANCE: ICD-10-CM

## 2021-10-12 DIAGNOSIS — R11.0 NAUSEA: ICD-10-CM

## 2021-10-12 DIAGNOSIS — K31.84 GASTROPARESIS: ICD-10-CM

## 2021-10-12 DIAGNOSIS — R11.10 VOMITING, INTRACTABILITY OF VOMITING NOT SPECIFIED, PRESENCE OF NAUSEA NOT SPECIFIED, UNSPECIFIED VOMITING TYPE: ICD-10-CM

## 2021-10-12 RX ORDER — PROMETHAZINE HYDROCHLORIDE 25 MG/1
25 TABLET ORAL EVERY 6 HOURS PRN
Qty: 20 TABLET | Refills: 0 | Status: SHIPPED | OUTPATIENT
Start: 2021-10-12 | End: 2021-11-05 | Stop reason: SDUPTHER

## 2021-10-12 RX ORDER — OXYBUTYNIN CHLORIDE 5 MG/1
5 TABLET, EXTENDED RELEASE ORAL DAILY
Qty: 30 TABLET | Refills: 0 | OUTPATIENT
Start: 2021-10-12

## 2021-10-14 ENCOUNTER — OFFICE VISIT (OUTPATIENT)
Dept: PHYSICAL MEDICINE AND REHAB | Facility: CLINIC | Age: 41
End: 2021-10-14
Payer: MEDICARE

## 2021-10-14 VITALS
HEART RATE: 108 BPM | SYSTOLIC BLOOD PRESSURE: 108 MMHG | WEIGHT: 87 LBS | BODY MASS INDEX: 15.41 KG/M2 | HEIGHT: 63 IN | DIASTOLIC BLOOD PRESSURE: 63 MMHG

## 2021-10-14 DIAGNOSIS — R25.2 SPASTICITY: Primary | ICD-10-CM

## 2021-10-14 PROCEDURE — 3008F BODY MASS INDEX DOCD: CPT | Mod: HCNC,CPTII,S$GLB, | Performed by: PHYSICAL MEDICINE & REHABILITATION

## 2021-10-14 PROCEDURE — 3008F PR BODY MASS INDEX (BMI) DOCUMENTED: ICD-10-PCS | Mod: HCNC,CPTII,S$GLB, | Performed by: PHYSICAL MEDICINE & REHABILITATION

## 2021-10-14 PROCEDURE — 99999 PR PBB SHADOW E&M-EST. PATIENT-LVL III: CPT | Mod: PBBFAC,HCNC,, | Performed by: PHYSICAL MEDICINE & REHABILITATION

## 2021-10-14 PROCEDURE — 99999 PR PBB SHADOW E&M-EST. PATIENT-LVL III: ICD-10-PCS | Mod: PBBFAC,HCNC,, | Performed by: PHYSICAL MEDICINE & REHABILITATION

## 2021-10-14 PROCEDURE — 3078F PR MOST RECENT DIASTOLIC BLOOD PRESSURE < 80 MM HG: ICD-10-PCS | Mod: HCNC,CPTII,S$GLB, | Performed by: PHYSICAL MEDICINE & REHABILITATION

## 2021-10-14 PROCEDURE — 64642 CHEMODENERV 1 EXTREMITY 1-4: CPT | Mod: HCNC,S$GLB,, | Performed by: PHYSICAL MEDICINE & REHABILITATION

## 2021-10-14 PROCEDURE — 99499 UNLISTED E&M SERVICE: CPT | Mod: HCNC,S$GLB,, | Performed by: PHYSICAL MEDICINE & REHABILITATION

## 2021-10-14 PROCEDURE — 76942 PR U/S GUIDANCE FOR NEEDLE GUIDANCE: ICD-10-PCS | Mod: HCNC,S$GLB,, | Performed by: PHYSICAL MEDICINE & REHABILITATION

## 2021-10-14 PROCEDURE — 1159F MED LIST DOCD IN RCRD: CPT | Mod: HCNC,CPTII,S$GLB, | Performed by: PHYSICAL MEDICINE & REHABILITATION

## 2021-10-14 PROCEDURE — 64642 PR CHEMODENERV ONE EXTREMITY; 1-4 MUSCLE(S): ICD-10-PCS | Mod: HCNC,S$GLB,, | Performed by: PHYSICAL MEDICINE & REHABILITATION

## 2021-10-14 PROCEDURE — 3074F PR MOST RECENT SYSTOLIC BLOOD PRESSURE < 130 MM HG: ICD-10-PCS | Mod: HCNC,CPTII,S$GLB, | Performed by: PHYSICAL MEDICINE & REHABILITATION

## 2021-10-14 PROCEDURE — 99499 NO LOS: ICD-10-PCS | Mod: HCNC,S$GLB,, | Performed by: PHYSICAL MEDICINE & REHABILITATION

## 2021-10-14 PROCEDURE — 76942 ECHO GUIDE FOR BIOPSY: CPT | Mod: HCNC,S$GLB,, | Performed by: PHYSICAL MEDICINE & REHABILITATION

## 2021-10-14 PROCEDURE — 3078F DIAST BP <80 MM HG: CPT | Mod: HCNC,CPTII,S$GLB, | Performed by: PHYSICAL MEDICINE & REHABILITATION

## 2021-10-14 PROCEDURE — 3074F SYST BP LT 130 MM HG: CPT | Mod: HCNC,CPTII,S$GLB, | Performed by: PHYSICAL MEDICINE & REHABILITATION

## 2021-10-14 PROCEDURE — 1159F PR MEDICATION LIST DOCUMENTED IN MEDICAL RECORD: ICD-10-PCS | Mod: HCNC,CPTII,S$GLB, | Performed by: PHYSICAL MEDICINE & REHABILITATION

## 2021-10-22 ENCOUNTER — HOSPITAL ENCOUNTER (EMERGENCY)
Facility: HOSPITAL | Age: 41
Discharge: PSYCHIATRIC OR SUBSTANCE USE FACILITY | End: 2021-10-22
Attending: EMERGENCY MEDICINE
Payer: MEDICARE

## 2021-10-22 ENCOUNTER — OFFICE VISIT (OUTPATIENT)
Dept: PSYCHIATRY | Facility: CLINIC | Age: 41
End: 2021-10-22
Payer: MEDICARE

## 2021-10-22 VITALS
HEIGHT: 63 IN | DIASTOLIC BLOOD PRESSURE: 85 MMHG | WEIGHT: 87.44 LBS | BODY MASS INDEX: 15.49 KG/M2 | SYSTOLIC BLOOD PRESSURE: 130 MMHG | HEART RATE: 94 BPM

## 2021-10-22 VITALS
HEART RATE: 63 BPM | DIASTOLIC BLOOD PRESSURE: 76 MMHG | HEIGHT: 63 IN | WEIGHT: 87 LBS | RESPIRATION RATE: 16 BRPM | SYSTOLIC BLOOD PRESSURE: 118 MMHG | TEMPERATURE: 98 F | OXYGEN SATURATION: 100 % | BODY MASS INDEX: 15.41 KG/M2

## 2021-10-22 DIAGNOSIS — G47.00 INSOMNIA, UNSPECIFIED TYPE: ICD-10-CM

## 2021-10-22 DIAGNOSIS — N30.00 ACUTE CYSTITIS WITHOUT HEMATURIA: ICD-10-CM

## 2021-10-22 DIAGNOSIS — F12.20 CANNABIS USE DISORDER, SEVERE, DEPENDENCE: ICD-10-CM

## 2021-10-22 DIAGNOSIS — F31.4 BIPOLAR 1 DISORDER, DEPRESSED, SEVERE: ICD-10-CM

## 2021-10-22 DIAGNOSIS — R45.851 DEPRESSION WITH SUICIDAL IDEATION: ICD-10-CM

## 2021-10-22 DIAGNOSIS — F41.9 ANXIETY: ICD-10-CM

## 2021-10-22 DIAGNOSIS — F32.A DEPRESSION WITH SUICIDAL IDEATION: ICD-10-CM

## 2021-10-22 DIAGNOSIS — Z00.8 MEDICAL CLEARANCE FOR PSYCHIATRIC ADMISSION: Primary | ICD-10-CM

## 2021-10-22 LAB
ALBUMIN SERPL BCP-MCNC: 4.4 G/DL (ref 3.5–5.2)
ALP SERPL-CCNC: 48 U/L (ref 55–135)
ALT SERPL W/O P-5'-P-CCNC: 13 U/L (ref 10–44)
AMPHET+METHAMPHET UR QL: NEGATIVE
AMPHET+METHAMPHET UR QL: NEGATIVE
ANION GAP SERPL CALC-SCNC: 6 MMOL/L (ref 8–16)
APAP SERPL-MCNC: <10 UG/ML (ref 10–20)
AST SERPL-CCNC: 18 U/L (ref 10–40)
B-HCG UR QL: NEGATIVE
BACTERIA #/AREA URNS HPF: NEGATIVE /HPF
BARBITURATES UR QL SCN>200 NG/ML: NEGATIVE
BARBITURATES UR QL SCN>200 NG/ML: NEGATIVE
BASOPHILS # BLD AUTO: 0.04 K/UL (ref 0–0.2)
BASOPHILS NFR BLD: 0.8 % (ref 0–1.9)
BENZODIAZ UR QL SCN>200 NG/ML: NEGATIVE
BENZODIAZ UR QL SCN>200 NG/ML: NEGATIVE
BILIRUB SERPL-MCNC: 0.5 MG/DL (ref 0.1–1)
BILIRUB UR QL STRIP: NEGATIVE
BILIRUB UR QL STRIP: NEGATIVE
BUN SERPL-MCNC: 15 MG/DL (ref 6–20)
BZE UR QL SCN: NEGATIVE
BZE UR QL SCN: NEGATIVE
CALCIUM SERPL-MCNC: 9 MG/DL (ref 8.7–10.5)
CANNABINOIDS UR QL SCN: ABNORMAL
CANNABINOIDS UR QL SCN: ABNORMAL
CHLORIDE SERPL-SCNC: 111 MMOL/L (ref 95–110)
CLARITY UR: ABNORMAL
CLARITY UR: ABNORMAL
CO2 SERPL-SCNC: 22 MMOL/L (ref 23–29)
COLOR UR: ABNORMAL
COLOR UR: ABNORMAL
CREAT SERPL-MCNC: 1.6 MG/DL (ref 0.5–1.4)
CREAT UR-MCNC: 42 MG/DL (ref 15–325)
CREAT UR-MCNC: 42 MG/DL (ref 15–325)
CTP QC/QA: YES
DIFFERENTIAL METHOD: ABNORMAL
EOSINOPHIL # BLD AUTO: 0 K/UL (ref 0–0.5)
EOSINOPHIL NFR BLD: 0.2 % (ref 0–8)
ERYTHROCYTE [DISTWIDTH] IN BLOOD BY AUTOMATED COUNT: 13.1 % (ref 11.5–14.5)
EST. GFR  (AFRICAN AMERICAN): 46.5 ML/MIN/1.73 M^2
EST. GFR  (NON AFRICAN AMERICAN): 40.3 ML/MIN/1.73 M^2
ETHANOL SERPL-MCNC: <5 MG/DL
GLUCOSE SERPL-MCNC: 86 MG/DL (ref 70–110)
GLUCOSE UR QL STRIP: NEGATIVE
GLUCOSE UR QL STRIP: NEGATIVE
HCT VFR BLD AUTO: 37.7 % (ref 37–48.5)
HGB BLD-MCNC: 12.5 G/DL (ref 12–16)
HGB UR QL STRIP: ABNORMAL
HGB UR QL STRIP: ABNORMAL
HYALINE CASTS #/AREA URNS LPF: 22 /LPF
IMM GRANULOCYTES # BLD AUTO: 0.01 K/UL (ref 0–0.04)
IMM GRANULOCYTES NFR BLD AUTO: 0.2 % (ref 0–0.5)
KETONES UR QL STRIP: NEGATIVE
KETONES UR QL STRIP: NEGATIVE
LEUKOCYTE ESTERASE UR QL STRIP: ABNORMAL
LEUKOCYTE ESTERASE UR QL STRIP: ABNORMAL
LYMPHOCYTES # BLD AUTO: 2 K/UL (ref 1–4.8)
LYMPHOCYTES NFR BLD: 39.6 % (ref 18–48)
MCH RBC QN AUTO: 33.2 PG (ref 27–31)
MCHC RBC AUTO-ENTMCNC: 33.2 G/DL (ref 32–36)
MCV RBC AUTO: 100 FL (ref 82–98)
MICROSCOPIC COMMENT: ABNORMAL
MONOCYTES # BLD AUTO: 0.4 K/UL (ref 0.3–1)
MONOCYTES NFR BLD: 7.1 % (ref 4–15)
NEUTROPHILS # BLD AUTO: 2.6 K/UL (ref 1.8–7.7)
NEUTROPHILS NFR BLD: 52.1 % (ref 38–73)
NITRITE UR QL STRIP: NEGATIVE
NITRITE UR QL STRIP: NEGATIVE
NRBC BLD-RTO: 0 /100 WBC
OPIATES UR QL SCN: NEGATIVE
OPIATES UR QL SCN: NEGATIVE
PCP UR QL SCN>25 NG/ML: NEGATIVE
PCP UR QL SCN>25 NG/ML: NEGATIVE
PH UR STRIP: 6 [PH] (ref 5–8)
PH UR STRIP: 6 [PH] (ref 5–8)
PLATELET # BLD AUTO: 211 K/UL (ref 150–450)
PMV BLD AUTO: 11.3 FL (ref 9.2–12.9)
POTASSIUM SERPL-SCNC: 4.7 MMOL/L (ref 3.5–5.1)
PROT SERPL-MCNC: 8 G/DL (ref 6–8.4)
PROT UR QL STRIP: ABNORMAL
PROT UR QL STRIP: ABNORMAL
RBC # BLD AUTO: 3.77 M/UL (ref 4–5.4)
RBC #/AREA URNS HPF: >100 /HPF (ref 0–4)
SALICYLATES SERPL-MCNC: <4 MG/DL (ref 15–30)
SARS-COV-2 RDRP RESP QL NAA+PROBE: NEGATIVE
SODIUM SERPL-SCNC: 139 MMOL/L (ref 136–145)
SP GR UR STRIP: 1.01 (ref 1–1.03)
SP GR UR STRIP: 1.01 (ref 1–1.03)
SQUAMOUS #/AREA URNS HPF: 1 /HPF
TOXICOLOGY INFORMATION: ABNORMAL
TOXICOLOGY INFORMATION: ABNORMAL
TSH SERPL DL<=0.005 MIU/L-ACNC: 0.83 UIU/ML (ref 0.34–5.6)
URN SPEC COLLECT METH UR: ABNORMAL
URN SPEC COLLECT METH UR: ABNORMAL
UROBILINOGEN UR STRIP-ACNC: NEGATIVE EU/DL
UROBILINOGEN UR STRIP-ACNC: NEGATIVE EU/DL
WBC # BLD AUTO: 4.93 K/UL (ref 3.9–12.7)
WBC #/AREA URNS HPF: 79 /HPF (ref 0–5)

## 2021-10-22 PROCEDURE — 1159F MED LIST DOCD IN RCRD: CPT | Mod: HCNC,CPTII,S$GLB, | Performed by: PSYCHIATRY & NEUROLOGY

## 2021-10-22 PROCEDURE — 3008F PR BODY MASS INDEX (BMI) DOCUMENTED: ICD-10-PCS | Mod: HCNC,CPTII,S$GLB, | Performed by: PSYCHIATRY & NEUROLOGY

## 2021-10-22 PROCEDURE — 1160F PR REVIEW ALL MEDS BY PRESCRIBER/CLIN PHARMACIST DOCUMENTED: ICD-10-PCS | Mod: HCNC,CPTII,S$GLB, | Performed by: PSYCHIATRY & NEUROLOGY

## 2021-10-22 PROCEDURE — 80143 DRUG ASSAY ACETAMINOPHEN: CPT | Performed by: EMERGENCY MEDICINE

## 2021-10-22 PROCEDURE — 82077 ASSAY SPEC XCP UR&BREATH IA: CPT | Performed by: EMERGENCY MEDICINE

## 2021-10-22 PROCEDURE — 80053 COMPREHEN METABOLIC PANEL: CPT | Performed by: EMERGENCY MEDICINE

## 2021-10-22 PROCEDURE — 84443 ASSAY THYROID STIM HORMONE: CPT | Performed by: EMERGENCY MEDICINE

## 2021-10-22 PROCEDURE — 3075F PR MOST RECENT SYSTOLIC BLOOD PRESS GE 130-139MM HG: ICD-10-PCS | Mod: HCNC,CPTII,S$GLB, | Performed by: PSYCHIATRY & NEUROLOGY

## 2021-10-22 PROCEDURE — 87186 SC STD MICRODIL/AGAR DIL: CPT | Performed by: EMERGENCY MEDICINE

## 2021-10-22 PROCEDURE — 3075F SYST BP GE 130 - 139MM HG: CPT | Mod: HCNC,CPTII,S$GLB, | Performed by: PSYCHIATRY & NEUROLOGY

## 2021-10-22 PROCEDURE — 25000003 PHARM REV CODE 250: Performed by: EMERGENCY MEDICINE

## 2021-10-22 PROCEDURE — 99999 PR PBB SHADOW E&M-EST. PATIENT-LVL III: ICD-10-PCS | Mod: PBBFAC,HCNC,, | Performed by: PSYCHIATRY & NEUROLOGY

## 2021-10-22 PROCEDURE — 87086 URINE CULTURE/COLONY COUNT: CPT | Performed by: EMERGENCY MEDICINE

## 2021-10-22 PROCEDURE — 99499 RISK ADDL DX/OHS AUDIT: ICD-10-PCS | Mod: HCNC,S$GLB,, | Performed by: PSYCHIATRY & NEUROLOGY

## 2021-10-22 PROCEDURE — U0002 COVID-19 LAB TEST NON-CDC: HCPCS | Performed by: EMERGENCY MEDICINE

## 2021-10-22 PROCEDURE — 81025 URINE PREGNANCY TEST: CPT | Performed by: EMERGENCY MEDICINE

## 2021-10-22 PROCEDURE — 3008F BODY MASS INDEX DOCD: CPT | Mod: HCNC,CPTII,S$GLB, | Performed by: PSYCHIATRY & NEUROLOGY

## 2021-10-22 PROCEDURE — 80179 DRUG ASSAY SALICYLATE: CPT | Performed by: EMERGENCY MEDICINE

## 2021-10-22 PROCEDURE — 3079F DIAST BP 80-89 MM HG: CPT | Mod: HCNC,CPTII,S$GLB, | Performed by: PSYCHIATRY & NEUROLOGY

## 2021-10-22 PROCEDURE — 99285 EMERGENCY DEPT VISIT HI MDM: CPT

## 2021-10-22 PROCEDURE — 87077 CULTURE AEROBIC IDENTIFY: CPT | Performed by: EMERGENCY MEDICINE

## 2021-10-22 PROCEDURE — 99215 OFFICE O/P EST HI 40 MIN: CPT | Mod: HCNC,S$GLB,, | Performed by: PSYCHIATRY & NEUROLOGY

## 2021-10-22 PROCEDURE — 1160F RVW MEDS BY RX/DR IN RCRD: CPT | Mod: HCNC,CPTII,S$GLB, | Performed by: PSYCHIATRY & NEUROLOGY

## 2021-10-22 PROCEDURE — 81001 URINALYSIS AUTO W/SCOPE: CPT | Mod: 59 | Performed by: EMERGENCY MEDICINE

## 2021-10-22 PROCEDURE — 99215 PR OFFICE/OUTPT VISIT, EST, LEVL V, 40-54 MIN: ICD-10-PCS | Mod: HCNC,S$GLB,, | Performed by: PSYCHIATRY & NEUROLOGY

## 2021-10-22 PROCEDURE — 99499 UNLISTED E&M SERVICE: CPT | Mod: HCNC,S$GLB,, | Performed by: PSYCHIATRY & NEUROLOGY

## 2021-10-22 PROCEDURE — 99999 PR PBB SHADOW E&M-EST. PATIENT-LVL III: CPT | Mod: PBBFAC,HCNC,, | Performed by: PSYCHIATRY & NEUROLOGY

## 2021-10-22 PROCEDURE — 3079F PR MOST RECENT DIASTOLIC BLOOD PRESSURE 80-89 MM HG: ICD-10-PCS | Mod: HCNC,CPTII,S$GLB, | Performed by: PSYCHIATRY & NEUROLOGY

## 2021-10-22 PROCEDURE — 1159F PR MEDICATION LIST DOCUMENTED IN MEDICAL RECORD: ICD-10-PCS | Mod: HCNC,CPTII,S$GLB, | Performed by: PSYCHIATRY & NEUROLOGY

## 2021-10-22 PROCEDURE — 85025 COMPLETE CBC W/AUTO DIFF WBC: CPT | Performed by: EMERGENCY MEDICINE

## 2021-10-22 PROCEDURE — 80307 DRUG TEST PRSMV CHEM ANLYZR: CPT | Performed by: EMERGENCY MEDICINE

## 2021-10-22 RX ORDER — CEFUROXIME AXETIL 250 MG/1
500 TABLET ORAL EVERY 12 HOURS
Status: DISCONTINUED | OUTPATIENT
Start: 2021-10-22 | End: 2021-10-23 | Stop reason: HOSPADM

## 2021-10-22 RX ORDER — CEFUROXIME AXETIL 500 MG/1
500 TABLET ORAL EVERY 12 HOURS
Qty: 20 TABLET | Refills: 0 | Status: SHIPPED | OUTPATIENT
Start: 2021-10-22 | End: 2021-11-18 | Stop reason: ALTCHOICE

## 2021-10-22 RX ORDER — DIVALPROEX SODIUM 250 MG/1
500 TABLET, DELAYED RELEASE ORAL NIGHTLY
COMMUNITY
End: 2021-11-08 | Stop reason: DRUGHIGH

## 2021-10-22 RX ADMIN — CEFUROXIME AXETIL 500 MG: 250 TABLET, FILM COATED ORAL at 06:10

## 2021-10-24 LAB — BACTERIA UR CULT: ABNORMAL

## 2021-11-04 ENCOUNTER — OFFICE VISIT (OUTPATIENT)
Dept: PHYSICAL MEDICINE AND REHAB | Facility: CLINIC | Age: 41
End: 2021-11-04
Payer: MEDICARE

## 2021-11-04 VITALS
BODY MASS INDEX: 15.41 KG/M2 | SYSTOLIC BLOOD PRESSURE: 96 MMHG | HEIGHT: 63 IN | DIASTOLIC BLOOD PRESSURE: 65 MMHG | WEIGHT: 87 LBS | HEART RATE: 73 BPM

## 2021-11-04 DIAGNOSIS — G81.90 HEMIPARESIS, UNSPECIFIED HEMIPARESIS ETIOLOGY, UNSPECIFIED LATERALITY: Primary | ICD-10-CM

## 2021-11-04 DIAGNOSIS — R25.2 SPASTICITY: ICD-10-CM

## 2021-11-04 PROCEDURE — 99213 OFFICE O/P EST LOW 20 MIN: CPT | Mod: HCNC,S$GLB,, | Performed by: PHYSICAL MEDICINE & REHABILITATION

## 2021-11-04 PROCEDURE — 99213 PR OFFICE/OUTPT VISIT, EST, LEVL III, 20-29 MIN: ICD-10-PCS | Mod: HCNC,S$GLB,, | Performed by: PHYSICAL MEDICINE & REHABILITATION

## 2021-11-04 PROCEDURE — 3074F PR MOST RECENT SYSTOLIC BLOOD PRESSURE < 130 MM HG: ICD-10-PCS | Mod: HCNC,CPTII,S$GLB, | Performed by: PHYSICAL MEDICINE & REHABILITATION

## 2021-11-04 PROCEDURE — 3008F PR BODY MASS INDEX (BMI) DOCUMENTED: ICD-10-PCS | Mod: HCNC,CPTII,S$GLB, | Performed by: PHYSICAL MEDICINE & REHABILITATION

## 2021-11-04 PROCEDURE — 3078F DIAST BP <80 MM HG: CPT | Mod: HCNC,CPTII,S$GLB, | Performed by: PHYSICAL MEDICINE & REHABILITATION

## 2021-11-04 PROCEDURE — 1159F MED LIST DOCD IN RCRD: CPT | Mod: HCNC,CPTII,S$GLB, | Performed by: PHYSICAL MEDICINE & REHABILITATION

## 2021-11-04 PROCEDURE — 3074F SYST BP LT 130 MM HG: CPT | Mod: HCNC,CPTII,S$GLB, | Performed by: PHYSICAL MEDICINE & REHABILITATION

## 2021-11-04 PROCEDURE — 99999 PR PBB SHADOW E&M-EST. PATIENT-LVL IV: CPT | Mod: PBBFAC,HCNC,, | Performed by: PHYSICAL MEDICINE & REHABILITATION

## 2021-11-04 PROCEDURE — 99999 PR PBB SHADOW E&M-EST. PATIENT-LVL IV: ICD-10-PCS | Mod: PBBFAC,HCNC,, | Performed by: PHYSICAL MEDICINE & REHABILITATION

## 2021-11-04 PROCEDURE — 3008F BODY MASS INDEX DOCD: CPT | Mod: HCNC,CPTII,S$GLB, | Performed by: PHYSICAL MEDICINE & REHABILITATION

## 2021-11-04 PROCEDURE — 3078F PR MOST RECENT DIASTOLIC BLOOD PRESSURE < 80 MM HG: ICD-10-PCS | Mod: HCNC,CPTII,S$GLB, | Performed by: PHYSICAL MEDICINE & REHABILITATION

## 2021-11-04 PROCEDURE — 1159F PR MEDICATION LIST DOCUMENTED IN MEDICAL RECORD: ICD-10-PCS | Mod: HCNC,CPTII,S$GLB, | Performed by: PHYSICAL MEDICINE & REHABILITATION

## 2021-11-04 RX ORDER — LIDOCAINE AND PRILOCAINE 25; 25 MG/G; MG/G
CREAM TOPICAL
Qty: 30 G | Refills: 6 | Status: SHIPPED | OUTPATIENT
Start: 2021-11-04 | End: 2021-12-04

## 2021-11-08 ENCOUNTER — PATIENT MESSAGE (OUTPATIENT)
Dept: PSYCHIATRY | Facility: CLINIC | Age: 41
End: 2021-11-08
Payer: MEDICARE

## 2021-11-08 RX ORDER — ARIPIPRAZOLE 5 MG/1
5 TABLET ORAL DAILY
Qty: 30 TABLET | Refills: 0 | Status: SHIPPED | OUTPATIENT
Start: 2021-11-08 | End: 2021-11-18 | Stop reason: SDUPTHER

## 2021-11-11 ENCOUNTER — CLINICAL SUPPORT (OUTPATIENT)
Dept: REHABILITATION | Facility: HOSPITAL | Age: 41
End: 2021-11-11
Attending: PHYSICAL MEDICINE & REHABILITATION
Payer: MEDICARE

## 2021-11-11 DIAGNOSIS — R27.9 LACK OF COORDINATION: ICD-10-CM

## 2021-11-11 DIAGNOSIS — G81.90 HEMIPARESIS, UNSPECIFIED HEMIPARESIS ETIOLOGY, UNSPECIFIED LATERALITY: ICD-10-CM

## 2021-11-11 DIAGNOSIS — R25.2 SPASTICITY: ICD-10-CM

## 2021-11-11 PROCEDURE — 97165 OT EVAL LOW COMPLEX 30 MIN: CPT | Mod: HCNC,PN

## 2021-11-15 ENCOUNTER — CLINICAL SUPPORT (OUTPATIENT)
Dept: REHABILITATION | Facility: HOSPITAL | Age: 41
End: 2021-11-15
Attending: PHYSICAL MEDICINE & REHABILITATION
Payer: MEDICARE

## 2021-11-15 DIAGNOSIS — M62.81 MUSCLE WEAKNESS OF UPPER EXTREMITY: ICD-10-CM

## 2021-11-15 DIAGNOSIS — R68.89 WORSENING FUNCTIONAL ENDURANCE: ICD-10-CM

## 2021-11-15 DIAGNOSIS — R29.898 REDUCED HAND STRENGTH: ICD-10-CM

## 2021-11-15 DIAGNOSIS — R27.9 LACK OF COORDINATION: ICD-10-CM

## 2021-11-15 PROCEDURE — 97110 THERAPEUTIC EXERCISES: CPT | Mod: HCNC,PN

## 2021-11-15 PROCEDURE — 97140 MANUAL THERAPY 1/> REGIONS: CPT | Mod: HCNC,PN

## 2021-11-17 PROBLEM — R27.9 LACK OF COORDINATION: Status: ACTIVE | Noted: 2021-11-17

## 2021-11-17 PROBLEM — M62.81 MUSCLE WEAKNESS OF UPPER EXTREMITY: Status: ACTIVE | Noted: 2021-11-17

## 2021-11-17 PROBLEM — R68.89 WORSENING FUNCTIONAL ENDURANCE: Status: ACTIVE | Noted: 2021-11-17

## 2021-11-17 PROBLEM — R29.898 REDUCED HAND STRENGTH: Status: ACTIVE | Noted: 2021-11-17

## 2021-11-18 ENCOUNTER — OFFICE VISIT (OUTPATIENT)
Dept: PSYCHIATRY | Facility: CLINIC | Age: 41
End: 2021-11-18
Payer: MEDICARE

## 2021-11-18 VITALS
BODY MASS INDEX: 15.73 KG/M2 | HEART RATE: 112 BPM | HEIGHT: 63 IN | DIASTOLIC BLOOD PRESSURE: 82 MMHG | SYSTOLIC BLOOD PRESSURE: 119 MMHG | WEIGHT: 88.75 LBS

## 2021-11-18 DIAGNOSIS — F12.20 CANNABIS USE DISORDER, SEVERE, DEPENDENCE: ICD-10-CM

## 2021-11-18 DIAGNOSIS — F41.9 ANXIETY: ICD-10-CM

## 2021-11-18 DIAGNOSIS — F31.75 BIPOLAR 1 DISORDER, DEPRESSED, PARTIAL REMISSION: ICD-10-CM

## 2021-11-18 DIAGNOSIS — Z79.899 HIGH RISK MEDICATION USE: ICD-10-CM

## 2021-11-18 DIAGNOSIS — G47.00 INSOMNIA, UNSPECIFIED TYPE: ICD-10-CM

## 2021-11-18 PROCEDURE — 3079F DIAST BP 80-89 MM HG: CPT | Mod: HCNC,CPTII,S$GLB, | Performed by: PSYCHIATRY & NEUROLOGY

## 2021-11-18 PROCEDURE — 3074F SYST BP LT 130 MM HG: CPT | Mod: HCNC,CPTII,S$GLB, | Performed by: PSYCHIATRY & NEUROLOGY

## 2021-11-18 PROCEDURE — 3008F PR BODY MASS INDEX (BMI) DOCUMENTED: ICD-10-PCS | Mod: HCNC,CPTII,S$GLB, | Performed by: PSYCHIATRY & NEUROLOGY

## 2021-11-18 PROCEDURE — 99214 OFFICE O/P EST MOD 30 MIN: CPT | Mod: HCNC,S$GLB,, | Performed by: PSYCHIATRY & NEUROLOGY

## 2021-11-18 PROCEDURE — 1160F RVW MEDS BY RX/DR IN RCRD: CPT | Mod: HCNC,CPTII,S$GLB, | Performed by: PSYCHIATRY & NEUROLOGY

## 2021-11-18 PROCEDURE — 3008F BODY MASS INDEX DOCD: CPT | Mod: HCNC,CPTII,S$GLB, | Performed by: PSYCHIATRY & NEUROLOGY

## 2021-11-18 PROCEDURE — 1159F PR MEDICATION LIST DOCUMENTED IN MEDICAL RECORD: ICD-10-PCS | Mod: HCNC,CPTII,S$GLB, | Performed by: PSYCHIATRY & NEUROLOGY

## 2021-11-18 PROCEDURE — 99499 RISK ADDL DX/OHS AUDIT: ICD-10-PCS | Mod: HCNC,S$GLB,, | Performed by: PSYCHIATRY & NEUROLOGY

## 2021-11-18 PROCEDURE — 99214 PR OFFICE/OUTPT VISIT, EST, LEVL IV, 30-39 MIN: ICD-10-PCS | Mod: HCNC,S$GLB,, | Performed by: PSYCHIATRY & NEUROLOGY

## 2021-11-18 PROCEDURE — 3074F PR MOST RECENT SYSTOLIC BLOOD PRESSURE < 130 MM HG: ICD-10-PCS | Mod: HCNC,CPTII,S$GLB, | Performed by: PSYCHIATRY & NEUROLOGY

## 2021-11-18 PROCEDURE — 3079F PR MOST RECENT DIASTOLIC BLOOD PRESSURE 80-89 MM HG: ICD-10-PCS | Mod: HCNC,CPTII,S$GLB, | Performed by: PSYCHIATRY & NEUROLOGY

## 2021-11-18 PROCEDURE — 99999 PR PBB SHADOW E&M-EST. PATIENT-LVL III: ICD-10-PCS | Mod: PBBFAC,HCNC,, | Performed by: PSYCHIATRY & NEUROLOGY

## 2021-11-18 PROCEDURE — 99999 PR PBB SHADOW E&M-EST. PATIENT-LVL III: CPT | Mod: PBBFAC,HCNC,, | Performed by: PSYCHIATRY & NEUROLOGY

## 2021-11-18 PROCEDURE — 1160F PR REVIEW ALL MEDS BY PRESCRIBER/CLIN PHARMACIST DOCUMENTED: ICD-10-PCS | Mod: HCNC,CPTII,S$GLB, | Performed by: PSYCHIATRY & NEUROLOGY

## 2021-11-18 PROCEDURE — 99499 UNLISTED E&M SERVICE: CPT | Mod: HCNC,S$GLB,, | Performed by: PSYCHIATRY & NEUROLOGY

## 2021-11-18 PROCEDURE — 1159F MED LIST DOCD IN RCRD: CPT | Mod: HCNC,CPTII,S$GLB, | Performed by: PSYCHIATRY & NEUROLOGY

## 2021-11-18 RX ORDER — MIRTAZAPINE 30 MG/1
TABLET, FILM COATED ORAL
Qty: 30 TABLET | Refills: 2 | Status: SHIPPED | OUTPATIENT
Start: 2021-11-18 | End: 2022-01-31 | Stop reason: SDUPTHER

## 2021-11-18 RX ORDER — TRAZODONE HYDROCHLORIDE 100 MG/1
TABLET ORAL
Qty: 60 TABLET | Refills: 2 | Status: SHIPPED | OUTPATIENT
Start: 2021-11-18 | End: 2022-01-31 | Stop reason: SDUPTHER

## 2021-11-18 RX ORDER — ARIPIPRAZOLE 5 MG/1
5 TABLET ORAL DAILY
Qty: 30 TABLET | Refills: 2 | Status: SHIPPED | OUTPATIENT
Start: 2021-11-18 | End: 2022-01-31 | Stop reason: DRUGHIGH

## 2021-11-18 RX ORDER — DIVALPROEX SODIUM 250 MG/1
TABLET, DELAYED RELEASE ORAL
Qty: 90 TABLET | Refills: 2 | Status: SHIPPED | OUTPATIENT
Start: 2021-11-18 | End: 2022-01-31 | Stop reason: SDUPTHER

## 2021-11-19 ENCOUNTER — CLINICAL SUPPORT (OUTPATIENT)
Dept: REHABILITATION | Facility: HOSPITAL | Age: 41
End: 2021-11-19
Attending: PHYSICAL MEDICINE & REHABILITATION
Payer: MEDICARE

## 2021-11-19 DIAGNOSIS — R27.9 LACK OF COORDINATION: Primary | ICD-10-CM

## 2021-11-19 DIAGNOSIS — M62.81 MUSCLE WEAKNESS OF UPPER EXTREMITY: ICD-10-CM

## 2021-11-19 DIAGNOSIS — R68.89 WORSENING FUNCTIONAL ENDURANCE: ICD-10-CM

## 2021-11-19 DIAGNOSIS — R29.898 REDUCED HAND STRENGTH: ICD-10-CM

## 2021-11-19 PROCEDURE — 97530 THERAPEUTIC ACTIVITIES: CPT | Mod: HCNC,PN

## 2021-11-19 PROCEDURE — 97110 THERAPEUTIC EXERCISES: CPT | Mod: HCNC,PN

## 2021-11-19 PROCEDURE — 97140 MANUAL THERAPY 1/> REGIONS: CPT | Mod: HCNC,PN

## 2021-11-23 ENCOUNTER — CLINICAL SUPPORT (OUTPATIENT)
Dept: REHABILITATION | Facility: HOSPITAL | Age: 41
End: 2021-11-23
Attending: PHYSICAL MEDICINE & REHABILITATION
Payer: MEDICARE

## 2021-11-23 DIAGNOSIS — R27.9 LACK OF COORDINATION: Primary | ICD-10-CM

## 2021-11-23 PROCEDURE — 97140 MANUAL THERAPY 1/> REGIONS: CPT | Mod: HCNC,PN

## 2021-11-23 PROCEDURE — 97110 THERAPEUTIC EXERCISES: CPT | Mod: HCNC,PN

## 2021-11-23 PROCEDURE — 97530 THERAPEUTIC ACTIVITIES: CPT | Mod: HCNC,PN

## 2021-11-26 ENCOUNTER — CLINICAL SUPPORT (OUTPATIENT)
Dept: REHABILITATION | Facility: HOSPITAL | Age: 41
End: 2021-11-26
Attending: PHYSICAL MEDICINE & REHABILITATION
Payer: MEDICARE

## 2021-11-26 DIAGNOSIS — R27.9 LACK OF COORDINATION: Primary | ICD-10-CM

## 2021-11-26 PROCEDURE — 97530 THERAPEUTIC ACTIVITIES: CPT | Mod: HCNC,PN

## 2021-11-27 ENCOUNTER — PATIENT OUTREACH (OUTPATIENT)
Dept: ADMINISTRATIVE | Facility: OTHER | Age: 41
End: 2021-11-27
Payer: MEDICARE

## 2021-11-30 ENCOUNTER — CLINICAL SUPPORT (OUTPATIENT)
Dept: REHABILITATION | Facility: HOSPITAL | Age: 41
End: 2021-11-30
Attending: PHYSICAL MEDICINE & REHABILITATION
Payer: MEDICARE

## 2021-11-30 DIAGNOSIS — M62.81 MUSCLE WEAKNESS OF UPPER EXTREMITY: ICD-10-CM

## 2021-11-30 DIAGNOSIS — R29.898 REDUCED HAND STRENGTH: ICD-10-CM

## 2021-11-30 DIAGNOSIS — R27.9 LACK OF COORDINATION: Primary | ICD-10-CM

## 2021-11-30 PROCEDURE — 97530 THERAPEUTIC ACTIVITIES: CPT | Mod: KX,HCNC,PN

## 2021-11-30 PROCEDURE — 97110 THERAPEUTIC EXERCISES: CPT | Mod: KX,HCNC,PN

## 2021-12-01 DIAGNOSIS — F19.939 WITHDRAWAL FROM OTHER PSYCHOACTIVE SUBSTANCE: ICD-10-CM

## 2021-12-01 DIAGNOSIS — R11.0 NAUSEA: ICD-10-CM

## 2021-12-01 DIAGNOSIS — R11.10 VOMITING, INTRACTABILITY OF VOMITING NOT SPECIFIED, PRESENCE OF NAUSEA NOT SPECIFIED, UNSPECIFIED VOMITING TYPE: ICD-10-CM

## 2021-12-01 RX ORDER — PROMETHAZINE HYDROCHLORIDE 25 MG/1
TABLET ORAL
Qty: 20 TABLET | Refills: 0 | Status: CANCELLED | OUTPATIENT
Start: 2021-12-01

## 2021-12-02 ENCOUNTER — OFFICE VISIT (OUTPATIENT)
Dept: FAMILY MEDICINE | Facility: CLINIC | Age: 41
End: 2021-12-02
Payer: MEDICARE

## 2021-12-02 ENCOUNTER — PATIENT MESSAGE (OUTPATIENT)
Dept: FAMILY MEDICINE | Facility: CLINIC | Age: 41
End: 2021-12-02

## 2021-12-02 DIAGNOSIS — R11.2 NAUSEA AND VOMITING, INTRACTABILITY OF VOMITING NOT SPECIFIED, UNSPECIFIED VOMITING TYPE: Primary | ICD-10-CM

## 2021-12-02 DIAGNOSIS — K31.84 GASTROPARESIS: ICD-10-CM

## 2021-12-02 PROCEDURE — 99213 PR OFFICE/OUTPT VISIT, EST, LEVL III, 20-29 MIN: ICD-10-PCS | Mod: HCNC,95,, | Performed by: NURSE PRACTITIONER

## 2021-12-02 PROCEDURE — 99213 OFFICE O/P EST LOW 20 MIN: CPT | Mod: HCNC,95,, | Performed by: NURSE PRACTITIONER

## 2021-12-02 RX ORDER — PROMETHAZINE HYDROCHLORIDE 25 MG/1
25 TABLET ORAL EVERY 6 HOURS PRN
Qty: 20 TABLET | Refills: 1 | Status: SHIPPED | OUTPATIENT
Start: 2021-12-02 | End: 2022-02-01 | Stop reason: SDUPTHER

## 2021-12-03 ENCOUNTER — TELEPHONE (OUTPATIENT)
Dept: GASTROENTEROLOGY | Facility: CLINIC | Age: 41
End: 2021-12-03
Payer: MEDICARE

## 2021-12-07 ENCOUNTER — CLINICAL SUPPORT (OUTPATIENT)
Dept: REHABILITATION | Facility: HOSPITAL | Age: 41
End: 2021-12-07
Attending: PHYSICAL MEDICINE & REHABILITATION
Payer: MEDICARE

## 2021-12-07 DIAGNOSIS — R27.9 LACK OF COORDINATION: Primary | ICD-10-CM

## 2021-12-07 DIAGNOSIS — M62.81 MUSCLE WEAKNESS OF UPPER EXTREMITY: ICD-10-CM

## 2021-12-07 DIAGNOSIS — R29.898 REDUCED HAND STRENGTH: ICD-10-CM

## 2021-12-07 PROCEDURE — 97530 THERAPEUTIC ACTIVITIES: CPT | Mod: KX,HCNC,PN

## 2021-12-07 PROCEDURE — 97110 THERAPEUTIC EXERCISES: CPT | Mod: KX,HCNC,PN

## 2021-12-09 ENCOUNTER — CLINICAL SUPPORT (OUTPATIENT)
Dept: REHABILITATION | Facility: HOSPITAL | Age: 41
End: 2021-12-09
Attending: PHYSICAL MEDICINE & REHABILITATION
Payer: MEDICARE

## 2021-12-09 DIAGNOSIS — M62.81 MUSCLE WEAKNESS OF UPPER EXTREMITY: ICD-10-CM

## 2021-12-09 DIAGNOSIS — R68.89 WORSENING FUNCTIONAL ENDURANCE: ICD-10-CM

## 2021-12-09 DIAGNOSIS — R27.9 LACK OF COORDINATION: Primary | ICD-10-CM

## 2021-12-09 DIAGNOSIS — G81.90 HEMIPARESIS, UNSPECIFIED HEMIPARESIS ETIOLOGY, UNSPECIFIED LATERALITY: ICD-10-CM

## 2021-12-09 DIAGNOSIS — R29.898 REDUCED HAND STRENGTH: ICD-10-CM

## 2021-12-09 PROCEDURE — 97110 THERAPEUTIC EXERCISES: CPT | Mod: KX,HCNC,PN

## 2021-12-09 PROCEDURE — 97530 THERAPEUTIC ACTIVITIES: CPT | Mod: KX,HCNC,PN

## 2021-12-14 ENCOUNTER — PATIENT MESSAGE (OUTPATIENT)
Dept: FAMILY MEDICINE | Facility: CLINIC | Age: 41
End: 2021-12-14
Payer: MEDICARE

## 2021-12-14 ENCOUNTER — CLINICAL SUPPORT (OUTPATIENT)
Dept: REHABILITATION | Facility: HOSPITAL | Age: 41
End: 2021-12-14
Attending: PHYSICAL MEDICINE & REHABILITATION
Payer: MEDICARE

## 2021-12-14 DIAGNOSIS — R29.898 REDUCED HAND STRENGTH: ICD-10-CM

## 2021-12-14 DIAGNOSIS — M62.81 MUSCLE WEAKNESS OF UPPER EXTREMITY: ICD-10-CM

## 2021-12-14 DIAGNOSIS — R27.9 LACK OF COORDINATION: Primary | ICD-10-CM

## 2021-12-14 PROCEDURE — 97530 THERAPEUTIC ACTIVITIES: CPT | Mod: KX,HCNC,PN

## 2021-12-15 ENCOUNTER — OFFICE VISIT (OUTPATIENT)
Dept: FAMILY MEDICINE | Facility: CLINIC | Age: 41
End: 2021-12-15
Payer: MEDICARE

## 2021-12-15 VITALS
SYSTOLIC BLOOD PRESSURE: 112 MMHG | HEIGHT: 63 IN | HEART RATE: 107 BPM | WEIGHT: 87.5 LBS | TEMPERATURE: 98 F | DIASTOLIC BLOOD PRESSURE: 74 MMHG | OXYGEN SATURATION: 97 % | BODY MASS INDEX: 15.5 KG/M2

## 2021-12-15 DIAGNOSIS — R05.9 COUGH: ICD-10-CM

## 2021-12-15 DIAGNOSIS — J06.9 URI, ACUTE: Primary | ICD-10-CM

## 2021-12-15 LAB
INFLUENZA A, MOLECULAR: NEGATIVE
INFLUENZA B, MOLECULAR: NEGATIVE
SPECIMEN SOURCE: NORMAL

## 2021-12-15 PROCEDURE — U0005 INFEC AGEN DETEC AMPLI PROBE: HCPCS | Performed by: PHYSICIAN ASSISTANT

## 2021-12-15 PROCEDURE — 99213 PR OFFICE/OUTPT VISIT, EST, LEVL III, 20-29 MIN: ICD-10-PCS | Mod: HCNC,S$GLB,, | Performed by: PHYSICIAN ASSISTANT

## 2021-12-15 PROCEDURE — 99999 PR PBB SHADOW E&M-EST. PATIENT-LVL IV: ICD-10-PCS | Mod: PBBFAC,HCNC,, | Performed by: PHYSICIAN ASSISTANT

## 2021-12-15 PROCEDURE — U0003 INFECTIOUS AGENT DETECTION BY NUCLEIC ACID (DNA OR RNA); SEVERE ACUTE RESPIRATORY SYNDROME CORONAVIRUS 2 (SARS-COV-2) (CORONAVIRUS DISEASE [COVID-19]), AMPLIFIED PROBE TECHNIQUE, MAKING USE OF HIGH THROUGHPUT TECHNOLOGIES AS DESCRIBED BY CMS-2020-01-R: HCPCS | Mod: HCNC | Performed by: PHYSICIAN ASSISTANT

## 2021-12-15 PROCEDURE — 87502 INFLUENZA DNA AMP PROBE: CPT | Mod: HCNC,PO | Performed by: PHYSICIAN ASSISTANT

## 2021-12-15 PROCEDURE — 99999 PR PBB SHADOW E&M-EST. PATIENT-LVL IV: CPT | Mod: PBBFAC,HCNC,, | Performed by: PHYSICIAN ASSISTANT

## 2021-12-15 PROCEDURE — 99213 OFFICE O/P EST LOW 20 MIN: CPT | Mod: HCNC,S$GLB,, | Performed by: PHYSICIAN ASSISTANT

## 2021-12-15 RX ORDER — FLUTICASONE PROPIONATE 50 MCG
1 SPRAY, SUSPENSION (ML) NASAL DAILY
Qty: 16 G | Refills: 0 | Status: SHIPPED | OUTPATIENT
Start: 2021-12-15 | End: 2022-10-06

## 2021-12-15 RX ORDER — BENZONATATE 200 MG/1
200 CAPSULE ORAL 3 TIMES DAILY PRN
Qty: 30 CAPSULE | Refills: 0 | Status: SHIPPED | OUTPATIENT
Start: 2021-12-15 | End: 2021-12-25

## 2021-12-16 LAB
SARS-COV-2 RNA RESP QL NAA+PROBE: NOT DETECTED
SARS-COV-2- CYCLE NUMBER: NORMAL

## 2021-12-20 ENCOUNTER — OFFICE VISIT (OUTPATIENT)
Dept: FAMILY MEDICINE | Facility: CLINIC | Age: 41
End: 2021-12-20
Payer: MEDICARE

## 2021-12-20 VITALS
BODY MASS INDEX: 16.17 KG/M2 | HEART RATE: 100 BPM | OXYGEN SATURATION: 96 % | WEIGHT: 91.25 LBS | TEMPERATURE: 99 F | HEIGHT: 63 IN | SYSTOLIC BLOOD PRESSURE: 120 MMHG | DIASTOLIC BLOOD PRESSURE: 78 MMHG

## 2021-12-20 DIAGNOSIS — J02.9 SORE THROAT: ICD-10-CM

## 2021-12-20 DIAGNOSIS — B96.89 ACUTE BACTERIAL BRONCHITIS: Primary | ICD-10-CM

## 2021-12-20 DIAGNOSIS — J20.8 ACUTE BACTERIAL BRONCHITIS: Primary | ICD-10-CM

## 2021-12-20 PROCEDURE — 3078F DIAST BP <80 MM HG: CPT | Mod: HCNC,CPTII,S$GLB, | Performed by: NURSE PRACTITIONER

## 2021-12-20 PROCEDURE — 1159F PR MEDICATION LIST DOCUMENTED IN MEDICAL RECORD: ICD-10-PCS | Mod: HCNC,CPTII,S$GLB, | Performed by: NURSE PRACTITIONER

## 2021-12-20 PROCEDURE — 99999 PR PBB SHADOW E&M-EST. PATIENT-LVL IV: CPT | Mod: PBBFAC,HCNC,, | Performed by: NURSE PRACTITIONER

## 2021-12-20 PROCEDURE — 3078F PR MOST RECENT DIASTOLIC BLOOD PRESSURE < 80 MM HG: ICD-10-PCS | Mod: HCNC,CPTII,S$GLB, | Performed by: NURSE PRACTITIONER

## 2021-12-20 PROCEDURE — 3008F BODY MASS INDEX DOCD: CPT | Mod: HCNC,CPTII,S$GLB, | Performed by: NURSE PRACTITIONER

## 2021-12-20 PROCEDURE — 99213 OFFICE O/P EST LOW 20 MIN: CPT | Mod: 25,HCNC,S$GLB, | Performed by: NURSE PRACTITIONER

## 2021-12-20 PROCEDURE — 3074F SYST BP LT 130 MM HG: CPT | Mod: HCNC,CPTII,S$GLB, | Performed by: NURSE PRACTITIONER

## 2021-12-20 PROCEDURE — 99213 PR OFFICE/OUTPT VISIT, EST, LEVL III, 20-29 MIN: ICD-10-PCS | Mod: 25,HCNC,S$GLB, | Performed by: NURSE PRACTITIONER

## 2021-12-20 PROCEDURE — 3008F PR BODY MASS INDEX (BMI) DOCUMENTED: ICD-10-PCS | Mod: HCNC,CPTII,S$GLB, | Performed by: NURSE PRACTITIONER

## 2021-12-20 PROCEDURE — 99999 PR PBB SHADOW E&M-EST. PATIENT-LVL IV: ICD-10-PCS | Mod: PBBFAC,HCNC,, | Performed by: NURSE PRACTITIONER

## 2021-12-20 PROCEDURE — 96372 THER/PROPH/DIAG INJ SC/IM: CPT | Mod: HCNC,S$GLB,, | Performed by: NURSE PRACTITIONER

## 2021-12-20 PROCEDURE — 3074F PR MOST RECENT SYSTOLIC BLOOD PRESSURE < 130 MM HG: ICD-10-PCS | Mod: HCNC,CPTII,S$GLB, | Performed by: NURSE PRACTITIONER

## 2021-12-20 PROCEDURE — 1160F PR REVIEW ALL MEDS BY PRESCRIBER/CLIN PHARMACIST DOCUMENTED: ICD-10-PCS | Mod: HCNC,CPTII,S$GLB, | Performed by: NURSE PRACTITIONER

## 2021-12-20 PROCEDURE — 1160F RVW MEDS BY RX/DR IN RCRD: CPT | Mod: HCNC,CPTII,S$GLB, | Performed by: NURSE PRACTITIONER

## 2021-12-20 PROCEDURE — 1159F MED LIST DOCD IN RCRD: CPT | Mod: HCNC,CPTII,S$GLB, | Performed by: NURSE PRACTITIONER

## 2021-12-20 PROCEDURE — 96372 PR INJECTION,THERAP/PROPH/DIAG2ST, IM OR SUBCUT: ICD-10-PCS | Mod: HCNC,S$GLB,, | Performed by: NURSE PRACTITIONER

## 2021-12-20 RX ORDER — BETAMETHASONE SODIUM PHOSPHATE AND BETAMETHASONE ACETATE 3; 3 MG/ML; MG/ML
6 INJECTION, SUSPENSION INTRA-ARTICULAR; INTRALESIONAL; INTRAMUSCULAR; SOFT TISSUE ONCE
Status: COMPLETED | OUTPATIENT
Start: 2021-12-20 | End: 2021-12-20

## 2021-12-20 RX ORDER — AZITHROMYCIN 250 MG/1
TABLET, FILM COATED ORAL
Qty: 6 TABLET | Refills: 0 | Status: SHIPPED | OUTPATIENT
Start: 2021-12-20 | End: 2021-12-25

## 2021-12-20 RX ADMIN — BETAMETHASONE SODIUM PHOSPHATE AND BETAMETHASONE ACETATE 6 MG: 3; 3 INJECTION, SUSPENSION INTRA-ARTICULAR; INTRALESIONAL; INTRAMUSCULAR; SOFT TISSUE at 10:12

## 2021-12-22 ENCOUNTER — OFFICE VISIT (OUTPATIENT)
Dept: GASTROENTEROLOGY | Facility: CLINIC | Age: 41
End: 2021-12-22
Payer: MEDICARE

## 2021-12-22 VITALS — RESPIRATION RATE: 18 BRPM | HEIGHT: 63 IN | BODY MASS INDEX: 16.21 KG/M2 | WEIGHT: 91.5 LBS

## 2021-12-22 DIAGNOSIS — R10.31 RLQ ABDOMINAL PAIN: ICD-10-CM

## 2021-12-22 DIAGNOSIS — K31.84 GASTROPARESIS: ICD-10-CM

## 2021-12-22 DIAGNOSIS — Z87.19 HISTORY OF GASTROESOPHAGEAL REFLUX (GERD): ICD-10-CM

## 2021-12-22 DIAGNOSIS — Z79.01 ANTICOAGULANT LONG-TERM USE: ICD-10-CM

## 2021-12-22 DIAGNOSIS — R63.4 WEIGHT LOSS: ICD-10-CM

## 2021-12-22 DIAGNOSIS — R11.2 NON-INTRACTABLE VOMITING WITH NAUSEA: Primary | ICD-10-CM

## 2021-12-22 DIAGNOSIS — R19.7 DIARRHEA, UNSPECIFIED TYPE: ICD-10-CM

## 2021-12-22 PROCEDURE — 99214 PR OFFICE/OUTPT VISIT, EST, LEVL IV, 30-39 MIN: ICD-10-PCS | Mod: HCNC,S$GLB,, | Performed by: NURSE PRACTITIONER

## 2021-12-22 PROCEDURE — 1160F RVW MEDS BY RX/DR IN RCRD: CPT | Mod: HCNC,CPTII,S$GLB, | Performed by: NURSE PRACTITIONER

## 2021-12-22 PROCEDURE — 1159F PR MEDICATION LIST DOCUMENTED IN MEDICAL RECORD: ICD-10-PCS | Mod: HCNC,CPTII,S$GLB, | Performed by: NURSE PRACTITIONER

## 2021-12-22 PROCEDURE — 1160F PR REVIEW ALL MEDS BY PRESCRIBER/CLIN PHARMACIST DOCUMENTED: ICD-10-PCS | Mod: HCNC,CPTII,S$GLB, | Performed by: NURSE PRACTITIONER

## 2021-12-22 PROCEDURE — 99999 PR PBB SHADOW E&M-EST. PATIENT-LVL V: CPT | Mod: PBBFAC,HCNC,, | Performed by: NURSE PRACTITIONER

## 2021-12-22 PROCEDURE — 3008F PR BODY MASS INDEX (BMI) DOCUMENTED: ICD-10-PCS | Mod: HCNC,CPTII,S$GLB, | Performed by: NURSE PRACTITIONER

## 2021-12-22 PROCEDURE — 3008F BODY MASS INDEX DOCD: CPT | Mod: HCNC,CPTII,S$GLB, | Performed by: NURSE PRACTITIONER

## 2021-12-22 PROCEDURE — 99999 PR PBB SHADOW E&M-EST. PATIENT-LVL V: ICD-10-PCS | Mod: PBBFAC,HCNC,, | Performed by: NURSE PRACTITIONER

## 2021-12-22 PROCEDURE — 99214 OFFICE O/P EST MOD 30 MIN: CPT | Mod: HCNC,S$GLB,, | Performed by: NURSE PRACTITIONER

## 2021-12-22 PROCEDURE — 1159F MED LIST DOCD IN RCRD: CPT | Mod: HCNC,CPTII,S$GLB, | Performed by: NURSE PRACTITIONER

## 2021-12-22 RX ORDER — FAMOTIDINE 40 MG/1
40 TABLET, FILM COATED ORAL NIGHTLY
Qty: 30 TABLET | Refills: 3 | Status: SHIPPED | OUTPATIENT
Start: 2021-12-22 | End: 2022-05-16

## 2021-12-22 NOTE — H&P (VIEW-ONLY)
Subjective:       Patient ID: Cat Denson is a 41 y.o. female Body mass index is 16.21 kg/m².    Chief Complaint: GI Problem (Nausea & vomiting)    Established patient of Dr. Colin & myself (last in 2017).    Patient reports she is planning on moving to [place] in a few months.    GI Problem  The primary symptoms include weight loss (lost ~10 lbs over the past year, patient has gained a few pounds back recently but her BMI is still <19; drinking protein shakes 3 a day), abdominal pain (occasional RLQ abdominal pain for the past few days; described as sharp when it occurs, lasts a few minutes, denies currently), nausea (phenergan 25 mg PRN), vomiting and diarrhea. Primary symptoms do not include fever, melena, hematochezia or dysuria.   Nausea began more than 1 week ago (started several years ago; chronic intermittent, occurs daily).   The vomiting began more than 2 days ago. Frequency: occurs ~twice a week. The emesis contains undigested food and bilious material.   The diarrhea began more than 1 week ago (chronic for several years). The diarrhea is semi-solid. The diarrhea occurs 2 to 4 times per day. Risk factors: antibiotic use currently for bronchitis with azithromycin.   The illness does not include dysphagia, odynophagia, bloating or constipation. Associated symptoms comments: PAST TREATMENT: zofran; prilosec, zantac (used to take a night and helped her symptoms, but stopped when removed off the market), reglan- took for awhile but caused jaw clenching and uncontrollable movements so she stopped. Significant associated medical issues include GERD (protonix 40 mg once daily).     Review of Systems   Constitutional: Positive for appetite change (decreased; eats a few bites of each meal throughout the day, & snacking) and weight loss (lost ~10 lbs over the past year, patient has gained a few pounds back recently but her BMI is still <19; drinking protein shakes 3 a day). Negative for fever.   HENT:  Negative for trouble swallowing.    Respiratory: Negative for choking and shortness of breath.    Gastrointestinal: Positive for abdominal pain (occasional RLQ abdominal pain for the past few days; described as sharp when it occurs, lasts a few minutes, denies currently), diarrhea, nausea (phenergan 25 mg PRN) and vomiting. Negative for anal bleeding, bloating, blood in stool, constipation, dysphagia, hematochezia, melena and rectal pain.   Genitourinary: Negative for difficulty urinating, dysuria, flank pain, frequency, pelvic pain and urgency.   Neurological: Negative for light-headedness.       Patient's last menstrual period was 12/02/2021.    Past Medical History:   Diagnosis Date    Acid reflux     Allergy     Anemia     Anticoagulated 12/29/2015    Anxiety     Asthma     Bipolar 1 disorder     Bronchospasm with bronchitis, acute     Chronic daily headache 9/11/2018    Chronic kidney disease     Chronic pain     Depression     bipolar 2    Fibromyalgia     Gastroparesis     History of right MCA stroke 11/25/2015    Hx of psychiatric care     Lactose intolerance     Lupus     Mixed hyperlipidemia 11/28/2018    Psychiatric problem     Renal tubular acidosis     Seizure     Sjogren's syndrome     Smoker     Stroke 11-    Substance abuse-kratom daily use 8/30/2020    Suicide attempt     overdosed on a bottle of paxil, muscle relaxers, & zoloft    Therapy      Past Surgical History:   Procedure Laterality Date    CHOLECYSTECTOMY      COLONOSCOPY  11/12/2014    Dr. Colin, repeat at 52 years old for screening    FLUOROSCOPIC URODYNAMIC STUDY N/A 7/23/2019    Procedure: URODYNAMIC STUDY, FLUOROSCOPIC;  Surgeon: Vanna Martinez MD;  Location: [facility] OR [address] [place] [address];  Service: Urology;  Laterality: N/A;  1 hour    UPPER GASTROINTESTINAL ENDOSCOPY  03/03/2017    Dr. Harris     Family History   Problem Relation Age of Onset    Hypertension Mother     Hyperlipidemia Mother      Psoriasis Mother     Thyroid disease Mother     No Known Problems Father     Post-traumatic stress disorder Brother     Drug abuse Brother     Diabetes Maternal Uncle     Hypertension Maternal Uncle     Alcohol abuse Maternal Uncle     Stroke Maternal Uncle     Scoliosis Paternal Aunt     Heart disease Paternal Uncle     Mental illness Maternal Grandmother     Cancer Maternal Grandmother         lung / brain - smoker    Drug abuse Maternal Grandmother     Hypertension Maternal Grandmother     Hyperlipidemia Maternal Grandmother     Diabetes Maternal Grandmother     Rheum arthritis Maternal Grandmother     Diabetes Mellitus Maternal Grandmother     Heart disease Maternal Grandfather     Hypertension Maternal Grandfather     Alcohol abuse Maternal Grandfather     Osteoarthritis Maternal Grandfather     No Known Problems Paternal Grandmother     Mental illness Paternal Grandfather     Early death Paternal Grandfather         self    Colon cancer Neg Hx     Colon polyps Neg Hx     Crohn's disease Neg Hx     Ulcerative colitis Neg Hx     Celiac disease Neg Hx     Lupus Neg Hx     Kidney disease Neg Hx     Inflammatory bowel disease Neg Hx     Depression Neg Hx     COPD Neg Hx     Asthma Neg Hx     Macular degeneration Neg Hx     Retinal detachment Neg Hx     Glaucoma Neg Hx      Social History     Tobacco Use    Smoking status: Current Every Day Smoker     Packs/day: 1.00     Years: 15.00     Pack years: 15.00     Types: Cigarettes     Start date: 11/25/2015    Smokeless tobacco: Never Used   Substance Use Topics    Alcohol use: No     Alcohol/week: 0.0 standard drinks    Drug use: Yes     Types: Marijuana     Comment: smokes occasionally, helps with pain and appetite     Wt Readings from Last 20 Encounters:   12/22/21 41.5 kg (91 lb 7.9 oz)   12/20/21 41.4 kg (91 lb 4.3 oz)   12/15/21 39.7 kg (87 lb 8.4 oz)   11/04/21 39.5 kg (87 lb)   10/22/21 39.5 kg (87 lb)   10/14/21 39.5  kg (87 lb)   09/27/21 39.5 kg (87 lb)   09/22/21 40.4 kg (89 lb)   09/18/21 40.4 kg (89 lb)   08/18/21 40.5 kg (89 lb 4.6 oz)   05/07/21 44.1 kg (97 lb 3.6 oz)   03/09/21 46.4 kg (102 lb 4.7 oz)   11/19/20 45.9 kg (101 lb 3.1 oz)   11/13/20 45.9 kg (101 lb 3.1 oz)   09/04/20 45.4 kg (100 lb)   09/01/20 45.4 kg (100 lb 1.4 oz)   08/29/20 45.4 kg (100 lb)   08/18/20 44.1 kg (97 lb 3.6 oz)   11/25/19 49.8 kg (109 lb 12.6 oz)   11/04/19 44 kg (97 lb)     Lab Results   Component Value Date    WBC 4.93 10/22/2021    HGB 12.5 10/22/2021    HCT 37.7 10/22/2021     (H) 10/22/2021     10/22/2021     CMP  Sodium   Date Value Ref Range Status   10/22/2021 139 136 - 145 mmol/L Final     Potassium   Date Value Ref Range Status   10/22/2021 4.7 3.5 - 5.1 mmol/L Final     Chloride   Date Value Ref Range Status   10/22/2021 111 (H) 95 - 110 mmol/L Final     CO2   Date Value Ref Range Status   10/22/2021 22 (L) 23 - 29 mmol/L Final     Glucose   Date Value Ref Range Status   10/22/2021 86 70 - 110 mg/dL Final     BUN   Date Value Ref Range Status   10/22/2021 15 6 - 20 mg/dL Final     Creatinine   Date Value Ref Range Status   10/22/2021 1.6 (H) 0.5 - 1.4 mg/dL Final     Calcium   Date Value Ref Range Status   10/22/2021 9.0 8.7 - 10.5 mg/dL Final     Total Protein   Date Value Ref Range Status   10/22/2021 8.0 6.0 - 8.4 g/dL Final     Albumin   Date Value Ref Range Status   10/22/2021 4.4 3.5 - 5.2 g/dL Final     Total Bilirubin   Date Value Ref Range Status   10/22/2021 0.5 0.1 - 1.0 mg/dL Final     Comment:     For infants and newborns, interpretation of results should be based  on gestational age, weight and in agreement with clinical  observations.    Premature Infant recommended reference ranges:  Up to 24 hours.............<8.0 mg/dL  Up to 48 hours............<12.0 mg/dL  3-5 days..................<15.0 mg/dL  6-29 days.................<15.0 mg/dL       Alkaline Phosphatase   Date Value Ref Range Status  "  10/22/2021 48 (L) 55 - 135 U/L Final     AST   Date Value Ref Range Status   10/22/2021 18 10 - 40 U/L Final     ALT   Date Value Ref Range Status   10/22/2021 13 10 - 44 U/L Final     Anion Gap   Date Value Ref Range Status   10/22/2021 6 (L) 8 - 16 mmol/L Final     eGFR if    Date Value Ref Range Status   10/22/2021 46.5 (A) >60 mL/min/1.73 m^2 Final     eGFR if non    Date Value Ref Range Status   10/22/2021 40.3 (A) >60 mL/min/1.73 m^2 Final     Comment:     Calculation used to obtain the estimated glomerular filtration  rate (eGFR) is the CKD-EPI equation.        Lab Results   Component Value Date    LIPASE 39 03/12/2017     Lab Results   Component Value Date    TSH 0.830 10/22/2021     Reviewed prior medical records including radiology report of 9/29/2021 pelvic ultrasound; 8/19/2020 limited abdominal ultrasound; 7/22/2019 CT abdomen;   6/13/2017 gastric emptying study (delayed); 6/12/17 ct abdomen pelvis, & endoscopy history (see surgical history).    11/12/14 Colonoscopy was reviewed and procedure report states:   " Findings:       The perianal examination was normal.       Non-bleeding internal hemorrhoids were found during retroflexion and        were medium-sized.       The rectum, sigmoid colon, descending colon, transverse colon,        ascending colon, cecum, appendiceal orifice and ileocecal valve        appeared normal. Biopsies were taken with a cold forceps from the        right colon and left colon for evaluation of microscopic colitis.       The terminal ileum appeared normal.  Impression:          - Non-bleeding internal hemorrhoids.                       - The rectum, sigmoid colon, descending colon,                        transverse colon, ascending colon, cecum,                        appendiceal orifice and ileocecal valve are normal.                        Biopsied.                       - The examined portion of the ileum was normal.  Recommendation:     " " - Patient has a contact number available for                        emergencies. The signs and symptoms of potential                        delayed complications were discussed with the                        patient. Return to normal activities tomorrow.                        Written discharge instructions were provided to the                        patient.                       - High fiber diet.                       - Continue present medications.                       - Repeat colonoscopy at age 52 for screening                        purposes.                       - Discharge patient to home (ambulatory).                       - Await pathology results.                       - Return to nurse practitioner after studies are                        complete. ".  Biopsy results:   "1-2. COLON; BIOPSY:  UNREMARKABLE COLONIC MUCOSA.  NO EVIDENCE OF MICROSCOPIC COLITIS.  NO ACUTE COLITIS.  NO EVIDENCE OF CHRONIC INJURY.  NEGATIVE FOR ADENOMA."    3/3/17 EGD was reviewed and procedure report states:   " Impression:          - Normal esophagus.                       - Gastritis. Biopsied.                       - Normal examined duodenum.                       - Patient with reported hematemesis without a drop                        in hemoglobin with mild gastritis on exam (biopsied).  Recommendation:      - [facility].                       - Advance diet as tolerated.                       - Continue present medications.                       - Await pathology results.                       - Return to primary care physician as previously                        scheduled. ".  Biopsy results:   "Stomach, antrum and body, biopsy:  Mild chronic nonactive gastritis.  Negative for H. pylori by routine stain.  Negative for intestinal metaplasia and dysplasia."  Objective:      Physical Exam  Vitals and nursing note reviewed.   Constitutional:       General: She is not in acute " distress.     Appearance: She is well-developed. She is cachectic. She is not diaphoretic.   HENT:      Mouth/Throat:      Comments: Patient is wearing a face mask, which covers patient's mouth and nose, due to COVID 19 concerns.  Eyes:      General: No scleral icterus.     Conjunctiva/sclera: Conjunctivae normal.      Pupils: Pupils are equal, round, and reactive to light.   Pulmonary:      Effort: Pulmonary effort is normal. No respiratory distress.      Breath sounds: Normal breath sounds. No wheezing.   Abdominal:      General: Bowel sounds are normal. There is no distension or abdominal bruit.      Palpations: Abdomen is soft. Abdomen is not rigid. There is no mass.      Tenderness: There is no abdominal tenderness. There is no guarding or rebound. Negative signs include Richards's sign and McBurney's sign.   Skin:     General: Skin is warm and dry.      Coloration: Skin is not pale.      Findings: No erythema or rash.      Comments: Non-jaundiced   Neurological:      Mental Status: She is alert and oriented to person, place, and time.   Psychiatric:         Behavior: Behavior normal.         Thought Content: Thought content normal.         Judgment: Judgment normal.         Assessment:       1. Non-intractable vomiting with nausea    2. Gastroparesis    3. Adult BMI <19 kg/sq m    4. Weight loss    5. RLQ abdominal pain    6. Diarrhea, unspecified type    7. History of gastroesophageal reflux (GERD)    8. Anticoagulant long-term use        Plan:       Non-intractable vomiting with nausea  - START    famotidine (PEPCID) 40 MG tablet; Take 1 tablet (40 mg total) by mouth every evening.  Dispense: 30 tablet; Refill: 3  - CONTINUE PHENERGAN PRN AS DIRECTED  -     CT Chest Abdomen Pelvis Without Contrast (XPD); Future; Expected date: 12/22/2021  - schedule EGD, discussed procedure with patient, including risks and benefits, patient verbalized understanding    Gastroparesis  -     CT Chest Abdomen Pelvis Without  Contrast (XPD); Future; Expected date: 12/22/2021  Discussed diagnosis and possibly etiologies and that it can be idiopathetic, may also improve over time or can be lifelong, reviewed AVS educational material on diagnosis and given to patient, patient verbalized understanding  Recommend small frequent meals instead of 3 big meals a day, low fat meals & low residue diet.  Avoid: high fiber (insoluble fiber), red meats, dairy, and non-digestible solids (peels, fruit pulp, etc). Avoid NSAIDs and narcotics.  - schedule EGD, discussed procedure with patient, including risks and benefits, patient verbalized understanding  -Discussed about possible medical therapy and discussed that this would be managed by one of the GI doctors, lastly surgical options are available at [facility] (Dr. Justin Hein), patient verbalized understanding    Adult BMI <19 kg/sq m & Weight loss  - schedule EGD, discussed procedure with patient, including risks and benefits, patient verbalized understanding  - schedule Colonoscopy, discussed procedure with the patient, including risks and benefits, patient verbalized understanding  -     CT Chest Abdomen Pelvis Without Contrast (XPD); Future; Expected date: 12/22/2021    RLQ abdominal pain  -     CT Chest Abdomen Pelvis Without Contrast (XPD); Future; Expected date: 12/22/2021    Diarrhea, unspecified type  -     Pancreatic elastase, fecal; Future; Expected date: 12/22/2021  -     Stool Exam-Ova,Cysts,Parasites; Future; Expected date: 12/22/2021  -     Fecal fat, qualitative; Future; Expected date: 12/22/2021  -     Giardia / Cryptosporidum, EIA; Future; Expected date: 12/22/2021  -     pH, stool; Future; Expected date: 12/22/2021  -     Rotavirus antigen, stool; Future; Expected date: 12/22/2021  -     WBC, Stool; Future; Expected date: 12/22/2021  -     Stool culture; Future; Expected date: 12/22/2021  -     Clostridium difficile EIA; Future; Expected date: 12/22/2021  -     Adenovirus  Molecular Detection, PCR, Non-Blood Stool; Future; Expected date: 12/22/2021  -     CT Chest Abdomen Pelvis Without Contrast (XPD); Future; Expected date: 12/22/2021  - schedule EGD, discussed procedure with patient, including risks and benefits, patient verbalized understanding  - recommended OTC probiotic, such as Align or Culturelle, as directed  - avoid lactose & caffeine  - avoid known triggers  - recommended OTC probiotic, such as Florastor or Culturelle, as directed  - avoid lactose & caffeine  - avoid known triggers  - Possible COLONOSCOPY pending results of testing and if symptoms persist    History of gastroesophageal reflux (GERD)  -   START  famotidine (PEPCID) 40 MG tablet; Take 1 tablet (40 mg total) by mouth every evening.  Dispense: 30 tablet; Refill: 3  - CONTINUE PROTONIX 40 MG ONCE DAILY AS DIRECTED  - avoid/minimize use of NSAIDs- since they can cause GI upset, bleeding and/or ulcers. If NSAID must be taken, recommend take with food.  - schedule EGD, discussed procedure with patient, including risks and benefits, patient verbalized understanding    Anticoagulant long-term use  - informed patient that the anticoagulant(s) will likely need to be held for endoscopy, nurse will confirm with endoscopist, cardiologist, and/or PCP.    Follow up in about 1 month (around 1/22/2022), or if symptoms worsen or fail to improve, for follow-up with Dr. Colin for continued evaluation and management.    If no improvement in symptoms or symptoms worsen, call/follow-up at clinic or go to ER.        46 minutes of total time spent on the encounter, which includes face to face time and non-face to face time preparing to see the patient (eg, review of tests), Obtaining and/or reviewing separately obtained history, Documenting clinical information in the electronic or other health record, Independently interpreting results (not separately reported) and communicating results to the patient/family/caregiver, or Care  coordination (not separately reported).

## 2021-12-23 ENCOUNTER — CLINICAL SUPPORT (OUTPATIENT)
Dept: REHABILITATION | Facility: HOSPITAL | Age: 41
End: 2021-12-23
Attending: PHYSICAL MEDICINE & REHABILITATION
Payer: MEDICARE

## 2021-12-23 DIAGNOSIS — R29.898 REDUCED HAND STRENGTH: ICD-10-CM

## 2021-12-23 DIAGNOSIS — R27.9 LACK OF COORDINATION: Primary | ICD-10-CM

## 2021-12-23 DIAGNOSIS — M62.81 MUSCLE WEAKNESS OF UPPER EXTREMITY: ICD-10-CM

## 2021-12-23 PROCEDURE — 97530 THERAPEUTIC ACTIVITIES: CPT | Mod: KX,HCNC,PN

## 2021-12-28 ENCOUNTER — CLINICAL SUPPORT (OUTPATIENT)
Dept: REHABILITATION | Facility: HOSPITAL | Age: 41
End: 2021-12-28
Attending: PHYSICAL MEDICINE & REHABILITATION
Payer: MEDICARE

## 2021-12-28 ENCOUNTER — HOSPITAL ENCOUNTER (OUTPATIENT)
Dept: RADIOLOGY | Facility: HOSPITAL | Age: 41
Discharge: HOME | End: 2021-12-28
Attending: NURSE PRACTITIONER
Payer: MEDICARE

## 2021-12-28 ENCOUNTER — TELEPHONE (OUTPATIENT)
Dept: GASTROENTEROLOGY | Facility: CLINIC | Age: 41
End: 2021-12-28
Payer: MEDICARE

## 2021-12-28 DIAGNOSIS — R63.4 WEIGHT LOSS: ICD-10-CM

## 2021-12-28 DIAGNOSIS — R10.31 RLQ ABDOMINAL PAIN: ICD-10-CM

## 2021-12-28 DIAGNOSIS — R11.2 NON-INTRACTABLE VOMITING WITH NAUSEA: ICD-10-CM

## 2021-12-28 DIAGNOSIS — K31.84 GASTROPARESIS: ICD-10-CM

## 2021-12-28 DIAGNOSIS — R29.898 REDUCED HAND STRENGTH: ICD-10-CM

## 2021-12-28 DIAGNOSIS — N83.201 CYST OF RIGHT OVARY: Primary | ICD-10-CM

## 2021-12-28 DIAGNOSIS — R19.7 DIARRHEA, UNSPECIFIED TYPE: ICD-10-CM

## 2021-12-28 DIAGNOSIS — M62.81 MUSCLE WEAKNESS OF UPPER EXTREMITY: ICD-10-CM

## 2021-12-28 DIAGNOSIS — R27.9 LACK OF COORDINATION: Primary | ICD-10-CM

## 2021-12-28 PROCEDURE — 74176 CT ABD & PELVIS W/O CONTRAST: CPT | Mod: 26,HCNC,, | Performed by: RADIOLOGY

## 2021-12-28 PROCEDURE — 74176 CT CHEST ABDOMEN PELVIS WITHOUT CONTRAST(XPD): ICD-10-PCS | Mod: 26,HCNC,, | Performed by: RADIOLOGY

## 2021-12-28 PROCEDURE — 71250 CT THORAX DX C-: CPT | Mod: TC,HCNC

## 2021-12-28 PROCEDURE — 74176 CT ABD & PELVIS W/O CONTRAST: CPT | Mod: TC,HCNC

## 2021-12-28 PROCEDURE — 71250 CT CHEST ABDOMEN PELVIS WITHOUT CONTRAST(XPD): ICD-10-PCS | Mod: 26,HCNC,, | Performed by: RADIOLOGY

## 2021-12-28 PROCEDURE — 71250 CT THORAX DX C-: CPT | Mod: 26,HCNC,, | Performed by: RADIOLOGY

## 2021-12-28 PROCEDURE — 97110 THERAPEUTIC EXERCISES: CPT | Mod: KX,HCNC,PN

## 2021-12-29 ENCOUNTER — TELEPHONE (OUTPATIENT)
Dept: FAMILY MEDICINE | Facility: CLINIC | Age: 41
End: 2021-12-29
Payer: MEDICARE

## 2021-12-29 ENCOUNTER — TELEPHONE (OUTPATIENT)
Dept: GASTROENTEROLOGY | Facility: CLINIC | Age: 41
End: 2021-12-29
Payer: MEDICARE

## 2021-12-29 ENCOUNTER — PATIENT MESSAGE (OUTPATIENT)
Dept: GASTROENTEROLOGY | Facility: CLINIC | Age: 41
End: 2021-12-29
Payer: MEDICARE

## 2021-12-29 DIAGNOSIS — Z01.818 PREOP TESTING: ICD-10-CM

## 2021-12-30 ENCOUNTER — CLINICAL SUPPORT (OUTPATIENT)
Dept: REHABILITATION | Facility: HOSPITAL | Age: 41
End: 2021-12-30
Attending: PHYSICAL MEDICINE & REHABILITATION
Payer: MEDICARE

## 2021-12-30 DIAGNOSIS — R29.898 REDUCED HAND STRENGTH: ICD-10-CM

## 2021-12-30 DIAGNOSIS — M62.81 MUSCLE WEAKNESS OF UPPER EXTREMITY: ICD-10-CM

## 2021-12-30 DIAGNOSIS — R27.9 LACK OF COORDINATION: Primary | ICD-10-CM

## 2021-12-30 DIAGNOSIS — G81.90 HEMIPARESIS, UNSPECIFIED HEMIPARESIS ETIOLOGY, UNSPECIFIED LATERALITY: ICD-10-CM

## 2021-12-30 PROCEDURE — 97110 THERAPEUTIC EXERCISES: CPT | Mod: KX,HCNC,PN

## 2021-12-31 ENCOUNTER — LAB VISIT (OUTPATIENT)
Dept: PRIMARY CARE CLINIC | Facility: CLINIC | Age: 41
End: 2021-12-31
Payer: MEDICARE

## 2021-12-31 DIAGNOSIS — Z01.818 PREOP TESTING: ICD-10-CM

## 2021-12-31 PROCEDURE — U0003 INFECTIOUS AGENT DETECTION BY NUCLEIC ACID (DNA OR RNA); SEVERE ACUTE RESPIRATORY SYNDROME CORONAVIRUS 2 (SARS-COV-2) (CORONAVIRUS DISEASE [COVID-19]), AMPLIFIED PROBE TECHNIQUE, MAKING USE OF HIGH THROUGHPUT TECHNOLOGIES AS DESCRIBED BY CMS-2020-01-R: HCPCS | Mod: HCNC | Performed by: INTERNAL MEDICINE

## 2022-01-03 ENCOUNTER — ANESTHESIA (OUTPATIENT)
Dept: ENDOSCOPY | Facility: HOSPITAL | Age: 42
End: 2022-01-03
Payer: MEDICARE

## 2022-01-03 ENCOUNTER — ANESTHESIA EVENT (OUTPATIENT)
Dept: ENDOSCOPY | Facility: HOSPITAL | Age: 42
End: 2022-01-03
Payer: MEDICARE

## 2022-01-03 ENCOUNTER — HOSPITAL ENCOUNTER (OUTPATIENT)
Facility: HOSPITAL | Age: 42
Discharge: HOME | End: 2022-01-03
Attending: INTERNAL MEDICINE | Admitting: INTERNAL MEDICINE
Payer: MEDICARE

## 2022-01-03 DIAGNOSIS — K31.84 GASTROPARESIS: ICD-10-CM

## 2022-01-03 LAB
B-HCG UR QL: NEGATIVE
CTP QC/QA: YES
SARS-COV-2 RNA RESP QL NAA+PROBE: NOT DETECTED
SARS-COV-2- CYCLE NUMBER: NORMAL

## 2022-01-03 PROCEDURE — 88305 TISSUE EXAM BY PATHOLOGIST: ICD-10-PCS | Mod: 26,HCNC,, | Performed by: PATHOLOGY

## 2022-01-03 PROCEDURE — 43239 PR EGD, FLEX, W/BIOPSY, SGL/MULTI: ICD-10-PCS | Mod: HCNC,,, | Performed by: INTERNAL MEDICINE

## 2022-01-03 PROCEDURE — 63600175 PHARM REV CODE 636 W HCPCS: Mod: HCNC | Performed by: NURSE ANESTHETIST, CERTIFIED REGISTERED

## 2022-01-03 PROCEDURE — D9220A PRA ANESTHESIA: Mod: HCNC,CRNA,, | Performed by: NURSE ANESTHETIST, CERTIFIED REGISTERED

## 2022-01-03 PROCEDURE — 88305 TISSUE EXAM BY PATHOLOGIST: CPT | Mod: HCNC | Performed by: PATHOLOGY

## 2022-01-03 PROCEDURE — 37000008 HC ANESTHESIA 1ST 15 MINUTES: Mod: HCNC | Performed by: INTERNAL MEDICINE

## 2022-01-03 PROCEDURE — D9220A PRA ANESTHESIA: ICD-10-PCS | Mod: HCNC,CRNA,, | Performed by: NURSE ANESTHETIST, CERTIFIED REGISTERED

## 2022-01-03 PROCEDURE — 25000003 PHARM REV CODE 250: Mod: HCNC | Performed by: NURSE ANESTHETIST, CERTIFIED REGISTERED

## 2022-01-03 PROCEDURE — D9220A PRA ANESTHESIA: Mod: HCNC,ANES,, | Performed by: ANESTHESIOLOGY

## 2022-01-03 PROCEDURE — 88342 CHG IMMUNOCYTOCHEMISTRY: ICD-10-PCS | Mod: 26,HCNC,, | Performed by: PATHOLOGY

## 2022-01-03 PROCEDURE — 81025 URINE PREGNANCY TEST: CPT | Mod: HCNC | Performed by: INTERNAL MEDICINE

## 2022-01-03 PROCEDURE — D9220A PRA ANESTHESIA: ICD-10-PCS | Mod: HCNC,ANES,, | Performed by: ANESTHESIOLOGY

## 2022-01-03 PROCEDURE — 88305 TISSUE EXAM BY PATHOLOGIST: CPT | Mod: 26,HCNC,, | Performed by: PATHOLOGY

## 2022-01-03 PROCEDURE — 88342 IMHCHEM/IMCYTCHM 1ST ANTB: CPT | Mod: HCNC | Performed by: PATHOLOGY

## 2022-01-03 PROCEDURE — 27201012 HC FORCEPS, HOT/COLD, DISP: Mod: HCNC | Performed by: INTERNAL MEDICINE

## 2022-01-03 PROCEDURE — 43239 EGD BIOPSY SINGLE/MULTIPLE: CPT | Mod: HCNC,,, | Performed by: INTERNAL MEDICINE

## 2022-01-03 PROCEDURE — 88342 IMHCHEM/IMCYTCHM 1ST ANTB: CPT | Mod: 26,HCNC,, | Performed by: PATHOLOGY

## 2022-01-03 PROCEDURE — 43239 EGD BIOPSY SINGLE/MULTIPLE: CPT | Mod: HCNC | Performed by: INTERNAL MEDICINE

## 2022-01-03 RX ORDER — LIDOCAINE HCL/PF 100 MG/5ML
SYRINGE (ML) INTRAVENOUS
Status: DISCONTINUED | OUTPATIENT
Start: 2022-01-03 | End: 2022-01-03

## 2022-01-03 RX ORDER — PROPOFOL 10 MG/ML
VIAL (ML) INTRAVENOUS
Status: DISCONTINUED | OUTPATIENT
Start: 2022-01-03 | End: 2022-01-03

## 2022-01-03 RX ORDER — SODIUM CHLORIDE 9 MG/ML
INJECTION, SOLUTION INTRAVENOUS CONTINUOUS
Status: DISCONTINUED | OUTPATIENT
Start: 2022-01-03 | End: 2022-01-03 | Stop reason: HOSPADM

## 2022-01-03 RX ADMIN — PROPOFOL 50 MG: 10 INJECTION, EMULSION INTRAVENOUS at 12:01

## 2022-01-03 RX ADMIN — PROPOFOL 150 MG: 10 INJECTION, EMULSION INTRAVENOUS at 12:01

## 2022-01-03 RX ADMIN — LIDOCAINE HYDROCHLORIDE 100 MG: 20 INJECTION INTRAVENOUS at 12:01

## 2022-01-03 NOTE — TRANSFER OF CARE
"Anesthesia Transfer of Care Note    Patient: Cat Denson    Procedure(s) Performed: Procedure(s) (LRB):  EGD (ESOPHAGOGASTRODUODENOSCOPY) (N/A)    Patient location: PACU    Anesthesia Type: general    Transport from OR: Transported from OR on room air with adequate spontaneous ventilation    Post pain: adequate analgesia    Post assessment: no apparent anesthetic complications and tolerated procedure well    Post vital signs: stable    Level of consciousness: awake and alert    Nausea/Vomiting: no nausea/vomiting    Complications: none    Transfer of care protocol was followed      Last vitals:   Visit Vitals  /79 (BP Location: Left arm, Patient Position: Lying)   Pulse 90   Temp 37.3 °C (99.1 °F) (Skin)   Resp (!) 21   Ht 5' 3" (1.6 m)   Wt 45.4 kg (100 lb)   SpO2 100%   Breastfeeding No   BMI 17.71 kg/m²     "

## 2022-01-03 NOTE — DISCHARGE INSTRUCTIONS
Patient Education       Gastritis Discharge Instructions   About this topic   Gastritis is inflammation of the lining of the stomach. Sometimes gastritis is caused by bacteria. Other times, it can be caused by drugs. Some types of drugs can cause gastritis. The most common are nonsteroidal anti-inflammatory drugs (NSAIDs) like ibuprofen or naproxen. Gastritis can also be caused by other things like drinking alcohol or having a serious illness. It can also happen if you have a health problem in which the bodys own infection fighting system attacks the stomach lining. Based on the cause, you may need to take an antibiotic or other medicine to treat your gastritis. If so, be sure you finish all of the medicine that is ordered.     What care is needed at home?   · Ask your doctor what you need to do when you go home. Make sure you ask questions if you do not understand what the doctor says.  · Eat small meals more often to help with belly pain.  · Keep a diary about your pain and the foods you eat. Then you can avoid those that bother your stomach.  · Avoid or limit spicy foods.  · Avoid or limit beer, wine, and mixed drinks.  · If you smoke, try to quit. Your doctor or nurse can help.  · Try to learn ways to manage stress. Stress may cause the acid levels in your stomach to rise.  · If possible, avoid long-term use of aspirin and other anti-inflammatory drugs.  What follow-up care is needed?   Your doctor may ask you to make visits to the office to check on your progress. Be sure to keep these visits.  What drugs may be needed?   The doctor may order drugs to:  · Fight an infection  · Control how much acid your stomach makes  · Help healing  Will physical activity be limited?   You may want to limit your activity if you have belly pain. Having an upset stomach or throwing up may also limit what you do. You may need more rest if you feel weak or tired.  What problems could happen?   · Stomach ulcer or  bleeding  · Stomach cancer  When do I need to call the doctor?   · You start throwing up blood or pass a lot of blood in your stool.  · Your belly pain becomes much worse all of a sudden or over a few hours.  · Your belly becomes hard or tender.  · You have chest pain or trouble breathing  · Your stools are bright red, black, or tar-colored.  · You are throwing up often.  · Your belly pain does not get better even after taking medicine, changing your diet, and following treatment instructions.  · You lose a lot of weight without trying.  Teach Back: Helping You Understand   The Teach Back Method helps you understand the information we are giving you. After you talk with the staff, tell them in your own words what you learned. This helps to make sure the staff has described each thing clearly. It also helps to explain things that may have been confusing. Before going home, make sure you can do these:  · I can tell you about my condition.  · I can tell you if I need to make changes with my diet or drugs.  · I can tell you what I will do if I throw up blood or have bloody or black tarry stools.  Where can I learn more?   [facility] [place]  https://www.nhs.[place]/conditions/gastritis/   Last Reviewed Date   2021-06-08  Consumer Information Use and Disclaimer   This information is not specific medical advice and does not replace information you receive from your health care provider. This is only a brief summary of general information. It does NOT include all information about conditions, illnesses, injuries, tests, procedures, treatments, therapies, discharge instructions or life-style choices that may apply to you. You must talk with your health care provider for complete information about your health and treatment options. This information should not be used to decide whether or not to accept your health care providers advice, instructions or recommendations. Only your health care provider has the knowledge  and training to provide advice that is right for you.  Copyright   Copyright © 2021 [employer] and its affiliates and/or licensors. All rights reserved.

## 2022-01-03 NOTE — PROVATION PATIENT INSTRUCTIONS
Discharge Summary/Instructions after an Endoscopic Procedure  Patient Name: Cat Denson  Patient MRN: [id number]  Patient YOB: 1980  Monday, January 3, 2022  Scout Colin MD  Dear patient,  As a result of recent federal legislation (The Federal Cures Act), you may   receive lab or pathology results from your procedure in your [facility]   account before your physician is able to contact you. Your physician or   their representative will relay the results to you with their   recommendations at their soonest availability.  Thank you,  RESTRICTIONS:  During your procedure today, you received medications for sedation.  These   medications may affect your judgment, balance and coordination.  Therefore,   for 24 hours, you have the following restrictions:   - DO NOT drive a car, operate machinery, make legal/financial decisions,   sign important papers or drink alcohol.    ACTIVITY:  Today: no heavy lifting, straining or running due to procedural   sedation/anesthesia.  The following day: return to full activity including work.  DIET:  Eat and drink normally unless instructed otherwise.     TREATMENT FOR COMMON SIDE EFFECTS:  - Mild abdominal pain, nausea, belching, bloating or excessive gas:  rest,   eat lightly and use a heating pad.  - Sore Throat: treat with throat lozenges and/or gargle with warm salt   water.  - Because air was used during the procedure, expelling large amounts of air   from your rectum or belching is normal.  - If a bowel prep was taken, you may not have a bowel movement for 1-3 days.    This is normal.  SYMPTOMS TO WATCH FOR AND REPORT TO YOUR PHYSICIAN:  1. Abdominal pain or bloating, other than gas cramps.  2. Chest pain.  3. Back pain.  4. Signs of infection such as: chills or fever occurring within 24 hours   after the procedure.  5. Rectal bleeding, which would show as bright red, maroon, or black stools.   (A tablespoon of blood from the rectum is not serious, especially  if   hemorrhoids are present.)  6. Vomiting.  7. Weakness or dizziness.  GO DIRECTLY TO THE NEAREST EMERGENCY ROOM IF YOU HAVE ANY OF THE FOLLOWING:      Difficulty breathing              Chills and/or fever over 101 F   Persistent vomiting and/or vomiting blood   Severe abdominal pain   Severe chest pain   Black, tarry stools   Bleeding- more than one tablespoon   Any other symptom or condition that you feel may need urgent attention  Your doctor recommends these additional instructions:  If any biopsies were taken, your doctors clinic will contact you in 1 to 2   weeks with any results.  - Patient has a contact number available for emergencies.  The signs and   symptoms of potential delayed complications were discussed with the   patient.  Return to normal activities tomorrow.  Written discharge   instructions were provided to the patient.   - Resume previous diet.   - Continue present medications.   - No aspirin, ibuprofen, naproxen, or other non-steroidal anti-inflammatory   drugs.   - Await pathology results.   - Discharge patient to home (ambulatory).   - Return to my office PRN.  For questions, problems or results please call your physician - Scout Colin MD at Work:  [phone].  [facility], EMERGENCY ROOM PHONE NUMBER: [phone]  IF A COMPLICATION OR EMERGENCY SITUATION ARISES AND YOU ARE UNABLE TO REACH   YOUR PHYSICIAN - GO DIRECTLY TO THE EMERGENCY ROOM.  Scout Colin MD  1/3/2022 12:06:42 PM  This report has been verified and signed electronically.  Dear patient,  As a result of recent federal legislation (The Federal Cures Act), you may   receive lab or pathology results from your procedure in your [facility]   account before your physician is able to contact you. Your physician or   their representative will relay the results to you with their   recommendations at their soonest availability.  Thank you,  PROVATION

## 2022-01-03 NOTE — ANESTHESIA POSTPROCEDURE EVALUATION
Anesthesia Post Evaluation    Patient: Cat Denson    Procedure(s) Performed: Procedure(s) (LRB):  EGD (ESOPHAGOGASTRODUODENOSCOPY) (N/A)    Final Anesthesia Type: general      Patient location during evaluation: PACU  Patient participation: Yes- Able to Participate  Level of consciousness: awake and alert and oriented  Post-procedure vital signs: reviewed and stable  Pain management: adequate  Airway patency: patent    PONV status at discharge: No PONV  Anesthetic complications: no      Cardiovascular status: blood pressure returned to baseline  Respiratory status: unassisted, spontaneous ventilation and room air  Hydration status: euvolemic  Follow-up not needed.          Vitals Value Taken Time   /72 01/03/22 1240   Temp 37.3 °C (99.1 °F) 01/03/22 1205   Pulse 75 01/03/22 1240   Resp 32 01/03/22 1240   SpO2 98 % 01/03/22 1240         Event Time   Out of Recovery 12:45:00         Pain/Link Score: Link Score: 10 (1/3/2022 12:40 PM)

## 2022-01-03 NOTE — ANESTHESIA PREPROCEDURE EVALUATION
01/03/2022  Cat Denson is a 41 y.o., female.    Anesthesia Evaluation    I have reviewed the Patient Summary Reports.    I have reviewed the Nursing Notes. I have reviewed the NPO Status.   I have reviewed the Medications.     Review of Systems  Anesthesia Hx:  No problems with previous Anesthesia Denies Hx of Anesthetic complications    Social:  Smoker Smoking Status: Current Every Day Smoker - 15 pack years  Smokeless Tobacco Status: Never Used  Alcohol use: No  Drug use: Marijuana       Cardiovascular:   Denies Hypertension.  Denies MI.  Denies CAD.    Denies CABG/stent.   Denies Angina. hyperlipidemia    Pulmonary:   COPD, mild Asthma Denies Recent URI.    Renal/:   Chronic Renal Disease    Hepatic/GI:   GERD, poorly controlled Liver Disease, Hepatitis, C    Neurological:   Denies TIA. CVA Neuromuscular Disease, Headaches Seizures, well controlled    Endocrine:   Denies Diabetes. Denies Hypothyroidism.    Psych:   Psychiatric History          Physical Exam  General:  Cachexia    Airway/Jaw/Neck:  Airway Findings: Mouth Opening: Normal Tongue: Normal  General Airway Assessment: Adult, Good  Mallampati: II  Improves to II with phonation.  TM Distance: 4-6 cm      Dental:  Dental Findings: In tact   Chest/Lungs:  Chest/Lungs Findings: Clear to auscultation, Normal Respiratory Rate     Heart/Vascular:  Heart Findings: Rate: Normal  Rhythm: Regular Rhythm  Sounds: Normal  Heart murmur: negative       Mental Status:  Mental Status Findings:  Cooperative, Alert and Oriented         Anesthesia Plan  Type of Anesthesia, risks & benefits discussed:  Anesthesia Type:  general    Patient's Preference:   Plan Factors:          Intra-op Monitoring Plan: standard ASA monitors  Intra-op Monitoring Plan Comments:   Post Op Pain Control Plan:   Post Op Pain Control Plan Comments:     Induction:   IV  Beta  Blocker:  Patient is not currently on a Beta-Blocker (No further documentation required).       Informed Consent: Patient understands risks and agrees with Anesthesia plan.  Questions answered. Anesthesia consent signed with patient.  ASA Score: 3     Day of Surgery Review of History & Physical: I have interviewed and examined the patient. I have reviewed the patient's H&P dated:  There are no significant changes.  H&P update referred to the surgeon.         Ready For Surgery From Anesthesia Perspective.

## 2022-01-04 VITALS
OXYGEN SATURATION: 98 % | TEMPERATURE: 99 F | RESPIRATION RATE: 32 BRPM | HEART RATE: 75 BPM | DIASTOLIC BLOOD PRESSURE: 72 MMHG | BODY MASS INDEX: 17.72 KG/M2 | SYSTOLIC BLOOD PRESSURE: 120 MMHG | HEIGHT: 63 IN | WEIGHT: 100 LBS

## 2022-01-07 LAB
FINAL PATHOLOGIC DIAGNOSIS: NORMAL
GROSS: NORMAL
Lab: NORMAL

## 2022-01-07 NOTE — PROGRESS NOTES
Please notify patient that biopsies reviewed and showed no bacteria.  Continue current meds and follow up as previously planned.

## 2022-01-20 ENCOUNTER — PATIENT MESSAGE (OUTPATIENT)
Dept: FAMILY MEDICINE | Facility: CLINIC | Age: 42
End: 2022-01-20
Payer: MEDICARE

## 2022-01-21 ENCOUNTER — PATIENT MESSAGE (OUTPATIENT)
Dept: FAMILY MEDICINE | Facility: CLINIC | Age: 42
End: 2022-01-21
Payer: MEDICARE

## 2022-01-21 ENCOUNTER — OFFICE VISIT (OUTPATIENT)
Dept: FAMILY MEDICINE | Facility: CLINIC | Age: 42
End: 2022-01-21
Payer: MEDICARE

## 2022-01-21 ENCOUNTER — PATIENT MESSAGE (OUTPATIENT)
Dept: PSYCHIATRY | Facility: CLINIC | Age: 42
End: 2022-01-21
Payer: MEDICARE

## 2022-01-21 DIAGNOSIS — R21 RASH: Primary | ICD-10-CM

## 2022-01-21 PROCEDURE — 99213 OFFICE O/P EST LOW 20 MIN: CPT | Mod: HCNC,95,, | Performed by: PHYSICIAN ASSISTANT

## 2022-01-21 PROCEDURE — 99213 PR OFFICE/OUTPT VISIT, EST, LEVL III, 20-29 MIN: ICD-10-PCS | Mod: HCNC,95,, | Performed by: PHYSICIAN ASSISTANT

## 2022-01-21 RX ORDER — CLOTRIMAZOLE AND BETAMETHASONE DIPROPIONATE 10; .64 MG/G; MG/G
CREAM TOPICAL 2 TIMES DAILY
Qty: 15 G | Refills: 0 | Status: SHIPPED | OUTPATIENT
Start: 2022-01-21 | End: 2022-01-28

## 2022-01-21 NOTE — PROGRESS NOTES
Subjective:       Patient ID: Cat Denson is a 41 y.o. female.    Chief Complaint: Rash    The patient location is: [place]  The chief complaint leading to consultation is: rash    Visit type: audiovisual    Face to Face time with patient: 3  10 minutes of total time spent on the encounter, which includes face to face time and non-face to face time preparing to see the patient (eg, review of tests), Obtaining and/or reviewing separately obtained history, Documenting clinical information in the electronic or other health record, Independently interpreting results (not separately reported) and communicating results to the patient/family/caregiver, or Care coordination (not separately reported).         Each patient to whom he or she provides medical services by telemedicine is:  (1) informed of the relationship between the physician and patient and the respective role of any other health care provider with respect to management of the patient; and (2) notified that he or she may decline to receive medical services by telemedicine and may withdraw from such care at any time.    Notes:       Rash  This is a new problem. The current episode started in the past 7 days. The problem has been gradually worsening since onset. The affected locations include the chest. Associated symptoms include diarrhea. Pertinent negatives include no anorexia, congestion, cough, eye pain, facial edema, fatigue, fever, joint pain, nail changes, rhinorrhea, shortness of breath, sore throat or vomiting. Past treatments include analgesics. The treatment provided no relief. Her past medical history is significant for varicella.     Review of Systems   Constitutional: Negative for fatigue and fever.   HENT: Negative for congestion, rhinorrhea and sore throat.    Eyes: Negative for pain.   Respiratory: Negative for cough and shortness of breath.    Gastrointestinal: Positive for diarrhea. Negative for anorexia and vomiting.   Musculoskeletal:  "Negative for joint pain.   Skin: Positive for rash. Negative for nail changes.       Objective:      Physical Exam  Constitutional:       General: She is not in acute distress.     Appearance: Normal appearance. She is not ill-appearing.   HENT:      Head: Normocephalic and atraumatic.   Eyes:      Conjunctiva/sclera: Conjunctivae normal.   Pulmonary:      Effort: Pulmonary effort is normal.   Skin:     Comments: Circular erythematous patch with no central clearing  on center of chest    Neurological:      Mental Status: She is alert.   Psychiatric:         Mood and Affect: Mood normal.         Assessment:       1. Rash        Plan:       Diagnoses and all orders for this visit:    Rash  -she did not respond to topical steroid will tx for potential fungal component combined with steroid   If no relief will need to be seen in clinic        clotrimazole-betamethasone 1-0.05% (LOTRISONE) cream; Apply topically 2 (two) times daily. for 7 days           No follow-ups on file.           Documentation entered by me for this encounter may have been done in part using speech-recognition technology. Although I have made an effort to ensure accuracy, "sound like" errors may exist and should be interpreted in context.   This includes face to face time and non-face to face time preparing to see the patient (eg, review of tests), obtaining and/or reviewing separately obtained history, documenting clinical information in the electronic or other health record, independently interpreting results and communicating results to the patient/family/caregiver, or care coordinator.    "

## 2022-01-24 ENCOUNTER — HOSPITAL ENCOUNTER (OUTPATIENT)
Dept: RADIOLOGY | Facility: HOSPITAL | Age: 42
Discharge: HOME | End: 2022-01-24
Attending: OBSTETRICS & GYNECOLOGY
Payer: MEDICARE

## 2022-01-24 ENCOUNTER — PATIENT OUTREACH (OUTPATIENT)
Dept: ADMINISTRATIVE | Facility: OTHER | Age: 42
End: 2022-01-24
Payer: MEDICARE

## 2022-01-24 DIAGNOSIS — Z12.31 ENCOUNTER FOR SCREENING MAMMOGRAM FOR MALIGNANT NEOPLASM OF BREAST: ICD-10-CM

## 2022-01-24 PROCEDURE — 77063 BREAST TOMOSYNTHESIS BI: CPT | Mod: 26,HCNC,, | Performed by: RADIOLOGY

## 2022-01-24 PROCEDURE — 77067 MAMMO DIGITAL SCREENING BILAT WITH TOMO: ICD-10-PCS | Mod: 26,HCNC,, | Performed by: RADIOLOGY

## 2022-01-24 PROCEDURE — 77067 SCR MAMMO BI INCL CAD: CPT | Mod: TC,HCNC

## 2022-01-24 PROCEDURE — 77063 BREAST TOMOSYNTHESIS BI: CPT | Mod: TC,HCNC

## 2022-01-24 PROCEDURE — 77063 MAMMO DIGITAL SCREENING BILAT WITH TOMO: ICD-10-PCS | Mod: 26,HCNC,, | Performed by: RADIOLOGY

## 2022-01-24 PROCEDURE — 77067 SCR MAMMO BI INCL CAD: CPT | Mod: 26,HCNC,, | Performed by: RADIOLOGY

## 2022-01-25 ENCOUNTER — CLINICAL SUPPORT (OUTPATIENT)
Dept: REHABILITATION | Facility: HOSPITAL | Age: 42
End: 2022-01-25
Attending: PHYSICAL MEDICINE & REHABILITATION
Payer: MEDICARE

## 2022-01-25 DIAGNOSIS — I69.354 HEMIPARESIS AFFECTING LEFT SIDE AS LATE EFFECT OF CEREBROVASCULAR ACCIDENT: ICD-10-CM

## 2022-01-25 PROCEDURE — 97112 NEUROMUSCULAR REEDUCATION: CPT | Mod: HCNC,PN

## 2022-01-25 NOTE — PROGRESS NOTES
[facility]  Occupational Therapy Progress Note     Today's date: 1/25/2022  Last seen: 12/30/21, reassessed today due to length of time since last visit and prior to Botox later in week.  Name: Cat Chapmanjade  [facility] Number: [id number]  Physician: Physician: Romana Weiss DO (11/4/2021)  Medical Diagnosis: Hemiparesis, unspecified hemiparesis etiology, unspecified laterality [G81.90], Spasticity [R25.2]  Evaluation Date: 11/11/2021  Insurance Authorization Period Expiration: 2/24/2022  Plan of Care Certification Period: 2/24/2022  Therapy diagnosis:   Hemiparesis, unspecified hemiparesis etiology, unspecified laterality      Spasticity      Lack of coordination         Date of Return to MD: Botox 1/27/2022  Visit # / Visits authorized: 1/20  Total visits: 11     Visit Date: 1/25/2022  Time in: 1345  Time out: 1430  Total treatment time 45 mins.     Patient scheduled for botox to left hand: 2nd one January 27.  Precautions:  Standard and Fall  Functional Level Prior to Evaluation:  Independent with adls     SUBJECTIVE      Date of Onset: (11/4/2021 Botox)   Pt had CVA at age 32.   Patient reports: She can cut her own food and drink from water bottle with Left hand. She states she may be moving to [place] in February.      Falls: Patient reports falling this morning. She fell in bathroom onto toilet due to dizziness. She states she feels dizzy most mornings.      Involved Side: left  Dominant Side: Right  Prior Therapy: Patient previously had therapy in 2015 following stroke.  Occupation:  no  Working presently: disability  Duties:  Plays video games on phones, enjoys watching [employer] on tx. She goes to store.      Functional Limitations/Social History:     Previous functional status includes: Independent with all ADLs.      Current Functional Status              Home/Living environment: lives with an aunt, uncle, mom, brother and two young kids.                              Limitation of Functional Status as follows:              ADLs/IADLs:                           - Feeding: I, able to cut food                          - Bathing: moderate assist to bottom of tub and out. She is modified I to bathe and wash hair.                          - Dressing: I                          - Driving: no             - Grooming cannot cut nails, modified I with dentures and hair care.              Leisure: Play survival games on phone  Pain:  Functional Pain Scale Rating 0-10: Current 0/10, worst 0/10,   Patient's Goals for Therapy: Cut food consistently, cut finger nails with left hand. She wants to tie her own shoes.     Medical History:        Past Medical History:   Diagnosis Date    Acid reflux      Allergy      Anemia      Anticoagulated 12/29/2015    Anxiety      Asthma      Bipolar 1 disorder      Bronchospasm with bronchitis, acute      Chronic daily headache 9/11/2018    Chronic kidney disease      Chronic pain      Depression       bipolar 2    Fibromyalgia      Fibromyalgia      Gastroparesis      History of right MCA stroke 11/25/2015    Hx of psychiatric care      Lactose intolerance      Lupus      Mixed hyperlipidemia 11/28/2018    Psychiatric problem      Renal tubular acidosis      Seizure      Sjogren's syndrome      Smoker      Stroke 11-    Substance abuse-kratom daily use 8/30/2020    Suicide attempt       overdosed on a bottle of paxil, muscle relaxers, & zoloft    Therapy           Surgical History:    has a past surgical history that includes Cholecystectomy; Colonoscopy (11/12/2014); Upper gastrointestinal endoscopy (03/03/2017); and Fluoroscopic urodynamic study (N/A, 7/23/2019).     Medications:   has a current medication list which includes the following prescription(s): aripiprazole, atorvastatin, cefuroxime, divalproex, gabapentin, lidocaine-prilocaine, mirtazapine, oxybutynin, oxybutynin, pantoprazole, promethazine, propranolol,  trazodone, and xarelto.     Allergies:         Review of patient's allergies indicates:   Allergen Reactions    Metoclopramide hcl Anaphylaxis       Involuntary mouth movements     Amoxicillin-pot clavulanate         2 other medications    Amoxil [amoxicillin]         hives    Avelox [moxifloxacin]         itching    Benadryl [diphenhydramine hcl]         Lowers seizure threshold     Quinazolinones         Lowers seizure threshold     Ultram [tramadol]         Lowers seizure threshold     Wellbutrin [bupropion hcl]         Lowers seizure threshold             OBJECTIVE    active range of motion:Brittanie is able to make fist but index lags in flexion. She is able to oppose thumb to 5th. Difficulty abduction/adduction of fingers.    Gross motor coordination:   · JIN (Rapid Alternating Movements): impaired to speed and accuracy  · Finger to Nose (5 times): not tested  · Finger Flicks (coordination moving from digit flexion to digit extension): impaired to speed and accuracy     Box and Block C assessment: Eval :(R) 46 (L) 14       1/25/2022:      (left) 22   [norms for women aged 35-39: R=84.8 +/-6.1; L=83.5 +/-6.1 (Mathiowetz et al, 1985)]     Fine motor coordination:   -   Thumb to Finger Opposition: awkward but able to do so.      9 Hole Peg Test: Eval (R) 25s (L) unable      1/25/2022: (right) 25  (left) 2 min 13 secs  [norms for women aged 36-40: R=16.74s +/-1.95s; L=18.16s +/-2.08s (Amilcar et al, 2003)]     Hand Strength (GERTRUDIS Dynamometer, Setting II)   (lbs) Right eval Left eval right 1/25/2022 left 1/25/2022   1 45 29 55 40   2 45 27 55 33   3 43 26 55 35   avg 44 27 55 36   [norms for women aged 40-44: R=70.4 +/-13.5; L=62.3 +/-13.8 (Mathiowetz et al, 1985)]    Pinch 1/25/2022 Right 1/25/2022 left    Lateral 16.5 psi 9.5 psi   3 pt 14 9.5   2 pt 12 7     active range of motion: normal bilateral shoulder, elbow and forearm         Strength 11/11/2021 11/11/2021 1/25/2021 1/25/2021   **within  available ROM** Left Right left  right    Shoulder flex 3/5 3+/5 4/5 5/5   Shoulder abd 3/5 3+/5 4/5 5/5   Shoulder ER 3/5 3+/5 4/5 5/5   Shoulder IR 3/5 3+/5 4/5 5/5   Shoulder Extension 3/5 3+/5 4/5 5/5            Elbow flex 2+/5 3+/5 4/5 5/5   Elbow ext 3/5 3+/5 4/5 5/5   Wrist flex 2/5 3/5 4/5 5/5   Wrist ext 2/5 3/5 4/5 with fist 5/5   Supination 2+/5 3/5 4/5 5/5            Treatment    45 minutes spent on neuro reassessment due to no treatment in 3 weeks.         Patient Education and Home Exercises       Education provided:   - Patient educated in sensory stimulation to left hand.      Patient/Family Education: role of OT, goals for OT, scheduling/cancellations - pt verbalized understanding. Discussed insurance limitations with patient.        ASSESSMENT      Cat Denson is a 42 y.o. female referred to outpatient occupational therapy and presents with a medical diagnosis of Hemiparesis, unspecified hemiparesis etiology, unspecified laterality [G81.90], Spasticity [R25.2].  Patient presents with the following therapy deficits: Decreased ROM, abnormal tone, Impaired coordination, grasp reflex and Decreased functional hand use . Following medical record review it is determined that pt will benefit from occupational therapy services in order to maximize pain free and/or functional use of left  UE. The following goals were discussed with the patient and patient is in agreement with them as to be addressed in the treatment plan. The patient's rehab potential is good given length of time since insult but positive with Botox.       Anticipated barriers to occupational therapy: transportation, Insurance, and may be moving out of state.   Pt has no cultural, educational or language barriers to learning provided.        The following goals were discussed with the patient and patient is in agreement with them as to be addressed in the treatment plan.      Goals:  STG 4 weeks  1. Patient will increase score on  Box and Block assessment by at least 10 points.  Progressing 1/25/2022  Deleted goal  3. Patient will demonstrate ability to complete 9 HPT with LUE Met 1/25/2022  4. Patient will increase  strength to L upper extremity by at least 5#. Met 1/25/2022  5. Patient will demonstrate independence with HEP. Ongoing  6. Patient will demonstrate the ability to hold 2 oz of water for 3 mins with no spillage. Met 1/25/2022     LTG 8 WEEKS    1. Patient will increase score on Box and Block assessment by at least 20 points. Ongoing 1/25/2022  Deleted  3. Patient will increase  strength to L upper extremity  By at least 15#. 1/25/2022  Added:   4. Patient will be aware of a variety of one hand nail clippers and choose what is appropriate for her. Ongoing 1/25/2022  5. Patient will tie shoes with modified I. Ongoing 1/25/2022        PLAN   Plan of Care Certification: 11/11/2021 to 02/03/2022. Extend to 2/24/2022 due to missed visits.      Outpatient Occupational Therapy 2 times weekly for 4 more weeks to include the following interventions: Manual therapy/joint mobilizations, sensory stimulation,  Therapeutic exercises/activities,and self care.   Ilana Cordero, OT

## 2022-01-27 ENCOUNTER — OFFICE VISIT (OUTPATIENT)
Dept: PHYSICAL MEDICINE AND REHAB | Facility: CLINIC | Age: 42
End: 2022-01-27
Payer: MEDICARE

## 2022-01-27 VITALS
HEART RATE: 91 BPM | BODY MASS INDEX: 17.72 KG/M2 | DIASTOLIC BLOOD PRESSURE: 85 MMHG | SYSTOLIC BLOOD PRESSURE: 133 MMHG | HEIGHT: 63 IN | WEIGHT: 100 LBS

## 2022-01-27 DIAGNOSIS — I69.398 SPASTICITY AS LATE EFFECT OF CEREBROVASCULAR ACCIDENT (CVA): Primary | ICD-10-CM

## 2022-01-27 DIAGNOSIS — R25.2 SPASTICITY AS LATE EFFECT OF CEREBROVASCULAR ACCIDENT (CVA): Primary | ICD-10-CM

## 2022-01-27 PROCEDURE — 3008F PR BODY MASS INDEX (BMI) DOCUMENTED: ICD-10-PCS | Mod: HCNC,CPTII,S$GLB, | Performed by: PHYSICAL MEDICINE & REHABILITATION

## 2022-01-27 PROCEDURE — 3075F SYST BP GE 130 - 139MM HG: CPT | Mod: HCNC,CPTII,S$GLB, | Performed by: PHYSICAL MEDICINE & REHABILITATION

## 2022-01-27 PROCEDURE — 64642 PR CHEMODENERV ONE EXTREMITY; 1-4 MUSCLE(S): ICD-10-PCS | Mod: HCNC,S$GLB,, | Performed by: PHYSICAL MEDICINE & REHABILITATION

## 2022-01-27 PROCEDURE — 3075F PR MOST RECENT SYSTOLIC BLOOD PRESS GE 130-139MM HG: ICD-10-PCS | Mod: HCNC,CPTII,S$GLB, | Performed by: PHYSICAL MEDICINE & REHABILITATION

## 2022-01-27 PROCEDURE — 3079F PR MOST RECENT DIASTOLIC BLOOD PRESSURE 80-89 MM HG: ICD-10-PCS | Mod: HCNC,CPTII,S$GLB, | Performed by: PHYSICAL MEDICINE & REHABILITATION

## 2022-01-27 PROCEDURE — 3079F DIAST BP 80-89 MM HG: CPT | Mod: HCNC,CPTII,S$GLB, | Performed by: PHYSICAL MEDICINE & REHABILITATION

## 2022-01-27 PROCEDURE — 64642 CHEMODENERV 1 EXTREMITY 1-4: CPT | Mod: HCNC,S$GLB,, | Performed by: PHYSICAL MEDICINE & REHABILITATION

## 2022-01-27 PROCEDURE — 99999 PR PBB SHADOW E&M-EST. PATIENT-LVL III: ICD-10-PCS | Mod: PBBFAC,HCNC,, | Performed by: PHYSICAL MEDICINE & REHABILITATION

## 2022-01-27 PROCEDURE — 99499 NO LOS: ICD-10-PCS | Mod: HCNC,S$GLB,, | Performed by: PHYSICAL MEDICINE & REHABILITATION

## 2022-01-27 PROCEDURE — 99999 PR PBB SHADOW E&M-EST. PATIENT-LVL III: CPT | Mod: PBBFAC,HCNC,, | Performed by: PHYSICAL MEDICINE & REHABILITATION

## 2022-01-27 PROCEDURE — 3008F BODY MASS INDEX DOCD: CPT | Mod: HCNC,CPTII,S$GLB, | Performed by: PHYSICAL MEDICINE & REHABILITATION

## 2022-01-27 PROCEDURE — 99499 UNLISTED E&M SERVICE: CPT | Mod: HCNC,S$GLB,, | Performed by: PHYSICAL MEDICINE & REHABILITATION

## 2022-01-27 PROCEDURE — 1159F MED LIST DOCD IN RCRD: CPT | Mod: HCNC,CPTII,S$GLB, | Performed by: PHYSICAL MEDICINE & REHABILITATION

## 2022-01-27 PROCEDURE — 1159F PR MEDICATION LIST DOCUMENTED IN MEDICAL RECORD: ICD-10-PCS | Mod: HCNC,CPTII,S$GLB, | Performed by: PHYSICAL MEDICINE & REHABILITATION

## 2022-01-27 NOTE — PROGRESS NOTES
PROCEDURE NOTE  Patient with stroke and spasticity who presents to clinic today for Botulinum toxin type-A injections.   The following muscle groups were injected :      Muscle(s) Units of Botulinum Toxin A Injected     R FCR 100u  R FDS 100u        Units used:200 units  Units Wasted: 0 Units      Injections performed after sterile prep w. chlorohexedine. Risk and benefits reviewed.Verbal consent obtained. No complications.  Ultrasound interpretation was performed prior to the procedure to identify the target muscles and any adjacent neurovascular structures. Short axis approach.Subsequently, interpretation was performed during real- time needle guidance confirming placement. Post- intervention interpretation was also performed confirming appropriate injectate flow and hemostasis. Images indicating needle placement have been saved in Interactive Project.

## 2022-01-31 ENCOUNTER — PATIENT MESSAGE (OUTPATIENT)
Dept: GASTROENTEROLOGY | Facility: CLINIC | Age: 42
End: 2022-01-31
Payer: MEDICARE

## 2022-01-31 ENCOUNTER — OFFICE VISIT (OUTPATIENT)
Dept: PSYCHIATRY | Facility: CLINIC | Age: 42
End: 2022-01-31
Payer: MEDICARE

## 2022-01-31 VITALS
BODY MASS INDEX: 17.32 KG/M2 | HEART RATE: 99 BPM | HEIGHT: 63 IN | WEIGHT: 97.75 LBS | SYSTOLIC BLOOD PRESSURE: 121 MMHG | DIASTOLIC BLOOD PRESSURE: 85 MMHG

## 2022-01-31 DIAGNOSIS — F12.20 CANNABIS USE DISORDER, SEVERE, DEPENDENCE: ICD-10-CM

## 2022-01-31 DIAGNOSIS — K31.84 GASTROPARESIS: ICD-10-CM

## 2022-01-31 DIAGNOSIS — G47.00 INSOMNIA, UNSPECIFIED TYPE: ICD-10-CM

## 2022-01-31 DIAGNOSIS — F31.75 BIPOLAR 1 DISORDER, DEPRESSED, PARTIAL REMISSION: ICD-10-CM

## 2022-01-31 DIAGNOSIS — R11.2 NAUSEA AND VOMITING, INTRACTABILITY OF VOMITING NOT SPECIFIED, UNSPECIFIED VOMITING TYPE: ICD-10-CM

## 2022-01-31 DIAGNOSIS — F41.9 ANXIETY: ICD-10-CM

## 2022-01-31 DIAGNOSIS — Z79.899 HIGH RISK MEDICATION USE: ICD-10-CM

## 2022-01-31 PROCEDURE — 99499 UNLISTED E&M SERVICE: CPT | Mod: S$GLB,,, | Performed by: PSYCHIATRY & NEUROLOGY

## 2022-01-31 PROCEDURE — 99999 PR PBB SHADOW E&M-EST. PATIENT-LVL III: ICD-10-PCS | Mod: PBBFAC,HCNC,, | Performed by: PSYCHIATRY & NEUROLOGY

## 2022-01-31 PROCEDURE — 1160F PR REVIEW ALL MEDS BY PRESCRIBER/CLIN PHARMACIST DOCUMENTED: ICD-10-PCS | Mod: HCNC,CPTII,S$GLB, | Performed by: PSYCHIATRY & NEUROLOGY

## 2022-01-31 PROCEDURE — 3079F PR MOST RECENT DIASTOLIC BLOOD PRESSURE 80-89 MM HG: ICD-10-PCS | Mod: HCNC,CPTII,S$GLB, | Performed by: PSYCHIATRY & NEUROLOGY

## 2022-01-31 PROCEDURE — 3044F HG A1C LEVEL LT 7.0%: CPT | Mod: HCNC,CPTII,S$GLB, | Performed by: PSYCHIATRY & NEUROLOGY

## 2022-01-31 PROCEDURE — 1160F RVW MEDS BY RX/DR IN RCRD: CPT | Mod: HCNC,CPTII,S$GLB, | Performed by: PSYCHIATRY & NEUROLOGY

## 2022-01-31 PROCEDURE — 3008F BODY MASS INDEX DOCD: CPT | Mod: HCNC,CPTII,S$GLB, | Performed by: PSYCHIATRY & NEUROLOGY

## 2022-01-31 PROCEDURE — 3008F PR BODY MASS INDEX (BMI) DOCUMENTED: ICD-10-PCS | Mod: HCNC,CPTII,S$GLB, | Performed by: PSYCHIATRY & NEUROLOGY

## 2022-01-31 PROCEDURE — 3074F SYST BP LT 130 MM HG: CPT | Mod: HCNC,CPTII,S$GLB, | Performed by: PSYCHIATRY & NEUROLOGY

## 2022-01-31 PROCEDURE — 1159F MED LIST DOCD IN RCRD: CPT | Mod: HCNC,CPTII,S$GLB, | Performed by: PSYCHIATRY & NEUROLOGY

## 2022-01-31 PROCEDURE — 3079F DIAST BP 80-89 MM HG: CPT | Mod: HCNC,CPTII,S$GLB, | Performed by: PSYCHIATRY & NEUROLOGY

## 2022-01-31 PROCEDURE — 90833 PR PSYCHOTHERAPY W/PATIENT W/E&M, 30 MIN (ADD ON): ICD-10-PCS | Mod: HCNC,S$GLB,, | Performed by: PSYCHIATRY & NEUROLOGY

## 2022-01-31 PROCEDURE — 99214 PR OFFICE/OUTPT VISIT, EST, LEVL IV, 30-39 MIN: ICD-10-PCS | Mod: HCNC,S$GLB,, | Performed by: PSYCHIATRY & NEUROLOGY

## 2022-01-31 PROCEDURE — 99214 OFFICE O/P EST MOD 30 MIN: CPT | Mod: HCNC,S$GLB,, | Performed by: PSYCHIATRY & NEUROLOGY

## 2022-01-31 PROCEDURE — 3074F PR MOST RECENT SYSTOLIC BLOOD PRESSURE < 130 MM HG: ICD-10-PCS | Mod: HCNC,CPTII,S$GLB, | Performed by: PSYCHIATRY & NEUROLOGY

## 2022-01-31 PROCEDURE — 90833 PSYTX W PT W E/M 30 MIN: CPT | Mod: HCNC,S$GLB,, | Performed by: PSYCHIATRY & NEUROLOGY

## 2022-01-31 PROCEDURE — 1159F PR MEDICATION LIST DOCUMENTED IN MEDICAL RECORD: ICD-10-PCS | Mod: HCNC,CPTII,S$GLB, | Performed by: PSYCHIATRY & NEUROLOGY

## 2022-01-31 PROCEDURE — 99999 PR PBB SHADOW E&M-EST. PATIENT-LVL III: CPT | Mod: PBBFAC,HCNC,, | Performed by: PSYCHIATRY & NEUROLOGY

## 2022-01-31 PROCEDURE — 99499 RISK ADDL DX/OHS AUDIT: ICD-10-PCS | Mod: S$GLB,,, | Performed by: PSYCHIATRY & NEUROLOGY

## 2022-01-31 PROCEDURE — 3044F PR MOST RECENT HEMOGLOBIN A1C LEVEL <7.0%: ICD-10-PCS | Mod: HCNC,CPTII,S$GLB, | Performed by: PSYCHIATRY & NEUROLOGY

## 2022-01-31 RX ORDER — PROMETHAZINE HYDROCHLORIDE 25 MG/1
25 TABLET ORAL EVERY 6 HOURS PRN
Qty: 20 TABLET | Refills: 1 | Status: CANCELLED | OUTPATIENT
Start: 2022-01-31 | End: 2022-02-20

## 2022-01-31 RX ORDER — MIRTAZAPINE 30 MG/1
TABLET, FILM COATED ORAL
Qty: 30 TABLET | Refills: 2 | Status: SHIPPED | OUTPATIENT
Start: 2022-01-31 | End: 2022-04-13 | Stop reason: SDUPTHER

## 2022-01-31 RX ORDER — DIVALPROEX SODIUM 250 MG/1
TABLET, DELAYED RELEASE ORAL
Qty: 90 TABLET | Refills: 2 | Status: SHIPPED | OUTPATIENT
Start: 2022-01-31 | End: 2022-04-13 | Stop reason: DRUGHIGH

## 2022-01-31 RX ORDER — ARIPIPRAZOLE 10 MG/1
10 TABLET ORAL DAILY
Qty: 30 TABLET | Refills: 2 | Status: SHIPPED | OUTPATIENT
Start: 2022-01-31 | End: 2022-04-13 | Stop reason: SDUPTHER

## 2022-01-31 RX ORDER — TRAZODONE HYDROCHLORIDE 100 MG/1
TABLET ORAL
Qty: 60 TABLET | Refills: 2 | Status: SHIPPED | OUTPATIENT
Start: 2022-01-31 | End: 2022-04-13 | Stop reason: SDUPTHER
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
## 2022-02-01 ENCOUNTER — DOCUMENTATION ONLY (OUTPATIENT)
Dept: REHABILITATION | Facility: HOSPITAL | Age: 42
End: 2022-02-01
Payer: MEDICARE

## 2022-02-01 ENCOUNTER — PATIENT MESSAGE (OUTPATIENT)
Dept: FAMILY MEDICINE | Facility: CLINIC | Age: 42
End: 2022-02-01
Payer: MEDICARE

## 2022-02-01 ENCOUNTER — LAB VISIT (OUTPATIENT)
Dept: LAB | Facility: HOSPITAL | Age: 42
End: 2022-02-01
Attending: PSYCHIATRY & NEUROLOGY
Payer: MEDICARE

## 2022-02-01 ENCOUNTER — TELEPHONE (OUTPATIENT)
Dept: PSYCHIATRY | Facility: CLINIC | Age: 42
End: 2022-02-01
Payer: MEDICARE

## 2022-02-01 DIAGNOSIS — K31.84 GASTROPARESIS: ICD-10-CM

## 2022-02-01 DIAGNOSIS — Z79.899 HIGH RISK MEDICATION USE: ICD-10-CM

## 2022-02-01 DIAGNOSIS — R11.2 NAUSEA AND VOMITING, INTRACTABILITY OF VOMITING NOT SPECIFIED, UNSPECIFIED VOMITING TYPE: ICD-10-CM

## 2022-02-01 LAB
ALBUMIN SERPL BCP-MCNC: 4 G/DL (ref 3.5–5.2)
ALBUMIN SERPL BCP-MCNC: 4 G/DL (ref 3.5–5.2)
ALP SERPL-CCNC: 85 U/L (ref 55–135)
ALP SERPL-CCNC: 85 U/L (ref 55–135)
ALT SERPL W/O P-5'-P-CCNC: 25 U/L (ref 10–44)
ALT SERPL W/O P-5'-P-CCNC: 25 U/L (ref 10–44)
AMYLASE SERPL-CCNC: 242 U/L (ref 20–110)
ANION GAP SERPL CALC-SCNC: 10 MMOL/L (ref 8–16)
AST SERPL-CCNC: 30 U/L (ref 10–40)
AST SERPL-CCNC: 30 U/L (ref 10–40)
BASOPHILS # BLD AUTO: 0.05 K/UL (ref 0–0.2)
BASOPHILS NFR BLD: 0.8 % (ref 0–1.9)
BILIRUB DIRECT SERPL-MCNC: 0.2 MG/DL (ref 0.1–0.3)
BILIRUB SERPL-MCNC: 0.5 MG/DL (ref 0.1–1)
BILIRUB SERPL-MCNC: 0.5 MG/DL (ref 0.1–1)
BUN SERPL-MCNC: 25 MG/DL (ref 6–20)
CALCIUM SERPL-MCNC: 8.9 MG/DL (ref 8.7–10.5)
CHLORIDE SERPL-SCNC: 103 MMOL/L (ref 95–110)
CHOLEST SERPL-MCNC: 161 MG/DL (ref 120–199)
CHOLEST/HDLC SERPL: 3.7 {RATIO} (ref 2–5)
CO2 SERPL-SCNC: 24 MMOL/L (ref 23–29)
CREAT SERPL-MCNC: 1.5 MG/DL (ref 0.5–1.4)
DIFFERENTIAL METHOD: ABNORMAL
EOSINOPHIL # BLD AUTO: 0.1 K/UL (ref 0–0.5)
EOSINOPHIL NFR BLD: 0.9 % (ref 0–8)
ERYTHROCYTE [DISTWIDTH] IN BLOOD BY AUTOMATED COUNT: 13.2 % (ref 11.5–14.5)
EST. GFR  (AFRICAN AMERICAN): 49.9 ML/MIN/1.73 M^2
EST. GFR  (NON AFRICAN AMERICAN): 43.3 ML/MIN/1.73 M^2
ESTIMATED AVG GLUCOSE: 114 MG/DL (ref 68–131)
GLUCOSE SERPL-MCNC: 88 MG/DL (ref 70–110)
HBA1C MFR BLD: 5.6 % (ref 4.5–6.2)
HCT VFR BLD AUTO: 41.8 % (ref 37–48.5)
HDLC SERPL-MCNC: 44 MG/DL (ref 40–75)
HDLC SERPL: 27.3 % (ref 20–50)
HGB BLD-MCNC: 13.6 G/DL (ref 12–16)
IMM GRANULOCYTES # BLD AUTO: 0.04 K/UL (ref 0–0.04)
IMM GRANULOCYTES NFR BLD AUTO: 0.6 % (ref 0–0.5)
LDLC SERPL CALC-MCNC: 101.4 MG/DL (ref 63–159)
LIPASE SERPL-CCNC: 28 U/L (ref 4–60)
LYMPHOCYTES # BLD AUTO: 2.2 K/UL (ref 1–4.8)
LYMPHOCYTES NFR BLD: 34.3 % (ref 18–48)
MCH RBC QN AUTO: 31.6 PG (ref 27–31)
MCHC RBC AUTO-ENTMCNC: 32.5 G/DL (ref 32–36)
MCV RBC AUTO: 97 FL (ref 82–98)
MONOCYTES # BLD AUTO: 0.4 K/UL (ref 0.3–1)
MONOCYTES NFR BLD: 6 % (ref 4–15)
NEUTROPHILS # BLD AUTO: 3.8 K/UL (ref 1.8–7.7)
NEUTROPHILS NFR BLD: 57.4 % (ref 38–73)
NONHDLC SERPL-MCNC: 117 MG/DL
NRBC BLD-RTO: 0 /100 WBC
PLATELET # BLD AUTO: 222 K/UL (ref 150–450)
PMV BLD AUTO: 10.8 FL (ref 9.2–12.9)
POTASSIUM SERPL-SCNC: 4.7 MMOL/L (ref 3.5–5.1)
PROT SERPL-MCNC: 7.1 G/DL (ref 6–8.4)
PROT SERPL-MCNC: 7.1 G/DL (ref 6–8.4)
RBC # BLD AUTO: 4.3 M/UL (ref 4–5.4)
SODIUM SERPL-SCNC: 137 MMOL/L (ref 136–145)
TRIGL SERPL-MCNC: 78 MG/DL (ref 30–150)
VALPROATE SERPL-MCNC: 37.4 UG/ML (ref 50–100)
WBC # BLD AUTO: 6.54 K/UL (ref 3.9–12.7)

## 2022-02-01 PROCEDURE — 83690 ASSAY OF LIPASE: CPT | Performed by: PSYCHIATRY & NEUROLOGY

## 2022-02-01 PROCEDURE — 82150 ASSAY OF AMYLASE: CPT | Performed by: PSYCHIATRY & NEUROLOGY

## 2022-02-01 PROCEDURE — 36415 COLL VENOUS BLD VENIPUNCTURE: CPT | Performed by: PSYCHIATRY & NEUROLOGY

## 2022-02-01 PROCEDURE — 83036 HEMOGLOBIN GLYCOSYLATED A1C: CPT | Performed by: PSYCHIATRY & NEUROLOGY

## 2022-02-01 PROCEDURE — 85025 COMPLETE CBC W/AUTO DIFF WBC: CPT | Performed by: PSYCHIATRY & NEUROLOGY

## 2022-02-01 PROCEDURE — 80053 COMPREHEN METABOLIC PANEL: CPT | Performed by: PSYCHIATRY & NEUROLOGY

## 2022-02-01 PROCEDURE — 80061 LIPID PANEL: CPT | Performed by: PSYCHIATRY & NEUROLOGY

## 2022-02-01 PROCEDURE — 80164 ASSAY DIPROPYLACETIC ACD TOT: CPT | Performed by: PSYCHIATRY & NEUROLOGY

## 2022-02-01 RX ORDER — PROMETHAZINE HYDROCHLORIDE 25 MG/1
25 TABLET ORAL EVERY 6 HOURS PRN
Qty: 20 TABLET | Refills: 1 | Status: SHIPPED | OUTPATIENT
Start: 2022-02-01 | End: 2022-03-21

## 2022-02-01 NOTE — PROGRESS NOTES
Occupational Therapy Cancellation:  Cat cancelled Occupational Therapy appointment 2/1/2022 due to no transportation.  Ilana Cordero OT

## 2022-02-02 ENCOUNTER — PATIENT MESSAGE (OUTPATIENT)
Dept: PSYCHIATRY | Facility: CLINIC | Age: 42
End: 2022-02-02
Payer: MEDICARE

## 2022-02-02 ENCOUNTER — TELEPHONE (OUTPATIENT)
Dept: PSYCHIATRY | Facility: CLINIC | Age: 42
End: 2022-02-02
Payer: MEDICARE

## 2022-02-02 NOTE — TELEPHONE ENCOUNTER
I left a message for patient to discuss her labs results. Note amylase is elevated.  Lipase is within normal range. She has chronic GI issues, gastroparesis. I left message for call back and will try to reach again tomorrow.

## 2022-02-03 ENCOUNTER — TELEPHONE (OUTPATIENT)
Dept: PSYCHIATRY | Facility: CLINIC | Age: 42
End: 2022-02-03
Payer: MEDICARE

## 2022-02-04 ENCOUNTER — PATIENT MESSAGE (OUTPATIENT)
Dept: PSYCHIATRY | Facility: CLINIC | Age: 42
End: 2022-02-04
Payer: MEDICARE

## 2022-02-04 ENCOUNTER — TELEPHONE (OUTPATIENT)
Dept: NEUROLOGY | Facility: CLINIC | Age: 42
End: 2022-02-04
Payer: MEDICARE

## 2022-02-04 DIAGNOSIS — R56.9 SEIZURE: ICD-10-CM

## 2022-02-04 DIAGNOSIS — G40.909 RECURRENT SEIZURES: ICD-10-CM

## 2022-02-04 DIAGNOSIS — I69.359 CVA, OLD, HEMIPARESIS: Primary | ICD-10-CM

## 2022-02-04 DIAGNOSIS — F09 COGNITIVE DISORDER: ICD-10-CM

## 2022-02-04 DIAGNOSIS — M32.9 LUPUS: ICD-10-CM

## 2022-02-04 NOTE — TELEPHONE ENCOUNTER
Spoke with the pt, appt scheduled with Dr Norman next Wed. Directions provided, asked the pt to come 15 min early. Pt v/u and agreed to do so. Encouraged the pt to keep this appt as Dr Norman does not have any other openings until April. Pt v/u

## 2022-02-04 NOTE — TELEPHONE ENCOUNTER
I contacted the patient to review recent lab results:     1. Hepatic (liver) function is acceptable.   2. Metabolic panel shows kidney impairment, similar to previous lab work   3. CBC - blood counts are acceptable   4. Hemoglobin A1c - no diabetes present based on average estimated blood sugar   5. Lipid panel is within acceptable range   6. Valproic acid level (depakote) is on low side - this treats both mood and seizures   7. Amylase is elevated, lipase is within normal limits.  These are pancreatic enzymes.  Usually, lipase is elevated with inflammation of the pancreas, but since you do have chronic GI issues with abdominal pain, nausea, weight loss, I would like to confer with your primary care provider and neurologist about switching depakote to a different medication, since depakote can cause pancreatic inflammation.  Depakote is also treating your seizures, so I wouldn't want to lower your dose without first discussing with Dr. Norman.   Please schedule a follow up appointment with him Dr. Norman since it has been several years.       Message forwarded to Dr. Norman and PCP.  Will have to coordinate a change in Depakote with her Neurologist since it also provides seizure coverage.   Pt verbalized an understanding.  Consider changing depakote to lamotrigine.  Pt states she is med compliant with all meds.

## 2022-02-09 ENCOUNTER — OFFICE VISIT (OUTPATIENT)
Dept: NEUROLOGY | Facility: CLINIC | Age: 42
End: 2022-02-09
Payer: MEDICARE

## 2022-02-09 ENCOUNTER — OFFICE VISIT (OUTPATIENT)
Dept: PODIATRY | Facility: CLINIC | Age: 42
End: 2022-02-09
Payer: MEDICARE

## 2022-02-09 VITALS
RESPIRATION RATE: 16 BRPM | HEIGHT: 63 IN | WEIGHT: 99.5 LBS | BODY MASS INDEX: 17.63 KG/M2 | HEART RATE: 94 BPM | OXYGEN SATURATION: 98 %

## 2022-02-09 VITALS
BODY MASS INDEX: 17.73 KG/M2 | HEART RATE: 90 BPM | SYSTOLIC BLOOD PRESSURE: 132 MMHG | WEIGHT: 100.06 LBS | DIASTOLIC BLOOD PRESSURE: 90 MMHG | RESPIRATION RATE: 17 BRPM

## 2022-02-09 DIAGNOSIS — M35.00 SJOGREN'S SYNDROME, WITH UNSPECIFIED ORGAN INVOLVEMENT: ICD-10-CM

## 2022-02-09 DIAGNOSIS — G40.909 NONINTRACTABLE EPILEPSY WITHOUT STATUS EPILEPTICUS, UNSPECIFIED EPILEPSY TYPE: Primary | ICD-10-CM

## 2022-02-09 DIAGNOSIS — M89.9 BONE DISORDER: ICD-10-CM

## 2022-02-09 DIAGNOSIS — R74.8 ELEVATED AMYLASE: ICD-10-CM

## 2022-02-09 DIAGNOSIS — L60.0 INGROWN NAIL: Primary | ICD-10-CM

## 2022-02-09 DIAGNOSIS — Z79.01 CHRONIC ANTICOAGULATION: ICD-10-CM

## 2022-02-09 DIAGNOSIS — M85.88 OTHER SPECIFIED DISORDERS OF BONE DENSITY AND STRUCTURE, OTHER SITE: ICD-10-CM

## 2022-02-09 DIAGNOSIS — M79.675 PAIN OF TOE OF LEFT FOOT: ICD-10-CM

## 2022-02-09 DIAGNOSIS — R11.0 NAUSEA: ICD-10-CM

## 2022-02-09 DIAGNOSIS — M85.80 OSTEOPENIA, UNSPECIFIED LOCATION: ICD-10-CM

## 2022-02-09 DIAGNOSIS — Z86.73 HISTORY OF STROKE: ICD-10-CM

## 2022-02-09 DIAGNOSIS — R51.9 HEADACHE DISORDER: ICD-10-CM

## 2022-02-09 PROCEDURE — 3008F BODY MASS INDEX DOCD: CPT | Mod: HCNC,CPTII,S$GLB, | Performed by: PSYCHIATRY & NEUROLOGY

## 2022-02-09 PROCEDURE — 99205 PR OFFICE/OUTPT VISIT, NEW, LEVL V, 60-74 MIN: ICD-10-PCS | Mod: HCNC,S$GLB,, | Performed by: PSYCHIATRY & NEUROLOGY

## 2022-02-09 PROCEDURE — 99499 RISK ADDL DX/OHS AUDIT: ICD-10-PCS | Mod: HCNC,S$GLB,, | Performed by: PSYCHIATRY & NEUROLOGY

## 2022-02-09 PROCEDURE — 3080F PR MOST RECENT DIASTOLIC BLOOD PRESSURE >= 90 MM HG: ICD-10-PCS | Mod: HCNC,CPTII,S$GLB, | Performed by: PSYCHIATRY & NEUROLOGY

## 2022-02-09 PROCEDURE — 3075F PR MOST RECENT SYSTOLIC BLOOD PRESS GE 130-139MM HG: ICD-10-PCS | Mod: HCNC,CPTII,S$GLB, | Performed by: PSYCHIATRY & NEUROLOGY

## 2022-02-09 PROCEDURE — 99499 UNLISTED E&M SERVICE: CPT | Mod: HCNC,S$GLB,, | Performed by: PSYCHIATRY & NEUROLOGY

## 2022-02-09 PROCEDURE — 1160F RVW MEDS BY RX/DR IN RCRD: CPT | Mod: CPTII,S$GLB,, | Performed by: PODIATRIST

## 2022-02-09 PROCEDURE — 3044F PR MOST RECENT HEMOGLOBIN A1C LEVEL <7.0%: ICD-10-PCS | Mod: CPTII,S$GLB,, | Performed by: PODIATRIST

## 2022-02-09 PROCEDURE — 99999 PR PBB SHADOW E&M-EST. PATIENT-LVL IV: ICD-10-PCS | Mod: PBBFAC,HCNC,, | Performed by: PSYCHIATRY & NEUROLOGY

## 2022-02-09 PROCEDURE — 3008F PR BODY MASS INDEX (BMI) DOCUMENTED: ICD-10-PCS | Mod: CPTII,S$GLB,, | Performed by: PODIATRIST

## 2022-02-09 PROCEDURE — 99213 PR OFFICE/OUTPT VISIT, EST, LEVL III, 20-29 MIN: ICD-10-PCS | Mod: S$GLB,,, | Performed by: PODIATRIST

## 2022-02-09 PROCEDURE — 3008F BODY MASS INDEX DOCD: CPT | Mod: CPTII,S$GLB,, | Performed by: PODIATRIST

## 2022-02-09 PROCEDURE — 1160F PR REVIEW ALL MEDS BY PRESCRIBER/CLIN PHARMACIST DOCUMENTED: ICD-10-PCS | Mod: CPTII,S$GLB,, | Performed by: PODIATRIST

## 2022-02-09 PROCEDURE — 1159F PR MEDICATION LIST DOCUMENTED IN MEDICAL RECORD: ICD-10-PCS | Mod: CPTII,S$GLB,, | Performed by: PODIATRIST

## 2022-02-09 PROCEDURE — 3080F DIAST BP >= 90 MM HG: CPT | Mod: HCNC,CPTII,S$GLB, | Performed by: PSYCHIATRY & NEUROLOGY

## 2022-02-09 PROCEDURE — 1159F MED LIST DOCD IN RCRD: CPT | Mod: HCNC,CPTII,S$GLB, | Performed by: PSYCHIATRY & NEUROLOGY

## 2022-02-09 PROCEDURE — 99205 OFFICE O/P NEW HI 60 MIN: CPT | Mod: HCNC,S$GLB,, | Performed by: PSYCHIATRY & NEUROLOGY

## 2022-02-09 PROCEDURE — 3044F HG A1C LEVEL LT 7.0%: CPT | Mod: HCNC,CPTII,S$GLB, | Performed by: PSYCHIATRY & NEUROLOGY

## 2022-02-09 PROCEDURE — 99213 OFFICE O/P EST LOW 20 MIN: CPT | Mod: S$GLB,,, | Performed by: PODIATRIST

## 2022-02-09 PROCEDURE — 3075F SYST BP GE 130 - 139MM HG: CPT | Mod: HCNC,CPTII,S$GLB, | Performed by: PSYCHIATRY & NEUROLOGY

## 2022-02-09 PROCEDURE — 3008F PR BODY MASS INDEX (BMI) DOCUMENTED: ICD-10-PCS | Mod: HCNC,CPTII,S$GLB, | Performed by: PSYCHIATRY & NEUROLOGY

## 2022-02-09 PROCEDURE — 3044F PR MOST RECENT HEMOGLOBIN A1C LEVEL <7.0%: ICD-10-PCS | Mod: HCNC,CPTII,S$GLB, | Performed by: PSYCHIATRY & NEUROLOGY

## 2022-02-09 PROCEDURE — 3044F HG A1C LEVEL LT 7.0%: CPT | Mod: CPTII,S$GLB,, | Performed by: PODIATRIST

## 2022-02-09 PROCEDURE — 1159F MED LIST DOCD IN RCRD: CPT | Mod: CPTII,S$GLB,, | Performed by: PODIATRIST

## 2022-02-09 PROCEDURE — 99999 PR PBB SHADOW E&M-EST. PATIENT-LVL IV: CPT | Mod: PBBFAC,HCNC,, | Performed by: PSYCHIATRY & NEUROLOGY

## 2022-02-09 PROCEDURE — 1159F PR MEDICATION LIST DOCUMENTED IN MEDICAL RECORD: ICD-10-PCS | Mod: HCNC,CPTII,S$GLB, | Performed by: PSYCHIATRY & NEUROLOGY

## 2022-02-09 NOTE — PROGRESS NOTES
"  [address] [facility] [address]  [place] [address]  Phone: [phone]   [phone]    Patient's PCP:Neela Barrett MD  Referring Provider: No ref. provider found    Subjective:      Chief Complaint:: Ingrown Toenail (Left great toe medial border) and Callouses (Left great toe)    HPI  Cat Densno is a 42 y.o. female who presents with a complaint of left great medial border lasting for over one week. Additional complaint callus to Left great toe Onset of symptoms pain to medial border of left great toe.  Current symptoms include pain inflammation and hard skin build up.  Aggravating factors are shoe gear. Symptoms have remained. Treatment to date have included previous callus scrapping.       Vitals:    02/09/22 0952   Pulse: 94   Resp: 16   SpO2: 98%   Weight: 45.1 kg (99 lb 8 oz)   Height: 5' 3" (1.6 m)   PainSc:   5      Shoe Size: 5 youth    Past Surgical History:   Procedure Laterality Date    CHOLECYSTECTOMY      COLONOSCOPY  11/12/2014    Dr. Kimble, repeat at 52 years old for screening    ESOPHAGOGASTRODUODENOSCOPY N/A 1/3/2022    Procedure: EGD (ESOPHAGOGASTRODUODENOSCOPY);  Surgeon: Scout Kimble MD;  Location: [facility];  Service: Endoscopy;  Laterality: N/A;    FLUOROSCOPIC URODYNAMIC STUDY N/A 7/23/2019    Procedure: URODYNAMIC STUDY, FLUOROSCOPIC;  Surgeon: Vanna Martinez MD;  Location: [address];  Service: Urology;  Laterality: N/A;  1 hour    UPPER GASTROINTESTINAL ENDOSCOPY  03/03/2017    Dr. Harris     Past Medical History:   Diagnosis Date    Acid reflux     Allergy     Anemia     Anticoagulated 12/29/2015    Anxiety     Asthma     Bipolar 1 disorder     Bronchospasm with bronchitis, acute     Chronic daily headache 9/11/2018    Chronic kidney disease     Chronic pain     Depression     bipolar 2    Fibromyalgia     Gastroparesis     History of right MCA stroke 11/25/2015    Hx of psychiatric care     Lactose intolerance     Lupus     Mixed " hyperlipidemia 11/28/2018    Psychiatric problem     Renal tubular acidosis     Seizure     Sjogren's syndrome     Smoker     Stroke 11-    Substance abuse-kratom daily use 8/30/2020    Suicide attempt     overdosed on a bottle of paxil, muscle relaxers, & zoloft    Therapy      Family History   Problem Relation Age of Onset    Hypertension Mother     Hyperlipidemia Mother     Psoriasis Mother     Thyroid disease Mother     No Known Problems Father     Post-traumatic stress disorder Brother     Drug abuse Brother     Diabetes Maternal Uncle     Hypertension Maternal Uncle     Alcohol abuse Maternal Uncle     Stroke Maternal Uncle     Scoliosis Paternal Aunt     Heart disease Paternal Uncle     Mental illness Maternal Grandmother     Cancer Maternal Grandmother         lung / brain - smoker    Drug abuse Maternal Grandmother     Hypertension Maternal Grandmother     Hyperlipidemia Maternal Grandmother     Diabetes Maternal Grandmother     Rheum arthritis Maternal Grandmother     Diabetes Mellitus Maternal Grandmother     Heart disease Maternal Grandfather     Hypertension Maternal Grandfather     Alcohol abuse Maternal Grandfather     Osteoarthritis Maternal Grandfather     No Known Problems Paternal Grandmother     Mental illness Paternal Grandfather     Early death Paternal Grandfather         self    Breast cancer Cousin     Breast cancer Other     Colon cancer Neg Hx     Colon polyps Neg Hx     Crohn's disease Neg Hx     Ulcerative colitis Neg Hx     Celiac disease Neg Hx     Lupus Neg Hx     Kidney disease Neg Hx     Inflammatory bowel disease Neg Hx     Depression Neg Hx     COPD Neg Hx     Asthma Neg Hx     Macular degeneration Neg Hx     Retinal detachment Neg Hx     Glaucoma Neg Hx         Social History:   Marital Status: Single  Alcohol History:  reports no history of alcohol use.  Tobacco History:  reports that she has been smoking cigarettes.  She started smoking about 6 years ago. She has a 15.00 pack-year smoking history. She has never used smokeless tobacco.  Drug History:  reports current drug use. Drug: Marijuana.    Review of patient's allergies indicates:   Allergen Reactions    Metoclopramide hcl Anaphylaxis     Involuntary mouth movements     Amoxicillin-pot clavulanate      2 other medications    Amoxil [amoxicillin]      hives    Avelox [moxifloxacin]      itching    Benadryl [diphenhydramine hcl]      Lowers seizure threshold     Quinazolinones      Lowers seizure threshold     Ultram [tramadol]      Lowers seizure threshold     Wellbutrin [bupropion hcl]      Lowers seizure threshold        Current Outpatient Medications   Medication Sig Dispense Refill    ARIPiprazole (ABILIFY) 10 MG Tab Take 1 tablet (10 mg total) by mouth once daily. 30 tablet 2    atorvastatin (LIPITOR) 10 MG tablet Take 1 tablet (10 mg total) by mouth once daily. 90 tablet 3    divalproex (DEPAKOTE) 250 MG EC tablet TAKE 1 TABLET BY MOUTH EVERY MORNING AND TAKE 2 TABLETS BY MOUTH EVERY NIGHT AT BEDTIME 90 tablet 2    famotidine (PEPCID) 40 MG tablet Take 1 tablet (40 mg total) by mouth every evening. 30 tablet 3    fluticasone propionate (FLONASE) 50 mcg/actuation nasal spray 1 spray (50 mcg total) by Each Nostril route once daily. (Patient not taking: Reported on 1/31/2022) 16 g 0    gabapentin (NEURONTIN) 800 MG tablet TAKE 1 TABLET(800 MG) BY MOUTH TWICE DAILY (Patient taking differently: Take 800 mg by mouth 2 (two) times daily. TAKE 1 TABLET(800 MG) BY MOUTH TWICE DAILY) 180 tablet 3    mirtazapine (REMERON) 30 MG tablet TAKE 1 TABLET(30 MG) BY MOUTH EVERY EVENING 30 tablet 2    pantoprazole (PROTONIX) 40 MG tablet TAKE 1 TABLET(40 MG) BY MOUTH EVERY DAY (Patient taking differently: Take 40 mg by mouth once daily.) 90 tablet 3    promethazine (PHENERGAN) 25 MG tablet Take 1 tablet (25 mg total) by mouth every 6 (six) hours as needed for Nausea. 20  tablet 1    propranoloL (INDERAL LA) 80 MG 24 hr capsule Take 1 capsule (80 mg total) by mouth once daily. 30 capsule 5    traZODone (DESYREL) 100 MG tablet TAKE 1 TO 2 TABLETS BY MOUTH EVERY NIGHT AS NEEDED FOR INSOMNIA 60 tablet 2    XARELTO 20 mg Tab TAKE 1 TABLET(20 MG) BY MOUTH DAILY WITH THE EVENING MEAL AND DINNER (Patient taking differently: Take 20 mg by mouth every evening.) 90 tablet 0     No current facility-administered medications for this visit.       Review of Systems   Constitutional: Negative for chills, fatigue, fever and unexpected weight change.   HENT: Negative for hearing loss and trouble swallowing.    Eyes: Negative for photophobia and visual disturbance.   Respiratory: Negative for cough, shortness of breath and wheezing.    Cardiovascular: Negative for chest pain, palpitations and leg swelling.   Gastrointestinal: Negative for abdominal pain and nausea.   Genitourinary: Negative for dysuria and frequency.   Musculoskeletal: Positive for myalgias. Negative for arthralgias, back pain, gait problem and joint swelling.   Skin: Negative for rash and wound.   Neurological: Positive for seizures. Negative for tremors, weakness, numbness and headaches.   Hematological: Does not bruise/bleed easily.         Objective:        Physical Exam:   Foot Exam    General  General Appearance: appears stated age and healthy   Orientation: alert and oriented to person, place, and time   Affect: appropriate   Gait: unimpaired       Left Foot/Ankle      Inspection and Palpation  Ecchymosis: none  Tenderness: (Distal medial great toenail)  Swelling: none   Skin Exam: callus; no drainage and no erythema   Neurovascular  Dorsalis pedis: 2+  Posterior tibial: 2+  Capillary refill: 2+  Varicose veins: not present  Saphenous nerve sensation: diminished  Tibial nerve sensation: diminished  Superficial peroneal nerve sensation: diminished  Deep peroneal nerve sensation: diminished  Sural nerve sensation:  diminished    Edema  Type of edema: non-pitting    Muscle Strength  Ankle dorsiflexion: 4  Ankle plantar flexion: 4  Ankle inversion: 4  Ankle eversion: 4  Great toe extension: 4  Great toe flexion: 4    Comments  There is slight incurvation of the distal medial portion of the great toenail.  Tender to palpation.  No edema.  No erythema.  No drainage.    Physical Exam  Cardiovascular:      Pulses:           Dorsalis pedis pulses are 2+ on the left side.        Posterior tibial pulses are 2+ on the left side.   Feet:      Left foot:      Skin integrity: Callus present. No erythema.         Imaging:  None           Assessment:       1. Ingrown nail    2. Pain of toe of left foot      Plan:   Ingrown nail    Pain of toe of left foot      Follow up if symptoms worsen or fail to improve.    Procedures        I trimmed the distal medial border left great toenail today as a courtesy.  Patient tolerated procedure well and stated relief following.  Discussed patient that this should alleviate her symptoms however, if her symptoms reoccur she should return for full ingrown nail removal.    Counseling:     I provided patient education verbally regarding:   Patient diagnosis, treatment options, as well as alternatives, risks, and benefits.     Ingrown toenail treatment options of no treatment, avulsion of nail border under local with regrowth of nail, chemical matrixectomy for attempted permanent correction of the problem. Patient was educated about daily dressing changes, soaks, and medications following removal of the nail.       This note was created using Dragon voice recognition software that occasionally misinterpreted phrases or words.

## 2022-02-09 NOTE — PATIENT INSTRUCTIONS

## 2022-02-09 NOTE — PROGRESS NOTES
Date of service:  2/9/2022    Chief complaint:  Seizures    HPI:  The patient is a 42 y.o. female who I last saw >3 years ago.  At that time, she had been evaluated in the EMU and found to have FLE.  Question remained as to whether all of her events were epileptic in nature of if she might also have NEE.  At that time, she was tolerating her Depakote.  She had been found to have osteopenia.  She did not follow up as requested.  She is here today on the advice of her psychiatrist, who found that the patient had an elevated amylase and expressed concern for pancreatic issues, questioning the need to transition off VPA.  Of note, the patient has been following with GI for chronic nausea and vomiting.  She denies pain.  She indicates that they have not expressed to her concerns related to her pancreas.  Additionally, she does have a diagnosis of Sjogren's syndrome.  She indicates that she has been having significant issues with dry mouth.  She does have an upcoming appointment with her rheumatologist.    The patient reports no seizures since the last time I saw her.      She reports no interval issues worrisome for recurrent strokes.  She continues to take Xarelto without issues.  She is seeing Dr. Weiss for Botox, which she feels has been helpful.    Also, the patient has previously been diagnosed with osteopenia.  She indicates she is not on treatment for this.      She continues to have issues with headaches.      Additionally, the patient had recently been considering a move to [place]; however, she decided against this and will be remaining in the area.    Prior history (2018):  The patient is a 42 y.o. female seen previously for seizures.  Since leaving the EMU, she has had no further events.  She reports she is tolerating her increased dose of Depakote well.    The patient was also started on magnesium for headache prophylaxis in the hospital.  She notes no issues with this.  She has not seen a significant  "improvement on this front.  She takes Tylenol and caffeine for rescue.  This is partially effective.    Prior history of present illness (2018):  The patient is a 42 y.o. female referred for evaluation of episodes suspicious for seizures.  She saw Dr. Muir in the hospital in June.  This is my first time seeing her.  The history available is marginal as both the patient and her  have limited insight into certain points.  These began "a couple of years back," "I'm not exactly sure when."  She is not aware of any aura.  Her seizure is characterized by left sided "muscle spasming."  It is not clear how long the events last for.  Afterwards, she reports some confusion.  The patient's frequency of events is roughly once every couple of years.    In the past, she has been told that she has NEE.  This diagnosis was apparently made somewhere in [place].  The patient is not sure where.    She also reports ongoing issues with muscle spasms on the left side.  She does have a spastic left hemiparesis stemming from an R MCA stroke in 2015.    Epilepsy risk factors:  Pregnancy/Labor/Delivery: None  Febrile seizures: None  Head injury: None  CNS infection: None     Stroke: +  Family Hx of Sz: None  Developmental delay: None    Current AEDs:  Depakote 1000 mg BID (also being used for bipolar disorder)    Prior AEDs:  Keppra    AEDs not tried:  acetazolamide (Diamox, AZM)  amantadine  brivaracetam (Briviact)  carbamazepine (Tegretol, CBZ)  clobezam (Onfi or Frizium, CLB)  ethosuximide (Zarontin, ESM)  eslicarbazine (Aptiom, ESL)  felbamate (felbatol, FBM)  gabapentin (Neurontin, GPN)  lacosamide (Vimpat, LCS)   lamotrigine (Lamictal, LTG)   methsuximide (Celontin, MSM)  methyphenytoin (Mesantion, MHT)  oxcarbazepine (Trileptal OXC)  perampanel (Fycompa, FCP)   phenobarbital (Pb)  phenytoin (Dilantin, PHT)  pregabalin (Lyrica, PGB)  primidone (Mysoline, PRM)  retigabine (Potiga, RTG)  rufinamide (Banzel, RUF)  tiagabine " (Gabatril,  TGB)  topiramate (Topamax, TPM)  viagabatrin, (Sabril, VGB)  vagal nerve stimulator (VNS)  zonisamide (Zonegran, ZNA)  Benzodiazepines  diazepam - rectal (Diastatl)  diazepam - oral (Valium, DZ)  clonazepam (Klonopin, CZP)  clorazepate (Tranxene, CLZ)  Ativan      Past Medical History:   Diagnosis Date    Acid reflux     Allergy     Anemia     Anticoagulated 12/29/2015    Anxiety     Asthma     Bipolar 1 disorder     Bronchospasm with bronchitis, acute     Chronic daily headache 9/11/2018    Chronic kidney disease     Chronic pain     Depression     bipolar 2    Fibromyalgia     Gastroparesis     History of right MCA stroke 11/25/2015    Hx of psychiatric care     Lactose intolerance     Lupus     Mixed hyperlipidemia 11/28/2018    Psychiatric problem     Renal tubular acidosis     Seizure     Sjogren's syndrome     Smoker     Stroke 11-    Substance abuse-kratom daily use 8/30/2020    Suicide attempt     overdosed on a bottle of paxil, muscle relaxers, & zoloft    Therapy        Past Surgical History:   Procedure Laterality Date    CHOLECYSTECTOMY      COLONOSCOPY  11/12/2014    Dr. Kimble, repeat at 52 years old for screening    ESOPHAGOGASTRODUODENOSCOPY N/A 1/3/2022    Procedure: EGD (ESOPHAGOGASTRODUODENOSCOPY);  Surgeon: Scout Kimble MD;  Location: [facility];  Service: Endoscopy;  Laterality: N/A;    FLUOROSCOPIC URODYNAMIC STUDY N/A 7/23/2019    Procedure: URODYNAMIC STUDY, FLUOROSCOPIC;  Surgeon: Vanna Martinez MD;  Location: [address];  Service: Urology;  Laterality: N/A;  1 hour    UPPER GASTROINTESTINAL ENDOSCOPY  03/03/2017    Dr. Harris       Family History   Problem Relation Age of Onset    Hypertension Mother     Hyperlipidemia Mother     Psoriasis Mother     Thyroid disease Mother     No Known Problems Father     Post-traumatic stress disorder Brother     Drug abuse Brother     Diabetes Maternal Uncle     Hypertension Maternal  Uncle     Alcohol abuse Maternal Uncle     Stroke Maternal Uncle     Scoliosis Paternal Aunt     Heart disease Paternal Uncle     Mental illness Maternal Grandmother     Cancer Maternal Grandmother         lung / brain - smoker    Drug abuse Maternal Grandmother     Hypertension Maternal Grandmother     Hyperlipidemia Maternal Grandmother     Diabetes Maternal Grandmother     Rheum arthritis Maternal Grandmother     Diabetes Mellitus Maternal Grandmother     Heart disease Maternal Grandfather     Hypertension Maternal Grandfather     Alcohol abuse Maternal Grandfather     Osteoarthritis Maternal Grandfather     No Known Problems Paternal Grandmother     Mental illness Paternal Grandfather     Early death Paternal Grandfather         self    Breast cancer Cousin     Breast cancer Other     Colon cancer Neg Hx     Colon polyps Neg Hx     Crohn's disease Neg Hx     Ulcerative colitis Neg Hx     Celiac disease Neg Hx     Lupus Neg Hx     Kidney disease Neg Hx     Inflammatory bowel disease Neg Hx     Depression Neg Hx     COPD Neg Hx     Asthma Neg Hx     Macular degeneration Neg Hx     Retinal detachment Neg Hx     Glaucoma Neg Hx        Social History     Socioeconomic History    Marital status: Single    Number of children: 0   Tobacco Use    Smoking status: Current Every Day Smoker     Packs/day: 1.00     Years: 15.00     Pack years: 15.00     Types: Cigarettes     Start date: 11/25/2015    Smokeless tobacco: Never Used   Substance and Sexual Activity    Alcohol use: No     Alcohol/week: 0.0 standard drinks    Drug use: Yes     Types: Marijuana     Comment: smokes occasionally, helps with pain and appetite    Sexual activity: Never     Partners: Female     Comment: usually female, but currently with her boyfriend   Social History Narrative    Doesn't drive since the stroke, mother drives her and she lives with her mother.     Social Determinants of Health     Financial  Resource Strain: Low Risk     Difficulty of Paying Living Expenses: Not hard at all   Food Insecurity: No Food Insecurity    Worried About Running Out of Food in the Last Year: Never true    Ran Out of Food in the Last Year: Never true   Transportation Needs: No Transportation Needs    Lack of Transportation (Medical): No    Lack of Transportation (Non-Medical): No   Physical Activity: Inactive    Days of Exercise per Week: 0 days    Minutes of Exercise per Session: 0 min   Stress: No Stress Concern Present    Feeling of Stress : Only a little   Social Connections: Unknown    Frequency of Communication with Friends and Family: Once a week    Frequency of Social Gatherings with Friends and Family: Twice a week    Active Member of Clubs or Organizations: No    Attends Club or Organization Meetings: Never    Marital Status: Never    Housing Stability: Low Risk     Unable to Pay for Housing in the Last Year: No    Number of Places Lived in the Last Year: 2    Unstable Housing in the Last Year: No        Review of patient's allergies indicates:   Allergen Reactions    Reglan [metoclopramide hcl]      Involuntary mouth movements     Amoxil [amoxicillin]      hives    Augmentin [amoxicillin-pot clavulanate]     Avelox [moxifloxacin]      itching    Benadryl [diphenhydramine hcl]      Lowers seizure threshold     Quinazolinones      Lowers seizure threshold     Ultram [tramadol]      Lowers seizure threshold     Wellbutrin [bupropion hcl]      Lowers seizure threshold         Review of Systems  The patient's ROS is noncontributory except as noted above.     Physical exam:  BP (!) 132/90 (BP Location: Right arm, Patient Position: Sitting, BP Method: Medium (Automatic))   Pulse 90   Resp 17   Wt 45.4 kg (100 lb 1.4 oz)   BMI 17.73 kg/m²   General: Well developed, well nourished.  No acute distress.  HEENT: Atraumatic, normocephalic.  Neck: Supple, trachea midline.  Pulmonary: No increased  "work of breathing.  Musculoskeletal: No clubbing or cyanosis.    Neurological exam:  Mental status: Awake and alert.  Oriented x4.  Speech fluent and appropriate.  Recent and remote memory appear to be intact.  Fund of knowledge normal.  Cranial nerves: Pupils equal round and reactive to light, extraocular movements intact, facial strength and sensation intact bilaterally, palate and tongue midline, hearing grossly intact bilaterally.  Motor: 5 out of 5 strength throughout the upper and lower extremities on the right, spastic hemiparesis on left. Normal bulk and tone on the right.  Sensation: Intact to light touch and temperature bilaterally.  DTR: 2+ at the knees and biceps on the right, 3+ on the left.  Coordination: Finger-nose-finger testing intact on the right, limited on the left by prior stroke.  Gait: Cautious gait.    Data base:  Chart reviewed.  Briefly summarized, the patient was seen in the hospital by Dr. Muir, who felt that her breakthrough seizures were likely related to AED noncompliance and UTI.    MRI brain (3/17):  "1.  No acute intracranial abnormality is seen.  Specifically, no reduced diffusion to suggest acute ischemia.  No abnormal contrast enhancement.  2.  Chronic right cerebral hemisphere infarcts including large MCA territory infarcts and smaller territory in the right frontoparietal PORSHA territory.  Associated ipsilateral Wallerian degeneration and mild cortical laminar necrosis noted.  Confluent white matter disease in the right cerebral hemisphere consistent with associated gliosis."  I independently visualized and interpreted this study.     CT brain (6/18):  "1. There is no acute abnormality.  There is no intracranial hemorrhage, mass, acute infarction.  2. There is no acute skull fracture.  3. There is encephalomalacia and gliosis throughout the right frontal parietal lobes from remote middle and anterior cerebral artery vascular territory infarctions."  I independently visualized " "and interpreted this study.     EEG (6/18):  "IMPRESSION:  This is an abnormal EEG during wakefulness, drowsiness and sleep.  Diffuse disorganized slowing of the background was noted.   CLINICAL CORRELATION:  The patient is a 38-year-old female with a history of multiple medical problems who is currently maintained on gabapentin and valproic acid.  This is an abnormal EEG during wakefulness, drowsiness and sleep.  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  There is no evidence of an epileptic process on this recording.  No seizures were recorded during this study."    vEEG (9/18):  Right sided slowing.  1 event captured without clear EEG correlate though semiology suggested right frontal seizure focus.    DEXA (7/18):  "Osteopenia."    The above information was reviewed and verified.  There are no changes.     Assessment and plan:  The patient is a 42 y.o. female referred for evaluation for events worrisome for seizures. The differential includes focal onset seizures arising from the right hemisphere symptomatic of her prior stroke.  She has also, apparently, been also diagnosed with NEE in the past.  The EMU admission here did appear to confirm that she does have epilepsy (most likely right FLE), so she may have both epileptic seizures and NEE.  As far as medications go, we will continue her Depakote (also on this for bipolar disorder).  We will check labs for medication monitoring purposes.  She will also need a DEXA scans periodically as she was found to have osteopenia and Depakote can contribute to this.  Medication side effects were discussed with the patient.  Teratogenicity of AEDs were discussed, including the manner in which Depakote is particularly problematic.  She was instructed to make sure she was utilizing a reliable means of birth control.  Should she decide to become pregnant, she is to contact " us first, so we can determine what medication adjustments, if any, are appropriate.  The patient was counseled that the risks posed by uncontrolled seizures typically exceeds that posed by the medications themselves. Consequently, the patient was advised of the importance of continuing her anticonvulsant medication should she become pregnant.  She was advised to take supplemental folate.  State law as it pertains to driving for individuals with seizures was discussed.  The patient was also counseled on seizure safety.     I have instructed her to work with her PCP on the osteopenia.  We will repeat the DEXA.  If this worsens even on treatment, we may have to consider alternatives to Depakote.    Regarding the patient's chronic headaches, we will arrange for her to be seen in headache clinic.  Hopefully, her increased dose of Depakote will help from a prophylactic perspective.  Her history of stroke/anticoagulation does limit our options for abortive agents.  She will continue using Tylenol for now.  Appropriate medication use has been discussed.    Question has been raised about the continuation of Depakote in light of elevated amylase.  I have discussed this issue with both psychiatry and GI.  It is presently unclear if the patient has any pancreatic issues.  GI indicated that a CT of the abdomen with contrast (pancreas protocol) could provide some insight.  I will also check additional serologies and will calculate a ACCR.  I do wonder if the elevated amylase might be related to salivary gland inflamation secondary to her Sjogren's.  She has an upcoming appointment with her rheumatologist, and I encouraged her to discuss further treatemt options.  Pending further evaluation, we will continue the Depakote for now.    We will plan on seeing the patient back in a few weeks.    A total of 61 minutes of total time spent on the encounter, which includes face to face time and non-face to face time preparing to see the  patient (eg, review of tests), Obtaining and/or reviewing separately obtained history, documenting clinical information in the electronic or other health record, independently interpreting results (not separately reported)/communicating results to the patient/family/caregiver, and/or care coordination (not separately reported).

## 2022-02-14 ENCOUNTER — OFFICE VISIT (OUTPATIENT)
Dept: NEUROLOGY | Facility: CLINIC | Age: 42
End: 2022-02-14
Payer: MEDICARE

## 2022-02-14 ENCOUNTER — PATIENT MESSAGE (OUTPATIENT)
Dept: NEUROLOGY | Facility: CLINIC | Age: 42
End: 2022-02-14

## 2022-02-14 VITALS
DIASTOLIC BLOOD PRESSURE: 73 MMHG | TEMPERATURE: 99 F | SYSTOLIC BLOOD PRESSURE: 107 MMHG | HEIGHT: 63 IN | RESPIRATION RATE: 17 BRPM | BODY MASS INDEX: 17.72 KG/M2 | HEART RATE: 82 BPM | WEIGHT: 100 LBS

## 2022-02-14 DIAGNOSIS — R51.9 CHRONIC NONINTRACTABLE HEADACHE, UNSPECIFIED HEADACHE TYPE: Primary | ICD-10-CM

## 2022-02-14 DIAGNOSIS — G89.29 CHRONIC NONINTRACTABLE HEADACHE, UNSPECIFIED HEADACHE TYPE: Primary | ICD-10-CM

## 2022-02-14 DIAGNOSIS — M79.18 MYOFASCIAL PAIN: ICD-10-CM

## 2022-02-14 DIAGNOSIS — R51.9 HEADACHE DISORDER: ICD-10-CM

## 2022-02-14 PROCEDURE — 1160F PR REVIEW ALL MEDS BY PRESCRIBER/CLIN PHARMACIST DOCUMENTED: ICD-10-PCS | Mod: HCNC,CPTII,S$GLB, | Performed by: PHYSICIAN ASSISTANT

## 2022-02-14 PROCEDURE — 3074F SYST BP LT 130 MM HG: CPT | Mod: HCNC,CPTII,S$GLB, | Performed by: PHYSICIAN ASSISTANT

## 2022-02-14 PROCEDURE — 3008F BODY MASS INDEX DOCD: CPT | Mod: HCNC,CPTII,S$GLB, | Performed by: PHYSICIAN ASSISTANT

## 2022-02-14 PROCEDURE — 3044F PR MOST RECENT HEMOGLOBIN A1C LEVEL <7.0%: ICD-10-PCS | Mod: HCNC,CPTII,S$GLB, | Performed by: PHYSICIAN ASSISTANT

## 2022-02-14 PROCEDURE — 3008F PR BODY MASS INDEX (BMI) DOCUMENTED: ICD-10-PCS | Mod: HCNC,CPTII,S$GLB, | Performed by: PHYSICIAN ASSISTANT

## 2022-02-14 PROCEDURE — 3044F HG A1C LEVEL LT 7.0%: CPT | Mod: HCNC,CPTII,S$GLB, | Performed by: PHYSICIAN ASSISTANT

## 2022-02-14 PROCEDURE — 99999 PR PBB SHADOW E&M-EST. PATIENT-LVL IV: CPT | Mod: PBBFAC,HCNC,, | Performed by: PHYSICIAN ASSISTANT

## 2022-02-14 PROCEDURE — 99215 PR OFFICE/OUTPT VISIT, EST, LEVL V, 40-54 MIN: ICD-10-PCS | Mod: HCNC,S$GLB,, | Performed by: PHYSICIAN ASSISTANT

## 2022-02-14 PROCEDURE — 99999 PR PBB SHADOW E&M-EST. PATIENT-LVL IV: ICD-10-PCS | Mod: PBBFAC,HCNC,, | Performed by: PHYSICIAN ASSISTANT

## 2022-02-14 PROCEDURE — 1159F PR MEDICATION LIST DOCUMENTED IN MEDICAL RECORD: ICD-10-PCS | Mod: HCNC,CPTII,S$GLB, | Performed by: PHYSICIAN ASSISTANT

## 2022-02-14 PROCEDURE — 3078F PR MOST RECENT DIASTOLIC BLOOD PRESSURE < 80 MM HG: ICD-10-PCS | Mod: HCNC,CPTII,S$GLB, | Performed by: PHYSICIAN ASSISTANT

## 2022-02-14 PROCEDURE — 3074F PR MOST RECENT SYSTOLIC BLOOD PRESSURE < 130 MM HG: ICD-10-PCS | Mod: HCNC,CPTII,S$GLB, | Performed by: PHYSICIAN ASSISTANT

## 2022-02-14 PROCEDURE — 1159F MED LIST DOCD IN RCRD: CPT | Mod: HCNC,CPTII,S$GLB, | Performed by: PHYSICIAN ASSISTANT

## 2022-02-14 PROCEDURE — 3078F DIAST BP <80 MM HG: CPT | Mod: HCNC,CPTII,S$GLB, | Performed by: PHYSICIAN ASSISTANT

## 2022-02-14 PROCEDURE — 99215 OFFICE O/P EST HI 40 MIN: CPT | Mod: HCNC,S$GLB,, | Performed by: PHYSICIAN ASSISTANT

## 2022-02-14 PROCEDURE — 1160F RVW MEDS BY RX/DR IN RCRD: CPT | Mod: HCNC,CPTII,S$GLB, | Performed by: PHYSICIAN ASSISTANT

## 2022-02-14 RX ORDER — CHLORZOXAZONE 500 MG/1
500 TABLET ORAL 3 TIMES DAILY PRN
Qty: 90 TABLET | Refills: 5 | Status: SHIPPED | OUTPATIENT
Start: 2022-02-14 | End: 2022-02-24

## 2022-02-14 NOTE — PATIENT INSTRUCTIONS
To help reduce the headaches:  No changes to your BP medicines, or seizure medicines, or mood medicines  Will try the parafon forte (chloroxazone)---muscle relaxant---1 pill 3x a day for 2 weeks, then switch to 1 pill up to 3x a day as needed for discomfort. No use with alcohol, and if it makes you tired, cut in half       We can consider emgality or nerve blocks (trigeminal/greater occipital)     If signs of stroke (e.g., worst headache of life, sudden vision change, numbness/weakness one side of the body, facial droop, speech change or confusion), call 911.

## 2022-02-14 NOTE — PROGRESS NOTES
[facility]  [address] [facility] [address] [place] [address]  Phone:[phone]  Fax: [phone]   New Patient Consultation  (followed by Dr. Norman, LOV: 2022)    Patient Name: Cat Denson  : 1980  MRN:  [id number]  Today: 2022   chief complaint: Headache    PCP: Neela Barrett MD.    Assessment:   Cat Denson is a 42 y.o. with a PMHx of: HA, Hx of RMCA territory stroke/LEFT hemiparesis (cognitive change/seizure-?, followed by my colleague, Dr. Norman, on AED therapy and DOAC), Bipolar 1/substance use/agoraphobia, CKD, bladder changes, Asthma, Lupus, Fe defic and osteopenia   whom presents with her friend to discuss HA.  HA appear to be chronic in nature with migrainous component. Will have her track her HA to better qualify them. Myofascial pain contributes.      Review:    ICD-10-CM ICD-9-CM   1. Chronic nonintractable headache, unspecified headache type  R51.9 784.0    G89.29    2. Myofascial pain  M79.18 729.1         Plan:   Discussed realistic goals of care with patient at length. Discussed medication options, need for lifestyle adjustment. Discussed treatment will take time. Goal will be to reduce frequency/intensity/quantity of HA, not to be completely HA free. Gave copy of S triggers for migraine informational sheet, and discussed clinic's non narcotic policy re: HA. Patient voiced understanding and agreement.            -will have patient track HA, discussed cedric for smart phone           -will have patient work on lifestyle           -if HA change in quality/nature, will get updated imaging study     For HA Prevention:  1 Ideally would like to do botox for migraines, already getting 200 units of botox for LUE spasticity (did not want to add more botox at this time d/t chance of respiratory compromise)   2 continue BP meds, mood meds and seizure meds  3 offered emgality vs nerve blocks vs muscle relaxant as the back of her head  ""hurts more" at times. She also wanted to "stick with a pill first"         -will try parafon forte 1 pill TID for a few weeks then switch to 1 pill TID PRN HA/neck pain. Discussed no use with ETOH/if it makes her tired, cut in half. She agreed              -received notification from pharmacy after she left that insurance not going to cover medicine. I have called her and wrote a patient portal message discussing [employer] and possibly switching pharmacies. I am awaiting a call back    For HA :  No triptans or ergots  Can consider nurtec in future      To break up Headaches:  We discussed nerve blocks, not interested at this time  No steroids d/t osteopenia/osteoporisis     Other:  If signs of stroke (e.g., worst headache of life, sudden vision change, numbness/weakness one side of the body, facial droop, speech change or confusion), call 911.            All test results as well as any necessary instructions were reviewed and discussed with patient.    Review:  Orders Placed This Encounter    chlorzoxazone (PARAFON FORTE) 500 mg Tab         Patient to return to PCP/other specialists for all other problems  Patient to continue on all medications as Rx'd   A detailed AVS was provided to the patient with patient readback   RTO- 2 months   The patient indicates understanding of these issues and agrees to the plan.    HPI:   Cat Denson is a 42 y.o.right handed, female with a PMHx of: HA, Hx of RMCA territory stroke/LEFT hemiparesis (cognitive change/seizure-?, followed by my colleague, Dr. Norman, on AED therapy and DOAC), Bipolar 1/substance use/agoraphobia, CKD, bladder changes, Asthma, Lupus, Fe defic and osteopenia   whom presents with her friend to discuss HA.      HA HPI:  Start:since teenage years  History of trauma (yes hit head on filing cabinet s/p seizure event, in the early s, though HA before this...), History of CNS infection (no), History of Stroke (yes)  Location:LEFT " "side of the face around the eye and behind the eye and whole back of the head   Severity: range: 2-10/10  Duration:all day long   Frequency:daily   Associated factors (bolded positive) WITH HA ( or migraine): Nausea, vomiting, photophobia, phonophobia, tinnitus, scalp pain, vision loss, diplopia, scintillations, eye pain, jaw pain, weakness?    Tried:tylenol and caffeine pills   Triggers (in bold): stress, lack of sleep, too much caffeine, too little caffeine, weather change, seasonal change, strong odours, bright lights, sunlight, food   Currently having a HA?:yes   Positives in bold: Hx of Kidney Stones, asthma, GI bleed, osteopenia CAD/MI, CVA/TIA, DM    Imaging on file: 9/18/2021 CT head -stable RMCA territory stroke, MRI brain 2017 (as previous)   Therapies tried in past: (failures to be marked, if known---why did it fail?)  Trazodone  remeron  Gabapentin  depakote  abilify  Inderal  botox for spasticity (Dr Arellano, 1/27/2022 last visit)       2/9/2022 Dr Norman's notes:  "HPI:  The patient is a 42 y.o. female who I last saw >3 years ago.  At that time, she had been evaluated in the EMU and found to have FLE.  Question remained as to whether all of her events were epileptic in nature of if she might also have NEE.  At that time, she was tolerating her Depakote.  She had been found to have osteopenia.  She did not follow up as requested.  She is here today on the advice of her psychiatrist, who found that the patient had an elevated amylase and expressed concern for pancreatic issues, questioning the need to transition off VPA.  Of note, the patient has been following with GI for chronic nausea and vomiting.  She denies pain.  She indicates that they have not expressed to her concerns related to her pancreas.  Additionally, she does have a diagnosis of Sjogren's syndrome.  She indicates that she has been having significant issues with dry mouth.  She does have an upcoming appointment with her " "rheumatologist.     The patient reports no seizures since the last time I saw her.       She reports no interval issues worrisome for recurrent strokes.  She continues to take Xarelto without issues.  She is seeing Dr. Weiss for Botox, which she feels has been helpful.     Also, the patient has previously been diagnosed with osteopenia.  She indicates she is not on treatment for this.       She continues to have issues with headaches.       Additionally, the patient had recently been considering a move to [place]; however, she decided against this and will be remaining in the area.     Prior history (2018):  The patient is a 42 y.o. female seen previously for seizures.  Since leaving the EMU, she has had no further events.  She reports she is tolerating her increased dose of Depakote well.     The patient was also started on magnesium for headache prophylaxis in the hospital.  She notes no issues with this.  She has not seen a significant improvement on this front.  She takes Tylenol and caffeine for rescue.  This is partially effective.     Prior history of present illness (2018):  The patient is a 42 y.o. female referred for evaluation of episodes suspicious for seizures.  She saw Dr. Muir in the hospital in June.  This is my first time seeing her.  The history available is marginal as both the patient and her  have limited insight into certain points.  These began "a couple of years back," "I'm not exactly sure when."  She is not aware of any aura.  Her seizure is characterized by left sided "muscle spasming."  It is not clear how long the events last for.  Afterwards, she reports some confusion.  The patient's frequency of events is roughly once every couple of years.     In the past, she has been told that she has NEE.  This diagnosis was apparently made somewhere in [place].  The patient is not sure where.     She also reports ongoing issues with muscle spasms on the left side.  She does have a " "spastic left hemiparesis stemming from an R MCA stroke in 2015.     Epilepsy risk factors:  Pregnancy/Labor/Delivery: None  Febrile seizures: None  Head injury: None  CNS infection: None     Stroke: +  Family Hx of Sz: None  Developmental delay: None     Current AEDs:  Depakote 1000 mg BID (also being used for bipolar disorder)     Prior AEDs:  Keppra"    Medication Reconciliation:   Current Outpatient Medications   Medication Sig Dispense Refill    ARIPiprazole (ABILIFY) 10 MG Tab Take 1 tablet (10 mg total) by mouth once daily. 30 tablet 2    atorvastatin (LIPITOR) 10 MG tablet Take 1 tablet (10 mg total) by mouth once daily. 90 tablet 3    divalproex (DEPAKOTE) 250 MG EC tablet TAKE 1 TABLET BY MOUTH EVERY MORNING AND TAKE 2 TABLETS BY MOUTH EVERY NIGHT AT BEDTIME 90 tablet 2    famotidine (PEPCID) 40 MG tablet Take 1 tablet (40 mg total) by mouth every evening. 30 tablet 3    fluticasone propionate (FLONASE) 50 mcg/actuation nasal spray 1 spray (50 mcg total) by Each Nostril route once daily. 16 g 0    gabapentin (NEURONTIN) 800 MG tablet TAKE 1 TABLET(800 MG) BY MOUTH TWICE DAILY (Patient taking differently: Take 800 mg by mouth 2 (two) times daily. TAKE 1 TABLET(800 MG) BY MOUTH TWICE DAILY) 180 tablet 3    mirtazapine (REMERON) 30 MG tablet TAKE 1 TABLET(30 MG) BY MOUTH EVERY EVENING 30 tablet 2    pantoprazole (PROTONIX) 40 MG tablet TAKE 1 TABLET(40 MG) BY MOUTH EVERY DAY (Patient taking differently: Take 40 mg by mouth once daily.) 90 tablet 3    promethazine (PHENERGAN) 25 MG tablet Take 1 tablet (25 mg total) by mouth every 6 (six) hours as needed for Nausea. 20 tablet 1    propranoloL (INDERAL LA) 80 MG 24 hr capsule Take 1 capsule (80 mg total) by mouth once daily. 30 capsule 5    traZODone (DESYREL) 100 MG tablet TAKE 1 TO 2 TABLETS BY MOUTH EVERY NIGHT AS NEEDED FOR INSOMNIA 60 tablet 2    XARELTO 20 mg Tab TAKE 1 TABLET(20 MG) BY MOUTH DAILY WITH THE EVENING MEAL AND DINNER (Patient " taking differently: Take 20 mg by mouth every evening.) 90 tablet 0     No current facility-administered medications for this visit.     Review of patient's allergies indicates:   Allergen Reactions    Metoclopramide hcl Anaphylaxis     Involuntary mouth movements     Amoxicillin-pot clavulanate      2 other medications    Amoxil [amoxicillin]      hives    Avelox [moxifloxacin]      itching    Benadryl [diphenhydramine hcl]      Lowers seizure threshold     Quinazolinones      Lowers seizure threshold     Ultram [tramadol]      Lowers seizure threshold     Wellbutrin [bupropion hcl]      Lowers seizure threshold        PMHx:  Past Medical History:   Diagnosis Date    Acid reflux     Allergy     Anemia     Anticoagulated 12/29/2015    Anxiety     Asthma     Bipolar 1 disorder     Bronchospasm with bronchitis, acute     Chronic daily headache 9/11/2018    Chronic kidney disease     Chronic pain     Depression     bipolar 2    Fibromyalgia     Gastroparesis     History of right MCA stroke 11/25/2015    Hx of psychiatric care     Lactose intolerance     Lupus     Mixed hyperlipidemia 11/28/2018    Psychiatric problem     Renal tubular acidosis     Seizure     Sjogren's syndrome     Smoker     Stroke 11-    Substance abuse-kratom daily use 8/30/2020    Suicide attempt     overdosed on a bottle of paxil, muscle relaxers, & zoloft    Therapy      Past Surgical History:   Procedure Laterality Date    CHOLECYSTECTOMY      COLONOSCOPY  11/12/2014    Dr. Kimble, repeat at 52 years old for screening    ESOPHAGOGASTRODUODENOSCOPY N/A 1/3/2022    Procedure: EGD (ESOPHAGOGASTRODUODENOSCOPY);  Surgeon: Scotu Kimble MD;  [facility];  Service: Endoscopy;  Laterality: N/A;    FLUOROSCOPIC URODYNAMIC STUDY N/A 7/23/2019    Procedure: URODYNAMIC STUDY, FLUOROSCOPIC;  Surgeon: Vanna Martinez MD;  Location: [address];  Service: Urology;  Laterality: N/A;  1 hour     UPPER GASTROINTESTINAL ENDOSCOPY  03/03/2017    Dr. Harris       Fhx:  Family History   Problem Relation Age of Onset    Hypertension Mother     Hyperlipidemia Mother     Psoriasis Mother     Thyroid disease Mother     No Known Problems Father     Post-traumatic stress disorder Brother     Drug abuse Brother     Diabetes Maternal Uncle     Hypertension Maternal Uncle     Alcohol abuse Maternal Uncle     Stroke Maternal Uncle     Scoliosis Paternal Aunt     Heart disease Paternal Uncle     Mental illness Maternal Grandmother     Cancer Maternal Grandmother         lung / brain - smoker    Drug abuse Maternal Grandmother     Hypertension Maternal Grandmother     Hyperlipidemia Maternal Grandmother     Diabetes Maternal Grandmother     Rheum arthritis Maternal Grandmother     Diabetes Mellitus Maternal Grandmother     Heart disease Maternal Grandfather     Hypertension Maternal Grandfather     Alcohol abuse Maternal Grandfather     Osteoarthritis Maternal Grandfather     No Known Problems Paternal Grandmother     Mental illness Paternal Grandfather     Early death Paternal Grandfather         self    Breast cancer Cousin     Breast cancer Other     Colon cancer Neg Hx     Colon polyps Neg Hx     Crohn's disease Neg Hx     Ulcerative colitis Neg Hx     Celiac disease Neg Hx     Lupus Neg Hx     Kidney disease Neg Hx     Inflammatory bowel disease Neg Hx     Depression Neg Hx     COPD Neg Hx     Asthma Neg Hx     Macular degeneration Neg Hx     Retinal detachment Neg Hx     Glaucoma Neg Hx        Shx:   Social History     Socioeconomic History    Marital status: Single    Number of children: 0   Tobacco Use    Smoking status: Current Every Day Smoker     Packs/day: 1.00     Years: 15.00     Pack years: 15.00     Types: Cigarettes     Start date: 11/25/2015    Smokeless tobacco: Never Used   Substance and Sexual Activity    Alcohol use: No     Alcohol/week: 0.0 standard  drinks    Drug use: Yes     Types: Marijuana     Comment: smokes occasionally, helps with pain and appetite    Sexual activity: Never     Partners: Female     Comment: usually female, but currently with her boyfriend   Social History Narrative    Doesn't drive since the stroke, mother drives her and she lives with her mother.     Social Determinants of Health     Financial Resource Strain: Low Risk     Difficulty of Paying Living Expenses: Not hard at all   Food Insecurity: No Food Insecurity    Worried About Running Out of Food in the Last Year: Never true    Ran Out of Food in the Last Year: Never true   Transportation Needs: No Transportation Needs    Lack of Transportation (Medical): No    Lack of Transportation (Non-Medical): No   Physical Activity: Inactive    Days of Exercise per Week: 0 days    Minutes of Exercise per Session: 0 min   Stress: No Stress Concern Present    Feeling of Stress : Only a little   Social Connections: Unknown    Frequency of Communication with Friends and Family: Once a week    Frequency of Social Gatherings with Friends and Family: Twice a week    Active Member of Clubs or Organizations: No    Attends Club or Organization Meetings: Never    Marital Status: Never    Housing Stability: Low Risk     Unable to Pay for Housing in the Last Year: No    Number of Places Lived in the Last Year: 2    Unstable Housing in the Last Year: No           Labs:   CMP  Sodium   Date Value Ref Range Status   02/01/2022 137 136 - 145 mmol/L Final     Potassium   Date Value Ref Range Status   02/01/2022 4.7 3.5 - 5.1 mmol/L Final     Comment:     Specimen slightly hemolyzed     Chloride   Date Value Ref Range Status   02/01/2022 103 95 - 110 mmol/L Final     CO2   Date Value Ref Range Status   02/01/2022 24 23 - 29 mmol/L Final     Glucose   Date Value Ref Range Status   02/01/2022 88 70 - 110 mg/dL Final     BUN   Date Value Ref Range Status   02/01/2022 25 (H) 6 - 20 mg/dL  Final     Creatinine   Date Value Ref Range Status   02/01/2022 1.5 (H) 0.5 - 1.4 mg/dL Final     Calcium   Date Value Ref Range Status   02/01/2022 8.9 8.7 - 10.5 mg/dL Final     Total Protein   Date Value Ref Range Status   02/01/2022 7.1 6.0 - 8.4 g/dL Final   02/01/2022 7.1 6.0 - 8.4 g/dL Final     Albumin   Date Value Ref Range Status   02/01/2022 4.0 3.5 - 5.2 g/dL Final   02/01/2022 4.0 3.5 - 5.2 g/dL Final     Total Bilirubin   Date Value Ref Range Status   02/01/2022 0.5 0.1 - 1.0 mg/dL Final     Comment:     For infants and newborns, interpretation of results should be based  on gestational age, weight and in agreement with clinical  observations.    Premature Infant recommended reference ranges:  Up to 24 hours.............<8.0 mg/dL  Up to 48 hours............<12.0 mg/dL  3-5 days..................<15.0 mg/dL  6-29 days.................<15.0 mg/dL     02/01/2022 0.5 0.1 - 1.0 mg/dL Final     Comment:     For infants and newborns, interpretation of results should be based  on gestational age, weight and in agreement with clinical  observations.    Premature Infant recommended reference ranges:  Up to 24 hours.............<8.0 mg/dL  Up to 48 hours............<12.0 mg/dL  3-5 days..................<15.0 mg/dL  6-29 days.................<15.0 mg/dL       Alkaline Phosphatase   Date Value Ref Range Status   02/01/2022 85 55 - 135 U/L Final   02/01/2022 85 55 - 135 U/L Final     AST   Date Value Ref Range Status   02/01/2022 30 10 - 40 U/L Final   02/01/2022 30 10 - 40 U/L Final     ALT   Date Value Ref Range Status   02/01/2022 25 10 - 44 U/L Final   02/01/2022 25 10 - 44 U/L Final     Anion Gap   Date Value Ref Range Status   02/01/2022 10 8 - 16 mmol/L Final     eGFR if    Date Value Ref Range Status   02/01/2022 49.9 (A) >60 mL/min/1.73 m^2 Final     eGFR if non    Date Value Ref Range Status   02/01/2022 43.3 (A) >60 mL/min/1.73 m^2 Final     Comment:     Calculation used  to obtain the estimated glomerular filtration  rate (eGFR) is the CKD-EPI equation.        Lab Results   Component Value Date    WBC 6.54 2022    HGB 13.6 2022    HCT 41.8 2022    MCV 97 2022     2022       Lab Results   Component Value Date    CHOL 161 2022    CHOL 161 2021    CHOL 159 2021     Lab Results   Component Value Date    HDL 44 2022    HDL 43 2021    HDL 38 (L) 2021     Lab Results   Component Value Date    LDLCALC 101.4 2022    LDLCALC 104.8 2021    LDLCALC 98.2 2021     Lab Results   Component Value Date    TRIG 78 2022    TRIG 66 2021    TRIG 114 2021     Lab Results   Component Value Date    CHOLHDL 27.3 2022    CHOLHDL 26.7 2021    CHOLHDL 23.9 2021           Imagin2021 CT head (Report):   Old large right middle cerebral artery distribution CVA with encephalomalacia.  No acute CVA, hemorrhage or hematoma is identified.       3/3/2017 MRI Brain (Report): 1.  No acute intracranial abnormality is seen.  Specifically, no reduced diffusion to suggest acute ischemia.  No abnormal contrast enhancement.  2.  Chronic right cerebral hemisphere infarcts including large MCA territory infarcts and smaller territory in the right frontoparietal PORSHA territory.  Associated ipsilateral Wallerian degeneration and mild cortical laminar necrosis noted.  Confluent white matter disease in the right cerebral hemisphere consistent with associated gliosis.            Other testing:  Reviewed in chart     Note: I have independently reviewed any/all imaging/labs/tests and agree with the report (s) as documented.  Any discrepancies will be as noted/demarcated by free text.  ANDRZEJ NEVAREZ 2022                     ROS:   Review Of Systems (questions asked, positive or additions in BOLD)  Gen: Weight change, fatigue/malaise, pyrexia   HEENT: Tinnitus, headache,  blurred vision, eye pain,  "diplopia, photophobia,  nose bleeds, congestion, sore throat, jaw pain, scalp pain, neck stiffness   Card: Palpitations, CP   Pulm: SOB, cough   Vas: Easy bruising, easy bleeding   GI: N/V/D/C, incontinence, hematemesis, hematochezia    : incontinence, hematuria   Endocrine: Temp intolerance, polyuria, polydipsia   M/S: Neck pain, myalgia, back pain, joint pain, falls    Neuro: PER HPI   PSY: Memory loss, confusion, depression, anxiety, trouble in sleep          EXAM:   /73 (BP Location: Right arm, Patient Position: Sitting, BP Method: Medium (Automatic))   Pulse 82   Temp 98.6 °F (37 °C) (Temporal)   Resp 17   Ht 5' 3" (1.6 m)   Wt 45.4 kg (100 lb)   BMI 17.71 kg/m²    GEN:  NAD  HEENT: NC/AT, Frontalis was NTTP, temporalis was NTTP.  nares patent, dentition appropriate, sits with head forward posture, LEFT shoulder slightly higher than right shoulder      EXTREM:    no edema present.    NEURO:  Mental Status:  Awake, alert and appropriately oriented to time, place, and person.  Normal attention and concentration.  Speech is fluent and appropriate language function (I.e., comprehension)    Cranial Nerves:   F  Extraocular movements are intact and without nystagmus.  Temporal visual loss OS,    Facial movement is symmetric, no gross flattening to NLF.  Facial sensation is intact.  Hearing is normal. Uvula in midline. DROM of neck in all (6) directions, shoulder shrug symmetrical.       Motor:  RUE:appropriate against gravity and medium force as tested 5/5              LUE: appropriate against gavity, proximally 4/5, distally 4-/5              RLE:appropriate against gravity and medium force as tested 5/5              LLE: appropriate against gravity and medium force as tested 5-/5  No resting tremor    Sensory:  RUE  intact light touch  LUE intact light touch    RLE intact light touch  LLE intact light touch      DTR's:                                            R              L                  Knee " jerk 2+ 2+           Gait and Stance: Normal manner of stance and gait function testing.          This document has been electronically signed by  Haroon TOLENTINOMaryanne Lee MPA, PA-C on 2/14/2022, I have personally typed this message using the EMR.       Dr Haja MD was available during today's visit.     Personal Protective Equipment:    Personal Protective Equipment was used during this encounter including:face shield, KN95  and non latex gloves.          CC: Neela Barrett MD, Dr. FELIX Norman MD

## 2022-02-18 ENCOUNTER — OFFICE VISIT (OUTPATIENT)
Dept: FAMILY MEDICINE | Facility: CLINIC | Age: 42
End: 2022-02-18
Payer: MEDICARE

## 2022-02-18 VITALS
TEMPERATURE: 98 F | RESPIRATION RATE: 12 BRPM | WEIGHT: 98.75 LBS | HEIGHT: 63 IN | BODY MASS INDEX: 17.5 KG/M2 | HEART RATE: 89 BPM | SYSTOLIC BLOOD PRESSURE: 90 MMHG | DIASTOLIC BLOOD PRESSURE: 60 MMHG | OXYGEN SATURATION: 96 %

## 2022-02-18 DIAGNOSIS — K21.9 GASTROESOPHAGEAL REFLUX DISEASE WITHOUT ESOPHAGITIS: Primary | ICD-10-CM

## 2022-02-18 DIAGNOSIS — R56.9 SEIZURE: ICD-10-CM

## 2022-02-18 DIAGNOSIS — N18.30 STAGE 3 CHRONIC KIDNEY DISEASE, UNSPECIFIED WHETHER STAGE 3A OR 3B CKD: Chronic | ICD-10-CM

## 2022-02-18 DIAGNOSIS — F31.75 BIPOLAR 1 DISORDER, DEPRESSED, PARTIAL REMISSION: ICD-10-CM

## 2022-02-18 DIAGNOSIS — Z23 FLU VACCINE NEED: ICD-10-CM

## 2022-02-18 DIAGNOSIS — D50.9 IRON DEFICIENCY ANEMIA, UNSPECIFIED IRON DEFICIENCY ANEMIA TYPE: ICD-10-CM

## 2022-02-18 DIAGNOSIS — Z86.73 HISTORY OF RIGHT MCA STROKE: Chronic | ICD-10-CM

## 2022-02-18 DIAGNOSIS — I69.354 HEMIPARESIS AFFECTING LEFT SIDE AS LATE EFFECT OF CEREBROVASCULAR ACCIDENT: ICD-10-CM

## 2022-02-18 DIAGNOSIS — M32.9 SYSTEMIC LUPUS ERYTHEMATOSUS, UNSPECIFIED SLE TYPE, UNSPECIFIED ORGAN INVOLVEMENT STATUS: ICD-10-CM

## 2022-02-18 DIAGNOSIS — E78.2 MIXED HYPERLIPIDEMIA: ICD-10-CM

## 2022-02-18 DIAGNOSIS — Z79.01 LONG TERM CURRENT USE OF ANTICOAGULANT THERAPY: ICD-10-CM

## 2022-02-18 PROCEDURE — 99999 PR PBB SHADOW E&M-EST. PATIENT-LVL IV: ICD-10-PCS | Mod: PBBFAC,HCNC,, | Performed by: FAMILY MEDICINE

## 2022-02-18 PROCEDURE — 99214 OFFICE O/P EST MOD 30 MIN: CPT | Mod: HCNC,S$GLB,, | Performed by: FAMILY MEDICINE

## 2022-02-18 PROCEDURE — 90686 FLU VACCINE (QUAD) GREATER THAN OR EQUAL TO 3YO PRESERVATIVE FREE IM: ICD-10-PCS | Mod: HCNC,S$GLB,, | Performed by: FAMILY MEDICINE

## 2022-02-18 PROCEDURE — 1160F PR REVIEW ALL MEDS BY PRESCRIBER/CLIN PHARMACIST DOCUMENTED: ICD-10-PCS | Mod: HCNC,CPTII,S$GLB, | Performed by: FAMILY MEDICINE

## 2022-02-18 PROCEDURE — 3044F PR MOST RECENT HEMOGLOBIN A1C LEVEL <7.0%: ICD-10-PCS | Mod: HCNC,CPTII,S$GLB, | Performed by: FAMILY MEDICINE

## 2022-02-18 PROCEDURE — 3074F PR MOST RECENT SYSTOLIC BLOOD PRESSURE < 130 MM HG: ICD-10-PCS | Mod: HCNC,CPTII,S$GLB, | Performed by: FAMILY MEDICINE

## 2022-02-18 PROCEDURE — 3078F PR MOST RECENT DIASTOLIC BLOOD PRESSURE < 80 MM HG: ICD-10-PCS | Mod: HCNC,CPTII,S$GLB, | Performed by: FAMILY MEDICINE

## 2022-02-18 PROCEDURE — 3074F SYST BP LT 130 MM HG: CPT | Mod: HCNC,CPTII,S$GLB, | Performed by: FAMILY MEDICINE

## 2022-02-18 PROCEDURE — 99999 PR PBB SHADOW E&M-EST. PATIENT-LVL IV: CPT | Mod: PBBFAC,HCNC,, | Performed by: FAMILY MEDICINE

## 2022-02-18 PROCEDURE — 99214 PR OFFICE/OUTPT VISIT, EST, LEVL IV, 30-39 MIN: ICD-10-PCS | Mod: HCNC,S$GLB,, | Performed by: FAMILY MEDICINE

## 2022-02-18 PROCEDURE — 99499 UNLISTED E&M SERVICE: CPT | Mod: HCNC,S$GLB,, | Performed by: FAMILY MEDICINE

## 2022-02-18 PROCEDURE — 99499 RISK ADDL DX/OHS AUDIT: ICD-10-PCS | Mod: HCNC,S$GLB,, | Performed by: FAMILY MEDICINE

## 2022-02-18 PROCEDURE — 3078F DIAST BP <80 MM HG: CPT | Mod: HCNC,CPTII,S$GLB, | Performed by: FAMILY MEDICINE

## 2022-02-18 PROCEDURE — 3008F BODY MASS INDEX DOCD: CPT | Mod: HCNC,CPTII,S$GLB, | Performed by: FAMILY MEDICINE

## 2022-02-18 PROCEDURE — G0008 ADMIN INFLUENZA VIRUS VAC: HCPCS | Mod: HCNC,S$GLB,, | Performed by: FAMILY MEDICINE

## 2022-02-18 PROCEDURE — 1159F MED LIST DOCD IN RCRD: CPT | Mod: HCNC,CPTII,S$GLB, | Performed by: FAMILY MEDICINE

## 2022-02-18 PROCEDURE — 90686 IIV4 VACC NO PRSV 0.5 ML IM: CPT | Mod: HCNC,S$GLB,, | Performed by: FAMILY MEDICINE

## 2022-02-18 PROCEDURE — 1159F PR MEDICATION LIST DOCUMENTED IN MEDICAL RECORD: ICD-10-PCS | Mod: HCNC,CPTII,S$GLB, | Performed by: FAMILY MEDICINE

## 2022-02-18 PROCEDURE — 3044F HG A1C LEVEL LT 7.0%: CPT | Mod: HCNC,CPTII,S$GLB, | Performed by: FAMILY MEDICINE

## 2022-02-18 PROCEDURE — 1160F RVW MEDS BY RX/DR IN RCRD: CPT | Mod: HCNC,CPTII,S$GLB, | Performed by: FAMILY MEDICINE

## 2022-02-18 PROCEDURE — 3008F PR BODY MASS INDEX (BMI) DOCUMENTED: ICD-10-PCS | Mod: HCNC,CPTII,S$GLB, | Performed by: FAMILY MEDICINE

## 2022-02-18 PROCEDURE — G0008 FLU VACCINE (QUAD) GREATER THAN OR EQUAL TO 3YO PRESERVATIVE FREE IM: ICD-10-PCS | Mod: HCNC,S$GLB,, | Performed by: FAMILY MEDICINE

## 2022-02-18 NOTE — PROGRESS NOTES
Identified patient's name and . Administered influenza vaccine, IM. Patient tolerated well, aseptic technique maintained. Pain scale 0 out of 10 with injection. Instructed patient to wait in clinic for 15 minutes after injection was given.

## 2022-02-18 NOTE — PROGRESS NOTES
Subjective:       Patient ID: Cat Denson is a 42 y.o. female.    Chief Complaint: Follow-up (6mth f/u )    HPI  Review of Systems   Constitutional: Negative for fatigue and unexpected weight change.   Respiratory: Negative for chest tightness and shortness of breath.    Cardiovascular: Negative for chest pain, palpitations and leg swelling.   Gastrointestinal: Negative for abdominal pain.   Musculoskeletal: Negative for arthralgias.   Neurological: Negative for dizziness, syncope, light-headedness and headaches.       Patient Active Problem List   Diagnosis    Chronic pain    Fibromyalgia    Lupus (systemic lupus erythematosus)    Seizure    Chronic asthma without complication    Hemiparesis affecting left side as late effect of cerebrovascular accident    History of right MCA stroke    Hemiplegic migraine    CKD (chronic kidney disease) stage 3, GFR 30-59 ml/min    Gastroesophageal reflux disease without esophagitis    Long term current use of anticoagulant therapy    Urge incontinence    Primary insomnia    Iron deficiency anemia    Long-term use of hydroxychloroquine    Hepatitis, fulminant    Cigarette nicotine dependence without complication    History of fracture of left ankle 11/16    Homocysteinemia    Recurrent seizures    Complex partial epilepsy with generalization and with intractable epilepsy    Chronic daily headache    Osteopenia    Mixed hyperlipidemia    Gastroparesis    Tobacco use    Bladder disorder    Urinary urgency    Bipolar 1 disorder, depressed, partial remission    Panic disorder with agoraphobia    Cognitive disorder    High risk medication use    Cannabis use disorder, moderate, dependence    Substance abuse-kratom daily use    Protein-calorie malnutrition    Gabapentin overdose    Lack of coordination    Muscle weakness of upper extremity    Worsening functional endurance    Reduced hand strength     Patient is here for a chronic  conditions follow up.    Reviewed labs 2/2022 A1c 5.6. h/o CVA on lipitor and xarelto    GI NP Erik/Dr. Kimble investigating elevated amylase.  Lipase normal and so are LFTs. EGD 1/2022 showing gastritis, small hiatal hernia. CT abd 12/2021 Chest; old granulomatous disease.  Small less than 6 mm nodules.  For multiple solid nodules all <6 mm, Fleischner Society 2017 guidelines recommend no routine follow up for a low risk patient, or follow up with non-contrast chest CT at 12 months after discovery in a high risk patient.   Abdomen and pelvis; prominent amount of stool within the colon.  Mild diverticulosis versus incomplete distention of colon without CT findings of acute diverticulitis.  Normal appearance of the appendix.  Findings suggesting complex thick-walled right ovarian cyst and suggest follow-up pelvic ultrasound to be sure that this resolves  Has vomiting-taking phenergan, pepcid and protonix. Colonoscopy pending. Stool studies ordered      Ob/GYN Dr. Vargas 9/2021 u/s pelvis no abnl. mammo 1/2022 neg. PAP and HPV neg 2021    Under care of Dr. Cruz for psych. Is on depakote chronically for bipolar 1, chronic depression. H/o substance abuse-[facility], [facility]. Has had multiple psych admissions for depression /SI and attempt 21. self injury-cutting, impulse control. Followed at [place] for intensive outpatient therapy 2020     Neuro Dr. Norman h/o RMCA CVA with residual left hemiparesis 2015 embolic from heart on xarelto . H/o grand mal seizures- on depakote . Started > 13 years ago.  Also h/o psuedoseizures.  Was in inpatient epilepsy monitoring unit 9/18. That was last sz activity. Neurology Dr. Norman     Psych Dr. Cruz- bipolar 1     Rheum Dr. Benoit- SLE complications of CKD, CVA 2015 with residual chronic left hemiplegia and seizure d/o        Nephrology Dr. Lovell CKD stage 3     Optometry Dr. Buchanan/Gus for homonymous chikis field defects  Objective:      Physical Exam  Vitals and nursing note  reviewed.   Constitutional:       Appearance: She is well-developed.   Cardiovascular:      Rate and Rhythm: Normal rate and regular rhythm.      Heart sounds: Normal heart sounds.   Pulmonary:      Effort: Pulmonary effort is normal.      Breath sounds: Normal breath sounds.   Skin:     General: Skin is warm and dry.   Neurological:      Mental Status: She is alert and oriented to person, place, and time.      Comments: Chronic left hemiplegia         Assessment:       1. Gastroesophageal reflux disease without esophagitis    2. Flu vaccine need    3. History of right MCA stroke    4. Seizure    5. Hemiparesis affecting left side as late effect of cerebrovascular accident    6. Bipolar 1 disorder, depressed, partial remission    7. Mixed hyperlipidemia    8. Stage 3 chronic kidney disease, unspecified whether stage 3a or 3b CKD    9. Systemic lupus erythematosus, unspecified SLE type, unspecified organ involvement status    10. Long term current use of anticoagulant therapy    11. Iron deficiency anemia, unspecified iron deficiency anemia type        Plan:         1. Gastroesophageal reflux disease without esophagitis  Cont current mgmt    2. Flu vaccine need  Immunize today.  Counseled patient on risks, benefits and side effects.  Patient elected to proceed with vaccination.    - Influenza - Quadrivalent (PF)    3. History of right MCA stroke  Cont xarelto    4. Seizure  Cont current mgmt and neuro consult. Cont depakote    5. Hemiparesis affecting left side as late effect of cerebrovascular accident  Chronic and stable. Cont current mgmt gabapentin    6. Bipolar 1 disorder, depressed, partial remission  Cont depakote and psych care    7. Mixed hyperlipidemia  Cont lipitor. Controlled on current medications.  Continue current medications.      8. Stage 3 chronic kidney disease, unspecified whether stage 3a or 3b CKD  Cont nephro consult. Stable and chronic.  Will continue to monitor q3-6 months and control chronic  conditions as optimally as possible to preserve function.      9. Systemic lupus erythematosus, unspecified SLE type, unspecified organ involvement status  Cont rheum mgmt and consult    10. Long term current use of anticoagulant therapy  Cont xarelto    11. Iron deficiency anemia, unspecified iron deficiency anemia type  Cont monitoring and treat as indicated        Time spent with patient: 20 minutes    Patient with be reevaluated in 6 months or sooner prn    Greater than 50% of this visit was spent counseling as described in above documentation:Yes

## 2022-03-16 ENCOUNTER — DOCUMENTATION ONLY (OUTPATIENT)
Dept: REHABILITATION | Facility: HOSPITAL | Age: 42
End: 2022-03-16
Payer: MEDICARE

## 2022-03-16 NOTE — PROGRESS NOTES
[facility]  Occupational Therapy Discharge Note    Name: Cat Denson  [facility] Number: [id number]    Therapy Diagnosis:   Hemiparesis, spasticity, lack of coordination    Physician: Romana Weiss DO   Physician Orders: OT eval & Treat  Medical Diagnosis: Hemiparesis, unspecified hemiparesis etiology, unspecified laterality [G81.90], Spasticity [R25.2]  Evaluation Date: 11/11/2021    Date of Last visit: 1/25/2022  Total Visits Received: 11    ASSESSMENT      Discharge reason: Patient has not attended therapy since 1/25/2022 and prior to that was last seen on 12/30/21.    Discharge FOTO Score: not assessed    Goals:   Goals:  STG 4 weeks  1. Patient will increase score on Box and Block assessment by at least 10 points.  not met  Deleted goal  3. Patient will demonstrate ability to complete 9 HPT with LUE Met 1/25/2022  4. Patient will increase  strength to L upper extremity by at least 5#. Met 1/25/2022  5. Patient will demonstrate independence with HEP.  not met  6. Patient will demonstrate the ability to hold 2 oz of water for 3 mins with no spillage. Met 1/25/2022     LTG 8 WEEKS    1. Patient will increase score on Box and Block assessment by at least 20 points. not met  Deleted  3. Patient will increase  strength to L upper extremity  By at least 15#. not met  Added:   4. Patient will be aware of a variety of one hand nail clippers and choose what is appropriate for her. Not met  5. Patient will tie shoes with modified I. Not met    PLAN   This patient is discharged from Occupational Therapy    Tiffany Okeefe OT

## 2022-03-19 ENCOUNTER — PATIENT MESSAGE (OUTPATIENT)
Dept: FAMILY MEDICINE | Facility: CLINIC | Age: 42
End: 2022-03-19
Payer: MEDICARE

## 2022-03-19 DIAGNOSIS — R11.2 NAUSEA AND VOMITING, INTRACTABILITY OF VOMITING NOT SPECIFIED, UNSPECIFIED VOMITING TYPE: ICD-10-CM

## 2022-03-19 DIAGNOSIS — K31.84 GASTROPARESIS: ICD-10-CM

## 2022-03-21 RX ORDER — PROMETHAZINE HYDROCHLORIDE 25 MG/1
25 TABLET ORAL EVERY 6 HOURS PRN
Qty: 20 TABLET | Refills: 1 | Status: SHIPPED | OUTPATIENT
Start: 2022-03-21 | End: 2022-04-10

## 2022-03-21 NOTE — TELEPHONE ENCOUNTER
No new care gaps identified.  Powered by [employer]. Reference number: [id number].   3/21/2022 1:37:09 PM CDT

## 2022-04-13 ENCOUNTER — LAB VISIT (OUTPATIENT)
Dept: LAB | Facility: HOSPITAL | Age: 42
End: 2022-04-13
Attending: PSYCHIATRY & NEUROLOGY
Payer: MEDICARE

## 2022-04-13 ENCOUNTER — OFFICE VISIT (OUTPATIENT)
Dept: PSYCHIATRY | Facility: CLINIC | Age: 42
End: 2022-04-13
Payer: MEDICARE

## 2022-04-13 ENCOUNTER — TELEPHONE (OUTPATIENT)
Dept: NEUROLOGY | Facility: CLINIC | Age: 42
End: 2022-04-13
Payer: MEDICARE

## 2022-04-13 VITALS
DIASTOLIC BLOOD PRESSURE: 82 MMHG | HEART RATE: 70 BPM | HEIGHT: 63 IN | WEIGHT: 98.31 LBS | BODY MASS INDEX: 17.42 KG/M2 | SYSTOLIC BLOOD PRESSURE: 137 MMHG

## 2022-04-13 DIAGNOSIS — F09 COGNITIVE DISORDER: ICD-10-CM

## 2022-04-13 DIAGNOSIS — Z79.899 HIGH RISK MEDICATION USE: ICD-10-CM

## 2022-04-13 DIAGNOSIS — G47.00 INSOMNIA, UNSPECIFIED TYPE: ICD-10-CM

## 2022-04-13 DIAGNOSIS — Z86.73 HISTORY OF STROKE: ICD-10-CM

## 2022-04-13 DIAGNOSIS — Z79.01 CHRONIC ANTICOAGULATION: ICD-10-CM

## 2022-04-13 DIAGNOSIS — F31.75 BIPOLAR 1 DISORDER, DEPRESSED, PARTIAL REMISSION: ICD-10-CM

## 2022-04-13 DIAGNOSIS — M85.88 OTHER SPECIFIED DISORDERS OF BONE DENSITY AND STRUCTURE, OTHER SITE: ICD-10-CM

## 2022-04-13 DIAGNOSIS — R74.8 ELEVATED AMYLASE: ICD-10-CM

## 2022-04-13 DIAGNOSIS — F41.9 ANXIETY: ICD-10-CM

## 2022-04-13 DIAGNOSIS — M85.80 OSTEOPENIA, UNSPECIFIED LOCATION: ICD-10-CM

## 2022-04-13 DIAGNOSIS — R11.0 NAUSEA: ICD-10-CM

## 2022-04-13 DIAGNOSIS — F12.20 CANNABIS USE DISORDER, SEVERE, DEPENDENCE: ICD-10-CM

## 2022-04-13 DIAGNOSIS — M89.9 BONE DISORDER: ICD-10-CM

## 2022-04-13 DIAGNOSIS — M35.00 SJOGREN'S SYNDROME, WITH UNSPECIFIED ORGAN INVOLVEMENT: ICD-10-CM

## 2022-04-13 DIAGNOSIS — G40.909 NONINTRACTABLE EPILEPSY WITHOUT STATUS EPILEPTICUS, UNSPECIFIED EPILEPSY TYPE: ICD-10-CM

## 2022-04-13 DIAGNOSIS — M79.2 NEUROPATHIC PAIN: ICD-10-CM

## 2022-04-13 LAB
ALBUMIN SERPL BCP-MCNC: 3.9 G/DL (ref 3.5–5.2)
ALP SERPL-CCNC: 53 U/L (ref 55–135)
ALT SERPL W/O P-5'-P-CCNC: 17 U/L (ref 10–44)
AMYLASE SERPL-CCNC: 190 U/L (ref 20–110)
ANION GAP SERPL CALC-SCNC: 9 MMOL/L (ref 8–16)
ANION GAP SERPL CALC-SCNC: 9 MMOL/L (ref 8–16)
AST SERPL-CCNC: 19 U/L (ref 10–40)
BASOPHILS # BLD AUTO: 0.04 K/UL (ref 0–0.2)
BASOPHILS NFR BLD: 0.7 % (ref 0–1.9)
BILIRUB SERPL-MCNC: 0.6 MG/DL (ref 0.1–1)
BUN SERPL-MCNC: 14 MG/DL (ref 6–20)
BUN SERPL-MCNC: 14 MG/DL (ref 6–20)
CALCIUM SERPL-MCNC: 8.4 MG/DL (ref 8.7–10.5)
CALCIUM SERPL-MCNC: 8.4 MG/DL (ref 8.7–10.5)
CHLORIDE SERPL-SCNC: 108 MMOL/L (ref 95–110)
CHLORIDE SERPL-SCNC: 108 MMOL/L (ref 95–110)
CO2 SERPL-SCNC: 21 MMOL/L (ref 23–29)
CO2 SERPL-SCNC: 21 MMOL/L (ref 23–29)
CREAT SERPL-MCNC: 1.7 MG/DL (ref 0.5–1.4)
CREAT SERPL-MCNC: 1.7 MG/DL (ref 0.5–1.4)
CREAT UR-MCNC: 48 MG/DL (ref 15–325)
DIFFERENTIAL METHOD: ABNORMAL
EOSINOPHIL # BLD AUTO: 0.1 K/UL (ref 0–0.5)
EOSINOPHIL NFR BLD: 2.1 % (ref 0–8)
ERYTHROCYTE [DISTWIDTH] IN BLOOD BY AUTOMATED COUNT: 12.8 % (ref 11.5–14.5)
EST. GFR  (AFRICAN AMERICAN): 42.9 ML/MIN/1.73 M^2
EST. GFR  (AFRICAN AMERICAN): 42.9 ML/MIN/1.73 M^2
EST. GFR  (NON AFRICAN AMERICAN): 37.2 ML/MIN/1.73 M^2
EST. GFR  (NON AFRICAN AMERICAN): 37.2 ML/MIN/1.73 M^2
GLUCOSE SERPL-MCNC: 91 MG/DL (ref 70–110)
GLUCOSE SERPL-MCNC: 91 MG/DL (ref 70–110)
HCG SERPL QL: NEGATIVE
HCT VFR BLD AUTO: 42.3 % (ref 37–48.5)
HGB BLD-MCNC: 13.5 G/DL (ref 12–16)
IMM GRANULOCYTES # BLD AUTO: 0.02 K/UL (ref 0–0.04)
IMM GRANULOCYTES NFR BLD AUTO: 0.4 % (ref 0–0.5)
LIPASE SERPL-CCNC: 37 U/L (ref 4–60)
LYMPHOCYTES # BLD AUTO: 2 K/UL (ref 1–4.8)
LYMPHOCYTES NFR BLD: 36.1 % (ref 18–48)
MCH RBC QN AUTO: 31.1 PG (ref 27–31)
MCHC RBC AUTO-ENTMCNC: 31.9 G/DL (ref 32–36)
MCV RBC AUTO: 98 FL (ref 82–98)
MONOCYTES # BLD AUTO: 0.5 K/UL (ref 0.3–1)
MONOCYTES NFR BLD: 8.3 % (ref 4–15)
NEUTROPHILS # BLD AUTO: 3 K/UL (ref 1.8–7.7)
NEUTROPHILS NFR BLD: 52.4 % (ref 38–73)
NRBC BLD-RTO: 0 /100 WBC
PLATELET # BLD AUTO: 221 K/UL (ref 150–450)
PMV BLD AUTO: 11.1 FL (ref 9.2–12.9)
POTASSIUM SERPL-SCNC: 4.2 MMOL/L (ref 3.5–5.1)
POTASSIUM SERPL-SCNC: 4.2 MMOL/L (ref 3.5–5.1)
PROT SERPL-MCNC: 7.3 G/DL (ref 6–8.4)
RBC # BLD AUTO: 4.34 M/UL (ref 4–5.4)
SODIUM SERPL-SCNC: 138 MMOL/L (ref 136–145)
SODIUM SERPL-SCNC: 138 MMOL/L (ref 136–145)
VALPROATE SERPL-MCNC: 60.4 UG/ML (ref 50–100)
WBC # BLD AUTO: 5.63 K/UL (ref 3.9–12.7)

## 2022-04-13 PROCEDURE — 80164 ASSAY DIPROPYLACETIC ACD TOT: CPT | Performed by: PSYCHIATRY & NEUROLOGY

## 2022-04-13 PROCEDURE — 99214 PR OFFICE/OUTPT VISIT, EST, LEVL IV, 30-39 MIN: ICD-10-PCS | Mod: S$GLB,,, | Performed by: PSYCHIATRY & NEUROLOGY

## 2022-04-13 PROCEDURE — 99214 OFFICE O/P EST MOD 30 MIN: CPT | Mod: S$GLB,,, | Performed by: PSYCHIATRY & NEUROLOGY

## 2022-04-13 PROCEDURE — 99999 PR PBB SHADOW E&M-EST. PATIENT-LVL III: ICD-10-PCS | Mod: PBBFAC,,, | Performed by: PSYCHIATRY & NEUROLOGY

## 2022-04-13 PROCEDURE — 1159F MED LIST DOCD IN RCRD: CPT | Mod: CPTII,S$GLB,, | Performed by: PSYCHIATRY & NEUROLOGY

## 2022-04-13 PROCEDURE — 84703 CHORIONIC GONADOTROPIN ASSAY: CPT | Performed by: PSYCHIATRY & NEUROLOGY

## 2022-04-13 PROCEDURE — 3044F PR MOST RECENT HEMOGLOBIN A1C LEVEL <7.0%: ICD-10-PCS | Mod: CPTII,S$GLB,, | Performed by: PSYCHIATRY & NEUROLOGY

## 2022-04-13 PROCEDURE — 85025 COMPLETE CBC W/AUTO DIFF WBC: CPT | Performed by: PSYCHIATRY & NEUROLOGY

## 2022-04-13 PROCEDURE — 80053 COMPREHEN METABOLIC PANEL: CPT | Performed by: PSYCHIATRY & NEUROLOGY

## 2022-04-13 PROCEDURE — 99499 UNLISTED E&M SERVICE: CPT | Mod: S$GLB,,, | Performed by: PSYCHIATRY & NEUROLOGY

## 2022-04-13 PROCEDURE — 1159F PR MEDICATION LIST DOCUMENTED IN MEDICAL RECORD: ICD-10-PCS | Mod: CPTII,S$GLB,, | Performed by: PSYCHIATRY & NEUROLOGY

## 2022-04-13 PROCEDURE — 83690 ASSAY OF LIPASE: CPT | Performed by: PSYCHIATRY & NEUROLOGY

## 2022-04-13 PROCEDURE — 3075F SYST BP GE 130 - 139MM HG: CPT | Mod: CPTII,S$GLB,, | Performed by: PSYCHIATRY & NEUROLOGY

## 2022-04-13 PROCEDURE — 82570 ASSAY OF URINE CREATININE: CPT | Performed by: PSYCHIATRY & NEUROLOGY

## 2022-04-13 PROCEDURE — 3079F PR MOST RECENT DIASTOLIC BLOOD PRESSURE 80-89 MM HG: ICD-10-PCS | Mod: CPTII,S$GLB,, | Performed by: PSYCHIATRY & NEUROLOGY

## 2022-04-13 PROCEDURE — 3075F PR MOST RECENT SYSTOLIC BLOOD PRESS GE 130-139MM HG: ICD-10-PCS | Mod: CPTII,S$GLB,, | Performed by: PSYCHIATRY & NEUROLOGY

## 2022-04-13 PROCEDURE — 36415 COLL VENOUS BLD VENIPUNCTURE: CPT | Performed by: PSYCHIATRY & NEUROLOGY

## 2022-04-13 PROCEDURE — 82150 ASSAY OF AMYLASE: CPT | Performed by: PSYCHIATRY & NEUROLOGY

## 2022-04-13 PROCEDURE — 1160F RVW MEDS BY RX/DR IN RCRD: CPT | Mod: CPTII,S$GLB,, | Performed by: PSYCHIATRY & NEUROLOGY

## 2022-04-13 PROCEDURE — 99999 PR PBB SHADOW E&M-EST. PATIENT-LVL III: CPT | Mod: PBBFAC,,, | Performed by: PSYCHIATRY & NEUROLOGY

## 2022-04-13 PROCEDURE — 3044F HG A1C LEVEL LT 7.0%: CPT | Mod: CPTII,S$GLB,, | Performed by: PSYCHIATRY & NEUROLOGY

## 2022-04-13 PROCEDURE — 99499 RISK ADDL DX/OHS AUDIT: ICD-10-PCS | Mod: S$GLB,,, | Performed by: PSYCHIATRY & NEUROLOGY

## 2022-04-13 PROCEDURE — 3008F PR BODY MASS INDEX (BMI) DOCUMENTED: ICD-10-PCS | Mod: CPTII,S$GLB,, | Performed by: PSYCHIATRY & NEUROLOGY

## 2022-04-13 PROCEDURE — 3079F DIAST BP 80-89 MM HG: CPT | Mod: CPTII,S$GLB,, | Performed by: PSYCHIATRY & NEUROLOGY

## 2022-04-13 PROCEDURE — 3008F BODY MASS INDEX DOCD: CPT | Mod: CPTII,S$GLB,, | Performed by: PSYCHIATRY & NEUROLOGY

## 2022-04-13 PROCEDURE — 1160F PR REVIEW ALL MEDS BY PRESCRIBER/CLIN PHARMACIST DOCUMENTED: ICD-10-PCS | Mod: CPTII,S$GLB,, | Performed by: PSYCHIATRY & NEUROLOGY

## 2022-04-13 RX ORDER — DIVALPROEX SODIUM 500 MG/1
500 TABLET, DELAYED RELEASE ORAL 2 TIMES DAILY
Qty: 60 TABLET | Refills: 2 | Status: SHIPPED | OUTPATIENT
Start: 2022-04-13 | End: 2022-04-13 | Stop reason: DRUGHIGH

## 2022-04-13 RX ORDER — DIVALPROEX SODIUM 250 MG/1
TABLET, DELAYED RELEASE ORAL
Refills: 0
Start: 2022-04-13 | End: 2022-06-16 | Stop reason: SDUPTHER

## 2022-04-13 RX ORDER — MIRTAZAPINE 30 MG/1
TABLET, FILM COATED ORAL
Qty: 30 TABLET | Refills: 2 | Status: SHIPPED | OUTPATIENT
Start: 2022-04-13 | End: 2022-06-16 | Stop reason: SDUPTHER

## 2022-04-13 RX ORDER — ARIPIPRAZOLE 10 MG/1
10 TABLET ORAL DAILY
Qty: 30 TABLET | Refills: 2 | Status: SHIPPED | OUTPATIENT
Start: 2022-04-13 | End: 2022-04-13 | Stop reason: DRUGHIGH

## 2022-04-13 RX ORDER — ONABOTULINUMTOXINA 200 [USP'U]/1
INJECTION, POWDER, LYOPHILIZED, FOR SOLUTION INTRADERMAL; INTRAMUSCULAR
COMMUNITY
Start: 2022-01-27 | End: 2023-10-02

## 2022-04-13 RX ORDER — ARIPIPRAZOLE 15 MG/1
15 TABLET ORAL DAILY
Qty: 30 TABLET | Refills: 1 | Status: SHIPPED | OUTPATIENT
Start: 2022-04-13 | End: 2022-06-16 | Stop reason: SDUPTHER

## 2022-04-13 RX ORDER — TRAZODONE HYDROCHLORIDE 100 MG/1
TABLET ORAL
Qty: 60 TABLET | Refills: 2 | Status: SHIPPED | OUTPATIENT
Start: 2022-04-13 | End: 2022-06-16 | Stop reason: SDUPTHER
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
## 2022-04-14 ENCOUNTER — TELEPHONE (OUTPATIENT)
Dept: PSYCHIATRY | Facility: CLINIC | Age: 42
End: 2022-04-14
Payer: MEDICARE

## 2022-04-14 NOTE — TELEPHONE ENCOUNTER
Discussed labs and recommendations with pt. Verbalized understanding. She is still under the care of Dr. Lovell. No questions or concerns at this time.

## 2022-04-14 NOTE — TELEPHONE ENCOUNTER
----- Message from Sakina Cruz MD sent at 4/14/2022  4:19 PM CDT -----  Please advise the patient:     Most of the labs resulted to Dr. Norman but I have reviewed them all.   Your metabolic panel shows kidney function is low, mild decrease in calcium.  Liver enzymes are acceptable.   Amylase is still high but improved from last lab.  Lipase (another pancreatic enzyme) is within normal range which is reassuring.  Urine creatine is also within a normal range. Your depakote level is also within a safe range.   Please follow up with your primary care provider and Neurology. Are you still under the care of Dr. Lovell (kidney doctor)?  I also recommend that you schedule follow up with him.

## 2022-04-21 ENCOUNTER — TELEPHONE (OUTPATIENT)
Dept: FAMILY MEDICINE | Facility: CLINIC | Age: 42
End: 2022-04-21
Payer: MEDICARE

## 2022-04-22 ENCOUNTER — PATIENT MESSAGE (OUTPATIENT)
Dept: FAMILY MEDICINE | Facility: CLINIC | Age: 42
End: 2022-04-22
Payer: MEDICARE

## 2022-05-10 ENCOUNTER — TELEPHONE (OUTPATIENT)
Dept: NEUROLOGY | Facility: CLINIC | Age: 42
End: 2022-05-10
Payer: MEDICARE

## 2022-05-23 ENCOUNTER — TELEPHONE (OUTPATIENT)
Dept: PHYSICAL MEDICINE AND REHAB | Facility: CLINIC | Age: 42
End: 2022-05-23
Payer: MEDICARE

## 2022-05-24 ENCOUNTER — TELEPHONE (OUTPATIENT)
Dept: PHYSICAL MEDICINE AND REHAB | Facility: CLINIC | Age: 42
End: 2022-05-24
Payer: MEDICARE

## 2022-05-24 NOTE — TELEPHONE ENCOUNTER
----- Message from Day Huber MA sent at 5/23/2022 10:29 AM CDT -----  Contact: pt    ----- Message -----  From: Melinda Hickey  Sent: 5/23/2022  10:10 AM CDT  To: Isela QUINONES Staff    Type: Needs Medical Advice    Who Called:  PT  Best Call Back Number: [phone]    Additional Information: Requesting a call back regarding rescheduling pt botox injection   Please Advise ---Thank you

## 2022-06-13 ENCOUNTER — OFFICE VISIT (OUTPATIENT)
Dept: PHYSICAL MEDICINE AND REHAB | Facility: CLINIC | Age: 42
End: 2022-06-13
Payer: MEDICARE

## 2022-06-13 VITALS
DIASTOLIC BLOOD PRESSURE: 82 MMHG | HEIGHT: 63 IN | SYSTOLIC BLOOD PRESSURE: 131 MMHG | HEART RATE: 143 BPM | BODY MASS INDEX: 17.36 KG/M2 | WEIGHT: 98 LBS

## 2022-06-13 DIAGNOSIS — G81.10 SPASTIC HEMIPARESIS: Primary | ICD-10-CM

## 2022-06-13 PROCEDURE — 76942 PR U/S GUIDANCE FOR NEEDLE GUIDANCE: ICD-10-PCS | Mod: S$GLB,,, | Performed by: PHYSICAL MEDICINE & REHABILITATION

## 2022-06-13 PROCEDURE — 3079F PR MOST RECENT DIASTOLIC BLOOD PRESSURE 80-89 MM HG: ICD-10-PCS | Mod: CPTII,S$GLB,, | Performed by: PHYSICAL MEDICINE & REHABILITATION

## 2022-06-13 PROCEDURE — 3008F BODY MASS INDEX DOCD: CPT | Mod: CPTII,S$GLB,, | Performed by: PHYSICAL MEDICINE & REHABILITATION

## 2022-06-13 PROCEDURE — 3079F DIAST BP 80-89 MM HG: CPT | Mod: CPTII,S$GLB,, | Performed by: PHYSICAL MEDICINE & REHABILITATION

## 2022-06-13 PROCEDURE — 99999 PR PBB SHADOW E&M-EST. PATIENT-LVL II: CPT | Mod: PBBFAC,,, | Performed by: PHYSICAL MEDICINE & REHABILITATION

## 2022-06-13 PROCEDURE — 3044F PR MOST RECENT HEMOGLOBIN A1C LEVEL <7.0%: ICD-10-PCS | Mod: CPTII,S$GLB,, | Performed by: PHYSICAL MEDICINE & REHABILITATION

## 2022-06-13 PROCEDURE — 64642 PR CHEMODENERV ONE EXTREMITY; 1-4 MUSCLE(S): ICD-10-PCS | Mod: S$GLB,,, | Performed by: PHYSICAL MEDICINE & REHABILITATION

## 2022-06-13 PROCEDURE — 3008F PR BODY MASS INDEX (BMI) DOCUMENTED: ICD-10-PCS | Mod: CPTII,S$GLB,, | Performed by: PHYSICAL MEDICINE & REHABILITATION

## 2022-06-13 PROCEDURE — 99499 NO LOS: ICD-10-PCS | Mod: S$GLB,,, | Performed by: PHYSICAL MEDICINE & REHABILITATION

## 2022-06-13 PROCEDURE — 99499 UNLISTED E&M SERVICE: CPT | Mod: S$GLB,,, | Performed by: PHYSICAL MEDICINE & REHABILITATION

## 2022-06-13 PROCEDURE — 3044F HG A1C LEVEL LT 7.0%: CPT | Mod: CPTII,S$GLB,, | Performed by: PHYSICAL MEDICINE & REHABILITATION

## 2022-06-13 PROCEDURE — 64642 CHEMODENERV 1 EXTREMITY 1-4: CPT | Mod: S$GLB,,, | Performed by: PHYSICAL MEDICINE & REHABILITATION

## 2022-06-13 PROCEDURE — 99999 PR PBB SHADOW E&M-EST. PATIENT-LVL II: ICD-10-PCS | Mod: PBBFAC,,, | Performed by: PHYSICAL MEDICINE & REHABILITATION

## 2022-06-13 PROCEDURE — 76942 ECHO GUIDE FOR BIOPSY: CPT | Mod: S$GLB,,, | Performed by: PHYSICAL MEDICINE & REHABILITATION

## 2022-06-13 PROCEDURE — 3075F SYST BP GE 130 - 139MM HG: CPT | Mod: CPTII,S$GLB,, | Performed by: PHYSICAL MEDICINE & REHABILITATION

## 2022-06-13 PROCEDURE — 3075F PR MOST RECENT SYSTOLIC BLOOD PRESS GE 130-139MM HG: ICD-10-PCS | Mod: CPTII,S$GLB,, | Performed by: PHYSICAL MEDICINE & REHABILITATION

## 2022-06-13 NOTE — PROGRESS NOTES
PROCEDURE NOTE  Patient with stroke and spasticity who presents to clinic today for Botulinum toxin type-A injections.   The following muscle groups were injected :      Muscle(s) Units of Botulinum Toxin A Injected       R FDS 200u        Units used:200 units  Units Wasted: 0 Units      Injections performed after sterile prep w. chlorohexedine. Risk and benefits reviewed.Verbal consent obtained. No complications.  Ultrasound interpretation was performed prior to the procedure to identify the target muscles and any adjacent neurovascular structures. Short axis approach.Subsequently, interpretation was performed during real- time needle guidance confirming placement. Post- intervention interpretation was also performed confirming appropriate injectate flow and hemostasis. Images indicating needle placement have been saved in [place].

## 2022-06-16 ENCOUNTER — OFFICE VISIT (OUTPATIENT)
Dept: PSYCHIATRY | Facility: CLINIC | Age: 42
End: 2022-06-16
Payer: MEDICARE

## 2022-06-16 VITALS
SYSTOLIC BLOOD PRESSURE: 147 MMHG | DIASTOLIC BLOOD PRESSURE: 104 MMHG | WEIGHT: 96.31 LBS | HEIGHT: 63 IN | HEART RATE: 111 BPM | BODY MASS INDEX: 17.07 KG/M2

## 2022-06-16 DIAGNOSIS — G47.00 INSOMNIA, UNSPECIFIED TYPE: ICD-10-CM

## 2022-06-16 DIAGNOSIS — R00.0 TACHYCARDIA: ICD-10-CM

## 2022-06-16 DIAGNOSIS — F12.20 CANNABIS USE DISORDER, SEVERE, DEPENDENCE: ICD-10-CM

## 2022-06-16 DIAGNOSIS — F41.9 ANXIETY: ICD-10-CM

## 2022-06-16 DIAGNOSIS — F31.75 BIPOLAR 1 DISORDER, DEPRESSED, PARTIAL REMISSION: ICD-10-CM

## 2022-06-16 PROCEDURE — 99214 PR OFFICE/OUTPT VISIT, EST, LEVL IV, 30-39 MIN: ICD-10-PCS | Mod: S$GLB,,, | Performed by: PSYCHIATRY & NEUROLOGY

## 2022-06-16 PROCEDURE — 3080F DIAST BP >= 90 MM HG: CPT | Mod: CPTII,S$GLB,, | Performed by: PSYCHIATRY & NEUROLOGY

## 2022-06-16 PROCEDURE — 3044F PR MOST RECENT HEMOGLOBIN A1C LEVEL <7.0%: ICD-10-PCS | Mod: CPTII,S$GLB,, | Performed by: PSYCHIATRY & NEUROLOGY

## 2022-06-16 PROCEDURE — 1159F MED LIST DOCD IN RCRD: CPT | Mod: CPTII,S$GLB,, | Performed by: PSYCHIATRY & NEUROLOGY

## 2022-06-16 PROCEDURE — 99999 PR PBB SHADOW E&M-EST. PATIENT-LVL IV: CPT | Mod: PBBFAC,,, | Performed by: PSYCHIATRY & NEUROLOGY

## 2022-06-16 PROCEDURE — 1159F PR MEDICATION LIST DOCUMENTED IN MEDICAL RECORD: ICD-10-PCS | Mod: CPTII,S$GLB,, | Performed by: PSYCHIATRY & NEUROLOGY

## 2022-06-16 PROCEDURE — 3008F PR BODY MASS INDEX (BMI) DOCUMENTED: ICD-10-PCS | Mod: CPTII,S$GLB,, | Performed by: PSYCHIATRY & NEUROLOGY

## 2022-06-16 PROCEDURE — 99999 PR PBB SHADOW E&M-EST. PATIENT-LVL IV: ICD-10-PCS | Mod: PBBFAC,,, | Performed by: PSYCHIATRY & NEUROLOGY

## 2022-06-16 PROCEDURE — 99499 UNLISTED E&M SERVICE: CPT | Mod: S$GLB,,, | Performed by: PSYCHIATRY & NEUROLOGY

## 2022-06-16 PROCEDURE — 99214 OFFICE O/P EST MOD 30 MIN: CPT | Mod: S$GLB,,, | Performed by: PSYCHIATRY & NEUROLOGY

## 2022-06-16 PROCEDURE — 3080F PR MOST RECENT DIASTOLIC BLOOD PRESSURE >= 90 MM HG: ICD-10-PCS | Mod: CPTII,S$GLB,, | Performed by: PSYCHIATRY & NEUROLOGY

## 2022-06-16 PROCEDURE — 99499 RISK ADDL DX/OHS AUDIT: ICD-10-PCS | Mod: S$GLB,,, | Performed by: PSYCHIATRY & NEUROLOGY

## 2022-06-16 PROCEDURE — 3008F BODY MASS INDEX DOCD: CPT | Mod: CPTII,S$GLB,, | Performed by: PSYCHIATRY & NEUROLOGY

## 2022-06-16 PROCEDURE — 3044F HG A1C LEVEL LT 7.0%: CPT | Mod: CPTII,S$GLB,, | Performed by: PSYCHIATRY & NEUROLOGY

## 2022-06-16 PROCEDURE — 3077F PR MOST RECENT SYSTOLIC BLOOD PRESSURE >= 140 MM HG: ICD-10-PCS | Mod: CPTII,S$GLB,, | Performed by: PSYCHIATRY & NEUROLOGY

## 2022-06-16 PROCEDURE — 3077F SYST BP >= 140 MM HG: CPT | Mod: CPTII,S$GLB,, | Performed by: PSYCHIATRY & NEUROLOGY

## 2022-06-16 RX ORDER — TRAZODONE HYDROCHLORIDE 100 MG/1
TABLET ORAL
Qty: 60 TABLET | Refills: 2 | Status: SHIPPED | OUTPATIENT
Start: 2022-06-16 | End: 2022-10-11

## 2022-06-16 RX ORDER — CHLORZOXAZONE 500 MG/1
TABLET ORAL
COMMUNITY
Start: 2022-03-19 | End: 2022-07-08 | Stop reason: SDUPTHER

## 2022-06-16 RX ORDER — DIVALPROEX SODIUM 250 MG/1
TABLET, DELAYED RELEASE ORAL
Qty: 90 TABLET | Refills: 2 | Status: SHIPPED | OUTPATIENT
Start: 2022-06-16 | End: 2023-05-08 | Stop reason: SDUPTHER

## 2022-06-16 RX ORDER — ARIPIPRAZOLE 15 MG/1
15 TABLET ORAL DAILY
Qty: 30 TABLET | Refills: 2 | Status: SHIPPED | OUTPATIENT
Start: 2022-06-16 | End: 2022-12-05

## 2022-06-16 RX ORDER — MIRTAZAPINE 30 MG/1
TABLET, FILM COATED ORAL
Qty: 30 TABLET | Refills: 2 | Status: SHIPPED | OUTPATIENT
Start: 2022-06-16 | End: 2023-05-08 | Stop reason: SDUPTHER
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
## 2022-06-17 ENCOUNTER — TELEPHONE (OUTPATIENT)
Dept: FAMILY MEDICINE | Facility: CLINIC | Age: 42
End: 2022-06-17
Payer: MEDICARE

## 2022-06-21 ENCOUNTER — CLINICAL SUPPORT (OUTPATIENT)
Dept: REHABILITATION | Facility: HOSPITAL | Age: 42
End: 2022-06-21
Attending: PHYSICAL MEDICINE & REHABILITATION
Payer: MEDICARE

## 2022-06-21 DIAGNOSIS — Z91.81 RISK FOR FALLS: ICD-10-CM

## 2022-06-21 DIAGNOSIS — G81.10 SPASTIC HEMIPARESIS: ICD-10-CM

## 2022-06-21 DIAGNOSIS — G81.94 LEFT HEMIPARESIS: ICD-10-CM

## 2022-06-21 DIAGNOSIS — R26.9 IMPAIRED GAIT: ICD-10-CM

## 2022-06-21 PROCEDURE — 97161 PT EVAL LOW COMPLEX 20 MIN: CPT | Mod: PN

## 2022-06-22 PROBLEM — R26.9 IMPAIRED GAIT: Status: ACTIVE | Noted: 2022-06-22

## 2022-06-22 PROBLEM — G81.94 LEFT HEMIPARESIS: Status: ACTIVE | Noted: 2022-06-22

## 2022-06-22 PROBLEM — Z91.81 RISK FOR FALLS: Status: ACTIVE | Noted: 2022-06-22

## 2022-06-22 NOTE — PLAN OF CARE
"[facility]  Physical Therapy Neurological Rehabilitation Initial Evaluation    Name: Cat Denson  Clinic Number: [id number]    Therapy Diagnosis:   Encounter Diagnoses   Name Primary?    Spastic hemiparesis     Impaired gait     Left hemiparesis     Risk for falls      Physician: Romana Weiss,*    Physician Orders: PT Eval and Treat   Medical Diagnosis from Referral: G81.10 (ICD-10-CM) - Spastic hemiparesis  Evaluation Date: 6/21/2022  Authorization Period Expiration: 6/13/2022  Plan of Care Expiration: 8/5/2022  Visit # / Visits authorized: 1/ 1    Time In: 1330  Time Out: 1415  Total Billable Time: 45 minutes    Precautions: Standard and Fall    Subjective   Date of onset: 2015   History of current condition - Cat reports she suffered a stroke in November 2015 and has residual left sided weakness. She recently had Botox in left upper extremity and states improved left hand function since injection. Patient believes she was referred here for occupational therapy, but states, "I don't walk straight either." She is agreeable for participation in physical therapy evaluation to determine if physical therapist services are also appropriate. Patient reports left leg weakness, difficulty walking over unlevel surfaces, and inability to get up from the floor without assistance.        Medical History:   Past Medical History:   Diagnosis Date    Acid reflux     Allergy     Anemia     Anticoagulated 12/29/2015    Anxiety     Asthma     Bipolar 1 disorder     Bronchospasm with bronchitis, acute     Chronic daily headache 9/11/2018    Chronic kidney disease     Chronic pain     Depression     bipolar 2    Fibromyalgia     Gastroparesis     History of right MCA stroke 11/25/2015    Hx of psychiatric care     Lactose intolerance     Lupus     Mixed hyperlipidemia 11/28/2018    Psychiatric problem     Renal tubular acidosis     Seizure     Sjogren's " "syndrome     Smoker     Stroke 11-    Substance abuse-kratom daily use 8/30/2020    Suicide attempt     overdosed on a bottle of paxil, muscle relaxers, & zoloft    Therapy        Surgical History:   Cat Denson  has a past surgical history that includes Cholecystectomy; Colonoscopy (11/12/2014); Upper gastrointestinal endoscopy (03/03/2017); Fluoroscopic urodynamic study (N/A, 7/23/2019); and Esophagogastroduodenoscopy (N/A, 1/3/2022).    Medications:   Cat has a current medication list which includes the following prescription(s): aripiprazole, atorvastatin, botox, chlorzoxazone, divalproex, famotidine, fluticasone propionate, gabapentin, mirtazapine, pantoprazole, promethazine, propranolol, rivaroxaban, and trazodone.    Allergies:   Review of patient's allergies indicates:   Allergen Reactions    Metoclopramide hcl Anaphylaxis     Involuntary mouth movements     Amoxicillin-pot clavulanate      2 other medications    Amoxil [amoxicillin]      hives    Avelox [moxifloxacin]      itching    Benadryl [diphenhydramine hcl]      Lowers seizure threshold     Coumadin [warfarin]      toxicity    Quinazolinones      Lowers seizure threshold     Ultram [tramadol]      Lowers seizure threshold     Wellbutrin [bupropion hcl]      Lowers seizure threshold         Imaging:   CT HEAD WITHOUT CONTRAST  Impression:     Old large right middle cerebral artery distribution CVA with encephalomalacia.  No acute CVA, hemorrhage or hematoma is identified.    Prior Therapy: Patient had physical therapy at inpatient rehabilitation immediately following stroke.   Social History: Patient lives with roommate and his family.   Falls: Patient reports 1 fall in the past 6 months. Reports she tripped over a pillow and landed on her left knee.    DME: Patient owns a rolling walker, but does not use it. Patient reports she had a left ankle brace following her stroke. States, "My doctor wants me to get another " "one because my foot is still dragging."   Home Environment: Patient lives in a single story home with one step to enter. Patient reports yard is unlevel.    Family Present at time of Eval: Friend/roomate present today   Prior Level of Function: Patient was fully independent prior to onset of CVA.   Current Level of Function: Patient requires some assistance with walking through parking lot and over unlevel surface. Patient requires assistance to enter/exit shower.     Pain:  Current 0/10, worst 5/10 (low back/legs), best 0/10   Location: Left upper trapezius region, lower back and bilateral lower extremities       Patient's goals: Improve left arm function, reduce risk for falls, independently get up off of floor     Objective     Vitals:   Blood Pressure:129/89 mmHg  Heart Rate:113 bpm      Transfers:     Transfers Level of Assistance  Comments   Roll Right Antelmo.    Roll Left Antelmo.    Roll Supine Antelmo.    Roll Prone Antelmo.    Supine to Sit Antelmo.    Sit to Supine Antelmo.    Sit to Stand Antelmo.    Stand to Sit Antelmo.    Transfer from bed to chair Antelmo.        Strength:      Right Lower Extremity Strength Grade Left Lower Extremity Strength Grade   Hip Flexion 4+/5 Hip Flexion 3+/5   Hip Extension 3/5 Hip Extension 3/5   Hip Abduction 4/5 Hip Abduction 3+/5   Knee Flexion 3+/5 Knee Flexion 3+/5   Knee Extension 5/5 Knee Extension 4/5   Ankle Dorsiflexion 5/5 Ankle Dorsiflexion 3-/5   Ankle Plantarflexion 5/5 Ankle Plantarflexion 2/5       Balance:    Static sitting balance:Normal  Dynamic sitting balance: Normal    Static standing Balance: Good  Dynamic standing balance: Fair    Ambulation:     Gait Assessment:   - AD used: no assistive device    - Assistance: supervision    - Distance: Patient ambulated into/out of clinic today and for distances to complete the Functional Gait Assessment (see below).   - Speed: 1.2 m/s   - Stairs: supervision using hand rail and "step-to" pattern     Gait Analysis:  Upon observation of " gait, patient demonstrates deviations including but not limited to: footflat at initial contact (left), hyperextension in stance (right), decreased step length (left), and toe out (right)     Impairments contributing to deviations:  Right lower extremity hemiparesis       Outcome Measures:     Functional Gait Assessment:   1. Gait on level surface =  3  (3) Normal: less than 5.5 sec, no A.D., no imbalance, normal gait pattern, deviates< 6in  (2) Mild impairment: 7-5.6 sec, uses A.D., mild gait deviations, or deviates 6-10 in  (1) Moderate impairment: > 7 sec, slow speed, imbalance, deviates 10-15 in.  (0) Severe impairment: needs assist, deviates >15 in, reach/touch wall  2. Change in Gait Speed = 1  (3) Normal: smooth change w/o loss of balance or gait deviation, deviates < 6 in, significant difference between speeds  (2) Mild impairment: changes speed, but demonstrates mild gait deviations, deviates 6-10 in, OR no deviations but unable to significantly speed, OR uses A.D.  (1) Moderate impairment: minor changes to speed, OR changes speed w/ significant deviations, deviates 10-15 in, OR  Changes speed , but loses balance & recovers  (0) Severe impairment: cannot change speed, deviates >15 in, or loses balance & needs assist  3. Gait with horizontal head turns  = 2  (3) Normal: no change in gait, deviates <6 in  (2) Mild impairment: slight change in speed, deviates 6-10 in, OR uses A.D.  (1) Moderate impairment: moderate change in speed, deviates 10-15 in  (0) Severe impairment: severe disruption of gait, deviates >15in  4. Gait with vertical head turns = 2  (3) Normal: no change in gait, deviates <6 in  (2) Mild impairment: slight change in speed, deviates 6-10 in OR uses A.D.  (1) Moderate impairment: moderate change in speed, deviates 10-15 in  (0) Severe impairment: severe disruption of gait, deviates >15 in  5. Gait with pivot turns = 3  (3) Normal: performs safely in 3 sec, no LOB  (2) Mild impairment:  "performs in >3 sec & no LOB, OR turns safely & requires several steps to regain LOB  (1) Moderate impairment: turns slow, OR requires several small steps for balance following turn & stop  (0) Severe impairment: cannot turn safely, needs assist  6. Step over obstacle = 2  (3) Normal: steps over 2 stacked boxes w/o change in speed or LOB  (2) Mild impairment: able to step over 1 box w/o change in speed or LOB  (1) Moderate impairment: steps over 1 box but must slow down, may require VC  (0) Severe impairment: cannot perform w/o assist  7. Gait with Narrow AZIZA = 0  (3) Normal: 10 steps no staggering  (2) Mild impairment: 7-9 steps  (1) Moderate impairment: 4-7 steps  (0) Severe impairment: < 4 steps or cannot perform w/o assist  8. Gait with eyes closed = 1  (3) Normal: < 7 sec, no A.D., no LOB, normal gait pattern, deviates <6 in  (2) Mild impairment: 7.1-9 sec, mild gait deviations, deviates 6-10 in  (1) Moderate impairment: > 9 sec, abnormal pattern, LOB, deviates 10-15 in  (0) Severe impairment: cannot perform w/o assist, LOB, deviates >15in  9. Ambulating Backwards = 1  (3) Normal: no A.D., no LOB, normal gait pattern, deviates <6in  (2) Mild impairment: uses A.D., slower speed, mild gait deviations, deviates 6-10 in  (1) Moderate impairment: slow speed, abnormal gait pattern, LOB, deviates 10-15 in  (0) Severe impairment: severe gait deviations or LOB, deviates >15in  10. Steps = 1  (3) Normal: alternating feet, no rail  (2) Mild Impairment: alternating feet, uses rail  (1) Moderate impairment: step-to, uses rail  (0) Severe impairment: cannot perform safely    Score 16/30     Score:   <22/30 fall risk   <20/30 fall risk in older adults   <18/30 fall risk in Parkinsons            Evaluation   Timed Up and Go 7.5 sec  < 20 sec safe for independent transfers, < 30 sec safe for dependent transfers/assist required   10 Meter Walk Test 1.2 m/sec (6m/5s)  "community ambulator" speed category   Functional Gait " "Assessment  16/30           CMS Impairment/Limitation/Restriction for FOTO  Survey    Therapist reviewed FOTO scores for Cat Denson on 6/21/2022.   FOTO documents entered into [employer] - see Media section.    Limitation Score: To be assessed in next treatment session          TREATMENT   Treatment Time In: N/A  Treatment Time Out: N/A  Total Treatment time separate from Evaluation: 0 minutes    No treatment provided this date secondary to time needed to gather subjective history, complete objective testing, assess functional mobility and perform standardized outcome measures.       Home Exercises and Patient Education Provided    Education provided:   - Patient was provided with education re: goals and plan of care for physical therapy. Patient verbalized understanding and agreement with plan.   - Discuss plan to request order for occupational therapy evaluation     Written Home Exercises Provided: To be provided in first follow-up treatment session     Assessment   Cat is a 42 y.o. female referred to outpatient Physical Therapy with a medical diagnosis of Spastic hemiparesis. Patient presents with signs and symptoms consistent with diagnosis and outlined in "thearpy diagnoses" section of initial evaluation. Additionally, she is limited in safety and independence with community level functional mobility due to above impairments. During evaluation today, patient participated in assessment of lower extremity muscle strength via MMT. she presents with decreased strength in her left lower extremity  as compared to right.  she performed standardized outcome measures to assess balance with dynamic gait activities, gait speed, and fall risk including the Functional Gait Assessment, 10 Meter Walk Test and Timed Up and Go. She scored a 16/30 on the Functional Gait Assessment which indicates a risk for falls with community level ambulation. Additionally, patient demonstrated one episode of left toe drag during " component of assessment. Ms. Denson is a good candidate for outpatient physical therapy to address impairments identified in today's evaluation, increase her capacity for community level mobility/activity participation and reduce her risk for further falls. physical therapist will reach out to patient's referring provider to request orders for occupational therapy evaluation. Upon completion of evaluation, patient and her friend were provided with education regarding goals and plan of care with which they verbalized understanding and agreement.         Patient prognosis is Good.   Patient will benefit from skilled outpatient Physical Therapy to address the deficits stated above and in the chart below, provide patient/family education, and to maximize patient's level of independence.     Plan of care discussed with patient: Yes  Patient's spiritual, cultural and educational needs considered and patient is agreeable to the plan of care and goals as stated below:     Anticipated Barriers for therapy: Chronic nature of impairments     Medical Necessity is demonstrated by the following  History  Co-morbidities and personal factors that may impact the plan of care Co-morbidities:   Acid reflux   Allergy   Anemia   Anticoagulated   Anxiety   Asthma   Bipolar 1 disorder   Bronchospasm with bronchitis, acute   Chronic daily headache   Chronic kidney disease   Chronic pain   Depression   bipolar 2   Fibromyalgia   Gastroparesis   History of right MCA stroke   Hx of psychiatric care   Lactose intolerance   Lupus   Mixed hyperlipidemia   Psychiatric problem   Renal tubular acidosis   Seizure   Sjogren's syndrome   Smoker   Stroke   Substance abuse-kratom daily use   overdosed on a bottle of paxil, muscle relaxers, & zoloft   Therapy       Personal Factors:   social background     high   Examination  Body Structures and Functions, activity limitations and participation restrictions that may impact the plan of care Body  Regions:   lower extremities    Body Systems:    ROM  strength  balance  gait  transfers  motor control  heart rate  blood pressure    Participation Restrictions:   Patient requires some assistance with walking through parking lot and over unlevel surface. Patient requires assistance to enter/exit shower.       Activity limitations:   Learning and applying knowledge  no deficits    General Tasks and Commands  no deficits    Communication  no deficits    Mobility  lifting and carrying objects  fine hand use (grasping/picking up)  walking  driving (bike, car, motorcycle)    Self care  showering     Domestic Life  shopping  cooking  doing house work (cleaning house, washing dishes, laundry)  assisting others    Interactions/Relationships  no deficits    Life Areas  unknown    Community and Social Life  community life  recreation and leisure         high   Clinical Presentation stable and uncomplicated low   Decision Making/ Complexity Score: low       Goals:      Short Term Goals: 3 weeks Date last assessed:  Status:    1. Patient will be provided with an individualized home exercise program. 6/21/2022   New   2.  Patient will demonstrate improve endurance and activity tolerance as evidenced by ability to perform Nu-step x 15 minutes without rests breaks with heart rated post-activity equivalent to 50-80% of maximum heart rate.  6/21/2022 New   3. Patient will complete the FOTO survey to assess self-perception of functional mobility and related limitation.    6/21/2022 New   4. Patient will complete the 6 Minute Walk Test to further evaluate her endurance and efficiency with community level gait. (Minimal detectable change  for stroke = 112 feet) 6/21/2022 New         Long Term Goals: 6 weeks  Date last assessed: Status:    1. Patient will be independent and compliant with updated home exercise program. 6/21/2022   New   2. Patient will increased lower extremity  strength as evidenced by increase in manual muscle test  by 1/2 grade as appropriate to decrease gait deviations consistently.  6/21/2022   New     3. The patient will be provided with any equipment and/or orthotic evaluations and recommendations as indicated on an ongoing basis prior to discharge from therapy.  6/21/2022 New   4. The patient will improve her score on the Functional Gait Assessment (FGA) from 16/30 to 20/30, indicating improved safety and (I) with dynamic,comunity level gait. (Minimal detectable change for stroke= 4.2 points)    6/21/2022 New   5. The patient will perform a stand to/from floor transfer with upper extremity support as needed at a modified independent level to demonstrate improved strength, balance, coordination needed for advanced transfers and higher level functional mobility.  6/21/2022 New         Plan   Plan of care Certification: 6/21/2022 to 8/5/2022.    Outpatient Physical Therapy 2 times weekly for 6 weeks to include the following interventions: Electrical Stimulation (as indicated and cleared for contraindication) , Gait Training,  Manual Therapy, Moist Heat/ Ice, Neuromuscular Re-ed, Orthotic Management and Training, Patient Education, Self Care and Therapeutic Exercise.     Lamar Chambers, PT, DPT  Board-certified Clinical Specialist in Neurologic Physical Therapy

## 2022-06-27 ENCOUNTER — TELEPHONE (OUTPATIENT)
Dept: PHYSICAL MEDICINE AND REHAB | Facility: CLINIC | Age: 42
End: 2022-06-27
Payer: MEDICARE

## 2022-06-27 ENCOUNTER — CLINICAL SUPPORT (OUTPATIENT)
Dept: REHABILITATION | Facility: HOSPITAL | Age: 42
End: 2022-06-27
Payer: MEDICARE

## 2022-06-27 DIAGNOSIS — Z91.81 RISK FOR FALLS: ICD-10-CM

## 2022-06-27 DIAGNOSIS — G81.94 LEFT HEMIPARESIS: ICD-10-CM

## 2022-06-27 DIAGNOSIS — R25.2 SPASTICITY AS LATE EFFECT OF CEREBROVASCULAR ACCIDENT (CVA): Primary | ICD-10-CM

## 2022-06-27 DIAGNOSIS — I69.398 SPASTICITY AS LATE EFFECT OF CEREBROVASCULAR ACCIDENT (CVA): Primary | ICD-10-CM

## 2022-06-27 DIAGNOSIS — R26.9 IMPAIRED GAIT: Primary | ICD-10-CM

## 2022-06-27 PROCEDURE — 97110 THERAPEUTIC EXERCISES: CPT | Mod: PN,CQ

## 2022-06-27 NOTE — PROGRESS NOTES
"[facility]   Physical Therapy Treatment Note     [facility]  [facility] Number[id number]    Therapy Diagnosis:   Encounter Diagnoses   Name Primary?    Impaired gait Yes    Left hemiparesis     Risk for falls      Physician: Romana Weiss,*    Visit Date: 6/27/2022    Physician Orders: PT Eval and Treat   Medical Diagnosis from Referral: G81.10 (ICD-10-CM) - Spastic hemiparesis  Evaluation Date: 6/21/2022  Authorization Period Expiration: 12/31/2022  Plan of Care Expiration: 8/5/2022  Visit # / Visits authorized: 1/20  FOTO visit #: 1  FOTO score: 59 (6/27/22)        PTA Visit #: 1/5     Time In: 1415  Time Out: 1458  Total Billable Time: 43 minutes    Precautions: Standard and Fall       SUBJECTIVE     Pt reports: Doing "okay", no complaints.  She had not received compliant with home exercise program.  Response to previous treatment: no problems stated  Functional change: ongoing    Pain: 0/10  Location:  Not Applicable      OBJECTIVE     Objective Measures updated at progress report unless specified.     Treatment     Cat received the treatments listed below:      therapeutic exercises to develop strength, endurance and rom for 43 minutes including:    SciFit Level 1 5 minutes with and without upper extremities    Sit:  Dorsiflexion with foot on stool 2 sets of 10 Right Left   Long arc quads 2 sets of 10 Right Left   March 2 sets of 10 Right Left     6 minute walk test 1,202 feet    Patient Education and Home Exercises     Home Exercises Provided and Patient Education Provided     Education provided:   - Patient provided with verbal and demonstrative instruction for all activities performed in today's session.  - decreased speed with all exercises with brief rest break between each repetition     Written Home Exercises Provided: yes. Exercises were reviewed and Cat was able to demonstrate them prior to the end of the session.  Cat demonstrated " fair  understanding of the education provided. See EMR under Patient Instructions for exercises provided during therapy sessions    ASSESSMENT     Cat provided good participation and effort during today's session with treatment focused on Bilateral lower extremity mobility and strengthening as well as gait endurance. Cat tolerated activities with cues for proper form, decreased speed and increase rest break with fair follow through.    Cat Is progressing towards her goals.   Pt prognosis is Fair.     Pt will continue to benefit from skilled outpatient physical therapy to address the deficits listed in the problem list box on initial evaluation, provide pt/family education and to maximize pt's level of independence in the home and community environment.     Pt's spiritual, cultural and educational needs considered and pt agreeable to plan of care and goals.     Anticipated barriers to physical therapy: Chronic nature of impairments       Goals:     Short Term Goals: 3 weeks Date last assessed:  Status:    1. Patient will be provided with an individualized home exercise program. 6/21/2022    progressing   2.  Patient will demonstrate improve endurance and activity tolerance as evidenced by ability to perform Nu-step x 15 minutes without rests breaks with heart rated post-activity equivalent to 50-80% of maximum heart rate.  6/21/2022 progressing   3. Patient will complete the FOTO survey to assess self-perception of functional mobility and related limitation.     6/21/2022 progressing   4. Patient will complete the 6 Minute Walk Test to further evaluate her endurance and efficiency with community level gait. (Minimal detectable change  for stroke = 112 feet) 6/21/2022 Progressing  (1,202 feet 6/27/22)            Long Term Goals: 6 weeks  Date last assessed: Status:    1. Patient will be independent and compliant with updated home exercise program. 6/21/2022    ongoing   2. Patient will increased lower  extremity  strength as evidenced by increase in manual muscle test by 1/2 grade as appropriate to decrease gait deviations consistently.  6/21/2022    ongoing      3. The patient will be provided with any equipment and/or orthotic evaluations and recommendations as indicated on an ongoing basis prior to discharge from therapy.  6/21/2022 ongoing   4. The patient will improve her score on the Functional Gait Assessment (FGA) from 16/30 to 20/30, indicating improved safety and (I) with dynamic,comunity level gait. (Minimal detectable change for stroke= 4.2 points)     6/21/2022 ongoing   5. The patient will perform a stand to/from floor transfer with upper extremity support as needed at a modified independent level to demonstrate improved strength, balance, coordination needed for advanced transfers and higher level functional mobility.  6/21/2022 ongoing        PLAN     Plan of care Certification: 6/21/2022 to 8/5/2022.     Outpatient Physical Therapy 2 times weekly for 6 weeks to include the following interventions: Electrical Stimulation (as indicated and cleared for contraindication) , Gait Training,  Manual Therapy, Moist Heat/ Ice, Neuromuscular Re-ed, Orthotic Management and Training, Patient Education, Self Care and Therapeutic Exercise.        Adalgisa Rubalcava, PTA

## 2022-06-29 ENCOUNTER — CLINICAL SUPPORT (OUTPATIENT)
Dept: REHABILITATION | Facility: HOSPITAL | Age: 42
End: 2022-06-29
Attending: PHYSICAL MEDICINE & REHABILITATION
Payer: MEDICARE

## 2022-06-29 ENCOUNTER — CLINICAL SUPPORT (OUTPATIENT)
Dept: REHABILITATION | Facility: HOSPITAL | Age: 42
End: 2022-06-29
Payer: MEDICARE

## 2022-06-29 DIAGNOSIS — R26.9 IMPAIRED GAIT: Primary | ICD-10-CM

## 2022-06-29 DIAGNOSIS — I69.398 SPASTICITY AS LATE EFFECT OF CEREBROVASCULAR ACCIDENT (CVA): ICD-10-CM

## 2022-06-29 DIAGNOSIS — Z91.81 RISK FOR FALLS: ICD-10-CM

## 2022-06-29 DIAGNOSIS — R25.2 SPASTICITY AS LATE EFFECT OF CEREBROVASCULAR ACCIDENT (CVA): ICD-10-CM

## 2022-06-29 DIAGNOSIS — G81.94 LEFT HEMIPARESIS: ICD-10-CM

## 2022-06-29 PROCEDURE — 97165 OT EVAL LOW COMPLEX 30 MIN: CPT | Mod: PN

## 2022-06-29 PROCEDURE — 97110 THERAPEUTIC EXERCISES: CPT | Mod: PN,CQ

## 2022-06-29 NOTE — PROGRESS NOTES
Occupational Therapy  Plan of Care    Cat Denson  MRN: [id number]    Plan of care completed. Please see attached POC for details. Thank you for consult!    Melinda De La Cruz OTR/RUTH   6/29/2022

## 2022-06-29 NOTE — PROGRESS NOTES
"[facility]   Physical Therapy Treatment Note     [facility]  [facility] Number: [id number]    Therapy Diagnosis:   Encounter Diagnoses   Name Primary?    Impaired gait Yes    Left hemiparesis     Risk for falls      Physician: Romana Weiss,*    Visit Date: 6/29/2022    Physician Orders: PT Eval and Treat   Medical Diagnosis from Referral: G81.10 (ICD-10-CM) - Spastic hemiparesis  Evaluation Date: 6/21/2022  Authorization Period Expiration: 12/31/2022  Plan of Care Expiration: 8/5/2022  Visit # / Visits authorized: 2/20  FOTO visit #: 3  FOTO score: 59 (6/27/22)        PTA Visit #: 2/5     Time In: 1630  Time Out: 1710  Total Billable Time: 40 minutes    Precautions: Standard and Fall       SUBJECTIVE     Pt reports: Doing "okay", no complaints. Reports needing to get to bed earlier tonight 2nd to having an appointment in a.m. on [place].  She was compliant with home exercise program.  Response to previous treatment: no problems stated  Functional change: ongoing    Pain: 0/10  Location:  Not Applicable      OBJECTIVE     Objective Measures updated at progress report unless specified.     Treatment     Cat received the treatments listed below:      therapeutic exercises to develop strength, endurance and range of motion  for 40 minutes including:    SciFit Level 1 8 minutes Bilateral Lower Extremities only    Sit:  Bilateral Dorsiflexion with feet on stool 20 times with total assist on Left     Parallel bars with Left upper extremity support to improve weight shift to Left :  Bilateral heel raises 10 times with assist for increased weight shift to Left   Toe tap 6 inch box 5 times Right and 5 times Right Left alternating  Step up 6 inch box 5 times Left weight bear     Sit < > Stand 5 times (x2) with hands on knees    Patient Education and Home Exercises     Home Exercises Provided and Patient Education Provided     Education provided:   - Patient provided " with verbal and demonstrative instruction for all activities performed in today's session.    Written Home Exercises Provided: Patient instructed to cont prior HEP. Exercises were reviewed and Cat was able to demonstrate them prior to the end of the session.  Cat demonstrated fair  understanding of the education provided. See EMR under Patient Instructions for exercises provided during therapy sessions    ASSESSMENT     Cat provided good participation and effort during today's session with treatment focused on Bilateral lower extremity strengthening and balance activities. Cat fatigued with activities and needed sit rest breaks and was able to increase time on SciFit and standing activities.    Cat Is progressing towards her goals.   Pt prognosis is Fair.     Pt will continue to benefit from skilled outpatient physical therapy to address the deficits listed in the problem list box on initial evaluation, provide pt/family education and to maximize pt's level of independence in the home and community environment.     Pt's spiritual, cultural and educational needs considered and pt agreeable to plan of care and goals.     Anticipated barriers to physical therapy: Chronic nature of impairments       Goals:     Short Term Goals: 3 weeks Date last assessed:  Status:    1. Patient will be provided with an individualized home exercise program. 6/21/2022    progressing   2.  Patient will demonstrate improve endurance and activity tolerance as evidenced by ability to perform Nu-step x 15 minutes without rests breaks with heart rated post-activity equivalent to 50-80% of maximum heart rate.  6/21/2022 progressing   3. Patient will complete the FOTO survey to assess self-perception of functional mobility and related limitation.     6/21/2022 progressing   4. Patient will complete the 6 Minute Walk Test to further evaluate her endurance and efficiency with community level gait. (Minimal detectable change   for stroke = 112 feet) 6/21/2022 Progressing  (1,202 feet 6/27/22)            Long Term Goals: 6 weeks  Date last assessed: Status:    1. Patient will be independent and compliant with updated home exercise program. 6/21/2022    ongoing   2. Patient will increased lower extremity  strength as evidenced by increase in manual muscle test by 1/2 grade as appropriate to decrease gait deviations consistently.  6/21/2022    ongoing      3. The patient will be provided with any equipment and/or orthotic evaluations and recommendations as indicated on an ongoing basis prior to discharge from therapy.  6/21/2022 ongoing   4. The patient will improve her score on the Functional Gait Assessment (FGA) from 16/30 to 20/30, indicating improved safety and (I) with dynamic,comunity level gait. (Minimal detectable change for stroke= 4.2 points)     6/21/2022 ongoing   5. The patient will perform a stand to/from floor transfer with upper extremity support as needed at a modified independent level to demonstrate improved strength, balance, coordination needed for advanced transfers and higher level functional mobility.  6/21/2022 ongoing        PLAN     Plan of care Certification: 6/21/2022 to 8/5/2022.     Outpatient Physical Therapy 2 times weekly for 6 weeks to include the following interventions: Electrical Stimulation (as indicated and cleared for contraindication) , Gait Training,  Manual Therapy, Moist Heat/ Ice, Neuromuscular Re-ed, Orthotic Management and Training, Patient Education, Self Care and Therapeutic Exercise.        Adalgisa Rubalcava, PTA

## 2022-06-29 NOTE — PATIENT INSTRUCTIONS
OCCUPATIONAL THERAPY HOME PROGRAM SUGGESTIONS    PICK 3-4 ACTIVITIES PER DAY AND ALTERNATE.    GRASP  A. CRUMPLE SHEETS OF PAPER IN HAND. KEEP BALL OF HAND IN ONE PLACE.  B. ALTERNATELY SQUEEZE AND RELEASE: SPONGE, PUTTY, RUBBER BALL, YARN BALL.  C. STACK: PAPER CUPS, CONES, BLOCKS.  D. USE TOOLS: HAMMER, SCREWDRIVER, HOLE , WIRE CUTTERS, LEATHER PUNCH.  E. WRING OUT WET CLOTH    PINCH  A. STATIC PINCH: PAINTING, METAL TOOLING, LEATHER LACING, WRITING, CUTTING, CARRY BOOK OR NEWSPAPER BETWEEN THUMB AND INDEX FELA.  B. TIP PINCH: STRING BEADS, POP BEADS, MAKE AN O BY TOUCHING THUMB TO EACH FINGERTIP ONE AT A TIME,  SMALL OBJECTS AS BEAD, NAIL, PIN, TOOTHPICK.  C. PALMAR AND LATERAL PINCH: USE TWEEZERS TO  SMALL OBJECTS, CLIP CLOTHES PINS ON EDGE OF CAN, PEGBOARD GAMES AS IQ, SQUEEZE AND RELEASE PUTTY, KRISTIE ACTIVITIES,  PLACE AND TURN AND RELEASE VARIOUS SIZE AND SHAPED OBJECTS AS SPOOLS OF THREAD, BLOCKS, PEGS, GRASP CARDS ONE AT A TIME TO PALM OF HAND, [id number].    HAND STRENGTHENING  A. ACTIVITES: GARDENING, WEEDING, PIANO, TYPING, WASH DISHES, USE SCISSORS, HOLE .  B. CLIP CLOTHES PINS ON EDGE OF CAN, INTERLANCE FINGERS; TURN PALMS OUT.  C. SQUEEZE AND PULL APART PUTTY (OTHER PUTTY EXERCISES).  D. TAP FINGERS ON TABLE.  E. PALMS TOGETHER, PUSH WEAK FINGERS AGAINST STRONG FINGERS.  F. PLACE KNOTTED TOWEL IN WEAK HAND, PULL IT THROUGH.  G. PALMS FLAT ON TALBE: SPREAD FINGERS APART THEN BRING TOGETHER; RAUSE EACH FINGER OFF TABLE AND LOWER IT; FINGERS BENT FLIP OBJECT BY STGRAIGHTENING FINGERS.  H. RUBBER BAND EXERCISES.  I. WIRE CUTTERS, SCREWDRIVER.  J. SQUIRT GUN.  K. HAND GRIPPER EXERCISES.  L. PRACTICE SHOOTING BEANS OR MARBLES AT A [employer].  M. TIDDLYWINKS, NO TOUCHING THUMBS.    HAND DEXTERITY (AS YOU IMPROVE, TRY TO INCREASE SPEED)  A. ACTIVITIES: TYPING, PIANO, WRITING, JIG-SAW PUZZLES, CHECKERS, CHESS, KNIT, SEW BUTTONS, OTHER FASTENERS, COMPUTERS, ADDING MACHINE,  CALCULATOR.  B.  AND STACK COINS. DO NOT SLIDE TO EDGE OF TABLE.  C.  INDIVIDUALLY AND RELEASE IN CONTINTER: MARBLES, NAILS, STRAIGHT PINS.  D. HOLD HANDFUL OF COINS (POKER CHIPS) AND SLIDE OFF FINGERTIP ONE AT A TIME.  E. TWIST NUTS AND BOLTS.  F. TAP FINGERS ON TABLE.  G. SHUFFLE CARDS, DEAL THEM,  ONE AT A TIME, PLAY CARD CAMES.  H. CHAIN PAPER CLIPS TOGETHER.  I. TURN PENCILS IN FINGERS.  J. TIE KNOTS, BRAID WITH HEAVY CORD, ISIDORO  K. STACK PENNIES (50) ROLL COINS.  L. STRING BEADS SEW BUTTONING.  M. WASH DISHES.  N. PICKUP, PLACE AND RELEASE VARIOUS SIZED AND SHAPED OBJECTS: PEGS, DRIED SEEDS, BLOCKS, TOOTHPICKS, SPOOLS OF THREAD.  O. FINGER WALK ACROSS TABLE.  P. FEEDING, DRINKING OUT OF A CUP AND GLASS, ALL FINGER FOODS.  Q. FINGER WARS.  R. PUT 2 BLOCKS IN HAND AND ROTATE THEM AROUND CLOCKWISE (COUNTER-CLOCKWISE IS HARDER) THEN TRY WITH BALLS.    After you have practiced any activity above, begin to time yourself and try to become faster at each task.     Exercises to Decrease Arm and Hand Swelling  Do 10 of each exercise, in order... Then repeat for 3 sets.    Shoulder Flexion      Start by sitting or standing with your arm at your side. Slowly raise the left arm up and forward towards overhead... As high as you can. Then return to the starting position.  Repeat 10 times per set.          Elbow Flexion & Extension      Bend and straighten the left elbow.  Repeat 10 times per set.          Pronation & Supination      With your left elbow bent and at your side, turn your hand palm up and palm down as shown.  Repeat 10 times per set.          Wrist Flexion & Extension      Actively bend your left wrist forward then back as far as possible, without pain.  Repeat 10 times per set.          Hand Open & Close      Open and close your left hand into a fist and then open your fingers (including your thumb) as far as you can. If you cannot make a full fist, then make a partial fist.  Repeat 10  times per set.         .vhi

## 2022-06-30 ENCOUNTER — OFFICE VISIT (OUTPATIENT)
Dept: RHEUMATOLOGY | Facility: CLINIC | Age: 42
End: 2022-06-30
Payer: MEDICARE

## 2022-06-30 ENCOUNTER — LAB VISIT (OUTPATIENT)
Dept: LAB | Facility: HOSPITAL | Age: 42
End: 2022-06-30
Attending: INTERNAL MEDICINE
Payer: MEDICARE

## 2022-06-30 ENCOUNTER — PATIENT MESSAGE (OUTPATIENT)
Dept: RHEUMATOLOGY | Facility: CLINIC | Age: 42
End: 2022-06-30

## 2022-06-30 VITALS
BODY MASS INDEX: 17.53 KG/M2 | HEART RATE: 83 BPM | WEIGHT: 99 LBS | DIASTOLIC BLOOD PRESSURE: 79 MMHG | SYSTOLIC BLOOD PRESSURE: 116 MMHG

## 2022-06-30 DIAGNOSIS — M32.9 SYSTEMIC LUPUS ERYTHEMATOSUS, UNSPECIFIED SLE TYPE, UNSPECIFIED ORGAN INVOLVEMENT STATUS: ICD-10-CM

## 2022-06-30 DIAGNOSIS — Z86.73 HISTORY OF RIGHT MCA STROKE: Chronic | ICD-10-CM

## 2022-06-30 DIAGNOSIS — M35.00 SICCA SYNDROME: ICD-10-CM

## 2022-06-30 DIAGNOSIS — K31.84 GASTROPARESIS: ICD-10-CM

## 2022-06-30 DIAGNOSIS — R51.9 CHRONIC DAILY HEADACHE: Chronic | ICD-10-CM

## 2022-06-30 DIAGNOSIS — M32.9 SYSTEMIC LUPUS ERYTHEMATOSUS, UNSPECIFIED SLE TYPE, UNSPECIFIED ORGAN INVOLVEMENT STATUS: Primary | ICD-10-CM

## 2022-06-30 LAB
C3 SERPL-MCNC: 80 MG/DL (ref 50–180)
C4 SERPL-MCNC: 26 MG/DL (ref 11–44)

## 2022-06-30 PROCEDURE — 1160F RVW MEDS BY RX/DR IN RCRD: CPT | Mod: CPTII,S$GLB,, | Performed by: INTERNAL MEDICINE

## 2022-06-30 PROCEDURE — 3044F HG A1C LEVEL LT 7.0%: CPT | Mod: CPTII,S$GLB,, | Performed by: INTERNAL MEDICINE

## 2022-06-30 PROCEDURE — 86160 COMPLEMENT ANTIGEN: CPT | Mod: 59 | Performed by: INTERNAL MEDICINE

## 2022-06-30 PROCEDURE — 3074F SYST BP LT 130 MM HG: CPT | Mod: CPTII,S$GLB,, | Performed by: INTERNAL MEDICINE

## 2022-06-30 PROCEDURE — 36415 COLL VENOUS BLD VENIPUNCTURE: CPT | Performed by: INTERNAL MEDICINE

## 2022-06-30 PROCEDURE — 1159F PR MEDICATION LIST DOCUMENTED IN MEDICAL RECORD: ICD-10-PCS | Mod: CPTII,S$GLB,, | Performed by: INTERNAL MEDICINE

## 2022-06-30 PROCEDURE — 3044F PR MOST RECENT HEMOGLOBIN A1C LEVEL <7.0%: ICD-10-PCS | Mod: CPTII,S$GLB,, | Performed by: INTERNAL MEDICINE

## 2022-06-30 PROCEDURE — 3078F DIAST BP <80 MM HG: CPT | Mod: CPTII,S$GLB,, | Performed by: INTERNAL MEDICINE

## 2022-06-30 PROCEDURE — 99999 PR PBB SHADOW E&M-EST. PATIENT-LVL III: CPT | Mod: PBBFAC,,, | Performed by: INTERNAL MEDICINE

## 2022-06-30 PROCEDURE — 3008F PR BODY MASS INDEX (BMI) DOCUMENTED: ICD-10-PCS | Mod: CPTII,S$GLB,, | Performed by: INTERNAL MEDICINE

## 2022-06-30 PROCEDURE — 99204 OFFICE O/P NEW MOD 45 MIN: CPT | Mod: S$GLB,,, | Performed by: INTERNAL MEDICINE

## 2022-06-30 PROCEDURE — 99204 PR OFFICE/OUTPT VISIT, NEW, LEVL IV, 45-59 MIN: ICD-10-PCS | Mod: S$GLB,,, | Performed by: INTERNAL MEDICINE

## 2022-06-30 PROCEDURE — 99999 PR PBB SHADOW E&M-EST. PATIENT-LVL III: ICD-10-PCS | Mod: PBBFAC,,, | Performed by: INTERNAL MEDICINE

## 2022-06-30 PROCEDURE — 3074F PR MOST RECENT SYSTOLIC BLOOD PRESSURE < 130 MM HG: ICD-10-PCS | Mod: CPTII,S$GLB,, | Performed by: INTERNAL MEDICINE

## 2022-06-30 PROCEDURE — 3078F PR MOST RECENT DIASTOLIC BLOOD PRESSURE < 80 MM HG: ICD-10-PCS | Mod: CPTII,S$GLB,, | Performed by: INTERNAL MEDICINE

## 2022-06-30 PROCEDURE — 3008F BODY MASS INDEX DOCD: CPT | Mod: CPTII,S$GLB,, | Performed by: INTERNAL MEDICINE

## 2022-06-30 PROCEDURE — 86225 DNA ANTIBODY NATIVE: CPT | Performed by: INTERNAL MEDICINE

## 2022-06-30 PROCEDURE — 1160F PR REVIEW ALL MEDS BY PRESCRIBER/CLIN PHARMACIST DOCUMENTED: ICD-10-PCS | Mod: CPTII,S$GLB,, | Performed by: INTERNAL MEDICINE

## 2022-06-30 PROCEDURE — 1159F MED LIST DOCD IN RCRD: CPT | Mod: CPTII,S$GLB,, | Performed by: INTERNAL MEDICINE

## 2022-06-30 PROCEDURE — 86160 COMPLEMENT ANTIGEN: CPT | Performed by: INTERNAL MEDICINE

## 2022-06-30 NOTE — PLAN OF CARE
[facility] Occupational Therapy  Initial Neurological Evaluation     Date: 6/29/2022  Patient: Cat Denson  Chart Number: [id number]    Therapy Diagnosis:   Encounter Diagnosis   Name Primary?    Spasticity as late effect of cerebrovascular accident (CVA)      Physician: Romana Weiss,*    Physician Orders: OT eval and treat   Medical Diagnosis: Cerebrovascular accident   Evaluation Date: 6/29/2022  Plan of Care Expiration Period: 8/10/2022  Insurance Authorization period Expiration: 12/31/2022  Date of Return to MD: to be determined   Visit # / Visits Authorized: 1/1  FOTO: to be assessed next session    Time In: 5:15 PM   Time Out: 6:00 PM  Total Billable (one on one) Time: 45 minutes    Precautions: Standard    Subjective     History of Current Condition: Cat had a stroke in 2015. She quit previous therapy because she wasn't seeing results fast enough. She is now left with decreased range of motion and functional use of left upper extremity.     Involved Side: Left   Dominant Side: Right  Date of Onset: November 24, 2015  Previous Therapy: OT and PT outpatient     Patient's Goals for Therapy: hand to mouth, cutting with left hand, keep hand open, clip nails     Pain:  Pain Related Behaviors Observed: no   Functional Pain Scale Rating 0-10:  0/10 on average  0/10 at best  0/10 at worst    Occupation:   Working presently: Disability  Duties:  Sits in bed, watching tv, playing on phone, video games     Functional Limitations/Social History:    Prior Level of Function: (I)   Current Level of Function: decreased ability to complete ADLs and IADLs efficiently.     Home/Living environment : Lives with best friend and his family   Home Access: no stairs   DME: shower chair     Leisure: play games on phone     Driving: No, not since before stroke in 2015     Past Medical History/Physical Systems Review:     Past Medical History:  Cat Denson  has a past medical history  of Acid reflux, Allergy, Anemia, Anticoagulated, Anxiety, Asthma, Bipolar 1 disorder, Bronchospasm with bronchitis, acute, Chronic daily headache, Chronic kidney disease, Chronic pain, Depression, Fibromyalgia, Gastroparesis, History of right MCA stroke, psychiatric care, Lactose intolerance, Lupus, Mixed hyperlipidemia, Psychiatric problem, Renal tubular acidosis, Seizure, Sjogren's syndrome, Smoker, Stroke, Substance abuse-kratom daily use, Suicide attempt, and Therapy.    Past Surgical History:  Cat Denson  has a past surgical history that includes Cholecystectomy; Colonoscopy (11/12/2014); Upper gastrointestinal endoscopy (03/03/2017); Fluoroscopic urodynamic study (N/A, 7/23/2019); and Esophagogastroduodenoscopy (N/A, 1/3/2022).    Current Medications:  Cat has a current medication list which includes the following prescription(s): aripiprazole, atorvastatin, botox, chlorzoxazone, divalproex, famotidine, fluticasone propionate, gabapentin, mirtazapine, pantoprazole, promethazine, propranolol, rivaroxaban, and trazodone.    Allergies:  Review of patient's allergies indicates:   Allergen Reactions    Metoclopramide hcl Anaphylaxis     Involuntary mouth movements     Amoxicillin-pot clavulanate      2 other medications    Amoxil [amoxicillin]      hives    Avelox [moxifloxacin]      itching    Benadryl [diphenhydramine hcl]      Lowers seizure threshold     Coumadin [warfarin]      toxicity    Quinazolinones      Lowers seizure threshold     Ultram [tramadol]      Lowers seizure threshold     Wellbutrin [bupropion hcl]      Lowers seizure threshold         Objective     Cognitive Exam:  Oriented: Person, Place, Time and Situation  Behaviors: normal, cooperative, friendly and cooperative  Follows Commands/attention: Follows two-step commands  Communication: clear/fluent  Memory: No Deficits noted as determined by 3 word recall after 1 minute and 3 minutes  Safety awareness/insight to  disability: aware of diagnosis, treatment, and prognosis  Coping skills/emotional control: Appropriate to situation    Visual/Perceptual: left peripheral loss on left eye  Tracking: intact    Saccades: intact   Acuity: corrected by glasses  Convergence: intact     Physical Exam:  Postural examination/scapula alignment: Slouched posture  Joint integrity: no concerns   Skin integrity: no concerns   Edema: no concerns     Joint Evaluation  AROM  6/29/2022 AROM  6/29/2022    Left Right   Shoulder flex 0-180 152 WNL   Shoulder Abd 0-180 129 WNL   Shoulder ER 0-90 27  WNL   Shoulder IR 0-90 WNL WNL   Shoulder Extension 0-80 NT WNL   Elbow flex/ext 0-150 WFL WNL   Wrist flex 0-80 63 WNL   Wrist ext 0-70 To neutral  WNL     Fist: loose    Manual Muscle Testing: to be further assessed     Hand Strength (GERTRUDIS Dynamometer, Setting II)   (lbs) Left  6/29/2022  Right  6/29/2022    1 17 53   2 15 47   3 13 45   Avg 15 48.3   [norms for women aged 40-44: R=70.4 +/-13.5; L=62.3 +/-13.8 (Mathiowetz et al, 1985)]    Pinch Left  6/29/2022  Right  6/29/2022    Lateral 5 14   Tip (2 point) 2 8   3 jaw david 5 11     Gross motor coordination:   - JIN (Rapid Alternating Movements): left impaired  - Finger to Nose (5 times): left impaired  - Finger Flicks (coordination moving from digit flexion to digit extension): left impaired     Box and Block Assessment Left  6/29/2022  Right  6/29/2022     13 45   [norms for women aged 40-44: R=81.1 +/-8.2; L=79.7 +/-8.8 (Mathiowetz et al, 1985)]    Fine motor coordination:   -   Thumb to Finger Opposition: left impaired     9 Hole Peg Test (9HPT) Left  6/29/2022  Right  6/29/2022     7 pegs in 2 min 25s   [norms for women aged 36-40: R=16.74s +/-1.95s; L=18.16s +/-2.08s (Amilcar et al, 2003)]    Tone:  Modified Adam Scale: to be further assessed   1+ Slight increases in muscle tone, manifested by a catch, followed by minimal resistance throughout the remainder (less than half) of the range of  motion (elbow flexors)    Sensation:  Cat reports no loss in sensation   Light touch: bilateral intact  Sharp/Dull: bilateral intact  Proprioception: bilateral intact  Temperature: bilateral intact    Balance:   Static Sit - GOOD+: Takes MAXIMAL challenges from all directions.    Dynamic sit- GOOD+: Takes MAXIMAL challenges from all directions.    Static Stand - GOOD: Takes MODERATE challenges from all directions  Dynamic stand - GOOD: Takes MODERATE challenges from all directions    Endurance Deficit: mild                    Functional Status      Functional Mobility:  Bed mobility: Mod I  Roll to left: Min A  Roll to right: Mod I  Supine to sit: Mod I  Sit to supine: Mod I  Transfers to bed: Mod I  Transfers to toilet: Mod I  Car transfers: Mod I    ADL's:  Feeding: Min A  Grooming: Mod I  Hygiene: Mod I  UB Dressing: Mod I  LB Dressing: Mod I  Toileting: Mod I  Bathing: Mod I    IADL's:  Homecare: Mod I  Cooking: does not complete   Laundry: Mod I  Yard work:does not complete   Use of telephone: Min A  Money management: Mod I  Medication management: Mod I  Handwriting:Mod I  Technology Use:Min A      Treatment     Home Exercises and Patient Education Provided: to be provided next session     Education provided:   -role of OT, goals for OT, scheduling/cancellations, insurance limitations with patient.    Assessment     Cat Denson is a 42 y.o. female referred to outpatient occupational therapy and presents with a medical diagnosis of cerebrovascular accident, resulting in decreased range of motion, decreased muscle strength and impaired function and demonstrates limitations as described in the chart below. Following medical record review it is determined that patient will benefit from occupational therapy services in order to maximize pain free and/or functional use of left upper extremity.    Patient prognosis is Fair.   Patient will benefit from skilled outpatient Occupational Therapy to address  the deficits stated above and in the chart below, provide patient/family education, and to maximize patient's level of independence.     Plan of care discussed with patient: Yes  Patient's spiritual, cultural and educational needs considered and patient is agreeable to the plan of care and goals as stated below:     Anticipated Barriers for therapy: relies on roommate for transportation.     Medical Necessity is demonstrated by the following  Profile and History Assessment of Occupational Performance Level of Clinical Decision Making Complexity Score   Occupational Profile:   Cat Denson is a 42 y.o. female who lives with a friend and is currently on disability Cat Denson has difficulty with  range of motion,tone, using a knife, rolling in bed, drinking out of a bottle, clipping finger nails and bringing hand to mouth.   affecting his/her daily functional abilities. His/her main goal for therapy is bringing hand to mouth, cutting with left hand,rolling in bed, keeping hand open, clipping nails     Comorbidities:   See medical history     Medical and Therapy History Review:   Brief               Performance Deficits    Physical:  Muscle Power/Strength  Muscle Endurance   Strength  Pinch Strength  Gross Motor Coordination  Fine Motor Coordination  Muscle Tone    Cognitive:  No Deficits    Psychosocial:    Social Interaction  Habits  Routines  Rituals  Group Participation     Clinical Decision Making:  low    Assessment Process:  Problem-Focused Assessments    Modification/Need for Assistance:  Minimal-Moderate Modifications/Assistance    Intervention Selection:  Several Treatment Options       low  Based on PMHX, co morbidities , data from assessments and functional level of assistance required with task and clinical presentation directly impacting function.       The following goals were discussed with the patient and patient is in agreement with them as to be addressed in the treatment  plan.     Goals:  Short Term Goals: 3 weeks   1. Cat will bring hand to mouth (functional range of motion) with min difficulty for increased ability to drink out of a water bottle.   2. Cat will be (I) and compliant with home exercise program.   3. Cat will be mod (I) with food cutting with adaptive equipment as needed for increased indendence in feeding.     Long Term Goals: 6 weeks   1. Cat will clip right hand finger nails using left hand with (Mod I)  2. Cat will complete 9 hole peg test with left upper extremity under 1 minute 45 seconds.   3. Cat will increase box and block score by 5 on left upper extremity.     Additional functional goals to be added as pt progresses and per her priorities.  Plan   Certification Period/Plan of care expiration: 6/29/2022 to 8/10/2022    Outpatient Occupational Therapy 2 times weekly for 6 weeks to include the following interventions: Manual Therapy, Neuromuscular Re-ed, Patient Education, Self Care, Therapeutic Activities and Therapeutic Exercise.    Melinda De La Cruz OT      I certify the need for these services furnished under this plan of treatment and while under my care.  ____________________________________ Physician/Referring Practitioner   Date of Signature

## 2022-07-01 LAB — DSDNA AB SER-ACNC: NORMAL [IU]/ML

## 2022-07-02 NOTE — PROGRESS NOTES
History of present illness: 42-year-old female I first saw in 2013.  She had a 10 year history of lupus although she had also been diagnosed as having Sjogren syndrome.  She had high titer TRISTAN with positive SSA and SSB antibody.  She had dry eye and dry mouth.  She had a livido pattern on her arms but not her legs.  She had no evidence of active lupus.  Laboratories at that time showed no evidence of lupus anticoagulant or anticardiolipin antibodies.  She remained on Plaquenil.  Her main problem was chronic pain secondary to fibromyalgia.  She also had a history of gastroparesis..    In 2015 she had an embolic stroke which left her with left-sided weakness.  She has had some trouble with spasms since then.  She is seeing Physical Medicine and gets Botox injections.    I last saw her in 2016. She was seen on several occasions by Dr. Benoit during my absence but has not been seen by Rheumatology since 2018. She remained on Plaquenil but was on no other medication.    She is now off Plaquenil.  She had an abnormal eye exam.  She has had no new symptoms since she has been off the Plaquenil.  She has had no unexplained fevers.  She has chronic headache for which she sees Neurology.  She has had no rash.  She has had no conjunctivitis or oral ulcers.  She continues to complain of dryness of her eye, mouth, and skin.  She has occasional swallowing difficulty because of the dryness.  She has no Raynaud's phenomena, pleurisy, joint swelling.  She has had some pain in her right knee which is worse with activity.    She has a occasional shortness of breath.  She still has chronic nausea.  She has not been to see GI recently.  She has no liver problems.  She has mild renal insufficiency.    Physical examination:  Skin:  No rashes  ENT:  She has slight decrease in saliva but normal tears.  No conjunctivitis or oral ulcers.  Neck:  No adenopathy  Chest:  Clear to auscultation  Cardiac:  No murmurs, gallops, rubs  Abdomen:  No  organomegaly or masses.  No tenderness to palpation  Musculoskeletal:  Good range of motion of all joints.  No synovitis.  Neurologic:  Left hemiparesis    Assessment:  No evidence of active SLE    Plans:  1. Laboratory studies obtained  2. As long as there is no evidence of active lupus, she does not need prophylactic therapy  3. Return to see me in 12 months.  Answers for HPI/ROS submitted by the patient on 6/30/2022  fever: No  eye redness: No  mouth sores: No  headaches: Yes  shortness of breath: Yes  chest pain: Yes  trouble swallowing: No  diarrhea: No  constipation: No  unexpected weight change: Yes  genital sore: No  dysuria: No  During the last 3 days, have you had a skin rash?: No  Bruises or bleeds easily: Yes  cough: No

## 2022-07-08 ENCOUNTER — PATIENT MESSAGE (OUTPATIENT)
Dept: RHEUMATOLOGY | Facility: CLINIC | Age: 42
End: 2022-07-08
Payer: MEDICARE

## 2022-07-08 RX ORDER — CHLORZOXAZONE 500 MG/1
500 TABLET ORAL 4 TIMES DAILY PRN
Qty: 120 TABLET | Refills: 2 | Status: SHIPPED | OUTPATIENT
Start: 2022-07-08 | End: 2022-07-09

## 2022-07-09 RX ORDER — CYCLOBENZAPRINE HCL 10 MG
10 TABLET ORAL 3 TIMES DAILY PRN
Qty: 90 TABLET | Refills: 2 | Status: SHIPPED | OUTPATIENT
Start: 2022-07-09 | End: 2022-08-08

## 2022-07-18 ENCOUNTER — CLINICAL SUPPORT (OUTPATIENT)
Dept: REHABILITATION | Facility: HOSPITAL | Age: 42
End: 2022-07-18
Attending: PHYSICAL MEDICINE & REHABILITATION
Payer: MEDICARE

## 2022-07-18 ENCOUNTER — CLINICAL SUPPORT (OUTPATIENT)
Dept: REHABILITATION | Facility: HOSPITAL | Age: 42
End: 2022-07-18
Payer: MEDICARE

## 2022-07-18 DIAGNOSIS — G81.94 LEFT HEMIPARESIS: Primary | ICD-10-CM

## 2022-07-18 DIAGNOSIS — Z91.81 RISK FOR FALLS: ICD-10-CM

## 2022-07-18 DIAGNOSIS — R25.2 SPASTICITY AS LATE EFFECT OF CEREBROVASCULAR ACCIDENT (CVA): ICD-10-CM

## 2022-07-18 DIAGNOSIS — G81.94 LEFT HEMIPARESIS: ICD-10-CM

## 2022-07-18 DIAGNOSIS — I69.398 SPASTICITY AS LATE EFFECT OF CEREBROVASCULAR ACCIDENT (CVA): ICD-10-CM

## 2022-07-18 DIAGNOSIS — R26.9 IMPAIRED GAIT: Primary | ICD-10-CM

## 2022-07-18 DIAGNOSIS — R68.89 IMPAIRED FUNCTION OF UPPER EXTREMITY: ICD-10-CM

## 2022-07-18 PROCEDURE — 97530 THERAPEUTIC ACTIVITIES: CPT | Mod: PN

## 2022-07-18 PROCEDURE — 97530 THERAPEUTIC ACTIVITIES: CPT | Mod: PN,CQ

## 2022-07-18 PROCEDURE — 97112 NEUROMUSCULAR REEDUCATION: CPT | Mod: PN,CQ

## 2022-07-18 PROCEDURE — 97110 THERAPEUTIC EXERCISES: CPT | Mod: PN,CQ

## 2022-07-18 NOTE — PROGRESS NOTES
"[facility]  Occupational Therapy Treatment Note    Date: 7/18/2022  Name: Cat Denson  Clinic Number: [id number]    Therapy Diagnosis:   Encounter Diagnoses   Name Primary?    Left hemiparesis Yes    Impaired function of upper extremity     Spasticity as late effect of cerebrovascular accident (CVA)      Physician: Romana Weiss,*    Physician Orders: OT eval and treat   Medical Diagnosis: Cerebrovascular accident   Evaluation Date: 6/29/2022  Insurance Authorization Period Expiration: 12/31/2022  Plan of Care Expiration: 8/10/2022  Progress Note Due: 7/29/2022  Date of Return to MD: to be determined   Visit # / Visits authorized: 1 / 25 (plus eval)  FOTO: not assessed in today's visit       Precautions:  Standard    Time In: 12:45 PM  Time Out: 1:30 PM  Total Billable Time: 45 minutes    SUBJECTIVE     Pt reports: "I was tired before I even got here". Pt reports receiving Botox last week and says it seems to be helping her hand. She is still having difficulty with tying and buttoning. Cat reports trying to complete one of the exercises given to her to crumple a piece of paper in her hand but she was unable to open her hand and fingers enough.     She was compliant with home exercise program given last session.   Response to previous treatment: No concerns   Functional change: None reported since eval    Pain: 0/10  Location: bilateral     OBJECTIVE     Objective Measures updated at progress report unless specified.    Treatment     Cat received the treatments listed below:     Manual therapy techniques: Joint mobilizations and Soft tissue Mobilization were applied for 5 minutes, including:    Gentle mobilization of joints involving the left UE phalanges, metacarpals, carpals, radius and ulna to facilitate increases in passive movement.   Gentle stretching of the soft tissues of the left wrist and hand to decrease edema and improve PROM, potentially allowing " "for increases in active movement.     Therapeutic exercises to develop ROM for 8 minutes, including:    Prayer hand stretch x3 for ~10 second hold    AROM isolated digit extension- able to lift index finger slightly off table but unable to lift other fingers    AROM wrist extension, pt is limited in performing into full range     Therapeutic activities to improve functional performance for 32 minutes, including:    Cat worked on scooping pom poms into a bowl with a built-up handled spoon. Compensatory movements to manipulate spoon and limitations with ROM in left wrist. Physical assistance required to help pt properly stabilize the  built-up spoon. Pt increased success with scooping up pom poms towards body vs scooping them across her body.     Serial opposition to  pom poms on table. Pt is unable to complete with all digits except index finger to thumb.     For increased coordination, pt poured pom poms from one cup to the other into forearm supination and pronation. Pt observed to have increased compensatory movements in shoulder abduction and leaning with torso to tilt the cup in pronation.     Tan putty for:  Putty  x10  Finger twists x5- Pt is limited with opening fingers and ROM in wrist   Making indents into putty with built-up knife with left hand   Cutting up putty into "pieces of food", using the left hand to  each piece and place into bowl. Pt observed to use her right hand to help drop the putty pieces into the bowl, verbal cues to use right hand to stabilize bowl and for the left hand to do the work. She also required physical assistance to properly stabilize fork.      x5 towel twists for increased ROM in the wrist and  strength    Patient Education and Home Exercises      Education provided:   - Stretching and weight-bearing on the left hand for increased stretch  - Progress towards goals     Written Home Exercises Provided: yes.  Exercises were reviewed and Cat was " able to demonstrate them prior to the end of the session.  Cat demonstrated good  understanding of the HEP provided. See EMR under Patient Instructions for exercises provided during therapy sessions.       Assessment     Pt would continue to benefit from skilled OT to address compensatory movements and limited ROM in the left upper extremity, affecting her abilities to complete her ADLs and IADLs. Based on today's observations, Cat is limited in forearm pronation and shoulder internal rotation resulting in improper upper extremity mechanics. Pt's increased tone is a limiting factor for Cat to functionally use her left hand and she can benefit from progressive stretching and ROM activities with the hand and wrist.     Cat is progressing towards her goals and there are no updates to goals at this time. Pt prognosis is Fair.   Pt will continue to benefit from skilled outpatient occupational therapy to address the deficits listed in the problem list on initial evaluation provide pt/family education and to maximize pt's level of independence in the home and community environment.     Pt's spiritual, cultural and educational needs considered and pt agreeable to plan of care and goals.    Anticipated barriers to occupational therapy: relies on roommate for transportation.    Goals:    Short Term Goals: 3 weeks   1. Cat will bring hand to mouth (functional range of motion) with min difficulty for increased ability to drink out of a water bottle. (progressing 7/18/2022)   2. Cat will be (I) and compliant with home exercise program.  (progressing 7/18/2022)   3. Cat will be mod (I) with food cutting with adaptive equipment as needed for increased indendence in feeding. (progressing 7/18/2022)      Long Term Goals: 6 weeks   1. Cat will clip right hand finger nails using left hand with (Mod I) (progressing 7/18/2022)   2. Cta will complete 9 hole peg test with left upper extremity  under 1 minute 45 seconds.  (progressing 7/18/2022)   3. Cat will increase box and block score by 5 on left upper extremity.  (progressing 7/18/2022)      Additional functional goals to be added as pt progresses and per her priorities    PLAN     Certification Period/Plan of care expiration: 6/29/2022 to 8/10/2022     Outpatient Occupational Therapy 2 times weekly for 6 weeks to include the following interventions: Manual Therapy, Neuromuscular Re-ed, Patient Education, Self Care, Therapeutic Activities and Therapeutic Exercise.    Updates/Grading for next session: stretching; ROM exercises for the left hand and wrist.    RADHA Herring     I certify that I was present in the room directing the student in service delivery and guiding them using my skilled judgment. As the co-signing therapist I have reviewed the students documentation and am responsible for the treatment, assessment, and plan.   MODESTA Raman, VARGAS

## 2022-07-18 NOTE — PROGRESS NOTES
"[facility]   Physical Therapy Treatment Note     [facility]  [facility] [id number]    Therapy Diagnosis:   Encounter Diagnoses   Name Primary?    Impaired gait Yes    Left hemiparesis     Risk for falls      Physician: Romana Weiss,*    Visit Date: 7/18/2022    Physician Orders: PT Eval and Treat   Medical Diagnosis from Referral: G81.10 (ICD-10-CM) - Spastic hemiparesis  Evaluation Date: 6/21/2022  Authorization Period Expiration: 12/31/2022  Plan of Care Expiration: 8/5/2022  Visit # / Visits authorized: 2/20  FOTO visit #: 3  FOTO score: 59 (6/27/22)        PTA Visit #: 3/5     Time In: 1200  Time Out: 1245  Total Billable Time: 45 minutes    Precautions: Standard and Fall       SUBJECTIVE     Pt reports: Doing "okay", "kind of tired".   She was compliant with home exercise program.  Response to previous treatment: no problems stated  Functional change: ongoing    Pain: 0/10  Location:  Not Applicable      OBJECTIVE     Objective Measures updated at progress report unless specified.     Treatment     Cat received the treatments listed below:      therapeutic exercises to develop strength, endurance and range of motion for 20 minutes including:    SciFit Level 1 10 minutes Bilateral Lower Extremities only    Sit:  Long arc quads 2 sets of 15 2# Right Left   Hip flexion 2 sets of 15 2# Right Left     therapeutic activities to improve functional performance for 10  minutes, including:    Stand <> sit transfer to floor mat contact guard assistance with Right upper extremity use for balance and occasion verbal cues for direction (x3)    neuromuscular re-education activities to improve: Balance, Coordination, Proprioception and Posture for 15 minutes. The following activities were included:    Parallel bars:  Static stand on Airex 2 minutes Eyes open Feet apart with minimal sway without upper extremities  Anterior/posterior weight shift on Airex 1 " minute  Toe tap on 7 inch box while standing on Airex contact guard assistance 2 sets of 10 Right Left   Right Toe tap on 7 inch box while Left standing on Airex contact guard assistance  Side step on 6 foot Airex to Right to Left x2 contact guard assistance with verbal cues and tactile cues to increase weight shift to Left     Patient Education and Home Exercises     Home Exercises Provided and Patient Education Provided     Education provided:   - Patient provided with verbal and demonstrative instruction for all activities performed in today's session.    Written Home Exercises Provided: Patient instructed to cont prior HEP. Exercises were reviewed and Cat was able to demonstrate them prior to the end of the session.  Cat demonstrated fair  understanding of the education provided. See EMR under Patient Instructions for exercises provided during therapy sessions    ASSESSMENT     Cat provided good participation and effort during today's session with treatment focused on lower extremity strengthening and balance activities with seated rest breaks. Cta did well with balance activities on Airex with no upper extremity support and contact guard assistance and cues to increase weight shift to Left during stance phase.    Cat Is progressing towards her goals.   Pt prognosis is Fair.     Pt will continue to benefit from skilled outpatient physical therapy to address the deficits listed in the problem list box on initial evaluation, provide pt/family education and to maximize pt's level of independence in the home and community environment.     Pt's spiritual, cultural and educational needs considered and pt agreeable to plan of care and goals.     Anticipated barriers to physical therapy: Chronic nature of impairments       Goals:     Short Term Goals: 3 weeks Date last assessed:  Status:    1. Patient will be provided with an individualized home exercise program. 6/21/2022    progressing   2.   Patient will demonstrate improve endurance and activity tolerance as evidenced by ability to perform Nu-step x 15 minutes without rests breaks with heart rated post-activity equivalent to 50-80% of maximum heart rate.  6/21/2022 progressing   3. Patient will complete the FOTO survey to assess self-perception of functional mobility and related limitation.     6/21/2022 progressing   4. Patient will complete the 6 Minute Walk Test to further evaluate her endurance and efficiency with community level gait. (Minimal detectable change  for stroke = 112 feet) 6/21/2022 Progressing  (1,202 feet 6/27/22)            Long Term Goals: 6 weeks  Date last assessed: Status:    1. Patient will be independent and compliant with updated home exercise program. 6/21/2022    progressing   2. Patient will increased lower extremity  strength as evidenced by increase in manual muscle test by 1/2 grade as appropriate to decrease gait deviations consistently.  6/21/2022    ongoing      3. The patient will be provided with any equipment and/or orthotic evaluations and recommendations as indicated on an ongoing basis prior to discharge from therapy.  6/21/2022 ongoing   4. The patient will improve her score on the Functional Gait Assessment (FGA) from 16/30 to 20/30, indicating improved safety and (I) with dynamic,comunity level gait. (Minimal detectable change for stroke= 4.2 points)     6/21/2022 ongoing   5. The patient will perform a stand to/from floor transfer with upper extremity support as needed at a modified independent level to demonstrate improved strength, balance, coordination needed for advanced transfers and higher level functional mobility.  6/21/2022 progressing        PLAN     Plan of care Certification: 6/21/2022 to 8/5/2022.     Outpatient Physical Therapy 2 times weekly for 6 weeks to include the following interventions: Electrical Stimulation (as indicated and cleared for contraindication) , Gait Training,  Manual  Therapy, Moist Heat/ Ice, Neuromuscular Re-ed, Orthotic Management and Training, Patient Education, Self Care and Therapeutic Exercise.        Adalgisa Rubalcava, PTA

## 2022-07-20 ENCOUNTER — CLINICAL SUPPORT (OUTPATIENT)
Dept: REHABILITATION | Facility: HOSPITAL | Age: 42
End: 2022-07-20
Attending: PHYSICAL MEDICINE & REHABILITATION
Payer: MEDICARE

## 2022-07-20 DIAGNOSIS — R25.2 SPASTICITY AS LATE EFFECT OF CEREBROVASCULAR ACCIDENT (CVA): ICD-10-CM

## 2022-07-20 DIAGNOSIS — G81.94 LEFT HEMIPARESIS: ICD-10-CM

## 2022-07-20 DIAGNOSIS — I69.398 SPASTICITY AS LATE EFFECT OF CEREBROVASCULAR ACCIDENT (CVA): ICD-10-CM

## 2022-07-20 DIAGNOSIS — R68.89 IMPAIRED FUNCTION OF UPPER EXTREMITY: Primary | ICD-10-CM

## 2022-07-20 PROCEDURE — 97110 THERAPEUTIC EXERCISES: CPT | Mod: PN

## 2022-07-20 PROCEDURE — 97112 NEUROMUSCULAR REEDUCATION: CPT | Mod: PN

## 2022-07-20 NOTE — PATIENT INSTRUCTIONS
hand stretch    place palms together with fingers apart, thumbs facing inward and elbows up.  push fingers and palms together till you feel a pull.  Hold 30 seconds, relax and repeat.                TOWEL WRING - WRIST FLEXION EXTENSION    Grasp the ends of a hand towel and begin twisting it one direction and then reverse the direction and twist it the other way. Repeat.

## 2022-07-20 NOTE — PROGRESS NOTES
"[facility]  Occupational Therapy Treatment Note    Date: 7/20/2022  Name: Cat Denson  Clinic Number: [id number]    Therapy Diagnosis:   Encounter Diagnoses   Name Primary?    Impaired function of upper extremity Yes    Left hemiparesis     Spasticity as late effect of cerebrovascular accident (CVA)      Physician: Romana Weiss,*    Physician Orders: OT eval and treat   Medical Diagnosis: Cerebrovascular accident   Evaluation Date: 6/29/2022  Insurance Authorization Period Expiration: 12/31/2022  Plan of Care Expiration: 8/10/2022  Progress Note Due: 7/29/2022  Date of Return to MD: to be determined   Visit # / Visits authorized: 2 / 25 (plus eval)  FOTO: 55/100 on 7/20/2022. See objective.   Precautions:  Standard    Time In: 12:45 PM  Time Out: 1:30 PM  Total Billable Time: 45 minutes    SUBJECTIVE     Pt reports: "My hand fees like its cramping a little and I'm not sure why, I have been doing stretches".     She was compliant with home exercise program given last session.     Response to previous treatment: No concerns   Functional change: None reported since eval    Pain: 2/10  Location: left palm    OBJECTIVE     Objective Measures updated at progress report unless specified.        Treatment     Cat received the treatments listed below:     Manual therapy techniques: Joint mobilizations and Soft tissue Mobilization were applied for 6 minutes, including:    Gentle mobilization of joints involving the left UE phalanges, metacarpals, carpals, radius and ulna to facilitate increases in passive movement.   Gentle stretching of the soft tissues of the left wrist and hand to decrease edema and improve PROM, potentially allowing for increases in active movement.     Therapeutic exercises to develop endurance, ROM and flexibility for 29 minutes, including:    AROM with wrist off of table x15. Pt's forearm observed to turn into supination as pt is limited with wrist " "extension. OTS stabilized pt's forearm to limit supination and instructed pt to make smaller movements in wrist extension. Pt is able to make small movements in wrist extension.      OTS provided pt with PROM in wrist extension for increased stretch and range x10     Placed pt's hand on the table with forearm in neutral for AROM into wrist extension. Pt has limited range with gravity eliminated. OTS provided PROM into wrist extension x10.    With pt's hand placed palm down on table, she was asked to raise her palm up keeping fingers flat on the table and relax hand to bring it flat down. Pt was unable to flatten out her hand on the table after multiple attempts. OTS placed her finger under pt's hand to "squish" finger to promote flattening and relaxation of the hand. Pt provided min pressure on OTS finger as she pushed down.     Isolated digit PROM into extension x5 each for increased stretch     With Cat's left upper extremity placed on yoga block, forearm in neutral, and with paddle donned, she completed ROM into wrist into wrist extension, ulnar deviation, radial deviation, and back into wrist flexion. OTS stabilized pt's wrist on block to maintain wrist in neutral. Pt observed to have more control and ROM in the left wrist with paddle donned.     Neuromuscular re-education activities to improve Coordination, Kinesthetic and Proprioception for 10 minutes. The following activities were included:    Placed flat paddle on pt's left hand for duration of treatment (following AROM and PROM hand exercises) to inhibit flexor tone and provide LLPS to long finger flexors.    Pt's left upper extremity placed on tilt stick with paddle into forward reach and external rotation x15 in each direction. Cat was able to perform forward reach with no difficulty but was limited with controlling her arm in external rotation. OTS required to stabilize pt's wrist in external rotation to keep wrist stable on tilt stick. "     Patient Education and Home Exercises      Education provided:   - Stretching and weight-bearing on the left hand for increased stretch  - Progress towards goals     Written Home Exercises Provided: yes.  Exercises were reviewed and Cat was able to demonstrate them prior to the end of the session.  Cat demonstrated good  understanding of the HEP provided. See EMR under Patient Instructions for exercises provided during therapy sessions.       Assessment     Cat required assistance to limit compensatory movements with her left wrist in extension and remains limited in this movement. Pt will benefit from continuing to use the flat paddle to reduce increased tone in the pt's hand. Once the paddle was placed on, Cat showed increased control and range into wrist extension and radial deviation. Recommended that pt continue to complete shoulder ROM and left hand ROM activities for increased independence at home.     Cat is progressing towards her goals and there are no updates to goals at this time. Pt prognosis is Fair.   Pt will continue to benefit from skilled outpatient occupational therapy to address the deficits listed in the problem list on initial evaluation provide pt/family education and to maximize pt's level of independence in the home and community environment.     Pt's spiritual, cultural and educational needs considered and pt agreeable to plan of care and goals.    Anticipated barriers to occupational therapy: relies on roommate for transportation.    Goals:    Short Term Goals: 3 weeks   1. Cat will bring hand to mouth (functional range of motion) with min difficulty for increased ability to drink out of a water bottle. (progressing 7/20/2022)   2. Cat will be (I) and compliant with home exercise program.  (progressing 7/20/2022)   3. Cat will be mod (I) with food cutting with adaptive equipment as needed for increased independence in feeding. (progressing  7/20/2022)      Long Term Goals: 6 weeks   1. Cat will clip right hand finger nails using left hand with (Mod I) (progressing 7/20/2022)   2. Cat will complete 9 hole peg test with left upper extremity under 1 minute 45 seconds.  (progressing 7/20/2022)   3. Cat will increase box and block score by 5 on left upper extremity.  (progressing 7/20/2022)      Additional functional goals to be added as pt progresses and per her priorities    PLAN     Certification Period/Plan of care expiration: 6/29/2022 to 8/10/2022     Outpatient Occupational Therapy 2 times weekly for 6 weeks to include the following interventions: Manual Therapy, Neuromuscular Re-ed, Patient Education, Self Care, Therapeutic Activities and Therapeutic Exercise.    Updates/Grading for next session: stretching; ROM exercises for the left hand and wrist, AROM/PROM of left shoulder     RADHA Herring     I certify that I was present in the room directing the student in service delivery and guiding them using my skilled judgment. As the co-signing therapist I have reviewed the students documentation and am responsible for the treatment, assessment, and plan.   MODESTA Raman, VARGAS

## 2022-08-01 ENCOUNTER — CLINICAL SUPPORT (OUTPATIENT)
Dept: REHABILITATION | Facility: HOSPITAL | Age: 42
End: 2022-08-01
Payer: MEDICARE

## 2022-08-01 ENCOUNTER — CLINICAL SUPPORT (OUTPATIENT)
Dept: REHABILITATION | Facility: HOSPITAL | Age: 42
End: 2022-08-01
Attending: PHYSICAL MEDICINE & REHABILITATION
Payer: MEDICARE

## 2022-08-01 DIAGNOSIS — Z91.81 RISK FOR FALLS: ICD-10-CM

## 2022-08-01 DIAGNOSIS — R25.2 SPASTICITY AS LATE EFFECT OF CEREBROVASCULAR ACCIDENT (CVA): ICD-10-CM

## 2022-08-01 DIAGNOSIS — G81.94 LEFT HEMIPARESIS: ICD-10-CM

## 2022-08-01 DIAGNOSIS — R68.89 IMPAIRED FUNCTION OF UPPER EXTREMITY: Primary | ICD-10-CM

## 2022-08-01 DIAGNOSIS — I69.398 SPASTICITY AS LATE EFFECT OF CEREBROVASCULAR ACCIDENT (CVA): ICD-10-CM

## 2022-08-01 DIAGNOSIS — R26.9 IMPAIRED GAIT: Primary | ICD-10-CM

## 2022-08-01 PROCEDURE — 97530 THERAPEUTIC ACTIVITIES: CPT | Mod: PN

## 2022-08-01 PROCEDURE — 97110 THERAPEUTIC EXERCISES: CPT | Mod: PN

## 2022-08-01 PROCEDURE — 97112 NEUROMUSCULAR REEDUCATION: CPT | Mod: PN

## 2022-08-01 NOTE — PROGRESS NOTES
"[facility]   Physical Therapy Treatment Note     Name: Cat Denson  Clinic Number: [id number]    Therapy Diagnosis:   Encounter Diagnoses   Name Primary?    Impaired gait Yes    Left hemiparesis     Risk for falls      Physician: Romana Weiss,*    Visit Date: 2022    Physician Orders: PT Eval and Treat   Medical Diagnosis from Referral: G81.10 (ICD-10-CM) - Spastic hemiparesis  Evaluation Date: 2022  Authorization Period Expiration: 2022  Plan of Care Expiration: 2022  Visit # / Visits authorized:     FOTO visit #: 5   FOTO score: 59 (22); 60 (2022)        PTA Visit #: 0/5     Time In: 1200   Time Out: 1241  Total Billable Time: 41 minutes    Precautions: Standard and Fall       SUBJECTIVE     Pt reports: "I'm hanging in there." Patient reports her left arm is not "cooperating" as she wished it was. She states, "after I got Botox last time, it was much more relaxed."       She reports she still feels like her balance is the biggest problem with her mobility.      She was compliant with home exercise program.  Response to previous treatment: No complaints reported with regards to previous treatment session.   Functional change: ongoing    Pain: 0/10  Location:  Not Applicable      OBJECTIVE     Objective Measures updated at progress report unless specified.     Treatment     Cat received the treatments listed below:      therapeutic exercises to develop strength, endurance and range of motion for 16 minutes includin. NuStep level 3 x 10 minutes using Bilateral Lower Extremities only  2. Standing calf stretch over foam half roll, 3 x 30 seconds bilaterally   3. Patient completed the FOTO survey to assess her improvement in perceived functional abilities/limitations. Results as follows:     FOTO Neuromuscular Condition Survey:       Therapist reviewed FOTO scores for Cat Denson on 2022.   FOTO documents entered " "into EPIC - see Media section.    Functional Status Score: 60            therapeutic activities to improve functional performance for 15 minutes, includin. Stand to/from sit transfer to floor mat with variable supervision to modified independence using right upper extremity use for balance on edge of mat. Completed 2 full transfers.     2. Transition tall kneel to/from half kneel (alternating forward foot) x 6 repetitions with attempt to perform without right upper extremity support to challenge balance/core control     3. Patient engaged in the 6 Minute Walk Test with no assistive device and supervision from physical therapist, results as follows:    1336 feet     neuromuscular re-education activities to improve: Balance, Coordination, Proprioception and Posture for 10 minutes. The following activities were included:    1. Sit to stand from elevated edge of mat with right foot on 4 inch step for "forced use" of left lower extremity x 10 repetitions    2. Mini squats/"tap downs" to elevated edge of mat with right foot on 4 inch step for "forced use" of left lower extremity x 10 repetitions    3. Alternating taps to 4 inch step while standing in parallel bars with support from parallel bars nearby as needed for optimal safety, 2 x 20 repetitions         Patient Education and Home Exercises     Home Exercises Provided and Patient Education Provided     Education provided:   - Patient provided with verbal and demonstrative instruction for all activities performed in today's session.    Written Home Exercises Provided: Patient instructed to cont prior HEP. Exercises were reviewed and Cat was able to demonstrate them prior to the end of the session.  Cat demonstrated fair  understanding of the education provided. See EMR under Patient Instructions for exercises provided during therapy sessions    ASSESSMENT     Cat provided good participation and effort during today's session with focus on lower " extremity strengthening, endurance, standing balance and neuromuscular re-education activities for left lower extremity. She verbalized challenge with performance of NuStep at level 3; therefore, physical therapist decreased resistance to level 1 following 7 minutes of performance. Demonstrated improved distance on the 6 Minute Walk Test from last assessment (2022); however, decreased speed and intermittent left foot drag was observed in final 2 minute of assessment due to onset of fatigue. Patient's current plan of care will  this week. Plan for formal reassessment of progress towards goals in next session to determine readiness for discharge.           Cat Is progressing towards her goals.   Pt prognosis is Fair.     Pt will continue to benefit from skilled outpatient physical therapy to address the deficits listed in the problem list box on initial evaluation, provide pt/family education and to maximize pt's level of independence in the home and community environment.     Pt's spiritual, cultural and educational needs considered and pt agreeable to plan of care and goals.     Anticipated barriers to physical therapy: Chronic nature of impairments       Goals:     Short Term Goals: 3 weeks Date last assessed:  Status:    1. Patient will be provided with an individualized home exercise program. 2022      Met    2.  Patient will demonstrate improve endurance and activity tolerance as evidenced by ability to perform Nu-step x 15 minutes without rests breaks with heart rated post-activity equivalent to 50-80% of maximum heart rate.  2022 progressing   3. Patient will complete the FOTO survey to assess self-perception of functional mobility and related limitation.     2022 Met    4. Patient will complete the 6 Minute Walk Test to further evaluate her endurance and efficiency with community level gait. (Minimal detectable change  for stroke = 112 feet) 2022 Met   (1,202 feet 22)             Long Term Goals: 6 weeks  Date last assessed: Status:    1. Patient will be independent and compliant with updated home exercise program. 8/1/2022      Met/ongoing    2. Patient will increased lower extremity  strength as evidenced by increase in manual muscle test by 1/2 grade as appropriate to decrease gait deviations consistently.  6/21/2022    ongoing      3. The patient will be provided with any equipment and/or orthotic evaluations and recommendations as indicated on an ongoing basis prior to discharge from therapy.  6/21/2022 ongoing   4. The patient will improve her score on the Functional Gait Assessment (FGA) from 16/30 to 20/30, indicating improved safety and (I) with dynamic,comunity level gait. (Minimal detectable change for stroke= 4.2 points)     6/21/2022 ongoing   5. The patient will perform a stand to/from floor transfer with upper extremity support as needed at a modified independent level to demonstrate improved strength, balance, coordination needed for advanced transfers and higher level functional mobility.  8/1/2022   Met         PLAN     Plan of care Certification: 6/21/2022 to 8/5/2022.     Outpatient Physical Therapy 2 times weekly for 6 weeks to include the following interventions: Electrical Stimulation (as indicated and cleared for contraindication) , Gait Training,  Manual Therapy, Moist Heat/ Ice, Neuromuscular Re-ed, Orthotic Management and Training, Patient Education, Self Care and Therapeutic Exercise.     Recommendations for next treatment session: reassessment for plan of care in next treatment session          NENITA RAYMOND, PT

## 2022-08-01 NOTE — PROGRESS NOTES
"[facility]  Occupational Therapy Treatment Note    Date: 8/1/2022  Name: Cat eDnson  Clinic Number: [id number]    Therapy Diagnosis:   Encounter Diagnoses   Name Primary?    Impaired function of upper extremity Yes    Left hemiparesis     Spasticity as late effect of cerebrovascular accident (CVA)      Physician: Romana Weiss,*    Physician Orders: OT eval and treat   Medical Diagnosis: Cerebrovascular accident   Evaluation Date: 6/29/2022  Insurance Authorization Period Expiration: 12/31/2022  Plan of Care Expiration: 8/10/2022  Progress Note Due: 7/29/2022  Date of Return to MD: to be determined   Visit # / Visits authorized: 3 / 25 (plus eval)  FOTO: 55/100 on 7/20/2022. See objective.   Precautions:  Standard    Time In: 11:15 AM  Time Out: 12:00 PM  Total Billable Time: 45 minutes    SUBJECTIVE     Pt reports: "I brought the gutter splint today. My hand has not been cramping as much with stretches:      She was compliant with home exercise program given last session.     Response to previous treatment: No concerns   Functional change: None reported since eval    Pain: 0/10  Location: NA     OBJECTIVE     Objective Measures updated at progress report unless specified.    Treatment     Cat received the treatments listed below:     Manual therapy techniques: Joint mobilizations and Soft tissue Mobilization were applied for 0 minutes, including:     Therapeutic exercises to develop endurance, ROM and flexibility for 0 minutes, including:    Therapeutic activity to increase functional performance for 45 minutes including:     Adjusting ulnar gutter orthosis   -There was a piece of strapping in place of cushion which in return was causing discomfort. Cushion was added. She was instructed to wear the orthosis for 1 hour a day. She was instructed to look for redness, swelling, or blanching that does not go away and to terminate wearing until next session. "     Cat picked up pennies that were flush with the table using index/long finger and thumb   -She had to focus to flex index thumb but was able to slowly lift the pennies and place them in a slot (15 pennies)    -She missed the slot 4 times when placing the pennies     For increased functional grasp, 6 cones were placed to the left of Cat. She was to grasp them with a C grasp, lift them up and over to the opposite side of the table. Each time she completed a set, the table was moved farther away from her to increase elbow extension.    -4 sets    -increased left upper extremity fatigue   -using shoulder compensation when left arm activities   -instructed to be aware of winging when completing the activity.     Food cutting simulation    -using a built up handled knife and yellow theraputty, Cat was to use her left hand to cut pieces of theraputty. She required a visual demonstration and mod verbal cues for hand placement on knife.    -increased left arm fatigue and winging    -right hand of table for stabilization     Neuromuscular re-education activities to improve Coordination, Kinesthetic and Proprioception for 0 minutes. The following activities were included:  NA     Patient Education and Home Exercises      Education provided:   - Stretching and weight-bearing on the left hand for increased stretch  - Progress towards goals     Written Home Exercises Provided: yes.  Exercises were reviewed and Cat was able to demonstrate them prior to the end of the session.  Cat demonstrated good  understanding of the HEP provided. See EMR under Patient Instructions for exercises provided during therapy sessions.       Assessment     Cat required cues to limit shoulder and elbow compensatory movements during all activities. Due to the compensatory movements her left upper extremity fatigued quickly. With the pennies, she demonstrated slight decreased accuracy with placement of pennies. When she  was accurate, it required several attempts for success. She did well moving the cones as she was able to place her hand in a more functional position (rather than using her finger to  the cones by putting it in the whole on top). This did require verbal cues for consistency. She is to wear the ulnar gutter orthosis and report back with wear ability.   Cat is progressing towards her goals and there are no updates to goals at this time. Pt prognosis is Fair.   Pt will continue to benefit from skilled outpatient occupational therapy to address the deficits listed in the problem list on initial evaluation provide pt/family education and to maximize pt's level of independence in the home and community environment.     Pt's spiritual, cultural and educational needs considered and pt agreeable to plan of care and goals.    Anticipated barriers to occupational therapy: relies on roommate for transportation.    Goals:    Short Term Goals: 3 weeks   1. Cat will bring hand to mouth (functional range of motion) with min difficulty for increased ability to drink out of a water bottle. (progressing 8/1/2022)   2. Cat will be (I) and compliant with home exercise program.  (progressing 8/1/2022)   3. Cat will be mod (I) with food cutting with adaptive equipment as needed for increased independence in feeding. (progressing 8/1/2022)      Long Term Goals: 6 weeks   1. Cat will clip right hand finger nails using left hand with (Mod I) (progressing 8/1/2022)   2. Cat will complete 9 hole peg test with left upper extremity under 1 minute 45 seconds.  (progressing 8/1/2022)   3. Cat will increase box and block score by 5 on left upper extremity.  (progressing 8/1/2022)      Additional functional goals to be added as pt progresses and per her priorities    PLAN     Certification Period/Plan of care expiration: 6/29/2022 to 8/10/2022     Outpatient Occupational Therapy 2 times weekly for 6 weeks to  include the following interventions: Manual Therapy, Neuromuscular Re-ed, Patient Education, Self Care, Therapeutic Activities and Therapeutic Exercise.    Updates/Grading for next session: stretching; ROM exercises for the left hand and wrist, AROM/PROM of left shoulder     Melinda De La Cruz, OT

## 2022-08-04 ENCOUNTER — CLINICAL SUPPORT (OUTPATIENT)
Dept: REHABILITATION | Facility: HOSPITAL | Age: 42
End: 2022-08-04
Attending: PHYSICAL MEDICINE & REHABILITATION
Payer: MEDICARE

## 2022-08-04 ENCOUNTER — CLINICAL SUPPORT (OUTPATIENT)
Dept: REHABILITATION | Facility: HOSPITAL | Age: 42
End: 2022-08-04
Payer: MEDICARE

## 2022-08-04 DIAGNOSIS — Z91.81 RISK FOR FALLS: ICD-10-CM

## 2022-08-04 DIAGNOSIS — G81.94 LEFT HEMIPARESIS: ICD-10-CM

## 2022-08-04 DIAGNOSIS — R68.89 IMPAIRED FUNCTION OF UPPER EXTREMITY: Primary | ICD-10-CM

## 2022-08-04 DIAGNOSIS — I69.398 SPASTICITY AS LATE EFFECT OF CEREBROVASCULAR ACCIDENT (CVA): ICD-10-CM

## 2022-08-04 DIAGNOSIS — R26.9 IMPAIRED GAIT: Primary | ICD-10-CM

## 2022-08-04 DIAGNOSIS — R25.2 SPASTICITY AS LATE EFFECT OF CEREBROVASCULAR ACCIDENT (CVA): ICD-10-CM

## 2022-08-04 PROCEDURE — 97530 THERAPEUTIC ACTIVITIES: CPT | Mod: PN

## 2022-08-04 PROCEDURE — 97110 THERAPEUTIC EXERCISES: CPT | Mod: PN

## 2022-08-04 NOTE — PROGRESS NOTES
"[facility]  Occupational Therapy Treatment Note    Date: 8/4/2022  Name: Cat Denson  Clinic Number: [id number]    Therapy Diagnosis:   Encounter Diagnoses   Name Primary?    Impaired function of upper extremity Yes    Left hemiparesis     Spasticity as late effect of cerebrovascular accident (CVA)      Physician: Romana Weiss,*    Physician Orders: OT eval and treat   Medical Diagnosis: Cerebrovascular accident   Evaluation Date: 6/29/2022  Insurance Authorization Period Expiration: 12/31/2022  Plan of Care Expiration: 8/10/2022  Progress Note Due: 7/29/2022  Date of Return to MD: to be determined   Visit # / Visits authorized: 4 / 25 (plus eval)  FOTO: 55/100 on 7/20/2022.  Precautions:  Standard    Time In: 1:30 PM  Time Out: 2:15 PM  Total Billable Time: 45 minutes    SUBJECTIVE     Pt reports: " I forgot to wear my splint this past week."     She was inconsistently compliant with home exercise program given.    Response to previous treatment: No concerns   Functional change: None reported since eval    Pain: 3 / 10  Location: forearm     OBJECTIVE     Objective Measures updated at progress report unless specified.    Treatment     Cat received the treatments listed below:     Manual therapy techniques: Joint mobilizations and Soft tissue Mobilization were applied for 0 minutes, including:     Therapeutic exercises to develop endurance, ROM and flexibility for 25  minutes, including:    Gentle prolonged stretching of soft-tissues throughout the left shoulder and elbow to decrease tone.    -Shoulder flexion (1x5) with 5 second hold at end range (with therapist help). She then completed (1x5) on her own with 5 second hold at end range. She felt like her arm was going to "drop" as in couldn't hold the movement. She was able to hold the movement    -Elbow extension and flexion (1x5)    Gentle prolonged mobilization and stretching involving the left upper " extremity phalanges, metacarpals, carpals, radius and ulna  Before donning paddle.     Placed flat paddle on pt's left hand for duration of treatment to inhibit flexor tone and provide LLPS to long finger flexors.     Wrist extension stretch (1x5 with 10 second hold)      Therapeutic activity to increase functional performance for 20 minutes including:     Weight bearing on left upper extremity (with elbow straight) to increase proprioceptive input to arm. She was able to complete a fine motor activity with right arm (including reaching to the left side) while weight bearing on left arm.    -Experienced forearm pain from stretch    -Required rest breaks during task     Able to drink out of her water bottle 3x with no spillage.   -Demostrates an awkward posture as she has to sit up very straight and move her head with the bottle. Minimal wrist movement. Keeps left upper extremity touching body.     Neuromuscular re-education activities to improve Coordination, Kinesthetic and Proprioception for 0 minutes. The following activities were included:  NA     Patient Education and Home Exercises      Education provided:   - Stretching and weight-bearing on the left hand for increased stretch  - Progress towards goals     Written Home Exercises Provided: yes.  Exercises were reviewed and Cat was able to demonstrate them prior to the end of the session.  Cat demonstrated good  understanding of the HEP provided. See EMR under Patient Instructions for exercises provided during therapy sessions.       Assessment     Cat did well today during her session. She was able to weight bear on her left arm (which is usually in a guarded position). She did experience arm fatigue during the weightbearing and needed rest breaks.The stretching of her left upper extremity did not cause pain, but she didn't think she had the strength to keep movement at end range (she was able to). She was instructed to use an aid to help her  remember to wear her ulnar gutter. She agreed to set a reminder for each day. She would benefit from continued paddle use, stretching, and weight bearing to maximize available function.     Cat is progressing towards her goals and there are no updates to goals at this time. Pt prognosis is Fair.   Pt will continue to benefit from skilled outpatient occupational therapy to address the deficits listed in the problem list on initial evaluation provide pt/family education and to maximize pt's level of independence in the home and community environment.     Pt's spiritual, cultural and educational needs considered and pt agreeable to plan of care and goals.    Anticipated barriers to occupational therapy: relies on roommate for transportation.    Goals:    Short Term Goals: 3 weeks   1. Cat will bring hand to mouth (functional range of motion) with min difficulty for increased ability to drink out of a water bottle. (progressing 8/4/2022)   2. Cat will be (I) and compliant with home exercise program.  (progressing 8/4/2022)   3. Cat will be mod (I) with food cutting with adaptive equipment as needed for increased independence in feeding. (progressing 8/4/2022)      Long Term Goals: 6 weeks   1. Cat will clip right hand finger nails using left hand with (Mod I) (progressing 8/4/2022)   2. Cat will complete 9 hole peg test with left upper extremity under 1 minute 45 seconds.  (progressing 8/4/2022)   3. Cat will increase box and block score by 5 on left upper extremity.  (progressing 8/4/2022)      Additional functional goals to be added as pt progresses and per her priorities    PLAN     Certification Period/Plan of care expiration: 6/29/2022 to 8/10/2022     Outpatient Occupational Therapy 2 times weekly for 6 weeks to include the following interventions: Manual Therapy, Neuromuscular Re-ed, Patient Education, Self Care, Therapeutic Activities and Therapeutic  Exercise.    Updates/Grading for next session: stretching; ROM exercises for the left hand and wrist, AROM/PROM of left shoulder     Melinda De La Cruz, OT

## 2022-08-04 NOTE — PROGRESS NOTES
"[facility]  Physical Therapy Discharge Summary    Name: Cat Denson  Clinic Number: [id number]    Therapy Diagnosis:   Encounter Diagnoses   Name Primary?    Impaired gait Yes    Left hemiparesis     Risk for falls      Physician: Romana Weiss,*    Visit Date: 2022       Physician Orders: PT Eval and Treat   Medical Diagnosis from Referral: G81.10 (ICD-10-CM) - Spastic hemiparesis  Evaluation Date: 2022  Authorization Period Expiration: 2022  Plan of Care Expiration: 2022  Visit # / Visits authorized:      FOTO visit #: 5  FOTO score: 59 (22); 60 (2022)          PTA Visit #: 0/5      Time In: 1202   Time Out: 1243  Total Billable Time: 41 minutes     Precautions: Standard and Fall     SUBJECTIVE     Pt reports: "Man, after last time, I was sore. You worked me hard." When discussing plan for further therapy, patient reports she feels ready for discharge from physical therapy.       She was compliant with home exercise program.  Response to previous treatment: No complaints reported with regards to previous treatment session.   Functional change: ongoing     Pain: 0/10  Location:  Not Applicable     OBJECTIVE       Cat received therapeutic exercises to develop strength, endurance and ROM for 26 minutes includin. SciFit level 1 x 10 minutes using bilateral lower extremities only. Verbal cues for full left knee extension. Heart rate post-activity found to be 160bpm.     2. Reassessment of lower extremity strength, with results as follows:     Strength:       Right Lower Extremity 2022   Left Lower Extremity 2022     Hip Flexion 4+/5 5/5 Hip Flexion 3+/5 3+/5   Hip Extension 3/5 3/5 Hip Extension 3/5 3/5   Hip Abduction 4/5 4/5 Hip Abduction 3+/5 3+/5   Knee Flexion 3+/5 4/5 Knee Flexion 3+/5 3+/5   Knee Extension 5/5 5/5 Knee Extension 4/5 4+/5   Ankle Dorsiflexion 5/5 5/5 Ankle Dorsiflexion 3-/5 " 3/5   Ankle Plantarflexion 5/5 5/5 Ankle Plantarflexion 2/5 2/5       3. Patient performed the following exercises on therapy mat:    Side-lying hip abduction with left lower extremity, 2 x 10 repetitions   Bridges from hook-lying (with feet staggered), 2 x 10 repetitions   Hip/knee flexion/extension in supine with left lower extremity, 2 x 10 repetitions   Pelvic tilts in hooklying, 2 x 10 repetitions     Cat participated in therapeutic activities to assess/improve functional performance for 15  minutes, includin. Participation in standardized outcome measures including:     Functional Gait Assessment:   1. Gait on level surface =  3  (3) Normal: less than 5.5 sec, no A.D., no imbalance, normal gait pattern, deviates< 6in  (2) Mild impairment: 7-5.6 sec, uses A.D., mild gait deviations, or deviates 6-10 in  (1) Moderate impairment: > 7 sec, slow speed, imbalance, deviates 10-15 in.  (0) Severe impairment: needs assist, deviates >15 in, reach/touch wall  2. Change in Gait Speed = 3  (3) Normal: smooth change w/o loss of balance or gait deviation, deviates < 6 in, significant difference between speeds  (2) Mild impairment: changes speed, but demonstrates mild gait deviations, deviates 6-10 in, OR no deviations but unable to significantly speed, OR uses A.D.  (1) Moderate impairment: minor changes to speed, OR changes speed w/ significant deviations, deviates 10-15 in, OR  Changes speed , but loses balance & recovers  (0) Severe impairment: cannot change speed, deviates >15 in, or loses balance & needs assist  3. Gait with horizontal head turns  = 2  (3) Normal: no change in gait, deviates <6 in  (2) Mild impairment: slight change in speed, deviates 6-10 in, OR uses A.D.  (1) Moderate impairment: moderate change in speed, deviates 10-15 in  (0) Severe impairment: severe disruption of gait, deviates >15in  4. Gait with vertical head turns = 2  (3) Normal: no change in gait, deviates <6 in  (2) Mild  impairment: slight change in speed, deviates 6-10 in OR uses A.D.  (1) Moderate impairment: moderate change in speed, deviates 10-15 in  (0) Severe impairment: severe disruption of gait, deviates >15 in  5. Gait with pivot turns = 3  (3) Normal: performs safely in 3 sec, no LOB  (2) Mild impairment: performs in >3 sec & no LOB, OR turns safely & requires several steps to regain LOB  (1) Moderate impairment: turns slow, OR requires several small steps for balance following turn & stop  (0) Severe impairment: cannot turn safely, needs assist  6. Step over obstacle = 2  (3) Normal: steps over 2 stacked boxes w/o change in speed or LOB  (2) Mild impairment: able to step over 1 box w/o change in speed or LOB  (1) Moderate impairment: steps over 1 box but must slow down, may require VC  (0) Severe impairment: cannot perform w/o assist  7. Gait with Narrow AZIZA = 0  (3) Normal: 10 steps no staggering  (2) Mild impairment: 7-9 steps  (1) Moderate impairment: 4-7 steps  (0) Severe impairment: < 4 steps or cannot perform w/o assist  8. Gait with eyes closed = 2  (3) Normal: < 7 sec, no A.D., no LOB, normal gait pattern, deviates <6 in  (2) Mild impairment: 7.1-9 sec, mild gait deviations, deviates 6-10 in  (1) Moderate impairment: > 9 sec, abnormal pattern, LOB, deviates 10-15 in  (0) Severe impairment: cannot perform w/o assist, LOB, deviates >15in  9. Ambulating Backwards = 1  (3) Normal: no A.D., no LOB, normal gait pattern, deviates <6in  (2) Mild impairment: uses A.D., slower speed, mild gait deviations, deviates 6-10 in  (1) Moderate impairment: slow speed, abnormal gait pattern, LOB, deviates 10-15 in  (0) Severe impairment: severe gait deviations or LOB, deviates >15in  10. Steps = 1  (3) Normal: alternating feet, no rail  (2) Mild Impairment: alternating feet, uses rail  (1) Moderate impairment: step-to, uses rail  (0) Severe impairment: cannot perform safely    Score 19/30     Score:   <22/30 fall risk   <20/30 fall  "risk in older adults   <18/30 fall risk in Parkinsons               Evaluation 8/4/2022     Timed Up and Go 7.5 sec  < 20 sec safe for independent transfers, < 30 sec safe for dependent transfers/assist required not tested   10 Meter Walk Test 1.2 m/sec (6m/5s)  "community ambulator" speed category 1.3 m/sec (6m/4.6s)  "community ambulator" speed category   Functional Gait Assessment  16/30 19/30          ASSESSMENT     Cat provided good participation and effort during session today with focus on reassessment of progress towards goals to determine readiness for discharge. She has met 3/4 short term goals and 3/5 long term goals since start of therapy. She has demonstrated good progress towards remaining goals including improvements in lower extremity strength and an increased in her score on the Functional Gait Assessment. She has been provided with a comprehensive home exercise program, including new exercises provided today. (See "patient instructions" section for specifics). physical therapist feels that patient is appropriate for discharge from outpatient physical therapy at this time. Patient verbalized agreement with this plan. She will continue with occupational therapy.         GOALS       Short Term Goals: 3 weeks Date last assessed:  Status:    1. Patient will be provided with an individualized home exercise program. 8/1/2022       Met    2.  Patient will demonstrate improve endurance and activity tolerance as evidenced by ability to perform Nu-step x 15 minutes without rests breaks with heart rated post-activity equivalent to 50-80% of maximum heart rate.  8/4/2022   Partially met    3. Patient will complete the FOTO survey to assess self-perception of functional mobility and related limitation.     6/27/2022 Met    4. Patient will complete the 6 Minute Walk Test to further evaluate her endurance and efficiency with community level gait. (Minimal detectable change  for stroke = 112 feet) 6/27/2022 " Met   (1,202 feet 6/27/22)            Long Term Goals: 6 weeks  Date last assessed: Status:    1. Patient will be independent and compliant with updated home exercise program. 8/1/2022       Met   2. Patient will increased lower extremity  strength as evidenced by increase in manual muscle test by 1/2 grade as appropriate to decrease gait deviations consistently.  8/4/2022      Partially met       3. The patient will be provided with any equipment and/or orthotic evaluations and recommendations as indicated on an ongoing basis prior to discharge from therapy.  8/4/2022   Met   4. The patient will improve her score on the Functional Gait Assessment (FGA) from 16/30 to 20/30, indicating improved safety and (I) with dynamic,comunity level gait. (Minimal detectable change for stroke= 4.2 points)     8/4/2022   Partially met    5. The patient will perform a stand to/from floor transfer with upper extremity support as needed at a modified independent level to demonstrate improved strength, balance, coordination needed for advanced transfers and higher level functional mobility.  8/1/2022    Met           PLAN     Patient to be discharged from outpatient physical therapy this date.     NENITA RAYMOND, PT

## 2022-08-08 DIAGNOSIS — M79.2 NEUROPATHIC PAIN: ICD-10-CM

## 2022-08-08 RX ORDER — GABAPENTIN 800 MG/1
TABLET ORAL
Qty: 180 TABLET | Refills: 3 | Status: CANCELLED | OUTPATIENT
Start: 2022-08-08

## 2022-08-08 NOTE — TELEPHONE ENCOUNTER
No new care gaps identified.  [facility] Embedded Care Gaps. Reference number: [id number]. 8/08/2022   12:23:09 PM CDT

## 2022-09-09 ENCOUNTER — TELEPHONE (OUTPATIENT)
Dept: PSYCHIATRY | Facility: CLINIC | Age: 42
End: 2022-09-09
Payer: MEDICARE

## 2022-09-09 NOTE — TELEPHONE ENCOUNTER
Called patient to move the appointment on 9/19/22 due to provider having a meeting. No answer, left voice message,  sent my chart message

## 2022-09-16 ENCOUNTER — PATIENT MESSAGE (OUTPATIENT)
Dept: PSYCHIATRY | Facility: CLINIC | Age: 42
End: 2022-09-16
Payer: MEDICARE

## 2022-09-16 ENCOUNTER — TELEPHONE (OUTPATIENT)
Dept: PSYCHIATRY | Facility: CLINIC | Age: 42
End: 2022-09-16
Payer: MEDICARE

## 2022-09-16 NOTE — TELEPHONE ENCOUNTER
Called patient to let her know we needed to cancel her 09/19/2022 appointment @ 1030 AM she did not answer left a message stating that we needed to cancel appointment with [facility] to please call office so we can get this rescheduled.  Also sent my chart message letting her know that was canceling appointment and that we have tried by phone and my chart to let her know and speak to her. Please call office so we can get this appointment rescheduled

## 2022-10-06 ENCOUNTER — OFFICE VISIT (OUTPATIENT)
Dept: CARDIOLOGY | Facility: CLINIC | Age: 42
End: 2022-10-06
Payer: MEDICARE

## 2022-10-06 VITALS
HEIGHT: 63 IN | BODY MASS INDEX: 17.81 KG/M2 | HEART RATE: 81 BPM | DIASTOLIC BLOOD PRESSURE: 82 MMHG | SYSTOLIC BLOOD PRESSURE: 123 MMHG | WEIGHT: 100.5 LBS

## 2022-10-06 DIAGNOSIS — F31.75 BIPOLAR 1 DISORDER, DEPRESSED, PARTIAL REMISSION: ICD-10-CM

## 2022-10-06 DIAGNOSIS — Z72.0 TOBACCO USE: ICD-10-CM

## 2022-10-06 DIAGNOSIS — Z86.73 HISTORY OF RIGHT MCA STROKE: Primary | Chronic | ICD-10-CM

## 2022-10-06 DIAGNOSIS — R68.89 IMPAIRED FUNCTION OF UPPER EXTREMITY: ICD-10-CM

## 2022-10-06 DIAGNOSIS — F12.20 CANNABIS USE DISORDER, MODERATE, DEPENDENCE: ICD-10-CM

## 2022-10-06 DIAGNOSIS — E78.2 MIXED HYPERLIPIDEMIA: ICD-10-CM

## 2022-10-06 DIAGNOSIS — R00.0 TACHYCARDIA: Primary | ICD-10-CM

## 2022-10-06 DIAGNOSIS — G43.409 HEMIPLEGIC MIGRAINE WITHOUT STATUS MIGRAINOSUS, NOT INTRACTABLE: ICD-10-CM

## 2022-10-06 DIAGNOSIS — I69.354 HEMIPARESIS AFFECTING LEFT SIDE AS LATE EFFECT OF CEREBROVASCULAR ACCIDENT: ICD-10-CM

## 2022-10-06 DIAGNOSIS — G81.94 LEFT HEMIPARESIS: ICD-10-CM

## 2022-10-06 DIAGNOSIS — R51.9 CHRONIC DAILY HEADACHE: Chronic | ICD-10-CM

## 2022-10-06 DIAGNOSIS — J45.909 CHRONIC ASTHMA WITHOUT COMPLICATION, UNSPECIFIED ASTHMA SEVERITY, UNSPECIFIED WHETHER PERSISTENT: ICD-10-CM

## 2022-10-06 DIAGNOSIS — T42.6X2A: ICD-10-CM

## 2022-10-06 DIAGNOSIS — R00.0 TACHYCARDIA: ICD-10-CM

## 2022-10-06 PROCEDURE — 93010 EKG 12-LEAD: ICD-10-PCS | Mod: S$GLB,,, | Performed by: INTERNAL MEDICINE

## 2022-10-06 PROCEDURE — 99204 PR OFFICE/OUTPT VISIT, NEW, LEVL IV, 45-59 MIN: ICD-10-PCS | Mod: S$GLB,,, | Performed by: INTERNAL MEDICINE

## 2022-10-06 PROCEDURE — 3079F PR MOST RECENT DIASTOLIC BLOOD PRESSURE 80-89 MM HG: ICD-10-PCS | Mod: CPTII,S$GLB,, | Performed by: INTERNAL MEDICINE

## 2022-10-06 PROCEDURE — 3079F DIAST BP 80-89 MM HG: CPT | Mod: CPTII,S$GLB,, | Performed by: INTERNAL MEDICINE

## 2022-10-06 PROCEDURE — 1160F RVW MEDS BY RX/DR IN RCRD: CPT | Mod: CPTII,S$GLB,, | Performed by: INTERNAL MEDICINE

## 2022-10-06 PROCEDURE — 3074F PR MOST RECENT SYSTOLIC BLOOD PRESSURE < 130 MM HG: ICD-10-PCS | Mod: CPTII,S$GLB,, | Performed by: INTERNAL MEDICINE

## 2022-10-06 PROCEDURE — 99999 PR PBB SHADOW E&M-EST. PATIENT-LVL III: ICD-10-PCS | Mod: PBBFAC,,, | Performed by: INTERNAL MEDICINE

## 2022-10-06 PROCEDURE — 3008F BODY MASS INDEX DOCD: CPT | Mod: CPTII,S$GLB,, | Performed by: INTERNAL MEDICINE

## 2022-10-06 PROCEDURE — 3074F SYST BP LT 130 MM HG: CPT | Mod: CPTII,S$GLB,, | Performed by: INTERNAL MEDICINE

## 2022-10-06 PROCEDURE — 99999 PR PBB SHADOW E&M-EST. PATIENT-LVL III: CPT | Mod: PBBFAC,,, | Performed by: INTERNAL MEDICINE

## 2022-10-06 PROCEDURE — 1160F PR REVIEW ALL MEDS BY PRESCRIBER/CLIN PHARMACIST DOCUMENTED: ICD-10-PCS | Mod: CPTII,S$GLB,, | Performed by: INTERNAL MEDICINE

## 2022-10-06 PROCEDURE — 93010 ELECTROCARDIOGRAM REPORT: CPT | Mod: S$GLB,,, | Performed by: INTERNAL MEDICINE

## 2022-10-06 PROCEDURE — 3044F PR MOST RECENT HEMOGLOBIN A1C LEVEL <7.0%: ICD-10-PCS | Mod: CPTII,S$GLB,, | Performed by: INTERNAL MEDICINE

## 2022-10-06 PROCEDURE — 1159F PR MEDICATION LIST DOCUMENTED IN MEDICAL RECORD: ICD-10-PCS | Mod: CPTII,S$GLB,, | Performed by: INTERNAL MEDICINE

## 2022-10-06 PROCEDURE — 3044F HG A1C LEVEL LT 7.0%: CPT | Mod: CPTII,S$GLB,, | Performed by: INTERNAL MEDICINE

## 2022-10-06 PROCEDURE — 1159F MED LIST DOCD IN RCRD: CPT | Mod: CPTII,S$GLB,, | Performed by: INTERNAL MEDICINE

## 2022-10-06 PROCEDURE — 3008F PR BODY MASS INDEX (BMI) DOCUMENTED: ICD-10-PCS | Mod: CPTII,S$GLB,, | Performed by: INTERNAL MEDICINE

## 2022-10-06 PROCEDURE — 93005 ELECTROCARDIOGRAM TRACING: CPT | Mod: PO

## 2022-10-06 PROCEDURE — 99204 OFFICE O/P NEW MOD 45 MIN: CPT | Mod: S$GLB,,, | Performed by: INTERNAL MEDICINE

## 2022-10-06 RX ORDER — LIDOCAINE AND PRILOCAINE 25; 25 MG/G; MG/G
CREAM TOPICAL
COMMUNITY
Start: 2022-05-29 | End: 2023-10-03

## 2022-10-06 NOTE — PROGRESS NOTES
Subjective:    Patient ID:  Cat Denson is a 42 y.o. female patient here for evaluation Irregular Heart Beat      History of Present Illness:  New patient cardiac evaluation.  Tachy cardia.  Patient's history of middle cerebral artery occlusion CVA 2015, suspect cardioembolic source.  Patient has been on chronic oral anticoagulation since that time.  Residual left hemiparesis.  Question past history of a fib.  No recent echo or noninvasive cardiac assessment.  Stable dyspnea.  No angina.  No syncope/presyncope.  No PND orthopnea.  No ankle edema.     Ongoing tobacco use.    Other concomitant medical problems include history of lupus, in remission, suspected drug-induced liver damage, stable.  Past history of acute kidney injury resolved.      Review of patient's allergies indicates:   Allergen Reactions    Metoclopramide hcl Anaphylaxis     Involuntary mouth movements     Amoxicillin-pot clavulanate      2 other medications    Amoxil [amoxicillin]      hives    Avelox [moxifloxacin]      itching    Benadryl [diphenhydramine hcl]      Lowers seizure threshold     Coumadin [warfarin]      toxicity    Quinazolinones      Lowers seizure threshold     Ultram [tramadol]      Lowers seizure threshold     Wellbutrin [bupropion hcl]      Lowers seizure threshold        Past Medical History:   Diagnosis Date    Acid reflux     Allergy     Anemia     Anticoagulated 12/29/2015    Anxiety     Asthma     Bipolar 1 disorder     Bronchospasm with bronchitis, acute     Chronic daily headache 9/11/2018    Chronic kidney disease     Chronic pain     Depression     bipolar 2    Fibromyalgia     Gastroparesis     History of right MCA stroke 11/25/2015    Hx of psychiatric care     Lactose intolerance     Lupus     Mixed hyperlipidemia 11/28/2018    Psychiatric problem     Renal tubular acidosis     Seizure     Sjogren's syndrome     Smoker     Stroke 11-    Substance abuse-kratom daily use 8/30/2020    Suicide attempt      overdosed on a bottle of paxil, muscle relaxers, & zoloft    Therapy      Past Surgical History:   Procedure Laterality Date    CHOLECYSTECTOMY      COLONOSCOPY  11/12/2014    Dr. Kimble, repeat at 52 years old for screening    ESOPHAGOGASTRODUODENOSCOPY N/A 1/3/2022    Procedure: EGD (ESOPHAGOGASTRODUODENOSCOPY);  Surgeon: Scout Kimble MD;  Location: [facility];  Service: Endoscopy;  Laterality: N/A;    FLUOROSCOPIC URODYNAMIC STUDY N/A 7/23/2019    Procedure: URODYNAMIC STUDY, FLUOROSCOPIC;  Surgeon: Vanna Martinez MD;  [facility] [facility] [address] [place] [address];  Service: Urology;  Laterality: N/A;  1 hour    UPPER GASTROINTESTINAL ENDOSCOPY  03/03/2017    Dr. Harris     Social History     Tobacco Use    Smoking status: Every Day     Packs/day: 1.00     Years: 15.00     Pack years: 15.00     Types: Cigarettes     Start date: 11/25/2015    Smokeless tobacco: Never   Substance Use Topics    Alcohol use: No     Alcohol/week: 0.0 standard drinks    Drug use: Yes     Types: Marijuana     Comment: smokes occasionally, helps with pain and appetite        Review of Systems:    As noted in HPI in addition         REVIEW OF SYSTEMS  Review of Systems   Constitutional: Negative for decreased appetite, diaphoresis, night sweats, weight gain and weight loss.   HENT:  Negative for nosebleeds and odynophagia.    Eyes:  Negative for double vision and photophobia.   Cardiovascular:  Positive for palpitations. Negative for chest pain, claudication, cyanosis, dyspnea on exertion, irregular heartbeat, leg swelling, near-syncope, orthopnea, paroxysmal nocturnal dyspnea and syncope.   Respiratory:  Positive for shortness of breath. Negative for cough, hemoptysis and wheezing.    Hematologic/Lymphatic: Negative for adenopathy.   Skin:  Negative for flushing, skin cancer and suspicious lesions.   Musculoskeletal:  Negative for gout, myalgias and neck pain.   Gastrointestinal:  Negative for abdominal pain, heartburn, hematemesis and  hematochezia.   Genitourinary:  Negative for bladder incontinence, hesitancy and nocturia.   Neurological:  Negative for focal weakness, headaches, light-headedness and paresthesias.   Psychiatric/Behavioral:  Negative for memory loss and substance abuse.      Objective:        Vitals:    10/06/22 1057   BP: 123/82   Pulse: 81       Lab Results   Component Value Date    WBC 5.63 04/13/2022    HGB 13.5 04/13/2022    HCT 42.3 04/13/2022     04/13/2022    CHOL 161 02/01/2022    TRIG 78 02/01/2022    HDL 44 02/01/2022    ALT 17 04/13/2022    AST 19 04/13/2022     04/13/2022     04/13/2022    K 4.2 04/13/2022    K 4.2 04/13/2022     04/13/2022     04/13/2022    CREATININE 1.7 (H) 04/13/2022    CREATININE 1.7 (H) 04/13/2022    BUN 14 04/13/2022    BUN 14 04/13/2022    CO2 21 (L) 04/13/2022    CO2 21 (L) 04/13/2022    TSH 0.830 10/22/2021    INR 1.0 09/18/2021    HGBA1C 5.6 02/01/2022      CARDIOGRAM RESULTS  No results found for this or any previous visit.        CURRENT/PREVIOUS VISIT EKG  Results for orders placed or performed during the hospital encounter of 09/18/21   ECG 12 lead    Collection Time: 09/18/21  5:03 PM    Narrative    Test Reason : I63.9,    Vent. Rate : 101 BPM     Atrial Rate : 101 BPM     P-R Int : 138 ms          QRS Dur : 092 ms      QT Int : 360 ms       P-R-T Axes : 082 068 065 degrees     QTc Int : 466 ms    Sinus tachycardia  Right atrial enlargement  Borderline Abnormal ECG  When compared with ECG of 11-DEC-2020 11:30,  No significant change was found  Confirmed by Renetta CROWLEY, Ramón BRIGGS ([id number]) on 9/20/2021 11:19:27 AM    Referred By: [email or web address]   SELF           Confirmed By:Ramón Jackson MD     No valid procedures specified.   No results found for this or any previous visit.    No valid procedures specified.    PHYSICAL EXAM  CONSTITUTIONAL: Well built, well nourished in no apparent distress  NECK: no carotid bruit, no JVD  LUNGS: CTA  CHEST WALL: no  tenderness,  HEART: regular rate and rhythm, S1, S2 normal, no murmur, click, rub or gallop   ABDOMEN: soft, non-tender; bowel sounds normal; no masses,  no organomegaly  EXTREMITIES: Extremities normal, no edema, no calf tenderness noted  VASCULAR EXAM: 2 PLUS UPPER AND LOWER EXT PULSES  NEURO: AAO X 3, NO ACUTE FOCAL OR LATERALIZING FINDINGS.  Left hemiparesis chronic.    I HAVE REVIEWED :    The vital signs, nurses notes, and all the pertinent radiology and labs.         Current Outpatient Medications   Medication Instructions    ARIPiprazole (ABILIFY) 15 mg, Oral, Daily    atorvastatin (LIPITOR) 10 mg, Oral, Daily    BOTOX 200 unit SolR No dose, route, or frequency recorded.    divalproex (DEPAKOTE) 250 MG EC tablet TAKE 1 TABLET BY MOUTH EVERY MORNING AND TAKE 2 TABLETS BY MOUTH EVERY NIGHT AT BEDTIME    famotidine (PEPCID) 40 MG tablet TAKE 1 TABLET(40 MG) BY MOUTH EVERY EVENING    gabapentin (NEURONTIN) 800 MG tablet TAKE 1 TABLET(800 MG) BY MOUTH TWICE DAILY    LIDOcaine-prilocaine (EMLA) cream APPLY TOPICALLY TO THE AFFECTED AREA AS NEEDED    mirtazapine (REMERON) 30 MG tablet TAKE 1 TABLET(30 MG) BY MOUTH EVERY EVENING    pantoprazole (PROTONIX) 40 MG tablet TAKE 1 TABLET(40 MG) BY MOUTH EVERY DAY    promethazine (PHENERGAN) 25 mg, Oral, Every 6 hours PRN    propranoloL (INDERAL LA) 80 mg, Oral, Daily    rivaroxaban (XARELTO) 20 mg Tab TAKE 1 TABLET(20 MG) BY MOUTH DAILY WITH THE EVENING MEAL AND DINNER    traZODone (DESYREL) 100 MG tablet TAKE 1 TO 2 TABLETS BY MOUTH EVERY NIGHT AS NEEDED FOR INSOMNIA          Assessment:   History of middle cerebral artery CVA.  Residual left hemiparesis.  Suspect cardioembolic source.  Patient on chronic oral anticoagulation.  History of seizure disorder on Depakote.  Tachycardia, possible paroxysmal atrial fibrillation diagnosed in the past.      Plan:   No recent noninvasive cardiac assessment.  Suggest 48 hour Holter monitor.  Echo.  Return clinic results.  No change  current medications.          No follow-ups on file.

## 2022-10-08 ENCOUNTER — HOSPITAL ENCOUNTER (OUTPATIENT)
Facility: HOSPITAL | Age: 42
Discharge: HOME | End: 2022-10-09
Attending: EMERGENCY MEDICINE | Admitting: HOSPITALIST
Payer: MEDICARE

## 2022-10-08 DIAGNOSIS — R53.1 GENERALIZED WEAKNESS: ICD-10-CM

## 2022-10-08 DIAGNOSIS — Z79.899 POLYPHARMACY: ICD-10-CM

## 2022-10-08 DIAGNOSIS — R53.83 LETHARGY: ICD-10-CM

## 2022-10-08 DIAGNOSIS — R07.9 CHEST PAIN: ICD-10-CM

## 2022-10-08 DIAGNOSIS — T50.905S TOXICITY, LATE EFFECT, DUE TO DRUG, MEDICINE, OR BIOLOGICAL SUBSTANCE: Primary | ICD-10-CM

## 2022-10-08 LAB
ALBUMIN SERPL BCP-MCNC: 3.8 G/DL (ref 3.5–5.2)
ALP SERPL-CCNC: 85 U/L (ref 55–135)
ALT SERPL W/O P-5'-P-CCNC: 17 U/L (ref 10–44)
AMPHET+METHAMPHET UR QL: NEGATIVE
ANION GAP SERPL CALC-SCNC: 12 MMOL/L (ref 8–16)
AST SERPL-CCNC: 21 U/L (ref 10–40)
BACTERIA #/AREA URNS HPF: ABNORMAL /HPF
BARBITURATES UR QL SCN>200 NG/ML: NEGATIVE
BASOPHILS # BLD AUTO: 0.03 K/UL (ref 0–0.2)
BASOPHILS NFR BLD: 0.6 % (ref 0–1.9)
BENZODIAZ UR QL SCN>200 NG/ML: NEGATIVE
BILIRUB SERPL-MCNC: 0.2 MG/DL (ref 0.1–1)
BILIRUB UR QL STRIP: NEGATIVE
BUN SERPL-MCNC: 12 MG/DL (ref 6–20)
BZE UR QL SCN: NEGATIVE
CALCIUM SERPL-MCNC: 8.7 MG/DL (ref 8.7–10.5)
CANNABINOIDS UR QL SCN: ABNORMAL
CHLORIDE SERPL-SCNC: 105 MMOL/L (ref 95–110)
CHOLEST SERPL-MCNC: 147 MG/DL (ref 120–199)
CHOLEST/HDLC SERPL: 4.3 {RATIO} (ref 2–5)
CK SERPL-CCNC: 20 U/L (ref 20–180)
CLARITY UR: CLEAR
CO2 SERPL-SCNC: 23 MMOL/L (ref 23–29)
COLOR UR: ABNORMAL
CREAT SERPL-MCNC: 1.6 MG/DL (ref 0.5–1.4)
CREAT UR-MCNC: 41.1 MG/DL (ref 15–325)
DIFFERENTIAL METHOD: ABNORMAL
EOSINOPHIL # BLD AUTO: 0.1 K/UL (ref 0–0.5)
EOSINOPHIL NFR BLD: 1.4 % (ref 0–8)
ERYTHROCYTE [DISTWIDTH] IN BLOOD BY AUTOMATED COUNT: 12.6 % (ref 11.5–14.5)
EST. GFR  (NO RACE VARIABLE): 42 ML/MIN/1.73 M^2
ESTIMATED AVG GLUCOSE: 94 MG/DL (ref 68–131)
ETHANOL SERPL-MCNC: <10 MG/DL
FERRITIN SERPL-MCNC: 46 NG/ML (ref 20–300)
GLUCOSE SERPL-MCNC: 81 MG/DL (ref 70–110)
GLUCOSE UR QL STRIP: NEGATIVE
HBA1C MFR BLD: 4.9 % (ref 4–5.6)
HCT VFR BLD AUTO: 37.4 % (ref 37–48.5)
HDLC SERPL-MCNC: 34 MG/DL (ref 40–75)
HDLC SERPL: 23.1 % (ref 20–50)
HGB BLD-MCNC: 12.8 G/DL (ref 12–16)
HGB UR QL STRIP: ABNORMAL
HYALINE CASTS #/AREA URNS LPF: 0 /LPF
IMM GRANULOCYTES # BLD AUTO: 0.02 K/UL (ref 0–0.04)
IMM GRANULOCYTES NFR BLD AUTO: 0.4 % (ref 0–0.5)
INR PPP: 1.2 (ref 0.8–1.2)
KETONES UR QL STRIP: NEGATIVE
LDLC SERPL CALC-MCNC: 86.2 MG/DL (ref 63–159)
LEUKOCYTE ESTERASE UR QL STRIP: ABNORMAL
LYMPHOCYTES # BLD AUTO: 2 K/UL (ref 1–4.8)
LYMPHOCYTES NFR BLD: 40.6 % (ref 18–48)
MCH RBC QN AUTO: 32.8 PG (ref 27–31)
MCHC RBC AUTO-ENTMCNC: 34.2 G/DL (ref 32–36)
MCV RBC AUTO: 96 FL (ref 82–98)
METHADONE UR QL SCN>300 NG/ML: NEGATIVE
MICROSCOPIC COMMENT: ABNORMAL
MONOCYTES # BLD AUTO: 0.3 K/UL (ref 0.3–1)
MONOCYTES NFR BLD: 6.4 % (ref 4–15)
NEUTROPHILS # BLD AUTO: 2.5 K/UL (ref 1.8–7.7)
NEUTROPHILS NFR BLD: 50.6 % (ref 38–73)
NITRITE UR QL STRIP: NEGATIVE
NONHDLC SERPL-MCNC: 113 MG/DL
NRBC BLD-RTO: 0 /100 WBC
OPIATES UR QL SCN: NEGATIVE
PCP UR QL SCN>25 NG/ML: NEGATIVE
PH UR STRIP: 8 [PH] (ref 5–8)
PLATELET # BLD AUTO: 206 K/UL (ref 150–450)
PMV BLD AUTO: 11.2 FL (ref 9.2–12.9)
POTASSIUM SERPL-SCNC: 4.9 MMOL/L (ref 3.5–5.1)
PROT SERPL-MCNC: 7.2 G/DL (ref 6–8.4)
PROT UR QL STRIP: ABNORMAL
PROTHROMBIN TIME: 12.5 SEC (ref 9–12.5)
RBC # BLD AUTO: 3.9 M/UL (ref 4–5.4)
RBC #/AREA URNS HPF: >100 /HPF (ref 0–4)
SODIUM SERPL-SCNC: 140 MMOL/L (ref 136–145)
SP GR UR STRIP: 1.01 (ref 1–1.03)
T4 FREE SERPL-MCNC: 0.85 NG/DL (ref 0.71–1.51)
TOXICOLOGY INFORMATION: ABNORMAL
TRIGL SERPL-MCNC: 134 MG/DL (ref 30–150)
TSH SERPL DL<=0.005 MIU/L-ACNC: 0.7 UIU/ML (ref 0.4–4)
URN SPEC COLLECT METH UR: ABNORMAL
UROBILINOGEN UR STRIP-ACNC: NEGATIVE EU/DL
VALPROATE SERPL-MCNC: 63.5 UG/ML (ref 50–100)
WBC # BLD AUTO: 4.85 K/UL (ref 3.9–12.7)
WBC #/AREA URNS HPF: 2 /HPF (ref 0–5)

## 2022-10-08 PROCEDURE — 84443 ASSAY THYROID STIM HORMONE: CPT | Performed by: EMERGENCY MEDICINE

## 2022-10-08 PROCEDURE — 82728 ASSAY OF FERRITIN: CPT | Performed by: HOSPITALIST

## 2022-10-08 PROCEDURE — 84466 ASSAY OF TRANSFERRIN: CPT | Performed by: HOSPITALIST

## 2022-10-08 PROCEDURE — 96361 HYDRATE IV INFUSION ADD-ON: CPT

## 2022-10-08 PROCEDURE — 82550 ASSAY OF CK (CPK): CPT | Performed by: HOSPITALIST

## 2022-10-08 PROCEDURE — 25000003 PHARM REV CODE 250: Performed by: HOSPITALIST

## 2022-10-08 PROCEDURE — G0378 HOSPITAL OBSERVATION PER HR: HCPCS

## 2022-10-08 PROCEDURE — 83036 HEMOGLOBIN GLYCOSYLATED A1C: CPT | Performed by: HOSPITALIST

## 2022-10-08 PROCEDURE — 93010 ELECTROCARDIOGRAM REPORT: CPT | Mod: ,,, | Performed by: INTERNAL MEDICINE

## 2022-10-08 PROCEDURE — 93005 ELECTROCARDIOGRAM TRACING: CPT

## 2022-10-08 PROCEDURE — 80164 ASSAY DIPROPYLACETIC ACD TOT: CPT | Performed by: EMERGENCY MEDICINE

## 2022-10-08 PROCEDURE — 36415 COLL VENOUS BLD VENIPUNCTURE: CPT | Performed by: EMERGENCY MEDICINE

## 2022-10-08 PROCEDURE — 82077 ASSAY SPEC XCP UR&BREATH IA: CPT | Performed by: EMERGENCY MEDICINE

## 2022-10-08 PROCEDURE — 93010 EKG 12-LEAD: ICD-10-PCS | Mod: ,,, | Performed by: INTERNAL MEDICINE

## 2022-10-08 PROCEDURE — 81000 URINALYSIS NONAUTO W/SCOPE: CPT | Mod: 59 | Performed by: EMERGENCY MEDICINE

## 2022-10-08 PROCEDURE — 80053 COMPREHEN METABOLIC PANEL: CPT | Performed by: EMERGENCY MEDICINE

## 2022-10-08 PROCEDURE — 84439 ASSAY OF FREE THYROXINE: CPT | Performed by: HOSPITALIST

## 2022-10-08 PROCEDURE — 80061 LIPID PANEL: CPT | Performed by: EMERGENCY MEDICINE

## 2022-10-08 PROCEDURE — 94761 N-INVAS EAR/PLS OXIMETRY MLT: CPT

## 2022-10-08 PROCEDURE — A9585 GADOBUTROL INJECTION: HCPCS | Performed by: EMERGENCY MEDICINE

## 2022-10-08 PROCEDURE — 85025 COMPLETE CBC W/AUTO DIFF WBC: CPT | Performed by: EMERGENCY MEDICINE

## 2022-10-08 PROCEDURE — 25500020 PHARM REV CODE 255: Performed by: EMERGENCY MEDICINE

## 2022-10-08 PROCEDURE — 99285 EMERGENCY DEPT VISIT HI MDM: CPT | Mod: 25

## 2022-10-08 PROCEDURE — 85610 PROTHROMBIN TIME: CPT | Performed by: EMERGENCY MEDICINE

## 2022-10-08 PROCEDURE — 80307 DRUG TEST PRSMV CHEM ANLYZR: CPT | Performed by: EMERGENCY MEDICINE

## 2022-10-08 PROCEDURE — 36415 COLL VENOUS BLD VENIPUNCTURE: CPT | Performed by: HOSPITALIST

## 2022-10-08 PROCEDURE — 96360 HYDRATION IV INFUSION INIT: CPT

## 2022-10-08 PROCEDURE — 25000003 PHARM REV CODE 250: Performed by: EMERGENCY MEDICINE

## 2022-10-08 RX ORDER — IBUPROFEN 200 MG
16 TABLET ORAL
Status: DISCONTINUED | OUTPATIENT
Start: 2022-10-08 | End: 2022-10-09 | Stop reason: HOSPADM

## 2022-10-08 RX ORDER — ACETAMINOPHEN 325 MG/1
650 TABLET ORAL EVERY 4 HOURS PRN
Status: DISCONTINUED | OUTPATIENT
Start: 2022-10-08 | End: 2022-10-09 | Stop reason: HOSPADM

## 2022-10-08 RX ORDER — IPRATROPIUM BROMIDE AND ALBUTEROL SULFATE 2.5; .5 MG/3ML; MG/3ML
3 SOLUTION RESPIRATORY (INHALATION) EVERY 6 HOURS PRN
Status: DISCONTINUED | OUTPATIENT
Start: 2022-10-08 | End: 2022-10-09 | Stop reason: HOSPADM

## 2022-10-08 RX ORDER — SODIUM,POTASSIUM PHOSPHATES 280-250MG
2 POWDER IN PACKET (EA) ORAL
Status: DISCONTINUED | OUTPATIENT
Start: 2022-10-08 | End: 2022-10-09 | Stop reason: HOSPADM

## 2022-10-08 RX ORDER — LANOLIN ALCOHOL/MO/W.PET/CERES
800 CREAM (GRAM) TOPICAL
Status: DISCONTINUED | OUTPATIENT
Start: 2022-10-08 | End: 2022-10-09 | Stop reason: HOSPADM

## 2022-10-08 RX ORDER — ARIPIPRAZOLE 5 MG/1
15 TABLET ORAL DAILY
Status: DISCONTINUED | OUTPATIENT
Start: 2022-10-09 | End: 2022-10-09 | Stop reason: HOSPADM

## 2022-10-08 RX ORDER — NALOXONE HCL 0.4 MG/ML
0.02 VIAL (ML) INJECTION
Status: DISCONTINUED | OUTPATIENT
Start: 2022-10-08 | End: 2022-10-09 | Stop reason: HOSPADM

## 2022-10-08 RX ORDER — MIRTAZAPINE 15 MG/1
15 TABLET, FILM COATED ORAL NIGHTLY
Status: DISCONTINUED | OUTPATIENT
Start: 2022-10-08 | End: 2022-10-09 | Stop reason: HOSPADM

## 2022-10-08 RX ORDER — ATORVASTATIN CALCIUM 10 MG/1
10 TABLET, FILM COATED ORAL DAILY
Status: DISCONTINUED | OUTPATIENT
Start: 2022-10-09 | End: 2022-10-09 | Stop reason: HOSPADM

## 2022-10-08 RX ORDER — GLUCAGON 1 MG
1 KIT INJECTION
Status: DISCONTINUED | OUTPATIENT
Start: 2022-10-08 | End: 2022-10-09 | Stop reason: HOSPADM

## 2022-10-08 RX ORDER — MAG HYDROX/ALUMINUM HYD/SIMETH 200-200-20
30 SUSPENSION, ORAL (FINAL DOSE FORM) ORAL 4 TIMES DAILY PRN
Status: DISCONTINUED | OUTPATIENT
Start: 2022-10-08 | End: 2022-10-09 | Stop reason: HOSPADM

## 2022-10-08 RX ORDER — PROPRANOLOL HYDROCHLORIDE 20 MG/1
40 TABLET ORAL 2 TIMES DAILY
Status: DISCONTINUED | OUTPATIENT
Start: 2022-10-08 | End: 2022-10-09 | Stop reason: HOSPADM

## 2022-10-08 RX ORDER — IBUPROFEN 200 MG
24 TABLET ORAL
Status: DISCONTINUED | OUTPATIENT
Start: 2022-10-08 | End: 2022-10-09 | Stop reason: HOSPADM

## 2022-10-08 RX ORDER — DIVALPROEX SODIUM 250 MG/1
250 TABLET, DELAYED RELEASE ORAL DAILY
Status: DISCONTINUED | OUTPATIENT
Start: 2022-10-09 | End: 2022-10-09 | Stop reason: HOSPADM

## 2022-10-08 RX ORDER — AMOXICILLIN 250 MG
1 CAPSULE ORAL 2 TIMES DAILY
Status: DISCONTINUED | OUTPATIENT
Start: 2022-10-08 | End: 2022-10-09 | Stop reason: HOSPADM

## 2022-10-08 RX ORDER — ACETAMINOPHEN 325 MG/1
650 TABLET ORAL EVERY 8 HOURS PRN
Status: DISCONTINUED | OUTPATIENT
Start: 2022-10-08 | End: 2022-10-09 | Stop reason: HOSPADM

## 2022-10-08 RX ORDER — GADOBUTROL 604.72 MG/ML
4 INJECTION INTRAVENOUS
Status: COMPLETED | OUTPATIENT
Start: 2022-10-08 | End: 2022-10-08

## 2022-10-08 RX ORDER — DIVALPROEX SODIUM 250 MG/1
500 TABLET, DELAYED RELEASE ORAL NIGHTLY
Status: DISCONTINUED | OUTPATIENT
Start: 2022-10-08 | End: 2022-10-09 | Stop reason: HOSPADM

## 2022-10-08 RX ORDER — PANTOPRAZOLE SODIUM 40 MG/1
40 TABLET, DELAYED RELEASE ORAL DAILY
Status: DISCONTINUED | OUTPATIENT
Start: 2022-10-09 | End: 2022-10-09 | Stop reason: HOSPADM

## 2022-10-08 RX ORDER — SODIUM CHLORIDE 9 MG/ML
INJECTION, SOLUTION INTRAVENOUS CONTINUOUS
Status: DISCONTINUED | OUTPATIENT
Start: 2022-10-08 | End: 2022-10-09 | Stop reason: HOSPADM

## 2022-10-08 RX ORDER — SODIUM CHLORIDE 0.9 % (FLUSH) 0.9 %
10 SYRINGE (ML) INJECTION EVERY 12 HOURS PRN
Status: DISCONTINUED | OUTPATIENT
Start: 2022-10-08 | End: 2022-10-09 | Stop reason: HOSPADM

## 2022-10-08 RX ADMIN — MIRTAZAPINE 15 MG: 15 TABLET, FILM COATED ORAL at 09:10

## 2022-10-08 RX ADMIN — SODIUM CHLORIDE: 0.9 INJECTION, SOLUTION INTRAVENOUS at 09:10

## 2022-10-08 RX ADMIN — DIVALPROEX SODIUM 500 MG: 250 TABLET, DELAYED RELEASE ORAL at 09:10

## 2022-10-08 RX ADMIN — SODIUM CHLORIDE 1000 ML: 0.9 INJECTION, SOLUTION INTRAVENOUS at 12:10

## 2022-10-08 RX ADMIN — SODIUM CHLORIDE: 0.9 INJECTION, SOLUTION INTRAVENOUS at 07:10

## 2022-10-08 RX ADMIN — GADOBUTROL 4 ML: 604.72 INJECTION INTRAVENOUS at 06:10

## 2022-10-08 NOTE — H&P
[facility] Emergency [facility] Medicine  History & Physical    Patient Name: Cat Denson  MRN: [id number]  Patient Class: Emergency  Admission Date: 10/8/2022  Attending Physician: Tj Lazar MD   Primary Care Provider: Neela Barrett MD         Patient information was obtained from patient, past medical records and ER records.     Subjective:     Principal Problem:Toxicity, late effect, due to drug, medicine, or biological substance    Chief Complaint:   Chief Complaint   Patient presents with    Weakness     ETOH Thursday pm and feeling bad since     Dizziness        HPI: 42 year old female with PMH  lupus, seizure disorder, right MCA CVA with left hemiplegia on Xarelto, bipolar with depression, fibromyalgia, GERD, gastroparesis, asthma, and nicotine dependence presented to ED with slurred speech, weakness unable to ambulate and lethargic. She reports drinking alcohol 2 days ago and has not gotten back to her baseline. She reports THC use. Family reports slurred speech today and increased weakness on right side which was not affected by previous CVA. Valproic acid level and ETOh level normal range. Head CT did not suggest acute infarction. Vitals stable and labs did not show anything of significance. She has a h/o of gabapentin intentional OD, but reports she has not taken more than prescribed. Hospital medicine to admit for further management.       Past Medical History:   Diagnosis Date    Acid reflux     Allergy     Anemia     Anticoagulated 12/29/2015    Anxiety     Asthma     Bipolar 1 disorder     Bronchospasm with bronchitis, acute     Chronic daily headache 9/11/2018    Chronic kidney disease     Chronic pain     Depression     bipolar 2    Fibromyalgia     Gastroparesis     History of right MCA stroke 11/25/2015    Hx of psychiatric care     Lactose intolerance     Lupus     Mixed hyperlipidemia 11/28/2018    Psychiatric problem     Renal tubular acidosis      Seizure     Sjogren's syndrome     Smoker     Stroke 11-    Substance abuse-kratom daily use 8/30/2020    Suicide attempt     overdosed on a bottle of paxil, muscle relaxers, & zoloft    Therapy        Past Surgical History:   Procedure Laterality Date    CHOLECYSTECTOMY      COLONOSCOPY  11/12/2014    Dr. Kimble, repeat at 52 years old for screening    ESOPHAGOGASTRODUODENOSCOPY N/A 1/3/2022    Procedure: EGD (ESOPHAGOGASTRODUODENOSCOPY);  Surgeon: Scout Kimble MD;  Location: [facility];  Service: Endoscopy;  Laterality: N/A;    FLUOROSCOPIC URODYNAMIC STUDY N/A 7/23/2019    Procedure: URODYNAMIC STUDY, FLUOROSCOPIC;  Surgeon: Vanna Martinez MD;  Location: [address];  Service: Urology;  Laterality: N/A;  1 hour    UPPER GASTROINTESTINAL ENDOSCOPY  03/03/2017    Dr. Harris       Review of patient's allergies indicates:   Allergen Reactions    Metoclopramide hcl Anaphylaxis     Involuntary mouth movements     Amoxicillin-pot clavulanate      2 other medications    Amoxil [amoxicillin]      hives    Avelox [moxifloxacin]      itching    Benadryl [diphenhydramine hcl]      Lowers seizure threshold     Coumadin [warfarin]      toxicity    Quinazolinones      Lowers seizure threshold     Ultram [tramadol]      Lowers seizure threshold     Wellbutrin [bupropion hcl]      Lowers seizure threshold        No current facility-administered medications on file prior to encounter.     Current Outpatient Medications on File Prior to Encounter   Medication Sig    ARIPiprazole (ABILIFY) 15 MG Tab Take 1 tablet (15 mg total) by mouth once daily.    atorvastatin (LIPITOR) 10 MG tablet Take 1 tablet (10 mg total) by mouth once daily. (Patient not taking: Reported on 10/6/2022)    BOTOX 200 unit SolR     divalproex (DEPAKOTE) 250 MG EC tablet TAKE 1 TABLET BY MOUTH EVERY MORNING AND TAKE 2 TABLETS BY MOUTH EVERY NIGHT AT BEDTIME    famotidine (PEPCID) 40 MG tablet TAKE 1 TABLET(40 MG) BY  MOUTH EVERY EVENING    gabapentin (NEURONTIN) 800 MG tablet TAKE 1 TABLET(800 MG) BY MOUTH TWICE DAILY    LIDOcaine-prilocaine (EMLA) cream APPLY TOPICALLY TO THE AFFECTED AREA AS NEEDED    mirtazapine (REMERON) 30 MG tablet TAKE 1 TABLET(30 MG) BY MOUTH EVERY EVENING    pantoprazole (PROTONIX) 40 MG tablet TAKE 1 TABLET(40 MG) BY MOUTH EVERY DAY (Patient taking differently: Take 40 mg by mouth once daily.)    promethazine (PHENERGAN) 25 MG tablet Take 1 tablet (25 mg total) by mouth every 6 (six) hours as needed for Nausea.    propranoloL (INDERAL LA) 80 MG 24 hr capsule Take 1 capsule (80 mg total) by mouth once daily.    rivaroxaban (XARELTO) 20 mg Tab TAKE 1 TABLET(20 MG) BY MOUTH DAILY WITH THE EVENING MEAL AND DINNER    traZODone (DESYREL) 100 MG tablet TAKE 1 TO 2 TABLETS BY MOUTH EVERY NIGHT AS NEEDED FOR INSOMNIA     Family History       Problem Relation (Age of Onset)    Alcohol abuse Maternal Uncle, Maternal Grandfather    Breast cancer Cousin, Other    Cancer Maternal Grandmother    Diabetes Maternal Uncle, Maternal Grandmother    Diabetes Mellitus Maternal Grandmother    Drug abuse Brother, Maternal Grandmother    Early death Paternal Grandfather    Heart disease Paternal Uncle, Maternal Grandfather    Hyperlipidemia Mother, Maternal Grandmother    Hypertension Mother, Maternal Uncle, Maternal Grandmother, Maternal Grandfather    Mental illness Maternal Grandmother, Paternal Grandfather    No Known Problems Father, Paternal Grandmother    Osteoarthritis Maternal Grandfather    Post-traumatic stress disorder Brother    Psoriasis Mother    Rheum arthritis Maternal Grandmother    Scoliosis Paternal Aunt    Stroke Maternal Uncle    Thyroid disease Mother          Tobacco Use    Smoking status: Every Day     Packs/day: 1.00     Years: 15.00     Pack years: 15.00     Types: Cigarettes     Start date: 11/25/2015    Smokeless tobacco: Never   Substance and Sexual Activity    Alcohol use: No      Alcohol/week: 0.0 standard drinks    Drug use: Yes     Types: Marijuana     Comment: smokes occasionally, helps with pain and appetite    Sexual activity: Never     Partners: Female     Comment: usually female, but currently with her boyfriend     Review of Systems   Constitutional:  Positive for activity change and fatigue. Negative for unexpected weight change.   HENT: Negative.     Eyes: Negative.    Respiratory: Negative.     Cardiovascular: Negative.    Gastrointestinal: Negative.    Endocrine: Negative.    Genitourinary: Negative.    Musculoskeletal:  Positive for arthralgias, gait problem and myalgias.   Skin: Negative.    Neurological:  Positive for seizures, speech difficulty, weakness and light-headedness.   Psychiatric/Behavioral:  Positive for dysphoric mood and sleep disturbance.    Objective:     Vital Signs (Most Recent):  Temp: 97.8 °F (36.6 °C) (10/08/22 1106)  Pulse: (!) 50 (10/08/22 1502)  Resp: 18 (10/08/22 1106)  BP: (!) 150/70 (10/08/22 1502)  SpO2: 100 % (10/08/22 1502)   Vital Signs (24h Range):  Temp:  [97.8 °F (36.6 °C)] 97.8 °F (36.6 °C)  Pulse:  [] 50  Resp:  [18] 18  SpO2:  [92 %-100 %] 100 %  BP: (122-167)/(65-81) 150/70        There is no height or weight on file to calculate BMI.    Physical Exam  Constitutional:       General: She is not in acute distress.     Comments: drowsy   HENT:      Head: Normocephalic and atraumatic.      Mouth/Throat:      Mouth: Mucous membranes are dry.      Pharynx: Oropharynx is clear.   Eyes:      Extraocular Movements: Extraocular movements intact.      Pupils: Pupils are equal, round, and reactive to light.   Cardiovascular:      Rate and Rhythm: Regular rhythm. Bradycardia present.      Heart sounds: No murmur heard.  Pulmonary:      Effort: Pulmonary effort is normal. No respiratory distress.      Breath sounds: No wheezing.   Abdominal:      General: Abdomen is flat. There is no distension.      Palpations: Abdomen is soft.      Tenderness:  There is no abdominal tenderness.   Musculoskeletal:         General: Deformity present.      Cervical back: Normal range of motion and neck supple.   Skin:     General: Skin is warm and dry.      Capillary Refill: Capillary refill takes 2 to 3 seconds.   Neurological:      Sensory: Sensory deficit present.      Motor: Weakness present.      Coordination: Coordination abnormal.      Deep Tendon Reflexes: Reflexes abnormal.   Psychiatric:      Comments: Flat affect         CRANIAL NERVES     CN III, IV, VI   Pupils are equal, round, and reactive to light.     Significant Labs: All pertinent labs within the past 24 hours have been reviewed.  CBC:   Recent Labs   Lab 10/08/22  1209   WBC 4.85   HGB 12.8   HCT 37.4        CMP:   Recent Labs   Lab 10/08/22  1209      K 4.9      CO2 23   GLU 81   BUN 12   CREATININE 1.6*   CALCIUM 8.7   PROT 7.2   ALBUMIN 3.8   BILITOT 0.2   ALKPHOS 85   AST 21   ALT 17   ANIONGAP 12     Coagulation:   Recent Labs   Lab 10/08/22  1209   INR 1.2     Lipid Panel:   Recent Labs   Lab 10/08/22  1209   CHOL 147   HDL 34*   LDLCALC 86.2   TRIG 134   CHOLHDL 23.1     Magnesium: No results for input(s): MG in the last 48 hours.  POCT Glucose: No results for input(s): POCTGLUCOSE in the last 48 hours.  Troponin: No results for input(s): TROPONINI, TROPONINIHS in the last 48 hours.  TSH:   Recent Labs   Lab 10/08/22  1209   TSH 0.703     Urine Studies: No results for input(s): COLORU, APPEARANCEUA, PHUR, SPECGRAV, PROTEINUA, GLUCUA, KETONESU, BILIRUBINUA, OCCULTUA, NITRITE, UROBILINOGEN, LEUKOCYTESUR, RBCUA, WBCUA, BACTERIA, SQUAMEPITHEL, HYALINECASTS in the last 48 hours.    Invalid input(s): WRIGHTSUR    Significant Imaging: I have reviewed all pertinent imaging results/findings within the past 24 hours.  Impression:Head CT without contrast     No acute intracranial abnormality appreciated.  No interval detrimental change when compared to the prior  examination.    Assessment/Plan:     * Toxicity, late effect, due to drug, medicine, or biological substance  Prolong affects of ETOH vs other etiology  Cannot check gabapentin level but appears quite drowsy and she has used this drug intitially in suicide attempt  Tele   IVF   Monitor closely       Bipolar 1 disorder, depressed, partial remission  Valproic acid levels appropriate   Abilify and trazodone/Remeron   No SI ideations - h/o gabapentin OD       Complex partial epilepsy with generalization and with intractable epilepsy    She has been seizure free for >5 years   Seizure precautions      Cigarette nicotine dependence without complication  Discussed smoking cessation 10 minutes  - she is not interested at this time   Nicotine patch to be applied after MRI       Long term current use of anticoagulant therapy    Previous CVA thought to be embolic - on xarelto for stroke prevention     Gastroesophageal reflux disease without esophagitis    PPI daily     CKD (chronic kidney disease) stage 3, GFR 30-59 ml/min  Cr is 1.6 which appears to be at baseline   CK pending  UA pending renal dose meds       Hemiparesis affecting left side as late effect of cerebrovascular accident  She does ambulate without assistive devices   PT/OT eval   Brain MRI     Lupus (systemic lupus erythematosus)  Patient follows a rheumatologist and reports her disease in under control  ESR, C3,C4 and bunny - ds - all within normal limits         VTE Risk Mitigation (From admission, onward)    None             Ameena Small MD  Department of Hospital Medicine   [facility] - Emergency Dept

## 2022-10-08 NOTE — ASSESSMENT & PLAN NOTE
Discussed smoking cessation 10 minutes  - she is not interested at this time   Nicotine patch to be applied after MRI

## 2022-10-08 NOTE — ASSESSMENT & PLAN NOTE
Valproic acid levels appropriate   Abilify and trazodone/Remeron   No SI ideations - h/o gabapentin OD

## 2022-10-08 NOTE — ED PROVIDER NOTES
Encounter Date: 10/8/2022    SCRIBE #1 NOTE: I, Italia Pisano, am scribing for, and in the presence of,  Tj Lazar MD.     History     Chief Complaint   Patient presents with    Weakness     ETOH Thursday pm and feeling bad since     Dizziness     Time seen by provider: 11:28 AM on 10/08/2022    Cat Denson is a 42 y.o. female who presents to the ED with an onset of fatigue and generalized weakness. Patient reportedly drank pre made [place] iced tea, chin, and small bottle of Jägermeister by herself at home 2 days ago. She reports getting drunk, and she has been fatigued and weak since. She does not normally drink. Significant other states, the last time he witnessed pt get drunk, she was acting different. She was not as fatigued. The patient denies N/V/D, fever, abdominal pain, difficulty urinating, blood in urine, burning or pain with urination, or any other symptoms at this time. She endorses marijuana use. She takes Flexeril and Trazodone at bedtime regularly. PMHx of lupus in remission, stroke, anxiety, bipolar 2 disorder, Sjogren's syndrome, asthma, and fibromyalgia. No pertinent PSHx.    The history is provided by the patient.   Review of patient's allergies indicates:   Allergen Reactions    Metoclopramide hcl Anaphylaxis     Involuntary mouth movements     Amoxicillin-pot clavulanate      2 other medications    Amoxil [amoxicillin]      hives    Avelox [moxifloxacin]      itching    Benadryl [diphenhydramine hcl]      Lowers seizure threshold     Coumadin [warfarin]      toxicity    Quinazolinones      Lowers seizure threshold     Ultram [tramadol]      Lowers seizure threshold     Wellbutrin [bupropion hcl]      Lowers seizure threshold      Past Medical History:   Diagnosis Date    Acid reflux     Allergy     Anemia     Anticoagulated 12/29/2015    Anxiety     Asthma     Bipolar 1 disorder     Bronchospasm with bronchitis, acute     Chronic daily headache 9/11/2018    Chronic  kidney disease     Chronic pain     Depression     bipolar 2    Fibromyalgia     Gastroparesis     History of right MCA stroke 11/25/2015    Hx of psychiatric care     Lactose intolerance     Lupus     Mixed hyperlipidemia 11/28/2018    Psychiatric problem     Renal tubular acidosis     Seizure     Sjogren's syndrome     Smoker     Stroke 11-    Substance abuse-kratom daily use 8/30/2020    Suicide attempt     overdosed on a bottle of paxil, muscle relaxers, & zoloft    Therapy      Past Surgical History:   Procedure Laterality Date    CHOLECYSTECTOMY      COLONOSCOPY  11/12/2014    Dr. Kimble, repeat at 52 years old for screening    ESOPHAGOGASTRODUODENOSCOPY N/A 1/3/2022    Procedure: EGD (ESOPHAGOGASTRODUODENOSCOPY);  Surgeon: Scout Kimble MD;  Location: [facility];  Service: Endoscopy;  Laterality: N/A;    FLUOROSCOPIC URODYNAMIC STUDY N/A 7/23/2019    Procedure: URODYNAMIC STUDY, FLUOROSCOPIC;  Surgeon: Vanna Martinez MD;  Location: [facility] OR [address] [place] [address];  Service: Urology;  Laterality: N/A;  1 hour    UPPER GASTROINTESTINAL ENDOSCOPY  03/03/2017    Dr. Harris     Family History   Problem Relation Age of Onset    Hypertension Mother     Hyperlipidemia Mother     Psoriasis Mother     Thyroid disease Mother     No Known Problems Father     Post-traumatic stress disorder Brother     Drug abuse Brother     Diabetes Maternal Uncle     Hypertension Maternal Uncle     Alcohol abuse Maternal Uncle     Stroke Maternal Uncle     Scoliosis Paternal Aunt     Heart disease Paternal Uncle     Mental illness Maternal Grandmother     Cancer Maternal Grandmother         lung / brain - smoker    Drug abuse Maternal Grandmother     Hypertension Maternal Grandmother     Hyperlipidemia Maternal Grandmother     Diabetes Maternal Grandmother     Rheum arthritis Maternal Grandmother     Diabetes Mellitus Maternal Grandmother     Heart disease Maternal Grandfather     Hypertension Maternal Grandfather     Alcohol abuse Maternal  Grandfather     Osteoarthritis Maternal Grandfather     No Known Problems Paternal Grandmother     Mental illness Paternal Grandfather     Early death Paternal Grandfather         self    Breast cancer Cousin     Breast cancer Other     Colon cancer Neg Hx     Colon polyps Neg Hx     Crohn's disease Neg Hx     Ulcerative colitis Neg Hx     Celiac disease Neg Hx     Lupus Neg Hx     Kidney disease Neg Hx     Inflammatory bowel disease Neg Hx     Depression Neg Hx     COPD Neg Hx     Asthma Neg Hx     Macular degeneration Neg Hx     Retinal detachment Neg Hx     Glaucoma Neg Hx      Social History     Tobacco Use    Smoking status: Every Day     Packs/day: 1.00     Years: 15.00     Pack years: 15.00     Types: Cigarettes     Start date: 11/25/2015    Smokeless tobacco: Never   Substance Use Topics    Alcohol use: No     Alcohol/week: 0.0 standard drinks    Drug use: Yes     Types: Marijuana     Comment: smokes occasionally, helps with pain and appetite     Review of Systems   Constitutional:  Positive for fatigue. Negative for fever.   HENT:  Negative for sore throat.    Respiratory:  Negative for shortness of breath.    Cardiovascular:  Negative for chest pain.   Gastrointestinal:  Negative for abdominal pain, diarrhea, nausea and vomiting.   Genitourinary:  Negative for difficulty urinating, dysuria and hematuria.   Musculoskeletal:  Negative for back pain.   Skin:  Negative for rash.   Allergic/Immunologic: Positive for immunocompromised state.   Neurological:  Positive for weakness (generalized).   Hematological:  Does not bruise/bleed easily.     Physical Exam     Initial Vitals [10/08/22 1106]   BP Pulse Resp Temp SpO2   123/72 108 18 97.8 °F (36.6 °C) (!) 92 %      MAP       --         Physical Exam    Nursing note and vitals reviewed.  Constitutional: She appears well-developed.  Non-toxic appearance.   HENT:   Head: Normocephalic and atraumatic.   Eyes: EOM are normal. Pupils are equal, round, and reactive to  light.   Neck: Neck supple.   Cardiovascular:  Normal rate, regular rhythm, normal heart sounds and intact distal pulses.     Exam reveals no gallop and no friction rub.       No murmur heard.  Pulmonary/Chest: Breath sounds normal. No respiratory distress. She has no decreased breath sounds. She has no wheezes. She has no rhonchi. She has no rales.   Abdominal: Abdomen is soft. Bowel sounds are normal. She exhibits no distension. There is no abdominal tenderness.   Musculoskeletal:         General: Normal range of motion.      Cervical back: Neck supple.     Neurological: She is alert and oriented to person, place, and time.   Hypotonia and spasticity in LUE.    Skin: Skin is warm and dry.   Psychiatric: She has a normal mood and affect.       ED Course   Procedures  Labs Reviewed   CBC W/ AUTO DIFFERENTIAL - Abnormal; Notable for the following components:       Result Value    RBC 3.90 (*)     MCH 32.8 (*)     All other components within normal limits   COMPREHENSIVE METABOLIC PANEL - Abnormal; Notable for the following components:    Creatinine 1.6 (*)     eGFR 42 (*)     All other components within normal limits   LIPID PANEL - Abnormal; Notable for the following components:    HDL 34 (*)     All other components within normal limits   URINALYSIS, REFLEX TO URINE CULTURE - Abnormal; Notable for the following components:    Color, UA Red (*)     Protein, UA 1+ (*)     Occult Blood UA 3+ (*)     Leukocytes, UA Trace (*)     All other components within normal limits    Narrative:     Specimen Source->Urine   DRUG SCREEN PANEL, URINE EMERGENCY - Abnormal; Notable for the following components:    THC Presumptive Positive (*)     All other components within normal limits    Narrative:     Specimen Source->Urine   URINALYSIS MICROSCOPIC - Abnormal; Notable for the following components:    RBC, UA >100 (*)     All other components within normal limits    Narrative:     Specimen Source->Urine   TSH   PROTIME-INR    VALPROIC ACID   ALCOHOL,MEDICAL (ETHANOL)   CK   T4, FREE   FERRITIN   POCT URINE PREGNANCY          Imaging Results              MRI Brain W WO Contrast (Final result)  Result time 10/08/22 22:21:36      Final result by Angie Bradley MD (10/08/22 22:21:36)                   Impression:      No acute intracranial abnormality appreciated.  Large remote right MCA territory cerebral infarct with large areas of encephalomalacia/gliosis noted within the right frontal, parietal, and temporal lobes.  No evidence of acute/recent major vascular territory cerebral infarction.    The preliminary and final reports are concordant.      Electronically signed by: Devin Bradley MD  Date:    10/08/2022  Time:    22:21               Narrative:    EXAMINATION:  MRI BRAIN W WO CONTRAST    CLINICAL HISTORY:  Neuro deficit, acute, stroke suspected;    TECHNIQUE:  Routine multiplanar MR imaging of the brain was performed both before and following the intravenous administration of 4 cc of Gadavist intravenous contrast administration    COMPARISON:  CT head without contrast-10/08/2022    FINDINGS:  The current examination once again demonstrates a large area of remote right MCA territory infarction with large areas of encephalomalacia/gliosis observed involving large areas of the right frontal, parietal, and temporal lobes (> 2/3 of the right MCA territory).  There is associated compensatory enlargement of the right lateral ventricle and right sylvian fissure.  There is ipsilateral atrophy of the right cerebral peduncle due to axonal degeneration.  There are foci of remote lacunar infarction also noted within the right basal ganglia.  There is supratentorial cerebral volume loss, greater than one would expect for a patient of this age.  No evidence of acute/recent major vascular territory cerebral infarction or acute parenchymal hemorrhage.  No enhancing intra-axial mass or enhancing vascular malformation.  There is asymmetrically  diminished perfusion of the right MCA territory relative to the left MCA territory on the contrast enhanced images.    No hydrocephalus or effacement of the skull base cisterns.  No extra-axial fluid collections or blood products.  No enhancing extra-axial mass.  The vertebral arteries and basilar artery are diminutive in caliber.  The jiktkg-sy-Ploulm vasculature is diffusely diminutive in caliber without focal > 50% hemodynamically significant stenosis or definite vessel occlusion involving the left or right anterior cerebral artery, posterior cerebral artery, or M1 branches of the middle cerebral artery.  No Chiari malformation.    The mastoid air cells are unremarkable.  There is minimal mucoperiosteal thickening observed within the ethmoid air cells.  The visualized orbits are unremarkable.                                       CT Head Without Contrast (Final result)  Result time 10/08/22 14:20:06      Final result by Angie Bradley MD (10/08/22 14:20:06)                   Impression:      No acute intracranial abnormality appreciated.  No interval detrimental change when compared to the prior examination.      Electronically signed by: Devin Bradley MD  Date:    10/08/2022  Time:    14:20               Narrative:    EXAMINATION:  CT HEAD WITHOUT CONTRAST    CLINICAL HISTORY:  Neuro deficit, acute, stroke suspected;increasing left sided weakness and dysarthria x 2 days.;    TECHNIQUE:  5 mm low dose non-contrast contiguous axial images were acquired through the head.  Subsequently, 2 dimensional coronal and sagittal reconstructed images were generated from the source data.    COMPARISON:  CT of the head without contrast-09/18/2021.    FINDINGS:  The brain is normally formed with preserved gray-white matter junction differentiation. No evidence of acute/recent major vascular territory cerebral infarction, parenchymal hemorrhage, or intra-axial mass.  There is supratentorial cerebral volume loss, greater  than one would expect for a patient of this chronologic age.  There are large areas of encephalomalacia/gliosis present within the right MCA territory involving the right frontal and parietal cortices and subcortical white matter compatible with prior ischemia/infarction.  There is associated compensatory enlargement of the CSF spaces over the right cerebral convexity and compensatory enlargement of the right lateral ventricle..  There is a remote focus of lacunar infarction present within the right thalamus, similar to the prior exam.    No hydrocephalus.  No effacement of the skull-base cisterns.  No extra-axial fluid collections or blood products.    The paranasal sinuses and mastoid air cells are clear.  The visualized orbits are unremarkable.  The bony calvarium and visualized facial bones show no acute abnormality.                                       Medications   ARIPiprazole tablet 15 mg (has no administration in time range)   atorvastatin tablet 10 mg (has no administration in time range)   divalproex EC tablet 250 mg (has no administration in time range)   divalproex EC tablet 500 mg (500 mg Oral Given 10/8/22 2140)   mirtazapine tablet 15 mg (15 mg Oral Given 10/8/22 2141)   pantoprazole EC tablet 40 mg (has no administration in time range)   propranoloL tablet 40 mg (0 mg Oral Hold 10/8/22 2141)   rivaroxaban tablet 20 mg (has no administration in time range)   sodium chloride 0.9% flush 10 mL (has no administration in time range)   naloxone 0.4 mg/mL injection 0.02 mg (has no administration in time range)   glucose chewable tablet 16 g (has no administration in time range)   glucose chewable tablet 24 g (has no administration in time range)   glucagon (human recombinant) injection 1 mg (has no administration in time range)   dextrose 10% bolus 125 mL (has no administration in time range)   dextrose 10% bolus 250 mL (has no administration in time range)   potassium bicarbonate disintegrating tablet 50  mEq (has no administration in time range)   potassium bicarbonate disintegrating tablet 35 mEq (has no administration in time range)   potassium bicarbonate disintegrating tablet 60 mEq (has no administration in time range)   magnesium oxide tablet 800 mg (has no administration in time range)   magnesium oxide tablet 800 mg (has no administration in time range)   potassium, sodium phosphates 280-160-250 mg packet 2 packet (has no administration in time range)   potassium, sodium phosphates 280-160-250 mg packet 2 packet (has no administration in time range)   potassium, sodium phosphates 280-160-250 mg packet 2 packet (has no administration in time range)   acetaminophen tablet 650 mg (has no administration in time range)   senna-docusate 8.6-50 mg per tablet 1 tablet (1 tablet Oral Not Given 10/8/22 2100)   albuterol-ipratropium 2.5 mg-0.5 mg/3 mL nebulizer solution 3 mL (has no administration in time range)   acetaminophen tablet 650 mg (has no administration in time range)   trazodone split tablet 25 mg (has no administration in time range)   aluminum-magnesium hydroxide-simethicone 200-200-20 mg/5 mL suspension 30 mL (has no administration in time range)   0.9%  NaCl infusion ( Intravenous Verify Only 10/9/22 0435)   sodium chloride 0.9% bolus 1,000 mL (0 mLs Intravenous Stopped 10/8/22 1316)   gadobutroL (GADAVIST) injection 4 mL (4 mLs Intravenous Given 10/8/22 1813)     Medical Decision Making:   History:   Old Medical Records: I decided to obtain old medical records.  Clinical Tests:   Lab Tests: Ordered and Reviewed        Scribe Attestation:   Scribe #1: I performed the above scribed service and the documentation accurately describes the services I performed. I attest to the accuracy of the note.      ED Course as of 10/09/22 0759   Sat Oct 08, 2022   1353  Patient does not feel better after fluids.  After further discussion with the patient and her  she states she is having some worsening speech  over the past 2 days since she drink alcohol and feels more weak in her left upper and left lower extremity that she normally does at baseline.  She may have reactivated or stroke.  It is very unclear.  I will get a CT of the head.  She is definitely not a tPA candidate given that her symptoms have been present for 2 days. [JS]   1453  Patient could have dehydration or alcohol use but that does not really explain her lethargy and slight dysarthria with increased weakness in her left upper and left lower extremity from baseline.  She is on multiple medications that could cause her to have the symptoms such as gabapentin.  Feel it would be best to admit her to the hospital hold these mood altering substances give her IV fluids given MRI and see if her symptoms improved.  Spoke to the hospitalist and the neurologist [JS]   1613  EKG independently interpreted by me as rate 52 sinus bradycardia with mild sinus rhythm and  normal axis intervals no ST segment elevation or depression. [JS]      ED Course User Index  [JS] Tj Lazar MD          I, Dr. Tj Lazar personally performed the services described in this documentation. All medical record entries made by the scribe were at my direction and in my presence.  I have reviewed the chart and agree that the record reflects my personal performance and is accurate and complete. Tj Lazar MD.  2:55 PM 10/09/2022    DISCLAIMER: This note was prepared with Dragon NaturallySpeaking voice recognition transcription software. Garbled syntax, mangled pronouns, and other bizarre constructions may be attributed to that software system        Clinical Impression:   Final diagnoses:  [R53.1] Generalized weakness (Primary)  [R53.83] Lethargy  [Z79.899] Polypharmacy  [R07.9] Chest pain      ED Disposition Condition    Observation Stable                Tj Lazar MD  10/08/22 1455       Tj Lazar MD  10/09/22 0800

## 2022-10-08 NOTE — ASSESSMENT & PLAN NOTE
Patient follows a rheumatologist and reports her disease in under control  ESR, C3,C4 and bunny - ds - all within normal limits

## 2022-10-08 NOTE — ASSESSMENT & PLAN NOTE
Prolong affects of ETOH vs other etiology  Cannot check gabapentin level but appears quite drowsy and she has used this drug intitially in suicide attempt  Tele   IVF   Monitor closely

## 2022-10-08 NOTE — HPI
42 year old female with PMH  lupus, seizure disorder, right MCA CVA with left hemiplegia on Xarelto, bipolar with depression, fibromyalgia, GERD, gastroparesis, asthma, and nicotine dependence presented to ED with slurred speech, weakness unable to ambulate and lethargic. She reports drinking alcohol 2 days ago and has not gotten back to her baseline. She reports THC use. Family reports slurred speech today and increased weakness on right side which was not affected by previous CVA. Valproic acid level and ETOh level normal range. Head CT did not suggest acute infarction. Vitals stable and labs did not show anything of significance. She has a h/o of gabapentin intentional OD, but reports she has not taken more than prescribed. Hospital medicine to admit for further management.

## 2022-10-08 NOTE — SUBJECTIVE & OBJECTIVE
Past Medical History:   Diagnosis Date    Acid reflux     Allergy     Anemia     Anticoagulated 12/29/2015    Anxiety     Asthma     Bipolar 1 disorder     Bronchospasm with bronchitis, acute     Chronic daily headache 9/11/2018    Chronic kidney disease     Chronic pain     Depression     bipolar 2    Fibromyalgia     Gastroparesis     History of right MCA stroke 11/25/2015    Hx of psychiatric care     Lactose intolerance     Lupus     Mixed hyperlipidemia 11/28/2018    Psychiatric problem     Renal tubular acidosis     Seizure     Sjogren's syndrome     Smoker     Stroke 11-    Substance abuse-kratom daily use 8/30/2020    Suicide attempt     overdosed on a bottle of paxil, muscle relaxers, & zoloft    Therapy        Past Surgical History:   Procedure Laterality Date    CHOLECYSTECTOMY      COLONOSCOPY  11/12/2014    Dr. Kimble, repeat at 52 years old for screening    ESOPHAGOGASTRODUODENOSCOPY N/A 1/3/2022    Procedure: EGD (ESOPHAGOGASTRODUODENOSCOPY);  Surgeon: Scout Kimble MD;  Location: [facility];  Service: Endoscopy;  Laterality: N/A;    FLUOROSCOPIC URODYNAMIC STUDY N/A 7/23/2019    Procedure: URODYNAMIC STUDY, FLUOROSCOPIC;  Surgeon: Vanna Martinez MD;  Location: [address];  Service: Urology;  Laterality: N/A;  1 hour    UPPER GASTROINTESTINAL ENDOSCOPY  03/03/2017    Dr. Harris       Review of patient's allergies indicates:   Allergen Reactions    Metoclopramide hcl Anaphylaxis     Involuntary mouth movements     Amoxicillin-pot clavulanate      2 other medications    Amoxil [amoxicillin]      hives    Avelox [moxifloxacin]      itching    Benadryl [diphenhydramine hcl]      Lowers seizure threshold     Coumadin [warfarin]      toxicity    Quinazolinones      Lowers seizure threshold     Ultram [tramadol]      Lowers seizure threshold     Wellbutrin [bupropion hcl]      Lowers seizure threshold        No current facility-administered medications on file prior to encounter.      Current Outpatient Medications on File Prior to Encounter   Medication Sig    ARIPiprazole (ABILIFY) 15 MG Tab Take 1 tablet (15 mg total) by mouth once daily.    atorvastatin (LIPITOR) 10 MG tablet Take 1 tablet (10 mg total) by mouth once daily. (Patient not taking: Reported on 10/6/2022)    BOTOX 200 unit SolR     divalproex (DEPAKOTE) 250 MG EC tablet TAKE 1 TABLET BY MOUTH EVERY MORNING AND TAKE 2 TABLETS BY MOUTH EVERY NIGHT AT BEDTIME    famotidine (PEPCID) 40 MG tablet TAKE 1 TABLET(40 MG) BY MOUTH EVERY EVENING    gabapentin (NEURONTIN) 800 MG tablet TAKE 1 TABLET(800 MG) BY MOUTH TWICE DAILY    LIDOcaine-prilocaine (EMLA) cream APPLY TOPICALLY TO THE AFFECTED AREA AS NEEDED    mirtazapine (REMERON) 30 MG tablet TAKE 1 TABLET(30 MG) BY MOUTH EVERY EVENING    pantoprazole (PROTONIX) 40 MG tablet TAKE 1 TABLET(40 MG) BY MOUTH EVERY DAY (Patient taking differently: Take 40 mg by mouth once daily.)    promethazine (PHENERGAN) 25 MG tablet Take 1 tablet (25 mg total) by mouth every 6 (six) hours as needed for Nausea.    propranoloL (INDERAL LA) 80 MG 24 hr capsule Take 1 capsule (80 mg total) by mouth once daily.    rivaroxaban (XARELTO) 20 mg Tab TAKE 1 TABLET(20 MG) BY MOUTH DAILY WITH THE EVENING MEAL AND DINNER    traZODone (DESYREL) 100 MG tablet TAKE 1 TO 2 TABLETS BY MOUTH EVERY NIGHT AS NEEDED FOR INSOMNIA     Family History       Problem Relation (Age of Onset)    Alcohol abuse Maternal Uncle, Maternal Grandfather    Breast cancer Cousin, Other    Cancer Maternal Grandmother    Diabetes Maternal Uncle, Maternal Grandmother    Diabetes Mellitus Maternal Grandmother    Drug abuse Brother, Maternal Grandmother    Early death Paternal Grandfather    Heart disease Paternal Uncle, Maternal Grandfather    Hyperlipidemia Mother, Maternal Grandmother    Hypertension Mother, Maternal Uncle, Maternal Grandmother, Maternal Grandfather    Mental illness Maternal Grandmother, Paternal Grandfather    No Known  Problems Father, Paternal Grandmother    Osteoarthritis Maternal Grandfather    Post-traumatic stress disorder Brother    Psoriasis Mother    Rheum arthritis Maternal Grandmother    Scoliosis Paternal Aunt    Stroke Maternal Uncle    Thyroid disease Mother          Tobacco Use    Smoking status: Every Day     Packs/day: 1.00     Years: 15.00     Pack years: 15.00     Types: Cigarettes     Start date: 11/25/2015    Smokeless tobacco: Never   Substance and Sexual Activity    Alcohol use: No     Alcohol/week: 0.0 standard drinks    Drug use: Yes     Types: Marijuana     Comment: smokes occasionally, helps with pain and appetite    Sexual activity: Never     Partners: Female     Comment: usually female, but currently with her boyfriend     Review of Systems   Constitutional:  Positive for activity change and fatigue. Negative for unexpected weight change.   HENT: Negative.     Eyes: Negative.    Respiratory: Negative.     Cardiovascular: Negative.    Gastrointestinal: Negative.    Endocrine: Negative.    Genitourinary: Negative.    Musculoskeletal:  Positive for arthralgias, gait problem and myalgias.   Skin: Negative.    Neurological:  Positive for seizures, speech difficulty, weakness and light-headedness.   Psychiatric/Behavioral:  Positive for dysphoric mood and sleep disturbance.    Objective:     Vital Signs (Most Recent):  Temp: 97.8 °F (36.6 °C) (10/08/22 1106)  Pulse: (!) 50 (10/08/22 1502)  Resp: 18 (10/08/22 1106)  BP: (!) 150/70 (10/08/22 1502)  SpO2: 100 % (10/08/22 1502)   Vital Signs (24h Range):  Temp:  [97.8 °F (36.6 °C)] 97.8 °F (36.6 °C)  Pulse:  [] 50  Resp:  [18] 18  SpO2:  [92 %-100 %] 100 %  BP: (122-167)/(65-81) 150/70        There is no height or weight on file to calculate BMI.    Physical Exam  Constitutional:       General: She is not in acute distress.     Comments: drowsy   HENT:      Head: Normocephalic and atraumatic.      Mouth/Throat:      Mouth: Mucous membranes are dry.       Pharynx: Oropharynx is clear.   Eyes:      Extraocular Movements: Extraocular movements intact.      Pupils: Pupils are equal, round, and reactive to light.   Cardiovascular:      Rate and Rhythm: Regular rhythm. Bradycardia present.      Heart sounds: No murmur heard.  Pulmonary:      Effort: Pulmonary effort is normal. No respiratory distress.      Breath sounds: No wheezing.   Abdominal:      General: Abdomen is flat. There is no distension.      Palpations: Abdomen is soft.      Tenderness: There is no abdominal tenderness.   Musculoskeletal:         General: Deformity present.      Cervical back: Normal range of motion and neck supple.   Skin:     General: Skin is warm and dry.      Capillary Refill: Capillary refill takes 2 to 3 seconds.   Neurological:      Sensory: Sensory deficit present.      Motor: Weakness present.      Coordination: Coordination abnormal.      Deep Tendon Reflexes: Reflexes abnormal.   Psychiatric:      Comments: Flat affect         CRANIAL NERVES     CN III, IV, VI   Pupils are equal, round, and reactive to light.     Significant Labs: All pertinent labs within the past 24 hours have been reviewed.  CBC:   Recent Labs   Lab 10/08/22  1209   WBC 4.85   HGB 12.8   HCT 37.4        CMP:   Recent Labs   Lab 10/08/22  1209      K 4.9      CO2 23   GLU 81   BUN 12   CREATININE 1.6*   CALCIUM 8.7   PROT 7.2   ALBUMIN 3.8   BILITOT 0.2   ALKPHOS 85   AST 21   ALT 17   ANIONGAP 12     Coagulation:   Recent Labs   Lab 10/08/22  1209   INR 1.2     Lipid Panel:   Recent Labs   Lab 10/08/22  1209   CHOL 147   HDL 34*   LDLCALC 86.2   TRIG 134   CHOLHDL 23.1     Magnesium: No results for input(s): MG in the last 48 hours.  POCT Glucose: No results for input(s): POCTGLUCOSE in the last 48 hours.  Troponin: No results for input(s): TROPONINI, TROPONINIHS in the last 48 hours.  TSH:   Recent Labs   Lab 10/08/22  1209   TSH 0.703     Urine Studies: No results for input(s): COLORU,  APPEARANCEUA, PHUR, SPECGRAV, PROTEINUA, GLUCUA, KETONESU, BILIRUBINUA, OCCULTUA, NITRITE, UROBILINOGEN, LEUKOCYTESUR, RBCUA, WBCUA, BACTERIA, SQUAMEPITHEL, HYALINECASTS in the last 48 hours.    Invalid input(s): ARIELLE    Significant Imaging: I have reviewed all pertinent imaging results/findings within the past 24 hours.  Impression:Head CT without contrast     No acute intracranial abnormality appreciated.  No interval detrimental change when compared to the prior examination.

## 2022-10-09 VITALS
OXYGEN SATURATION: 99 % | WEIGHT: 107.56 LBS | TEMPERATURE: 98 F | HEART RATE: 45 BPM | BODY MASS INDEX: 19.06 KG/M2 | SYSTOLIC BLOOD PRESSURE: 150 MMHG | RESPIRATION RATE: 16 BRPM | DIASTOLIC BLOOD PRESSURE: 68 MMHG | HEIGHT: 63 IN

## 2022-10-09 LAB
ALBUMIN SERPL BCP-MCNC: 3.3 G/DL (ref 3.5–5.2)
ALP SERPL-CCNC: 69 U/L (ref 55–135)
ALT SERPL W/O P-5'-P-CCNC: 15 U/L (ref 10–44)
ANION GAP SERPL CALC-SCNC: 9 MMOL/L (ref 8–16)
AST SERPL-CCNC: 18 U/L (ref 10–40)
BASOPHILS # BLD AUTO: 0.04 K/UL (ref 0–0.2)
BASOPHILS NFR BLD: 0.7 % (ref 0–1.9)
BILIRUB SERPL-MCNC: 0.3 MG/DL (ref 0.1–1)
BUN SERPL-MCNC: 11 MG/DL (ref 6–20)
CALCIUM SERPL-MCNC: 8.3 MG/DL (ref 8.7–10.5)
CHLORIDE SERPL-SCNC: 116 MMOL/L (ref 95–110)
CO2 SERPL-SCNC: 16 MMOL/L (ref 23–29)
CREAT SERPL-MCNC: 1.2 MG/DL (ref 0.5–1.4)
DIFFERENTIAL METHOD: ABNORMAL
EOSINOPHIL # BLD AUTO: 0.1 K/UL (ref 0–0.5)
EOSINOPHIL NFR BLD: 1.7 % (ref 0–8)
ERYTHROCYTE [DISTWIDTH] IN BLOOD BY AUTOMATED COUNT: 12.3 % (ref 11.5–14.5)
EST. GFR  (NO RACE VARIABLE): 59 ML/MIN/1.73 M^2
GLUCOSE SERPL-MCNC: 79 MG/DL (ref 70–110)
HCT VFR BLD AUTO: 37.7 % (ref 37–48.5)
HGB BLD-MCNC: 12.7 G/DL (ref 12–16)
IMM GRANULOCYTES # BLD AUTO: 0.02 K/UL (ref 0–0.04)
IMM GRANULOCYTES NFR BLD AUTO: 0.4 % (ref 0–0.5)
IRON SERPL-MCNC: 88 UG/DL (ref 30–160)
LYMPHOCYTES # BLD AUTO: 2.4 K/UL (ref 1–4.8)
LYMPHOCYTES NFR BLD: 44.9 % (ref 18–48)
MAGNESIUM SERPL-MCNC: 2.4 MG/DL (ref 1.6–2.6)
MCH RBC QN AUTO: 32.3 PG (ref 27–31)
MCHC RBC AUTO-ENTMCNC: 33.7 G/DL (ref 32–36)
MCV RBC AUTO: 96 FL (ref 82–98)
MONOCYTES # BLD AUTO: 0.3 K/UL (ref 0.3–1)
MONOCYTES NFR BLD: 6.3 % (ref 4–15)
NEUTROPHILS # BLD AUTO: 2.5 K/UL (ref 1.8–7.7)
NEUTROPHILS NFR BLD: 46 % (ref 38–73)
NRBC BLD-RTO: 0 /100 WBC
PHOSPHATE SERPL-MCNC: 3.1 MG/DL (ref 2.7–4.5)
PLATELET # BLD AUTO: 143 K/UL (ref 150–450)
PMV BLD AUTO: 12.7 FL (ref 9.2–12.9)
POTASSIUM SERPL-SCNC: 4.8 MMOL/L (ref 3.5–5.1)
PROT SERPL-MCNC: 6.4 G/DL (ref 6–8.4)
RBC # BLD AUTO: 3.93 M/UL (ref 4–5.4)
SATURATED IRON: 23 % (ref 20–50)
SODIUM SERPL-SCNC: 141 MMOL/L (ref 136–145)
TOTAL IRON BINDING CAPACITY: 380 UG/DL (ref 250–450)
TRANSFERRIN SERPL-MCNC: 257 MG/DL (ref 200–375)
WBC # BLD AUTO: 5.39 K/UL (ref 3.9–12.7)

## 2022-10-09 PROCEDURE — 94761 N-INVAS EAR/PLS OXIMETRY MLT: CPT

## 2022-10-09 PROCEDURE — 97116 GAIT TRAINING THERAPY: CPT

## 2022-10-09 PROCEDURE — 83735 ASSAY OF MAGNESIUM: CPT | Performed by: HOSPITALIST

## 2022-10-09 PROCEDURE — 99900035 HC TECH TIME PER 15 MIN (STAT)

## 2022-10-09 PROCEDURE — 85025 COMPLETE CBC W/AUTO DIFF WBC: CPT | Performed by: HOSPITALIST

## 2022-10-09 PROCEDURE — 96361 HYDRATE IV INFUSION ADD-ON: CPT

## 2022-10-09 PROCEDURE — 84100 ASSAY OF PHOSPHORUS: CPT | Performed by: HOSPITALIST

## 2022-10-09 PROCEDURE — G0378 HOSPITAL OBSERVATION PER HR: HCPCS

## 2022-10-09 PROCEDURE — 80053 COMPREHEN METABOLIC PANEL: CPT | Performed by: HOSPITALIST

## 2022-10-09 PROCEDURE — 25000003 PHARM REV CODE 250: Performed by: HOSPITALIST

## 2022-10-09 PROCEDURE — 36415 COLL VENOUS BLD VENIPUNCTURE: CPT | Performed by: HOSPITALIST

## 2022-10-09 PROCEDURE — 97161 PT EVAL LOW COMPLEX 20 MIN: CPT

## 2022-10-09 RX ORDER — ATORVASTATIN CALCIUM 10 MG/1
10 TABLET, FILM COATED ORAL DAILY
Qty: 30 TABLET | Refills: 0
Start: 2022-10-09 | End: 2023-02-17

## 2022-10-09 RX ADMIN — ATORVASTATIN CALCIUM 10 MG: 10 TABLET, FILM COATED ORAL at 09:10

## 2022-10-09 RX ADMIN — PROPRANOLOL HYDROCHLORIDE 40 MG: 20 TABLET ORAL at 09:10

## 2022-10-09 RX ADMIN — PANTOPRAZOLE SODIUM 40 MG: 40 TABLET, DELAYED RELEASE ORAL at 09:10

## 2022-10-09 RX ADMIN — ACETAMINOPHEN 650 MG: 325 TABLET ORAL at 11:10

## 2022-10-09 RX ADMIN — DIVALPROEX SODIUM 250 MG: 250 TABLET, DELAYED RELEASE ORAL at 09:10

## 2022-10-09 RX ADMIN — ARIPIPRAZOLE 15 MG: 5 TABLET ORAL at 09:10

## 2022-10-09 NOTE — PLAN OF CARE
Unable to schedule hospital follow up within 7 days. In basket message sent to Dr. Barrett' staff requesting office to contact pt for scheduling

## 2022-10-09 NOTE — PT/OT/SLP EVAL
Physical Therapy Evaluation    Patient Name:  Cat Denson   MRN:  [id number]    Recommendations:     Discharge Recommendations:  outpatient PT   Discharge Equipment Recommendations: none   Barriers to discharge: None    Assessment:     Cat Denson is a 42 y.o. female admitted with a medical diagnosis of Toxicity, late effect, due to drug, medicine, or biological substance.  She presents with the following impairments/functional limitations:  weakness, impaired endurance, impaired self care skills, impaired functional mobility, gait instability, impaired balance, decreased lower extremity function. Patient agreeable to mobility training.    Rehab Prognosis: Fair; patient would benefit from acute skilled PT services to address these deficits and reach maximum level of function.    Recent Surgery: * No surgery found *      Plan:     During this hospitalization, patient to be seen daily to address the identified rehab impairments via gait training, therapeutic activities, therapeutic exercises, neuromuscular re-education and progress toward the following goals:    Plan of Care Expires:  10/23/22    Subjective     Chief Complaint: left sided weakness  Patient/Family Comments/goals: improve overall mobility  Pain/Comfort:  Pain Rating 1: 0/10    Patients cultural, spiritual, Quaker conflicts given the current situation:      Living Environment:  Lives with friend in 1-story home (no steps).  Prior to admission, patients level of function was supervision needed.  Equipment used at home: none.  DME owned (not currently used): none.  Upon discharge, patient will have assistance from friend.    Objective:     Communicated with nurse prior to session.  Patient found supine with peripheral IV, telemetry  upon PT entry to room.    General Precautions: Standard, fall   Orthopedic Precautions:N/A   Braces: N/A  Respiratory Status: Room air    Exams:  RLE ROM: WFL  RLE Strength: WFL  LLE ROM: WFL  LLE  Strength: Deficits: 3-/5 ankle, 3+/5 rest     Functional Mobility:  Bed Mobility:     Supine to Sit: stand by assistance  Sit to Supine: stand by assistance  Transfers:     Sit to Stand:  stand by assistance with no AD  Gait: 100 feet with stand by assist     Therapeutic Activities and Exercises:   Stand by assist in/out of bathroom    AM-PAC 6 CLICK MOBILITY  Total Score:18     Patient left supine with all lines intact and call button in reach.    GOALS:   Multidisciplinary Problems       Physical Therapy Goals          Problem: Physical Therapy    Goal Priority Disciplines Outcome Goal Variances Interventions   Physical Therapy Goal     PT, PT/OT      Description: Goals to be met by: 10/23/22     Patient will increase functional independence with mobility by performin. Bed to chair transfer with Stand-by Assistance using No Assistive Device  2. Gait  x 200 feet with Stand-by Assistance using No Assistive Device.   3. Lower extremity exercise program x 15 reps, with supervision                         History:     Past Medical History:   Diagnosis Date    Acid reflux     Allergy     Anemia     Anticoagulated 2015    Anxiety     Asthma     Bipolar 1 disorder     Bronchospasm with bronchitis, acute     Chronic daily headache 2018    Chronic kidney disease     Chronic pain     Depression     bipolar 2    Fibromyalgia     Gastroparesis     History of right MCA stroke 2015    Hx of psychiatric care     Lactose intolerance     Lupus     Mixed hyperlipidemia 2018    Psychiatric problem     Renal tubular acidosis     Seizure     Sjogren's syndrome     Smoker     Stroke 2015    Substance abuse-kratom daily use 2020    Suicide attempt     overdosed on a bottle of paxil, muscle relaxers, & zoloft    Therapy        Past Surgical History:   Procedure Laterality Date    CHOLECYSTECTOMY      COLONOSCOPY  2014    Dr. Colin, repeat at 52 years old for screening     ESOPHAGOGASTRODUODENOSCOPY N/A 1/3/2022    Procedure: EGD (ESOPHAGOGASTRODUODENOSCOPY);  Surgeon: Scout Kimble MD;  Location: [facility];  Service: Endoscopy;  Laterality: N/A;    FLUOROSCOPIC URODYNAMIC STUDY N/A 7/23/2019    Procedure: URODYNAMIC STUDY, FLUOROSCOPIC;  Surgeon: Vanna Martinez MD;  Location: [address];  Service: Urology;  Laterality: N/A;  1 hour    UPPER GASTROINTESTINAL ENDOSCOPY  03/03/2017    Dr. Harris       Time Tracking:     PT Received On: 10/09/22  PT Start Time: 0910     PT Stop Time: 0935  PT Total Time (min): 25 min     Billable Minutes: Evaluation 15 and Gait Training 10      10/09/2022

## 2022-10-09 NOTE — PLAN OF CARE
Goals to be met by: 10/23/22     Patient will increase functional independence with mobility by performin. Bed to chair transfer with Stand-by Assistance using No Assistive Device  2. Gait  x 200 feet with Stand-by Assistance using No Assistive Device.   3. Lower extremity exercise program x 15 reps, with supervision

## 2022-10-09 NOTE — NURSING
RESOURCE ROUNDING     Resource nurse rounding on pt. Sleeping- Lying, in bed. VSS stable. SB per monitor. HR 54. O2 sats stable, Room air. IV access intact, fluids infusing. Will continue to monitor throughout shift.

## 2022-10-09 NOTE — NURSING
Pt discharged home with brother.  Pt brought down in W/C by MURPHY Brandt.  Discharge instructions were given and pt verbalized understanding.  No c/o pain/nausea/vomitting.  Pt cleared by neuro.  PIV taken out and intact.  Tele monitor taken off pt.  Will continue to monitor.

## 2022-10-09 NOTE — PLAN OF CARE
10/09/22 1151   FRAZIER Message   Medicare Outpatient and Observation Notification regarding financial responsibility Given to patient/caregiver;Explained to patient/caregiver;Signed/date by patient/caregiver   Date FRAZIER was signed 10/09/22   Time FRAZIER was signed 1010       MOON explained to pt. Pt verbalized understanding and signed form.

## 2022-10-09 NOTE — CONSULTS
[facility]  Neurology  Consult Note        PATIENT NAME: Cat Denson  MRN: [id number]  CSN: [id number]      TODAY'S DATE: 10/09/2022  ADMIT DATE: 10/8/2022                            CONSULTING PROVIDER: Elias Rae MD  CONSULT REQUESTED BY: Paolo Chong MD      Reason for consult: Weakness       History obtained from chart review and the patient.    HPI per EMR: Cat Denson is a 42 y.o. female with a history of   lupus, seizure disorder, right MCA CVA with left hemiplegia on Xarelto, bipolar with depression, fibromyalgia, GERD, gastroparesis, asthma, and nicotine dependence presented to ED with slurred speech, weakness unable to ambulate and lethargic. She reports drinking alcohol 2 days ago and has not gotten back to her baseline. She reports THC use. Family reports slurred speech today and increased weakness on right side which was not affected by previous CVA. Valproic acid level and ETOh level normal range. Head CT did not suggest acute infarction. Vitals stable and labs did not show anything of significance. She has a h/o of gabapentin intentional OD, but reports she has not taken more than prescribed    Neurology consult:  Patient was seen examined by me this morning.  She is alert and oriented x3.  She states that she had alcohol 2 days ago which she usually does not and she also has been doing THC.  She states that her left-sided weakness has worsened from her baseline and she also have slow speech.  She states the symptoms have improved now she is back to her baseline.    She denies any headache, vision trouble, nausea or vomiting or dizziness.  She has prior history of gabapentin overdose however she denies any drug abuse this time.  Urine drug screen was positive only for THC.    Patient states that she had a right MCA stroke in 2015 and had baseline left-sided weakness.  After the stroke, she was started on Coumadin and then was transitioned to Xarelto for  possible atrial fibrillation.  She has been compliant with her medications.    PREVIOUS MEDICAL HISTORY:  Past Medical History:   Diagnosis Date    Acid reflux     Allergy     Anemia     Anticoagulated 12/29/2015    Anxiety     Asthma     Bipolar 1 disorder     Bronchospasm with bronchitis, acute     Chronic daily headache 9/11/2018    Chronic kidney disease     Chronic pain     Depression     bipolar 2    Fibromyalgia     Gastroparesis     History of right MCA stroke 11/25/2015    Hx of psychiatric care     Lactose intolerance     Lupus     Mixed hyperlipidemia 11/28/2018    Psychiatric problem     Renal tubular acidosis     Seizure     Sjogren's syndrome     Smoker     Stroke 11-    Substance abuse-kratom daily use 8/30/2020    Suicide attempt     overdosed on a bottle of paxil, muscle relaxers, & zoloft    Therapy      PREVIOUS SURGICAL HISTORY:  Past Surgical History:   Procedure Laterality Date    CHOLECYSTECTOMY      COLONOSCOPY  11/12/2014    Dr. Kimble, repeat at 52 years old for screening    ESOPHAGOGASTRODUODENOSCOPY N/A 1/3/2022    Procedure: EGD (ESOPHAGOGASTRODUODENOSCOPY);  Surgeon: Scout Kimble MD;  Location: [facility];  Service: Endoscopy;  Laterality: N/A;    FLUOROSCOPIC URODYNAMIC STUDY N/A 7/23/2019    Procedure: URODYNAMIC STUDY, FLUOROSCOPIC;  Surgeon: Vanna Martinez MD;  Location: [address];  Service: Urology;  Laterality: N/A;  1 hour    UPPER GASTROINTESTINAL ENDOSCOPY  03/03/2017    Dr. Harris     FAMILY MEDICAL HISTORY:  Family History   Problem Relation Age of Onset    Hypertension Mother     Hyperlipidemia Mother     Psoriasis Mother     Thyroid disease Mother     No Known Problems Father     Post-traumatic stress disorder Brother     Drug abuse Brother     Diabetes Maternal Uncle     Hypertension Maternal Uncle     Alcohol abuse Maternal Uncle     Stroke Maternal Uncle     Scoliosis Paternal Aunt     Heart disease Paternal Uncle     Mental illness Maternal  Grandmother     Cancer Maternal Grandmother         lung / brain - smoker    Drug abuse Maternal Grandmother     Hypertension Maternal Grandmother     Hyperlipidemia Maternal Grandmother     Diabetes Maternal Grandmother     Rheum arthritis Maternal Grandmother     Diabetes Mellitus Maternal Grandmother     Heart disease Maternal Grandfather     Hypertension Maternal Grandfather     Alcohol abuse Maternal Grandfather     Osteoarthritis Maternal Grandfather     No Known Problems Paternal Grandmother     Mental illness Paternal Grandfather     Early death Paternal Grandfather         self    Breast cancer Cousin     Breast cancer Other     Colon cancer Neg Hx     Colon polyps Neg Hx     Crohn's disease Neg Hx     Ulcerative colitis Neg Hx     Celiac disease Neg Hx     Lupus Neg Hx     Kidney disease Neg Hx     Inflammatory bowel disease Neg Hx     Depression Neg Hx     COPD Neg Hx     Asthma Neg Hx     Macular degeneration Neg Hx     Retinal detachment Neg Hx     Glaucoma Neg Hx      SOCIAL HISTORY:  Social History     Tobacco Use    Smoking status: Every Day     Packs/day: 1.00     Years: 15.00     Pack years: 15.00     Types: Cigarettes     Start date: 11/25/2015    Smokeless tobacco: Never   Substance Use Topics    Alcohol use: No     Alcohol/week: 0.0 standard drinks    Drug use: Yes     Types: Marijuana     Comment: smokes occasionally, helps with pain and appetite     ALLERGIES:  Review of patient's allergies indicates:   Allergen Reactions    Metoclopramide hcl Anaphylaxis     Involuntary mouth movements     Amoxicillin-pot clavulanate      2 other medications    Amoxil [amoxicillin]      hives    Avelox [moxifloxacin]      itching    Benadryl [diphenhydramine hcl]      Lowers seizure threshold     Coumadin [warfarin]      toxicity    Quinazolinones      Lowers seizure threshold     Ultram [tramadol]      Lowers seizure threshold     Wellbutrin [bupropion hcl]      Lowers seizure threshold      HOME  MEDICATIONS:  Prior to Admission medications    Medication Sig Start Date End Date Taking? Authorizing Provider   ARIPiprazole (ABILIFY) 15 MG Tab Take 1 tablet (15 mg total) by mouth once daily. 6/16/22 6/16/23 Yes Sakina Cruz MD   BOTOX 200 unit SolR  1/27/22  Yes Historical Provider   divalproex (DEPAKOTE) 250 MG EC tablet TAKE 1 TABLET BY MOUTH EVERY MORNING AND TAKE 2 TABLETS BY MOUTH EVERY NIGHT AT BEDTIME 6/16/22  Yes Sakina Cruz MD   famotidine (PEPCID) 40 MG tablet TAKE 1 TABLET(40 MG) BY MOUTH EVERY EVENING 5/16/22  Yes CONRAD Omer   gabapentin (NEURONTIN) 800 MG tablet TAKE 1 TABLET(800 MG) BY MOUTH TWICE DAILY 8/8/22  Yes Nelea Barrett MD   LIDOcaine-prilocaine (EMLA) cream APPLY TOPICALLY TO THE AFFECTED AREA AS NEEDED 5/29/22  Yes Historical Provider   mirtazapine (REMERON) 30 MG tablet TAKE 1 TABLET(30 MG) BY MOUTH EVERY EVENING 6/16/22  Yes Sakina Cruz MD   pantoprazole (PROTONIX) 40 MG tablet TAKE 1 TABLET(40 MG) BY MOUTH EVERY DAY  Patient taking differently: Take 40 mg by mouth once daily. 3/13/21  Yes Neela Barrett MD   promethazine (PHENERGAN) 25 MG tablet Take 1 tablet (25 mg total) by mouth every 6 (six) hours as needed for Nausea. 8/8/22  Yes Neela Barrett MD   rivaroxaban (XARELTO) 20 mg Tab TAKE 1 TABLET(20 MG) BY MOUTH DAILY WITH THE EVENING MEAL AND DINNER 4/25/22  Yes Neela Barrett MD   traZODone (DESYREL) 100 MG tablet TAKE 1 TO 2 TABLETS BY MOUTH EVERY NIGHT AS NEEDED FOR INSOMNIA 6/16/22  Yes Sakina Crzu MD   propranoloL (INDERAL LA) 80 MG 24 hr capsule Take 1 capsule (80 mg total) by mouth once daily. 8/10/21 8/10/22  Mariano Arredondo MD     CURRENT SCHEDULED MEDICATIONS:   ARIPiprazole  15 mg Oral Daily    atorvastatin  10 mg Oral Daily    divalproex  250 mg Oral Daily    divalproex  500 mg Oral QHS    mirtazapine  15 mg Oral QHS    pantoprazole  40 mg Oral Daily    propranoloL  40 mg Oral BID    rivaroxaban  20 mg Oral  "with dinner    senna-docusate 8.6-50 mg  1 tablet Oral BID     CURRENT INFUSIONS:   sodium chloride 0.9% 75 mL/hr at 10/09/22 0435     CURRENT PRN MEDICATIONS:  acetaminophen, acetaminophen, albuterol-ipratropium, aluminum-magnesium hydroxide-simethicone, dextrose 10%, dextrose 10%, glucagon (human recombinant), glucose, glucose, magnesium oxide, magnesium oxide, naloxone, potassium bicarbonate, potassium bicarbonate, potassium bicarbonate, potassium, sodium phosphates, potassium, sodium phosphates, potassium, sodium phosphates, sodium chloride 0.9%, trazodone    REVIEW OF SYSTEMS:  Please refer to the HPI for all pertinent positive and negative findings. A comprehensive review of all other systems was negative.       PHYSICAL EXAM:  Patient Vitals for the past 24 hrs:   BP Temp Temp src Pulse Resp SpO2 Height Weight   10/09/22 0945 -- -- -- -- -- 100 % -- --   10/09/22 0908 (!) 151/70 -- -- -- -- -- -- --   10/09/22 0735 (!) 151/70 97.7 °F (36.5 °C) -- (!) 48 16 100 % -- --   10/09/22 0730 -- -- -- -- -- 100 % -- --   10/09/22 0346 (!) 168/81 96.7 °F (35.9 °C) -- (!) 46 16 100 % -- --   10/08/22 2351 134/74 96.6 °F (35.9 °C) -- 60 16 100 % -- --   10/08/22 2131 -- -- -- -- -- -- 5' 3" (1.6 m) 48.8 kg (107 lb 9.4 oz)   10/08/22 2122 (!) 146/89 -- -- -- 16 -- -- --   10/08/22 2118 -- 97.5 °F (36.4 °C) Oral (!) 58 -- 99 % -- --   10/08/22 2035 -- -- -- (!) 50 -- 100 % -- --   10/08/22 2003 (!) 152/80 -- -- 63 -- 100 % -- --   10/08/22 1932 (!) 126/59 -- -- (!) 53 -- 100 % -- --   10/08/22 1906 (!) 155/78 -- -- (!) 47 -- 98 % -- --   10/08/22 1851 133/68 -- -- (!) 54 -- (!) 88 % -- --   10/08/22 1836 123/68 -- -- 74 -- 99 % -- --   10/08/22 1815 125/76 -- -- 93 -- 96 % -- --   10/08/22 1731 122/75 -- -- (!) 53 -- 100 % -- --   10/08/22 1702 127/72 -- -- (!) 47 -- 100 % -- --   10/08/22 1631 121/77 -- -- (!) 48 -- 100 % -- --   10/08/22 1502 (!) 150/70 -- -- (!) 50 -- 100 % -- --   10/08/22 1432 138/65 -- -- (!) 51 -- " 100 % -- --   10/08/22 1421 (!) 167/81 -- -- (!) 51 -- 99 % -- --   10/08/22 1355 -- -- -- -- -- 100 % -- --   10/08/22 1331 138/77 -- -- (!) 53 -- 100 % -- --   10/08/22 1218 122/73 -- -- 66 -- 99 % -- --   10/08/22 1106 123/72 97.8 °F (36.6 °C) Oral 108 18 (!) 92 % -- --       GENERAL APPEARANCE: Alert, well-developed, well-nourished female in no acute distress.  HEENT: Normocephalic and atraumatic. PERRL. Oropharynx unremarkable.  PULM: Normal respiratory effort. No accessory muscle use.  CV: RRR.  ABDOMEN: Soft, nontender.  EXTREMITIES: No obvious signs of vascular compromise. Pulses present. No cyanosis, clubbing or edema.  SKIN: Clear; no rashes, lesions or skin breaks in exposed areas.    NEURO:  MENTAL STATUS: Patient awake and oriented to time, place, and person, recent/remote memory normal, attention span/concentration normal, speech fluent without paraphasic errors, good comprehension with appropriate thought content, and fund of knowledge appropriate for patient's level of education.  Affect euthymic.    CRANIAL NERVES:  CN I: Not tested.  CN II: Fundoscopic exam deferred.  CN III, IV, VI: Pupils equal, round and reactive to light.  Extraocular movements full and intact.  CN V: Facial sensation normal.  CN VII: Facial asymmetry noted for left facial droop.  CN VIII: Hearing grossly normal and equal bilaterally.  No skew deviation or pathologic nystagmus.  CN IX, X: Palate elevates symmetrically. Speech/articulation is clear without dysarthria.  CN XI: Shoulder shrug and chin rotation equal with good strength.  CN XII: Tongue protrusion midline.    MOTOR:  Bulk normal. Tone normal and symmetric throughout.  Abnormal movements absent.  Tremor: none present.  Strength:  5/5 in right upper and lower extremity, 4/5 in left upper lower extremity.    REFLEXES:  DTRs 2+ throughout.  Plantar response equivocal bilaterally.  SENSATION:grossly intact throughout.  COORDINATION: normal finger-to-nose.  STATION: not  tested.  GAIT: not tested.      Labs:  Recent Labs   Lab 10/08/22  1209 10/09/22  0321    141   K 4.9 4.8    116*   CO2 23 16*   BUN 12 11   CREATININE 1.6* 1.2   GLU 81 79   CALCIUM 8.7 8.3*   PHOS  --  3.1   MG  --  2.4     Recent Labs   Lab 10/08/22  1209 10/09/22  0321   WBC 4.85 5.39   HGB 12.8 12.7   HCT 37.4 37.7    143*     Recent Labs   Lab 10/08/22  1209 10/09/22  0321   ALBUMIN 3.8 3.3*   PROT 7.2 6.4   BILITOT 0.2 0.3   ALKPHOS 85 69   ALT 17 15   AST 21 18     Lab Results   Component Value Date    INR 1.2 10/08/2022     Lab Results   Component Value Date    TRIG 134 10/08/2022    HDL 34 (L) 10/08/2022    CHOLHDL 23.1 10/08/2022     Lab Results   Component Value Date    HGBA1C 4.9 10/08/2022     Lab Results   Component Value Date    PROTEINCSF 28 03/03/2017    GLUCCSF 45 03/03/2017       Imaging:  I have reviewed and interpreted the pertinent imaging and lab results.      MRI Brain W WO Contrast  Narrative: EXAMINATION:  MRI BRAIN W WO CONTRAST    CLINICAL HISTORY:  Neuro deficit, acute, stroke suspected;    TECHNIQUE:  Routine multiplanar MR imaging of the brain was performed both before and following the intravenous administration of 4 cc of Gadavist intravenous contrast administration    COMPARISON:  CT head without contrast-10/08/2022    FINDINGS:  The current examination once again demonstrates a large area of remote right MCA territory infarction with large areas of encephalomalacia/gliosis observed involving large areas of the right frontal, parietal, and temporal lobes (> 2/3 of the right MCA territory).  There is associated compensatory enlargement of the right lateral ventricle and right sylvian fissure.  There is ipsilateral atrophy of the right cerebral peduncle due to axonal degeneration.  There are foci of remote lacunar infarction also noted within the right basal ganglia.  There is supratentorial cerebral volume loss, greater than one would expect for a patient of this  age.  No evidence of acute/recent major vascular territory cerebral infarction or acute parenchymal hemorrhage.  No enhancing intra-axial mass or enhancing vascular malformation.  There is asymmetrically diminished perfusion of the right MCA territory relative to the left MCA territory on the contrast enhanced images.    No hydrocephalus or effacement of the skull base cisterns.  No extra-axial fluid collections or blood products.  No enhancing extra-axial mass.  The vertebral arteries and basilar artery are diminutive in caliber.  The niurax-vm-Cgmsqo vasculature is diffusely diminutive in caliber without focal > 50% hemodynamically significant stenosis or definite vessel occlusion involving the left or right anterior cerebral artery, posterior cerebral artery, or M1 branches of the middle cerebral artery.  No Chiari malformation.    The mastoid air cells are unremarkable.  There is minimal mucoperiosteal thickening observed within the ethmoid air cells.  The visualized orbits are unremarkable.  Impression: No acute intracranial abnormality appreciated.  Large remote right MCA territory cerebral infarct with large areas of encephalomalacia/gliosis noted within the right frontal, parietal, and temporal lobes.  No evidence of acute/recent major vascular territory cerebral infarction.    The preliminary and final reports are concordant.    Electronically signed by: Devin Bradley MD  Date:    10/08/2022  Time:    22:21  CT Head Without Contrast  Narrative: EXAMINATION:  CT HEAD WITHOUT CONTRAST    CLINICAL HISTORY:  Neuro deficit, acute, stroke suspected;increasing left sided weakness and dysarthria x 2 days.;    TECHNIQUE:  5 mm low dose non-contrast contiguous axial images were acquired through the head.  Subsequently, 2 dimensional coronal and sagittal reconstructed images were generated from the source data.    COMPARISON:  CT of the head without contrast-09/18/2021.    FINDINGS:  The brain is normally formed with  preserved gray-white matter junction differentiation. No evidence of acute/recent major vascular territory cerebral infarction, parenchymal hemorrhage, or intra-axial mass.  There is supratentorial cerebral volume loss, greater than one would expect for a patient of this chronologic age.  There are large areas of encephalomalacia/gliosis present within the right MCA territory involving the right frontal and parietal cortices and subcortical white matter compatible with prior ischemia/infarction.  There is associated compensatory enlargement of the CSF spaces over the right cerebral convexity and compensatory enlargement of the right lateral ventricle..  There is a remote focus of lacunar infarction present within the right thalamus, similar to the prior exam.    No hydrocephalus.  No effacement of the skull-base cisterns.  No extra-axial fluid collections or blood products.    The paranasal sinuses and mastoid air cells are clear.  The visualized orbits are unremarkable.  The bony calvarium and visualized facial bones show no acute abnormality.  Impression: No acute intracranial abnormality appreciated.  No interval detrimental change when compared to the prior examination.    Electronically signed by: Devin Bradley MD  Date:    10/08/2022  Time:    14:20         ASSESSMENT & PLAN:    Recrudescence of stroke symptoms      Plan:  Worsening left-sided weakness is likely in the setting of recrudescence of prior stroke symptoms in the setting of alcohol use.  Patient is back to her baseline.    MRI brain was done and was negative for acute abnormalities.  Continue Xarelto and Lipitor for secondary stroke prevention.   PT and OT   Management of metabolic derangements per primary team  No further neurological recommendations at this time.  Will follow patient as needed.  Please call with any questions    Recommend patient to follow up outpatient with Neurology and Cardiology.      Thank you kindly for including us in the  care of this patient. Please do not hesitate to contact us with any questions.        45 minutes of care time has been spent evaluating with the patient. Time includes chart review not limited to diagnostic imaging, labs, and vitals, patient assessment, discussion with family and nursing, current order evaluations, and new order entries.       Elias Rae MD  Neurology/vascular Neurology  Date of Service: 10/09/2022  10:06 AM        Please note: This note was transcribed using voice recognition software. Because of this technology there are often uinintended grammatical, spelling, and other transcription errors. Please disregard these errors.

## 2022-10-09 NOTE — PLAN OF CARE
[facility]  Initial Discharge Assessment       Primary Care Provider: Neela Barrett MD    Admission Diagnosis: Lethargy [R53.83]  Polypharmacy [Z79.899]  Generalized weakness [R53.1]  Chest pain [R07.9]    Admission Date: 10/8/2022  Expected Discharge Date:     Discharge Barriers Identified: None    Payor: HU[employer] MANAGED MEDICARE / Plan: HUMANA SNP (SPECIAL NEEDS PLAN) / Product Type: Medicare Advantage /     Extended Emergency Contact Information  Primary Emergency Contact: Arabella Rose  Address: [address] [place] [id number] [facility]  Home Phone: [phone]  Mobile Phone: [phone]  Relation: Mother  Preferred language: English   needed? No  Secondary Emergency Contact: AdelatanyaPatrick   Address: [address] [place] [id number] [facility]  Home Phone: [phone]  Relation: Brother  Preferred language: English   needed? No    Discharge Plan A: Home with family  Discharge Plan B: Home Health      [id number] - [place], [place] - [address]  [address]  [facility] [address]  Phone: [phone] Fax: [phone]    [id number] - [place], [place] - [address] AT [facility] &   [address]  [place] [address]  Phone: [phone] Fax: [phone]      Completed DC assessment with pt at bedside. Verified information on facesheet as correct. Denies POA. Reports BLANCHE is her mother, Arabella. Reports living at listed address with her best friend, Scout, and his family. Verified PCP as Dr. Barrett and pharm as [facility] [address]. Denies hh/hd/dme. On xarelto at home. Reports being independent with activities but does not drive. Reports that Scout provides transportation to [facility] and will provide transportation home upon DC. Verified insurance on file. Denies recent inpt stay in last 30 days. DC plan is home. BENSON  following    Initial Assessment (most recent)       Adult Discharge Assessment - 10/09/22 1151          Discharge Assessment    Assessment Type Discharge Planning Assessment     Confirmed/corrected address, phone number and insurance Yes     Confirmed Demographics Correct on Facesheet     Source of Information patient     Does patient/caregiver understand observation status Yes     Communicated EDY with patient/caregiver Yes     Reason For Admission toxicity     Lives With friend(s)     Facility Arrived From: home     Do you expect to return to your current living situation? Yes     Do you have help at home or someone to help you manage your care at home? Yes     Prior to hospitilization cognitive status: Alert/Oriented     Current cognitive status: Alert/Oriented     Walking or Climbing Stairs Difficulty none     Dressing/Bathing Difficulty none     Equipment Currently Used at Home none     Readmission within 30 days? No     Patient currently being followed by outpatient case management? No     Do you currently have service(s) that help you manage your care at home? No     Do you take prescription medications? Yes     Do you have prescription coverage? Yes     Coverage [employer] medicare     Do you have any problems affording any of your prescribed medications? No     Is the patient taking medications as prescribed? yes     Who is going to help you get home at discharge? Scout     How do you get to doctors appointments? family or friend will provide     Are you on dialysis? No     Do you take coumadin? No     Discharge Plan A Home with family     Discharge Plan B Home Health     DME Needed Upon Discharge  none     Discharge Plan discussed with: Patient     Discharge Barriers Identified None        Physical Activity    On average, how many days per week do you engage in moderate to strenuous exercise (like a brisk walk)? 0 days     On average, how many minutes do you engage in exercise at this level? 0 min         Financial Resource Strain    How hard is it for you to pay for the very basics like food, housing, medical care, and heating? Not hard at all        Housing Stability    In the last 12 months, was there a time when you were not able to pay the mortgage or rent on time? No     In the last 12 months, how many places have you lived? 3     In the last 12 months, was there a time when you did not have a steady place to sleep or slept in a shelter (including now)? No        Transportation Needs    In the past 12 months, has lack of transportation kept you from medical appointments or from getting medications? No     In the past 12 months, has lack of transportation kept you from meetings, work, or from getting things needed for daily living? No        Food Insecurity    Within the past 12 months, you worried that your food would run out before you got the money to buy more. Never true     Within the past 12 months, the food you bought just didn't last and you didn't have money to get more. Never true        Stress    Do you feel stress - tense, restless, nervous, or anxious, or unable to sleep at night because your mind is troubled all the time - these days? Not at all        Social Connections    In a typical week, how many times do you talk on the phone with family, friends, or neighbors? Three times a week     How often do you get together with friends or relatives? More than three times a week     How often do you attend Mormon or Church services? Never     Do you belong to any clubs or organizations such as Mormon groups, unions, fraternal or athletic groups, or school groups? No     How often do you attend meetings of the clubs or organizations you belong to? Never     Are you , , , , never , or living with a partner?         Alcohol Use    Q1: How often do you have a drink containing alcohol? Monthly or less     Q2: How many drinks containing alcohol do you have on a  typical day when you are drinking? 3 or 4     Q3: How often do you have six or more drinks on one occasion? Never

## 2022-10-09 NOTE — NURSING
Pt was seen by Dr. Chong this am.  Awaiting neuro consult.  Possible discharge if pt is cleared by neuro.  Pt is complaining about PIV to R arm, but site looks fine and pt is a hard stick.  Used US on pt down in ER in order to get PIV.  No c/o pain voiced.  Pt ambulatory.  No other needs at this time.  Will continue to monitor.

## 2022-10-09 NOTE — PLAN OF CARE
Pt clear for DC from case management standpoint. Discharging to home      Unable to schedule apts due to weekend     10/09/22 1216   Final Note   Assessment Type Final Discharge Note   Anticipated Discharge Disposition Home

## 2022-10-09 NOTE — DISCHARGE SUMMARY
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 She reports drinking alcohol 2 days ago and has not gotten back to her baseline. She reports THC use. Family reports slurred speech today and increased weakness on right side which was not affected by previous CVA. Valproic acid level and ETOh level normal range. Head CT did not suggest acute infarction. Vitals stable and labs did not show anything of significance. She has a h/o of gabapentin intentional OD, but reports she has not taken more than prescribed. Hospital medicine to admit for further management.       * No surgery found *      Hospital Course:   Patient with hx seizure disorder and CVA with left hemiplegia admitted due to slurred speech, difficulty ambulating, and lethargy. She reported this occurred after drinking etoh few days prior to admit. Negative etoh level on admission. Head CT without acute findings. MRI brain without acute finding. UDS only positive for THC for which she admitted daily use and no change in substance. Valproate level WNL. Had hx of gabapentin overdose suicide attempt in past which she denied current attempt and gabapentin level was unable to be checked at this facility. Given IVF. Monitored overnight on telemetry and she returned to her baseline. She reported that this happened before when she was 20 years old after  using etoh and she reports that is the last time she used etoh prior to this. Neurology consulted and evaluated the patient attributing symptoms related to her old stroke without recommended med changes. By time of discharge the patient was tolerating a regular diet with resolved admission symptoms.    Physical Exam on day of discharge:  General - Patient alert and oriented x3 in NAD  CV - Regular rhythm, bradycardic, No Murmur/jessica/rubs  Resp - Lungs CTA Bilaterally, No increased WOB  GI - BS normoactive, soft, non-tender/non-distended, no HSM  Extrem-  LUE weak with contracture, LLE weakness. No cyanosis, clubbing, edema.   Skin -  No masses, rashes or lesions noted on cursory skin exam.           Goals of Care Treatment Preferences:  Code Status: Full Code      Consults:   Consults (From admission, onward)        Status Ordering Provider     Inpatient consult to Neurology  Once        Provider:  (Not yet assigned)    Acknowledged BREANN ASHTON          No new Assessment & Plan notes have been filed under this hospital service since the last note was generated.  Service: Hospital Medicine    Final Active Diagnoses:    Diagnosis Date Noted POA    PRINCIPAL PROBLEM:  Toxicity, late effect, due to drug, medicine, or biological substance [T50.905S] 10/08/2022 Not Applicable    Bipolar 1 disorder, depressed, partial remission [F31.75] 12/22/2019 Yes    Complex partial epilepsy with generalization and with intractable epilepsy [G40.219] 09/11/2018 Yes    Cigarette nicotine dependence without complication [F17.210] 11/03/2016 Yes    Gastroesophageal reflux disease without esophagitis [K21.9] 12/29/2015 Yes    Long term current use of anticoagulant therapy [Z79.01] 12/29/2015 Not Applicable    CKD (chronic kidney disease) stage 3, GFR 30-59 ml/min [N18.30] 11/25/2015 Yes     Chronic    Hemiparesis affecting left side as late effect of cerebrovascular accident [I69.354] 11/25/2015 Not Applicable    Lupus  (systemic lupus erythematosus) [M32.9] 11/27/2013 Yes      Problems Resolved During this Admission:       Discharged Condition: good    Disposition: Home or Self Care    Follow Up:   Follow-up Information     Neela Barrett MD. Schedule an appointment as soon as possible for a visit in 1 week(s).    Specialty: Family Medicine  Contact information:  [address] [place] [id number]  [phone]                       Patient Instructions:      Ambulatory referral/consult to Outpatient Case Management   Referral Priority: Routine Referral Type: Consultation   Referral Reason: Specialty Services Required   Number of Visits Requested: 1     Diet Cardiac     Other restrictions (specify):   Order Comments: Avoid alcohol or illicit drug use     Notify your health care provider if you experience any of the following:  temperature >100.4     Notify your health care provider if you experience any of the following:  persistent nausea and vomiting or diarrhea     Notify your health care provider if you experience any of the following:  severe uncontrolled pain     Notify your health care provider if you experience any of the following:  redness, tenderness, or signs of infection (pain, swelling, redness, odor or green/yellow discharge around incision site)     Notify your health care provider if you experience any of the following:  difficulty breathing or increased cough     Notify your health care provider if you experience any of the following:  severe persistent headache     Notify your health care provider if you experience any of the following:  worsening rash     Notify your health care provider if you experience any of the following:  persistent dizziness, light-headedness, or visual disturbances     Notify your health care provider if you experience any of the following:  increased confusion or weakness     Activity as tolerated       Significant Diagnostic Studies:    Admission on 10/08/2022   Component Date Value  Ref Range Status    WBC 10/08/2022 4.85  3.90 - 12.70 K/uL Final    RBC 10/08/2022 3.90 (L)  4.00 - 5.40 M/uL Final    Hemoglobin 10/08/2022 12.8  12.0 - 16.0 g/dL Final    Hematocrit 10/08/2022 37.4  37.0 - 48.5 % Final    MCV 10/08/2022 96  82 - 98 fL Final    MCH 10/08/2022 32.8 (H)  27.0 - 31.0 pg Final    MCHC 10/08/2022 34.2  32.0 - 36.0 g/dL Final    RDW 10/08/2022 12.6  11.5 - 14.5 % Final    Platelets 10/08/2022 206  150 - 450 K/uL Final    MPV 10/08/2022 11.2  9.2 - 12.9 fL Final    Immature Granulocytes 10/08/2022 0.4  0.0 - 0.5 % Final    Gran # (ANC) 10/08/2022 2.5  1.8 - 7.7 K/uL Final    Immature Grans (Abs) 10/08/2022 0.02  0.00 - 0.04 K/uL Final    Comment: Mild elevation in immature granulocytes is non specific and   can be seen in a variety of conditions including stress response,   acute inflammation, trauma and pregnancy. Correlation with other   laboratory and clinical findings is essential.      Lymph # 10/08/2022 2.0  1.0 - 4.8 K/uL Final    Mono # 10/08/2022 0.3  0.3 - 1.0 K/uL Final    Eos # 10/08/2022 0.1  0.0 - 0.5 K/uL Final    Baso # 10/08/2022 0.03  0.00 - 0.20 K/uL Final    nRBC 10/08/2022 0  0 /100 WBC Final    Gran % 10/08/2022 50.6  38.0 - 73.0 % Final    Lymph % 10/08/2022 40.6  18.0 - 48.0 % Final    Mono % 10/08/2022 6.4  4.0 - 15.0 % Final    Eosinophil % 10/08/2022 1.4  0.0 - 8.0 % Final    Basophil % 10/08/2022 0.6  0.0 - 1.9 % Final    Differential Method 10/08/2022 Automated   Final    Sodium 10/08/2022 140  136 - 145 mmol/L Final    Potassium 10/08/2022 4.9  3.5 - 5.1 mmol/L Final    Chloride 10/08/2022 105  95 - 110 mmol/L Final    CO2 10/08/2022 23  23 - 29 mmol/L Final    Glucose 10/08/2022 81  70 - 110 mg/dL Final    BUN 10/08/2022 12  6 - 20 mg/dL Final    Creatinine 10/08/2022 1.6 (H)  0.5 - 1.4 mg/dL Final    Calcium 10/08/2022 8.7  8.7 - 10.5 mg/dL Final    Total Protein 10/08/2022 7.2  6.0 - 8.4 g/dL Final    Albumin 10/08/2022  3.8  3.5 - 5.2 g/dL Final    Total Bilirubin 10/08/2022 0.2  0.1 - 1.0 mg/dL Final    Comment: For infants and newborns, interpretation of results should be based  on gestational age, weight and in agreement with clinical  observations.    Premature Infant recommended reference ranges:  Up to 24 hours.............<8.0 mg/dL  Up to 48 hours............<12.0 mg/dL  3-5 days..................<15.0 mg/dL  6-29 days.................<15.0 mg/dL      Alkaline Phosphatase 10/08/2022 85  55 - 135 U/L Final    AST 10/08/2022 21  10 - 40 U/L Final    ALT 10/08/2022 17  10 - 44 U/L Final    Anion Gap 10/08/2022 12  8 - 16 mmol/L Final    eGFR 10/08/2022 42 (A)  >60 mL/min/1.73 m^2 Final    TSH 10/08/2022 0.703  0.400 - 4.000 uIU/mL Final    Cholesterol 10/08/2022 147  120 - 199 mg/dL Final    Comment: The National Cholesterol Education Program (NCEP) has set the  following guidelines (reference ranges) for Cholesterol:  Optimal.....................<200 mg/dL  Borderline High.............200-239 mg/dL  High........................> or = 240 mg/dL      Triglycerides 10/08/2022 134  30 - 150 mg/dL Final    Comment: The National Cholesterol Education Program (NCEP) has set the  following guidelines (reference values) for triglycerides:  Normal......................<150 mg/dL  Borderline High.............150-199 mg/dL  High........................200-499 mg/dL      HDL 10/08/2022 34 (L)  40 - 75 mg/dL Final    Comment: The National Cholesterol Education Program (NCEP) has set the  following guidelines (reference values) for HDL Cholesterol:  Low...............<40 mg/dL  Optimal...........>60 mg/dL      LDL Cholesterol 10/08/2022 86.2  63.0 - 159.0 mg/dL Final    Comment: The National Cholesterol Education Program (NCEP) has set the  following guidelines (reference values) for LDL Cholesterol:  Optimal.......................<130 mg/dL  Borderline High...............130-159 mg/dL  High..........................160-189  mg/dL  Very High.....................>190 mg/dL      HDL/Cholesterol Ratio 10/08/2022 23.1  20.0 - 50.0 % Final    Total Cholesterol/HDL Ratio 10/08/2022 4.3  2.0 - 5.0 Final    Non-HDL Cholesterol 10/08/2022 113  mg/dL Final    Comment: Risk category and Non-HDL cholesterol goals:  Coronary heart disease (CHD)or equivalent (10-year risk of CHD >20%):  Non-HDL cholesterol goal     <130 mg/dL  Two or more CHD risk factors and 10-year risk of CHD <= 20%:  Non-HDL cholesterol goal     <160 mg/dL  0 to 1 CHD risk factor:  Non-HDL cholesterol goal     <190 mg/dL      Prothrombin Time 10/08/2022 12.5  9.0 - 12.5 sec Final    INR 10/08/2022 1.2  0.8 - 1.2 Final    Comment: Coumadin Therapy:  2.0 - 3.0 for INR for all indicators except mechanical heart valves  and antiphospholipid syndromes which should use 2.5 - 3.5.      Specimen UA 10/08/2022 Urine, Clean Catch   Final    Color, UA 10/08/2022 Red (A)  Yellow, Straw, Britany Final    Appearance, UA 10/08/2022 Clear  Clear Final    pH, UA 10/08/2022 8.0  5.0 - 8.0 Final    Specific Marshall, UA 10/08/2022 1.010  1.005 - 1.030 Final    Protein, UA 10/08/2022 1+ (A)  Negative Final    Comment: Recommend a 24 hour urine protein or a urine   protein/creatinine ratio if globulin induced proteinuria is  clinically suspected.      Glucose, UA 10/08/2022 Negative  Negative Final    Ketones, UA 10/08/2022 Negative  Negative Final    Bilirubin (UA) 10/08/2022 Negative  Negative Final    Occult Blood UA 10/08/2022 3+ (A)  Negative Final    Nitrite, UA 10/08/2022 Negative  Negative Final    Urobilinogen, UA 10/08/2022 Negative  <2.0 EU/dL Final    Leukocytes, UA 10/08/2022 Trace (A)  Negative Final    Valproic Acid Level 10/08/2022 63.5  50.0 - 100.0 ug/mL Final    Comment: Valproic Acid (ug/ml)  Toxic:   >100.0 ug/ml      Alcohol, Serum 10/08/2022 <10  <10 mg/dL Final    Benzodiazepines 10/08/2022 Negative  Negative Final    Methadone metabolites 10/08/2022  Negative  Negative Final    Cocaine (Metab.) 10/08/2022 Negative  Negative Final    Opiate Scrn, Ur 10/08/2022 Negative  Negative Final    Barbiturate Screen, Ur 10/08/2022 Negative  Negative Final    Amphetamine Screen, Ur 10/08/2022 Negative  Negative Final    THC 10/08/2022 Presumptive Positive (A)  Negative Final    Phencyclidine 10/08/2022 Negative  Negative Final    Creatinine, Urine 10/08/2022 41.1  15.0 - 325.0 mg/dL Final    Toxicology Information 10/08/2022 SEE COMMENT   Final    Comment: This screen includes the following classes of drugs at the listed   cut-off:    Benzodiazepines 200 ng/ml  Methadone 300 ng/ml  Cocaine metabolite 300 ng/ml  Opiates 300 ng/ml  Barbiturates 200 ng/ml  Amphetamines 1000 ng/ml  Marijuana metabs (THC) 50 ng/ml  Phencyclidine (PCP) 25 ng/ml    This is a screening test. If results do not correlate with clinical   presentation, then a confirmatory send out test is advised.     This report is intended for use in clinical monitoring and management   of   patients. It is not intended for use in employment related drug   testing.      RBC, UA 10/08/2022 >100 (H)  0 - 4 /hpf Final    WBC, UA 10/08/2022 2  0 - 5 /hpf Final    Bacteria 10/08/2022 None  None-Occ /hpf Final    Hyaline Casts, UA 10/08/2022 0  0-1/lpf /lpf Final    Microscopic Comment 10/08/2022 SEE COMMENT   Final    Comment: Other formed elements not mentioned in the report are not   present in the microscopic examination.       CPK 10/08/2022 20  20 - 180 U/L Final    Free T4 10/08/2022 0.85  0.71 - 1.51 ng/dL Final    Hemoglobin A1C 10/08/2022 4.9  4.0 - 5.6 % Final    Comment: ADA Screening Guidelines:  5.7-6.4%  Consistent with prediabetes  >or=6.5%  Consistent with diabetes    High levels of fetal hemoglobin interfere with the HbA1C  assay. Heterozygous hemoglobin variants (HbS, HgC, etc)do  not significantly interfere with this assay.   However, presence of multiple variants may affect accuracy.       Estimated Avg Glucose 10/08/2022 94  68 - 131 mg/dL Final    Ferritin 10/08/2022 46  20.0 - 300.0 ng/mL Final    Iron 10/08/2022 88  30 - 160 ug/dL Final    Transferrin 10/08/2022 257  200 - 375 mg/dL Final    TIBC 10/08/2022 380  250 - 450 ug/dL Final    Saturated Iron 10/08/2022 23  20 - 50 % Final    Sodium 10/09/2022 141  136 - 145 mmol/L Final    Potassium 10/09/2022 4.8  3.5 - 5.1 mmol/L Final    Chloride 10/09/2022 116 (H)  95 - 110 mmol/L Final    CO2 10/09/2022 16 (L)  23 - 29 mmol/L Final    Glucose 10/09/2022 79  70 - 110 mg/dL Final    BUN 10/09/2022 11  6 - 20 mg/dL Final    Creatinine 10/09/2022 1.2  0.5 - 1.4 mg/dL Final    Calcium 10/09/2022 8.3 (L)  8.7 - 10.5 mg/dL Final    Total Protein 10/09/2022 6.4  6.0 - 8.4 g/dL Final    Albumin 10/09/2022 3.3 (L)  3.5 - 5.2 g/dL Final    Total Bilirubin 10/09/2022 0.3  0.1 - 1.0 mg/dL Final    Comment: For infants and newborns, interpretation of results should be based  on gestational age, weight and in agreement with clinical  observations.    Premature Infant recommended reference ranges:  Up to 24 hours.............<8.0 mg/dL  Up to 48 hours............<12.0 mg/dL  3-5 days..................<15.0 mg/dL  6-29 days.................<15.0 mg/dL      Alkaline Phosphatase 10/09/2022 69  55 - 135 U/L Final    AST 10/09/2022 18  10 - 40 U/L Final    ALT 10/09/2022 15  10 - 44 U/L Final    Anion Gap 10/09/2022 9  8 - 16 mmol/L Final    eGFR 10/09/2022 59 (A)  >60 mL/min/1.73 m^2 Final    Magnesium 10/09/2022 2.4  1.6 - 2.6 mg/dL Final    Phosphorus 10/09/2022 3.1  2.7 - 4.5 mg/dL Final    WBC 10/09/2022 5.39  3.90 - 12.70 K/uL Final    RBC 10/09/2022 3.93 (L)  4.00 - 5.40 M/uL Final    Hemoglobin 10/09/2022 12.7  12.0 - 16.0 g/dL Final    Hematocrit 10/09/2022 37.7  37.0 - 48.5 % Final    MCV 10/09/2022 96  82 - 98 fL Final    MCH 10/09/2022 32.3 (H)  27.0 - 31.0 pg Final    MCHC 10/09/2022 33.7  32.0 - 36.0 g/dL Final    RDW  10/09/2022 12.3  11.5 - 14.5 % Final    Platelets 10/09/2022 143 (L)  150 - 450 K/uL Final    MPV 10/09/2022 12.7  9.2 - 12.9 fL Final    Immature Granulocytes 10/09/2022 0.4  0.0 - 0.5 % Final    Gran # (ANC) 10/09/2022 2.5  1.8 - 7.7 K/uL Final    Immature Grans (Abs) 10/09/2022 0.02  0.00 - 0.04 K/uL Final    Comment: Mild elevation in immature granulocytes is non specific and   can be seen in a variety of conditions including stress response,   acute inflammation, trauma and pregnancy. Correlation with other   laboratory and clinical findings is essential.      Lymph # 10/09/2022 2.4  1.0 - 4.8 K/uL Final    Mono # 10/09/2022 0.3  0.3 - 1.0 K/uL Final    Eos # 10/09/2022 0.1  0.0 - 0.5 K/uL Final    Baso # 10/09/2022 0.04  0.00 - 0.20 K/uL Final    nRBC 10/09/2022 0  0 /100 WBC Final    Gran % 10/09/2022 46.0  38.0 - 73.0 % Final    Lymph % 10/09/2022 44.9  18.0 - 48.0 % Final    Mono % 10/09/2022 6.3  4.0 - 15.0 % Final    Eosinophil % 10/09/2022 1.7  0.0 - 8.0 % Final    Basophil % 10/09/2022 0.7  0.0 - 1.9 % Final    Differential Method 10/09/2022 Automated   Final         CT HEAD WITHOUT CONTRAST 10/8/22     CLINICAL HISTORY:  Neuro deficit, acute, stroke suspected;increasing left sided weakness and dysarthria x 2 days.;     TECHNIQUE:  5 mm low dose non-contrast contiguous axial images were acquired through the head.  Subsequently, 2 dimensional coronal and sagittal reconstructed images were generated from the source data.     COMPARISON:  CT of the head without contrast-09/18/2021.     FINDINGS:  The brain is normally formed with preserved gray-white matter junction differentiation. No evidence of acute/recent major vascular territory cerebral infarction, parenchymal hemorrhage, or intra-axial mass.  There is supratentorial cerebral volume loss, greater than one would expect for a patient of this chronologic age.  There are large areas of encephalomalacia/gliosis present within the right  MCA territory involving the right frontal and parietal cortices and subcortical white matter compatible with prior ischemia/infarction.  There is associated compensatory enlargement of the CSF spaces over the right cerebral convexity and compensatory enlargement of the right lateral ventricle..  There is a remote focus of lacunar infarction present within the right thalamus, similar to the prior exam.     No hydrocephalus.  No effacement of the skull-base cisterns.  No extra-axial fluid collections or blood products.     The paranasal sinuses and mastoid air cells are clear.  The visualized orbits are unremarkable.  The bony calvarium and visualized facial bones show no acute abnormality.     Impression:     No acute intracranial abnormality appreciated.  No interval detrimental change when compared to the prior examination.                MRI BRAIN W WO CONTRAST 10/8/22     CLINICAL HISTORY:  Neuro deficit, acute, stroke suspected;     TECHNIQUE:  Routine multiplanar MR imaging of the brain was performed both before and following the intravenous administration of 4 cc of Gadavist intravenous contrast administration     COMPARISON:  CT head without contrast-10/08/2022     FINDINGS:  The current examination once again demonstrates a large area of remote right MCA territory infarction with large areas of encephalomalacia/gliosis observed involving large areas of the right frontal, parietal, and temporal lobes (> 2/3 of the right MCA territory).  There is associated compensatory enlargement of the right lateral ventricle and right sylvian fissure.  There is ipsilateral atrophy of the right cerebral peduncle due to axonal degeneration.  There are foci of remote lacunar infarction also noted within the right basal ganglia.  There is supratentorial cerebral volume loss, greater than one would expect for a patient of this age.  No evidence of acute/recent major vascular territory cerebral infarction or acute parenchymal  hemorrhage.  No enhancing intra-axial mass or enhancing vascular malformation.  There is asymmetrically diminished perfusion of the right MCA territory relative to the left MCA territory on the contrast enhanced images.     No hydrocephalus or effacement of the skull base cisterns.  No extra-axial fluid collections or blood products.  No enhancing extra-axial mass.  The vertebral arteries and basilar artery are diminutive in caliber.  The dertud-oo-Rbmgac vasculature is diffusely diminutive in caliber without focal > 50% hemodynamically significant stenosis or definite vessel occlusion involving the left or right anterior cerebral artery, posterior cerebral artery, or M1 branches of the middle cerebral artery.  No Chiari malformation.     The mastoid air cells are unremarkable.  There is minimal mucoperiosteal thickening observed within the ethmoid air cells.  The visualized orbits are unremarkable.     Impression:     No acute intracranial abnormality appreciated.  Large remote right MCA territory cerebral infarct with large areas of encephalomalacia/gliosis noted within the right frontal, parietal, and temporal lobes.  No evidence of acute/recent major vascular territory cerebral infarction.     The preliminary and final reports are concordant.          Pending Diagnostic Studies:     None         Medications:  Reconciled Home Medications:      Medication List      CONTINUE taking these medications    ARIPiprazole 15 MG Tab  Commonly known as: ABILIFY  Take 1 tablet (15 mg total) by mouth once daily.     atorvastatin 10 MG tablet  Commonly known as: LIPITOR  Take 1 tablet (10 mg total) by mouth once daily.     BOTOX 200 unit Solr  Generic drug: onabotulinumtoxina     divalproex 250 MG EC tablet  Commonly known as: DEPAKOTE  TAKE 1 TABLET BY MOUTH EVERY MORNING AND TAKE 2 TABLETS BY MOUTH EVERY NIGHT AT BEDTIME     famotidine 40 MG tablet  Commonly known as: PEPCID  TAKE 1 TABLET(40 MG) BY MOUTH EVERY EVENING      gabapentin 800 MG tablet  Commonly known as: NEURONTIN  TAKE 1 TABLET(800 MG) BY MOUTH TWICE DAILY     LIDOcaine-prilocaine cream  Commonly known as: EMLA  APPLY TOPICALLY TO THE AFFECTED AREA AS NEEDED     mirtazapine 30 MG tablet  Commonly known as: REMERON  TAKE 1 TABLET(30 MG) BY MOUTH EVERY EVENING     pantoprazole 40 MG tablet  Commonly known as: PROTONIX  TAKE 1 TABLET(40 MG) BY MOUTH EVERY DAY     promethazine 25 MG tablet  Commonly known as: PHENERGAN  Take 1 tablet (25 mg total) by mouth every 6 (six) hours as needed for Nausea.     propranoloL 80 MG 24 hr capsule  Commonly known as: INDERAL LA  Take 1 capsule (80 mg total) by mouth once daily.     rivaroxaban 20 mg Tab  Commonly known as: XARELTO  TAKE 1 TABLET(20 MG) BY MOUTH DAILY WITH THE EVENING MEAL AND DINNER     traZODone 100 MG tablet  Commonly known as: DESYREL  TAKE 1 TO 2 TABLETS BY MOUTH EVERY NIGHT AS NEEDED FOR INSOMNIA            Indwelling Lines/Drains at time of discharge:   Lines/Drains/Airways     None                 Time spent on the discharge of patient: 35 minutes         Paolo Chong MD  Department of Hospital Medicine  [facility]

## 2022-10-09 NOTE — PLAN OF CARE
Plan of care reviewed with patient. Patient verbalized complete understanding. Pt awake and alert. Pt not complaining of pain or N/V. Pt able to walk to the bathroom with no assist. IV CDI. Pt on tele.   All fall precautions maintained, bed in lowest position, locked, call light within reach. Side rails up times 2. Slip resistant socks maintained.

## 2022-10-09 NOTE — HOSPITAL COURSE
Patient with hx seizure disorder and CVA with left hemiplegia admitted due to slurred speech, difficulty ambulating, and lethargy. She reported this occurred after drinking etoh few days prior to admit. Negative etoh level on admission. Head CT without acute findings. MRI brain without acute finding. UDS only positive for THC for which she admitted daily use and no change in substance. Valproate level WNL. Had hx of gabapentin overdose suicide attempt in past which she denied current attempt and gabapentin level was unable to be checked at this facility. Given IVF. Monitored overnight on telemetry and she returned to her baseline. She reported that this happened before when she was 20 years old after using etoh and she reports that is the last time she used etoh prior to this. Neurology consulted and evaluated the patient attributing symptoms related to her old stroke without recommended med changes. By time of discharge the patient was tolerating a regular diet with resolved admission symptoms.    Physical Exam on day of discharge:  General - Patient alert and oriented x3 in NAD  CV - Regular rhythm, bradycardic, No Murmur/jessica/rubs  Resp - Lungs CTA Bilaterally, No increased WOB  GI - BS normoactive, soft, non-tender/non-distended, no HSM  Extrem-  LUE weak with contracture, LLE weakness. No cyanosis, clubbing, edema.   Skin -  No masses, rashes or lesions noted on cursory skin exam.

## 2022-10-10 ENCOUNTER — TELEPHONE (OUTPATIENT)
Dept: MEDSURG UNIT | Facility: HOSPITAL | Age: 42
End: 2022-10-10
Payer: MEDICARE

## 2022-10-10 ENCOUNTER — TELEPHONE (OUTPATIENT)
Dept: FAMILY MEDICINE | Facility: CLINIC | Age: 42
End: 2022-10-10
Payer: MEDICARE

## 2022-10-10 NOTE — TELEPHONE ENCOUNTER
----- Message from Tracey Sykes RN sent at 10/9/2022 12:17 PM CDT -----  Regarding: hospital follow up  Good afternoon    This pt is discharging today and needs a hospital follow up scheduled within 7 days of DC. Please contact pt for scheduling. Thank you!

## 2022-10-17 ENCOUNTER — CLINICAL SUPPORT (OUTPATIENT)
Dept: CARDIOLOGY | Facility: HOSPITAL | Age: 42
End: 2022-10-17
Attending: INTERNAL MEDICINE
Payer: MEDICARE

## 2022-10-17 VITALS — HEIGHT: 63 IN | HEART RATE: 106 BPM | BODY MASS INDEX: 18.96 KG/M2 | WEIGHT: 107 LBS

## 2022-10-17 DIAGNOSIS — I69.354 HEMIPARESIS AFFECTING LEFT SIDE AS LATE EFFECT OF CEREBROVASCULAR ACCIDENT: ICD-10-CM

## 2022-10-17 DIAGNOSIS — R00.0 TACHYCARDIA: ICD-10-CM

## 2022-10-17 DIAGNOSIS — R68.89 IMPAIRED FUNCTION OF UPPER EXTREMITY: ICD-10-CM

## 2022-10-17 DIAGNOSIS — J45.909 CHRONIC ASTHMA WITHOUT COMPLICATION, UNSPECIFIED ASTHMA SEVERITY, UNSPECIFIED WHETHER PERSISTENT: ICD-10-CM

## 2022-10-17 DIAGNOSIS — Z86.73 HISTORY OF RIGHT MCA STROKE: ICD-10-CM

## 2022-10-17 DIAGNOSIS — T42.6X2A: ICD-10-CM

## 2022-10-17 DIAGNOSIS — E78.2 MIXED HYPERLIPIDEMIA: ICD-10-CM

## 2022-10-17 LAB
ASCENDING AORTA: 2.32 CM
AV INDEX (PROSTH): 0.81
AV MEAN GRADIENT: 5 MMHG
AV PEAK GRADIENT: 8 MMHG
AV VALVE AREA: 1.57 CM2
AV VELOCITY RATIO: 0.77
BSA FOR ECHO PROCEDURE: 1.47 M2
CV ECHO LV RWT: 0.5 CM
DOP CALC AO PEAK VEL: 1.45 M/S
DOP CALC AO VTI: 23.4 CM
DOP CALC LVOT AREA: 1.9 CM2
DOP CALC LVOT DIAMETER: 1.57 CM
DOP CALC LVOT PEAK VEL: 1.11 M/S
DOP CALC LVOT STROKE VOLUME: 36.76 CM3
DOP CALCLVOT PEAK VEL VTI: 19 CM
ECHO LV POSTERIOR WALL: 0.75 CM (ref 0.6–1.1)
EJECTION FRACTION: 65 %
FRACTIONAL SHORTENING: 31 % (ref 28–44)
INTERVENTRICULAR SEPTUM: 1.05 CM (ref 0.6–1.1)
IVRT: 59.94 MSEC
LA MAJOR: 3.12 CM
LA MINOR: 3.59 CM
LA WIDTH: 1.9 CM
LEFT ATRIUM SIZE: 1.82 CM
LEFT ATRIUM VOLUME INDEX: 6.6 ML/M2
LEFT ATRIUM VOLUME: 9.81 CM3
LEFT INTERNAL DIMENSION IN SYSTOLE: 2.07 CM (ref 2.1–4)
LEFT VENTRICLE DIASTOLIC VOLUME INDEX: 23.47 ML/M2
LEFT VENTRICLE DIASTOLIC VOLUME: 34.73 ML
LEFT VENTRICLE MASS INDEX: 47 G/M2
LEFT VENTRICLE SYSTOLIC VOLUME INDEX: 9.4 ML/M2
LEFT VENTRICLE SYSTOLIC VOLUME: 13.94 ML
LEFT VENTRICULAR INTERNAL DIMENSION IN DIASTOLE: 2.99 CM (ref 3.5–6)
LEFT VENTRICULAR MASS: 69.8 G
LVOT MG: 2.8 MMHG
LVOT MV: 0.79 CM/S
PISA TR MAX VEL: 2.07 M/S
PULM VEIN S/D RATIO: 1.82
PV PEAK D VEL: 0.49 M/S
PV PEAK S VEL: 0.89 M/S
RA MAJOR: 3.38 CM
RA PRESSURE: 3 MMHG
RA WIDTH: 2.37 CM
RIGHT VENTRICULAR END-DIASTOLIC DIMENSION: 2.15 CM
RIGHT VENTRICULAR LENGTH IN DIASTOLE (APICAL 4-CHAMBER VIEW): 3.76 CM
RV MID DIAMA: 2 CM
RV TISSUE DOPPLER FREE WALL SYSTOLIC VELOCITY 1 (APICAL 4 CHAMBER VIEW): 0.02 CM/S
SINUS: 2.1 CM
STJ: 1.77 CM
TDI LATERAL: 0.11 M/S
TR MAX PG: 17 MMHG
TRICUSPID ANNULAR PLANE SYSTOLIC EXCURSION: 1.35 CM
TV REST PULMONARY ARTERY PRESSURE: 20 MMHG

## 2022-10-17 PROCEDURE — 93306 TTE W/DOPPLER COMPLETE: CPT | Mod: PO

## 2022-10-17 PROCEDURE — 93306 TTE W/DOPPLER COMPLETE: CPT | Mod: 26,,, | Performed by: INTERNAL MEDICINE

## 2022-10-17 PROCEDURE — 93306 ECHO (CUPID ONLY): ICD-10-PCS | Mod: 26,,, | Performed by: INTERNAL MEDICINE

## 2022-10-20 ENCOUNTER — OUTPATIENT CASE MANAGEMENT (OUTPATIENT)
Dept: ADMINISTRATIVE | Facility: OTHER | Age: 42
End: 2022-10-20
Payer: MEDICARE

## 2022-10-20 NOTE — PROGRESS NOTES
10/20/22 Attempt assessment with patient/caregiver. No answer. Left message requesting call back. RN OPCM  first attempt.   10/21/22 Attempt assessment with patient/caregiver. Pt declines OPCM at this time. Not interested in services. Will mail RN info and OPCM brochure. RN OPCM  case closure.   How Severe Is It?: moderate Is This A New Presentation, Or A Follow-Up?: Rash

## 2022-10-25 RX ORDER — CYCLOBENZAPRINE HCL 10 MG
10 TABLET ORAL 3 TIMES DAILY
COMMUNITY
Start: 2022-09-16 | End: 2023-07-13 | Stop reason: SDUPTHER

## 2022-10-26 ENCOUNTER — OFFICE VISIT (OUTPATIENT)
Dept: CARDIOLOGY | Facility: CLINIC | Age: 42
End: 2022-10-26
Payer: MEDICARE

## 2022-10-26 VITALS
WEIGHT: 103.38 LBS | BODY MASS INDEX: 18.32 KG/M2 | HEART RATE: 113 BPM | HEIGHT: 63 IN | DIASTOLIC BLOOD PRESSURE: 105 MMHG | SYSTOLIC BLOOD PRESSURE: 157 MMHG

## 2022-10-26 DIAGNOSIS — R51.9 CHRONIC DAILY HEADACHE: Chronic | ICD-10-CM

## 2022-10-26 DIAGNOSIS — Z86.73 HISTORY OF RIGHT MCA STROKE: Chronic | ICD-10-CM

## 2022-10-26 DIAGNOSIS — F31.75 BIPOLAR 1 DISORDER, DEPRESSED, PARTIAL REMISSION: ICD-10-CM

## 2022-10-26 DIAGNOSIS — N18.30 STAGE 3 CHRONIC KIDNEY DISEASE, UNSPECIFIED WHETHER STAGE 3A OR 3B CKD: Chronic | ICD-10-CM

## 2022-10-26 DIAGNOSIS — R56.9 SEIZURE: ICD-10-CM

## 2022-10-26 DIAGNOSIS — Z72.0 TOBACCO USE: ICD-10-CM

## 2022-10-26 DIAGNOSIS — G89.4 CHRONIC PAIN SYNDROME: Primary | Chronic | ICD-10-CM

## 2022-10-26 PROCEDURE — 3077F PR MOST RECENT SYSTOLIC BLOOD PRESSURE >= 140 MM HG: ICD-10-PCS | Mod: CPTII,S$GLB,, | Performed by: INTERNAL MEDICINE

## 2022-10-26 PROCEDURE — 99999 PR PBB SHADOW E&M-EST. PATIENT-LVL III: ICD-10-PCS | Mod: PBBFAC,,, | Performed by: INTERNAL MEDICINE

## 2022-10-26 PROCEDURE — 3044F HG A1C LEVEL LT 7.0%: CPT | Mod: CPTII,S$GLB,, | Performed by: INTERNAL MEDICINE

## 2022-10-26 PROCEDURE — 99214 OFFICE O/P EST MOD 30 MIN: CPT | Mod: S$GLB,,, | Performed by: INTERNAL MEDICINE

## 2022-10-26 PROCEDURE — 99214 PR OFFICE/OUTPT VISIT, EST, LEVL IV, 30-39 MIN: ICD-10-PCS | Mod: S$GLB,,, | Performed by: INTERNAL MEDICINE

## 2022-10-26 PROCEDURE — 3080F PR MOST RECENT DIASTOLIC BLOOD PRESSURE >= 90 MM HG: ICD-10-PCS | Mod: CPTII,S$GLB,, | Performed by: INTERNAL MEDICINE

## 2022-10-26 PROCEDURE — 3080F DIAST BP >= 90 MM HG: CPT | Mod: CPTII,S$GLB,, | Performed by: INTERNAL MEDICINE

## 2022-10-26 PROCEDURE — 3077F SYST BP >= 140 MM HG: CPT | Mod: CPTII,S$GLB,, | Performed by: INTERNAL MEDICINE

## 2022-10-26 PROCEDURE — 3044F PR MOST RECENT HEMOGLOBIN A1C LEVEL <7.0%: ICD-10-PCS | Mod: CPTII,S$GLB,, | Performed by: INTERNAL MEDICINE

## 2022-10-26 PROCEDURE — 1159F PR MEDICATION LIST DOCUMENTED IN MEDICAL RECORD: ICD-10-PCS | Mod: CPTII,S$GLB,, | Performed by: INTERNAL MEDICINE

## 2022-10-26 PROCEDURE — 1160F RVW MEDS BY RX/DR IN RCRD: CPT | Mod: CPTII,S$GLB,, | Performed by: INTERNAL MEDICINE

## 2022-10-26 PROCEDURE — 1159F MED LIST DOCD IN RCRD: CPT | Mod: CPTII,S$GLB,, | Performed by: INTERNAL MEDICINE

## 2022-10-26 PROCEDURE — 1160F PR REVIEW ALL MEDS BY PRESCRIBER/CLIN PHARMACIST DOCUMENTED: ICD-10-PCS | Mod: CPTII,S$GLB,, | Performed by: INTERNAL MEDICINE

## 2022-10-26 PROCEDURE — 99999 PR PBB SHADOW E&M-EST. PATIENT-LVL III: CPT | Mod: PBBFAC,,, | Performed by: INTERNAL MEDICINE

## 2022-10-26 NOTE — PROGRESS NOTES
Subjective:    Patient ID:  Cat Denson is a 42 y.o. female patient here for evaluation Follow-up (Echo results, never got holter)      History of Present Illness:  Cardiology follow-up test results.  History cerebral artery occlusion 2015, suspect cardioembolic source.  Patient chronic oral anticoagulation with Xarelto 20 mg.  Ongoing tobacco use.  Residual kiko paresis.  Recent problems with cough congestion.    A concomitant medical problems include history of lupus, remission.             Review of patient's allergies indicates:   Allergen Reactions    Metoclopramide hcl Anaphylaxis     Involuntary mouth movements     Amoxicillin-pot clavulanate      2 other medications    Amoxil [amoxicillin]      hives    Avelox [moxifloxacin]      itching    Benadryl [diphenhydramine hcl]      Lowers seizure threshold     Coumadin [warfarin]      toxicity    Quinazolinones      Lowers seizure threshold     Ultram [tramadol]      Lowers seizure threshold     Wellbutrin [bupropion hcl]      Lowers seizure threshold        Past Medical History:   Diagnosis Date    Acid reflux     Allergy     Anemia     Anticoagulated 12/29/2015    Anxiety     Asthma     Bipolar 1 disorder     Bronchospasm with bronchitis, acute     Chronic daily headache 9/11/2018    Chronic kidney disease     Chronic pain     Depression     bipolar 2    Fibromyalgia     Gastroparesis     History of right MCA stroke 11/25/2015    Hx of psychiatric care     Lactose intolerance     Lupus     Mixed hyperlipidemia 11/28/2018    Psychiatric problem     Renal tubular acidosis     Seizure     Sjogren's syndrome     Smoker     Stroke 11-    Substance abuse-kratom daily use 8/30/2020    Suicide attempt     overdosed on a bottle of paxil, muscle relaxers, & zoloft    Therapy      Past Surgical History:   Procedure Laterality Date    CHOLECYSTECTOMY      COLONOSCOPY  11/12/2014    Dr. Colin, repeat at 52 years old for screening     ESOPHAGOGASTRODUODENOSCOPY N/A 1/3/2022    Procedure: EGD (ESOPHAGOGASTRODUODENOSCOPY);  Surgeon: Scout Kimble MD;  Location: [facility];  Service: Endoscopy;  Laterality: N/A;    FLUOROSCOPIC URODYNAMIC STUDY N/A 7/23/2019    Procedure: URODYNAMIC STUDY, FLUOROSCOPIC;  Surgeon: Vanna Martinez MD;  Location: [address];  Service: Urology;  Laterality: N/A;  1 hour    UPPER GASTROINTESTINAL ENDOSCOPY  03/03/2017    Dr. Harris     Social History     Tobacco Use    Smoking status: Every Day     Packs/day: 1.00     Years: 15.00     Pack years: 15.00     Types: Cigarettes     Start date: 11/25/2015    Smokeless tobacco: Never   Substance Use Topics    Alcohol use: No     Alcohol/week: 0.0 standard drinks    Drug use: Yes     Types: Marijuana     Comment: smokes occasionally, helps with pain and appetite        Review of Systems:    As noted in HPI in addition      REVIEW OF SYSTEMS  Review of Systems   Constitutional: Negative for decreased appetite, diaphoresis, night sweats, weight gain and weight loss.   HENT:  Negative for nosebleeds and odynophagia.    Eyes:  Negative for double vision and photophobia.   Cardiovascular:  Positive for irregular heartbeat and palpitations. Negative for chest pain, claudication, cyanosis, dyspnea on exertion, leg swelling, near-syncope, orthopnea, paroxysmal nocturnal dyspnea and syncope.   Respiratory:  Negative for cough, hemoptysis, shortness of breath and wheezing.    Hematologic/Lymphatic: Negative for adenopathy.   Skin:  Negative for flushing, skin cancer and suspicious lesions.   Musculoskeletal:  Negative for gout, myalgias and neck pain.   Gastrointestinal:  Negative for abdominal pain, heartburn, hematemesis and hematochezia.   Genitourinary:  Negative for bladder incontinence, hesitancy and nocturia.   Neurological:  Negative for focal weakness, headaches, light-headedness and paresthesias.   Psychiatric/Behavioral:  Negative for memory loss and substance abuse.              Objective:        Vitals:    10/26/22 1311   BP: (!) 157/105   Pulse: (!) 113       Lab Results   Component Value Date    WBC 5.39 10/09/2022    HGB 12.7 10/09/2022    HCT 37.7 10/09/2022     (L) 10/09/2022    CHOL 147 10/08/2022    TRIG 134 10/08/2022    HDL 34 (L) 10/08/2022    ALT 15 10/09/2022    AST 18 10/09/2022     10/09/2022    K 4.8 10/09/2022     (H) 10/09/2022    CREATININE 1.2 10/09/2022    BUN 11 10/09/2022    CO2 16 (L) 10/09/2022    TSH 0.703 10/08/2022    INR 1.2 10/08/2022    HGBA1C 4.9 10/08/2022        ECHOCARDIOGRAM RESULTS  Results for orders placed in visit on 10/17/22    Echo    Interpretation Summary  · Concentric remodeling and normal systolic function.  · The estimated ejection fraction is 65%.  · Normal left ventricular diastolic function.  · Normal right ventricular size with normal right ventricular systolic function.  · Normal central venous pressure (3 mmHg).  · The estimated PA systolic pressure is 20 mmHg.        CURRENT/PREVIOUS VISIT EKG  Results for orders placed or performed during the hospital encounter of 10/08/22   ECG 12 lead    Collection Time: 10/08/22  2:14 PM    Narrative    Test Reason : I63.9,    Vent. Rate : 052 BPM     Atrial Rate : 052 BPM     P-R Int : 150 ms          QRS Dur : 082 ms      QT Int : 454 ms       P-R-T Axes : 071 069 056 degrees     QTc Int : 422 ms    Sinus bradycardia with sinus arrhythmia  Otherwise normal ECG  When compared with ECG of 06-OCT-2022 11:03,  T wave amplitude has increased in Anterior leads  Confirmed by Alvarado Villafuerte MD ([id number]) on 10/12/2022 10:30:36 PM    Referred By: JESUSITA   SELF           Confirmed By:Alvarado Villafuerte MD     No valid procedures specified.   No results found for this or any previous visit.    No valid procedures specified.    PHYSICAL EXAM  CONSTITUTIONAL: Well built, well nourished in no apparent distress  NECK: no carotid bruit, no JVD  LUNGS: CTA  CHEST WALL: no tenderness,  HEART:  regular rate and rhythm, S1, S2 normal, no murmur, click, rub or gallop   ABDOMEN: soft, non-tender; bowel sounds normal; no masses,  no organomegaly  EXTREMITIES: Extremities normal, no edema, no calf tenderness noted  NEURO: AAO X 3    I HAVE REVIEWED :    The vital signs, nurses notes, and all the pertinent radiology and labs.         Current Outpatient Medications   Medication Instructions    ARIPiprazole (ABILIFY) 15 mg, Oral, Daily    atorvastatin (LIPITOR) 10 mg, Oral, Daily    BOTOX 200 unit SolR No dose, route, or frequency recorded.    cyclobenzaprine (FLEXERIL) 10 mg, Oral, 3 times daily    divalproex (DEPAKOTE) 250 MG EC tablet TAKE 1 TABLET BY MOUTH EVERY MORNING AND TAKE 2 TABLETS BY MOUTH EVERY NIGHT AT BEDTIME    famotidine (PEPCID) 40 MG tablet TAKE 1 TABLET(40 MG) BY MOUTH EVERY EVENING    gabapentin (NEURONTIN) 800 MG tablet TAKE 1 TABLET(800 MG) BY MOUTH TWICE DAILY    LIDOcaine-prilocaine (EMLA) cream APPLY TOPICALLY TO THE AFFECTED AREA AS NEEDED    mirtazapine (REMERON) 30 MG tablet TAKE 1 TABLET(30 MG) BY MOUTH EVERY EVENING    pantoprazole (PROTONIX) 40 MG tablet TAKE 1 TABLET(40 MG) BY MOUTH EVERY DAY    promethazine (PHENERGAN) 25 mg, Oral, Every 6 hours PRN    propranoloL (INDERAL LA) 80 mg, Oral, Daily    rivaroxaban (XARELTO) 20 mg Tab TAKE 1 TABLET(20 MG) BY MOUTH DAILY WITH THE EVENING MEAL AND DINNER    traZODone (DESYREL) 100 MG tablet TAKE 1 TO 2 TABLETS BY MOUTH EVERY NIGHT AS NEEDED FOR INSOMNIA          Assessment:   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  History of seizure disorder.  Chronic oral anticoagulation with Xarelto.  Ongoing tobacco use.  Follow-up echo with preserved ejection fraction of structural or valvular heart issues.  If normal.        Plan:     Complete workup with Holter monitor call results.  Otherwise follow-up 6 months.        No follow-ups on file.

## 2022-11-09 ENCOUNTER — PATIENT MESSAGE (OUTPATIENT)
Dept: CARDIOLOGY | Facility: CLINIC | Age: 42
End: 2022-11-09
Payer: MEDICARE

## 2022-11-09 DIAGNOSIS — G89.4 CHRONIC PAIN SYNDROME: Chronic | ICD-10-CM

## 2022-11-09 DIAGNOSIS — Z86.73 HISTORY OF RIGHT MCA STROKE: Primary | Chronic | ICD-10-CM

## 2022-11-10 ENCOUNTER — CLINICAL SUPPORT (OUTPATIENT)
Dept: CARDIOLOGY | Facility: HOSPITAL | Age: 42
End: 2022-11-10
Attending: INTERNAL MEDICINE
Payer: MEDICARE

## 2022-11-10 ENCOUNTER — PES CALL (OUTPATIENT)
Dept: ADMINISTRATIVE | Facility: CLINIC | Age: 42
End: 2022-11-10
Payer: MEDICARE

## 2022-11-10 DIAGNOSIS — Z86.73 HISTORY OF RIGHT MCA STROKE: ICD-10-CM

## 2022-11-10 PROCEDURE — 93227 HOLTER MONITOR - 48 HOUR (CUPID ONLY): ICD-10-PCS | Mod: ,,, | Performed by: INTERNAL MEDICINE

## 2022-11-10 PROCEDURE — 93227 XTRNL ECG REC<48 HR R&I: CPT | Mod: ,,, | Performed by: INTERNAL MEDICINE

## 2022-11-10 PROCEDURE — 93226 XTRNL ECG REC<48 HR SCAN A/R: CPT | Mod: PO

## 2022-11-17 LAB
OHS CV EVENT MONITOR DAY: 0
OHS CV HOLTER LENGTH DECIMAL HOURS: 47.98
OHS CV HOLTER LENGTH HOURS: 47
OHS CV HOLTER LENGTH MINUTES: 59
OHS CV HOLTER SINUS AVERAGE HR: 98
OHS CV HOLTER SINUS MAX HR: 160
OHS CV HOLTER SINUS MIN HR: 63

## 2022-11-21 ENCOUNTER — PATIENT MESSAGE (OUTPATIENT)
Dept: FAMILY MEDICINE | Facility: CLINIC | Age: 42
End: 2022-11-21
Payer: MEDICARE

## 2022-11-21 ENCOUNTER — TELEPHONE (OUTPATIENT)
Dept: FAMILY MEDICINE | Facility: CLINIC | Age: 42
End: 2022-11-21
Payer: MEDICARE

## 2022-11-23 ENCOUNTER — PES CALL (OUTPATIENT)
Dept: ADMINISTRATIVE | Facility: CLINIC | Age: 42
End: 2022-11-23
Payer: MEDICARE

## 2022-12-05 RX ORDER — ARIPIPRAZOLE 15 MG/1
TABLET ORAL
Qty: 30 TABLET | Refills: 0 | Status: SHIPPED | OUTPATIENT
Start: 2022-12-05 | End: 2023-01-08 | Stop reason: DRUGHIGH

## 2022-12-05 NOTE — TELEPHONE ENCOUNTER
Called pt regarding below message. Advised pt that prescription has been submitted. Advised pt of need to schedule appt. Pt agreed to schedule in clinic appt. Placed pt on wait list as well. Pt verbalized understanding with no further questions.

## 2022-12-05 NOTE — TELEPHONE ENCOUNTER
[facility] refill request on Abilify 15 mg  Last refill: 6/16  Last visit: 6/16  Follow up: none scheduled

## 2023-01-08 RX ORDER — ARIPIPRAZOLE 10 MG/1
10 TABLET ORAL DAILY
Qty: 30 TABLET | Refills: 1 | Status: SHIPPED | OUTPATIENT
Start: 2023-01-08 | End: 2023-05-08 | Stop reason: SDUPTHER

## 2023-01-08 RX ORDER — ARIPIPRAZOLE 15 MG/1
15 TABLET ORAL DAILY
Qty: 30 TABLET | Refills: 0 | Status: CANCELLED | OUTPATIENT
Start: 2023-01-08

## 2023-01-11 ENCOUNTER — HOSPITAL ENCOUNTER (OUTPATIENT)
Facility: HOSPITAL | Age: 43
Discharge: HOME | End: 2023-01-12
Attending: EMERGENCY MEDICINE | Admitting: HOSPITALIST
Payer: MEDICARE

## 2023-01-11 DIAGNOSIS — E87.6 HYPOKALEMIA: ICD-10-CM

## 2023-01-11 DIAGNOSIS — N17.9 AKI (ACUTE KIDNEY INJURY): ICD-10-CM

## 2023-01-11 DIAGNOSIS — K31.84 GASTROPARESIS: Primary | ICD-10-CM

## 2023-01-11 DIAGNOSIS — E86.0 DEHYDRATION: ICD-10-CM

## 2023-01-11 PROBLEM — M85.80 OSTEOPENIA: Status: RESOLVED | Noted: 2018-10-15 | Resolved: 2023-01-11

## 2023-01-11 PROBLEM — N10 ACUTE PYELONEPHRITIS: Status: RESOLVED | Noted: 2023-01-11 | Resolved: 2023-01-11

## 2023-01-11 PROBLEM — F09 COGNITIVE DISORDER: Status: RESOLVED | Noted: 2019-12-22 | Resolved: 2023-01-11

## 2023-01-11 PROBLEM — R68.89 WORSENING FUNCTIONAL ENDURANCE: Status: RESOLVED | Noted: 2021-11-17 | Resolved: 2023-01-11

## 2023-01-11 PROBLEM — Z72.0 TOBACCO USE: Status: RESOLVED | Noted: 2018-11-28 | Resolved: 2023-01-11

## 2023-01-11 PROBLEM — R27.9 LACK OF COORDINATION: Status: RESOLVED | Noted: 2021-11-17 | Resolved: 2023-01-11

## 2023-01-11 PROBLEM — N32.9 BLADDER DISORDER: Status: RESOLVED | Noted: 2019-07-23 | Resolved: 2023-01-11

## 2023-01-11 PROBLEM — R29.898 REDUCED HAND STRENGTH: Status: RESOLVED | Noted: 2021-11-17 | Resolved: 2023-01-11

## 2023-01-11 PROBLEM — M62.81 MUSCLE WEAKNESS OF UPPER EXTREMITY: Status: RESOLVED | Noted: 2021-11-17 | Resolved: 2023-01-11

## 2023-01-11 PROBLEM — E46 PROTEIN-CALORIE MALNUTRITION: Status: RESOLVED | Noted: 2020-11-19 | Resolved: 2023-01-11

## 2023-01-11 PROBLEM — R39.15 URINARY URGENCY: Status: RESOLVED | Noted: 2019-11-04 | Resolved: 2023-01-11

## 2023-01-11 PROBLEM — N10 ACUTE PYELONEPHRITIS: Status: ACTIVE | Noted: 2023-01-11

## 2023-01-11 PROBLEM — G40.909 RECURRENT SEIZURES: Status: RESOLVED | Noted: 2018-06-28 | Resolved: 2023-01-11

## 2023-01-11 PROBLEM — R26.9 IMPAIRED GAIT: Status: RESOLVED | Noted: 2022-06-22 | Resolved: 2023-01-11

## 2023-01-11 PROBLEM — Z79.899 HIGH RISK MEDICATION USE: Status: RESOLVED | Noted: 2019-12-22 | Resolved: 2023-01-11

## 2023-01-11 PROBLEM — Z91.81 RISK FOR FALLS: Status: RESOLVED | Noted: 2022-06-22 | Resolved: 2023-01-11

## 2023-01-11 PROBLEM — R68.89 IMPAIRED FUNCTION OF UPPER EXTREMITY: Status: RESOLVED | Noted: 2022-06-29 | Resolved: 2023-01-11

## 2023-01-11 PROBLEM — T42.6X1A GABAPENTIN OVERDOSE: Status: RESOLVED | Noted: 2021-09-18 | Resolved: 2023-01-11

## 2023-01-11 LAB
ALBUMIN SERPL BCP-MCNC: 4.1 G/DL (ref 3.5–5.2)
ALP SERPL-CCNC: 61 U/L (ref 55–135)
ALT SERPL W/O P-5'-P-CCNC: 9 U/L (ref 10–44)
ANION GAP SERPL CALC-SCNC: 12 MMOL/L (ref 8–16)
AST SERPL-CCNC: 11 U/L (ref 10–40)
BASOPHILS # BLD AUTO: 0.05 K/UL (ref 0–0.2)
BASOPHILS NFR BLD: 0.6 % (ref 0–1.9)
BILIRUB SERPL-MCNC: 0.3 MG/DL (ref 0.1–1)
BILIRUB UR QL STRIP: NEGATIVE
BUN SERPL-MCNC: 12 MG/DL (ref 6–20)
CALCIUM SERPL-MCNC: 8.6 MG/DL (ref 8.7–10.5)
CHLORIDE SERPL-SCNC: 113 MMOL/L (ref 95–110)
CLARITY UR: CLEAR
CO2 SERPL-SCNC: 13 MMOL/L (ref 23–29)
COLOR UR: YELLOW
CREAT SERPL-MCNC: 2.1 MG/DL (ref 0.5–1.4)
DIFFERENTIAL METHOD: ABNORMAL
EOSINOPHIL # BLD AUTO: 0.1 K/UL (ref 0–0.5)
EOSINOPHIL NFR BLD: 0.9 % (ref 0–8)
ERYTHROCYTE [DISTWIDTH] IN BLOOD BY AUTOMATED COUNT: 13.5 % (ref 11.5–14.5)
EST. GFR  (NO RACE VARIABLE): 30 ML/MIN/1.73 M^2
GLUCOSE SERPL-MCNC: 108 MG/DL (ref 70–110)
GLUCOSE UR QL STRIP: NEGATIVE
HCT VFR BLD AUTO: 38.5 % (ref 37–48.5)
HCV AB SERPL QL IA: NORMAL
HGB BLD-MCNC: 13.3 G/DL (ref 12–16)
HGB UR QL STRIP: NEGATIVE
HIV 1+2 AB+HIV1 P24 AG SERPL QL IA: NORMAL
IMM GRANULOCYTES # BLD AUTO: 0.06 K/UL (ref 0–0.04)
IMM GRANULOCYTES NFR BLD AUTO: 0.7 % (ref 0–0.5)
INFLUENZA A, MOLECULAR: NEGATIVE
INFLUENZA B, MOLECULAR: NEGATIVE
KETONES UR QL STRIP: NEGATIVE
LEUKOCYTE ESTERASE UR QL STRIP: NEGATIVE
LIPASE SERPL-CCNC: 23 U/L (ref 4–60)
LYMPHOCYTES # BLD AUTO: 1.6 K/UL (ref 1–4.8)
LYMPHOCYTES NFR BLD: 18.5 % (ref 18–48)
MAGNESIUM SERPL-MCNC: 1.8 MG/DL (ref 1.6–2.6)
MCH RBC QN AUTO: 33.1 PG (ref 27–31)
MCHC RBC AUTO-ENTMCNC: 34.5 G/DL (ref 32–36)
MCV RBC AUTO: 96 FL (ref 82–98)
MONOCYTES # BLD AUTO: 0.5 K/UL (ref 0.3–1)
MONOCYTES NFR BLD: 5.9 % (ref 4–15)
NEUTROPHILS # BLD AUTO: 6.5 K/UL (ref 1.8–7.7)
NEUTROPHILS NFR BLD: 73.4 % (ref 38–73)
NITRITE UR QL STRIP: NEGATIVE
NRBC BLD-RTO: 0 /100 WBC
PH UR STRIP: 6 [PH] (ref 5–8)
PLATELET # BLD AUTO: 265 K/UL (ref 150–450)
PMV BLD AUTO: 10.6 FL (ref 9.2–12.9)
POTASSIUM SERPL-SCNC: 3.4 MMOL/L (ref 3.5–5.1)
PROT SERPL-MCNC: 7.4 G/DL (ref 6–8.4)
PROT UR QL STRIP: NEGATIVE
RBC # BLD AUTO: 4.02 M/UL (ref 4–5.4)
SARS-COV-2 RDRP RESP QL NAA+PROBE: NEGATIVE
SODIUM SERPL-SCNC: 138 MMOL/L (ref 136–145)
SP GR UR STRIP: <=1.005 (ref 1–1.03)
SPECIMEN SOURCE: NORMAL
URN SPEC COLLECT METH UR: ABNORMAL
UROBILINOGEN UR STRIP-ACNC: NEGATIVE EU/DL
WBC # BLD AUTO: 8.88 K/UL (ref 3.9–12.7)

## 2023-01-11 PROCEDURE — 25000003 PHARM REV CODE 250: Mod: HCNC | Performed by: HOSPITALIST

## 2023-01-11 PROCEDURE — 25000003 PHARM REV CODE 250: Mod: HCNC | Performed by: NURSE PRACTITIONER

## 2023-01-11 PROCEDURE — 36415 COLL VENOUS BLD VENIPUNCTURE: CPT | Mod: HCNC | Performed by: EMERGENCY MEDICINE

## 2023-01-11 PROCEDURE — 99285 EMERGENCY DEPT VISIT HI MDM: CPT | Mod: 25,HCNC

## 2023-01-11 PROCEDURE — 96361 HYDRATE IV INFUSION ADD-ON: CPT | Mod: HCNC

## 2023-01-11 PROCEDURE — 63600175 PHARM REV CODE 636 W HCPCS: Mod: HCNC | Performed by: NURSE PRACTITIONER

## 2023-01-11 PROCEDURE — 83735 ASSAY OF MAGNESIUM: CPT | Mod: HCNC | Performed by: NURSE PRACTITIONER

## 2023-01-11 PROCEDURE — 81003 URINALYSIS AUTO W/O SCOPE: CPT | Mod: HCNC | Performed by: NURSE PRACTITIONER

## 2023-01-11 PROCEDURE — 96361 HYDRATE IV INFUSION ADD-ON: CPT

## 2023-01-11 PROCEDURE — 83690 ASSAY OF LIPASE: CPT | Mod: HCNC | Performed by: NURSE PRACTITIONER

## 2023-01-11 PROCEDURE — 85025 COMPLETE CBC W/AUTO DIFF WBC: CPT | Mod: HCNC | Performed by: NURSE PRACTITIONER

## 2023-01-11 PROCEDURE — U0002 COVID-19 LAB TEST NON-CDC: HCPCS | Mod: HCNC | Performed by: NURSE PRACTITIONER

## 2023-01-11 PROCEDURE — 86803 HEPATITIS C AB TEST: CPT | Mod: HCNC | Performed by: EMERGENCY MEDICINE

## 2023-01-11 PROCEDURE — 80053 COMPREHEN METABOLIC PANEL: CPT | Mod: HCNC | Performed by: NURSE PRACTITIONER

## 2023-01-11 PROCEDURE — G0378 HOSPITAL OBSERVATION PER HR: HCPCS | Mod: HCNC

## 2023-01-11 PROCEDURE — 96374 THER/PROPH/DIAG INJ IV PUSH: CPT | Mod: HCNC

## 2023-01-11 PROCEDURE — 87389 HIV-1 AG W/HIV-1&-2 AB AG IA: CPT | Mod: HCNC | Performed by: EMERGENCY MEDICINE

## 2023-01-11 PROCEDURE — 87502 INFLUENZA DNA AMP PROBE: CPT | Mod: HCNC | Performed by: NURSE PRACTITIONER

## 2023-01-11 RX ORDER — TALC
9 POWDER (GRAM) TOPICAL NIGHTLY PRN
Status: DISCONTINUED | OUTPATIENT
Start: 2023-01-11 | End: 2023-01-12 | Stop reason: HOSPADM

## 2023-01-11 RX ORDER — SODIUM CHLORIDE 0.9 % (FLUSH) 0.9 %
10 SYRINGE (ML) INJECTION EVERY 12 HOURS PRN
Status: DISCONTINUED | OUTPATIENT
Start: 2023-01-11 | End: 2023-01-12 | Stop reason: HOSPADM

## 2023-01-11 RX ORDER — SODIUM CHLORIDE 9 MG/ML
INJECTION, SOLUTION INTRAVENOUS CONTINUOUS
Status: DISCONTINUED | OUTPATIENT
Start: 2023-01-11 | End: 2023-01-12 | Stop reason: HOSPADM

## 2023-01-11 RX ORDER — ACETAMINOPHEN 500 MG
1000 TABLET ORAL EVERY 6 HOURS PRN
Status: DISCONTINUED | OUTPATIENT
Start: 2023-01-11 | End: 2023-01-12 | Stop reason: HOSPADM

## 2023-01-11 RX ORDER — LANOLIN ALCOHOL/MO/W.PET/CERES
800 CREAM (GRAM) TOPICAL
Status: DISCONTINUED | OUTPATIENT
Start: 2023-01-11 | End: 2023-01-12 | Stop reason: HOSPADM

## 2023-01-11 RX ORDER — PROPRANOLOL HYDROCHLORIDE 10 MG/1
40 TABLET ORAL 2 TIMES DAILY
Status: DISCONTINUED | OUTPATIENT
Start: 2023-01-11 | End: 2023-01-12 | Stop reason: HOSPADM

## 2023-01-11 RX ORDER — AMOXICILLIN 250 MG
1 CAPSULE ORAL 2 TIMES DAILY
Status: DISCONTINUED | OUTPATIENT
Start: 2023-01-11 | End: 2023-01-12 | Stop reason: HOSPADM

## 2023-01-11 RX ORDER — ATORVASTATIN CALCIUM 10 MG/1
10 TABLET, FILM COATED ORAL DAILY
Status: DISCONTINUED | OUTPATIENT
Start: 2023-01-12 | End: 2023-01-12 | Stop reason: HOSPADM

## 2023-01-11 RX ORDER — IBUPROFEN 200 MG
24 TABLET ORAL
Status: DISCONTINUED | OUTPATIENT
Start: 2023-01-11 | End: 2023-01-12 | Stop reason: HOSPADM

## 2023-01-11 RX ORDER — NALOXONE HCL 0.4 MG/ML
0.02 VIAL (ML) INJECTION
Status: DISCONTINUED | OUTPATIENT
Start: 2023-01-11 | End: 2023-01-12 | Stop reason: HOSPADM

## 2023-01-11 RX ORDER — FAMOTIDINE 20 MG/1
40 TABLET, FILM COATED ORAL NIGHTLY
Status: DISCONTINUED | OUTPATIENT
Start: 2023-01-11 | End: 2023-01-12 | Stop reason: HOSPADM

## 2023-01-11 RX ORDER — TRAZODONE HYDROCHLORIDE 50 MG/1
150 TABLET ORAL NIGHTLY
Status: DISCONTINUED | OUTPATIENT
Start: 2023-01-11 | End: 2023-01-12 | Stop reason: HOSPADM

## 2023-01-11 RX ORDER — MIRTAZAPINE 15 MG/1
30 TABLET, FILM COATED ORAL NIGHTLY
Status: DISCONTINUED | OUTPATIENT
Start: 2023-01-11 | End: 2023-01-12 | Stop reason: HOSPADM

## 2023-01-11 RX ORDER — IBUPROFEN 200 MG
16 TABLET ORAL
Status: DISCONTINUED | OUTPATIENT
Start: 2023-01-11 | End: 2023-01-12 | Stop reason: HOSPADM

## 2023-01-11 RX ORDER — ARIPIPRAZOLE 5 MG/1
10 TABLET ORAL DAILY
Status: DISCONTINUED | OUTPATIENT
Start: 2023-01-12 | End: 2023-01-12 | Stop reason: HOSPADM

## 2023-01-11 RX ORDER — SODIUM CHLORIDE 9 MG/ML
1000 INJECTION, SOLUTION INTRAVENOUS
Status: COMPLETED | OUTPATIENT
Start: 2023-01-11 | End: 2023-01-11

## 2023-01-11 RX ORDER — GLUCAGON 1 MG
1 KIT INJECTION
Status: DISCONTINUED | OUTPATIENT
Start: 2023-01-11 | End: 2023-01-12 | Stop reason: HOSPADM

## 2023-01-11 RX ORDER — ONDANSETRON 2 MG/ML
8 INJECTION INTRAMUSCULAR; INTRAVENOUS
Status: COMPLETED | OUTPATIENT
Start: 2023-01-11 | End: 2023-01-11

## 2023-01-11 RX ORDER — ONDANSETRON 2 MG/ML
8 INJECTION INTRAMUSCULAR; INTRAVENOUS EVERY 8 HOURS PRN
Status: DISCONTINUED | OUTPATIENT
Start: 2023-01-11 | End: 2023-01-12 | Stop reason: HOSPADM

## 2023-01-11 RX ORDER — DIVALPROEX SODIUM 250 MG/1
250 TABLET, DELAYED RELEASE ORAL EVERY 8 HOURS
Status: DISCONTINUED | OUTPATIENT
Start: 2023-01-11 | End: 2023-01-12 | Stop reason: HOSPADM

## 2023-01-11 RX ADMIN — SODIUM CHLORIDE 1000 ML: 9 INJECTION, SOLUTION INTRAVENOUS at 02:01

## 2023-01-11 RX ADMIN — TRAZODONE HYDROCHLORIDE 150 MG: 50 TABLET ORAL at 08:01

## 2023-01-11 RX ADMIN — DIVALPROEX SODIUM 250 MG: 250 TABLET, DELAYED RELEASE ORAL at 10:01

## 2023-01-11 RX ADMIN — RIVAROXABAN 15 MG: 15 TABLET, FILM COATED ORAL at 07:01

## 2023-01-11 RX ADMIN — MIRTAZAPINE 30 MG: 15 TABLET, FILM COATED ORAL at 08:01

## 2023-01-11 RX ADMIN — ONDANSETRON HYDROCHLORIDE 8 MG: 2 SOLUTION INTRAMUSCULAR; INTRAVENOUS at 11:01

## 2023-01-11 RX ADMIN — FAMOTIDINE 40 MG: 20 TABLET, FILM COATED ORAL at 08:01

## 2023-01-11 RX ADMIN — DOCUSATE SODIUM AND SENNOSIDES 1 TABLET: 8.6; 5 TABLET, FILM COATED ORAL at 08:01

## 2023-01-11 RX ADMIN — SODIUM CHLORIDE: 9 INJECTION, SOLUTION INTRAVENOUS at 05:01

## 2023-01-11 RX ADMIN — PROPRANOLOL HYDROCHLORIDE 40 MG: 10 TABLET ORAL at 08:01

## 2023-01-11 RX ADMIN — SODIUM CHLORIDE 1000 ML: 9 INJECTION, SOLUTION INTRAVENOUS at 11:01

## 2023-01-11 NOTE — ED NOTES
Assumed care    Patient states she has vomiting and diarrhea for 1 week. Denies chest pain or shortness of breath. Patient denies any changes with bladder habits. Patient has history of GI problems in the past. History of stroke with left sided weakness.  at bedside. Patient AAOx4. No change with LOC. Patient has dizziness for 5 days. Patient states anything she eats or drinks come back up.

## 2023-01-11 NOTE — ASSESSMENT & PLAN NOTE
Patient with acute kidney injury likely due to IVVD/dehydration CHRISTEN is currently worsening. Labs reviewed- Renal function/electrolytes with Estimated Creatinine Clearance: 26.3 mL/min (A) (based on SCr of 2.1 mg/dL (H)). according to latest data. Monitor urine output and serial BMP and adjust therapy as needed. Avoid nephrotoxins and renally dose meds for GFR listed above.  Will continue IV fluids and follow up repeat serum creatinine in the morning.

## 2023-01-11 NOTE — PLAN OF CARE
Patient transferred to room from ED. Oriented to room. Plan of care reviewed with patient, verbalized understanding. Vitals stable. IV fluids initiated . Bed in lowest position and call light within reach.

## 2023-01-11 NOTE — ASSESSMENT & PLAN NOTE
Patient's anemia is currently controlled. Has not received any PRBCs to date.. Etiology likely d/t iron deficiency  Current CBC reviewed-   Lab Results   Component Value Date    HGB 13.3 01/11/2023    HCT 38.5 01/11/2023     Monitor serial CBC and transfuse if patient becomes hemodynamically unstable, symptomatic or H/H drops below 7/21.

## 2023-01-11 NOTE — ASSESSMENT & PLAN NOTE
Noted, currently controlled.  Patient remains on chronic anticoagulation.  Patient is not on any anti lupus medications at the moment.  Continue to monitor for any signs and symptoms of this.

## 2023-01-11 NOTE — H&P
[facility] Medicine  History & Physical    Patient Name: Cat Denson  MRN: [id number]  Patient Class: OP- Observation  Admission Date: 1/11/2023  Attending Physician: Santiago Peters MD  Primary Care Provider: Neela Barrett MD         Patient information was obtained from patient, past medical records and ER records.     Subjective:     Principal Problem:CHRISTEN (acute kidney injury)    Chief Complaint:   Chief Complaint   Patient presents with    Vomiting     With diarrhea x 1 week - denies abd pain        HPI: Patient is a 42-year-old  female with a past medical history significant for CKD, seizure disorder, stroke in 2015 with resulting hemiparesis, gastroparesis, and asthma who presents the hospital after worsening vomiting and diarrhea over the past 3-4 days.  Patient routinely has gastroparesis and has difficulty with vomiting, but states that this is not similar to that, and the element of diarrhea made her think that she had a potential gastroenteritis.  Patient denies any fever, or hematochezia.  He also denies any other systemic symptoms.  He has been unable to hold down any food for the last 2 days and unable hold down any water for the last day.  In the emergency department, she was found to be profoundly dehydrated, and was given IV fluid bolus.  Was also noted the patient was in acute kidney injury secondary to likely to dehydration.      Past Medical History:   Diagnosis Date    Acid reflux     Allergy     Anemia     Anticoagulated 12/29/2015    Anxiety     Asthma     Bipolar 1 disorder     Bronchospasm with bronchitis, acute     Chronic daily headache 9/11/2018    Chronic kidney disease     Chronic pain     Depression     bipolar 2    Fibromyalgia     Gastroparesis     History of right MCA stroke 11/25/2015    Hx of psychiatric care     Lactose intolerance     Lupus     Mixed hyperlipidemia 11/28/2018    Psychiatric problem     Renal  tubular acidosis     Seizure     Sjogren's syndrome     Smoker     Stroke 11-    Substance abuse-kratom daily use 8/30/2020    Suicide attempt     overdosed on a bottle of paxil, muscle relaxers, & zoloft    Therapy        Past Surgical History:   Procedure Laterality Date    CHOLECYSTECTOMY      COLONOSCOPY  11/12/2014    Dr. Kimble, repeat at 52 years old for screening    ESOPHAGOGASTRODUODENOSCOPY N/A 1/3/2022    Procedure: EGD (ESOPHAGOGASTRODUODENOSCOPY);  Surgeon: Scout Kimble MD;  Location: [facility];  Service: Endoscopy;  Laterality: N/A;    FLUOROSCOPIC URODYNAMIC STUDY N/A 7/23/2019    Procedure: URODYNAMIC STUDY, FLUOROSCOPIC;  Surgeon: Vanna Martinez MD;  Location: [address];  Service: Urology;  Laterality: N/A;  1 hour    UPPER GASTROINTESTINAL ENDOSCOPY  03/03/2017    Dr. Harris       Review of patient's allergies indicates:   Allergen Reactions    Metoclopramide hcl Anaphylaxis     Involuntary mouth movements     Amoxicillin-pot clavulanate      2 other medications    Amoxil [amoxicillin]      hives    Avelox [moxifloxacin]      itching    Benadryl [diphenhydramine hcl]      Lowers seizure threshold     Coumadin [warfarin]      toxicity    Quinazolinones      Lowers seizure threshold     Ultram [tramadol]      Lowers seizure threshold     Wellbutrin [bupropion hcl]      Lowers seizure threshold        No current facility-administered medications on file prior to encounter.     Current Outpatient Medications on File Prior to Encounter   Medication Sig    ARIPiprazole (ABILIFY) 10 MG Tab Take 1 tablet (10 mg total) by mouth once daily.    atorvastatin (LIPITOR) 10 MG tablet Take 1 tablet (10 mg total) by mouth once daily.    BOTOX 200 unit SolR     cyclobenzaprine (FLEXERIL) 10 MG tablet Take 10 mg by mouth 3 (three) times daily.    divalproex (DEPAKOTE) 250 MG EC tablet TAKE 1 TABLET BY MOUTH EVERY MORNING AND TAKE 2 TABLETS BY MOUTH EVERY NIGHT AT BEDTIME     famotidine (PEPCID) 40 MG tablet TAKE 1 TABLET(40 MG) BY MOUTH EVERY EVENING    gabapentin (NEURONTIN) 800 MG tablet TAKE 1 TABLET(800 MG) BY MOUTH TWICE DAILY    LIDOcaine-prilocaine (EMLA) cream APPLY TOPICALLY TO THE AFFECTED AREA AS NEEDED    mirtazapine (REMERON) 30 MG tablet TAKE 1 TABLET(30 MG) BY MOUTH EVERY EVENING    pantoprazole (PROTONIX) 40 MG tablet Take 1 tablet (40 mg total) by mouth once daily.    promethazine (PHENERGAN) 25 MG tablet Take 1 tablet (25 mg total) by mouth every 6 (six) hours as needed for Nausea.    propranoloL (INDERAL LA) 80 MG 24 hr capsule Take 1 capsule (80 mg total) by mouth once daily.    rivaroxaban (XARELTO) 20 mg Tab TAKE 1 TABLET(20 MG) BY MOUTH DAILY WITH THE EVENING MEAL AND DINNER    traZODone (DESYREL) 100 MG tablet TAKE 1 TO 2 TABLETS BY MOUTH EVERY NIGHT AS NEEDED FOR INSOMNIA     Family History       Problem Relation (Age of Onset)    Alcohol abuse Maternal Uncle, Maternal Grandfather    Breast cancer Cousin, Other    Cancer Maternal Grandmother    Diabetes Maternal Uncle, Maternal Grandmother    Diabetes Mellitus Maternal Grandmother    Drug abuse Brother, Maternal Grandmother    Early death Paternal Grandfather    Heart disease Paternal Uncle, Maternal Grandfather    Hyperlipidemia Mother, Maternal Grandmother    Hypertension Mother, Maternal Uncle, Maternal Grandmother, Maternal Grandfather    Mental illness Maternal Grandmother, Paternal Grandfather    No Known Problems Father, Paternal Grandmother    Osteoarthritis Maternal Grandfather    Post-traumatic stress disorder Brother    Psoriasis Mother    Rheum arthritis Maternal Grandmother    Scoliosis Paternal Aunt    Stroke Maternal Uncle    Thyroid disease Mother          Tobacco Use    Smoking status: Every Day     Packs/day: 1.00     Years: 15.00     Pack years: 15.00     Types: Cigarettes     Start date: 11/25/2015    Smokeless tobacco: Never   Substance and Sexual Activity    Alcohol use: No      Alcohol/week: 0.0 standard drinks    Drug use: Yes     Types: Marijuana     Comment: smokes occasionally, helps with pain and appetite    Sexual activity: Never     Partners: Female     Comment: usually female, but currently with her boyfriend     Review of Systems   Constitutional:  Positive for activity change and fatigue.   Respiratory:  Negative for cough and shortness of breath.    Cardiovascular:  Negative for chest pain and leg swelling.   Gastrointestinal:  Positive for diarrhea, nausea and vomiting. Negative for abdominal pain.   Neurological:  Negative for weakness.   Psychiatric/Behavioral:  Negative for confusion.    All other systems reviewed and are negative.  Objective:     Vital Signs (Most Recent):  Temp: 98 °F (36.7 °C) (01/11/23 1041)  Pulse: (!) 57 (01/11/23 1433)  Resp: 18 (01/11/23 1433)  BP: 128/73 (01/11/23 1433)  SpO2: 100 % (01/11/23 1433)   Vital Signs (24h Range):  Temp:  [98 °F (36.7 °C)] 98 °F (36.7 °C)  Pulse:  [57-76] 57  Resp:  [17-18] 18  SpO2:  [97 %-100 %] 100 %  BP: (113-129)/(62-85) 128/73     Weight: 46.7 kg (103 lb)  Body mass index is 18.25 kg/m².    Physical Exam  Vitals and nursing note reviewed.   Constitutional:       General: She is not in acute distress.  HENT:      Mouth/Throat:      Mouth: Mucous membranes are dry.      Pharynx: No oropharyngeal exudate.   Eyes:      Pupils: Pupils are equal, round, and reactive to light.   Cardiovascular:      Rate and Rhythm: Normal rate and regular rhythm.      Heart sounds: No murmur heard.  Pulmonary:      Effort: Pulmonary effort is normal.      Breath sounds: Normal breath sounds.   Abdominal:      General: There is distension.      Palpations: Abdomen is soft.      Tenderness: There is no abdominal tenderness. There is no guarding.   Skin:     Coloration: Skin is not pale.      Findings: No rash.   Neurological:      Mental Status: She is alert and oriented to person, place, and time.         CRANIAL NERVES     CN III,  IV, VI   Pupils are equal, round, and reactive to light.     Significant Labs: All pertinent labs within the past 24 hours have been reviewed.    Significant Imaging: I have reviewed all pertinent imaging results/findings within the past 24 hours.    Assessment/Plan:     * CHRISTEN (acute kidney injury)  Patient with acute kidney injury likely due to IVVD/dehydration CHRISTEN is currently worsening. Labs reviewed- Renal function/electrolytes with Estimated Creatinine Clearance: 26.3 mL/min (A) (based on SCr of 2.1 mg/dL (H)). according to latest data. Monitor urine output and serial BMP and adjust therapy as needed. Avoid nephrotoxins and renally dose meds for GFR listed above.  Will continue IV fluids and follow up repeat serum creatinine in the morning.      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      Bipolar 1 disorder, depressed, partial remission  Chronic controlled, no evidence of psychosis or other symptoms.  No indication for psych consultation.  Continue antidepressants and monitor.      Gastroparesis  Chronic, controlled.  Continue p.r.n. anti emetics    Mixed hyperlipidemia   Patient is chronically on statin.will continue for now. Monitor clinically. Last LDL was   Lab Results   Component Value Date    LDLCALC 86.2 10/08/2022            Chronic daily headache  Noted, no incidence of headache at the moment.      Cigarette nicotine dependence without complication  Dangers of cigarette smoking were reviewed with patient in detail for 10 minutes and patient was encouraged to quit. Nicotine replacement options were discussed.        Iron deficiency anemia  Patient's anemia is currently controlled. Has not received any PRBCs to date.. Etiology likely d/t iron deficiency  Current CBC reviewed-   Lab Results   Component Value Date    HGB 13.3 01/11/2023    HCT 38.5 01/11/2023     Monitor serial CBC and transfuse if patient becomes hemodynamically unstable, symptomatic or H/H drops  below 7/21.         Long term current use of anticoagulant therapy  Noted, continue rivaroxaban.      Chronic asthma without complication  Patient's asthma is controlled currently. Continue scheduled inhalers and monitor respiratory status closely.         Lupus (systemic lupus erythematosus)  Noted, currently controlled.  Patient remains on chronic anticoagulation.  Patient is not on any anti lupus medications at the moment.  Continue to monitor for any signs and symptoms of this.      Fibromyalgia  Noted, chronic.  Continue medication from home at appropriate renal dosing.      Chronic pain  Noted, will continue pain medication at appropriate dosing for renal function.        VTE Risk Mitigation (From admission, onward)    None         Rivaroxaban 15 mg b.i.d.    Santiago Peters MD  Department of Hospital Medicine   [facility] - Emergency Dept

## 2023-01-11 NOTE — ED PROVIDER NOTES
"Encounter Date: 1/11/2023    SCRIBE #1 NOTE: IHaley, am scribing for, and in the presence of,  SOFIE Baker.     History     Chief Complaint   Patient presents with    Vomiting     With diarrhea x 1 week - denies abd pain     Time seen by provider: 11:03 AM on 01/11/2023    Cat Denson is a 42 y.o. female with a PMHx of anxiety, depression, chronic pain, chronic kidney Dz, lupus, Sjogren's syndrome, and anemia who presents to the ED for evaluation of persistent N/V with associated diarrhea that onset 1 week ago.  Patient reports passing 2-3 loose stools per day and notes it is without blood.  She notes a similar Hx in the past when diagnosed with gastroparesis.  Phenergan normally improves N/V but patient has taken this medication with no change to Sx.  Patient states the N/V is exacerbated with oral intake.  She notes accompanying congestion and coughs.  Patient expresses concerns for Influenza infection as "current Sx appear more viral versus GI," per patient.  She denies a fever, abdominal pain, painful urination, or any other symptoms at this time.  PSHx includes cholecystectomy, colonoscopy, upper gastrointestinal endoscopy, and EGD.  No recent travel outside the [place] mentioned.      The history is provided by the patient.   Review of patient's allergies indicates:   Allergen Reactions    Metoclopramide hcl Anaphylaxis     Involuntary mouth movements     Amoxicillin-pot clavulanate      2 other medications    Amoxil [amoxicillin]      hives    Avelox [moxifloxacin]      itching    Benadryl [diphenhydramine hcl]      Lowers seizure threshold     Coumadin [warfarin]      toxicity    Quinazolinones      Lowers seizure threshold     Ultram [tramadol]      Lowers seizure threshold     Wellbutrin [bupropion hcl]      Lowers seizure threshold      Past Medical History:   Diagnosis Date    Acid reflux     Allergy     Anemia     Anticoagulated 12/29/2015    Anxiety     Asthma     Bipolar 1 " disorder     Bronchospasm with bronchitis, acute     Chronic daily headache 9/11/2018    Chronic kidney disease     Chronic pain     Depression     bipolar 2    Fibromyalgia     Gastroparesis     History of right MCA stroke 11/25/2015    Hx of psychiatric care     Lactose intolerance     Lupus     Mixed hyperlipidemia 11/28/2018    Psychiatric problem     Renal tubular acidosis     Seizure     Sjogren's syndrome     Smoker     Stroke 11-    Substance abuse-kratom daily use 8/30/2020    Suicide attempt     overdosed on a bottle of paxil, muscle relaxers, & zoloft    Therapy      Past Surgical History:   Procedure Laterality Date    CHOLECYSTECTOMY      COLONOSCOPY  11/12/2014    Dr. Kimble, repeat at 52 years old for screening    ESOPHAGOGASTRODUODENOSCOPY N/A 1/3/2022    Procedure: EGD (ESOPHAGOGASTRODUODENOSCOPY);  Surgeon: Scout Kimble MD;  Location: [facility];  Service: Endoscopy;  Laterality: N/A;    FLUOROSCOPIC URODYNAMIC STUDY N/A 7/23/2019    Procedure: URODYNAMIC STUDY, FLUOROSCOPIC;  Surgeon: Vanna Martinez MD;  Location: [address];  Service: Urology;  Laterality: N/A;  1 hour    UPPER GASTROINTESTINAL ENDOSCOPY  03/03/2017    Dr. Harris     Family History   Problem Relation Age of Onset    Hypertension Mother     Hyperlipidemia Mother     Psoriasis Mother     Thyroid disease Mother     No Known Problems Father     Post-traumatic stress disorder Brother     Drug abuse Brother     Diabetes Maternal Uncle     Hypertension Maternal Uncle     Alcohol abuse Maternal Uncle     Stroke Maternal Uncle     Scoliosis Paternal Aunt     Heart disease Paternal Uncle     Mental illness Maternal Grandmother     Cancer Maternal Grandmother         lung / brain - smoker    Drug abuse Maternal Grandmother     Hypertension Maternal Grandmother     Hyperlipidemia Maternal Grandmother     Diabetes Maternal Grandmother     Rheum arthritis Maternal Grandmother     Diabetes Mellitus Maternal Grandmother      Heart disease Maternal Grandfather     Hypertension Maternal Grandfather     Alcohol abuse Maternal Grandfather     Osteoarthritis Maternal Grandfather     No Known Problems Paternal Grandmother     Mental illness Paternal Grandfather     Early death Paternal Grandfather         self    Breast cancer Cousin     Breast cancer Other     Colon cancer Neg Hx     Colon polyps Neg Hx     Crohn's disease Neg Hx     Ulcerative colitis Neg Hx     Celiac disease Neg Hx     Lupus Neg Hx     Kidney disease Neg Hx     Inflammatory bowel disease Neg Hx     Depression Neg Hx     COPD Neg Hx     Asthma Neg Hx     Macular degeneration Neg Hx     Retinal detachment Neg Hx     Glaucoma Neg Hx      Social History     Tobacco Use    Smoking status: Every Day     Packs/day: 1.00     Years: 15.00     Pack years: 15.00     Types: Cigarettes     Start date: 11/25/2015    Smokeless tobacco: Never   Substance Use Topics    Alcohol use: No     Alcohol/week: 0.0 standard drinks    Drug use: Yes     Types: Marijuana     Comment: smokes occasionally, helps with pain and appetite     Review of Systems   Constitutional:  Negative for fever.   HENT:  Positive for congestion. Negative for sore throat.    Respiratory:  Positive for cough. Negative for shortness of breath.    Cardiovascular:  Negative for chest pain.   Gastrointestinal:  Positive for diarrhea, nausea and vomiting. Negative for abdominal pain and blood in stool.   Genitourinary:  Negative for dysuria.   Musculoskeletal:  Negative for back pain.   Skin:  Negative for rash.   Neurological:  Negative for weakness.   Hematological:  Does not bruise/bleed easily.     Physical Exam     Initial Vitals [01/11/23 1041]   BP Pulse Resp Temp SpO2   126/77 76 17 98 °F (36.7 °C) 99 %      MAP       --         Physical Exam    Nursing note and vitals reviewed.  Constitutional: Vital signs are normal. She appears well-developed and well-nourished.   HENT:   Head: Normocephalic and atraumatic.   Eyes:  Pupils are equal, round, and reactive to light.   Neck: Neck supple.   Cardiovascular:  Normal rate, regular rhythm, normal heart sounds and intact distal pulses.     Exam reveals no gallop and no friction rub.       No murmur heard.  Pulmonary/Chest: Breath sounds normal. She has no wheezes. She has no rhonchi. She has no rales.   Abdominal: Abdomen is soft. Bowel sounds are normal. There is no abdominal tenderness.   Musculoskeletal:      Cervical back: Neck supple.     Neurological: She is alert and oriented to person, place, and time. She has normal strength.   Skin: Skin is warm, dry and intact.   Psychiatric: She has a normal mood and affect. Her speech is normal and behavior is normal.       ED Course   Procedures  Labs Reviewed   CBC W/ AUTO DIFFERENTIAL - Abnormal; Notable for the following components:       Result Value    MCH 33.1 (*)     Immature Granulocytes 0.7 (*)     Immature Grans (Abs) 0.06 (*)     Gran % 73.4 (*)     All other components within normal limits   COMPREHENSIVE METABOLIC PANEL - Abnormal; Notable for the following components:    Potassium 3.4 (*)     Chloride 113 (*)     CO2 13 (*)     Creatinine 2.1 (*)     Calcium 8.6 (*)     ALT 9 (*)     eGFR 30 (*)     All other components within normal limits   URINALYSIS, REFLEX TO URINE CULTURE - Abnormal; Notable for the following components:    Specific Gravity, UA <=1.005 (*)     All other components within normal limits    Narrative:     Specimen Source->Urine   INFLUENZA A & B BY MOLECULAR   LIPASE   MAGNESIUM   SARS-COV-2 RNA AMPLIFICATION, QUAL   HIV 1 / 2 ANTIBODY   HEPATITIS C ANTIBODY          Imaging Results    None          Medications   ARIPiprazole tablet 10 mg (has no administration in time range)   atorvastatin tablet 10 mg (has no administration in time range)   divalproex EC tablet 250 mg (has no administration in time range)   famotidine tablet 40 mg (has no administration in time range)   mirtazapine tablet 30 mg (has no  administration in time range)   propranoloL tablet 40 mg (has no administration in time range)   rivaroxaban tablet 15 mg (has no administration in time range)   traZODone tablet 150 mg (has no administration in time range)   sodium chloride 0.9% flush 10 mL (has no administration in time range)   melatonin tablet 9 mg (has no administration in time range)   senna-docusate 8.6-50 mg per tablet 1 tablet (has no administration in time range)   acetaminophen tablet 1,000 mg (has no administration in time range)   naloxone 0.4 mg/mL injection 0.02 mg (has no administration in time range)   potassium bicarbonate disintegrating tablet 50 mEq (has no administration in time range)   potassium bicarbonate disintegrating tablet 35 mEq (has no administration in time range)   potassium bicarbonate disintegrating tablet 60 mEq (has no administration in time range)   magnesium oxide tablet 800 mg (has no administration in time range)   magnesium oxide tablet 800 mg (has no administration in time range)   glucose chewable tablet 16 g (has no administration in time range)   glucose chewable tablet 24 g (has no administration in time range)   glucagon (human recombinant) injection 1 mg (has no administration in time range)   dextrose 10% bolus 125 mL 125 mL (has no administration in time range)   dextrose 10% bolus 250 mL 250 mL (has no administration in time range)   0.9%  NaCl infusion ( Intravenous New Bag 1/11/23 1738)   sodium chloride 0.9% bolus 1,000 mL 1,000 mL (0 mLs Intravenous Stopped 1/11/23 1200)   ondansetron injection 8 mg (8 mg Intravenous Given 1/11/23 1129)   0.9%  NaCl infusion (0 mLs Intravenous Stopped 1/11/23 1550)     Medical Decision Making:   History:   Old Medical Records: I decided to obtain old medical records.  Differential Diagnosis:   Viral gastroenteritis  Dehydration  Gastroparesis   Clinical Tests:   Lab Tests: Ordered and Reviewed     APC / Resident Notes:   Patient is a 42 y.o. female who presents  to the ED 01/11/2023 with a chief complaint of several days of vomiting and diarrhea.  Patient has a history of nausea and vomiting due to her gastroparesis.  She denies any abdominal pain has benign abdominal exam.  Do not think emergent abdominal CT indicated at this time or other imaging studies.  Do not think emergent process such as obstruction, abscess, perforation, or appendicitis.  Patient having on 2-3 stools a day.  I doubt acute bacterial cause of diarrhea or C diff colitis.  Patient afebrile.  She does not appear septic.  She is not tachycardic.  Labs however reveal CHRISTEN with mild hypokalemia.  Potassium supplementation ordered per hospital medicine.  Patient is given aggressive IV fluid rehydration in the emergency department and patient no longer has vomiting after antiemetics.  Will admit for further observation.  Case discussed with hospital medicine team is agreeable to this plan of care and accepting of this admission.  Case also discussed with Dr. Lazar who is agreeable to plan of care.  Findings and plan care discussed with patient who is also agreeable.           Scribe Attestation:   Scribe #1: I performed the above scribed service and the documentation accurately describes the services I performed. I attest to the accuracy of the note.    Attending Attestation:           Physician Attestation for Scribe:  Physician Attestation Statement for Scribe #1: I, Monique Pretty, reviewed documentation, as scribed by in my presence, and it is both accurate and complete.     Comments: I, SOFIE Baker, personally performed the services described in this documentation. All medical record entries made by the scribe were at my direction and in my presence.  I have reviewed the chart and agree that the record reflects my personal performance and is accurate and complete. SOFIE Baker.  6:41 PM 01/11/2023                     Clinical Impression:   Final diagnoses:  [K31.84] Gastroparesis  (Primary)  [E86.0] Dehydration  [N17.9] CHRISTEN (acute kidney injury)  [E87.6] Hypokalemia        ED Disposition Condition    Observation                 Monique Pretty NP  01/11/23 1844       Monique Pretty NP  01/11/23 1845

## 2023-01-11 NOTE — ASSESSMENT & PLAN NOTE
Patient is chronically on statin.will continue for now. Monitor clinically. Last LDL was   Lab Results   Component Value Date    LDLCALC 86.2 10/08/2022

## 2023-01-11 NOTE — ASSESSMENT & PLAN NOTE
Patient's asthma is controlled currently. Continue scheduled inhalers and monitor respiratory status closely.

## 2023-01-11 NOTE — SUBJECTIVE & OBJECTIVE
Past Medical History:   Diagnosis Date    Acid reflux     Allergy     Anemia     Anticoagulated 12/29/2015    Anxiety     Asthma     Bipolar 1 disorder     Bronchospasm with bronchitis, acute     Chronic daily headache 9/11/2018    Chronic kidney disease     Chronic pain     Depression     bipolar 2    Fibromyalgia     Gastroparesis     History of right MCA stroke 11/25/2015    Hx of psychiatric care     Lactose intolerance     Lupus     Mixed hyperlipidemia 11/28/2018    Psychiatric problem     Renal tubular acidosis     Seizure     Sjogren's syndrome     Smoker     Stroke 13-    Substance abuse-kratom daily use 8/30/2020    Suicide attempt     overdosed on a bottle of paxil, muscle relaxers, & zoloft    Therapy        Past Surgical History:   Procedure Laterality Date    CHOLECYSTECTOMY      COLONOSCOPY  11/12/2014    Dr. Kimble, repeat at 52 years old for screening    ESOPHAGOGASTRODUODENOSCOPY N/A 1/3/2022    Procedure: EGD (ESOPHAGOGASTRODUODENOSCOPY);  Surgeon: Scout Kimble MD;  Location: [facility];  Service: Endoscopy;  Laterality: N/A;    FLUOROSCOPIC URODYNAMIC STUDY N/A 7/23/2019    Procedure: URODYNAMIC STUDY, FLUOROSCOPIC;  Surgeon: Vanna Martinez MD;  Location: [address];  Service: Urology;  Laterality: N/A;  1 hour    UPPER GASTROINTESTINAL ENDOSCOPY  03/03/2017    Dr. Harris       Review of patient's allergies indicates:   Allergen Reactions    Metoclopramide hcl Anaphylaxis     Involuntary mouth movements     Amoxicillin-pot clavulanate      2 other medications    Amoxil [amoxicillin]      hives    Avelox [moxifloxacin]      itching    Benadryl [diphenhydramine hcl]      Lowers seizure threshold     Coumadin [warfarin]      toxicity    Quinazolinones      Lowers seizure threshold     Ultram [tramadol]      Lowers seizure threshold     Wellbutrin [bupropion hcl]      Lowers seizure threshold        No current facility-administered medications on file prior to encounter.      Current Outpatient Medications on File Prior to Encounter   Medication Sig    ARIPiprazole (ABILIFY) 10 MG Tab Take 1 tablet (10 mg total) by mouth once daily.    atorvastatin (LIPITOR) 10 MG tablet Take 1 tablet (10 mg total) by mouth once daily.    BOTOX 200 unit SolR     cyclobenzaprine (FLEXERIL) 10 MG tablet Take 10 mg by mouth 3 (three) times daily.    divalproex (DEPAKOTE) 250 MG EC tablet TAKE 1 TABLET BY MOUTH EVERY MORNING AND TAKE 2 TABLETS BY MOUTH EVERY NIGHT AT BEDTIME    famotidine (PEPCID) 40 MG tablet TAKE 1 TABLET(40 MG) BY MOUTH EVERY EVENING    gabapentin (NEURONTIN) 800 MG tablet TAKE 1 TABLET(800 MG) BY MOUTH TWICE DAILY    LIDOcaine-prilocaine (EMLA) cream APPLY TOPICALLY TO THE AFFECTED AREA AS NEEDED    mirtazapine (REMERON) 30 MG tablet TAKE 1 TABLET(30 MG) BY MOUTH EVERY EVENING    pantoprazole (PROTONIX) 40 MG tablet Take 1 tablet (40 mg total) by mouth once daily.    promethazine (PHENERGAN) 25 MG tablet Take 1 tablet (25 mg total) by mouth every 6 (six) hours as needed for Nausea.    propranoloL (INDERAL LA) 80 MG 24 hr capsule Take 1 capsule (80 mg total) by mouth once daily.    rivaroxaban (XARELTO) 20 mg Tab TAKE 1 TABLET(20 MG) BY MOUTH DAILY WITH THE EVENING MEAL AND DINNER    traZODone (DESYREL) 100 MG tablet TAKE 1 TO 2 TABLETS BY MOUTH EVERY NIGHT AS NEEDED FOR INSOMNIA     Family History       Problem Relation (Age of Onset)    Alcohol abuse Maternal Uncle, Maternal Grandfather    Breast cancer Cousin, Other    Cancer Maternal Grandmother    Diabetes Maternal Uncle, Maternal Grandmother    Diabetes Mellitus Maternal Grandmother    Drug abuse Brother, Maternal Grandmother    Early death Paternal Grandfather    Heart disease Paternal Uncle, Maternal Grandfather    Hyperlipidemia Mother, Maternal Grandmother    Hypertension Mother, Maternal Uncle, Maternal Grandmother, Maternal Grandfather    Mental illness Maternal Grandmother, Paternal Grandfather    No Known Problems  Father, Paternal Grandmother    Osteoarthritis Maternal Grandfather    Post-traumatic stress disorder Brother    Psoriasis Mother    Rheum arthritis Maternal Grandmother    Scoliosis Paternal Aunt    Stroke Maternal Uncle    Thyroid disease Mother          Tobacco Use    Smoking status: Every Day     Packs/day: 1.00     Years: 15.00     Pack years: 15.00     Types: Cigarettes     Start date: 11/25/2015    Smokeless tobacco: Never   Substance and Sexual Activity    Alcohol use: No     Alcohol/week: 0.0 standard drinks    Drug use: Yes     Types: Marijuana     Comment: smokes occasionally, helps with pain and appetite    Sexual activity: Never     Partners: Female     Comment: usually female, but currently with her boyfriend     Review of Systems   Constitutional:  Positive for activity change and fatigue.   Respiratory:  Negative for cough and shortness of breath.    Cardiovascular:  Negative for chest pain and leg swelling.   Gastrointestinal:  Positive for diarrhea, nausea and vomiting. Negative for abdominal pain.   Neurological:  Negative for weakness.   Psychiatric/Behavioral:  Negative for confusion.    All other systems reviewed and are negative.  Objective:     Vital Signs (Most Recent):  Temp: 98 °F (36.7 °C) (01/11/23 1041)  Pulse: (!) 57 (01/11/23 1433)  Resp: 18 (01/11/23 1433)  BP: 128/73 (01/11/23 1433)  SpO2: 100 % (01/11/23 1433)   Vital Signs (24h Range):  Temp:  [98 °F (36.7 °C)] 98 °F (36.7 °C)  Pulse:  [57-76] 57  Resp:  [17-18] 18  SpO2:  [97 %-100 %] 100 %  BP: (113-129)/(62-85) 128/73     Weight: 46.7 kg (103 lb)  Body mass index is 18.25 kg/m².    Physical Exam  Vitals and nursing note reviewed.   Constitutional:       General: She is not in acute distress.  HENT:      Mouth/Throat:      Mouth: Mucous membranes are dry.      Pharynx: No oropharyngeal exudate.   Eyes:      Pupils: Pupils are equal, round, and reactive to light.   Cardiovascular:      Rate and Rhythm: Normal rate and regular  rhythm.      Heart sounds: No murmur heard.  Pulmonary:      Effort: Pulmonary effort is normal.      Breath sounds: Normal breath sounds.   Abdominal:      General: There is distension.      Palpations: Abdomen is soft.      Tenderness: There is no abdominal tenderness. There is no guarding.   Skin:     Coloration: Skin is not pale.      Findings: No rash.   Neurological:      Mental Status: She is alert and oriented to person, place, and time.         CRANIAL NERVES     CN III, IV, VI   Pupils are equal, round, and reactive to light.     Significant Labs: All pertinent labs within the past 24 hours have been reviewed.    Significant Imaging: I have reviewed all pertinent imaging results/findings within the past 24 hours.

## 2023-01-11 NOTE — ASSESSMENT & PLAN NOTE
Chronic controlled, no evidence of psychosis or other symptoms.  No indication for psych consultation.  Continue antidepressants and monitor.

## 2023-01-12 VITALS
HEIGHT: 63 IN | HEART RATE: 63 BPM | BODY MASS INDEX: 18.25 KG/M2 | RESPIRATION RATE: 17 BRPM | SYSTOLIC BLOOD PRESSURE: 150 MMHG | WEIGHT: 103 LBS | DIASTOLIC BLOOD PRESSURE: 70 MMHG | TEMPERATURE: 98 F | OXYGEN SATURATION: 99 %

## 2023-01-12 LAB
ALBUMIN SERPL BCP-MCNC: 3.2 G/DL (ref 3.5–5.2)
ALP SERPL-CCNC: 57 U/L (ref 55–135)
ALT SERPL W/O P-5'-P-CCNC: 8 U/L (ref 10–44)
ANION GAP SERPL CALC-SCNC: 8 MMOL/L (ref 8–16)
AST SERPL-CCNC: 11 U/L (ref 10–40)
BASOPHILS # BLD AUTO: 0.03 K/UL (ref 0–0.2)
BASOPHILS NFR BLD: 0.5 % (ref 0–1.9)
BILIRUB SERPL-MCNC: 0.2 MG/DL (ref 0.1–1)
BUN SERPL-MCNC: 8 MG/DL (ref 6–20)
CALCIUM SERPL-MCNC: 7.7 MG/DL (ref 8.7–10.5)
CHLORIDE SERPL-SCNC: 121 MMOL/L (ref 95–110)
CO2 SERPL-SCNC: 13 MMOL/L (ref 23–29)
CREAT SERPL-MCNC: 1.5 MG/DL (ref 0.5–1.4)
DIFFERENTIAL METHOD: ABNORMAL
EOSINOPHIL # BLD AUTO: 0.1 K/UL (ref 0–0.5)
EOSINOPHIL NFR BLD: 1.4 % (ref 0–8)
ERYTHROCYTE [DISTWIDTH] IN BLOOD BY AUTOMATED COUNT: 13.8 % (ref 11.5–14.5)
EST. GFR  (NO RACE VARIABLE): 45 ML/MIN/1.73 M^2
GLUCOSE SERPL-MCNC: 78 MG/DL (ref 70–110)
HCT VFR BLD AUTO: 36.8 % (ref 37–48.5)
HGB BLD-MCNC: 12.5 G/DL (ref 12–16)
IMM GRANULOCYTES # BLD AUTO: 0.03 K/UL (ref 0–0.04)
IMM GRANULOCYTES NFR BLD AUTO: 0.5 % (ref 0–0.5)
LYMPHOCYTES # BLD AUTO: 2.7 K/UL (ref 1–4.8)
LYMPHOCYTES NFR BLD: 45.5 % (ref 18–48)
MAGNESIUM SERPL-MCNC: 1.5 MG/DL (ref 1.6–2.6)
MCH RBC QN AUTO: 33.3 PG (ref 27–31)
MCHC RBC AUTO-ENTMCNC: 34 G/DL (ref 32–36)
MCV RBC AUTO: 98 FL (ref 82–98)
MONOCYTES # BLD AUTO: 0.5 K/UL (ref 0.3–1)
MONOCYTES NFR BLD: 7.8 % (ref 4–15)
NEUTROPHILS # BLD AUTO: 2.6 K/UL (ref 1.8–7.7)
NEUTROPHILS NFR BLD: 44.3 % (ref 38–73)
NRBC BLD-RTO: 0 /100 WBC
PHOSPHATE SERPL-MCNC: 3.1 MG/DL (ref 2.7–4.5)
PLATELET # BLD AUTO: 246 K/UL (ref 150–450)
PMV BLD AUTO: 11.3 FL (ref 9.2–12.9)
POTASSIUM SERPL-SCNC: 3.4 MMOL/L (ref 3.5–5.1)
PROT SERPL-MCNC: 5.7 G/DL (ref 6–8.4)
RBC # BLD AUTO: 3.75 M/UL (ref 4–5.4)
SODIUM SERPL-SCNC: 142 MMOL/L (ref 136–145)
WBC # BLD AUTO: 5.87 K/UL (ref 3.9–12.7)

## 2023-01-12 PROCEDURE — 36415 COLL VENOUS BLD VENIPUNCTURE: CPT | Mod: HCNC | Performed by: HOSPITALIST

## 2023-01-12 PROCEDURE — 84100 ASSAY OF PHOSPHORUS: CPT | Mod: HCNC | Performed by: HOSPITALIST

## 2023-01-12 PROCEDURE — 25000003 PHARM REV CODE 250: Mod: HCNC | Performed by: HOSPITALIST

## 2023-01-12 PROCEDURE — 85025 COMPLETE CBC W/AUTO DIFF WBC: CPT | Mod: HCNC | Performed by: HOSPITALIST

## 2023-01-12 PROCEDURE — 80053 COMPREHEN METABOLIC PANEL: CPT | Mod: HCNC | Performed by: HOSPITALIST

## 2023-01-12 PROCEDURE — G0378 HOSPITAL OBSERVATION PER HR: HCPCS | Mod: HCNC

## 2023-01-12 PROCEDURE — 96361 HYDRATE IV INFUSION ADD-ON: CPT

## 2023-01-12 PROCEDURE — 83735 ASSAY OF MAGNESIUM: CPT | Mod: HCNC | Performed by: HOSPITALIST

## 2023-01-12 RX ORDER — ONDANSETRON HYDROCHLORIDE 8 MG/1
8 TABLET, FILM COATED ORAL EVERY 8 HOURS PRN
Qty: 10 TABLET | Refills: 0 | Status: SHIPPED | OUTPATIENT
Start: 2023-01-12 | End: 2023-03-30 | Stop reason: SDUPTHER

## 2023-01-12 RX ORDER — LOPERAMIDE HYDROCHLORIDE 2 MG/1
2 CAPSULE ORAL 4 TIMES DAILY PRN
Qty: 20 CAPSULE | Refills: 0 | Status: SHIPPED | OUTPATIENT
Start: 2023-01-12 | End: 2023-01-22

## 2023-01-12 RX ADMIN — DIVALPROEX SODIUM 250 MG: 250 TABLET, DELAYED RELEASE ORAL at 06:01

## 2023-01-12 RX ADMIN — ATORVASTATIN CALCIUM 10 MG: 10 TABLET, FILM COATED ORAL at 10:01

## 2023-01-12 RX ADMIN — POTASSIUM BICARBONATE 35 MEQ: 391 TABLET, EFFERVESCENT ORAL at 06:01

## 2023-01-12 RX ADMIN — PROPRANOLOL HYDROCHLORIDE 40 MG: 10 TABLET ORAL at 10:01

## 2023-01-12 RX ADMIN — ARIPIPRAZOLE 10 MG: 5 TABLET ORAL at 10:01

## 2023-01-12 NOTE — PLAN OF CARE
Patient cleared for discharge from case management standpoint.    SW scheduled hospital follow up and placed on AVS.  SW spoke with patient regarding resources for housing, food, financial.  SW left information at bedside for patient to review.       01/12/23 1220   Final Note   Assessment Type Final Discharge Note   Anticipated Discharge Disposition Home   What phone number can be called within the next 1-3 days to see how you are doing after discharge? [phone]   Hospital Resources/Appts/Education Provided Appointments scheduled and added to AVS;Provided patient/caregiver with written discharge plan information;Community resources provided;Provided education on problems/symptoms using teachback

## 2023-01-12 NOTE — DISCHARGE SUMMARY
[facility] Medicine  Discharge Summary      Patient Name: Cat Denson  MRN: [id number]  [facility]: [id number]  Patient Class: OP- Observation  Admission Date: 1/11/2023  Hospital Length of Stay: 0 days  Discharge Date and Time: 1/12/2023  1:05 PM  Attending Physician: Alie att. providers found   Discharging Provider: Santiago Peters MD  Primary Care Provider: Neela Barrett MD    Primary Care Team: Networked reference to record PCT     HPI:   Patient is a 42-year-old  female with a past medical history significant for CKD, seizure disorder, stroke in 2015 with resulting hemiparesis, gastroparesis, and asthma who presents the hospital after worsening vomiting and diarrhea over the past 3-4 days.  Patient routinely has gastroparesis and has difficulty with vomiting, but states that this is not similar to that, and the element of diarrhea made her think that she had a potential gastroenteritis.  Patient denies any fever, or hematochezia.  He also denies any other systemic symptoms.  He has been unable to hold down any food for the last 2 days and unable hold down any water for the last day.  In the emergency department, she was found to be profoundly dehydrated, and was given IV fluid bolus.  Was also noted the patient was in acute kidney injury secondary to likely to dehydration.      * No surgery found *      Hospital Course:   Patient was admitted to the hospital with dehydration and acute kidney injury secondary to uncontrolled nausea and vomiting from likely viral gastroenteritis.  He has multiple chronic medical problems.  The patient was given aggressive IV hydration, and control of her nausea and vomiting on diarrhea by antiemetics and antidiarrheals.  Well over the course of her hospitalization, was tolerating clear liquids well at time of discharge.  Her nausea and diarrhea were controlled with oral medications.  He was discharged home with follow-up with primary care and  outpatient case management.       Goals of Care Treatment Preferences:  Code Status: Full Code      Consults:     No new Assessment & Plan notes have been filed under this hospital service since the last note was generated.  Service: Hospital Medicine    Final Active Diagnoses:    Diagnosis Date Noted POA    PRINCIPAL PROBLEM:  CHRISTEN (acute kidney injury) [N17.9] 09/14/2018 Yes    Spasticity as late effect of cerebrovascular accident (CVA) [I69.398, R25.2] 06/29/2022 Not Applicable    Bipolar 1 disorder, depressed, partial remission [F31.75] 12/22/2019 Yes    Gastroparesis [K31.84] 11/28/2018 Yes    Mixed hyperlipidemia [E78.2] 11/28/2018 Yes    Chronic daily headache [R51.9] 09/11/2018 Yes     Chronic    Cigarette nicotine dependence without complication [F17.210] 11/03/2016 Yes    Iron deficiency anemia [D50.9] 05/16/2016 Yes    Long term current use of anticoagulant therapy [Z79.01] 12/29/2015 Not Applicable    Chronic asthma without complication [J45.909] 11/17/2015 Yes    Lupus (systemic lupus erythematosus) [M32.9] 11/27/2013 Yes    Fibromyalgia [M79.7] 10/02/2013 Yes     Chronic    Chronic pain [G89.29] 07/19/2013 Yes     Chronic      Problems Resolved During this Admission:    Diagnosis Date Noted Date Resolved POA    Acute pyelonephritis [N10] 01/11/2023 01/11/2023 Unknown    Cognitive disorder [F09] 12/22/2019 01/11/2023 Yes       Discharged Condition: stable    Disposition: Home or Self Care    Follow Up:   Follow-up Information     Neela Barrett MD Follow up on 1/13/2023.    Specialty: Family Medicine  Why: at 3:00pm with ORTEGA Camejo  Contact information:  [address] [facility] [address]  [phone]                       Patient Instructions:      Ambulatory referral/consult to Outpatient Case Management   Referral Priority: Routine Referral Type: Consultation   Referral Reason: Specialty Services Required   Number of Visits Requested: 1       Significant Diagnostic Studies: Labs:   BMP:    Recent Labs   Lab 01/11/23  1130 01/12/23  0430    78    142   K 3.4* 3.4*   * 121*   CO2 13* 13*   BUN 12 8   CREATININE 2.1* 1.5*   CALCIUM 8.6* 7.7*   MG 1.8 1.5*    and CBC   Recent Labs   Lab 01/11/23  1130 01/12/23  0430   WBC 8.88 5.87   HGB 13.3 12.5   HCT 38.5 36.8*    246       Pending Diagnostic Studies:     None         Medications:  Reconciled Home Medications:      Medication List      START taking these medications    loperamide 2 mg capsule  Commonly known as: IMODIUM  Take 1 capsule (2 mg total) by mouth 4 (four) times daily as needed for Diarrhea.     ondansetron 8 MG tablet  Commonly known as: ZOFRAN  Take 1 tablet (8 mg total) by mouth every 8 (eight) hours as needed for Nausea.        CONTINUE taking these medications    ARIPiprazole 10 MG Tab  Commonly known as: ABILIFY  Take 1 tablet (10 mg total) by mouth once daily.     atorvastatin 10 MG tablet  Commonly known as: LIPITOR  Take 1 tablet (10 mg total) by mouth once daily.     BOTOX 200 unit Solr  Generic drug: onabotulinumtoxina     cyclobenzaprine 10 MG tablet  Commonly known as: FLEXERIL  Take 10 mg by mouth 3 (three) times daily.     divalproex 250 MG EC tablet  Commonly known as: DEPAKOTE  TAKE 1 TABLET BY MOUTH EVERY MORNING AND TAKE 2 TABLETS BY MOUTH EVERY NIGHT AT BEDTIME     famotidine 40 MG tablet  Commonly known as: PEPCID  TAKE 1 TABLET(40 MG) BY MOUTH EVERY EVENING     gabapentin 800 MG tablet  Commonly known as: NEURONTIN  TAKE 1 TABLET(800 MG) BY MOUTH TWICE DAILY     LIDOcaine-prilocaine cream  Commonly known as: EMLA  APPLY TOPICALLY TO THE AFFECTED AREA AS NEEDED     mirtazapine 30 MG tablet  Commonly known as: REMERON  TAKE 1 TABLET(30 MG) BY MOUTH EVERY EVENING     pantoprazole 40 MG tablet  Commonly known as: PROTONIX  Take 1 tablet (40 mg total) by mouth once daily.     promethazine 25 MG tablet  Commonly known as: PHENERGAN  Take 1 tablet (25 mg total) by mouth every 6 (six) hours as needed  for Nausea.     propranoloL 80 MG 24 hr capsule  Commonly known as: INDERAL LA  Take 1 capsule (80 mg total) by mouth once daily.     rivaroxaban 20 mg Tab  Commonly known as: XARELTO  TAKE 1 TABLET(20 MG) BY MOUTH DAILY WITH THE EVENING MEAL AND DINNER     traZODone 100 MG tablet  Commonly known as: DESYREL  TAKE 1 TO 2 TABLETS BY MOUTH EVERY NIGHT AS NEEDED FOR INSOMNIA            Indwelling Lines/Drains at time of discharge:   Lines/Drains/Airways     None                 Time spent on the discharge of patient: 31 minutes         Santiago Peters MD  Department of Hospital Medicine  [facility]

## 2023-01-12 NOTE — PLAN OF CARE
[facility]  Initial Discharge Assessment       Primary Care Provider: Neela Barrett MD    Admission Diagnosis: Gastroparesis [K31.84]  Dehydration [E86.0]  Hypokalemia [E87.6]  CHRISTEN (acute kidney injury) [N17.9]    Admission Date: 1/11/2023  Expected Discharge Date: 1/12/2023    Discharge Barriers Identified: None    Payor: HU[employer] MANAGED MEDICARE / Plan: HU[employer]NA SNP (SPECIAL NEEDS PLAN) / Product Type: Medicare Advantage /     Extended Emergency Contact Information  Primary Emergency Contact: Arabella Rose  Address: [address] [place] [address] [facility]  Home Phone: [phone]  Mobile Phone: [phone]  Relation: Mother  Preferred language: English   needed? No  Secondary Emergency Contact: RALPH CASTELLON  Address: [address] [place] [address] [facility]  Home Phone: [phone]  Mobile Phone: [phone]  Relation: Friend  Preferred language: English   needed? No    Discharge Plan A: Home  Discharge Plan B: Home with family      [id number] [address] & [facility]  [address] [facility] [address]  Phone: [phone] Fax: [phone]    SW met with patient at bedside to complete discharge planning assessment.  Patient alert and oriented xs 4.  Patient verified all demographic information on facesheet is correct.  Patient verified PCP is Dr. Barrett.  Patient verified primary health insurance is [employer].  Patient with NO home health or DME.  Patient with NO POA or Living Will.  Patient not on dialysis or medication coumadin.  Patient with no 30 day admission.  Patient with no financial issues at this time.  Patient friend will provide transportation upon discharge from facility.  Patient independent with ADLs, live with friend [address].      Initial Assessment (most recent)       Adult Discharge Assessment - 01/12/23 8092           Discharge Assessment    Assessment Type Discharge Planning Assessment     Confirmed/corrected address, phone number and insurance Yes     Confirmed Demographics Correct on Facesheet     Source of Information patient     Does patient/caregiver understand observation status Yes     Communicated EDY with patient/caregiver Yes     People in Home friend(s)     Facility Arrived From: home     Do you expect to return to your current living situation? Yes     Do you have help at home or someone to help you manage your care at home? Yes     Who are your caregiver(s) and their phone number(s)? friend     Prior to hospitilization cognitive status: Alert/Oriented     Current cognitive status: Alert/Oriented     Equipment Currently Used at Home none     Readmission within 30 days? No     Patient currently being followed by outpatient case management? No     Do you currently have service(s) that help you manage your care at home? No     Do you take prescription medications? Yes     Do you have prescription coverage? Yes     Do you have any problems affording any of your prescribed medications? No     Is the patient taking medications as prescribed? yes     Who is going to help you get home at discharge? friend     How do you get to doctors appointments? family or friend will provide     Are you on dialysis? No     Do you take coumadin? No   Xarelto    Discharge Plan A Home     Discharge Plan B Home with family     DME Needed Upon Discharge  none     Discharge Plan discussed with: Patient     Discharge Barriers Identified None

## 2023-01-12 NOTE — PLAN OF CARE
01/12/23 1205   FRAZIER Message   Medicare Outpatient and Observation Notification regarding financial responsibility Explained to patient/caregiver;Signed/date by patient/caregiver   Date FRAZIER was signed 01/12/23   Time FRAZIER was signed 1207

## 2023-01-12 NOTE — PLAN OF CARE
Plan of care reviewed with patient. Verbalized understanding. IV intact and patent with fluids infusing. Tolerating clear liquids. No nausea noted. Ambulatory to bathroom. No complaints of pain. Weakness noted to left arm. Swallows pills without difficulty. Safety maintained. Call light in reach and instructed to call for assistance. Will continue to monitor.

## 2023-01-12 NOTE — NURSING
D/c education provided,pt verbalized understanding. PIV removed, belongings in hand, pt walked safely off unit.

## 2023-01-13 ENCOUNTER — OFFICE VISIT (OUTPATIENT)
Dept: FAMILY MEDICINE | Facility: CLINIC | Age: 43
End: 2023-01-13
Payer: MEDICARE

## 2023-01-13 ENCOUNTER — TELEPHONE (OUTPATIENT)
Dept: MEDSURG UNIT | Facility: HOSPITAL | Age: 43
End: 2023-01-13
Payer: MEDICARE

## 2023-01-13 VITALS
HEART RATE: 94 BPM | OXYGEN SATURATION: 100 % | BODY MASS INDEX: 18.52 KG/M2 | TEMPERATURE: 98 F | HEIGHT: 63 IN | WEIGHT: 104.5 LBS | SYSTOLIC BLOOD PRESSURE: 142 MMHG | DIASTOLIC BLOOD PRESSURE: 80 MMHG

## 2023-01-13 DIAGNOSIS — M54.50 ACUTE LOW BACK PAIN WITHOUT SCIATICA, UNSPECIFIED BACK PAIN LATERALITY: ICD-10-CM

## 2023-01-13 DIAGNOSIS — E78.2 MIXED HYPERLIPIDEMIA: ICD-10-CM

## 2023-01-13 DIAGNOSIS — N18.30 STAGE 3 CHRONIC KIDNEY DISEASE, UNSPECIFIED WHETHER STAGE 3A OR 3B CKD: ICD-10-CM

## 2023-01-13 DIAGNOSIS — N17.9 AKI (ACUTE KIDNEY INJURY): ICD-10-CM

## 2023-01-13 DIAGNOSIS — Z09 HOSPITAL DISCHARGE FOLLOW-UP: Primary | ICD-10-CM

## 2023-01-13 DIAGNOSIS — K31.84 GASTROPARESIS: ICD-10-CM

## 2023-01-13 LAB
BILIRUB SERPL-MCNC: NEGATIVE MG/DL
BLOOD URINE, POC: NEGATIVE
CLARITY, POC UA: CLEAR
COLOR, POC UA: YELLOW
GLUCOSE UR QL STRIP: NORMAL
KETONES UR QL STRIP: NEGATIVE
LEUKOCYTE ESTERASE URINE, POC: NEGATIVE
NITRITE, POC UA: NEGATIVE
PH, POC UA: 6.5
PROTEIN, POC: NEGATIVE
SPECIFIC GRAVITY, POC UA: 1.01
UROBILINOGEN, POC UA: 0.2

## 2023-01-13 PROCEDURE — 3077F SYST BP >= 140 MM HG: CPT | Mod: HCNC,CPTII,S$GLB, | Performed by: NURSE PRACTITIONER

## 2023-01-13 PROCEDURE — 3008F BODY MASS INDEX DOCD: CPT | Mod: HCNC,CPTII,S$GLB, | Performed by: NURSE PRACTITIONER

## 2023-01-13 PROCEDURE — 99213 PR OFFICE/OUTPT VISIT, EST, LEVL III, 20-29 MIN: ICD-10-PCS | Mod: HCNC,S$GLB,, | Performed by: NURSE PRACTITIONER

## 2023-01-13 PROCEDURE — 99999 PR PBB SHADOW E&M-EST. PATIENT-LVL V: ICD-10-PCS | Mod: PBBFAC,HCNC,, | Performed by: NURSE PRACTITIONER

## 2023-01-13 PROCEDURE — 81002 POCT URINE DIPSTICK WITHOUT MICROSCOPE: ICD-10-PCS | Mod: HCNC,S$GLB,, | Performed by: NURSE PRACTITIONER

## 2023-01-13 PROCEDURE — 1159F MED LIST DOCD IN RCRD: CPT | Mod: HCNC,CPTII,S$GLB, | Performed by: NURSE PRACTITIONER

## 2023-01-13 PROCEDURE — 3079F DIAST BP 80-89 MM HG: CPT | Mod: HCNC,CPTII,S$GLB, | Performed by: NURSE PRACTITIONER

## 2023-01-13 PROCEDURE — 1160F PR REVIEW ALL MEDS BY PRESCRIBER/CLIN PHARMACIST DOCUMENTED: ICD-10-PCS | Mod: HCNC,CPTII,S$GLB, | Performed by: NURSE PRACTITIONER

## 2023-01-13 PROCEDURE — 1159F PR MEDICATION LIST DOCUMENTED IN MEDICAL RECORD: ICD-10-PCS | Mod: HCNC,CPTII,S$GLB, | Performed by: NURSE PRACTITIONER

## 2023-01-13 PROCEDURE — 81002 URINALYSIS NONAUTO W/O SCOPE: CPT | Mod: HCNC,S$GLB,, | Performed by: NURSE PRACTITIONER

## 2023-01-13 PROCEDURE — 99499 UNLISTED E&M SERVICE: CPT | Mod: HCNC,S$GLB,, | Performed by: NURSE PRACTITIONER

## 2023-01-13 PROCEDURE — 3077F PR MOST RECENT SYSTOLIC BLOOD PRESSURE >= 140 MM HG: ICD-10-PCS | Mod: HCNC,CPTII,S$GLB, | Performed by: NURSE PRACTITIONER

## 2023-01-13 PROCEDURE — 3079F PR MOST RECENT DIASTOLIC BLOOD PRESSURE 80-89 MM HG: ICD-10-PCS | Mod: HCNC,CPTII,S$GLB, | Performed by: NURSE PRACTITIONER

## 2023-01-13 PROCEDURE — 99999 PR PBB SHADOW E&M-EST. PATIENT-LVL V: CPT | Mod: PBBFAC,HCNC,, | Performed by: NURSE PRACTITIONER

## 2023-01-13 PROCEDURE — 1160F RVW MEDS BY RX/DR IN RCRD: CPT | Mod: HCNC,CPTII,S$GLB, | Performed by: NURSE PRACTITIONER

## 2023-01-13 PROCEDURE — 3008F PR BODY MASS INDEX (BMI) DOCUMENTED: ICD-10-PCS | Mod: HCNC,CPTII,S$GLB, | Performed by: NURSE PRACTITIONER

## 2023-01-13 PROCEDURE — 81003 URINALYSIS AUTO W/O SCOPE: CPT | Mod: HCNC | Performed by: NURSE PRACTITIONER

## 2023-01-13 PROCEDURE — 99499 RISK ADDL DX/OHS AUDIT: ICD-10-PCS | Mod: HCNC,S$GLB,, | Performed by: NURSE PRACTITIONER

## 2023-01-13 PROCEDURE — 87086 URINE CULTURE/COLONY COUNT: CPT | Mod: HCNC | Performed by: NURSE PRACTITIONER

## 2023-01-13 PROCEDURE — 99213 OFFICE O/P EST LOW 20 MIN: CPT | Mod: HCNC,S$GLB,, | Performed by: NURSE PRACTITIONER

## 2023-01-13 RX ORDER — LANOLIN ALCOHOL/MO/W.PET/CERES
400 CREAM (GRAM) TOPICAL DAILY
Qty: 90 TABLET | Refills: 1 | Status: ON HOLD | OUTPATIENT
Start: 2023-01-13 | End: 2023-10-06 | Stop reason: SDUPTHER

## 2023-01-13 NOTE — PROGRESS NOTES
Subjective:       Patient ID: Cat Denson is a 42 y.o. female.    Chief Complaint: hospital follow up     HPI   41 y/o female patient with medical problems listed below presents for hospital discharge follow up.     Admission Date: 1/11/2023  Hospital Length of Stay: 0 days  Discharge Date and Time: 1/12/2023  1:05 PM    Patient is with CKD, seizure disorder, stroke in 2015 with resulting hemiparesis, gastroparesis, and asthma who presented the hospital after worsening vomiting and diarrhea over the past 3-4 days. she has been unable to hold down any food for the last 2 days and unable hold down any water for the last day. In ED, she was found to be profoundly dehydrated, and was given IV fluid bolus. Was also noted the patient was in CHRISTEN secondary to likely to dehydration.    Hospital Course:   Patient was admitted to the hospital with dehydration and acute kidney injury secondary to uncontrolled nausea and vomiting from likely viral gastroenteritis. She has multiple chronic medical problems. The patient was given aggressive IV hydration, and control of her nausea and vomiting on diarrhea by antiemetics and antidiarrheals.  Well over the course of her hospitalization, was tolerating clear liquids well at time of discharge.  Her nausea and diarrhea were controlled with oral medications. Patient was discharged home with follow-up with primary care and outpatient case management.    START taking these medications    loperamide 2 mg capsule  Commonly known as: IMODIUM  Take 1 capsule (2 mg total) by mouth 4 (four) times daily as needed for Diarrhea.      ondansetron 8 MG tablet  Commonly known as: ZOFRAN  Take 1 tablet (8 mg total) by mouth every 8 (eight) hours as needed for Nausea.        Upper GI endoscopy 1/3/2022  - Normal esophagus.   - Z-line regular, 35 cm from the incisors.   - Small hiatal hernia.     Patient states symptoms are gradually improving by taking zofran and loperamide. States she is  able to tolerate liquids and some solid food. She also states has had lower back pain for a week. Denies recent trauma to the affected area. Denies abdominal pain, urinary or bowel symptoms, fever, chills.     Patient smokes 5 cigars a day since at the age of 22 y/o    Labs reviewed 1/2023- Mg low to 1.5, Cl 121, Cr 1.5, GFR 45    Patient Active Problem List   Diagnosis    Chronic pain    Fibromyalgia    Lupus (systemic lupus erythematosus)    Chronic asthma without complication    Hemiparesis affecting left side as late effect of cerebrovascular accident    Hemiplegic migraine    CKD (chronic kidney disease) stage 3, GFR 30-59 ml/min    Gastroesophageal reflux disease without esophagitis    Long term current use of anticoagulant therapy    Primary insomnia    Iron deficiency anemia    Cigarette nicotine dependence without complication    Homocysteinemia    Complex partial epilepsy with generalization and with intractable epilepsy    Chronic daily headache    CHRISTEN (acute kidney injury)    Mixed hyperlipidemia    Gastroparesis    Bipolar 1 disorder, depressed, partial remission    Panic disorder with agoraphobia    Cannabis use disorder, moderate, dependence    Substance abuse-kratom daily use    Left hemiparesis    Spasticity as late effect of cerebrovascular accident (CVA)    Toxicity, late effect, due to drug, medicine, or biological substance        Review of patient's allergies indicates:   Allergen Reactions    Metoclopramide hcl Anaphylaxis     Involuntary mouth movements     Amoxicillin-pot clavulanate      2 other medications    Amoxil [amoxicillin]      hives    Avelox [moxifloxacin]      itching    Benadryl [diphenhydramine hcl]      Lowers seizure threshold     Coumadin [warfarin]      toxicity    Quinazolinones      Lowers seizure threshold     Ultram [tramadol]      Lowers seizure threshold     Wellbutrin [bupropion hcl]      Lowers seizure threshold        Past Surgical History:   Procedure Laterality  Date    CHOLECYSTECTOMY      COLONOSCOPY  11/12/2014    Dr. Kimble, repeat at 52 years old for screening    ESOPHAGOGASTRODUODENOSCOPY N/A 1/3/2022    Procedure: EGD (ESOPHAGOGASTRODUODENOSCOPY);  Surgeon: Scout Kimble MD;  [facility];  Service: Endoscopy;  Laterality: N/A;    FLUOROSCOPIC URODYNAMIC STUDY N/A 7/23/2019    Procedure: URODYNAMIC STUDY, FLUOROSCOPIC;  Surgeon: Vanna Martinez MD;  Location: [address];  Service: Urology;  Laterality: N/A;  1 hour    UPPER GASTROINTESTINAL ENDOSCOPY  03/03/2017    Dr. Harris          Current Outpatient Medications:     ARIPiprazole (ABILIFY) 10 MG Tab, Take 1 tablet (10 mg total) by mouth once daily., Disp: 30 tablet, Rfl: 1    atorvastatin (LIPITOR) 10 MG tablet, Take 1 tablet (10 mg total) by mouth once daily., Disp: 30 tablet, Rfl: 0    BOTOX 200 unit SolR, , Disp: , Rfl:     cyclobenzaprine (FLEXERIL) 10 MG tablet, Take 10 mg by mouth 3 (three) times daily., Disp: , Rfl:     divalproex (DEPAKOTE) 250 MG EC tablet, TAKE 1 TABLET BY MOUTH EVERY MORNING AND TAKE 2 TABLETS BY MOUTH EVERY NIGHT AT BEDTIME, Disp: 90 tablet, Rfl: 2    famotidine (PEPCID) 40 MG tablet, TAKE 1 TABLET(40 MG) BY MOUTH EVERY EVENING, Disp: 30 tablet, Rfl: 3    gabapentin (NEURONTIN) 800 MG tablet, TAKE 1 TABLET(800 MG) BY MOUTH TWICE DAILY, Disp: 180 tablet, Rfl: 3    LIDOcaine-prilocaine (EMLA) cream, APPLY TOPICALLY TO THE AFFECTED AREA AS NEEDED, Disp: , Rfl:     loperamide (IMODIUM) 2 mg capsule, Take 1 capsule (2 mg total) by mouth 4 (four) times daily as needed for Diarrhea., Disp: 20 capsule, Rfl: 0    mirtazapine (REMERON) 30 MG tablet, TAKE 1 TABLET(30 MG) BY MOUTH EVERY EVENING, Disp: 30 tablet, Rfl: 2    ondansetron (ZOFRAN) 8 MG tablet, Take 1 tablet (8 mg total) by mouth every 8 (eight) hours as needed for Nausea., Disp: 10 tablet, Rfl: 0    pantoprazole (PROTONIX) 40 MG tablet, Take 1 tablet (40 mg total) by mouth once daily., Disp: 90 tablet, Rfl: 3     "promethazine (PHENERGAN) 25 MG tablet, Take 1 tablet (25 mg total) by mouth every 6 (six) hours as needed for Nausea., Disp: 20 tablet, Rfl: 0    propranoloL (INDERAL LA) 80 MG 24 hr capsule, Take 1 capsule (80 mg total) by mouth once daily., Disp: 30 capsule, Rfl: 5    rivaroxaban (XARELTO) 20 mg Tab, TAKE 1 TABLET(20 MG) BY MOUTH DAILY WITH THE EVENING MEAL AND DINNER, Disp: 90 tablet, Rfl: 3    traZODone (DESYREL) 100 MG tablet, TAKE 1 TO 2 TABLETS BY MOUTH EVERY NIGHT AS NEEDED FOR INSOMNIA, Disp: 60 tablet, Rfl: 2    magnesium oxide (MAG-OX) 400 mg (241.3 mg magnesium) tablet, Take 1 tablet (400 mg total) by mouth once daily., Disp: 90 tablet, Rfl: 1    Lab Results   Component Value Date    WBC 5.87 01/12/2023    HGB 12.5 01/12/2023    HCT 36.8 (L) 01/12/2023     01/12/2023    CHOL 147 10/08/2022    TRIG 134 10/08/2022    HDL 34 (L) 10/08/2022    ALT 8 (L) 01/12/2023    AST 11 01/12/2023     01/12/2023    K 3.4 (L) 01/12/2023     (H) 01/12/2023    CREATININE 1.5 (H) 01/12/2023    BUN 8 01/12/2023    CO2 13 (L) 01/12/2023    TSH 0.703 10/08/2022    INR 1.2 10/08/2022    HGBA1C 4.9 10/08/2022     Review of Systems   Constitutional:  Negative for chills and fever.   Respiratory:  Negative for cough, chest tightness and shortness of breath.    Cardiovascular:  Negative for chest pain and palpitations.   Gastrointestinal:  Positive for diarrhea and nausea. Negative for abdominal pain.   Musculoskeletal:  Positive for back pain.   Neurological:  Negative for dizziness and headaches.       Objective:   BP (!) 142/80 (BP Location: Right arm, Patient Position: Sitting, BP Method: Medium (Manual))   Pulse 94   Temp 98.3 °F (36.8 °C) (Oral)   Ht 5' 3" (1.6 m)   Wt 47.4 kg (104 lb 8 oz)   SpO2 100%   BMI 18.51 kg/m²         Physical Exam  Constitutional:       General: She is not in acute distress.     Appearance: Normal appearance.   HENT:      Head: Atraumatic.   Cardiovascular:      Rate and " Rhythm: Normal rate and regular rhythm.      Pulses: Normal pulses.      Heart sounds: Normal heart sounds.   Pulmonary:      Effort: Pulmonary effort is normal.      Breath sounds: Normal breath sounds.   Abdominal:      General: Abdomen is flat. Bowel sounds are normal.      Palpations: Abdomen is soft.   Skin:     General: Skin is warm and dry.   Neurological:      General: No focal deficit present.      Mental Status: She is alert and oriented to person, place, and time.   Psychiatric:         Mood and Affect: Mood normal.       Assessment:       1. Hospital discharge follow-up    2. Stage 3 chronic kidney disease, unspecified whether stage 3a or 3b CKD    3. Acute low back pain without sciatica, unspecified back pain laterality    4. Gastroparesis    5. Mixed hyperlipidemia    6. CHRISTEN (acute kidney injury)        Plan:       1. Stage 3 chronic kidney disease, unspecified whether stage 3a or 3b CKD  - Ambulatory referral/consult to Nephrology; Future  - CBC Auto Differential; Future  - Comprehensive Metabolic Panel; Future  - magnesium oxide (MAG-OX) 400 mg (241.3 mg magnesium) tablet; Take 1 tablet (400 mg total) by mouth once daily.  Dispense: 90 tablet; Refill: 1    2. Acute low back pain without sciatica, unspecified back pain laterality  - Urinalysis  - Urine culture  - POCT urine dipstick without microscope    3. Gastroparesis  - Advised to eat small, frequent meals liquid or semi solid meals, decrease fiber intake  - Counseled on smoking cessation  - Continue with zofran and loperamide as needed  - Ambulatory referral/consult to Gastroenterology; Future  - TSH; Future  - FERRITIN; Future    4. Mixed hyperlipidemia  - Lipid Panel; Future    5. Hospital discharge follow-up    6. CHRISTEN (acute kidney injury)  - Advised to stay well hydrated to prevent dehydration    Patient with be reevaluated in  as scheduled  or sooner melvin Kaye NP

## 2023-01-14 LAB
BILIRUB UR QL STRIP: NEGATIVE
CLARITY UR REFRACT.AUTO: CLEAR
COLOR UR AUTO: NORMAL
GLUCOSE UR QL STRIP: NEGATIVE
HGB UR QL STRIP: NEGATIVE
KETONES UR QL STRIP: NEGATIVE
LEUKOCYTE ESTERASE UR QL STRIP: NEGATIVE
NITRITE UR QL STRIP: NEGATIVE
PH UR STRIP: 7 [PH] (ref 5–8)
PROT UR QL STRIP: NEGATIVE
SP GR UR STRIP: 1 (ref 1–1.03)
URN SPEC COLLECT METH UR: NORMAL

## 2023-01-15 LAB — BACTERIA UR CULT: NORMAL

## 2023-01-17 ENCOUNTER — TELEPHONE (OUTPATIENT)
Dept: FAMILY MEDICINE | Facility: CLINIC | Age: 43
End: 2023-01-17
Payer: MEDICARE

## 2023-01-17 ENCOUNTER — PATIENT MESSAGE (OUTPATIENT)
Dept: OBSTETRICS AND GYNECOLOGY | Facility: CLINIC | Age: 43
End: 2023-01-17
Payer: MEDICARE

## 2023-01-17 DIAGNOSIS — Z12.31 VISIT FOR SCREENING MAMMOGRAM: ICD-10-CM

## 2023-01-17 DIAGNOSIS — Z12.31 ENCOUNTER FOR SCREENING MAMMOGRAM FOR MALIGNANT NEOPLASM OF BREAST: ICD-10-CM

## 2023-01-24 ENCOUNTER — PATIENT MESSAGE (OUTPATIENT)
Dept: FAMILY MEDICINE | Facility: CLINIC | Age: 43
End: 2023-01-24
Payer: MEDICARE

## 2023-01-24 ENCOUNTER — TELEPHONE (OUTPATIENT)
Dept: FAMILY MEDICINE | Facility: CLINIC | Age: 43
End: 2023-01-24
Payer: MEDICARE

## 2023-01-25 ENCOUNTER — HOSPITAL ENCOUNTER (OUTPATIENT)
Dept: RADIOLOGY | Facility: HOSPITAL | Age: 43
Discharge: HOME | End: 2023-01-25
Attending: OBSTETRICS & GYNECOLOGY
Payer: MEDICARE

## 2023-01-25 DIAGNOSIS — Z12.31 ENCOUNTER FOR SCREENING MAMMOGRAM FOR MALIGNANT NEOPLASM OF BREAST: ICD-10-CM

## 2023-01-25 PROCEDURE — 77063 BREAST TOMOSYNTHESIS BI: CPT | Mod: 26,HCNC,, | Performed by: RADIOLOGY

## 2023-01-25 PROCEDURE — 77063 MAMMO DIGITAL SCREENING BILAT WITH TOMO: ICD-10-PCS | Mod: 26,HCNC,, | Performed by: RADIOLOGY

## 2023-01-25 PROCEDURE — 77067 MAMMO DIGITAL SCREENING BILAT WITH TOMO: ICD-10-PCS | Mod: 26,HCNC,, | Performed by: RADIOLOGY

## 2023-01-25 PROCEDURE — 77067 SCR MAMMO BI INCL CAD: CPT | Mod: 26,HCNC,, | Performed by: RADIOLOGY

## 2023-01-25 PROCEDURE — 77067 SCR MAMMO BI INCL CAD: CPT | Mod: TC,HCNC

## 2023-01-27 ENCOUNTER — OUTPATIENT CASE MANAGEMENT (OUTPATIENT)
Dept: ADMINISTRATIVE | Facility: OTHER | Age: 43
End: 2023-01-27
Payer: MEDICARE

## 2023-01-27 NOTE — PROGRESS NOTES
01/27/23 Attempt assessment with patient/caregiver. No answer. Left message requesting call back. RN OPCM  first assessment attempt

## 2023-01-31 ENCOUNTER — TELEPHONE (OUTPATIENT)
Dept: GASTROENTEROLOGY | Facility: CLINIC | Age: 43
End: 2023-01-31
Payer: MEDICARE

## 2023-01-31 ENCOUNTER — PATIENT MESSAGE (OUTPATIENT)
Dept: PSYCHIATRY | Facility: CLINIC | Age: 43
End: 2023-01-31
Payer: MEDICARE

## 2023-02-07 DIAGNOSIS — Z00.00 ENCOUNTER FOR MEDICARE ANNUAL WELLNESS EXAM: ICD-10-CM

## 2023-02-09 DIAGNOSIS — Z00.00 ENCOUNTER FOR MEDICARE ANNUAL WELLNESS EXAM: ICD-10-CM

## 2023-02-14 ENCOUNTER — PATIENT MESSAGE (OUTPATIENT)
Dept: PSYCHIATRY | Facility: CLINIC | Age: 43
End: 2023-02-14
Payer: MEDICARE

## 2023-02-14 ENCOUNTER — PATIENT MESSAGE (OUTPATIENT)
Dept: FAMILY MEDICINE | Facility: CLINIC | Age: 43
End: 2023-02-14
Payer: MEDICARE

## 2023-02-16 ENCOUNTER — HOSPITAL ENCOUNTER (EMERGENCY)
Facility: HOSPITAL | Age: 43
Discharge: HOME | End: 2023-02-16
Attending: EMERGENCY MEDICINE
Payer: MEDICARE

## 2023-02-16 VITALS
HEIGHT: 63 IN | RESPIRATION RATE: 18 BRPM | SYSTOLIC BLOOD PRESSURE: 110 MMHG | BODY MASS INDEX: 18.43 KG/M2 | TEMPERATURE: 99 F | OXYGEN SATURATION: 98 % | WEIGHT: 104 LBS | HEART RATE: 86 BPM | DIASTOLIC BLOOD PRESSURE: 72 MMHG

## 2023-02-16 DIAGNOSIS — U07.1 COVID: Primary | ICD-10-CM

## 2023-02-16 PROCEDURE — 99284 EMERGENCY DEPT VISIT MOD MDM: CPT | Mod: HCNC

## 2023-02-16 PROCEDURE — 86803 HEPATITIS C AB TEST: CPT | Mod: HCNC | Performed by: EMERGENCY MEDICINE

## 2023-02-16 PROCEDURE — 36415 COLL VENOUS BLD VENIPUNCTURE: CPT | Mod: HCNC | Performed by: EMERGENCY MEDICINE

## 2023-02-16 PROCEDURE — 87389 HIV-1 AG W/HIV-1&-2 AB AG IA: CPT | Mod: HCNC | Performed by: EMERGENCY MEDICINE

## 2023-02-16 RX ORDER — ALBUTEROL SULFATE 90 UG/1
1-2 AEROSOL, METERED RESPIRATORY (INHALATION) EVERY 6 HOURS PRN
Qty: 18 G | Refills: 0 | Status: SHIPPED | OUTPATIENT
Start: 2023-02-16 | End: 2023-04-13

## 2023-02-16 RX ORDER — BENZONATATE 200 MG/1
200 CAPSULE ORAL 3 TIMES DAILY PRN
Qty: 30 CAPSULE | Refills: 0 | Status: SHIPPED | OUTPATIENT
Start: 2023-02-16 | End: 2023-02-26

## 2023-02-16 NOTE — ED PROVIDER NOTES
Encounter Date: 2/16/2023    SCRIBE #1 NOTE: I, Antoni Farris, am scribing for, and in the presence of,  Tj Lazar MD.     History     Chief Complaint   Patient presents with    COVID-19 Concerns     Home text + on 2/14, body aches, cough     Time seen by provider: 12:49 PM on 02/16/2023    Cat Denson is a 43 y.o. female who presents to the ED with an onset of sore throat, body aches, cough, COB, and nausea that began several days ago. The patient has baseline left-sided weakness due to a prior stroke. The patient tested positive for Covid on 2/14/23. The patient has never had Covid before. The patient denies vomiting, diarrhea, or any other symptoms at this time. PMHx of Lupus, CKD, stroke, gastroparesis, acid reflux, asthma, firbomyalgia, mixed HLD, and substance abuse (kratom). PSHx of cholecystectomy.     The history is provided by the patient.   Review of patient's allergies indicates:   Allergen Reactions    Metoclopramide hcl Anaphylaxis     Involuntary mouth movements     Amoxicillin-pot clavulanate      2 other medications    Amoxil [amoxicillin]      hives    Avelox [moxifloxacin]      itching    Benadryl [diphenhydramine hcl]      Lowers seizure threshold     Coumadin [warfarin]      toxicity    Quinazolinones      Lowers seizure threshold     Ultram [tramadol]      Lowers seizure threshold     Wellbutrin [bupropion hcl]      Lowers seizure threshold      Past Medical History:   Diagnosis Date    Acid reflux     Allergy     Anemia     Anticoagulated 12/29/2015    Anxiety     Asthma     Bipolar 1 disorder     Bronchospasm with bronchitis, acute     Chronic daily headache 9/11/2018    Chronic kidney disease     Chronic pain     Depression     bipolar 2    Fibromyalgia     Gastroparesis     History of right MCA stroke 11/25/2015    Hx of psychiatric care     Lactose intolerance     Lupus     Mixed hyperlipidemia 11/28/2018    Psychiatric problem     Renal tubular acidosis     Seizure      Sjogren's syndrome     Smoker     Stroke 11-    Substance abuse-kratom daily use 8/30/2020    Suicide attempt     overdosed on a bottle of paxil, muscle relaxers, & zoloft    Therapy      Past Surgical History:   Procedure Laterality Date    CHOLECYSTECTOMY      COLONOSCOPY  11/12/2014    Dr. Kimble, repeat at 52 years old for screening    ESOPHAGOGASTRODUODENOSCOPY N/A 1/3/2022    Procedure: EGD (ESOPHAGOGASTRODUODENOSCOPY);  Surgeon: Scout Kimble MD;  Location: [facility];  Service: Endoscopy;  Laterality: N/A;    FLUOROSCOPIC URODYNAMIC STUDY N/A 7/23/2019    Procedure: URODYNAMIC STUDY, FLUOROSCOPIC;  Surgeon: Vanna Martinez MD;  Location: [address];  Service: Urology;  Laterality: N/A;  1 hour    UPPER GASTROINTESTINAL ENDOSCOPY  03/03/2017    Dr. Harris     Family History   Problem Relation Age of Onset    Hypertension Mother     Hyperlipidemia Mother     Psoriasis Mother     Thyroid disease Mother     No Known Problems Father     Post-traumatic stress disorder Brother     Drug abuse Brother     Diabetes Maternal Uncle     Hypertension Maternal Uncle     Alcohol abuse Maternal Uncle     Stroke Maternal Uncle     Scoliosis Paternal Aunt     Heart disease Paternal Uncle     Mental illness Maternal Grandmother     Cancer Maternal Grandmother         lung / brain - smoker    Drug abuse Maternal Grandmother     Hypertension Maternal Grandmother     Hyperlipidemia Maternal Grandmother     Diabetes Maternal Grandmother     Rheum arthritis Maternal Grandmother     Diabetes Mellitus Maternal Grandmother     Heart disease Maternal Grandfather     Hypertension Maternal Grandfather     Alcohol abuse Maternal Grandfather     Osteoarthritis Maternal Grandfather     No Known Problems Paternal Grandmother     Mental illness Paternal Grandfather     Early death Paternal Grandfather         self    Breast cancer Cousin     Breast cancer Other     Colon cancer Neg Hx     Colon polyps Neg Hx     Crohn's  disease Neg Hx     Ulcerative colitis Neg Hx     Celiac disease Neg Hx     Lupus Neg Hx     Kidney disease Neg Hx     Inflammatory bowel disease Neg Hx     Depression Neg Hx     COPD Neg Hx     Asthma Neg Hx     Macular degeneration Neg Hx     Retinal detachment Neg Hx     Glaucoma Neg Hx      Social History     Tobacco Use    Smoking status: Every Day     Packs/day: 1.00     Years: 15.00     Pack years: 15.00     Types: Cigarettes     Start date: 11/25/2015    Smokeless tobacco: Never   Substance Use Topics    Alcohol use: No     Alcohol/week: 0.0 standard drinks    Drug use: Yes     Types: Marijuana     Comment: smokes occasionally, helps with pain and appetite     Review of Systems   Constitutional:  Negative for activity change, appetite change, fatigue and fever.   HENT:  Positive for sore throat. Negative for congestion and rhinorrhea.    Eyes:  Negative for visual disturbance.   Respiratory:  Positive for cough and shortness of breath. Negative for chest tightness and wheezing.    Cardiovascular:  Negative for chest pain and palpitations.   Gastrointestinal:  Positive for nausea. Negative for diarrhea and vomiting.   Musculoskeletal:  Positive for myalgias. Negative for arthralgias.   Skin:  Negative for rash.   Neurological:  Positive for weakness (baseline). Negative for light-headedness, numbness and headaches.     Physical Exam     Initial Vitals [02/16/23 1206]   BP Pulse Resp Temp SpO2   (!) 101/59 93 18 98.6 °F (37 °C) 97 %      MAP       --         Physical Exam    Nursing note and vitals reviewed.  Constitutional: She appears well-developed.  Non-toxic appearance.   HENT:   Head: Normocephalic and atraumatic.   Eyes: EOM are normal. Pupils are equal, round, and reactive to light.   Neck: Neck supple.   Cardiovascular:  Normal rate, regular rhythm, normal heart sounds and intact distal pulses.     Exam reveals no gallop and no friction rub.       No murmur heard.  Pulmonary/Chest: Breath sounds  normal. No respiratory distress. She has no decreased breath sounds. She has no wheezes. She has no rhonchi. She has no rales.   Abdominal: Abdomen is soft. Bowel sounds are normal. She exhibits no distension. There is no abdominal tenderness.   Musculoskeletal:         General: Normal range of motion.      Cervical back: Neck supple.     Neurological: She is alert and oriented to person, place, and time.   Baseline weakness noted of LUE and LLE.   Skin: Skin is warm and dry.   Psychiatric: She has a normal mood and affect.       ED Course   Procedures  Labs Reviewed   HIV 1 / 2 ANTIBODY   HEPATITIS C ANTIBODY          Imaging Results    None          Medications - No data to display  Medical Decision Making:   History:   Old Medical Records: I decided to obtain old medical records.  No signs of pneumonia on exam or hypoxia.  Patient is stable for discharge.  Will prescribe antivirals.  Stable for discharge at this time.  Return precautions given        Scribe Attestation:   Scribe #1: I performed the above scribed service and the documentation accurately describes the services I performed. I attest to the accuracy of the note.            I, Dr. Tj Lazar personally performed the services described in this documentation. All medical record entries made by the scribe were at my direction and in my presence.  I have reviewed the chart and agree that the record reflects my personal performance and is accurate and complete. Tj Lazar MD.  10:27 PM 02/16/2023    DISCLAIMER: This note was prepared with Dragon NaturallySpeaking voice recognition transcription software. Garbled syntax, mangled pronouns, and other bizarre constructions may be attributed to that software system        Clinical Impression:   Final diagnoses:  [U07.1] COVID (Primary)        ED Disposition Condition    Discharge Stable          ED Prescriptions       Medication Sig Dispense Start Date End Date Auth. Provider    molnupiravir 200 mg  capsule (EUA) Take 4 capsules (800 mg total) by mouth every 12 (twelve) hours. for 5 days 40 capsule 2/16/2023 2/21/2023 Tj Lazar MD    albuterol (PROVENTIL/VENTOLIN HFA) 90 mcg/actuation inhaler Inhale 1-2 puffs into the lungs every 6 (six) hours as needed for Wheezing. Rescue 18 g 2/16/2023 2/16/2024 Tj Lazar MD    benzonatate (TESSALON) 200 MG capsule Take 1 capsule (200 mg total) by mouth 3 (three) times daily as needed for Cough. 30 capsule 2/16/2023 2/26/2023 Tj Lazar MD          Follow-up Information    None          Tj Lazar MD  02/16/23 7400

## 2023-02-16 NOTE — TELEPHONE ENCOUNTER
I am sorry to hear this.  Your risk of serious complications from COVID is low.          Most cases of COVID-19 cause a common cold type of illness that is self-limited.  You are advised to act as if you have COVID-19 if sick and self-quarantine in the home until 5 days after respiratory symptoms and 1 days after resolution of fever.  Household contacts should avoid contact. If possible limit activity to 1 closed room and use  separate bathroom from asymptomatic household contacts.  Monitor for symptoms of worsening illness such as shortness of breath, labored breathing, dehydration, sudden confusion or lethargy (difficulty rousing).  If occur seek medical attention immediately in emergency room.   You are welcome to contact us for advice at any time.     General home care guidelines for cough and congestion:increase fluid intake, get plenty of rest, and use OTC cough and cold medications for relief of symptoms.  I recommended guafenesin for congestion and dextromethorphan as directed for cough.  Brands like Mucinex DM or Chloricidin HBP or similar are recommended.  Avoidance of decongestants is recommended for patients with heart problems and hypertension.  Extra vitamin C may also benefit.  Return to clinic or the ED or urgent care if symptoms last longer than 10 days or sooner if worsen with symptoms like fever > 100.4, severe sinus pain or headache, thick yellow nasal discharge or sputum, dehydration, sudden confusion or mental status changes, shortness of breath, labored breathing, or lethargy. Anti-fever medications can be alternated like OTC ibuprofen or tylenol as needed and as directed unless told to avoid these by your doctor.     Recovered patients: Patients who have recovered from COVID-19 can continue to have detectable SARS-CoV-2 RNA in upper respiratory specimens for up to 3 months after illness onset. However, replication-competent virus has not been reliably recovered from such patients, and they  are not likely infectious.    For more information look up https://www.cdc.gov/coronavirus/2019-ncov/if-you-are-sick/index.html

## 2023-02-17 ENCOUNTER — OFFICE VISIT (OUTPATIENT)
Dept: FAMILY MEDICINE | Facility: CLINIC | Age: 43
End: 2023-02-17
Payer: MEDICARE

## 2023-02-17 DIAGNOSIS — U07.1 COVID-19: Primary | ICD-10-CM

## 2023-02-17 LAB
HCV AB SERPL QL IA: NORMAL
HIV 1+2 AB+HIV1 P24 AG SERPL QL IA: NORMAL

## 2023-02-17 PROCEDURE — 1160F RVW MEDS BY RX/DR IN RCRD: CPT | Mod: HCNC,CPTII,95, | Performed by: FAMILY MEDICINE

## 2023-02-17 PROCEDURE — 1159F PR MEDICATION LIST DOCUMENTED IN MEDICAL RECORD: ICD-10-PCS | Mod: HCNC,CPTII,95, | Performed by: FAMILY MEDICINE

## 2023-02-17 PROCEDURE — 1159F MED LIST DOCD IN RCRD: CPT | Mod: HCNC,CPTII,95, | Performed by: FAMILY MEDICINE

## 2023-02-17 PROCEDURE — 1160F PR REVIEW ALL MEDS BY PRESCRIBER/CLIN PHARMACIST DOCUMENTED: ICD-10-PCS | Mod: HCNC,CPTII,95, | Performed by: FAMILY MEDICINE

## 2023-02-17 PROCEDURE — 99213 PR OFFICE/OUTPT VISIT, EST, LEVL III, 20-29 MIN: ICD-10-PCS | Mod: HCNC,95,, | Performed by: FAMILY MEDICINE

## 2023-02-17 PROCEDURE — 99213 OFFICE O/P EST LOW 20 MIN: CPT | Mod: HCNC,95,, | Performed by: FAMILY MEDICINE

## 2023-02-17 RX ORDER — PROMETHAZINE HYDROCHLORIDE AND DEXTROMETHORPHAN HYDROBROMIDE 6.25; 15 MG/5ML; MG/5ML
5 SYRUP ORAL EVERY 4 HOURS PRN
Qty: 118 ML | Refills: 0 | Status: SHIPPED | OUTPATIENT
Start: 2023-02-17 | End: 2023-02-27

## 2023-02-17 NOTE — PROGRESS NOTES
Subjective:       Patient ID: Cat Denson is a 43 y.o. female.    Chief Complaint: No chief complaint on file.    HPI  Review of Systems   Constitutional:  Positive for fatigue. Negative for chills and fever.   HENT:  Positive for congestion, postnasal drip, rhinorrhea, sneezing and sore throat. Negative for ear discharge, ear pain and sinus pressure.    Respiratory:  Positive for cough. Negative for shortness of breath and wheezing.      Patient Active Problem List   Diagnosis    Chronic pain    Fibromyalgia    Lupus (systemic lupus erythematosus)    Chronic asthma without complication    Hemiparesis affecting left side as late effect of cerebrovascular accident    Hemiplegic migraine    CKD (chronic kidney disease) stage 3, GFR 30-59 ml/min    Gastroesophageal reflux disease without esophagitis    Long term current use of anticoagulant therapy    Primary insomnia    Iron deficiency anemia    Cigarette nicotine dependence without complication    Homocysteinemia    Complex partial epilepsy with generalization and with intractable epilepsy    Chronic daily headache    CHRISTEN (acute kidney injury)    Mixed hyperlipidemia    Gastroparesis    Bipolar 1 disorder, depressed, partial remission    Panic disorder with agoraphobia    Cannabis use disorder, moderate, dependence    Substance abuse-kratom daily use    Left hemiparesis    Spasticity as late effect of cerebrovascular accident (CVA)    Toxicity, late effect, due to drug, medicine, or biological substance     Patient is here for telemedicine visit  The patient location is: [place]  The chief complaint leading to consultation is: covid  Visit type: Virtual visit with synchronous audio and video  Total time spent with patient: 10-20 min  Each patient to whom he or she provides medical services by telemedicine is:  (1) informed of the relationship between the physician and patient and the respective role of any other health care provider with respect to management  of the patient; and (2) notified that he or she may decline to receive medical services by telemedicine and may withdraw from such care at any time.    Notes: see below   AUDIO VISIT ONLY? no   + COVID home test on 14th .  Uri , cough, congestion, sore throat. No fever. Body aches. No sob. Seen in ed yesterday and given tessalon. Low risk.    Objective:      Physical Exam  Vitals and nursing note reviewed.   Constitutional:       Appearance: She is well-developed.   HENT:      Right Ear: Tympanic membrane and ear canal normal.      Left Ear: Tympanic membrane and ear canal normal.      Nose: Mucosal edema and rhinorrhea present.      Right Sinus: No maxillary sinus tenderness or frontal sinus tenderness.      Left Sinus: No maxillary sinus tenderness or frontal sinus tenderness.      Mouth/Throat:      Pharynx: Posterior oropharyngeal erythema present. No oropharyngeal exudate.      Tonsils: No tonsillar abscesses.   Cardiovascular:      Rate and Rhythm: Normal rate and regular rhythm.      Heart sounds: Normal heart sounds.   Pulmonary:      Effort: Pulmonary effort is normal.      Breath sounds: Normal breath sounds.   Skin:     General: Skin is warm and dry.       Assessment:       1. COVID-19        Plan:       1. COVID-19  General home care guidelines for cough and congestion:increase fluid intake, get plenty of rest, and use OTC cough and cold medications for relief of symptoms.  I recommended guafenesin for congestion and dextromethorphan as directed for cough.  Brands like Mucinex DM or Chloricidin HBP or similar are recommended.  Avoidance of decongestants is recommended for patients with heart problems and hypertension.  Extra vitamin C may also benefit.  Return to clinic if symptoms last longer than 10 days or sooner if worsen with symptoms like fever > 100.4, severe sinus pain or headache, thick yellow nasal discharge or sputum, dehydration, sudden confusion or mental status changes, shortness of breath,  labored breathing, or lethargy. Anti-fever medications can be alternated like OTC ibuprofen or tylenol as needed and as directed unless told to avoid these by your doctor.     - promethazine-dextromethorphan (PROMETHAZINE-DM) 6.25-15 mg/5 mL Syrp; Take 5 mLs by mouth every 4 (four) hours as needed (cough).  Dispense: 118 mL; Refill: 0      Time spent with patient: 20 minutes    Patient with be reevaluated in  prn  or sooner prn    Greater than 50% of this visit was spent counseling as described in above documentation:Yes

## 2023-03-02 NOTE — MR AVS SNAPSHOT
[facility]  [address]  [place] [address]  Phone: [phone]                  Cat Denson   2017 9:45 AM   Anti-coag visit    Description:  Female : 1980   Provider:  Vidya Metzger PharmD   Department:  [facility]           Diagnoses this Visit        Comments    Long-term (current) use of anticoagulants    -  Primary     Left hemiparesis         Embolic stroke involving right middle cerebral artery                To Do List           Future Appointments        Provider Department Dept Phone    2017 9:45 AM [facility] [phone]    2017 8:30 AM MD [facility] [phone]    2/3/2017 9:30 AM Vidya Metzger PharmD [facility] [phone]    2017 10:00 AM Orestes Ribeiro DO [facility] [phone]      Goals (5 Years of Data)     None      [facility] On [facility]     [facility] On Call [facility] Care Line -  Assistance  Registered nurses in the [facility] Call Center provide clinical advisement, health education, appointment booking, and other advisory services.  Call for this free service at [phone].             Medications           Message regarding Medications     Verify the changes and/or additions to your medication regime listed below are the same as discussed with your clinician today.  If any of these changes or additions are incorrect, please notify your healthcare provider.             Verify that the below list of medications is an accurate representation of the medications you are currently taking.  If none reported, the list may be blank. If incorrect, please contact your healthcare provider. Carry this list with you in case of emergency.           Current Medications     buPROPion (WELLBUTRIN SR) 150 MG TBSR 12 hr tablet Take one tablet PO daily x 10 days, then STOP    cetirizine (ZYRTEC) 10 MG tablet Take 1 tablet (10 mg total) by  mouth once daily.    chlorhexidine (PERIDEX) 0.12 % solution RINSE WITH 1/2 OZ BID AFTER BRUSHING AND FLOSSING    clobetasol 0.05% (TEMOVATE) 0.05 % Oint Apply topically 2 (two) times daily.    clonazePAM (KLONOPIN) 0.5 MG tablet Take one tablet PO BID PRN anxiety    cyanocobalamin, vitamin B-12, (VITAMIN B-12) 1,000 mcg Subl Place under the tongue once daily.    escitalopram oxalate (LEXAPRO) 20 MG tablet Take 1 tablet (20 mg total) by mouth once daily.    fluticasone (FLONASE) 50 mcg/actuation nasal spray 2 sprays by Each Nare route once daily.    gabapentin (NEURONTIN) 800 MG tablet Take 1 tablet (800 mg total) by mouth 2 (two) times daily.    hydroxychloroquine (PLAQUENIL) 200 mg tablet TAKE 1 TABLET BY MOUTH TWICE DAILY    meloxicam (MOBIC) 15 MG tablet TAKE 1 TABLET(15 MG) BY MOUTH DAILY AS NEEDED FOR PAIN    methylphenidate (RITALIN) 5 MG tablet Take one tablet PO twice daily with meals. May take additional one-half to one tablet daily by 4 pm as needed for attention/focus    methylphenidate (RITALIN) 5 MG tablet Starting on Feb 15, 2017. Take one tablet PO twice daily with meals. May take additional one-half to one tablet daily by 4 pm as needed for attention/focus    metoprolol succinate (TOPROL-XL) 25 MG 24 hr tablet Take 1 tablet (25 mg total) by mouth once daily.    nicotine (NICODERM CQ) 14 mg/24 hr Place 1 patch onto the skin once daily.    omeprazole (PRILOSEC) 40 MG capsule TAKE 1 CAPSULE(40 MG) BY MOUTH EVERY MORNING    ondansetron (ZOFRAN-ODT) 4 MG TbDL Take 1 tablet (4 mg total) by mouth every 8 (eight) hours as needed.    PNV WITH CA#74/IRON/FOLIC ACID (PRENATAL VITAMIN 1+1 ORAL) Take by mouth once daily. No vitamin K    PREVIDENT 5000 BOOSTER PLUS 1.1 % Pste BRUSH ON NIGHTLY AFTER NORMAL HYGIENE REGIMAN. SPIT OUT EXCESS DO NOT RINSE    promethazine (PHENERGAN) 25 MG tablet Take 25 mg by mouth every 6 (six) hours as needed.     ranitidine (ZANTAC) 300 MG tablet Take 1 tablet (300 mg total) by  mouth once daily.    risperidone (RISPERDAL) 0.5 MG Tab Take 1 tablet (0.5 mg total) by mouth every evening.    tamsulosin (FLOMAX) 0.4 mg Cp24 Take 1 capsule (0.4 mg total) by mouth once daily.    trazodone (DESYREL) 50 MG tablet Take one-half to one tablet PO nightly PRN insomnia    warfarin (COUMADIN) 1 MG tablet Take 1 mg by mouth Daily. On Tuesday and friday     warfarin (COUMADIN) 2 MG tablet TAKE 1 TABLET BY MOUTH EVERY DAY    WELCHOL 625 mg tablet TAKE 1 TABLET BY MOUTH EVERY DAY INCREASE BY ONE PILL EVERY 3 TO 4 DAYS UNTIL DESIRED CONTROL IS REACHED           Clinical Reference Information           Allergies as of 1/27/2017     Amoxil [Amoxicillin]    Augmentin [Amoxicillin-pot Clavulanate]    Avelox [Moxifloxacin]      Immunizations Administered on Date of Encounter - 1/27/2017     None      Orders Placed During Today's Visit      Normal Orders This Visit    POCT INR          1/27/2017  9:38 AM - Trisha StephensD      Component Results     Component Value Flag Ref Range Units Status    INR 1.3 (A) 2.0 - 3.0  Final     Acceptable Yes    Final      December 2016 Details    Sun Mon Tue Wed Thu Fri Sat         1               2               3                 4               5               6               7               8               9               10                 11               12               13               14               15               16               17                 18               19               20               21               22               23               24                 25               26               27               28      2 mg         29      1 mg         30      2 mg         31      1 mg          Date Details   No additional details              January 2017 Details    Sun Mon Tue Wed Thu Fri Sat     1      2 mg         2      2 mg         3      1 mg         4      2 mg         5   2.6   1 mg   See details      6      2 mg         7      1 mg            8      2 mg         9      2 mg         10      1 mg         11      2 mg         12      1 mg         13      2 mg         14      1 mg           15      2 mg         16      2 mg         17      1 mg         18      2 mg         19      1 mg         20      2 mg         21      1 mg           22      2 mg         23      2 mg         24      1 mg         25      2 mg         26      1 mg         27   1.3   4 mg   See details      28      2 mg           29      2 mg         30      2 mg         31      1 mg              Date Details   01/05 Last INR check   INR: 2.6      01/27 This INR check   INR: 1.3                     How to take your warfarin dose     To take:  1 mg Take 0.5 of a 2 mg tablet.    To take:  2 mg Take 1 of the 2 mg tablets.    To take:  4 mg Take 2 of the 2 mg tablets.           February 2017 Details    Sun Mon Tue Wed Thu Fri Sat        1      2 mg         2      1 mg         3            4                 5               6               7               8               9               10               11                 12               13               14               15               16               17               18                 19               20               21               22               23               24               25                 26               27               28                    Date Details   No additional details    Date of next INR:  2/3/2017         How to take your warfarin dose     To take:  1 mg Take 0.5 of a 2 mg tablet.    To take:  2 mg Take 1 of the 2 mg tablets.           Anticoagulation Summary as of 1/27/2017     Maintenance plan 1 mg (2 mg x 0.5) on Tue, Thu; 2 mg (2 mg x 1) all other days    Full instructions 1/27: 4 mg; Otherwise 1 mg on Tue, Thu; 2 mg all other days    Next INR check 2/3/2017      Anticoagulation Episode Summary     Comments [facility]       Otezla Pregnancy And Lactation Text: This medication is Pregnancy Category C and it isn't known if it is safe during pregnancy. It is unknown if it is excreted in breast milk.

## 2023-03-30 ENCOUNTER — OFFICE VISIT (OUTPATIENT)
Dept: GASTROENTEROLOGY | Facility: CLINIC | Age: 43
End: 2023-03-30
Payer: MEDICARE

## 2023-03-30 VITALS
BODY MASS INDEX: 17.3 KG/M2 | HEART RATE: 123 BPM | WEIGHT: 97.69 LBS | DIASTOLIC BLOOD PRESSURE: 82 MMHG | SYSTOLIC BLOOD PRESSURE: 122 MMHG

## 2023-03-30 DIAGNOSIS — R19.7 DIARRHEA, UNSPECIFIED TYPE: Primary | ICD-10-CM

## 2023-03-30 DIAGNOSIS — K31.84 GASTROPARESIS: ICD-10-CM

## 2023-03-30 DIAGNOSIS — R11.2 NAUSEA AND VOMITING, UNSPECIFIED VOMITING TYPE: ICD-10-CM

## 2023-03-30 DIAGNOSIS — N18.30 STAGE 3 CHRONIC KIDNEY DISEASE, UNSPECIFIED WHETHER STAGE 3A OR 3B CKD: Chronic | ICD-10-CM

## 2023-03-30 DIAGNOSIS — R25.2 SPASTICITY AS LATE EFFECT OF CEREBROVASCULAR ACCIDENT (CVA): ICD-10-CM

## 2023-03-30 DIAGNOSIS — G40.219 COMPLEX PARTIAL EPILEPSY WITH GENERALIZATION AND WITH INTRACTABLE EPILEPSY: ICD-10-CM

## 2023-03-30 DIAGNOSIS — M79.7 FIBROMYALGIA: Chronic | ICD-10-CM

## 2023-03-30 DIAGNOSIS — I69.398 SPASTICITY AS LATE EFFECT OF CEREBROVASCULAR ACCIDENT (CVA): ICD-10-CM

## 2023-03-30 PROCEDURE — 1159F MED LIST DOCD IN RCRD: CPT | Mod: HCNC,CPTII,S$GLB, | Performed by: INTERNAL MEDICINE

## 2023-03-30 PROCEDURE — 1159F PR MEDICATION LIST DOCUMENTED IN MEDICAL RECORD: ICD-10-PCS | Mod: HCNC,CPTII,S$GLB, | Performed by: INTERNAL MEDICINE

## 2023-03-30 PROCEDURE — 99214 PR OFFICE/OUTPT VISIT, EST, LEVL IV, 30-39 MIN: ICD-10-PCS | Mod: HCNC,S$GLB,, | Performed by: INTERNAL MEDICINE

## 2023-03-30 PROCEDURE — 3008F PR BODY MASS INDEX (BMI) DOCUMENTED: ICD-10-PCS | Mod: HCNC,CPTII,S$GLB, | Performed by: INTERNAL MEDICINE

## 2023-03-30 PROCEDURE — 3079F DIAST BP 80-89 MM HG: CPT | Mod: HCNC,CPTII,S$GLB, | Performed by: INTERNAL MEDICINE

## 2023-03-30 PROCEDURE — 3074F PR MOST RECENT SYSTOLIC BLOOD PRESSURE < 130 MM HG: ICD-10-PCS | Mod: HCNC,CPTII,S$GLB, | Performed by: INTERNAL MEDICINE

## 2023-03-30 PROCEDURE — 99214 OFFICE O/P EST MOD 30 MIN: CPT | Mod: HCNC,S$GLB,, | Performed by: INTERNAL MEDICINE

## 2023-03-30 PROCEDURE — 3074F SYST BP LT 130 MM HG: CPT | Mod: HCNC,CPTII,S$GLB, | Performed by: INTERNAL MEDICINE

## 2023-03-30 PROCEDURE — 3079F PR MOST RECENT DIASTOLIC BLOOD PRESSURE 80-89 MM HG: ICD-10-PCS | Mod: HCNC,CPTII,S$GLB, | Performed by: INTERNAL MEDICINE

## 2023-03-30 PROCEDURE — 3008F BODY MASS INDEX DOCD: CPT | Mod: HCNC,CPTII,S$GLB, | Performed by: INTERNAL MEDICINE

## 2023-03-30 PROCEDURE — 99999 PR PBB SHADOW E&M-EST. PATIENT-LVL III: ICD-10-PCS | Mod: PBBFAC,HCNC,, | Performed by: INTERNAL MEDICINE

## 2023-03-30 PROCEDURE — 99999 PR PBB SHADOW E&M-EST. PATIENT-LVL III: CPT | Mod: PBBFAC,HCNC,, | Performed by: INTERNAL MEDICINE

## 2023-03-30 RX ORDER — ONDANSETRON HYDROCHLORIDE 8 MG/1
8 TABLET, FILM COATED ORAL EVERY 8 HOURS PRN
Qty: 90 TABLET | Refills: 0 | Status: ON HOLD | OUTPATIENT
Start: 2023-03-30 | End: 2023-06-21 | Stop reason: SDUPTHER

## 2023-03-30 NOTE — PROGRESS NOTES
Subjective     Patient ID: Cat Denson is a 43 y.o. female.    This is an established patient.      Chief Complaint: Emesis, Diarrhea, and Nausea    Patient seen for motility problems, remote onset, ongoing, with alternating diarrhea and constipation in the past but currently moreso with diarrhea.  She has had colonoscopy with biopsy in the past which was unremarkable.  Last EGD was a few years ago and she has recurrent N/V related to gastroparesis.  She has taken reglan in the past but developed side effects and thus had to be discontinued.  She has multiple medical problems including psych diagnosis and is followed for these.  Weight is fluctuating but overall stable.    Review of Systems   Constitutional:  Negative for chills, fatigue and fever.   HENT:  Negative for sore throat and trouble swallowing.    Respiratory:  Negative for cough, shortness of breath and wheezing.    Cardiovascular:  Negative for chest pain and palpitations.   Gastrointestinal:  Positive for change in bowel habit, constipation, diarrhea, nausea, vomiting and change in bowel habit. Negative for abdominal pain and blood in stool.   Genitourinary:  Negative for dysuria and hematuria.   Musculoskeletal:  Negative for arthralgias and myalgias.   Integumentary:  Negative for color change and rash.   Neurological:  Negative for dizziness and headaches.   Hematological:  Negative for adenopathy.   Psychiatric/Behavioral:  Negative for confusion. The patient is not nervous/anxious.    All other systems reviewed and are negative.       Objective     Vitals:    03/30/23 1539   BP: 122/82   BP Location: Right arm   Patient Position: Sitting   Pulse: (!) 123   Weight: 44.3 kg (97 lb 10.6 oz)       Physical Exam  Constitutional:       Appearance: She is well-developed.   HENT:      Head: Normocephalic and atraumatic.   Eyes:      General: No scleral icterus.     Pupils: Pupils are equal, round, and reactive to light.   Cardiovascular:       Rate and Rhythm: Normal rate and regular rhythm.      Heart sounds: No murmur heard.  Pulmonary:      Effort: Pulmonary effort is normal.      Breath sounds: Normal breath sounds. No wheezing.   Abdominal:      General: Bowel sounds are normal. There is no distension.      Palpations: Abdomen is soft.      Tenderness: There is no abdominal tenderness.   Musculoskeletal:         General: No tenderness.      Cervical back: Normal range of motion.   Lymphadenopathy:      Cervical: No cervical adenopathy.   Skin:     General: Skin is warm and dry.      Findings: No rash.   Neurological:      Mental Status: She is alert.          Assessment and Plan     Problem List Items Addressed This Visit       Fibromyalgia (Chronic)    CKD (chronic kidney disease) stage 3, GFR 30-59 ml/min (Chronic)    Complex partial epilepsy with generalization and with intractable epilepsy    Gastroparesis    Spasticity as late effect of cerebrovascular accident (CVA)     Other Visit Diagnoses       Diarrhea, unspecified type    -  Primary    Relevant Orders    FL Upper GI With Small Bowel (xpd)    Nausea and vomiting, unspecified vomiting type        Relevant Orders    FL Upper GI With Small Bowel (xpd)            EGD for above  Increase fiber.   Small frequent meals throughout day  Refill zofran  Further recommendations to follow after above.  Check UGI series.

## 2023-04-09 NOTE — TELEPHONE ENCOUNTER
----- Message from Destiny Jordan RN sent at 3/6/2017  4:11 PM CST -----  Please forward this important TCC information to your provider in order to maximize the post discharge care delivery of this patient.    C3 nurse spoke with Cat Denson for a TCC post hospital discharge follow up call. The patient does not have a scheduled HOSFU appointment with Dr Ribeiro within 7-14 days post hospital discharge date 03/04/17. C3 nurse was unable to schedule HOSFU appointment in [facility].  Please contact patient and schedule follow up appointment using HOSFU visit type on or before 03/17/17.    Respectfully,  Destiny Jordan RN, BSN, CCRN  [facility]    [email or web address]       Please do not reply to this message, as this inbox is not routinely monitored.   no

## 2023-04-13 ENCOUNTER — LAB VISIT (OUTPATIENT)
Dept: LAB | Facility: HOSPITAL | Age: 43
End: 2023-04-13
Attending: FAMILY MEDICINE
Payer: MEDICARE

## 2023-04-13 ENCOUNTER — OFFICE VISIT (OUTPATIENT)
Dept: FAMILY MEDICINE | Facility: CLINIC | Age: 43
End: 2023-04-13
Payer: MEDICARE

## 2023-04-13 VITALS
HEART RATE: 60 BPM | OXYGEN SATURATION: 96 % | WEIGHT: 101.19 LBS | BODY MASS INDEX: 17.93 KG/M2 | RESPIRATION RATE: 16 BRPM | HEIGHT: 63 IN | TEMPERATURE: 99 F | SYSTOLIC BLOOD PRESSURE: 130 MMHG | DIASTOLIC BLOOD PRESSURE: 80 MMHG

## 2023-04-13 DIAGNOSIS — Z86.73 HISTORY OF RIGHT MCA STROKE: ICD-10-CM

## 2023-04-13 DIAGNOSIS — K21.9 GASTROESOPHAGEAL REFLUX DISEASE WITHOUT ESOPHAGITIS: ICD-10-CM

## 2023-04-13 DIAGNOSIS — E83.42 HYPOMAGNESEMIA: ICD-10-CM

## 2023-04-13 DIAGNOSIS — E61.1 IRON DEFICIENCY: ICD-10-CM

## 2023-04-13 DIAGNOSIS — N18.30 STAGE 3 CHRONIC KIDNEY DISEASE, UNSPECIFIED WHETHER STAGE 3A OR 3B CKD: ICD-10-CM

## 2023-04-13 DIAGNOSIS — E55.9 VITAMIN D DEFICIENCY: ICD-10-CM

## 2023-04-13 DIAGNOSIS — K31.84 GASTROPARESIS: ICD-10-CM

## 2023-04-13 DIAGNOSIS — E78.2 MIXED HYPERLIPIDEMIA: ICD-10-CM

## 2023-04-13 DIAGNOSIS — G89.4 CHRONIC PAIN SYNDROME: Chronic | ICD-10-CM

## 2023-04-13 DIAGNOSIS — M32.9 SYSTEMIC LUPUS ERYTHEMATOSUS, UNSPECIFIED SLE TYPE, UNSPECIFIED ORGAN INVOLVEMENT STATUS: ICD-10-CM

## 2023-04-13 DIAGNOSIS — I69.354 HEMIPARESIS AFFECTING LEFT SIDE AS LATE EFFECT OF CEREBROVASCULAR ACCIDENT: ICD-10-CM

## 2023-04-13 DIAGNOSIS — Z79.01 LONG TERM CURRENT USE OF ANTICOAGULANT THERAPY: ICD-10-CM

## 2023-04-13 DIAGNOSIS — E78.2 MIXED HYPERLIPIDEMIA: Primary | ICD-10-CM

## 2023-04-13 LAB
ALBUMIN SERPL BCP-MCNC: 4.1 G/DL (ref 3.5–5.2)
ALP SERPL-CCNC: 92 U/L (ref 55–135)
ALT SERPL W/O P-5'-P-CCNC: 32 U/L (ref 10–44)
ANION GAP SERPL CALC-SCNC: 14 MMOL/L (ref 8–16)
AST SERPL-CCNC: 27 U/L (ref 10–40)
BASOPHILS # BLD AUTO: 0.06 K/UL (ref 0–0.2)
BASOPHILS NFR BLD: 0.5 % (ref 0–1.9)
BILIRUB SERPL-MCNC: 0.3 MG/DL (ref 0.1–1)
BUN SERPL-MCNC: 21 MG/DL (ref 6–20)
CALCIUM SERPL-MCNC: 9.6 MG/DL (ref 8.7–10.5)
CHLORIDE SERPL-SCNC: 106 MMOL/L (ref 95–110)
CO2 SERPL-SCNC: 18 MMOL/L (ref 23–29)
CREAT SERPL-MCNC: 1.7 MG/DL (ref 0.5–1.4)
DIFFERENTIAL METHOD: ABNORMAL
EOSINOPHIL # BLD AUTO: 0.1 K/UL (ref 0–0.5)
EOSINOPHIL NFR BLD: 0.9 % (ref 0–8)
ERYTHROCYTE [DISTWIDTH] IN BLOOD BY AUTOMATED COUNT: 12.3 % (ref 11.5–14.5)
EST. GFR  (NO RACE VARIABLE): 38.4 ML/MIN/1.73 M^2
FERRITIN SERPL-MCNC: 28 NG/ML (ref 20–300)
GLUCOSE SERPL-MCNC: 89 MG/DL (ref 70–110)
HCT VFR BLD AUTO: 40.2 % (ref 37–48.5)
HGB BLD-MCNC: 12.7 G/DL (ref 12–16)
IMM GRANULOCYTES # BLD AUTO: 0.06 K/UL (ref 0–0.04)
IMM GRANULOCYTES NFR BLD AUTO: 0.5 % (ref 0–0.5)
IRON SERPL-MCNC: 66 UG/DL (ref 30–160)
LYMPHOCYTES # BLD AUTO: 2 K/UL (ref 1–4.8)
LYMPHOCYTES NFR BLD: 18.1 % (ref 18–48)
MAGNESIUM SERPL-MCNC: 1.7 MG/DL (ref 1.6–2.6)
MCH RBC QN AUTO: 31.4 PG (ref 27–31)
MCHC RBC AUTO-ENTMCNC: 31.6 G/DL (ref 32–36)
MCV RBC AUTO: 100 FL (ref 82–98)
MONOCYTES # BLD AUTO: 0.4 K/UL (ref 0.3–1)
MONOCYTES NFR BLD: 3.4 % (ref 4–15)
NEUTROPHILS # BLD AUTO: 8.5 K/UL (ref 1.8–7.7)
NEUTROPHILS NFR BLD: 76.6 % (ref 38–73)
NRBC BLD-RTO: 0 /100 WBC
PLATELET # BLD AUTO: 297 K/UL (ref 150–450)
PMV BLD AUTO: 11.5 FL (ref 9.2–12.9)
POTASSIUM SERPL-SCNC: 4.7 MMOL/L (ref 3.5–5.1)
PROT SERPL-MCNC: 7.5 G/DL (ref 6–8.4)
RBC # BLD AUTO: 4.04 M/UL (ref 4–5.4)
SATURATED IRON: 15 % (ref 20–50)
SODIUM SERPL-SCNC: 138 MMOL/L (ref 136–145)
TOTAL IRON BINDING CAPACITY: 443 UG/DL (ref 250–450)
TRANSFERRIN SERPL-MCNC: 299 MG/DL (ref 200–375)
WBC # BLD AUTO: 11.04 K/UL (ref 3.9–12.7)

## 2023-04-13 PROCEDURE — 1160F RVW MEDS BY RX/DR IN RCRD: CPT | Mod: HCNC,CPTII,S$GLB, | Performed by: FAMILY MEDICINE

## 2023-04-13 PROCEDURE — 99999 PR PBB SHADOW E&M-EST. PATIENT-LVL III: CPT | Mod: PBBFAC,HCNC,, | Performed by: FAMILY MEDICINE

## 2023-04-13 PROCEDURE — 82728 ASSAY OF FERRITIN: CPT | Mod: HCNC | Performed by: FAMILY MEDICINE

## 2023-04-13 PROCEDURE — 3079F PR MOST RECENT DIASTOLIC BLOOD PRESSURE 80-89 MM HG: ICD-10-PCS | Mod: HCNC,CPTII,S$GLB, | Performed by: FAMILY MEDICINE

## 2023-04-13 PROCEDURE — 99214 OFFICE O/P EST MOD 30 MIN: CPT | Mod: HCNC,S$GLB,, | Performed by: FAMILY MEDICINE

## 2023-04-13 PROCEDURE — 84466 ASSAY OF TRANSFERRIN: CPT | Mod: HCNC | Performed by: FAMILY MEDICINE

## 2023-04-13 PROCEDURE — 82306 VITAMIN D 25 HYDROXY: CPT | Mod: HCNC | Performed by: FAMILY MEDICINE

## 2023-04-13 PROCEDURE — 1159F PR MEDICATION LIST DOCUMENTED IN MEDICAL RECORD: ICD-10-PCS | Mod: HCNC,CPTII,S$GLB, | Performed by: FAMILY MEDICINE

## 2023-04-13 PROCEDURE — 1160F PR REVIEW ALL MEDS BY PRESCRIBER/CLIN PHARMACIST DOCUMENTED: ICD-10-PCS | Mod: HCNC,CPTII,S$GLB, | Performed by: FAMILY MEDICINE

## 2023-04-13 PROCEDURE — 3008F PR BODY MASS INDEX (BMI) DOCUMENTED: ICD-10-PCS | Mod: HCNC,CPTII,S$GLB, | Performed by: FAMILY MEDICINE

## 2023-04-13 PROCEDURE — 36415 COLL VENOUS BLD VENIPUNCTURE: CPT | Mod: HCNC,PO | Performed by: FAMILY MEDICINE

## 2023-04-13 PROCEDURE — 85025 COMPLETE CBC W/AUTO DIFF WBC: CPT | Mod: HCNC | Performed by: FAMILY MEDICINE

## 2023-04-13 PROCEDURE — 3075F PR MOST RECENT SYSTOLIC BLOOD PRESS GE 130-139MM HG: ICD-10-PCS | Mod: HCNC,CPTII,S$GLB, | Performed by: FAMILY MEDICINE

## 2023-04-13 PROCEDURE — 99999 PR PBB SHADOW E&M-EST. PATIENT-LVL III: ICD-10-PCS | Mod: PBBFAC,HCNC,, | Performed by: FAMILY MEDICINE

## 2023-04-13 PROCEDURE — 99214 PR OFFICE/OUTPT VISIT, EST, LEVL IV, 30-39 MIN: ICD-10-PCS | Mod: HCNC,S$GLB,, | Performed by: FAMILY MEDICINE

## 2023-04-13 PROCEDURE — 1159F MED LIST DOCD IN RCRD: CPT | Mod: HCNC,CPTII,S$GLB, | Performed by: FAMILY MEDICINE

## 2023-04-13 PROCEDURE — 3079F DIAST BP 80-89 MM HG: CPT | Mod: HCNC,CPTII,S$GLB, | Performed by: FAMILY MEDICINE

## 2023-04-13 PROCEDURE — 80053 COMPREHEN METABOLIC PANEL: CPT | Mod: HCNC | Performed by: FAMILY MEDICINE

## 2023-04-13 PROCEDURE — 3075F SYST BP GE 130 - 139MM HG: CPT | Mod: HCNC,CPTII,S$GLB, | Performed by: FAMILY MEDICINE

## 2023-04-13 PROCEDURE — 83735 ASSAY OF MAGNESIUM: CPT | Mod: HCNC | Performed by: FAMILY MEDICINE

## 2023-04-13 PROCEDURE — 3008F BODY MASS INDEX DOCD: CPT | Mod: HCNC,CPTII,S$GLB, | Performed by: FAMILY MEDICINE

## 2023-04-13 RX ORDER — TRAZODONE HYDROCHLORIDE 100 MG/1
200 TABLET ORAL NIGHTLY
Qty: 180 TABLET | Refills: 3 | Status: SHIPPED | OUTPATIENT
Start: 2023-04-13 | End: 2023-05-08 | Stop reason: SDUPTHER

## 2023-04-13 NOTE — PROGRESS NOTES
Subjective:       Patient ID: Cat Denson is a 43 y.o. female.    Chief Complaint: Follow-up (4mth f/u)    HPI  Review of Systems   Constitutional:  Negative for fatigue and unexpected weight change.   Respiratory:  Negative for chest tightness and shortness of breath.    Cardiovascular:  Negative for chest pain, palpitations and leg swelling.   Gastrointestinal:  Negative for abdominal pain.   Musculoskeletal:  Negative for arthralgias.   Neurological:  Negative for dizziness, syncope, light-headedness and headaches.     Patient Active Problem List   Diagnosis    Chronic pain    Fibromyalgia    Lupus (systemic lupus erythematosus)    Chronic asthma without complication    Hemiparesis affecting left side as late effect of cerebrovascular accident    Hemiplegic migraine    CKD (chronic kidney disease) stage 3, GFR 30-59 ml/min    Gastroesophageal reflux disease without esophagitis    Long term current use of anticoagulant therapy    Primary insomnia    Iron deficiency anemia    Cigarette nicotine dependence without complication    Homocysteinemia    Complex partial epilepsy with generalization and with intractable epilepsy    Chronic daily headache    CHRISTEN (acute kidney injury)    Mixed hyperlipidemia    Gastroparesis    Bipolar 1 disorder, depressed, partial remission    Panic disorder with agoraphobia    Cannabis use disorder, moderate, dependence    Substance abuse-kratom daily use    Left hemiparesis    Spasticity as late effect of cerebrovascular accident (CVA)    Toxicity, late effect, due to drug, medicine, or biological substance     Patient is here for a chronic conditions follow up.    Reviewed labs 2/2022 A1c 5.6. h/o CVA on lipitor and xarelto     GI NP Erik/Dr. Kimble investigating elevated amylase.  Lipase normal and so are LFTs. EGD 1/2022 showing gastritis, small hiatal hernia. CT abd 12/2021 Chest; old granulomatous disease.  Small less than 6 mm nodules.  For multiple solid nodules all  <6 mm, Fleischner Society 2017 guidelines recommend no routine follow up for a low risk patient, or follow up with non-contrast chest CT at 12 months after discovery in a high risk patient.   Abdomen and pelvis; prominent amount of stool within the colon.  Mild diverticulosis versus incomplete distention of colon without CT findings of acute diverticulitis.  Normal appearance of the appendix.  Findings suggesting complex thick-walled right ovarian cyst and suggest follow-up pelvic ultrasound to be sure that this resolves  Has chronic gastroparesis vomiting-taking phenergan, pepcid and protonix. EGD 6/2023 scheduled        Ob/GYN Dr. Vargas 9/2021 u/s pelvis no abnl. mammo 1/2022 neg. PAP and HPV neg 2021     Under care of Dr. Cruz for psych. Is on depakote chronically for bipolar 1, chronic depression. H/o substance abuse-[facility], [facility]. Has had multiple psych admissions for depression /SI and attempt 19. self injury-cutting, impulse control. Followed at [facility] for intensive outpatient therapy 2020     Neuro Dr. Norman h/o RMCA CVA with residual left hemiparesis 2015 embolic from heart on xarelto . H/o grand mal seizures- on depakote . Started > 13 years ago.  Also h/o psuedoseizures.  Was in inpatient epilepsy monitoring unit 9/18. That was last sz activity. Neurology Dr. Norman     Psych Dr. Cruz- bipolar 1     Rheum Dr. Benoit- SLE complications of CKD, CVA 2015 with residual chronic left hemiplegia and seizure d/o        Nephrology Dr. Lovell CKD stage 3     Optometry Dr. Buchanan/Gus for homonymous chikis field defects  Objective:      Physical Exam  Vitals and nursing note reviewed.   Constitutional:       Appearance: She is well-developed.   Cardiovascular:      Rate and Rhythm: Normal rate and regular rhythm.      Heart sounds: Normal heart sounds.   Pulmonary:      Effort: Pulmonary effort is normal.      Breath sounds: Normal breath sounds.   Skin:     General: Skin is warm and dry.   Neurological:       Mental Status: She is alert and oriented to person, place, and time.      Comments: Chronic left sided hemiplegia       Assessment:       1. Mixed hyperlipidemia    2. Stage 3 chronic kidney disease, unspecified whether stage 3a or 3b CKD    3. Gastroparesis    4. History of right MCA stroke    5. Systemic lupus erythematosus, unspecified SLE type, unspecified organ involvement status    6. Hemiparesis affecting left side as late effect of cerebrovascular accident    7. Long term current use of anticoagulant therapy    8. Gastroesophageal reflux disease without esophagitis    9. Chronic pain syndrome    10. Vitamin D deficiency    11. Iron deficiency    12. Hypomagnesemia        Plan:         1. Mixed hyperlipidemia  I recommend a heart healthy diet rich in fiber, fresh vegetables and fruit and low in saturated fats (fried foods, red meat, etc.).  I also recommend regular exercise including a minimum of 150 minutes of cardio exercise per week and 2-30 minute workouts of strength training like light weights, yoga, pilates, etc.  You should work toward a body mass index of < 25.    Stable condition.  Continue current medications.  Will adjust based on lab findings or if condition changes.    - Comprehensive Metabolic Panel; Future    2. Stage 3 chronic kidney disease, unspecified whether stage 3a or 3b CKD  Stable and chronic.  Will continue to monitor q3-6 months and control chronic conditions as optimally as possible to preserve function.    - CBC Auto Differential; Future    3. Gastroparesis  Cont gi consult and current mgmt    4. History of right MCA stroke  Cont xarelto    5. Systemic lupus erythematosus, unspecified SLE type, unspecified organ involvement status  Cont current mgmt    6. Hemiparesis affecting left side as late effect of cerebrovascular accident  Chronic . Cont mgmt of spasticity and supports    7. Long term current use of anticoagulant therapy  Cont xarelto and bleeding precautions    8.  Gastroesophageal reflux disease without esophagitis  Cont pepcid pm and protonix am    9. Chronic pain syndrome  Cont current mgmt     10. Vitamin D deficiency  Cont monitoring and treat as indicated  - Vitamin D; Future    11. Iron deficiency  Screen and treat as indicated:    - Ferritin; Future  - Iron and TIBC; Future    12. Hypomagnesemia  Screen and treat as indicated:    - Magnesium; Future      Time spent with patient: 20 minutes    Patient with be reevaluated in 3 months or sooner prn    Greater than 50% of this visit was spent counseling as described in above documentation:Yes

## 2023-04-14 LAB — 25(OH)D3+25(OH)D2 SERPL-MCNC: 31 NG/ML (ref 30–96)

## 2023-04-17 PROBLEM — N17.9 AKI (ACUTE KIDNEY INJURY): Status: RESOLVED | Noted: 2018-09-14 | Resolved: 2023-04-17

## 2023-04-30 ENCOUNTER — TELEPHONE (OUTPATIENT)
Dept: FAMILY MEDICINE | Facility: CLINIC | Age: 43
End: 2023-04-30
Payer: MEDICARE

## 2023-05-01 NOTE — TELEPHONE ENCOUNTER
Left message for patient to call office  Ok per Dr. Barrett to hold Xarelto 2 days prior to procedure.

## 2023-05-01 NOTE — TELEPHONE ENCOUNTER
----- Message from Akira Hoskins MA sent at 4/3/2023  3:57 PM CDT -----  Regarding: FW: clearance  See message below.     ----- Message -----  From: Phani Michael LPN  Sent: 3/30/2023   4:46 PM CDT  To: Nena MIRANDA Staff  Subject: clearance                                        Ms. Denson has a colonoscopy scheduled with Dr. Kimble on 6/12/23. Please send clearance to hold Xarelto for 2 days?      If you have any questions or concerns, please don't hesitate to call.    Sincerely,  JOYCE Jeronimo

## 2023-05-03 ENCOUNTER — PATIENT MESSAGE (OUTPATIENT)
Dept: PSYCHIATRY | Facility: CLINIC | Age: 43
End: 2023-05-03
Payer: MEDICARE

## 2023-05-03 ENCOUNTER — TELEPHONE (OUTPATIENT)
Dept: PSYCHIATRY | Facility: CLINIC | Age: 43
End: 2023-05-03
Payer: MEDICARE

## 2023-05-03 NOTE — TELEPHONE ENCOUNTER
Called patient to get her scheduled for follow up appointment on 05/16/2023 no answer left message for her to call office.  Also sent my chart message about getting her scheduled

## 2023-05-08 ENCOUNTER — OFFICE VISIT (OUTPATIENT)
Dept: PSYCHIATRY | Facility: CLINIC | Age: 43
End: 2023-05-08
Payer: MEDICARE

## 2023-05-08 VITALS
SYSTOLIC BLOOD PRESSURE: 134 MMHG | DIASTOLIC BLOOD PRESSURE: 93 MMHG | HEART RATE: 114 BPM | WEIGHT: 104.25 LBS | HEIGHT: 63 IN | BODY MASS INDEX: 18.47 KG/M2

## 2023-05-08 DIAGNOSIS — G47.00 INSOMNIA, UNSPECIFIED TYPE: ICD-10-CM

## 2023-05-08 DIAGNOSIS — F12.90 CANNABIS USE DISORDER: ICD-10-CM

## 2023-05-08 DIAGNOSIS — Z79.899 HIGH RISK MEDICATION USE: ICD-10-CM

## 2023-05-08 DIAGNOSIS — F41.9 ANXIETY: ICD-10-CM

## 2023-05-08 DIAGNOSIS — F31.75 BIPOLAR 1 DISORDER, DEPRESSED, PARTIAL REMISSION: ICD-10-CM

## 2023-05-08 PROCEDURE — 3075F PR MOST RECENT SYSTOLIC BLOOD PRESS GE 130-139MM HG: ICD-10-PCS | Mod: CPTII,S$GLB,, | Performed by: PSYCHIATRY & NEUROLOGY

## 2023-05-08 PROCEDURE — 1160F PR REVIEW ALL MEDS BY PRESCRIBER/CLIN PHARMACIST DOCUMENTED: ICD-10-PCS | Mod: CPTII,S$GLB,, | Performed by: PSYCHIATRY & NEUROLOGY

## 2023-05-08 PROCEDURE — 3008F PR BODY MASS INDEX (BMI) DOCUMENTED: ICD-10-PCS | Mod: CPTII,S$GLB,, | Performed by: PSYCHIATRY & NEUROLOGY

## 2023-05-08 PROCEDURE — 1159F MED LIST DOCD IN RCRD: CPT | Mod: CPTII,S$GLB,, | Performed by: PSYCHIATRY & NEUROLOGY

## 2023-05-08 PROCEDURE — 99999 PR PBB SHADOW E&M-EST. PATIENT-LVL IV: ICD-10-PCS | Mod: PBBFAC,,, | Performed by: PSYCHIATRY & NEUROLOGY

## 2023-05-08 PROCEDURE — 3080F PR MOST RECENT DIASTOLIC BLOOD PRESSURE >= 90 MM HG: ICD-10-PCS | Mod: CPTII,S$GLB,, | Performed by: PSYCHIATRY & NEUROLOGY

## 2023-05-08 PROCEDURE — 99999 PR PBB SHADOW E&M-EST. PATIENT-LVL IV: CPT | Mod: PBBFAC,,, | Performed by: PSYCHIATRY & NEUROLOGY

## 2023-05-08 PROCEDURE — 3044F PR MOST RECENT HEMOGLOBIN A1C LEVEL <7.0%: ICD-10-PCS | Mod: CPTII,S$GLB,, | Performed by: PSYCHIATRY & NEUROLOGY

## 2023-05-08 PROCEDURE — 1160F RVW MEDS BY RX/DR IN RCRD: CPT | Mod: CPTII,S$GLB,, | Performed by: PSYCHIATRY & NEUROLOGY

## 2023-05-08 PROCEDURE — 3008F BODY MASS INDEX DOCD: CPT | Mod: CPTII,S$GLB,, | Performed by: PSYCHIATRY & NEUROLOGY

## 2023-05-08 PROCEDURE — 3075F SYST BP GE 130 - 139MM HG: CPT | Mod: CPTII,S$GLB,, | Performed by: PSYCHIATRY & NEUROLOGY

## 2023-05-08 PROCEDURE — 1159F PR MEDICATION LIST DOCUMENTED IN MEDICAL RECORD: ICD-10-PCS | Mod: CPTII,S$GLB,, | Performed by: PSYCHIATRY & NEUROLOGY

## 2023-05-08 PROCEDURE — 3080F DIAST BP >= 90 MM HG: CPT | Mod: CPTII,S$GLB,, | Performed by: PSYCHIATRY & NEUROLOGY

## 2023-05-08 PROCEDURE — 99214 OFFICE O/P EST MOD 30 MIN: CPT | Mod: S$GLB,,, | Performed by: PSYCHIATRY & NEUROLOGY

## 2023-05-08 PROCEDURE — 99214 PR OFFICE/OUTPT VISIT, EST, LEVL IV, 30-39 MIN: ICD-10-PCS | Mod: S$GLB,,, | Performed by: PSYCHIATRY & NEUROLOGY

## 2023-05-08 PROCEDURE — 3044F HG A1C LEVEL LT 7.0%: CPT | Mod: CPTII,S$GLB,, | Performed by: PSYCHIATRY & NEUROLOGY

## 2023-05-08 RX ORDER — TRAZODONE HYDROCHLORIDE 100 MG/1
TABLET ORAL
Qty: 90 TABLET | Refills: 2 | Status: SHIPPED | OUTPATIENT
Start: 2023-05-08 | End: 2023-10-02 | Stop reason: ALTCHOICE

## 2023-05-08 RX ORDER — DIVALPROEX SODIUM 250 MG/1
TABLET, DELAYED RELEASE ORAL
Qty: 90 TABLET | Refills: 2 | Status: ON HOLD | OUTPATIENT
Start: 2023-05-08 | End: 2023-10-06 | Stop reason: SDUPTHER

## 2023-05-08 RX ORDER — ARIPIPRAZOLE 10 MG/1
10 TABLET ORAL DAILY
Qty: 30 TABLET | Refills: 2 | Status: ON HOLD | OUTPATIENT
Start: 2023-05-08 | End: 2023-10-06 | Stop reason: SDUPTHER

## 2023-05-08 RX ORDER — MIRTAZAPINE 30 MG/1
TABLET, FILM COATED ORAL
Qty: 30 TABLET | Refills: 2 | Status: ON HOLD | OUTPATIENT
Start: 2023-05-08 | End: 2023-10-06 | Stop reason: SDUPTHER
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
## 2023-05-10 ENCOUNTER — LAB VISIT (OUTPATIENT)
Dept: LAB | Facility: HOSPITAL | Age: 43
End: 2023-05-10
Attending: PSYCHIATRY & NEUROLOGY
Payer: MEDICARE

## 2023-05-10 DIAGNOSIS — Z79.899 HIGH RISK MEDICATION USE: ICD-10-CM

## 2023-05-10 DIAGNOSIS — G47.00 INSOMNIA, UNSPECIFIED TYPE: ICD-10-CM

## 2023-05-10 LAB
AMYLASE SERPL-CCNC: 174 U/L (ref 20–110)
CHOLEST SERPL-MCNC: 145 MG/DL (ref 120–199)
CHOLEST/HDLC SERPL: 3.4 {RATIO} (ref 2–5)
ESTIMATED AVG GLUCOSE: 94 MG/DL (ref 68–131)
HBA1C MFR BLD: 4.9 % (ref 4–5.6)
HDLC SERPL-MCNC: 43 MG/DL (ref 40–75)
HDLC SERPL: 29.7 % (ref 20–50)
LDLC SERPL CALC-MCNC: 93.6 MG/DL (ref 63–159)
LIPASE SERPL-CCNC: 29 U/L (ref 4–60)
NONHDLC SERPL-MCNC: 102 MG/DL
TRIGL SERPL-MCNC: 42 MG/DL (ref 30–150)
TSH SERPL DL<=0.005 MIU/L-ACNC: 0.44 UIU/ML (ref 0.4–4)
VALPROATE SERPL-MCNC: 28.7 UG/ML (ref 50–100)

## 2023-05-10 PROCEDURE — 80164 ASSAY DIPROPYLACETIC ACD TOT: CPT | Performed by: PSYCHIATRY & NEUROLOGY

## 2023-05-10 PROCEDURE — 84443 ASSAY THYROID STIM HORMONE: CPT | Performed by: PSYCHIATRY & NEUROLOGY

## 2023-05-10 PROCEDURE — 80061 LIPID PANEL: CPT | Performed by: PSYCHIATRY & NEUROLOGY

## 2023-05-10 PROCEDURE — 83036 HEMOGLOBIN GLYCOSYLATED A1C: CPT | Performed by: PSYCHIATRY & NEUROLOGY

## 2023-05-10 PROCEDURE — 82150 ASSAY OF AMYLASE: CPT | Performed by: PSYCHIATRY & NEUROLOGY

## 2023-05-10 PROCEDURE — 36415 COLL VENOUS BLD VENIPUNCTURE: CPT | Mod: PO | Performed by: PSYCHIATRY & NEUROLOGY

## 2023-05-10 PROCEDURE — 83690 ASSAY OF LIPASE: CPT | Performed by: PSYCHIATRY & NEUROLOGY

## 2023-05-19 ENCOUNTER — TELEPHONE (OUTPATIENT)
Dept: NEUROLOGY | Facility: CLINIC | Age: 43
End: 2023-05-19
Payer: MEDICARE

## 2023-05-30 ENCOUNTER — PATIENT MESSAGE (OUTPATIENT)
Dept: FAMILY MEDICINE | Facility: CLINIC | Age: 43
End: 2023-05-30
Payer: MEDICARE

## 2023-06-01 ENCOUNTER — OFFICE VISIT (OUTPATIENT)
Dept: FAMILY MEDICINE | Facility: CLINIC | Age: 43
End: 2023-06-01
Payer: MEDICARE

## 2023-06-01 VITALS
HEIGHT: 63 IN | TEMPERATURE: 98 F | HEART RATE: 85 BPM | DIASTOLIC BLOOD PRESSURE: 92 MMHG | WEIGHT: 107.56 LBS | OXYGEN SATURATION: 98 % | BODY MASS INDEX: 19.06 KG/M2 | SYSTOLIC BLOOD PRESSURE: 146 MMHG

## 2023-06-01 DIAGNOSIS — M25.561 ARTHRALGIA OF RIGHT KNEE: ICD-10-CM

## 2023-06-01 DIAGNOSIS — M25.561 ACUTE PAIN OF RIGHT KNEE: Primary | ICD-10-CM

## 2023-06-01 PROCEDURE — 3008F PR BODY MASS INDEX (BMI) DOCUMENTED: ICD-10-PCS | Mod: CPTII,S$GLB,, | Performed by: NURSE PRACTITIONER

## 2023-06-01 PROCEDURE — 3077F SYST BP >= 140 MM HG: CPT | Mod: CPTII,S$GLB,, | Performed by: NURSE PRACTITIONER

## 2023-06-01 PROCEDURE — 3008F BODY MASS INDEX DOCD: CPT | Mod: CPTII,S$GLB,, | Performed by: NURSE PRACTITIONER

## 2023-06-01 PROCEDURE — 99999 PR PBB SHADOW E&M-EST. PATIENT-LVL III: ICD-10-PCS | Mod: PBBFAC,,, | Performed by: NURSE PRACTITIONER

## 2023-06-01 PROCEDURE — 99999 PR PBB SHADOW E&M-EST. PATIENT-LVL III: CPT | Mod: PBBFAC,,, | Performed by: NURSE PRACTITIONER

## 2023-06-01 PROCEDURE — 3044F PR MOST RECENT HEMOGLOBIN A1C LEVEL <7.0%: ICD-10-PCS | Mod: CPTII,S$GLB,, | Performed by: NURSE PRACTITIONER

## 2023-06-01 PROCEDURE — 3044F HG A1C LEVEL LT 7.0%: CPT | Mod: CPTII,S$GLB,, | Performed by: NURSE PRACTITIONER

## 2023-06-01 PROCEDURE — 1159F MED LIST DOCD IN RCRD: CPT | Mod: CPTII,S$GLB,, | Performed by: NURSE PRACTITIONER

## 2023-06-01 PROCEDURE — 3080F DIAST BP >= 90 MM HG: CPT | Mod: CPTII,S$GLB,, | Performed by: NURSE PRACTITIONER

## 2023-06-01 PROCEDURE — 99214 OFFICE O/P EST MOD 30 MIN: CPT | Mod: S$GLB,,, | Performed by: NURSE PRACTITIONER

## 2023-06-01 PROCEDURE — 3080F PR MOST RECENT DIASTOLIC BLOOD PRESSURE >= 90 MM HG: ICD-10-PCS | Mod: CPTII,S$GLB,, | Performed by: NURSE PRACTITIONER

## 2023-06-01 PROCEDURE — 99214 PR OFFICE/OUTPT VISIT, EST, LEVL IV, 30-39 MIN: ICD-10-PCS | Mod: S$GLB,,, | Performed by: NURSE PRACTITIONER

## 2023-06-01 PROCEDURE — 3077F PR MOST RECENT SYSTOLIC BLOOD PRESSURE >= 140 MM HG: ICD-10-PCS | Mod: CPTII,S$GLB,, | Performed by: NURSE PRACTITIONER

## 2023-06-01 PROCEDURE — 1159F PR MEDICATION LIST DOCUMENTED IN MEDICAL RECORD: ICD-10-PCS | Mod: CPTII,S$GLB,, | Performed by: NURSE PRACTITIONER

## 2023-06-01 RX ORDER — PREDNISONE 20 MG/1
20 TABLET ORAL DAILY
Qty: 7 TABLET | Refills: 0 | Status: SHIPPED | OUTPATIENT
Start: 2023-06-01 | End: 2023-06-08

## 2023-06-01 NOTE — PROGRESS NOTES
This dictation has been generated using Modal Fluency Dictation some phonetic errors may occur. Please contact author for clarification if needed.     Problem List Items Addressed This Visit    None  Visit Diagnoses       Acute pain of right knee    -  Primary    Arthralgia of right knee                Orders Placed This Encounter    predniSONE (DELTASONE) 20 MG tablet     Acute right knee pain and arthralgia.  NSAID contraindicated due to blood thinner and GERD.  Prednisone as above.    No follow-ups on file.    ________________________________________________________________  ________________________________________________________________      Chief Complaint   Patient presents with    Knee Pain     Right knee pain. 1 week     History of present illness  This 43 y.o. presents today for complaint of right knee pain insidious onset.  No history of injury.  She did not play sports.  Has had other joint pains.  No history of arthritis.  Tried Tylenol without improvement.  She does take Xarelto due to stroke history.  NSAID contraindicated.  Also notes GERD fair control.  Does not feel like the knee is going to give out.    Past Medical History:   Diagnosis Date    Acid reflux     Allergy     Anemia     Anticoagulated 12/29/2015    Anxiety     Asthma     Bipolar 1 disorder     Bronchospasm with bronchitis, acute     Chronic daily headache 9/11/2018    Chronic kidney disease     Chronic pain     Depression     bipolar 2    Fibromyalgia     Gastroparesis     History of right MCA stroke 11/25/2015    Hx of psychiatric care     Lactose intolerance     Lupus     Mixed hyperlipidemia 11/28/2018    Psychiatric problem     Renal tubular acidosis     Seizure     Sjogren's syndrome     Smoker     Stroke 13-    Substance abuse-kratom daily use 8/30/2020    Suicide attempt     overdosed on a bottle of paxil, muscle relaxers, & zoloft    Therapy        Past Surgical History:   Procedure Laterality Date    CHOLECYSTECTOMY       COLONOSCOPY  11/12/2014    Dr. Kimble, repeat at 52 years old for screening    ESOPHAGOGASTRODUODENOSCOPY N/A 1/3/2022    Procedure: EGD (ESOPHAGOGASTRODUODENOSCOPY);  Surgeon: Scout Kimble MD;  Location: [facility];  Service: Endoscopy;  Laterality: N/A;    FLUOROSCOPIC URODYNAMIC STUDY N/A 7/23/2019    Procedure: URODYNAMIC STUDY, FLUOROSCOPIC;  Surgeon: Vanna Martinez MD;  Location: [address];  Service: Urology;  Laterality: N/A;  1 hour    UPPER GASTROINTESTINAL ENDOSCOPY  03/03/2017    Dr. Harris       Family History   Problem Relation Age of Onset    Hypertension Mother     Hyperlipidemia Mother     Psoriasis Mother     Thyroid disease Mother     No Known Problems Father     Post-traumatic stress disorder Brother     Drug abuse Brother     Diabetes Maternal Uncle     Hypertension Maternal Uncle     Alcohol abuse Maternal Uncle     Stroke Maternal Uncle     Scoliosis Paternal Aunt     Heart disease Paternal Uncle     Mental illness Maternal Grandmother     Cancer Maternal Grandmother         lung / brain - smoker    Drug abuse Maternal Grandmother     Hypertension Maternal Grandmother     Hyperlipidemia Maternal Grandmother     Diabetes Maternal Grandmother     Rheum arthritis Maternal Grandmother     Diabetes Mellitus Maternal Grandmother     Heart disease Maternal Grandfather     Hypertension Maternal Grandfather     Alcohol abuse Maternal Grandfather     Osteoarthritis Maternal Grandfather     No Known Problems Paternal Grandmother     Mental illness Paternal Grandfather     Early death Paternal Grandfather         self    Breast cancer Cousin     Breast cancer Other     Colon cancer Neg Hx     Colon polyps Neg Hx     Crohn's disease Neg Hx     Ulcerative colitis Neg Hx     Celiac disease Neg Hx     Lupus Neg Hx     Kidney disease Neg Hx     Inflammatory bowel disease Neg Hx     Depression Neg Hx     COPD Neg Hx     Asthma Neg Hx     Macular degeneration Neg Hx     Retinal detachment Neg Hx      Glaucoma Neg Hx        Social History     Socioeconomic History    Marital status: Single    Number of children: 0   Tobacco Use    Smoking status: Every Day     Packs/day: 1.00     Years: 15.00     Pack years: 15.00     Types: Cigarettes     Start date: 11/25/2015    Smokeless tobacco: Never   Substance and Sexual Activity    Alcohol use: No     Alcohol/week: 0.0 standard drinks    Drug use: Yes     Types: Marijuana     Comment: smokes occasionally, helps with pain and appetite    Sexual activity: Never     Partners: Female     Comment: usually female, but currently with her boyfriend   Social History Narrative    Doesn't drive since the stroke, mother drives her and she lives with her mother.     Social Determinants of Health     Financial Resource Strain: Low Risk     Difficulty of Paying Living Expenses: Not hard at all   Food Insecurity: No Food Insecurity    Worried About Running Out of Food in the Last Year: Never true    Ran Out of Food in the Last Year: Never true   Transportation Needs: Unmet Transportation Needs    Lack of Transportation (Medical): Yes    Lack of Transportation (Non-Medical): No   Physical Activity: Inactive    Days of Exercise per Week: 0 days    Minutes of Exercise per Session: 0 min   Stress: No Stress Concern Present    Feeling of Stress : Only a little   Social Connections: Socially Isolated    Frequency of Communication with Friends and Family: Once a week    Frequency of Social Gatherings with Friends and Family: Once a week    Attends Hoahaoism Services: Never    Active Member of Clubs or Organizations: No    Attends Club or Organization Meetings: Never    Marital Status: Never    Housing Stability: Low Risk     Unable to Pay for Housing in the Last Year: No    Number of Places Lived in the Last Year: 2    Unstable Housing in the Last Year: No       Current Outpatient Medications   Medication Sig Dispense Refill    ARIPiprazole (ABILIFY) 10 MG Tab Take 1 tablet (10 mg  total) by mouth once daily. 30 tablet 2    BOTOX 200 unit SolR       cyclobenzaprine (FLEXERIL) 10 MG tablet Take 10 mg by mouth 3 (three) times daily.      divalproex (DEPAKOTE) 250 MG EC tablet TAKE 1 TABLET BY MOUTH EVERY MORNING AND TAKE 2 TABLETS BY MOUTH EVERY NIGHT AT BEDTIME 90 tablet 2    famotidine (PEPCID) 40 MG tablet TAKE 1 TABLET(40 MG) BY MOUTH EVERY EVENING 30 tablet 3    gabapentin (NEURONTIN) 800 MG tablet TAKE 1 TABLET(800 MG) BY MOUTH TWICE DAILY 180 tablet 3    LIDOcaine-prilocaine (EMLA) cream APPLY TOPICALLY TO THE AFFECTED AREA AS NEEDED      magnesium oxide (MAG-OX) 400 mg (241.3 mg magnesium) tablet Take 1 tablet (400 mg total) by mouth once daily. 90 tablet 1    mirtazapine (REMERON) 30 MG tablet TAKE 1 TABLET(30 MG) BY MOUTH EVERY EVENING 30 tablet 2    ondansetron (ZOFRAN) 8 MG tablet Take 1 tablet (8 mg total) by mouth every 8 (eight) hours as needed for Nausea. 90 tablet 0    pantoprazole (PROTONIX) 40 MG tablet Take 1 tablet (40 mg total) by mouth once daily. 90 tablet 3    promethazine (PHENERGAN) 25 MG tablet TAKE 1 TABLET(25 MG) BY MOUTH EVERY 6 HOURS AS NEEDED FOR NAUSEA 20 tablet 0    propranoloL (INDERAL LA) 80 MG 24 hr capsule Take 1 capsule (80 mg total) by mouth once daily. 30 capsule 5    rivaroxaban (XARELTO) 20 mg Tab TAKE 1 TABLET(20 MG) BY MOUTH DAILY WITH THE EVENING MEAL AND DINNER 90 tablet 3    traZODone (DESYREL) 100 MG tablet Take three tablets po nightly prn insomnia 90 tablet 2    predniSONE (DELTASONE) 20 MG tablet Take 1 tablet (20 mg total) by mouth once daily. for 7 days 7 tablet 0     No current facility-administered medications for this visit.       Review of patient's allergies indicates:   Allergen Reactions    Metoclopramide hcl Anaphylaxis     Involuntary mouth movements     Amoxicillin-pot clavulanate      2 other medications    Amoxil [amoxicillin]      hives    Avelox [moxifloxacin]      itching    Benadryl [diphenhydramine hcl]      Lowers seizure  threshold     Coumadin [warfarin]      toxicity    Quinazolinones      Lowers seizure threshold     Ultram [tramadol]      Lowers seizure threshold     Wellbutrin [bupropion hcl]      Lowers seizure threshold        Physical examination  Vitals Reviewed\  Vitals:    06/01/23 0850   BP: (!) 146/92   Pulse: 85   Temp: 97.7 °F (36.5 °C)     Body mass index is 19.06 kg/m².   Gen. Well-dressed well-nourished   Skin warm dry and intact.  No rashes noted.  Neck is supple without adenopathy  Chest.  Respirations are even unlabored.  Lungs are clear to auscultation.  Cardiac regular rate and rhythm.  No chest wall adenopathy noted.  Neuro. Awake alert oriented x4.  Normal judgment and cognition noted.  Extremities no clubbing cyanosis or edema noted.  Arthritic changes right knee and joint fluid noted.  Stable knee exam negative anterior posterior drawer and no evidence of lateral collateral medial collateral ligament laxity.    Call or return to clinic prn if these symptoms worsen or fail to improve as anticipated.

## 2023-06-12 ENCOUNTER — ANESTHESIA EVENT (OUTPATIENT)
Dept: ENDOSCOPY | Facility: HOSPITAL | Age: 43
End: 2023-06-12
Payer: MEDICARE

## 2023-06-12 ENCOUNTER — HOSPITAL ENCOUNTER (OUTPATIENT)
Facility: HOSPITAL | Age: 43
Discharge: HOME | End: 2023-06-12
Attending: INTERNAL MEDICINE | Admitting: INTERNAL MEDICINE
Payer: MEDICARE

## 2023-06-12 ENCOUNTER — ANESTHESIA (OUTPATIENT)
Dept: ENDOSCOPY | Facility: HOSPITAL | Age: 43
End: 2023-06-12
Payer: MEDICARE

## 2023-06-12 VITALS
HEART RATE: 68 BPM | DIASTOLIC BLOOD PRESSURE: 71 MMHG | HEIGHT: 63 IN | OXYGEN SATURATION: 100 % | TEMPERATURE: 99 F | BODY MASS INDEX: 18.61 KG/M2 | SYSTOLIC BLOOD PRESSURE: 128 MMHG | WEIGHT: 105 LBS | RESPIRATION RATE: 14 BRPM

## 2023-06-12 DIAGNOSIS — R11.2 NAUSEA AND VOMITING: ICD-10-CM

## 2023-06-12 DIAGNOSIS — K29.70 GASTRITIS, PRESENCE OF BLEEDING UNSPECIFIED, UNSPECIFIED CHRONICITY, UNSPECIFIED GASTRITIS TYPE: Primary | ICD-10-CM

## 2023-06-12 DIAGNOSIS — K44.9 HIATAL HERNIA: ICD-10-CM

## 2023-06-12 LAB
B-HCG UR QL: NEGATIVE
CTP QC/QA: YES

## 2023-06-12 PROCEDURE — 88342 CHG IMMUNOCYTOCHEMISTRY: ICD-10-PCS | Mod: 26,,, | Performed by: PATHOLOGY

## 2023-06-12 PROCEDURE — 63600175 PHARM REV CODE 636 W HCPCS: Performed by: NURSE ANESTHETIST, CERTIFIED REGISTERED

## 2023-06-12 PROCEDURE — 27201012 HC FORCEPS, HOT/COLD, DISP: Performed by: INTERNAL MEDICINE

## 2023-06-12 PROCEDURE — 25000003 PHARM REV CODE 250: Performed by: NURSE ANESTHETIST, CERTIFIED REGISTERED

## 2023-06-12 PROCEDURE — 88305 TISSUE EXAM BY PATHOLOGIST: CPT | Mod: 26,,, | Performed by: PATHOLOGY

## 2023-06-12 PROCEDURE — 88342 IMHCHEM/IMCYTCHM 1ST ANTB: CPT | Mod: 26,,, | Performed by: PATHOLOGY

## 2023-06-12 PROCEDURE — 88305 TISSUE EXAM BY PATHOLOGIST: ICD-10-PCS | Mod: 26,,, | Performed by: PATHOLOGY

## 2023-06-12 PROCEDURE — 81025 URINE PREGNANCY TEST: CPT | Performed by: INTERNAL MEDICINE

## 2023-06-12 PROCEDURE — 43239 PR EGD, FLEX, W/BIOPSY, SGL/MULTI: ICD-10-PCS | Mod: ,,, | Performed by: INTERNAL MEDICINE

## 2023-06-12 PROCEDURE — 37000008 HC ANESTHESIA 1ST 15 MINUTES: Performed by: INTERNAL MEDICINE

## 2023-06-12 PROCEDURE — D9220A PRA ANESTHESIA: Mod: CRNA,,, | Performed by: NURSE ANESTHETIST, CERTIFIED REGISTERED

## 2023-06-12 PROCEDURE — D9220A PRA ANESTHESIA: ICD-10-PCS | Mod: CRNA,,, | Performed by: NURSE ANESTHETIST, CERTIFIED REGISTERED

## 2023-06-12 PROCEDURE — 88305 TISSUE EXAM BY PATHOLOGIST: CPT | Performed by: PATHOLOGY

## 2023-06-12 PROCEDURE — D9220A PRA ANESTHESIA: Mod: ANES,,, | Performed by: ANESTHESIOLOGY

## 2023-06-12 PROCEDURE — 43239 EGD BIOPSY SINGLE/MULTIPLE: CPT | Performed by: INTERNAL MEDICINE

## 2023-06-12 PROCEDURE — 88342 IMHCHEM/IMCYTCHM 1ST ANTB: CPT | Performed by: PATHOLOGY

## 2023-06-12 PROCEDURE — D9220A PRA ANESTHESIA: ICD-10-PCS | Mod: ANES,,, | Performed by: ANESTHESIOLOGY

## 2023-06-12 PROCEDURE — 25000003 PHARM REV CODE 250: Performed by: INTERNAL MEDICINE

## 2023-06-12 PROCEDURE — 43239 EGD BIOPSY SINGLE/MULTIPLE: CPT | Mod: ,,, | Performed by: INTERNAL MEDICINE

## 2023-06-12 RX ORDER — PANTOPRAZOLE SODIUM 40 MG/1
40 TABLET, DELAYED RELEASE ORAL DAILY
Qty: 90 TABLET | Refills: 3 | Status: ON HOLD | OUTPATIENT
Start: 2023-06-12 | End: 2023-10-06 | Stop reason: SDUPTHER

## 2023-06-12 RX ORDER — LIDOCAINE HYDROCHLORIDE 20 MG/ML
INJECTION INTRAVENOUS
Status: DISCONTINUED | OUTPATIENT
Start: 2023-06-12 | End: 2023-06-12

## 2023-06-12 RX ORDER — SODIUM CHLORIDE 9 MG/ML
INJECTION, SOLUTION INTRAVENOUS CONTINUOUS
Status: DISCONTINUED | OUTPATIENT
Start: 2023-06-12 | End: 2023-06-12 | Stop reason: HOSPADM

## 2023-06-12 RX ORDER — PROPOFOL 10 MG/ML
VIAL (ML) INTRAVENOUS
Status: DISCONTINUED | OUTPATIENT
Start: 2023-06-12 | End: 2023-06-12

## 2023-06-12 RX ADMIN — SODIUM CHLORIDE: 9 INJECTION, SOLUTION INTRAVENOUS at 09:06

## 2023-06-12 RX ADMIN — LIDOCAINE HYDROCHLORIDE 100 MG: 20 INJECTION, SOLUTION INTRAVENOUS at 09:06

## 2023-06-12 RX ADMIN — PROPOFOL 120 MG: 10 INJECTION, EMULSION INTRAVENOUS at 09:06

## 2023-06-12 NOTE — ANESTHESIA PREPROCEDURE EVALUATION
06/12/2023  Cat Denson is a 43 y.o., female.      Pre-op Assessment    I have reviewed the Patient Summary Reports.     I have reviewed the Nursing Notes. I have reviewed the NPO Status.   I have reviewed the Medications.     Review of Systems  Anesthesia Hx:  No problems with previous Anesthesia    Social:  Smoker    Hematology/Oncology:         -- Anemia:   Cardiovascular:   hyperlipidemia    Pulmonary:   Asthma mild    Renal/:   Chronic Renal Disease, CKD    Hepatic/GI:   GERD    Neurological:   CVA (L weakness), residual symptoms Neuromuscular Disease, Headaches Seizures, well controlled    Endocrine:  Endocrine Normal    Psych:   Psychiatric History (bipolar 1) anxiety depression          Physical Exam  General: Well nourished, Cooperative, Alert and Oriented    Airway:  Mallampati: II   Mouth Opening: Normal  TM Distance: Normal  Neck ROM: Normal ROM        Anesthesia Plan  Type of Anesthesia, risks & benefits discussed:    Anesthesia Type: Gen ETT, Gen Supraglottic Airway, Gen Natural Airway, MAC  Intra-op Monitoring Plan: Standard ASA Monitors  Post Op Pain Control Plan: multimodal analgesia  Induction:  IV  Airway Plan: Direct, Video and Fiberoptic, Post-Induction  Informed Consent: Informed consent signed with the Patient and all parties understand the risks and agree with anesthesia plan.  All questions answered.   ASA Score: 3    Ready For Surgery From Anesthesia Perspective.     .

## 2023-06-12 NOTE — PROVATION PATIENT INSTRUCTIONS
Discharge Summary/Instructions after an Endoscopic Procedure  Patient Name: Cat Denson  Patient MRN: [id number]  Patient YOB: 1980 Monday, June 12, 2023  Scout Colin MD  Dear patient,  As a result of recent federal legislation (The Federal Cures Act), you may   receive lab or pathology results from your procedure in your [facility]   account before your physician is able to contact you. Your physician or   their representative will relay the results to you with their   recommendations at their soonest availability.  Thank you,  RESTRICTIONS:  During your procedure today, you received medications for sedation.  These   medications may affect your judgment, balance and coordination.  Therefore,   for 24 hours, you have the following restrictions:   - DO NOT drive a car, operate machinery, make legal/financial decisions,   sign important papers or drink alcohol.    ACTIVITY:  Today: no heavy lifting, straining or running due to procedural   sedation/anesthesia.  The following day: return to full activity including work.  DIET:  Eat and drink normally unless instructed otherwise.     TREATMENT FOR COMMON SIDE EFFECTS:  - Mild abdominal pain, nausea, belching, bloating or excessive gas:  rest,   eat lightly and use a heating pad.  - Sore Throat: treat with throat lozenges and/or gargle with warm salt   water.  - Because air was used during the procedure, expelling large amounts of air   from your rectum or belching is normal.  - If a bowel prep was taken, you may not have a bowel movement for 1-3 days.    This is normal.  SYMPTOMS TO WATCH FOR AND REPORT TO YOUR PHYSICIAN:  1. Abdominal pain or bloating, other than gas cramps.  2. Chest pain.  3. Back pain.  4. Signs of infection such as: chills or fever occurring within 24 hours   after the procedure.  5. Rectal bleeding, which would show as bright red, maroon, or black stools.   (A tablespoon of blood from the rectum is not serious, especially  if   hemorrhoids are present.)  6. Vomiting.  7. Weakness or dizziness.  GO DIRECTLY TO THE NEAREST EMERGENCY ROOM IF YOU HAVE ANY OF THE FOLLOWING:      Difficulty breathing              Chills and/or fever over 101 F   Persistent vomiting and/or vomiting blood   Severe abdominal pain   Severe chest pain   Black, tarry stools   Bleeding- more than one tablespoon   Any other symptom or condition that you feel may need urgent attention  Your doctor recommends these additional instructions:  If any biopsies were taken, your doctors clinic will contact you in 1 to 2   weeks with any results.  - Patient has a contact number available for emergencies.  The signs and   symptoms of potential delayed complications were discussed with the   patient.  Return to normal activities tomorrow.  Written discharge   instructions were provided to the patient.   - Resume previous diet.   - Continue present medications.   - No aspirin, ibuprofen, naproxen, or other non-steroidal anti-inflammatory   drugs.   - Await pathology results.   - Discharge patient to home (ambulatory).   - Follow an antireflux regimen.   - Return to GI office after studies are complete.  For questions, problems or results please call your physician - Scout Colin MD at Work:  [phone].  [facility], EMERGENCY ROOM PHONE NUMBER: [phone]  IF A COMPLICATION OR EMERGENCY SITUATION ARISES AND YOU ARE UNABLE TO REACH   YOUR PHYSICIAN - GO DIRECTLY TO THE EMERGENCY ROOM.  Scout Colin MD  6/12/2023 9:46:13 AM  This report has been verified and signed electronically.  Dear patient,  As a result of recent federal legislation (The Federal Cures Act), you may   receive lab or pathology results from your procedure in your [facility]   account before your physician is able to contact you. Your physician or   their representative will relay the results to you with their   recommendations at their soonest availability.  Thank you,  PROVATION

## 2023-06-12 NOTE — H&P
CC: Nausea, vomiting    43 year old female with above. States that symptoms are stable, no alleviating/exacerbating factors.  No bleeding or weight loss.     ROS:  No headache, no fever/chills, no chest pain/SOB, no nausea/vomiting/diarrhea/constipation/GI bleeding/abdominal pain, no dysuria/hematuria.    VSSAF   Exam:   Alert and oriented x 3; no apparent distress   PERRLA, sclera anicteric  CV: Regular rate/rhythm, normal PMI   Lungs: Clear bilaterally with no wheeze/rales   Abdomen: Soft, NT/ND, normal bowel sounds   Ext: No cyanosis, clubbing     Impression:   As above    Plan:   Proceed with endoscopy. Further recs to follow.

## 2023-06-12 NOTE — TRANSFER OF CARE
"Anesthesia Transfer of Care Note    Patient: Cat Denson    Procedure(s) Performed: Procedure(s) (LRB):  EGD (ESOPHAGOGASTRODUODENOSCOPY) (N/A)    Patient location: PACU    Anesthesia Type: general    Transport from OR: Transported from OR on room air with adequate spontaneous ventilation    Post pain: adequate analgesia    Post assessment: no apparent anesthetic complications    Post vital signs: stable    Level of consciousness: awake    Nausea/Vomiting: no nausea/vomiting    Complications: none    Transfer of care protocol was followed      Last vitals:   Visit Vitals  /89   Pulse 90   Temp 37.2 °C (99 °F) (Oral)   Resp 16   Ht 5' 3" (1.6 m)   Wt 47.6 kg (105 lb)   SpO2 100%   Breastfeeding No   BMI 18.60 kg/m²     "

## 2023-06-12 NOTE — ANESTHESIA POSTPROCEDURE EVALUATION
Anesthesia Post Evaluation    Patient: Cat Denson    Procedure(s) Performed: Procedure(s) (LRB):  EGD (ESOPHAGOGASTRODUODENOSCOPY) (N/A)    Final Anesthesia Type: general      Patient location during evaluation: PACU  Patient participation: Yes- Able to Participate  Level of consciousness: awake and alert and oriented  Post-procedure vital signs: reviewed and stable  Pain management: adequate  Airway patency: patent    PONV status at discharge: No PONV  Anesthetic complications: no      Cardiovascular status: blood pressure returned to baseline and stable  Respiratory status: unassisted and spontaneous ventilation  Hydration status: euvolemic  Follow-up not needed.          Vitals Value Taken Time   /71 06/12/23 1010   Temp 37.1 °C (98.7 °F) 06/12/23 1010   Pulse 68 06/12/23 1015   Resp 14 06/12/23 1010   SpO2 100 % 06/12/23 1015         Event Time   Out of Recovery 10:29:55         Pain/Link Score: Link Score: 9 (6/12/2023  9:51 AM)

## 2023-06-14 ENCOUNTER — PES CALL (OUTPATIENT)
Dept: ADMINISTRATIVE | Facility: CLINIC | Age: 43
End: 2023-06-14
Payer: MEDICARE

## 2023-06-18 LAB
FINAL PATHOLOGIC DIAGNOSIS: NORMAL
GROSS: NORMAL
Lab: NORMAL

## 2023-06-19 ENCOUNTER — HOSPITAL ENCOUNTER (OUTPATIENT)
Facility: HOSPITAL | Age: 43
Discharge: HOME HEALTH | End: 2023-06-21
Attending: EMERGENCY MEDICINE | Admitting: STUDENT IN AN ORGANIZED HEALTH CARE EDUCATION/TRAINING PROGRAM
Payer: MEDICARE

## 2023-06-19 DIAGNOSIS — R11.2 NAUSEA AND VOMITING: ICD-10-CM

## 2023-06-19 DIAGNOSIS — R11.2 INTRACTABLE VOMITING WITH NAUSEA: ICD-10-CM

## 2023-06-19 DIAGNOSIS — K31.84 GASTROPARESIS: ICD-10-CM

## 2023-06-19 DIAGNOSIS — R11.10 VOMITING AND DIARRHEA: Primary | ICD-10-CM

## 2023-06-19 DIAGNOSIS — R07.9 CHEST PAIN: ICD-10-CM

## 2023-06-19 DIAGNOSIS — E87.20 METABOLIC ACIDEMIA: ICD-10-CM

## 2023-06-19 DIAGNOSIS — R19.7 VOMITING AND DIARRHEA: Primary | ICD-10-CM

## 2023-06-19 PROBLEM — N18.30 ACUTE RENAL FAILURE SUPERIMPOSED ON STAGE 3 CHRONIC KIDNEY DISEASE: Status: ACTIVE | Noted: 2018-09-14

## 2023-06-19 PROBLEM — G40.219 COMPLEX PARTIAL EPILEPSY WITH GENERALIZATION AND WITH INTRACTABLE EPILEPSY: Chronic | Status: ACTIVE | Noted: 2018-09-11

## 2023-06-19 PROBLEM — F12.20 CANNABIS USE DISORDER, MODERATE, DEPENDENCE: Chronic | Status: ACTIVE | Noted: 2019-12-22

## 2023-06-19 PROBLEM — F31.75 BIPOLAR 1 DISORDER, DEPRESSED, PARTIAL REMISSION: Chronic | Status: ACTIVE | Noted: 2019-12-22

## 2023-06-19 PROBLEM — E87.29 KETOACIDOSIS: Status: ACTIVE | Noted: 2023-06-19

## 2023-06-19 LAB
ALBUMIN SERPL BCP-MCNC: 4.7 G/DL (ref 3.5–5.2)
ALLENS TEST: ABNORMAL
ALP SERPL-CCNC: 78 U/L (ref 55–135)
ALT SERPL W/O P-5'-P-CCNC: 27 U/L (ref 10–44)
ANION GAP SERPL CALC-SCNC: 19 MMOL/L (ref 8–16)
AST SERPL-CCNC: 39 U/L (ref 10–40)
B-HCG UR QL: NEGATIVE
B-OH-BUTYR BLD STRIP-SCNC: 5.3 MMOL/L (ref 0–0.5)
BACTERIA #/AREA URNS HPF: NORMAL /HPF
BASOPHILS # BLD AUTO: 0.02 K/UL (ref 0–0.2)
BASOPHILS NFR BLD: 0.3 % (ref 0–1.9)
BILIRUB SERPL-MCNC: 0.2 MG/DL (ref 0.1–1)
BILIRUB UR QL STRIP: NEGATIVE
BUN SERPL-MCNC: 23 MG/DL (ref 6–20)
CALCIUM SERPL-MCNC: 9.4 MG/DL (ref 8.7–10.5)
CHLORIDE SERPL-SCNC: 108 MMOL/L (ref 95–110)
CHLORIDE UR-SCNC: 27 MMOL/L (ref 25–200)
CLARITY UR: CLEAR
CO2 SERPL-SCNC: 7 MMOL/L (ref 23–29)
COLOR UR: ABNORMAL
CREAT SERPL-MCNC: 2.1 MG/DL (ref 0.5–1.4)
DELSYS: ABNORMAL
DIFFERENTIAL METHOD: ABNORMAL
EOSINOPHIL # BLD AUTO: 0 K/UL (ref 0–0.5)
EOSINOPHIL NFR BLD: 0.3 % (ref 0–8)
ERYTHROCYTE [DISTWIDTH] IN BLOOD BY AUTOMATED COUNT: 13.3 % (ref 11.5–14.5)
EST. GFR  (NO RACE VARIABLE): 30 ML/MIN/1.73 M^2
GLUCOSE SERPL-MCNC: 67 MG/DL (ref 70–110)
GLUCOSE UR QL STRIP: NEGATIVE
HCO3 UR-SCNC: 10.1 MMOL/L (ref 24–28)
HCT VFR BLD AUTO: 41.3 % (ref 37–48.5)
HGB BLD-MCNC: 14.1 G/DL (ref 12–16)
HGB UR QL STRIP: NEGATIVE
HYALINE CASTS #/AREA URNS LPF: 0 /LPF
IMM GRANULOCYTES # BLD AUTO: 0.01 K/UL (ref 0–0.04)
IMM GRANULOCYTES NFR BLD AUTO: 0.2 % (ref 0–0.5)
KETONES UR QL STRIP: ABNORMAL
LACTATE SERPL-SCNC: 0.7 MMOL/L (ref 0.5–2.2)
LEUKOCYTE ESTERASE UR QL STRIP: NEGATIVE
LIPASE SERPL-CCNC: 30 U/L (ref 4–60)
LYMPHOCYTES # BLD AUTO: 1.5 K/UL (ref 1–4.8)
LYMPHOCYTES NFR BLD: 22.6 % (ref 18–48)
MAGNESIUM SERPL-MCNC: 1.9 MG/DL (ref 1.6–2.6)
MCH RBC QN AUTO: 31.7 PG (ref 27–31)
MCHC RBC AUTO-ENTMCNC: 34.1 G/DL (ref 32–36)
MCV RBC AUTO: 93 FL (ref 82–98)
MICROSCOPIC COMMENT: NORMAL
MONOCYTES # BLD AUTO: 0.5 K/UL (ref 0.3–1)
MONOCYTES NFR BLD: 7.2 % (ref 4–15)
NEUTROPHILS # BLD AUTO: 4.5 K/UL (ref 1.8–7.7)
NEUTROPHILS NFR BLD: 69.4 % (ref 38–73)
NITRITE UR QL STRIP: NEGATIVE
NRBC BLD-RTO: 0 /100 WBC
PCO2 BLDA: 29.6 MMHG (ref 35–45)
PH SMN: 7.14 [PH] (ref 7.35–7.45)
PH UR STRIP: 5 [PH] (ref 5–8)
PLATELET # BLD AUTO: 247 K/UL (ref 150–450)
PMV BLD AUTO: 9.9 FL (ref 9.2–12.9)
PO2 BLDA: 34 MMHG (ref 40–60)
POC BE: -19 MMOL/L
POC SATURATED O2: 50 % (ref 95–100)
POC TCO2: 11 MMOL/L (ref 24–29)
POTASSIUM SERPL-SCNC: 4.4 MMOL/L (ref 3.5–5.1)
POTASSIUM UR-SCNC: 27 MMOL/L (ref 15–95)
PROT SERPL-MCNC: 8.3 G/DL (ref 6–8.4)
PROT UR QL STRIP: ABNORMAL
RBC # BLD AUTO: 4.45 M/UL (ref 4–5.4)
RBC #/AREA URNS HPF: 0 /HPF (ref 0–4)
SAMPLE: ABNORMAL
SITE: ABNORMAL
SODIUM SERPL-SCNC: 134 MMOL/L (ref 136–145)
SODIUM UR-SCNC: 34 MMOL/L (ref 20–250)
SP GR UR STRIP: 1.01 (ref 1–1.03)
SQUAMOUS #/AREA URNS HPF: 4 /HPF
URN SPEC COLLECT METH UR: ABNORMAL
UROBILINOGEN UR STRIP-ACNC: NEGATIVE EU/DL
WBC # BLD AUTO: 6.41 K/UL (ref 3.9–12.7)
WBC #/AREA URNS HPF: 0 /HPF (ref 0–5)

## 2023-06-19 PROCEDURE — 85025 COMPLETE CBC W/AUTO DIFF WBC: CPT | Performed by: EMERGENCY MEDICINE

## 2023-06-19 PROCEDURE — 84133 ASSAY OF URINE POTASSIUM: CPT | Mod: HCNC | Performed by: STUDENT IN AN ORGANIZED HEALTH CARE EDUCATION/TRAINING PROGRAM

## 2023-06-19 PROCEDURE — G0378 HOSPITAL OBSERVATION PER HR: HCPCS

## 2023-06-19 PROCEDURE — 36415 COLL VENOUS BLD VENIPUNCTURE: CPT | Performed by: EMERGENCY MEDICINE

## 2023-06-19 PROCEDURE — 83690 ASSAY OF LIPASE: CPT | Performed by: EMERGENCY MEDICINE

## 2023-06-19 PROCEDURE — 25000003 PHARM REV CODE 250: Performed by: STUDENT IN AN ORGANIZED HEALTH CARE EDUCATION/TRAINING PROGRAM

## 2023-06-19 PROCEDURE — 80053 COMPREHEN METABOLIC PANEL: CPT | Performed by: EMERGENCY MEDICINE

## 2023-06-19 PROCEDURE — 82803 BLOOD GASES ANY COMBINATION: CPT

## 2023-06-19 PROCEDURE — 83605 ASSAY OF LACTIC ACID: CPT | Mod: HCNC | Performed by: STUDENT IN AN ORGANIZED HEALTH CARE EDUCATION/TRAINING PROGRAM

## 2023-06-19 PROCEDURE — 81000 URINALYSIS NONAUTO W/SCOPE: CPT | Mod: HCNC | Performed by: STUDENT IN AN ORGANIZED HEALTH CARE EDUCATION/TRAINING PROGRAM

## 2023-06-19 PROCEDURE — 84300 ASSAY OF URINE SODIUM: CPT | Mod: HCNC | Performed by: STUDENT IN AN ORGANIZED HEALTH CARE EDUCATION/TRAINING PROGRAM

## 2023-06-19 PROCEDURE — 99900035 HC TECH TIME PER 15 MIN (STAT)

## 2023-06-19 PROCEDURE — 25000003 PHARM REV CODE 250: Performed by: EMERGENCY MEDICINE

## 2023-06-19 PROCEDURE — 96366 THER/PROPH/DIAG IV INF ADDON: CPT

## 2023-06-19 PROCEDURE — 96375 TX/PRO/DX INJ NEW DRUG ADDON: CPT | Mod: HCNC

## 2023-06-19 PROCEDURE — 36415 COLL VENOUS BLD VENIPUNCTURE: CPT | Mod: HCNC | Performed by: STUDENT IN AN ORGANIZED HEALTH CARE EDUCATION/TRAINING PROGRAM

## 2023-06-19 PROCEDURE — 81025 URINE PREGNANCY TEST: CPT | Performed by: EMERGENCY MEDICINE

## 2023-06-19 PROCEDURE — 96365 THER/PROPH/DIAG IV INF INIT: CPT

## 2023-06-19 PROCEDURE — 96361 HYDRATE IV INFUSION ADD-ON: CPT | Mod: HCNC

## 2023-06-19 PROCEDURE — 63600175 PHARM REV CODE 636 W HCPCS: Performed by: EMERGENCY MEDICINE

## 2023-06-19 PROCEDURE — 99285 EMERGENCY DEPT VISIT HI MDM: CPT | Mod: HCNC,25

## 2023-06-19 PROCEDURE — 63600175 PHARM REV CODE 636 W HCPCS: Performed by: STUDENT IN AN ORGANIZED HEALTH CARE EDUCATION/TRAINING PROGRAM

## 2023-06-19 PROCEDURE — 82436 ASSAY OF URINE CHLORIDE: CPT | Mod: HCNC | Performed by: STUDENT IN AN ORGANIZED HEALTH CARE EDUCATION/TRAINING PROGRAM

## 2023-06-19 PROCEDURE — 82010 KETONE BODYS QUAN: CPT | Mod: HCNC | Performed by: STUDENT IN AN ORGANIZED HEALTH CARE EDUCATION/TRAINING PROGRAM

## 2023-06-19 PROCEDURE — 83735 ASSAY OF MAGNESIUM: CPT | Performed by: EMERGENCY MEDICINE

## 2023-06-19 RX ORDER — GLUCAGON 1 MG
1 KIT INJECTION
Status: DISCONTINUED | OUTPATIENT
Start: 2023-06-19 | End: 2023-06-21 | Stop reason: HOSPADM

## 2023-06-19 RX ORDER — HYDROCODONE BITARTRATE AND ACETAMINOPHEN 5; 325 MG/1; MG/1
1 TABLET ORAL EVERY 6 HOURS PRN
Status: DISCONTINUED | OUTPATIENT
Start: 2023-06-19 | End: 2023-06-21 | Stop reason: HOSPADM

## 2023-06-19 RX ORDER — IBUPROFEN 200 MG
16 TABLET ORAL
Status: DISCONTINUED | OUTPATIENT
Start: 2023-06-19 | End: 2023-06-21 | Stop reason: HOSPADM

## 2023-06-19 RX ORDER — DIVALPROEX SODIUM 250 MG/1
500 TABLET, DELAYED RELEASE ORAL NIGHTLY
Status: DISCONTINUED | OUTPATIENT
Start: 2023-06-19 | End: 2023-06-21 | Stop reason: HOSPADM

## 2023-06-19 RX ORDER — MAG HYDROX/ALUMINUM HYD/SIMETH 200-200-20
30 SUSPENSION, ORAL (FINAL DOSE FORM) ORAL 4 TIMES DAILY PRN
Status: DISCONTINUED | OUTPATIENT
Start: 2023-06-19 | End: 2023-06-21 | Stop reason: HOSPADM

## 2023-06-19 RX ORDER — ONDANSETRON 2 MG/ML
4 INJECTION INTRAMUSCULAR; INTRAVENOUS EVERY 6 HOURS PRN
Status: DISCONTINUED | OUTPATIENT
Start: 2023-06-19 | End: 2023-06-21 | Stop reason: HOSPADM

## 2023-06-19 RX ORDER — SODIUM CHLORIDE 0.9 % (FLUSH) 0.9 %
10 SYRINGE (ML) INJECTION EVERY 12 HOURS PRN
Status: DISCONTINUED | OUTPATIENT
Start: 2023-06-19 | End: 2023-06-21 | Stop reason: HOSPADM

## 2023-06-19 RX ORDER — IBUPROFEN 200 MG
24 TABLET ORAL
Status: DISCONTINUED | OUTPATIENT
Start: 2023-06-19 | End: 2023-06-21 | Stop reason: HOSPADM

## 2023-06-19 RX ORDER — ARIPIPRAZOLE 5 MG/1
10 TABLET ORAL DAILY
Status: DISCONTINUED | OUTPATIENT
Start: 2023-06-20 | End: 2023-06-21 | Stop reason: HOSPADM

## 2023-06-19 RX ORDER — PROPRANOLOL HYDROCHLORIDE 20 MG/1
80 TABLET ORAL 2 TIMES DAILY
Status: DISCONTINUED | OUTPATIENT
Start: 2023-06-19 | End: 2023-06-21 | Stop reason: HOSPADM

## 2023-06-19 RX ORDER — FAMOTIDINE 20 MG/1
40 TABLET, FILM COATED ORAL NIGHTLY
Status: DISCONTINUED | OUTPATIENT
Start: 2023-06-19 | End: 2023-06-21 | Stop reason: HOSPADM

## 2023-06-19 RX ORDER — DIVALPROEX SODIUM 250 MG/1
250 TABLET, DELAYED RELEASE ORAL DAILY
Status: DISCONTINUED | OUTPATIENT
Start: 2023-06-20 | End: 2023-06-21 | Stop reason: HOSPADM

## 2023-06-19 RX ORDER — TALC
6 POWDER (GRAM) TOPICAL NIGHTLY PRN
Status: DISCONTINUED | OUTPATIENT
Start: 2023-06-19 | End: 2023-06-21 | Stop reason: HOSPADM

## 2023-06-19 RX ORDER — ONDANSETRON 2 MG/ML
4 INJECTION INTRAMUSCULAR; INTRAVENOUS
Status: COMPLETED | OUTPATIENT
Start: 2023-06-19 | End: 2023-06-19

## 2023-06-19 RX ORDER — HYDROCODONE BITARTRATE AND ACETAMINOPHEN 10; 325 MG/1; MG/1
1 TABLET ORAL EVERY 6 HOURS PRN
Status: DISCONTINUED | OUTPATIENT
Start: 2023-06-19 | End: 2023-06-21 | Stop reason: HOSPADM

## 2023-06-19 RX ORDER — MIRTAZAPINE 15 MG/1
30 TABLET, FILM COATED ORAL NIGHTLY
Status: DISCONTINUED | OUTPATIENT
Start: 2023-06-19 | End: 2023-06-21 | Stop reason: HOSPADM

## 2023-06-19 RX ORDER — NALOXONE HCL 0.4 MG/ML
0.02 VIAL (ML) INJECTION
Status: DISCONTINUED | OUTPATIENT
Start: 2023-06-19 | End: 2023-06-21 | Stop reason: HOSPADM

## 2023-06-19 RX ORDER — ACETAMINOPHEN 325 MG/1
650 TABLET ORAL EVERY 6 HOURS PRN
Status: DISCONTINUED | OUTPATIENT
Start: 2023-06-19 | End: 2023-06-21 | Stop reason: HOSPADM

## 2023-06-19 RX ORDER — GABAPENTIN 400 MG/1
800 CAPSULE ORAL 2 TIMES DAILY
Status: DISCONTINUED | OUTPATIENT
Start: 2023-06-19 | End: 2023-06-21 | Stop reason: HOSPADM

## 2023-06-19 RX ADMIN — SODIUM CHLORIDE, POTASSIUM CHLORIDE, SODIUM LACTATE AND CALCIUM CHLORIDE 1000 ML: 600; 310; 30; 20 INJECTION, SOLUTION INTRAVENOUS at 03:06

## 2023-06-19 RX ADMIN — HYDROCODONE BITARTRATE AND ACETAMINOPHEN 1 TABLET: 10; 325 TABLET ORAL at 04:06

## 2023-06-19 RX ADMIN — SODIUM BICARBONATE: 84 INJECTION, SOLUTION INTRAVENOUS at 06:06

## 2023-06-19 RX ADMIN — ONDANSETRON 4 MG: 2 INJECTION INTRAMUSCULAR; INTRAVENOUS at 02:06

## 2023-06-19 RX ADMIN — GABAPENTIN 800 MG: 400 CAPSULE ORAL at 08:06

## 2023-06-19 RX ADMIN — HYDROCODONE BITARTRATE AND ACETAMINOPHEN 1 TABLET: 10; 325 TABLET ORAL at 11:06

## 2023-06-19 RX ADMIN — ONDANSETRON 4 MG: 2 INJECTION INTRAMUSCULAR; INTRAVENOUS at 08:06

## 2023-06-19 RX ADMIN — PROPRANOLOL HYDROCHLORIDE 80 MG: 20 TABLET ORAL at 08:06

## 2023-06-19 RX ADMIN — FAMOTIDINE 40 MG: 20 TABLET, FILM COATED ORAL at 08:06

## 2023-06-19 RX ADMIN — SODIUM CHLORIDE 1000 ML: 9 INJECTION, SOLUTION INTRAVENOUS at 02:06

## 2023-06-19 RX ADMIN — MIRTAZAPINE 30 MG: 15 TABLET, FILM COATED ORAL at 08:06

## 2023-06-19 RX ADMIN — DIVALPROEX SODIUM 500 MG: 250 TABLET, DELAYED RELEASE ORAL at 08:06

## 2023-06-19 RX ADMIN — TRAZODONE HYDROCHLORIDE 100 MG: 50 TABLET ORAL at 08:06

## 2023-06-19 NOTE — ASSESSMENT & PLAN NOTE
Noted hx RTA  Per VBG  Lactic acid WNL  Noted ketoacidemia  - f/u urine electrolytes  - bicarb drip  - nephro consult  - daily labs

## 2023-06-19 NOTE — HPI
43F with PMH SLE, CKD3, gastroparesis, bipolar disorder, fibromyalgia, CVA w/ L-side weakness, and hx RTA presents due to 1 week of acute on chronic nausea, vomiting, and diarrhea since having EGD. She reports not tolerating much oral intake since then. She does report using marijuana multiple times daily. EGD 6/12/23 showed small hiatal hernia and gastritis. Pathology was negative. Her labs in the ER are significant for acute on chronic kidney injury, metabolic acidosis with acidemia on VBG, serum and urine ketoacidosis, and elevated anion gap. Given 1L LR bolus and zofran in ER. Admitted to hospital medicine for continued management and nephrology consult.

## 2023-06-19 NOTE — H&P
[facility] Emergency Dept  [facility] Medicine  History & Physical    Patient Name: Cat Denson  MRN: [id number]  Patient Class: OP- Observation  Admission Date: 6/19/2023  Attending Physician: Paolo Chong MD  Primary Care Provider: Neela Barrett MD         Patient information was obtained from patient, past medical records and ER records.     Subjective:     Principal Problem:Intractable vomiting with nausea    Chief Complaint:   Chief Complaint   Patient presents with    Vomiting     And diarrhea since upper GI scope 1 week ago        HPI: 43F with PMH SLE, CKD3, gastroparesis, bipolar disorder, fibromyalgia, CVA w/ L-side weakness, and hx RTA presents due to 1 week of acute on chronic nausea, vomiting, and diarrhea since having EGD. She reports not tolerating much oral intake since then. She does report using marijuana multiple times daily. EGD 6/12/23 showed small hiatal hernia and gastritis. Pathology was negative. Her labs in the ER are significant for acute on chronic kidney injury, metabolic acidosis with acidemia on VBG, serum and urine ketoacidosis, and elevated anion gap. Given 1L LR bolus and zofran in ER. Admitted to hospital medicine for continued management and nephrology consult.      Past Medical History:   Diagnosis Date    Acid reflux     Allergy     Anemia     Anticoagulated 12/29/2015    Anxiety     Asthma     Bipolar 1 disorder     Bronchospasm with bronchitis, acute     Chronic daily headache 9/11/2018    Chronic kidney disease     Chronic pain     Depression     bipolar 2    Fibromyalgia     Gastroparesis     History of right MCA stroke 11/25/2015    Hx of psychiatric care     Lactose intolerance     Lupus     Mixed hyperlipidemia 11/28/2018    Psychiatric problem     Renal tubular acidosis     Seizure     Sjogren's syndrome     Smoker     Stroke 11-    Substance abuse-kratom daily use 8/30/2020    Suicide attempt     overdosed on a bottle of  paxil, muscle relaxers, & zoloft    Therapy        Past Surgical History:   Procedure Laterality Date    CHOLECYSTECTOMY      COLONOSCOPY  11/12/2014    Dr. Kimble, repeat at 52 years old for screening    ESOPHAGOGASTRODUODENOSCOPY N/A 1/3/2022    Procedure: EGD (ESOPHAGOGASTRODUODENOSCOPY);  Surgeon: Scout Kimble MD;  Location: [facility];  Service: Endoscopy;  Laterality: N/A;    ESOPHAGOGASTRODUODENOSCOPY N/A 6/12/2023    Procedure: EGD (ESOPHAGOGASTRODUODENOSCOPY);  Surgeon: Scout Kimble MD;  Location: [facility];  Service: Endoscopy;  Laterality: N/A;    FLUOROSCOPIC URODYNAMIC STUDY N/A 7/23/2019    Procedure: URODYNAMIC STUDY, FLUOROSCOPIC;  Surgeon: Vanna Martinez MD;  Location: [address];  Service: Urology;  Laterality: N/A;  1 hour    UPPER GASTROINTESTINAL ENDOSCOPY  03/03/2017    Dr. Harris       Review of patient's allergies indicates:   Allergen Reactions    Metoclopramide hcl Anaphylaxis     Involuntary mouth movements     Amoxicillin-pot clavulanate      2 other medications    Amoxil [amoxicillin]      hives    Avelox [moxifloxacin]      itching    Benadryl [diphenhydramine hcl]      Lowers seizure threshold     Coumadin [warfarin]      toxicity    Quinazolinones      Lowers seizure threshold     Ultram [tramadol]      Lowers seizure threshold     Wellbutrin [bupropion hcl]      Lowers seizure threshold        No current facility-administered medications on file prior to encounter.     Current Outpatient Medications on File Prior to Encounter   Medication Sig    ARIPiprazole (ABILIFY) 10 MG Tab Take 1 tablet (10 mg total) by mouth once daily.    BOTOX 200 unit SolR     cyclobenzaprine (FLEXERIL) 10 MG tablet Take 10 mg by mouth 3 (three) times daily.    divalproex (DEPAKOTE) 250 MG EC tablet TAKE 1 TABLET BY MOUTH EVERY MORNING AND TAKE 2 TABLETS BY MOUTH EVERY NIGHT AT BEDTIME    famotidine (PEPCID) 40 MG tablet TAKE 1 TABLET(40 MG) BY MOUTH EVERY EVENING     gabapentin (NEURONTIN) 800 MG tablet TAKE 1 TABLET(800 MG) BY MOUTH TWICE DAILY    LIDOcaine-prilocaine (EMLA) cream APPLY TOPICALLY TO THE AFFECTED AREA AS NEEDED    magnesium oxide (MAG-OX) 400 mg (241.3 mg magnesium) tablet Take 1 tablet (400 mg total) by mouth once daily.    mirtazapine (REMERON) 30 MG tablet TAKE 1 TABLET(30 MG) BY MOUTH EVERY EVENING    ondansetron (ZOFRAN) 8 MG tablet Take 1 tablet (8 mg total) by mouth every 8 (eight) hours as needed for Nausea.    pantoprazole (PROTONIX) 40 MG tablet Take 1 tablet (40 mg total) by mouth once daily.    promethazine (PHENERGAN) 25 MG tablet TAKE 1 TABLET(25 MG) BY MOUTH EVERY 6 HOURS AS NEEDED FOR NAUSEA    propranoloL (INDERAL LA) 80 MG 24 hr capsule Take 1 capsule (80 mg total) by mouth once daily.    rivaroxaban (XARELTO) 20 mg Tab TAKE 1 TABLET(20 MG) BY MOUTH DAILY WITH THE EVENING MEAL AND DINNER    traZODone (DESYREL) 100 MG tablet Take three tablets po nightly prn insomnia     Family History       Problem Relation (Age of Onset)    Alcohol abuse Maternal Uncle, Maternal Grandfather    Breast cancer Cousin, Other    Cancer Maternal Grandmother    Diabetes Maternal Uncle, Maternal Grandmother    Diabetes Mellitus Maternal Grandmother    Drug abuse Brother, Maternal Grandmother    Early death Paternal Grandfather    Heart disease Paternal Uncle, Maternal Grandfather    Hyperlipidemia Mother, Maternal Grandmother    Hypertension Mother, Maternal Uncle, Maternal Grandmother, Maternal Grandfather    Mental illness Maternal Grandmother, Paternal Grandfather    No Known Problems Father, Paternal Grandmother    Osteoarthritis Maternal Grandfather    Post-traumatic stress disorder Brother    Psoriasis Mother    Rheum arthritis Maternal Grandmother    Scoliosis Paternal Aunt    Stroke Maternal Uncle    Thyroid disease Mother          Tobacco Use    Smoking status: Every Day     Packs/day: 1.00     Years: 15.00     Pack years: 15.00     Types:  Cigarettes     Start date: 11/25/2015    Smokeless tobacco: Never   Substance and Sexual Activity    Alcohol use: No     Alcohol/week: 0.0 standard drinks    Drug use: Yes     Types: Marijuana     Comment: smokes occasionally, helps with pain and appetite    Sexual activity: Never     Partners: Female     Comment: usually female, but currently with her boyfriend     Review of Systems   Constitutional:  Positive for appetite change. Negative for chills and fever.   Respiratory:  Negative for cough and shortness of breath.    Cardiovascular:  Negative for chest pain.   Gastrointestinal:  Positive for diarrhea, nausea and vomiting. Negative for abdominal pain and constipation.   Musculoskeletal:  Positive for myalgias (hx fibro).   Neurological:  Positive for weakness (left side chronic s/p CVA).   Objective:     Vital Signs (Most Recent):  Temp: 98.7 °F (37.1 °C) (06/19/23 1319)  Pulse: 74 (06/19/23 1503)  Resp: 17 (06/19/23 1639)  BP: (!) 166/85 (06/19/23 1503)  SpO2: 100 % (06/19/23 1503) Vital Signs (24h Range):  Temp:  [98.7 °F (37.1 °C)] 98.7 °F (37.1 °C)  Pulse:  [] 74  Resp:  [17-18] 17  SpO2:  [98 %-100 %] 100 %  BP: (137-185)/() 166/85     Weight: 47.6 kg (105 lb)  Body mass index is 18.6 kg/m².     Physical Exam  Vitals reviewed.   Constitutional:       General: She is not in acute distress.     Appearance: Normal appearance. She is not ill-appearing.   HENT:      Head: Normocephalic and atraumatic.   Eyes:      General:         Right eye: No discharge.         Left eye: No discharge.      Conjunctiva/sclera: Conjunctivae normal.   Cardiovascular:      Rate and Rhythm: Normal rate and regular rhythm.      Pulses: Normal pulses.      Heart sounds: Normal heart sounds.   Pulmonary:      Effort: Pulmonary effort is normal. No respiratory distress.      Breath sounds: Normal breath sounds. No wheezing, rhonchi or rales.   Abdominal:      General: Abdomen is flat. Bowel sounds are normal. There is  no distension.      Palpations: Abdomen is soft.      Tenderness: There is no abdominal tenderness.   Musculoskeletal:         General: No swelling, deformity or signs of injury.      Cervical back: Neck supple. No rigidity.   Skin:     General: Skin is warm and dry.   Neurological:      Mental Status: She is alert and oriented to person, place, and time. Mental status is at baseline.      Motor: Weakness (LUE chronic s/p CVA) present.   Psychiatric:         Mood and Affect: Affect normal.         Behavior: Behavior normal.         Thought Content: Thought content normal.              Significant Labs: All pertinent labs within the past 24 hours have been reviewed.    Significant Imaging: I have reviewed all pertinent imaging results/findings within the past 24 hours.    Admission on 06/19/2023   Component Date Value Ref Range Status    Preg Test, Ur 06/19/2023 Negative   Final    WBC 06/19/2023 6.41  3.90 - 12.70 K/uL Final    RBC 06/19/2023 4.45  4.00 - 5.40 M/uL Final    Hemoglobin 06/19/2023 14.1  12.0 - 16.0 g/dL Final    Hematocrit 06/19/2023 41.3  37.0 - 48.5 % Final    MCV 06/19/2023 93  82 - 98 fL Final    MCH 06/19/2023 31.7 (H)  27.0 - 31.0 pg Final    MCHC 06/19/2023 34.1  32.0 - 36.0 g/dL Final    RDW 06/19/2023 13.3  11.5 - 14.5 % Final    Platelets 06/19/2023 247  150 - 450 K/uL Final    MPV 06/19/2023 9.9  9.2 - 12.9 fL Final    Immature Granulocytes 06/19/2023 0.2  0.0 - 0.5 % Final    Gran # (ANC) 06/19/2023 4.5  1.8 - 7.7 K/uL Final    Immature Grans (Abs) 06/19/2023 0.01  0.00 - 0.04 K/uL Final    Comment: Mild elevation in immature granulocytes is non specific and   can be seen in a variety of conditions including stress response,   acute inflammation, trauma and pregnancy. Correlation with other   laboratory and clinical findings is essential.      Lymph # 06/19/2023 1.5  1.0 - 4.8 K/uL Final    Mono # 06/19/2023 0.5  0.3 - 1.0 K/uL Final    Eos # 06/19/2023 0.0  0.0 - 0.5 K/uL  Final    Baso # 06/19/2023 0.02  0.00 - 0.20 K/uL Final    nRBC 06/19/2023 0  0 /100 WBC Final    Gran % 06/19/2023 69.4  38.0 - 73.0 % Final    Lymph % 06/19/2023 22.6  18.0 - 48.0 % Final    Mono % 06/19/2023 7.2  4.0 - 15.0 % Final    Eosinophil % 06/19/2023 0.3  0.0 - 8.0 % Final    Basophil % 06/19/2023 0.3  0.0 - 1.9 % Final    Differential Method 06/19/2023 Automated   Final    Sodium 06/19/2023 134 (L)  136 - 145 mmol/L Final    Potassium 06/19/2023 4.4  3.5 - 5.1 mmol/L Final    Chloride 06/19/2023 108  95 - 110 mmol/L Final    CO2 06/19/2023 7 (LL)  23 - 29 mmol/L Final    Comment: CO2 critical result(s) called and verbal readback obtained from   Nadia Kruger by BROOKE 06/19/2023 15:14      Glucose 06/19/2023 67 (L)  70 - 110 mg/dL Final    BUN 06/19/2023 23 (H)  6 - 20 mg/dL Final    Creatinine 06/19/2023 2.1 (H)  0.5 - 1.4 mg/dL Final    Calcium 06/19/2023 9.4  8.7 - 10.5 mg/dL Final    Total Protein 06/19/2023 8.3  6.0 - 8.4 g/dL Final    Albumin 06/19/2023 4.7  3.5 - 5.2 g/dL Final    Total Bilirubin 06/19/2023 0.2  0.1 - 1.0 mg/dL Final    Comment: For infants and newborns, interpretation of results should be based  on gestational age, weight and in agreement with clinical  observations.    Premature Infant recommended reference ranges:  Up to 24 hours.............<8.0 mg/dL  Up to 48 hours............<12.0 mg/dL  3-5 days..................<15.0 mg/dL  6-29 days.................<15.0 mg/dL      Alkaline Phosphatase 06/19/2023 78  55 - 135 U/L Final    AST 06/19/2023 39  10 - 40 U/L Final    ALT 06/19/2023 27  10 - 44 U/L Final    eGFR 06/19/2023 30 (A)  >60 mL/min/1.73 m^2 Final    Anion Gap 06/19/2023 19 (H)  8 - 16 mmol/L Final    Lipase 06/19/2023 30  4 - 60 U/L Final    Magnesium 06/19/2023 1.9  1.6 - 2.6 mg/dL Final    POC PH 06/19/2023 7.141 (L)  7.35 - 7.45 Final    POC PCO2 06/19/2023 29.6 (L)  35 - 45 mmHg Final    POC PO2 06/19/2023 34 (L)  40 - 60 mmHg Final     POC HCO3 06/19/2023 10.1 (L)  24 - 28 mmol/L Final    POC BE 06/19/2023 -19  -2 to 2 mmol/L Final    POC SATURATED O2 06/19/2023 50 (L)  95 - 100 % Final    POC TCO2 06/19/2023 11 (L)  24 - 29 mmol/L Final    Sample 06/19/2023 VENOUS   Final    Site 06/19/2023 Other   Final    Allens Test 06/19/2023 N/A   Final    DelSys 06/19/2023 Room Air   Final    Lactate (Lactic Acid) 06/19/2023 0.7  0.5 - 2.2 mmol/L Final    Comment: Falsely low lactic acid results can be found in samples   containing >=13.0 mg/dL total bilirubin and/or >=3.5 mg/dL   direct bilirubin.      Beta-Hydroxybutyrate 06/19/2023 5.3 (H)  0.0 - 0.5 mmol/L Final    Specimen UA 06/19/2023 Urine, Clean Catch   Final    Color, UA 06/19/2023 Straw  Yellow, Straw, Britany Final    Appearance, UA 06/19/2023 Clear  Clear Final    pH, UA 06/19/2023 5.0  5.0 - 8.0 Final    Specific [place], UA 06/19/2023 1.010  1.005 - 1.030 Final    Protein, UA 06/19/2023 1+ (A)  Negative Final    Comment: Recommend a 24 hour urine protein or a urine   protein/creatinine ratio if globulin induced proteinuria is  clinically suspected.      Glucose, UA 06/19/2023 Negative  Negative Final    Ketones, UA 06/19/2023 3+ (A)  Negative Final    Bilirubin (UA) 06/19/2023 Negative  Negative Final    Occult Blood UA 06/19/2023 Negative  Negative Final    Nitrite, UA 06/19/2023 Negative  Negative Final    Urobilinogen, UA 06/19/2023 Negative  <2.0 EU/dL Final    Leukocytes, UA 06/19/2023 Negative  Negative Final    RBC, UA 06/19/2023 0  0 - 4 /hpf Final    WBC, UA 06/19/2023 0  0 - 5 /hpf Final    Bacteria 06/19/2023 Rare  None-Occ /hpf Final    Squam Epithel, UA 06/19/2023 4  /hpf Final    Hyaline Casts, UA 06/19/2023 0  0-1/lpf /lpf Final    Microscopic Comment 06/19/2023 SEE COMMENT   Final    Comment: Other formed elements not mentioned in the report are not   present in the microscopic examination.            Imaging Results    None            Assessment/Plan:     * Intractable vomiting with nausea  Possibly combo of gastroparesis and cannabis hyperemesis syndrome  - encouraged trial of cannabis cessation  - consider motility agent  - antiemetics prn  - IVF  - encouraged diet as tolerated    Ketoacidosis  Suspect due to recent dehydration/starvation from N/V/D lack of PO intake  - IVF with dextrose  - address the N/V/D    Cannabis use disorder, moderate, dependence  Possibly related to N/V  - encouraged consideration of trial of abstinence    Bipolar 1 disorder, depressed, partial remission  Chronic, controlled  - continue home abilify, depakote, gabapentin, mirtazapine, propanolol    Gastroparesis  Noted hx  - consider motility agent    Acute renal failure superimposed on stage 3 chronic kidney disease  Likely related to dehydration from lack of PO intake  - IVF  - nephro consult  - daily labs    Complex partial epilepsy with generalization and with intractable epilepsy  Chronic, stable  - continue depakote  - seizure precautions    Metabolic acidemia  Noted hx RTA  Per VBG  Lactic acid WNL  Noted ketoacidemia  - f/u urine electrolytes  - bicarb drip  - nephro consult  - daily labs    Gastroesophageal reflux disease without esophagitis  Chronic, stable  - continue pepcid    Hemiparesis affecting left side as late effect of cerebrovascular accident  Chronic, stable  - continue rivaroxaban    Fibromyalgia  Chronic, stable  - continue gabapentin   - pain med prn      VTE Risk Mitigation (From admission, onward)    rivaroxaban             On 06/19/2023, patient should be placed in hospital observation services under my care.        Paolo Chong MD  Department of Hospital Medicine  [facility] - Emergency Dept

## 2023-06-19 NOTE — ED PROVIDER NOTES
Chief complaint:  Vomiting (And diarrhea since upper GI scope 1 week ago)      HPI:  Cat Denson is a 43 y.o. female with hx SLE, RTA, CKD, prior CVA, fibromyalgia, chronic headache syndrome, gastroparesis with EGD for nausea and vomiting on 6/12/23 showing hiatal hernia with gastritis and no bleeding presenting with resumption of nonbilious, nonbloody vomiting and nonbloody diarrhea for the past 7 days since colonoscopy.  Both have been present present preceding colonoscopy to address the same symptoms.  She denies any new change in medications, diet, travel history.  She denies associated abdominal pain.  No recent antibiotic use.  No change in urination.  No lightheadedness or presyncope.    ROS: As per HPI and below:  No headache, confusion, rash, swelling, oliguria, dysuria, fever, abdominal pain, chest pain, dyspnea.    Review of patient's allergies indicates:   Allergen Reactions    Metoclopramide hcl Anaphylaxis     Involuntary mouth movements     Amoxicillin-pot clavulanate      2 other medications    Amoxil [amoxicillin]      hives    Avelox [moxifloxacin]      itching    Benadryl [diphenhydramine hcl]      Lowers seizure threshold     Coumadin [warfarin]      toxicity    Quinazolinones      Lowers seizure threshold     Ultram [tramadol]      Lowers seizure threshold     Wellbutrin [bupropion hcl]      Lowers seizure threshold        Patient's Medications   New Prescriptions    No medications on file   Previous Medications    ARIPIPRAZOLE (ABILIFY) 10 MG TAB    Take 1 tablet (10 mg total) by mouth once daily.    BOTOX 200 UNIT SOLR        CYCLOBENZAPRINE (FLEXERIL) 10 MG TABLET    Take 10 mg by mouth 3 (three) times daily.    DIVALPROEX (DEPAKOTE) 250 MG EC TABLET    TAKE 1 TABLET BY MOUTH EVERY MORNING AND TAKE 2 TABLETS BY MOUTH EVERY NIGHT AT BEDTIME    FAMOTIDINE (PEPCID) 40 MG TABLET    TAKE 1 TABLET(40 MG) BY MOUTH EVERY EVENING    GABAPENTIN (NEURONTIN) 800 MG TABLET    TAKE 1 TABLET(800  MG) BY MOUTH TWICE DAILY    LIDOCAINE-PRILOCAINE (EMLA) CREAM    APPLY TOPICALLY TO THE AFFECTED AREA AS NEEDED    MAGNESIUM OXIDE (MAG-OX) 400 MG (241.3 MG MAGNESIUM) TABLET    Take 1 tablet (400 mg total) by mouth once daily.    MIRTAZAPINE (REMERON) 30 MG TABLET    TAKE 1 TABLET(30 MG) BY MOUTH EVERY EVENING    ONDANSETRON (ZOFRAN) 8 MG TABLET    Take 1 tablet (8 mg total) by mouth every 8 (eight) hours as needed for Nausea.    PANTOPRAZOLE (PROTONIX) 40 MG TABLET    Take 1 tablet (40 mg total) by mouth once daily.    PROMETHAZINE (PHENERGAN) 25 MG TABLET    TAKE 1 TABLET(25 MG) BY MOUTH EVERY 6 HOURS AS NEEDED FOR NAUSEA    PROPRANOLOL (INDERAL LA) 80 MG 24 HR CAPSULE    Take 1 capsule (80 mg total) by mouth once daily.    RIVAROXABAN (XARELTO) 20 MG TAB    TAKE 1 TABLET(20 MG) BY MOUTH DAILY WITH THE EVENING MEAL AND DINNER    TRAZODONE (DESYREL) 100 MG TABLET    Take three tablets po nightly prn insomnia   Modified Medications    No medications on file   Discontinued Medications    No medications on file       PMH:  As per HPI and below:  Past Medical History:   Diagnosis Date    Acid reflux     Allergy     Anemia     Anticoagulated 12/29/2015    Anxiety     Asthma     Bipolar 1 disorder     Bronchospasm with bronchitis, acute     Chronic daily headache 9/11/2018    Chronic kidney disease     Chronic pain     Depression     bipolar 2    Fibromyalgia     Gastroparesis     History of right MCA stroke 11/25/2015    Hx of psychiatric care     Lactose intolerance     Lupus     Mixed hyperlipidemia 11/28/2018    Psychiatric problem     Renal tubular acidosis     Seizure     Sjogren's syndrome     Smoker     Stroke 11-    Substance abuse-kratom daily use 8/30/2020    Suicide attempt     overdosed on a bottle of paxil, muscle relaxers, & zoloft    Therapy      Past Surgical History:   Procedure Laterality Date    CHOLECYSTECTOMY      COLONOSCOPY  11/12/2014    Dr. Colin, repeat at 52 years old for screening     ESOPHAGOGASTRODUODENOSCOPY N/A 1/3/2022    Procedure: EGD (ESOPHAGOGASTRODUODENOSCOPY);  Surgeon: Scout Kimble MD;  Location: [facility];  Service: Endoscopy;  Laterality: N/A;    ESOPHAGOGASTRODUODENOSCOPY N/A 6/12/2023    Procedure: EGD (ESOPHAGOGASTRODUODENOSCOPY);  Surgeon: Scout Kimble MD;  Location: [facility] ENDO;  Service: Endoscopy;  Laterality: N/A;    FLUOROSCOPIC URODYNAMIC STUDY N/A 7/23/2019    Procedure: URODYNAMIC STUDY, FLUOROSCOPIC;  Surgeon: Vanna Martinez MD;  Location: [address];  Service: Urology;  Laterality: N/A;  1 hour    UPPER GASTROINTESTINAL ENDOSCOPY  03/03/2017    Dr. Harris       Social History     Socioeconomic History    Marital status: Single    Number of children: 0   Tobacco Use    Smoking status: Every Day     Packs/day: 1.00     Years: 15.00     Pack years: 15.00     Types: Cigarettes     Start date: 11/25/2015    Smokeless tobacco: Never   Substance and Sexual Activity    Alcohol use: No     Alcohol/week: 0.0 standard drinks    Drug use: Yes     Types: Marijuana     Comment: smokes occasionally, helps with pain and appetite    Sexual activity: Never     Partners: Female     Comment: usually female, but currently with her boyfriend   Social History Narrative    Doesn't drive since the stroke, mother drives her and she lives with her mother.     Social Determinants of Health     Financial Resource Strain: Low Risk     Difficulty of Paying Living Expenses: Not hard at all   Food Insecurity: No Food Insecurity    Worried About Running Out of Food in the Last Year: Never true    Ran Out of Food in the Last Year: Never true   Transportation Needs: Unmet Transportation Needs    Lack of Transportation (Medical): Yes    Lack of Transportation (Non-Medical): No   Physical Activity: Inactive    Days of Exercise per Week: 0 days    Minutes of Exercise per Session: 0 min   Stress: No Stress Concern Present    Feeling of Stress : Only a little   Social Connections: Socially  Isolated    Frequency of Communication with Friends and Family: Once a week    Frequency of Social Gatherings with Friends and Family: Once a week    Attends Sabianist Services: Never    Active Member of Clubs or Organizations: No    Attends Club or Organization Meetings: Never    Marital Status: Never    Housing Stability: Low Risk     Unable to Pay for Housing in the Last Year: No    Number of Places Lived in the Last Year: 2    Unstable Housing in the Last Year: No       Family History   Problem Relation Age of Onset    Hypertension Mother     Hyperlipidemia Mother     Psoriasis Mother     Thyroid disease Mother     No Known Problems Father     Post-traumatic stress disorder Brother     Drug abuse Brother     Diabetes Maternal Uncle     Hypertension Maternal Uncle     Alcohol abuse Maternal Uncle     Stroke Maternal Uncle     Scoliosis Paternal Aunt     Heart disease Paternal Uncle     Mental illness Maternal Grandmother     Cancer Maternal Grandmother         lung / brain - smoker    Drug abuse Maternal Grandmother     Hypertension Maternal Grandmother     Hyperlipidemia Maternal Grandmother     Diabetes Maternal Grandmother     Rheum arthritis Maternal Grandmother     Diabetes Mellitus Maternal Grandmother     Heart disease Maternal Grandfather     Hypertension Maternal Grandfather     Alcohol abuse Maternal Grandfather     Osteoarthritis Maternal Grandfather     No Known Problems Paternal Grandmother     Mental illness Paternal Grandfather     Early death Paternal Grandfather         self    Breast cancer Cousin     Breast cancer Other     Colon cancer Neg Hx     Colon polyps Neg Hx     Crohn's disease Neg Hx     Ulcerative colitis Neg Hx     Celiac disease Neg Hx     Lupus Neg Hx     Kidney disease Neg Hx     Inflammatory bowel disease Neg Hx     Depression Neg Hx     COPD Neg Hx     Asthma Neg Hx     Macular degeneration Neg Hx     Retinal detachment Neg Hx     Glaucoma Neg Hx        Physical Exam:     Vitals:    06/19/23 1639   BP:    Pulse:    Resp: 17   Temp:      GENERAL:  No apparent distress.  Alert.    HEENT:  Moist mucous membranes.  Normocephalic and atraumatic.    NECK:  No swelling.  Midline trachea.   CARDIOVASCULAR:  Regular rate and rhythm.  2+ radial pulses.  No murmur.  PULMONARY:  Lungs clear to auscultation bilaterally.  No wheezes, rales, or rhonci.    ABDOMEN:  Non-tender and non-distended.  No palpable hernias or organomegaly.  EXTREMITIES:  Warm and well perfused.  Brisk capillary refill.  No edema.    NEUROLOGICAL:  Normal mental status.  Appropriate and conversant.  L-sided hemiparesis LUE/LLE.  SKIN:  No rashes or ecchymoses.    BACK:  Atraumatic.  No CVA tenderness to palpation.      Labs Reviewed   CBC W/ AUTO DIFFERENTIAL - Abnormal; Notable for the following components:       Result Value    MCH 31.7 (*)     All other components within normal limits   COMPREHENSIVE METABOLIC PANEL - Abnormal; Notable for the following components:    Sodium 134 (*)     CO2 7 (*)     Glucose 67 (*)     BUN 23 (*)     Creatinine 2.1 (*)     eGFR 30 (*)     Anion Gap 19 (*)     All other components within normal limits    Narrative:     CO2 critical result(s) called and verbal readback obtained from   Nadia Kruger by BROOKE 06/19/2023 15:14   BETA - HYDROXYBUTYRATE, SERUM - Abnormal; Notable for the following components:    Beta-Hydroxybutyrate 5.3 (*)     All other components within normal limits   URINALYSIS - Abnormal; Notable for the following components:    Protein, UA 1+ (*)     Ketones, UA 3+ (*)     All other components within normal limits    Narrative:     Specimen Source->Urine   ISTAT PROCEDURE - Abnormal; Notable for the following components:    POC PH 7.141 (*)     POC PCO2 29.6 (*)     POC PO2 34 (*)     POC HCO3 10.1 (*)     POC SATURATED O2 50 (*)     POC TCO2 11 (*)     All other components within normal limits   PREGNANCY TEST, URINE RAPID    Narrative:     Specimen Source->Urine    LIPASE   MAGNESIUM   LACTIC ACID, PLASMA   URINALYSIS MICROSCOPIC    Narrative:     Specimen Source->Urine   SODIUM, URINE, RANDOM   CHLORIDE, URINE, RANDOM   POTASSIUM, URINE, RANDOM       Current Discharge Medication List        CONTINUE these medications which have NOT CHANGED    Details   ARIPiprazole (ABILIFY) 10 MG Tab Take 1 tablet (10 mg total) by mouth once daily.  Qty: 30 tablet, Refills: 2      BOTOX 200 unit SolR       cyclobenzaprine (FLEXERIL) 10 MG tablet Take 10 mg by mouth 3 (three) times daily.      divalproex (DEPAKOTE) 250 MG EC tablet TAKE 1 TABLET BY MOUTH EVERY MORNING AND TAKE 2 TABLETS BY MOUTH EVERY NIGHT AT BEDTIME  Qty: 90 tablet, Refills: 2    Comments: **Patient requests 90 days supply**      famotidine (PEPCID) 40 MG tablet TAKE 1 TABLET(40 MG) BY MOUTH EVERY EVENING  Qty: 30 tablet, Refills: 3    Associated Diagnoses: Non-intractable vomiting with nausea; History of gastroesophageal reflux (GERD)      gabapentin (NEURONTIN) 800 MG tablet TAKE 1 TABLET(800 MG) BY MOUTH TWICE DAILY  Qty: 180 tablet, Refills: 3    Associated Diagnoses: Neuropathic pain      LIDOcaine-prilocaine (EMLA) cream APPLY TOPICALLY TO THE AFFECTED AREA AS NEEDED      magnesium oxide (MAG-OX) 400 mg (241.3 mg magnesium) tablet Take 1 tablet (400 mg total) by mouth once daily.  Qty: 90 tablet, Refills: 1    Associated Diagnoses: Stage 3 chronic kidney disease, unspecified whether stage 3a or 3b CKD      mirtazapine (REMERON) 30 MG tablet TAKE 1 TABLET(30 MG) BY MOUTH EVERY EVENING  Qty: 30 tablet, Refills: 2      ondansetron (ZOFRAN) 8 MG tablet Take 1 tablet (8 mg total) by mouth every 8 (eight) hours as needed for Nausea.  Qty: 90 tablet, Refills: 0      pantoprazole (PROTONIX) 40 MG tablet Take 1 tablet (40 mg total) by mouth once daily.  Qty: 90 tablet, Refills: 3      promethazine (PHENERGAN) 25 MG tablet TAKE 1 TABLET(25 MG) BY MOUTH EVERY 6 HOURS AS NEEDED FOR NAUSEA  Qty: 20 tablet, Refills: 0     Associated Diagnoses: Nausea and vomiting; Gastroparesis      propranoloL (INDERAL LA) 80 MG 24 hr capsule Take 1 capsule (80 mg total) by mouth once daily.  Qty: 30 capsule, Refills: 5    Comments: .  Associated Diagnoses: Chronic intractable headache, unspecified headache type      rivaroxaban (XARELTO) 20 mg Tab TAKE 1 TABLET(20 MG) BY MOUTH DAILY WITH THE EVENING MEAL AND DINNER  Qty: 90 tablet, Refills: 3      traZODone (DESYREL) 100 MG tablet Take three tablets po nightly prn insomnia  Qty: 90 tablet, Refills: 2             Orders Placed This Encounter   Procedures    Pregnancy, urine rapid    Complete Blood Count (CBC)    Comprehensive Metabolic Panel (CMP)    Lipase    Magnesium    Lactic Acid, Plasma    Beta - Hydroxybutyrate, Serum    Sodium, urine, random    Chloride, urine, random    Potassium, urine, random    Urinalysis    Urinalysis Microscopic    Vital signs    POCT VENOUS BLOOD GAS    Insert Saline lock IV    Possible Hospitalization    Place in Observation       Imaging Results    None              MDM:    43 y.o. female with chronic nausea and vomiting with diarrhea worse recently.  Recent colonoscopy for investigation showing only gastritis with hiatal hernia.  She is using antiemetics as necessary at home.  Prior history of marijuana use with cannabis hyperemesis considered although broad differential discussed with patient.  There is no abdominal pain with a benign exam and I have very low suspicion for new, acute life-threatening process such as appendicitis, abscess, bowel obstruction.  I do not think emergent surgical consultation or abdominal imaging is indicated.  Laboratories done to exclude significant dehydration or other end-organ dysfunction such as electrolyte derangement or renal insult.  Patient does have acute renal insufficiency in addition to acidosis.  Acidosis may be multifactorial in terms of dehydration as an acute component as well as RTA as a subacute or chronic component.   This is likely metabolic based on available inflammation including VBG.  I do think she would benefit from admission for further IV fluids.  I have discussed with hospital medicine who will assume care.    Diagnoses:    1. Vomiting and diarrhea  2. Renal insufficiency  3. Acidemia       Jake Lewis MD  06/19/23 9023

## 2023-06-19 NOTE — SUBJECTIVE & OBJECTIVE
Past Medical History:   Diagnosis Date    Acid reflux     Allergy     Anemia     Anticoagulated 12/29/2015    Anxiety     Asthma     Bipolar 1 disorder     Bronchospasm with bronchitis, acute     Chronic daily headache 9/11/2018    Chronic kidney disease     Chronic pain     Depression     bipolar 2    Fibromyalgia     Gastroparesis     History of right MCA stroke 11/25/2015    Hx of psychiatric care     Lactose intolerance     Lupus     Mixed hyperlipidemia 11/28/2018    Psychiatric problem     Renal tubular acidosis     Seizure     Sjogren's syndrome     Smoker     Stroke 11-    Substance abuse-kratom daily use 8/30/2020    Suicide attempt     overdosed on a bottle of paxil, muscle relaxers, & zoloft    Therapy        Past Surgical History:   Procedure Laterality Date    CHOLECYSTECTOMY      COLONOSCOPY  11/12/2014    Dr. Kimble, repeat at 52 years old for screening    ESOPHAGOGASTRODUODENOSCOPY N/A 1/3/2022    Procedure: EGD (ESOPHAGOGASTRODUODENOSCOPY);  Surgeon: Scout Kimble MD;  Location: [facility];  Service: Endoscopy;  Laterality: N/A;    ESOPHAGOGASTRODUODENOSCOPY N/A 6/12/2023    Procedure: EGD (ESOPHAGOGASTRODUODENOSCOPY);  Surgeon: Scout Kimble MD;  Location: [facility];  Service: Endoscopy;  Laterality: N/A;    FLUOROSCOPIC URODYNAMIC STUDY N/A 7/23/2019    Procedure: URODYNAMIC STUDY, FLUOROSCOPIC;  Surgeon: Vanna Martinez MD;  Location: [address];  Service: Urology;  Laterality: N/A;  1 hour    UPPER GASTROINTESTINAL ENDOSCOPY  03/03/2017    Dr. Harris       Review of patient's allergies indicates:   Allergen Reactions    Metoclopramide hcl Anaphylaxis     Involuntary mouth movements     Amoxicillin-pot clavulanate      2 other medications    Amoxil [amoxicillin]      hives    Avelox [moxifloxacin]      itching    Benadryl [diphenhydramine hcl]      Lowers seizure threshold     Coumadin [warfarin]      toxicity    Quinazolinones      Lowers seizure threshold     Ultram  [tramadol]      Lowers seizure threshold     Wellbutrin [bupropion hcl]      Lowers seizure threshold        No current facility-administered medications on file prior to encounter.     Current Outpatient Medications on File Prior to Encounter   Medication Sig    ARIPiprazole (ABILIFY) 10 MG Tab Take 1 tablet (10 mg total) by mouth once daily.    BOTOX 200 unit SolR     cyclobenzaprine (FLEXERIL) 10 MG tablet Take 10 mg by mouth 3 (three) times daily.    divalproex (DEPAKOTE) 250 MG EC tablet TAKE 1 TABLET BY MOUTH EVERY MORNING AND TAKE 2 TABLETS BY MOUTH EVERY NIGHT AT BEDTIME    famotidine (PEPCID) 40 MG tablet TAKE 1 TABLET(40 MG) BY MOUTH EVERY EVENING    gabapentin (NEURONTIN) 800 MG tablet TAKE 1 TABLET(800 MG) BY MOUTH TWICE DAILY    LIDOcaine-prilocaine (EMLA) cream APPLY TOPICALLY TO THE AFFECTED AREA AS NEEDED    magnesium oxide (MAG-OX) 400 mg (241.3 mg magnesium) tablet Take 1 tablet (400 mg total) by mouth once daily.    mirtazapine (REMERON) 30 MG tablet TAKE 1 TABLET(30 MG) BY MOUTH EVERY EVENING    ondansetron (ZOFRAN) 8 MG tablet Take 1 tablet (8 mg total) by mouth every 8 (eight) hours as needed for Nausea.    pantoprazole (PROTONIX) 40 MG tablet Take 1 tablet (40 mg total) by mouth once daily.    promethazine (PHENERGAN) 25 MG tablet TAKE 1 TABLET(25 MG) BY MOUTH EVERY 6 HOURS AS NEEDED FOR NAUSEA    propranoloL (INDERAL LA) 80 MG 24 hr capsule Take 1 capsule (80 mg total) by mouth once daily.    rivaroxaban (XARELTO) 20 mg Tab TAKE 1 TABLET(20 MG) BY MOUTH DAILY WITH THE EVENING MEAL AND DINNER    traZODone (DESYREL) 100 MG tablet Take three tablets po nightly prn insomnia     Family History       Problem Relation (Age of Onset)    Alcohol abuse Maternal Uncle, Maternal Grandfather    Breast cancer Cousin, Other    Cancer Maternal Grandmother    Diabetes Maternal Uncle, Maternal Grandmother    Diabetes Mellitus Maternal Grandmother    Drug abuse Brother, Maternal Grandmother    Early death  Paternal Grandfather    Heart disease Paternal Uncle, Maternal Grandfather    Hyperlipidemia Mother, Maternal Grandmother    Hypertension Mother, Maternal Uncle, Maternal Grandmother, Maternal Grandfather    Mental illness Maternal Grandmother, Paternal Grandfather    No Known Problems Father, Paternal Grandmother    Osteoarthritis Maternal Grandfather    Post-traumatic stress disorder Brother    Psoriasis Mother    Rheum arthritis Maternal Grandmother    Scoliosis Paternal Aunt    Stroke Maternal Uncle    Thyroid disease Mother          Tobacco Use    Smoking status: Every Day     Packs/day: 1.00     Years: 15.00     Pack years: 15.00     Types: Cigarettes     Start date: 11/25/2015    Smokeless tobacco: Never   Substance and Sexual Activity    Alcohol use: No     Alcohol/week: 0.0 standard drinks    Drug use: Yes     Types: Marijuana     Comment: smokes occasionally, helps with pain and appetite    Sexual activity: Never     Partners: Female     Comment: usually female, but currently with her boyfriend     Review of Systems   Constitutional:  Positive for appetite change. Negative for chills and fever.   Respiratory:  Negative for cough and shortness of breath.    Cardiovascular:  Negative for chest pain.   Gastrointestinal:  Positive for diarrhea, nausea and vomiting. Negative for abdominal pain and constipation.   Musculoskeletal:  Positive for myalgias (hx fibro).   Neurological:  Positive for weakness (left side chronic s/p CVA).   Objective:     Vital Signs (Most Recent):  Temp: 98.7 °F (37.1 °C) (06/19/23 1319)  Pulse: 74 (06/19/23 1503)  Resp: 17 (06/19/23 1639)  BP: (!) 166/85 (06/19/23 1503)  SpO2: 100 % (06/19/23 1503) Vital Signs (24h Range):  Temp:  [98.7 °F (37.1 °C)] 98.7 °F (37.1 °C)  Pulse:  [] 74  Resp:  [17-18] 17  SpO2:  [98 %-100 %] 100 %  BP: (137-185)/() 166/85     Weight: 47.6 kg (105 lb)  Body mass index is 18.6 kg/m².     Physical Exam  Vitals reviewed.   Constitutional:        General: She is not in acute distress.     Appearance: Normal appearance. She is not ill-appearing.   HENT:      Head: Normocephalic and atraumatic.   Eyes:      General:         Right eye: No discharge.         Left eye: No discharge.      Conjunctiva/sclera: Conjunctivae normal.   Cardiovascular:      Rate and Rhythm: Normal rate and regular rhythm.      Pulses: Normal pulses.      Heart sounds: Normal heart sounds.   Pulmonary:      Effort: Pulmonary effort is normal. No respiratory distress.      Breath sounds: Normal breath sounds. No wheezing, rhonchi or rales.   Abdominal:      General: Abdomen is flat. Bowel sounds are normal. There is no distension.      Palpations: Abdomen is soft.      Tenderness: There is no abdominal tenderness.   Musculoskeletal:         General: No swelling, deformity or signs of injury.      Cervical back: Neck supple. No rigidity.   Skin:     General: Skin is warm and dry.   Neurological:      Mental Status: She is alert and oriented to person, place, and time. Mental status is at baseline.      Motor: Weakness (LUE chronic s/p CVA) present.   Psychiatric:         Mood and Affect: Affect normal.         Behavior: Behavior normal.         Thought Content: Thought content normal.              Significant Labs: All pertinent labs within the past 24 hours have been reviewed.    Significant Imaging: I have reviewed all pertinent imaging results/findings within the past 24 hours.    Admission on 06/19/2023   Component Date Value Ref Range Status    Preg Test, Ur 06/19/2023 Negative   Final    WBC 06/19/2023 6.41  3.90 - 12.70 K/uL Final    RBC 06/19/2023 4.45  4.00 - 5.40 M/uL Final    Hemoglobin 06/19/2023 14.1  12.0 - 16.0 g/dL Final    Hematocrit 06/19/2023 41.3  37.0 - 48.5 % Final    MCV 06/19/2023 93  82 - 98 fL Final    MCH 06/19/2023 31.7 (H)  27.0 - 31.0 pg Final    MCHC 06/19/2023 34.1  32.0 - 36.0 g/dL Final    RDW 06/19/2023 13.3  11.5 - 14.5 % Final    Platelets 06/19/2023  247  150 - 450 K/uL Final    MPV 06/19/2023 9.9  9.2 - 12.9 fL Final    Immature Granulocytes 06/19/2023 0.2  0.0 - 0.5 % Final    Gran # (ANC) 06/19/2023 4.5  1.8 - 7.7 K/uL Final    Immature Grans (Abs) 06/19/2023 0.01  0.00 - 0.04 K/uL Final    Comment: Mild elevation in immature granulocytes is non specific and   can be seen in a variety of conditions including stress response,   acute inflammation, trauma and pregnancy. Correlation with other   laboratory and clinical findings is essential.      Lymph # 06/19/2023 1.5  1.0 - 4.8 K/uL Final    Mono # 06/19/2023 0.5  0.3 - 1.0 K/uL Final    Eos # 06/19/2023 0.0  0.0 - 0.5 K/uL Final    Baso # 06/19/2023 0.02  0.00 - 0.20 K/uL Final    nRBC 06/19/2023 0  0 /100 WBC Final    Gran % 06/19/2023 69.4  38.0 - 73.0 % Final    Lymph % 06/19/2023 22.6  18.0 - 48.0 % Final    Mono % 06/19/2023 7.2  4.0 - 15.0 % Final    Eosinophil % 06/19/2023 0.3  0.0 - 8.0 % Final    Basophil % 06/19/2023 0.3  0.0 - 1.9 % Final    Differential Method 06/19/2023 Automated   Final    Sodium 06/19/2023 134 (L)  136 - 145 mmol/L Final    Potassium 06/19/2023 4.4  3.5 - 5.1 mmol/L Final    Chloride 06/19/2023 108  95 - 110 mmol/L Final    CO2 06/19/2023 7 (LL)  23 - 29 mmol/L Final    Comment: CO2 critical result(s) called and verbal readback obtained from   Nadia Kruger by BROOKE 06/19/2023 15:14      Glucose 06/19/2023 67 (L)  70 - 110 mg/dL Final    BUN 06/19/2023 23 (H)  6 - 20 mg/dL Final    Creatinine 06/19/2023 2.1 (H)  0.5 - 1.4 mg/dL Final    Calcium 06/19/2023 9.4  8.7 - 10.5 mg/dL Final    Total Protein 06/19/2023 8.3  6.0 - 8.4 g/dL Final    Albumin 06/19/2023 4.7  3.5 - 5.2 g/dL Final    Total Bilirubin 06/19/2023 0.2  0.1 - 1.0 mg/dL Final    Comment: For infants and newborns, interpretation of results should be based  on gestational age, weight and in agreement with clinical  observations.    Premature Infant recommended reference ranges:  Up to 24 hours.............<8.0  mg/dL  Up to 48 hours............<12.0 mg/dL  3-5 days..................<15.0 mg/dL  6-29 days.................<15.0 mg/dL      Alkaline Phosphatase 06/19/2023 78  55 - 135 U/L Final    AST 06/19/2023 39  10 - 40 U/L Final    ALT 06/19/2023 27  10 - 44 U/L Final    eGFR 06/19/2023 30 (A)  >60 mL/min/1.73 m^2 Final    Anion Gap 06/19/2023 19 (H)  8 - 16 mmol/L Final    Lipase 06/19/2023 30  4 - 60 U/L Final    Magnesium 06/19/2023 1.9  1.6 - 2.6 mg/dL Final    POC PH 06/19/2023 7.141 (L)  7.35 - 7.45 Final    POC PCO2 06/19/2023 29.6 (L)  35 - 45 mmHg Final    POC PO2 06/19/2023 34 (L)  40 - 60 mmHg Final    POC HCO3 06/19/2023 10.1 (L)  24 - 28 mmol/L Final    POC BE 06/19/2023 -19  -2 to 2 mmol/L Final    POC SATURATED O2 06/19/2023 50 (L)  95 - 100 % Final    POC TCO2 06/19/2023 11 (L)  24 - 29 mmol/L Final    Sample 06/19/2023 VENOUS   Final    Site 06/19/2023 Other   Final    Allens Test 06/19/2023 N/A   Final    DelSys 06/19/2023 Room Air   Final    Lactate (Lactic Acid) 06/19/2023 0.7  0.5 - 2.2 mmol/L Final    Comment: Falsely low lactic acid results can be found in samples   containing >=13.0 mg/dL total bilirubin and/or >=3.5 mg/dL   direct bilirubin.      Beta-Hydroxybutyrate 06/19/2023 5.3 (H)  0.0 - 0.5 mmol/L Final    Specimen UA 06/19/2023 Urine, Clean Catch   Final    Color, UA 06/19/2023 Straw  Yellow, Straw, Britany Final    Appearance, UA 06/19/2023 Clear  Clear Final    pH, UA 06/19/2023 5.0  5.0 - 8.0 Final    Specific Gravity, UA 06/19/2023 1.010  1.005 - 1.030 Final    Protein, UA 06/19/2023 1+ (A)  Negative Final    Comment: Recommend a 24 hour urine protein or a urine   protein/creatinine ratio if globulin induced proteinuria is  clinically suspected.      Glucose, UA 06/19/2023 Negative  Negative Final    Ketones, UA 06/19/2023 3+ (A)  Negative Final    Bilirubin (UA) 06/19/2023 Negative  Negative Final    Occult Blood UA 06/19/2023 Negative  Negative Final    Nitrite, UA 06/19/2023 Negative   Negative Final    Urobilinogen, UA 06/19/2023 Negative  <2.0 EU/dL Final    Leukocytes, UA 06/19/2023 Negative  Negative Final    RBC, UA 06/19/2023 0  0 - 4 /hpf Final    WBC, UA 06/19/2023 0  0 - 5 /hpf Final    Bacteria 06/19/2023 Rare  None-Occ /hpf Final    Squam Epithel, UA 06/19/2023 4  /hpf Final    Hyaline Casts, UA 06/19/2023 0  0-1/lpf /lpf Final    Microscopic Comment 06/19/2023 SEE COMMENT   Final    Comment: Other formed elements not mentioned in the report are not   present in the microscopic examination.            Imaging Results    None

## 2023-06-19 NOTE — NURSING
Arrives to room 315 AAOX4 Vss Afebrile Ambulates from chair to bed Stand by assistance Skin warm and dry no impairments noted Sodium bicarb infusing @ 75ML/HR to 20 Jelco left upper arm Bed low side rails up x 2 Bed in lowest position call light in easy reach.

## 2023-06-19 NOTE — ASSESSMENT & PLAN NOTE
Possibly combo of gastroparesis and cannabis hyperemesis syndrome  - encouraged trial of cannabis cessation  - consider motility agent  - antiemetics prn  - IVF  - encouraged diet as tolerated

## 2023-06-19 NOTE — PROGRESS NOTES
Pharmacist Renal Dose Adjustment Note    Cat Denson is a 43 y.o. female being treated with the medication rivaroxaban    Patient Data:    Vital Signs (Most Recent):  Temp: 99.8 °F (37.7 °C) (06/19/23 1822)  Pulse: 83 (06/19/23 1822)  Resp: 17 (06/19/23 1822)  BP: (!) 173/94 (06/19/23 1822)  SpO2: 98 % (06/19/23 1822) Vital Signs (72h Range):  Temp:  [98.7 °F (37.1 °C)-99.8 °F (37.7 °C)]   Pulse:  []   Resp:  [17-18]   BP: (137-185)/()   SpO2:  [98 %-100 %]      Recent Labs   Lab 06/19/23  1347   CREATININE 2.1*     Serum creatinine: 2.1 mg/dL (H) 06/19/23 1347  Estimated creatinine clearance: 26.5 mL/min (A)    Medication:rivaroxaban dose: 20mg frequency with dinner will be changed to medication:rivaroxaban dose:15mg frequency:with dinner    Pharmacist's Name: Tomasa Saldaña  Pharmacist's Extension: 0921

## 2023-06-19 NOTE — ED NOTES
Pt recently had EGD during hospitalization 06/12/2023 due to bleeding and gastritis. Pt reports nausea, vomiting and diarrhea since procedure.

## 2023-06-20 PROBLEM — R79.89 ELEVATED LFTS: Status: ACTIVE | Noted: 2023-06-20

## 2023-06-20 LAB
ALBUMIN SERPL BCP-MCNC: 3.8 G/DL (ref 3.5–5.2)
ALP SERPL-CCNC: 112 U/L (ref 55–135)
ALT SERPL W/O P-5'-P-CCNC: 140 U/L (ref 10–44)
ANION GAP SERPL CALC-SCNC: 12 MMOL/L (ref 8–16)
ANION GAP SERPL CALC-SCNC: 13 MMOL/L (ref 8–16)
AST SERPL-CCNC: 171 U/L (ref 10–40)
BASOPHILS # BLD AUTO: 0.03 K/UL (ref 0–0.2)
BASOPHILS NFR BLD: 0.6 % (ref 0–1.9)
BILIRUB SERPL-MCNC: 0.3 MG/DL (ref 0.1–1)
BUN SERPL-MCNC: 16 MG/DL (ref 6–20)
BUN SERPL-MCNC: 17 MG/DL (ref 6–20)
CALCIUM SERPL-MCNC: 8.1 MG/DL (ref 8.7–10.5)
CALCIUM SERPL-MCNC: 8.3 MG/DL (ref 8.7–10.5)
CHLORIDE SERPL-SCNC: 109 MMOL/L (ref 95–110)
CHLORIDE SERPL-SCNC: 109 MMOL/L (ref 95–110)
CO2 SERPL-SCNC: 17 MMOL/L (ref 23–29)
CO2 SERPL-SCNC: 18 MMOL/L (ref 23–29)
CREAT SERPL-MCNC: 1.7 MG/DL (ref 0.5–1.4)
CREAT SERPL-MCNC: 1.8 MG/DL (ref 0.5–1.4)
DIFFERENTIAL METHOD: ABNORMAL
EOSINOPHIL # BLD AUTO: 0.1 K/UL (ref 0–0.5)
EOSINOPHIL NFR BLD: 1.2 % (ref 0–8)
ERYTHROCYTE [DISTWIDTH] IN BLOOD BY AUTOMATED COUNT: 13.3 % (ref 11.5–14.5)
EST. GFR  (NO RACE VARIABLE): 36 ML/MIN/1.73 M^2
EST. GFR  (NO RACE VARIABLE): 38 ML/MIN/1.73 M^2
GLUCOSE SERPL-MCNC: 116 MG/DL (ref 70–110)
GLUCOSE SERPL-MCNC: 97 MG/DL (ref 70–110)
HCT VFR BLD AUTO: 36.6 % (ref 37–48.5)
HGB BLD-MCNC: 12.4 G/DL (ref 12–16)
IMM GRANULOCYTES # BLD AUTO: 0.02 K/UL (ref 0–0.04)
IMM GRANULOCYTES NFR BLD AUTO: 0.4 % (ref 0–0.5)
LYMPHOCYTES # BLD AUTO: 2.8 K/UL (ref 1–4.8)
LYMPHOCYTES NFR BLD: 55.2 % (ref 18–48)
MCH RBC QN AUTO: 31 PG (ref 27–31)
MCHC RBC AUTO-ENTMCNC: 33.9 G/DL (ref 32–36)
MCV RBC AUTO: 92 FL (ref 82–98)
MONOCYTES # BLD AUTO: 0.3 K/UL (ref 0.3–1)
MONOCYTES NFR BLD: 6.3 % (ref 4–15)
NEUTROPHILS # BLD AUTO: 1.8 K/UL (ref 1.8–7.7)
NEUTROPHILS NFR BLD: 36.3 % (ref 38–73)
NRBC BLD-RTO: 0 /100 WBC
PHOSPHATE SERPL-MCNC: 2.6 MG/DL (ref 2.7–4.5)
PLATELET # BLD AUTO: 203 K/UL (ref 150–450)
PMV BLD AUTO: 10.7 FL (ref 9.2–12.9)
POTASSIUM SERPL-SCNC: 3.4 MMOL/L (ref 3.5–5.1)
POTASSIUM SERPL-SCNC: 3.9 MMOL/L (ref 3.5–5.1)
PROT SERPL-MCNC: 6.6 G/DL (ref 6–8.4)
RBC # BLD AUTO: 4 M/UL (ref 4–5.4)
SODIUM SERPL-SCNC: 139 MMOL/L (ref 136–145)
SODIUM SERPL-SCNC: 139 MMOL/L (ref 136–145)
WBC # BLD AUTO: 5.05 K/UL (ref 3.9–12.7)

## 2023-06-20 PROCEDURE — 80048 BASIC METABOLIC PNL TOTAL CA: CPT | Mod: HCNC,XB | Performed by: NURSE PRACTITIONER

## 2023-06-20 PROCEDURE — 85025 COMPLETE CBC W/AUTO DIFF WBC: CPT | Mod: HCNC | Performed by: STUDENT IN AN ORGANIZED HEALTH CARE EDUCATION/TRAINING PROGRAM

## 2023-06-20 PROCEDURE — 96367 TX/PROPH/DG ADDL SEQ IV INF: CPT

## 2023-06-20 PROCEDURE — 36415 COLL VENOUS BLD VENIPUNCTURE: CPT | Mod: HCNC | Performed by: STUDENT IN AN ORGANIZED HEALTH CARE EDUCATION/TRAINING PROGRAM

## 2023-06-20 PROCEDURE — 96361 HYDRATE IV INFUSION ADD-ON: CPT

## 2023-06-20 PROCEDURE — 80074 ACUTE HEPATITIS PANEL: CPT | Mod: HCNC | Performed by: STUDENT IN AN ORGANIZED HEALTH CARE EDUCATION/TRAINING PROGRAM

## 2023-06-20 PROCEDURE — 63600175 PHARM REV CODE 636 W HCPCS: Mod: HCNC | Performed by: INTERNAL MEDICINE

## 2023-06-20 PROCEDURE — 36410 VNPNXR 3YR/> PHY/QHP DX/THER: CPT | Mod: HCNC

## 2023-06-20 PROCEDURE — 96366 THER/PROPH/DIAG IV INF ADDON: CPT

## 2023-06-20 PROCEDURE — 80053 COMPREHEN METABOLIC PANEL: CPT | Mod: HCNC | Performed by: STUDENT IN AN ORGANIZED HEALTH CARE EDUCATION/TRAINING PROGRAM

## 2023-06-20 PROCEDURE — 25000003 PHARM REV CODE 250: Performed by: NURSE PRACTITIONER

## 2023-06-20 PROCEDURE — 84100 ASSAY OF PHOSPHORUS: CPT | Mod: HCNC | Performed by: NURSE PRACTITIONER

## 2023-06-20 PROCEDURE — C1751 CATH, INF, PER/CENT/MIDLINE: HCPCS | Mod: HCNC

## 2023-06-20 PROCEDURE — G0378 HOSPITAL OBSERVATION PER HR: HCPCS | Mod: HCNC

## 2023-06-20 PROCEDURE — 36415 COLL VENOUS BLD VENIPUNCTURE: CPT | Mod: HCNC | Performed by: NURSE PRACTITIONER

## 2023-06-20 PROCEDURE — S4991 NICOTINE PATCH NONLEGEND: HCPCS | Performed by: NURSE PRACTITIONER

## 2023-06-20 PROCEDURE — 25000003 PHARM REV CODE 250: Mod: HCNC | Performed by: STUDENT IN AN ORGANIZED HEALTH CARE EDUCATION/TRAINING PROGRAM

## 2023-06-20 RX ORDER — POTASSIUM CHLORIDE 20 MEQ/1
40 TABLET, EXTENDED RELEASE ORAL ONCE
Status: COMPLETED | OUTPATIENT
Start: 2023-06-20 | End: 2023-06-20

## 2023-06-20 RX ORDER — IBUPROFEN 200 MG
1 TABLET ORAL DAILY
Status: DISCONTINUED | OUTPATIENT
Start: 2023-06-20 | End: 2023-06-21 | Stop reason: HOSPADM

## 2023-06-20 RX ORDER — SODIUM CHLORIDE, SODIUM LACTATE, POTASSIUM CHLORIDE, CALCIUM CHLORIDE 600; 310; 30; 20 MG/100ML; MG/100ML; MG/100ML; MG/100ML
INJECTION, SOLUTION INTRAVENOUS CONTINUOUS
Status: DISCONTINUED | OUTPATIENT
Start: 2023-06-20 | End: 2023-06-21 | Stop reason: HOSPADM

## 2023-06-20 RX ORDER — CALCIUM GLUCONATE 98 MG/ML
1 INJECTION, SOLUTION INTRAVENOUS ONCE
Status: DISCONTINUED | OUTPATIENT
Start: 2023-06-20 | End: 2023-06-20

## 2023-06-20 RX ORDER — LORAZEPAM 0.5 MG/1
0.5 TABLET ORAL EVERY 12 HOURS PRN
Status: DISCONTINUED | OUTPATIENT
Start: 2023-06-20 | End: 2023-06-21 | Stop reason: HOSPADM

## 2023-06-20 RX ORDER — CALCIUM GLUCONATE 20 MG/ML
1 INJECTION, SOLUTION INTRAVENOUS ONCE
Status: COMPLETED | OUTPATIENT
Start: 2023-06-20 | End: 2023-06-20

## 2023-06-20 RX ADMIN — GABAPENTIN 800 MG: 400 CAPSULE ORAL at 08:06

## 2023-06-20 RX ADMIN — HYDROCODONE BITARTRATE AND ACETAMINOPHEN 1 TABLET: 10; 325 TABLET ORAL at 11:06

## 2023-06-20 RX ADMIN — TRAZODONE HYDROCHLORIDE 100 MG: 50 TABLET ORAL at 08:06

## 2023-06-20 RX ADMIN — MIRTAZAPINE 30 MG: 15 TABLET, FILM COATED ORAL at 08:06

## 2023-06-20 RX ADMIN — DIVALPROEX SODIUM 500 MG: 250 TABLET, DELAYED RELEASE ORAL at 08:06

## 2023-06-20 RX ADMIN — ARIPIPRAZOLE 10 MG: 5 TABLET ORAL at 08:06

## 2023-06-20 RX ADMIN — DIVALPROEX SODIUM 250 MG: 250 TABLET, DELAYED RELEASE ORAL at 08:06

## 2023-06-20 RX ADMIN — RIVAROXABAN 15 MG: 15 TABLET, FILM COATED ORAL at 05:06

## 2023-06-20 RX ADMIN — SODIUM BICARBONATE: 84 INJECTION, SOLUTION INTRAVENOUS at 06:06

## 2023-06-20 RX ADMIN — FAMOTIDINE 40 MG: 20 TABLET, FILM COATED ORAL at 08:06

## 2023-06-20 RX ADMIN — POTASSIUM CHLORIDE 40 MEQ: 1500 TABLET, EXTENDED RELEASE ORAL at 09:06

## 2023-06-20 RX ADMIN — SODIUM CHLORIDE, POTASSIUM CHLORIDE, SODIUM LACTATE AND CALCIUM CHLORIDE: 600; 310; 30; 20 INJECTION, SOLUTION INTRAVENOUS at 09:06

## 2023-06-20 RX ADMIN — PROPRANOLOL HYDROCHLORIDE 80 MG: 20 TABLET ORAL at 08:06

## 2023-06-20 RX ADMIN — CALCIUM GLUCONATE 1 G: 20 INJECTION, SOLUTION INTRAVENOUS at 09:06

## 2023-06-20 RX ADMIN — Medication 1 PATCH: at 02:06

## 2023-06-20 RX ADMIN — HYDROCODONE BITARTRATE AND ACETAMINOPHEN 1 TABLET: 10; 325 TABLET ORAL at 07:06

## 2023-06-20 RX ADMIN — ACETAMINOPHEN 650 MG: 325 TABLET ORAL at 08:06

## 2023-06-20 NOTE — PLAN OF CARE
POC discussed with patient, verbalized understanding. Patient with uneventful night, slept well between care. VS stable. Medicated with Zofran X 1 for complaints of nausea without emesis. Medicated X 1 with Hydrocodone for complaints of generalized discomfort to back and legs. Ambulated to bathroom with stand by assist, L sided weakness from previous stroke, hx fall. Nicotine patch placed. Call light at bedside, bed alarm in use.

## 2023-06-20 NOTE — PROGRESS NOTES
[facility] Hospital Medicine  Progress Note    Patient Name: Cat Denson  MRN: [id number]  Patient Class: OP- Observation   Admission Date: 6/19/2023  Length of Stay: 0 days  Attending Physician: Paolo Chong MD  Primary Care Provider: Neela Barrett MD        Subjective:     Principal Problem:Intractable vomiting with nausea        HPI:  43F with PMH SLE, CKD3, gastroparesis, bipolar disorder, fibromyalgia, CVA w/ L-side weakness, and hx RTA presents due to 1 week of acute on chronic nausea, vomiting, and diarrhea since having EGD. She reports not tolerating much oral intake since then. She does report using marijuana multiple times daily. EGD 6/12/23 showed small hiatal hernia and gastritis. Pathology was negative. Her labs in the ER are significant for acute on chronic kidney injury, metabolic acidosis with acidemia on VBG, serum and urine ketoacidosis, and elevated anion gap. Given 1L LR bolus and zofran in ER. Admitted to hospital medicine for continued management and nephrology consult.      Overview/Hospital Course:  Admitted with acute on chronic nausea, vomiting, and diarrhea after recent EGD found to be severely acidemic on VBG with acute on chronic kidney injury and likely starvation ketoacidosis. She has history of renal tubular acidosis. Started on bicarb drip with noted improvement to CO2 on labs. Continued on IVF without bicarb. Nephrology consulted and followed. Required electrolyte replacement. Noted elevated LFTs of unclear etiology so acute hep panel ordered.      Interval History: Patient seen and examined. NAEON. Starting to feel better. Tolerated some PO today.      Objective:     Vital Signs (Most Recent):  Temp: 98.7 °F (37.1 °C) (06/20/23 1546)  Pulse: 60 (06/20/23 1546)  Resp: 17 (06/20/23 1546)  BP: (!) 169/84 (06/20/23 1546)  SpO2: 100 % (06/20/23 1152) Vital Signs (24h Range):  Temp:  [96.4 °F (35.8 °C)-99.8 °F (37.7 °C)] 98.7 °F (37.1 °C)  Pulse:  [52-89]  60  Resp:  [17-18] 17  SpO2:  [97 %-100 %] 100 %  BP: (134-182)/() 169/84     Weight: 46.9 kg (103 lb 6.3 oz)  Body mass index is 18.32 kg/m².    Intake/Output Summary (Last 24 hours) at 6/20/2023 1705  Last data filed at 6/20/2023 0843  Gross per 24 hour   Intake 220 ml   Output 300 ml   Net -80 ml         Physical Exam  Vitals reviewed.   Constitutional:       General: She is not in acute distress.     Appearance: Normal appearance. She is not ill-appearing.   HENT:      Head: Normocephalic and atraumatic.   Cardiovascular:      Rate and Rhythm: Normal rate and regular rhythm.   Pulmonary:      Effort: Pulmonary effort is normal. No respiratory distress.   Abdominal:      General: Abdomen is flat. Bowel sounds are normal. There is no distension.      Palpations: Abdomen is soft.      Tenderness: There is no abdominal tenderness.   Musculoskeletal:      Cervical back: Neck supple.   Skin:     General: Skin is warm and dry.   Neurological:      Mental Status: She is alert and oriented to person, place, and time. Mental status is at baseline.      Motor: Weakness (LUE chronic s/p CVA) present.   Psychiatric:         Mood and Affect: Affect normal.         Behavior: Behavior normal.         Thought Content: Thought content normal.           Significant Labs: All pertinent labs within the past 24 hours have been reviewed.    Significant Imaging: I have reviewed all pertinent imaging results/findings within the past 24 hours.      Assessment/Plan:      * Intractable vomiting with nausea  Improved   Possibly combo of gastroparesis and cannabis hyperemesis syndrome vs other  - encouraged trial of cannabis cessation  - consider motility agent  - antiemetics prn  - IVF  - encouraged diet as tolerated    Elevated LFTs  Unclear etiology  Symptoms are overall improving  - f/u acute hep panel    Ketoacidosis  Suspect due to recent dehydration/starvation from N/V/D lack of PO intake  S/p IVF with dextrose  - address the  N/V/D    Cannabis use disorder, moderate, dependence  Possibly related to N/V  - encouraged consideration of trial of abstinence    Bipolar 1 disorder, depressed, partial remission  Chronic, controlled  - continue home abilify, depakote, gabapentin, mirtazapine, propanolol    Gastroparesis  Noted hx  - consider motility agent    Acute renal failure superimposed on stage 3 chronic kidney disease  Improved now approaching baseline  Likely related to dehydration from lack of PO intake  - IVF  - nephro consult  - daily labs    Complex partial epilepsy with generalization and with intractable epilepsy  Chronic, stable  - continue depakote  - seizure precautions    Metabolic acidemia  Improving  Noted hx RTA  Per VBG  Lactic acid WNL  Noted ketoacidemia  S/p bicarb drip  - IVF  - encouraged PO intake  - nephro consult and following  - daily labs    Gastroesophageal reflux disease without esophagitis  Chronic, stable  - continue pepcid    Hemiparesis affecting left side as late effect of cerebrovascular accident  Chronic, stable  - continue rivaroxaban    Fibromyalgia  Chronic, stable  - continue gabapentin   - pain med prn      VTE Risk Mitigation (From admission, onward)         Ordered     rivaroxaban tablet 15 mg  with dinner         06/19/23 1818     IP VTE HIGH RISK PATIENT  Once         06/19/23 1818     Place sequential compression device  Until discontinued         06/19/23 1818                Discharge Planning   EDY: 6/22/2023     Code Status: Full Code   Is the patient medically ready for discharge?:     Reason for patient still in hospital (select all that apply): Patient trending condition, Laboratory test and Consult recommendations  Discharge Plan A: Home with family                  Paolo Chong MD  Department of Hospital Medicine   [facility]

## 2023-06-20 NOTE — PLAN OF CARE
[facility]  Initial Discharge Assessment       Primary Care Provider: Neela Barrett MD    Admission Diagnosis: Metabolic acidemia [E87.20]  Vomiting and diarrhea [R11.10, R19.7]  Intractable vomiting with nausea [R11.2]    Admission Date: 6/19/2023  Expected Discharge Date:     DC assessment completed with pt at bedside. Verified info on facesheet as correct. PCP Nena. Pharmacy [facility]. No DME. Denies hd/dm/hh. Pt takes xarelto. Pt without recent admission. Family to provide ride home. CM to follow for discharge needs.      Transition of Care Barriers: None    Payor: HU[employer] MANAGED MEDICARE / Plan: [employer] TRData HMO PPO SPECIAL NEEDS / Product Type: Medicare Advantage /     Extended Emergency Contact Information  Primary Emergency Contact: RALPH CASTELLON  Address: [address] [place] [address] [facility]  Home Phone: [phone]  Mobile Phone: [phone]  Relation: Friend  Preferred language: English   needed? No  Secondary Emergency Contact: Arabella Rose  Address: [id number] [place] [id number] [facility]  Home Phone: [phone]  Mobile Phone: [phone]  Relation: Mother  Preferred language: English   needed? No    Discharge Plan A: Home with family  Discharge Plan B: Home      [id number] - [address] AT [address] [facility] [address] & [address] [address]  Phone: [phone] Fax: [phone]      Initial Assessment (most recent)       Adult Discharge Assessment - 06/20/23 1319          Discharge Assessment    Assessment Type Discharge Planning Assessment     Confirmed/corrected address, phone number and insurance Yes     Confirmed Demographics Correct on Facesheet     Source of Information patient     People in Home friend(s)     Do you expect to return to your current living situation? Yes     Prior to hospitilization cognitive status: Unable  to Assess     Current cognitive status: Alert/Oriented     Equipment Currently Used at Home none     Readmission within 30 days? No     Patient currently being followed by outpatient case management? No     Do you currently have service(s) that help you manage your care at home? No     Do you take prescription medications? Yes     Do you have prescription coverage? Yes     Do you have any problems affording any of your prescribed medications? No     Is the patient taking medications as prescribed? yes     How do you get to doctors appointments? family or friend will provide     Are you on dialysis? No     Do you take coumadin? No     Discharge Plan A Home with family     Discharge Plan B Home     DME Needed Upon Discharge  none     Discharge Plan discussed with: Patient     Transition of Care Barriers None

## 2023-06-20 NOTE — SUBJECTIVE & OBJECTIVE
Interval History: Patient seen and examined. NAEON. Starting to feel better. Tolerated some PO today.      Objective:     Vital Signs (Most Recent):  Temp: 98.7 °F (37.1 °C) (06/20/23 1546)  Pulse: 60 (06/20/23 1546)  Resp: 17 (06/20/23 1546)  BP: (!) 169/84 (06/20/23 1546)  SpO2: 100 % (06/20/23 1152) Vital Signs (24h Range):  Temp:  [96.4 °F (35.8 °C)-99.8 °F (37.7 °C)] 98.7 °F (37.1 °C)  Pulse:  [52-89] 60  Resp:  [17-18] 17  SpO2:  [97 %-100 %] 100 %  BP: (134-182)/() 169/84     Weight: 46.9 kg (103 lb 6.3 oz)  Body mass index is 18.32 kg/m².    Intake/Output Summary (Last 24 hours) at 6/20/2023 1705  Last data filed at 6/20/2023 0843  Gross per 24 hour   Intake 220 ml   Output 300 ml   Net -80 ml         Physical Exam  Vitals reviewed.   Constitutional:       General: She is not in acute distress.     Appearance: Normal appearance. She is not ill-appearing.   HENT:      Head: Normocephalic and atraumatic.   Cardiovascular:      Rate and Rhythm: Normal rate and regular rhythm.   Pulmonary:      Effort: Pulmonary effort is normal. No respiratory distress.   Abdominal:      General: Abdomen is flat. Bowel sounds are normal. There is no distension.      Palpations: Abdomen is soft.      Tenderness: There is no abdominal tenderness.   Musculoskeletal:      Cervical back: Neck supple.   Skin:     General: Skin is warm and dry.   Neurological:      Mental Status: She is alert and oriented to person, place, and time. Mental status is at baseline.      Motor: Weakness (LUE chronic s/p CVA) present.   Psychiatric:         Mood and Affect: Affect normal.         Behavior: Behavior normal.         Thought Content: Thought content normal.           Significant Labs: All pertinent labs within the past 24 hours have been reviewed.    Significant Imaging: I have reviewed all pertinent imaging results/findings within the past 24 hours.

## 2023-06-20 NOTE — ASSESSMENT & PLAN NOTE
Improved   Possibly combo of gastroparesis and cannabis hyperemesis syndrome vs other  - encouraged trial of cannabis cessation  - consider motility agent  - antiemetics prn  - IVF  - encouraged diet as tolerated

## 2023-06-20 NOTE — ASSESSMENT & PLAN NOTE
Improving  Noted hx RTA  Per VBG  Lactic acid WNL  Noted ketoacidemia  S/p bicarb drip  - IVF  - encouraged PO intake  - nephro consult and following  - daily labs

## 2023-06-20 NOTE — HOSPITAL COURSE
Admitted with acute on chronic nausea, vomiting, and diarrhea after recent EGD found to be severely acidemic on VBG with acute on chronic kidney injury and likely starvation ketoacidosis. She has history of renal tubular acidosis. Started on bicarb drip with noted improvement to CO2 on labs. Continued on IVF without bicarb. Nephrology consulted and followed. Required electrolyte replacement. Noted elevated LFTs of unclear etiology so acute hep panel ordered, which was negative. Her nausea and vomiting resolved and she was stable for discharge. She was discharged on PO zofran and phenergan.

## 2023-06-20 NOTE — ASSESSMENT & PLAN NOTE
Suspect due to recent dehydration/starvation from N/V/D lack of PO intake  S/p IVF with dextrose  - address the N/V/D

## 2023-06-20 NOTE — PLAN OF CARE
06/20/23 1319   FRAZIER Message   Medicare Outpatient and Observation Notification regarding financial responsibility Explained to patient/caregiver;Signed/date by patient/caregiver   Date FRAZIER was signed 06/20/23   Time FRAZIER was signed 1031

## 2023-06-20 NOTE — NURSING
Nurses Note -- 4 Eyes      6/19/2023   9:57 PM      Skin assessed during: Admit      [x] No Altered Skin Integrity Present    []Prevention Measures Documented      [] Yes- Altered Skin Integrity Present or Discovered   [] LDA Added if Not in Epic (Describe Wound)   [] New Altered Skin Integrity was Present on Admit and Documented in LDA   [] Wound Image Taken    Wound Care Consulted? No    Attending Nurse:  Basilia Zapata RN     Second RN/Staff Member:  Erna Tay RN

## 2023-06-20 NOTE — NURSING
Patient complaining of nausea. Medicated with Zofran 4mg IVP for complaints of nausea without emesis after taking a few of her HS medications by mouth..

## 2023-06-20 NOTE — CONSULTS
INPATIENT NEPHROLOGY CONSULT   [facility]    Patient Name: Cat Denson  Date: 06/20/2023    Reason for consultation: CHRISTEN    Chief Complaint:   Chief Complaint   Patient presents with    Vomiting     And diarrhea since upper GI scope 1 week ago       History of Present Illness:  43F with PMH SLE, CKD3, gastroparesis, bipolar disorder, fibromyalgia, CVA w/ L-side weakness, and hx RTA presents due to 1 week of acute on chronic nausea, vomiting, and diarrhea since having EGD. She reports not tolerating much oral intake since then. She does report using marijuana multiple times daily. EGD 6/12/23 showed small hiatal hernia and gastritis. Pathology was negative. Her labs in the ER are significant for acute on chronic kidney injury, metabolic acidosis with acidemia on VBG, serum and urine ketoacidosis, and elevated anion gap. Given 1L LR bolus and zofran in ER. Admitted to hospital medicine for continued management and nephrology consult.    Interval History:  6/20- VSS, on RA, voiding, feeling better, n/v, better, eating lunch    Plan of Care:    Assessment:  CHRISTEN/CKD III  Volume depletion  Hyponatremia  Hypokalemia  Hypocalcemia  Hypophosphatemia  HypoMg  Ketoacidosis; hx of RTA    Plan:    - responded to fluid bolus  - ordered LR 75cc/hr  - suspect hypovolemic hyponatremia  - ordered K, Ca, phos, Mg repletion (nutritional depletion, 25 vitamin D replete, albumin preserved)  - don't think she needs bicarb supplementation today- reassess tomorrow    Thank you for allowing us to participate in this patient's care. We will continue to follow.    Vital Signs:  Temp Readings from Last 3 Encounters:   06/20/23 98.3 °F (36.8 °C) (Oral)   06/12/23 98.7 °F (37.1 °C)   06/01/23 97.7 °F (36.5 °C) (Oral)       Pulse Readings from Last 3 Encounters:   06/20/23 (!) 57   06/12/23 68   06/01/23 85       BP Readings from Last 3 Encounters:   06/20/23 (!) 153/73   06/12/23 128/71   06/01/23 (!) 146/92        Weight:  Wt Readings from Last 3 Encounters:   06/19/23 46.9 kg (103 lb 6.3 oz)   06/12/23 47.6 kg (105 lb)   06/01/23 48.8 kg (107 lb 9.4 oz)       INS/OUTS:  I/O last 3 completed shifts:  In: 1219 [P.O.:220; IV Piggyback:999]  Out: -   I/O this shift:  In: -   Out: 300 [Urine:300]    Past Medical & Surgical History:  Past Medical History:   Diagnosis Date    Acid reflux     Allergy     Anemia     Anticoagulated 12/29/2015    Anxiety     Asthma     Bipolar 1 disorder     Bronchospasm with bronchitis, acute     Chronic daily headache 9/11/2018    Chronic kidney disease     Chronic pain     Depression     bipolar 2    Fibromyalgia     Gastroparesis     History of right MCA stroke 11/25/2015    Hx of psychiatric care     Lactose intolerance     Lupus     Mixed hyperlipidemia 11/28/2018    Psychiatric problem     Renal tubular acidosis     Seizure     Sjogren's syndrome     Smoker     Stroke 11-    Substance abuse-kratom daily use 8/30/2020    Suicide attempt     overdosed on a bottle of paxil, muscle relaxers, & zoloft    Therapy        Past Surgical History:   Procedure Laterality Date    CHOLECYSTECTOMY      COLONOSCOPY  11/12/2014    Dr. Kimble, repeat at 52 years old for screening    ESOPHAGOGASTRODUODENOSCOPY N/A 1/3/2022    Procedure: EGD (ESOPHAGOGASTRODUODENOSCOPY);  Surgeon: Scout Kimble MD;  Location: [facility];  Service: Endoscopy;  Laterality: N/A;    ESOPHAGOGASTRODUODENOSCOPY N/A 6/12/2023    Procedure: EGD (ESOPHAGOGASTRODUODENOSCOPY);  Surgeon: Scout Kimble MD;  Location: [facility];  Service: Endoscopy;  Laterality: N/A;    FLUOROSCOPIC URODYNAMIC STUDY N/A 7/23/2019    Procedure: URODYNAMIC STUDY, FLUOROSCOPIC;  Surgeon: Vanna Martinez MD;  Location: [address];  Service: Urology;  Laterality: N/A;  1 hour    UPPER GASTROINTESTINAL ENDOSCOPY  03/03/2017    Dr. Harris       Past Social History:  Social History     Socioeconomic History    Marital status: Single    Number  of children: 0   Tobacco Use    Smoking status: Every Day     Packs/day: 1.00     Years: 15.00     Pack years: 15.00     Types: Cigarettes     Start date: 11/25/2015    Smokeless tobacco: Never   Substance and Sexual Activity    Alcohol use: No     Alcohol/week: 0.0 standard drinks    Drug use: Yes     Types: Marijuana     Comment: smokes occasionally, helps with pain and appetite    Sexual activity: Never     Partners: Female     Comment: usually female, but currently with her boyfriend   Social History Narrative    Doesn't drive since the stroke, mother drives her and she lives with her mother.     Social Determinants of Health     Financial Resource Strain: Low Risk     Difficulty of Paying Living Expenses: Not hard at all   Food Insecurity: No Food Insecurity    Worried About Running Out of Food in the Last Year: Never true    Ran Out of Food in the Last Year: Never true   Transportation Needs: Unmet Transportation Needs    Lack of Transportation (Medical): Yes    Lack of Transportation (Non-Medical): No   Physical Activity: Inactive    Days of Exercise per Week: 0 days    Minutes of Exercise per Session: 0 min   Stress: No Stress Concern Present    Feeling of Stress : Only a little   Social Connections: Socially Isolated    Frequency of Communication with Friends and Family: Once a week    Frequency of Social Gatherings with Friends and Family: Once a week    Attends Faith Services: Never    Active Member of Clubs or Organizations: No    Attends Club or Organization Meetings: Never    Marital Status: Never    Housing Stability: Low Risk     Unable to Pay for Housing in the Last Year: No    Number of Places Lived in the Last Year: 2    Unstable Housing in the Last Year: No       Medications:  Scheduled Meds:   ARIPiprazole  10 mg Oral Daily    calcium gluconate  1 g Intravenous Once    divalproex  250 mg Oral Daily    divalproex  500 mg Oral QHS    famotidine  40 mg Oral QHS    gabapentin  800 mg Oral  BID    mirtazapine  30 mg Oral QHS    nicotine  1 patch Transdermal Daily    potassium chloride  40 mEq Oral Once    propranoloL  80 mg Oral BID    rivaroxaban  15 mg Oral with dinner     Continuous Infusions:   sodium bicarbonate drip 75 mL/hr at 06/20/23 0641     PRN Meds:.acetaminophen, aluminum-magnesium hydroxide-simethicone, dextrose 10%, dextrose 10%, glucagon (human recombinant), glucose, glucose, HYDROcodone-acetaminophen, HYDROcodone-acetaminophen, melatonin, naloxone, ondansetron, promethazine (PHENERGAN) IVPB, sodium chloride 0.9%, traZODone  No current facility-administered medications on file prior to encounter.     Current Outpatient Medications on File Prior to Encounter   Medication Sig Dispense Refill    ARIPiprazole (ABILIFY) 10 MG Tab Take 1 tablet (10 mg total) by mouth once daily. 30 tablet 2    divalproex (DEPAKOTE) 250 MG EC tablet TAKE 1 TABLET BY MOUTH EVERY MORNING AND TAKE 2 TABLETS BY MOUTH EVERY NIGHT AT BEDTIME 90 tablet 2    famotidine (PEPCID) 40 MG tablet TAKE 1 TABLET(40 MG) BY MOUTH EVERY EVENING 30 tablet 3    gabapentin (NEURONTIN) 800 MG tablet TAKE 1 TABLET(800 MG) BY MOUTH TWICE DAILY 180 tablet 3    ondansetron (ZOFRAN) 8 MG tablet Take 1 tablet (8 mg total) by mouth every 8 (eight) hours as needed for Nausea. 90 tablet 0    pantoprazole (PROTONIX) 40 MG tablet Take 1 tablet (40 mg total) by mouth once daily. 90 tablet 3    promethazine (PHENERGAN) 25 MG tablet TAKE 1 TABLET(25 MG) BY MOUTH EVERY 6 HOURS AS NEEDED FOR NAUSEA 20 tablet 0    propranoloL (INDERAL LA) 80 MG 24 hr capsule Take 1 capsule (80 mg total) by mouth once daily. 30 capsule 5    rivaroxaban (XARELTO) 20 mg Tab TAKE 1 TABLET(20 MG) BY MOUTH DAILY WITH THE EVENING MEAL AND DINNER 90 tablet 3    traZODone (DESYREL) 100 MG tablet Take three tablets po nightly prn insomnia 90 tablet 2    BOTOX 200 unit SolR       cyclobenzaprine (FLEXERIL) 10 MG tablet Take 10 mg by mouth 3 (three) times daily.       "LIDOcaine-prilocaine (EMLA) cream APPLY TOPICALLY TO THE AFFECTED AREA AS NEEDED      magnesium oxide (MAG-OX) 400 mg (241.3 mg magnesium) tablet Take 1 tablet (400 mg total) by mouth once daily. 90 tablet 1    mirtazapine (REMERON) 30 MG tablet TAKE 1 TABLET(30 MG) BY MOUTH EVERY EVENING 30 tablet 2       Allergies:  Metoclopramide hcl, Amoxicillin-pot clavulanate, Amoxil [amoxicillin], Avelox [moxifloxacin], Benadryl [diphenhydramine hcl], Coumadin [warfarin], Quinazolinones, Ultram [tramadol], and Wellbutrin [bupropion hcl]    Past Family History:  Reviewed; refer to Hospitalist Admission Note    Review of Systems:  Review of Systems - All 14 systems reviewed and negative, except as noted in HPI    Physical Exam:  BP (!) 153/73 (BP Location: Right arm, Patient Position: Lying)   Pulse (!) 57   Temp 98.3 °F (36.8 °C) (Oral)   Resp 17   Ht 5' 3" (1.6 m)   Wt 46.9 kg (103 lb 6.3 oz)   LMP 06/05/2023 (Exact Date)   SpO2 99%   Breastfeeding No   BMI 18.32 kg/m²     General Appearance:    NAD, AAO x 3, cooperative, appears stated age   Head:    Normocephalic, atraumatic   Eyes:    PER, EOMI, and conjunctiva/sclera clear bilaterally        Mouth:   Moist mucus membranes, no thrush or oral lesions, normal      dentition   Back:     No CVA tenderness   Lungs:     Clear to auscultation bilaterally, no wheeze, crackles, rales      or rhonchi, symmetric air movement, respirations unlabored   Heart:    Regular rate and rhythm, S1 and S2 normal, no murmur, rub   or gallop   Abdomen:     Soft, non-tender, non-distended, bowel sounds active all four   quadrants, no RT or guarding, no masses, no organomegaly   Extremities:   Warm and well perfused, distal pulses intact, no cyanosis or    peripheral edema   MSK:   No joint or muscle swelling, tenderness or deformity   Skin:   Skin color, texture, turgor normal, no rashes or lesions   Neurologic/Psychiatric:   CNII-XII intact, normal strength and sensation       " throughout, no asterixis; normal affect, memory, judgement     and insight     Results:  Lab Results   Component Value Date     06/20/2023    K 3.4 (L) 06/20/2023     06/20/2023    CO2 18 (L) 06/20/2023    BUN 16 06/20/2023    CREATININE 1.7 (H) 06/20/2023    CALCIUM 8.1 (L) 06/20/2023    ANIONGAP 12 06/20/2023    ESTGFRAFRICA 42.9 (A) 04/13/2022    ESTGFRAFRICA 42.9 (A) 04/13/2022    EGFRNONAA 37.2 (A) 04/13/2022    EGFRNONAA 37.2 (A) 04/13/2022       Lab Results   Component Value Date    CALCIUM 8.1 (L) 06/20/2023    PHOS 2.6 (L) 06/20/2023       Recent Labs   Lab 06/20/23  0338   WBC 5.05   RBC 4.00   HGB 12.4   HCT 36.6*      MCV 92   MCH 31.0   MCHC 33.9       I have personally reviewed pertinent radiological imaging and reports.    I have spent > 70 minutes providing care for this patient for the above diagnoses. These services have included chart/data/imaging review, evaluation, exam, formulation of plan, , note preparation, and discussions with staff involved in this patient's care.    Carmel Gatica MD MPH  [facility] [address] [address]  [phone] (p)  [phone] (f)

## 2023-06-20 NOTE — ASSESSMENT & PLAN NOTE
Improved now approaching baseline  Likely related to dehydration from lack of PO intake  - IVF  - nephro consult  - daily labs

## 2023-06-21 VITALS
DIASTOLIC BLOOD PRESSURE: 90 MMHG | HEART RATE: 65 BPM | TEMPERATURE: 98 F | HEIGHT: 63 IN | OXYGEN SATURATION: 100 % | RESPIRATION RATE: 18 BRPM | BODY MASS INDEX: 18.32 KG/M2 | SYSTOLIC BLOOD PRESSURE: 173 MMHG | WEIGHT: 103.38 LBS

## 2023-06-21 LAB
ALBUMIN SERPL BCP-MCNC: 3.3 G/DL (ref 3.5–5.2)
ALP SERPL-CCNC: 94 U/L (ref 55–135)
ALT SERPL W/O P-5'-P-CCNC: 92 U/L (ref 10–44)
ANION GAP SERPL CALC-SCNC: 9 MMOL/L (ref 8–16)
AST SERPL-CCNC: 77 U/L (ref 10–40)
BASOPHILS # BLD AUTO: 0.03 K/UL (ref 0–0.2)
BASOPHILS NFR BLD: 0.6 % (ref 0–1.9)
BILIRUB SERPL-MCNC: 0.2 MG/DL (ref 0.1–1)
BUN SERPL-MCNC: 10 MG/DL (ref 6–20)
CALCIUM SERPL-MCNC: 8.1 MG/DL (ref 8.7–10.5)
CHLORIDE SERPL-SCNC: 111 MMOL/L (ref 95–110)
CO2 SERPL-SCNC: 21 MMOL/L (ref 23–29)
CREAT SERPL-MCNC: 1.3 MG/DL (ref 0.5–1.4)
DIFFERENTIAL METHOD: ABNORMAL
EOSINOPHIL # BLD AUTO: 0.1 K/UL (ref 0–0.5)
EOSINOPHIL NFR BLD: 2 % (ref 0–8)
ERYTHROCYTE [DISTWIDTH] IN BLOOD BY AUTOMATED COUNT: 13.2 % (ref 11.5–14.5)
EST. GFR  (NO RACE VARIABLE): 53 ML/MIN/1.73 M^2
GLUCOSE SERPL-MCNC: 86 MG/DL (ref 70–110)
HAV IGM SERPL QL IA: NORMAL
HBV CORE IGM SERPL QL IA: NORMAL
HBV SURFACE AG SERPL QL IA: NORMAL
HCT VFR BLD AUTO: 32.6 % (ref 37–48.5)
HCV AB SERPL QL IA: NORMAL
HGB BLD-MCNC: 11.3 G/DL (ref 12–16)
IMM GRANULOCYTES # BLD AUTO: 0.01 K/UL (ref 0–0.04)
IMM GRANULOCYTES NFR BLD AUTO: 0.2 % (ref 0–0.5)
LYMPHOCYTES # BLD AUTO: 2.3 K/UL (ref 1–4.8)
LYMPHOCYTES NFR BLD: 46.8 % (ref 18–48)
MCH RBC QN AUTO: 31.4 PG (ref 27–31)
MCHC RBC AUTO-ENTMCNC: 34.7 G/DL (ref 32–36)
MCV RBC AUTO: 91 FL (ref 82–98)
MONOCYTES # BLD AUTO: 0.3 K/UL (ref 0.3–1)
MONOCYTES NFR BLD: 5.8 % (ref 4–15)
NEUTROPHILS # BLD AUTO: 2.2 K/UL (ref 1.8–7.7)
NEUTROPHILS NFR BLD: 44.6 % (ref 38–73)
NRBC BLD-RTO: 0 /100 WBC
PLATELET # BLD AUTO: 170 K/UL (ref 150–450)
PMV BLD AUTO: 11.4 FL (ref 9.2–12.9)
POTASSIUM SERPL-SCNC: 4.5 MMOL/L (ref 3.5–5.1)
PROT SERPL-MCNC: 6 G/DL (ref 6–8.4)
RBC # BLD AUTO: 3.6 M/UL (ref 4–5.4)
SODIUM SERPL-SCNC: 141 MMOL/L (ref 136–145)
WBC # BLD AUTO: 4.96 K/UL (ref 3.9–12.7)

## 2023-06-21 PROCEDURE — 99406 BEHAV CHNG SMOKING 3-10 MIN: CPT | Mod: HCNC

## 2023-06-21 PROCEDURE — S4991 NICOTINE PATCH NONLEGEND: HCPCS | Mod: HCNC | Performed by: NURSE PRACTITIONER

## 2023-06-21 PROCEDURE — G0378 HOSPITAL OBSERVATION PER HR: HCPCS | Mod: HCNC

## 2023-06-21 PROCEDURE — 25000003 PHARM REV CODE 250: Mod: HCNC | Performed by: STUDENT IN AN ORGANIZED HEALTH CARE EDUCATION/TRAINING PROGRAM

## 2023-06-21 PROCEDURE — 63600175 PHARM REV CODE 636 W HCPCS: Mod: HCNC | Performed by: INTERNAL MEDICINE

## 2023-06-21 PROCEDURE — 85025 COMPLETE CBC W/AUTO DIFF WBC: CPT | Mod: HCNC | Performed by: STUDENT IN AN ORGANIZED HEALTH CARE EDUCATION/TRAINING PROGRAM

## 2023-06-21 PROCEDURE — 25000003 PHARM REV CODE 250: Mod: HCNC | Performed by: NURSE PRACTITIONER

## 2023-06-21 PROCEDURE — 36415 COLL VENOUS BLD VENIPUNCTURE: CPT | Mod: HCNC | Performed by: STUDENT IN AN ORGANIZED HEALTH CARE EDUCATION/TRAINING PROGRAM

## 2023-06-21 PROCEDURE — 80053 COMPREHEN METABOLIC PANEL: CPT | Mod: HCNC | Performed by: STUDENT IN AN ORGANIZED HEALTH CARE EDUCATION/TRAINING PROGRAM

## 2023-06-21 RX ORDER — ONDANSETRON HYDROCHLORIDE 8 MG/1
8 TABLET, FILM COATED ORAL EVERY 8 HOURS PRN
Qty: 90 TABLET | Refills: 0 | Status: SHIPPED | OUTPATIENT
Start: 2023-06-21 | End: 2023-11-27 | Stop reason: SDUPTHER

## 2023-06-21 RX ORDER — PROMETHAZINE HYDROCHLORIDE 25 MG/1
25 TABLET ORAL EVERY 6 HOURS PRN
Qty: 20 TABLET | Refills: 0 | Status: SHIPPED | OUTPATIENT
Start: 2023-06-21 | End: 2023-07-21

## 2023-06-21 RX ADMIN — GABAPENTIN 800 MG: 400 CAPSULE ORAL at 10:06

## 2023-06-21 RX ADMIN — HYDROCODONE BITARTRATE AND ACETAMINOPHEN 1 TABLET: 10; 325 TABLET ORAL at 02:06

## 2023-06-21 RX ADMIN — PROPRANOLOL HYDROCHLORIDE 80 MG: 20 TABLET ORAL at 10:06

## 2023-06-21 RX ADMIN — Medication 1 PATCH: at 12:06

## 2023-06-21 RX ADMIN — ARIPIPRAZOLE 10 MG: 5 TABLET ORAL at 10:06

## 2023-06-21 RX ADMIN — DIVALPROEX SODIUM 250 MG: 250 TABLET, DELAYED RELEASE ORAL at 10:06

## 2023-06-21 RX ADMIN — LORAZEPAM 0.5 MG: 0.5 TABLET ORAL at 12:06

## 2023-06-21 RX ADMIN — SODIUM CHLORIDE, POTASSIUM CHLORIDE, SODIUM LACTATE AND CALCIUM CHLORIDE: 600; 310; 30; 20 INJECTION, SOLUTION INTRAVENOUS at 02:06

## 2023-06-21 NOTE — PLAN OF CARE
Problem: Adult Inpatient Plan of Care  Goal: Plan of Care Review  Outcome: Met  Goal: Patient-Specific Goal (Individualized)  Outcome: Met  Goal: Absence of Hospital-Acquired Illness or Injury  Outcome: Met  Goal: Optimal Comfort and Wellbeing  Outcome: Met  Goal: Readiness for Transition of Care  Outcome: Met     Problem: Fluid and Electrolyte Imbalance (Acute Kidney Injury/Impairment)  Goal: Fluid and Electrolyte Balance  Outcome: Met     Problem: Oral Intake Inadequate (Acute Kidney Injury/Impairment)  Goal: Optimal Nutrition Intake  Outcome: Met     Problem: Renal Function Impairment (Acute Kidney Injury/Impairment)  Goal: Effective Renal Function  Outcome: Met     Problem: Infection  Goal: Absence of Infection Signs and Symptoms  Outcome: Met   Discharge instructions given to patient. Patient verbalized complete understanding. IV discontinued with catheter intact, pressure applied to site and secured with tape. Patient tolerated well.

## 2023-06-21 NOTE — PLAN OF CARE
POC discussed with patient, verbalized understanding. Patient with uneventful night, slept well between care. VS stable. Medicated X 1 with Hydrocodone for complaints of generalized discomfort to back and legs. Ambulated to bathroom with stand by assist, L sided weakness from previous stroke, hx fall. Call light at bedside, bed alarm in use.

## 2023-06-21 NOTE — DISCHARGE SUMMARY
[facility] Hospital Medicine  Discharge Summary      Patient Name: Cat Denson  MRN: [id number]  RICH: [id number]  Patient Class: OP- Observation  Admission Date: 6/19/2023  Hospital Length of Stay: 0 days  Discharge Date and Time: 6/21/2023  2:20 PM  Attending Physician: No att. providers found   Discharging Provider: Santiago Peters MD  Primary Care Provider: Neela Barrett MD    Primary Care Team: [facility] MEDICINE 1    HPI:   43F with PMH SLE, CKD3, gastroparesis, bipolar disorder, fibromyalgia, CVA w/ L-side weakness, and hx RTA presents due to 1 week of acute on chronic nausea, vomiting, and diarrhea since having EGD. She reports not tolerating much oral intake since then. She does report using marijuana multiple times daily. EGD 6/12/23 showed small hiatal hernia and gastritis. Pathology was negative. Her labs in the ER are significant for acute on chronic kidney injury, metabolic acidosis with acidemia on VBG, serum and urine ketoacidosis, and elevated anion gap. Given 1L LR bolus and zofran in ER. Admitted to hospital medicine for continued management and nephrology consult.      * No surgery found *      Hospital Course:   Admitted with acute on chronic nausea, vomiting, and diarrhea after recent EGD found to be severely acidemic on VBG with acute on chronic kidney injury and likely starvation ketoacidosis. She has history of renal tubular acidosis. Started on bicarb drip with noted improvement to CO2 on labs. Continued on IVF without bicarb. Nephrology consulted and followed. Required electrolyte replacement. Noted elevated LFTs of unclear etiology so acute hep panel ordered, which was negative. Her nausea and vomiting resolved and she was stable for discharge. She was discharged on PO zofran and phenergan.       Goals of Care Treatment Preferences:  Code Status: Full Code      Consults:   Consults (From admission, onward)        Status Ordering Provider     Inpatient  consult to Midline team  Once        Provider:  (Not yet assigned)    Completed MAJOR LIZAMA     Inpatient consult to Nephrology  Once        Provider:  (Not yet assigned)    Completed MAJOR LIZAMA          No new Assessment & Plan notes have been filed under this hospital service since the last note was generated.  Service: Hospital Medicine    Final Active Diagnoses:    Diagnosis Date Noted POA    PRINCIPAL PROBLEM:  Intractable vomiting with nausea [R11.2] 06/19/2023 Yes    Acute renal failure superimposed on stage 3 chronic kidney disease [N17.9, N18.30] 09/14/2018 Yes    Elevated LFTs [R79.89] 06/20/2023 No    Ketoacidosis [E87.29] 06/19/2023 Yes    Cannabis use disorder, moderate, dependence [F12.20] 12/22/2019 Yes     Chronic    Bipolar 1 disorder, depressed, partial remission [F31.75] 12/22/2019 Yes     Chronic    Gastroparesis [K31.84] 11/28/2018 Yes     Chronic    Complex partial epilepsy with generalization and with intractable epilepsy [G40.219] 09/11/2018 Yes     Chronic    Metabolic acidemia [E87.20] 03/02/2017 Yes    Gastroesophageal reflux disease without esophagitis [K21.9] 12/29/2015 Yes     Chronic    Hemiparesis affecting left side as late effect of cerebrovascular accident [I69.354] 11/25/2015 Not Applicable     Chronic    Fibromyalgia [M79.7] 10/02/2013 Yes     Chronic      Problems Resolved During this Admission:       Discharged Condition: stable    Disposition: Home-Health Care [facility]    Follow Up:   Follow-up Information     Neela Barrett MD Follow up.    Specialty: Family Medicine  Why: 6/29 @ 10:30 am with NP  Contact information:  [address] [facility] [id number] [phone]                       Patient Instructions:      Diet Adult Regular     Notify your health care provider if you experience any of the following:  temperature >100.4     Notify your health care provider if you experience any of the following:  persistent nausea and vomiting or diarrhea     Notify your  health care provider if you experience any of the following:  severe uncontrolled pain     Activity as tolerated       Significant Diagnostic Studies: N/A    Pending Diagnostic Studies:     None         Medications:  Reconciled Home Medications:      Medication List      CONTINUE taking these medications    ARIPiprazole 10 MG Tab  Commonly known as: ABILIFY  Take 1 tablet (10 mg total) by mouth once daily.     BOTOX 200 unit Solr  Generic drug: onabotulinumtoxina     cyclobenzaprine 10 MG tablet  Commonly known as: FLEXERIL  Take 10 mg by mouth 3 (three) times daily.     divalproex 250 MG EC tablet  Commonly known as: DEPAKOTE  TAKE 1 TABLET BY MOUTH EVERY MORNING AND TAKE 2 TABLETS BY MOUTH EVERY NIGHT AT BEDTIME     famotidine 40 MG tablet  Commonly known as: PEPCID  TAKE 1 TABLET(40 MG) BY MOUTH EVERY EVENING     gabapentin 800 MG tablet  Commonly known as: NEURONTIN  TAKE 1 TABLET(800 MG) BY MOUTH TWICE DAILY     LIDOcaine-prilocaine cream  Commonly known as: EMLA  APPLY TOPICALLY TO THE AFFECTED AREA AS NEEDED     magnesium oxide 400 mg (241.3 mg magnesium) tablet  Commonly known as: MAG-OX  Take 1 tablet (400 mg total) by mouth once daily.     mirtazapine 30 MG tablet  Commonly known as: REMERON  TAKE 1 TABLET(30 MG) BY MOUTH EVERY EVENING     ondansetron 8 MG tablet  Commonly known as: ZOFRAN  Take 1 tablet (8 mg total) by mouth every 8 (eight) hours as needed for Nausea.     pantoprazole 40 MG tablet  Commonly known as: PROTONIX  Take 1 tablet (40 mg total) by mouth once daily.     promethazine 25 MG tablet  Commonly known as: PHENERGAN  Take 1 tablet (25 mg total) by mouth every 6 (six) hours as needed for Nausea.     propranoloL 80 MG 24 hr capsule  Commonly known as: INDERAL LA  Take 1 capsule (80 mg total) by mouth once daily.     rivaroxaban 20 mg Tab  Commonly known as: XARELTO  TAKE 1 TABLET(20 MG) BY MOUTH DAILY WITH THE EVENING MEAL AND DINNER     traZODone 100 MG tablet  Commonly known as:  DESYREL  Take three tablets po nightly prn insomnia            Indwelling Lines/Drains at time of discharge:   Lines/Drains/Airways     None                 Time spent on the discharge of patient: 37 minutes         Santiago Peters MD  Department of Hospital Medicine  [facility]

## 2023-06-21 NOTE — PLAN OF CARE
POC discussed with patient, verbalized understanding. Patient with uneventful night, slept off and on between care. VS stable. Received Ativan for complaints of anxiety. IVF L upper arm Midline. Up to bedside commode to void. Accurate I/O. On menses. No complaints of pain voiced. Call light at bedside, bed alarm in use.

## 2023-06-21 NOTE — PROGRESS NOTES
INPATIENT NEPHROLOGY Progress Note  [facility]    Patient Name: Cat Denson  Date: 06/21/2023    Reason for consultation: CHRISTEN    Chief Complaint:   Chief Complaint   Patient presents with    Vomiting     And diarrhea since upper GI scope 1 week ago       History of Present Illness:  43F with PMH SLE, CKD3, gastroparesis, bipolar disorder, fibromyalgia, CVA w/ L-side weakness, and hx RTA presents due to 1 week of acute on chronic nausea, vomiting, and diarrhea since having EGD. She reports not tolerating much oral intake since then. She does report using marijuana multiple times daily. EGD 6/12/23 showed small hiatal hernia and gastritis. Pathology was negative. Her labs in the ER are significant for acute on chronic kidney injury, metabolic acidosis with acidemia on VBG, serum and urine ketoacidosis, and elevated anion gap. Given 1L LR bolus and zofran in ER. Admitted to hospital medicine for continued management and nephrology consult.    Interval History:  6/20- VSS, on RA, voiding, feeling better, n/v, better, eating lunch  6/21- feeling much better, discussed alternating water with sports drinks at home or using electrolyte infused water    Plan of Care:    Assessment:  CHRISTEN/CKD III  Volume depletion  Hyponatremia  Hypokalemia  Hypocalcemia  Hypophosphatemia  HypoMg  Ketoacidosis; hx of RTA    Plan:    - improved nicely with IVFs and electrolyte repletion  - discussed hydration and diet at home  - would repeat BMP, Mg in 1 week  - can decide as outpatient about standing bicarb supplementation    Thank you for allowing us to participate in this patient's care. We will continue to follow.    Vital Signs:  Temp Readings from Last 3 Encounters:   06/21/23 97.7 °F (36.5 °C)   06/12/23 98.7 °F (37.1 °C)   06/01/23 97.7 °F (36.5 °C) (Oral)       Pulse Readings from Last 3 Encounters:   06/21/23 65   06/12/23 68   06/01/23 85       BP Readings from Last 3 Encounters:   06/21/23 (!) 173/90    06/12/23 128/71   06/01/23 (!) 146/92       Weight:  Wt Readings from Last 3 Encounters:   06/19/23 46.9 kg (103 lb 6.3 oz)   06/12/23 47.6 kg (105 lb)   06/01/23 48.8 kg (107 lb 9.4 oz)       INS/OUTS:  I/O last 3 completed shifts:  In: 4134.7 [P.O.:1840; I.V.:2245.4; IV Piggyback:49.3]  Out: 3150 [Urine:3150]  No intake/output data recorded.    Medications:  Scheduled Meds:   ARIPiprazole  10 mg Oral Daily    divalproex  250 mg Oral Daily    divalproex  500 mg Oral QHS    famotidine  40 mg Oral QHS    gabapentin  800 mg Oral BID    mirtazapine  30 mg Oral QHS    nicotine  1 patch Transdermal Daily    propranoloL  80 mg Oral BID    rivaroxaban  15 mg Oral with dinner     Continuous Infusions:   lactated ringers 75 mL/hr at 06/21/23 0231     PRN Meds:.acetaminophen, aluminum-magnesium hydroxide-simethicone, dextrose 10%, dextrose 10%, glucagon (human recombinant), glucose, glucose, HYDROcodone-acetaminophen, HYDROcodone-acetaminophen, LORazepam, melatonin, naloxone, ondansetron, promethazine (PHENERGAN) IVPB, sodium chloride 0.9%, traZODone  No current facility-administered medications on file prior to encounter.     Current Outpatient Medications on File Prior to Encounter   Medication Sig Dispense Refill    ARIPiprazole (ABILIFY) 10 MG Tab Take 1 tablet (10 mg total) by mouth once daily. 30 tablet 2    divalproex (DEPAKOTE) 250 MG EC tablet TAKE 1 TABLET BY MOUTH EVERY MORNING AND TAKE 2 TABLETS BY MOUTH EVERY NIGHT AT BEDTIME 90 tablet 2    famotidine (PEPCID) 40 MG tablet TAKE 1 TABLET(40 MG) BY MOUTH EVERY EVENING 30 tablet 3    gabapentin (NEURONTIN) 800 MG tablet TAKE 1 TABLET(800 MG) BY MOUTH TWICE DAILY 180 tablet 3    ondansetron (ZOFRAN) 8 MG tablet Take 1 tablet (8 mg total) by mouth every 8 (eight) hours as needed for Nausea. 90 tablet 0    pantoprazole (PROTONIX) 40 MG tablet Take 1 tablet (40 mg total) by mouth once daily. 90 tablet 3    promethazine (PHENERGAN) 25 MG tablet TAKE 1 TABLET(25 MG) BY  "MOUTH EVERY 6 HOURS AS NEEDED FOR NAUSEA 20 tablet 0    propranoloL (INDERAL LA) 80 MG 24 hr capsule Take 1 capsule (80 mg total) by mouth once daily. 30 capsule 5    rivaroxaban (XARELTO) 20 mg Tab TAKE 1 TABLET(20 MG) BY MOUTH DAILY WITH THE EVENING MEAL AND DINNER 90 tablet 3    traZODone (DESYREL) 100 MG tablet Take three tablets po nightly prn insomnia 90 tablet 2    BOTOX 200 unit SolR       cyclobenzaprine (FLEXERIL) 10 MG tablet Take 10 mg by mouth 3 (three) times daily.      LIDOcaine-prilocaine (EMLA) cream APPLY TOPICALLY TO THE AFFECTED AREA AS NEEDED      magnesium oxide (MAG-OX) 400 mg (241.3 mg magnesium) tablet Take 1 tablet (400 mg total) by mouth once daily. 90 tablet 1    mirtazapine (REMERON) 30 MG tablet TAKE 1 TABLET(30 MG) BY MOUTH EVERY EVENING 30 tablet 2       Review of Systems:  Neg    Physical Exam:  BP (!) 173/90   Pulse 65   Temp 97.7 °F (36.5 °C)   Resp 18   Ht 5' 3" (1.6 m)   Wt 46.9 kg (103 lb 6.3 oz)   LMP 06/05/2023 (Exact Date)   SpO2 100%   Breastfeeding No   BMI 18.32 kg/m²     General Appearance:    NAD, AAO x 3, cooperative, appears stated age   Head:    Normocephalic, atraumatic   Eyes:    PER, EOMI, and conjunctiva/sclera clear bilaterally        Mouth:   Moist mucus membranes, no thrush or oral lesions, normal      dentition   Back:     No CVA tenderness   Lungs:     Clear to auscultation bilaterally, no wheeze, crackles, rales      or rhonchi, symmetric air movement, respirations unlabored   Heart:    Regular rate and rhythm, S1 and S2 normal, no murmur, rub   or gallop   Abdomen:     Soft, non-tender, non-distended, bowel sounds active all four   quadrants, no RT or guarding, no masses, no organomegaly   Extremities:   Warm and well perfused, distal pulses intact, no cyanosis or    peripheral edema   MSK:   No joint or muscle swelling, tenderness or deformity   Skin:   Skin color, texture, turgor normal, no rashes or lesions   Neurologic/Psychiatric:   CNII-XII " intact, normal strength and sensation       throughout, no asterixis; normal affect, memory, judgement     and insight     Results:  Lab Results   Component Value Date     06/21/2023    K 4.5 06/21/2023     (H) 06/21/2023    CO2 21 (L) 06/21/2023    BUN 10 06/21/2023    CREATININE 1.3 06/21/2023    CALCIUM 8.1 (L) 06/21/2023    ANIONGAP 9 06/21/2023    ESTGFRAFRICA 42.9 (A) 04/13/2022    ESTGFRAFRICA 42.9 (A) 04/13/2022    EGFRNONAA 37.2 (A) 04/13/2022    EGFRNONAA 37.2 (A) 04/13/2022       Lab Results   Component Value Date    CALCIUM 8.1 (L) 06/21/2023    PHOS 2.6 (L) 06/20/2023       Recent Labs   Lab 06/21/23  0318   WBC 4.96   RBC 3.60*   HGB 11.3*   HCT 32.6*      MCV 91   MCH 31.4*   MCHC 34.7         I have personally reviewed pertinent radiological imaging and reports.    I have spent > 35 minutes providing care for this patient for the above diagnoses. These services have included chart/data/imaging review, evaluation, exam, formulation of plan, , note preparation, and discussions with staff involved in this patient's care.    Carmel Gatica MD MPH  [facility] [address] [address]  [phone] (p)  [phone] (f)

## 2023-06-21 NOTE — CARE UPDATE
06/21/23 0810   Tobacco Cessation Intervention   Do you use any type of tobacco product? Yes   Are you interested in quitting use of tobacco products? Not interested   Are you interested in Nicotine Replacement for symptom relief?   (already has a patch)   $ Smoking Cessation Charges Smoking Cessation - Intermediate (CTTS)        06/21/23 0810   Tobacco Cessation Intervention   Do you use any type of tobacco product? Yes   Are you interested in quitting use of tobacco products? Not interested   Are you interested in Nicotine Replacement for symptom relief?   (already has a patch)   $ Smoking Cessation Charges Smoking Cessation - Intermediate (CTTS)     Pt was educated on smoking cessation.  Pt states she is not interested in quitting at this time.  Information about smoking cessation also given to Pt.

## 2023-06-22 ENCOUNTER — TELEPHONE (OUTPATIENT)
Dept: FAMILY MEDICINE | Facility: CLINIC | Age: 43
End: 2023-06-22
Payer: MEDICARE

## 2023-06-23 NOTE — TELEPHONE ENCOUNTER
----- Message from Sakina Cruz MD sent at 5/12/2023  5:51 PM CDT -----  Cat Rossi's amylase has been elevated for over a year - although lower than it was and lipase has been normal.  I have been monitoring it on Depakote.  Would you recommend any further work up?

## 2023-07-01 ENCOUNTER — PATIENT MESSAGE (OUTPATIENT)
Dept: NEPHROLOGY | Facility: CLINIC | Age: 43
End: 2023-07-01
Payer: MEDICARE

## 2023-07-01 DIAGNOSIS — R51.9 CHRONIC INTRACTABLE HEADACHE, UNSPECIFIED HEADACHE TYPE: ICD-10-CM

## 2023-07-01 DIAGNOSIS — G89.29 CHRONIC INTRACTABLE HEADACHE, UNSPECIFIED HEADACHE TYPE: ICD-10-CM

## 2023-07-01 NOTE — TELEPHONE ENCOUNTER
No care due was identified.  [facility] Embedded Care Due Messages. Reference number: [id number].   7/01/2023 7:53:27 AM CDT

## 2023-07-04 NOTE — TELEPHONE ENCOUNTER
Refill Routing Note   Refill Routing Note   Medication(s) are not appropriate for processing by [facility] for the following reason(s):      Patient not seen by PCP within 15 months  Required vitals abnormal    [facility] action(s):  Defer None identified     Medication Therapy Plan: BP 6/21/23 (!) 173/90  Medication reconciliation completed: No     Appointments  past 12m or future 3m with PCP    Date Provider   Last Visit   4/13/2023 Neela Barrett MD   Next Visit   8/17/2023 Neela Barrett MD   ED visits in past 90 days: 0        Note composed:11:46 AM 07/04/2023

## 2023-07-04 NOTE — TELEPHONE ENCOUNTER
Refill Routing Note   Refill Routing Note   Medication(s) are not appropriate for processing by [facility] for the following reason(s):      Required vitals abnormal    [facility] action(s):  Defer None identified     Medication Therapy Plan: BP 6/21/23 (!) 173/90  Medication reconciliation completed: No     Appointments  past 12m or future 3m with PCP    Date Provider   Last Visit   4/13/2023 Neela Barrett MD   Next Visit   8/17/2023 Neela Barrett MD   ED visits in past 90 days: 0        Note composed:11:45 AM 07/04/2023

## 2023-07-05 RX ORDER — SODIUM BICARBONATE 650 MG/1
650 TABLET ORAL 2 TIMES DAILY
Qty: 60 TABLET | Refills: 5 | Status: SHIPPED | OUTPATIENT
Start: 2023-07-05 | End: 2023-10-02

## 2023-07-05 NOTE — TELEPHONE ENCOUNTER
Chronic diarrhea can certainly cause acidosis.  I think it is fine to start sodium bicarb thus I will send a rx to her pharmacy

## 2023-07-06 RX ORDER — PROPRANOLOL HYDROCHLORIDE 80 MG/1
80 CAPSULE, EXTENDED RELEASE ORAL DAILY
Qty: 30 CAPSULE | Refills: 11 | Status: ON HOLD | OUTPATIENT
Start: 2023-07-06 | End: 2023-10-06 | Stop reason: SDUPTHER

## 2023-07-11 ENCOUNTER — PATIENT MESSAGE (OUTPATIENT)
Dept: FAMILY MEDICINE | Facility: CLINIC | Age: 43
End: 2023-07-11
Payer: MEDICARE

## 2023-07-11 ENCOUNTER — PATIENT MESSAGE (OUTPATIENT)
Dept: NEPHROLOGY | Facility: CLINIC | Age: 43
End: 2023-07-11
Payer: MEDICARE

## 2023-07-13 ENCOUNTER — OFFICE VISIT (OUTPATIENT)
Dept: FAMILY MEDICINE | Facility: CLINIC | Age: 43
End: 2023-07-13
Payer: MEDICARE

## 2023-07-13 ENCOUNTER — LAB VISIT (OUTPATIENT)
Dept: LAB | Facility: HOSPITAL | Age: 43
End: 2023-07-13
Attending: NURSE PRACTITIONER
Payer: MEDICARE

## 2023-07-13 VITALS
OXYGEN SATURATION: 99 % | TEMPERATURE: 98 F | HEART RATE: 69 BPM | SYSTOLIC BLOOD PRESSURE: 100 MMHG | WEIGHT: 101.44 LBS | HEIGHT: 63 IN | DIASTOLIC BLOOD PRESSURE: 72 MMHG | BODY MASS INDEX: 17.97 KG/M2

## 2023-07-13 DIAGNOSIS — N18.30 STAGE 3 CHRONIC KIDNEY DISEASE, UNSPECIFIED WHETHER STAGE 3A OR 3B CKD: ICD-10-CM

## 2023-07-13 DIAGNOSIS — M54.50 ACUTE LOW BACK PAIN WITHOUT SCIATICA, UNSPECIFIED BACK PAIN LATERALITY: ICD-10-CM

## 2023-07-13 DIAGNOSIS — M32.9 SYSTEMIC LUPUS ERYTHEMATOSUS, UNSPECIFIED SLE TYPE, UNSPECIFIED ORGAN INVOLVEMENT STATUS: ICD-10-CM

## 2023-07-13 DIAGNOSIS — I69.354 HEMIPARESIS AFFECTING LEFT SIDE AS LATE EFFECT OF CEREBROVASCULAR ACCIDENT: ICD-10-CM

## 2023-07-13 DIAGNOSIS — E78.2 MIXED HYPERLIPIDEMIA: Primary | ICD-10-CM

## 2023-07-13 DIAGNOSIS — R74.8 ELEVATED LIVER ENZYMES: ICD-10-CM

## 2023-07-13 DIAGNOSIS — Z86.73 HISTORY OF RIGHT MCA STROKE: ICD-10-CM

## 2023-07-13 DIAGNOSIS — D68.69 OTHER THROMBOPHILIA: ICD-10-CM

## 2023-07-13 DIAGNOSIS — F31.75 BIPOLAR 1 DISORDER, DEPRESSED, PARTIAL REMISSION: Chronic | ICD-10-CM

## 2023-07-13 LAB
ALBUMIN SERPL BCP-MCNC: 4.3 G/DL (ref 3.5–5.2)
ALP SERPL-CCNC: 69 U/L (ref 55–135)
ALT SERPL W/O P-5'-P-CCNC: 11 U/L (ref 10–44)
ANION GAP SERPL CALC-SCNC: 8 MMOL/L (ref 8–16)
AST SERPL-CCNC: 16 U/L (ref 10–40)
BASOPHILS # BLD AUTO: 0.04 K/UL (ref 0–0.2)
BASOPHILS NFR BLD: 0.7 % (ref 0–1.9)
BILIRUB SERPL-MCNC: 0.4 MG/DL (ref 0.1–1)
BUN SERPL-MCNC: 9 MG/DL (ref 6–20)
CALCIUM SERPL-MCNC: 9.2 MG/DL (ref 8.7–10.5)
CHLORIDE SERPL-SCNC: 108 MMOL/L (ref 95–110)
CO2 SERPL-SCNC: 18 MMOL/L (ref 23–29)
CREAT SERPL-MCNC: 1.5 MG/DL (ref 0.5–1.4)
DIFFERENTIAL METHOD: ABNORMAL
EOSINOPHIL # BLD AUTO: 0 K/UL (ref 0–0.5)
EOSINOPHIL NFR BLD: 0.5 % (ref 0–8)
ERYTHROCYTE [DISTWIDTH] IN BLOOD BY AUTOMATED COUNT: 14.1 % (ref 11.5–14.5)
EST. GFR  (NO RACE VARIABLE): 44.6 ML/MIN/1.73 M^2
GLUCOSE SERPL-MCNC: 79 MG/DL (ref 70–110)
HCT VFR BLD AUTO: 42.5 % (ref 37–48.5)
HGB BLD-MCNC: 13.8 G/DL (ref 12–16)
IMM GRANULOCYTES # BLD AUTO: 0.03 K/UL (ref 0–0.04)
IMM GRANULOCYTES NFR BLD AUTO: 0.5 % (ref 0–0.5)
LYMPHOCYTES # BLD AUTO: 2.2 K/UL (ref 1–4.8)
LYMPHOCYTES NFR BLD: 37.8 % (ref 18–48)
MCH RBC QN AUTO: 31.3 PG (ref 27–31)
MCHC RBC AUTO-ENTMCNC: 32.5 G/DL (ref 32–36)
MCV RBC AUTO: 96 FL (ref 82–98)
MONOCYTES # BLD AUTO: 0.3 K/UL (ref 0.3–1)
MONOCYTES NFR BLD: 4.3 % (ref 4–15)
NEUTROPHILS # BLD AUTO: 3.3 K/UL (ref 1.8–7.7)
NEUTROPHILS NFR BLD: 56.2 % (ref 38–73)
NRBC BLD-RTO: 0 /100 WBC
PLATELET # BLD AUTO: 229 K/UL (ref 150–450)
PMV BLD AUTO: 11.6 FL (ref 9.2–12.9)
POTASSIUM SERPL-SCNC: 4 MMOL/L (ref 3.5–5.1)
PROT SERPL-MCNC: 7.5 G/DL (ref 6–8.4)
RBC # BLD AUTO: 4.41 M/UL (ref 4–5.4)
SODIUM SERPL-SCNC: 134 MMOL/L (ref 136–145)
WBC # BLD AUTO: 5.8 K/UL (ref 3.9–12.7)

## 2023-07-13 PROCEDURE — 3044F PR MOST RECENT HEMOGLOBIN A1C LEVEL <7.0%: ICD-10-PCS | Mod: HCNC,CPTII,S$GLB, | Performed by: NURSE PRACTITIONER

## 2023-07-13 PROCEDURE — 1160F PR REVIEW ALL MEDS BY PRESCRIBER/CLIN PHARMACIST DOCUMENTED: ICD-10-PCS | Mod: HCNC,CPTII,S$GLB, | Performed by: NURSE PRACTITIONER

## 2023-07-13 PROCEDURE — 99214 OFFICE O/P EST MOD 30 MIN: CPT | Mod: HCNC,S$GLB,, | Performed by: NURSE PRACTITIONER

## 2023-07-13 PROCEDURE — 85025 COMPLETE CBC W/AUTO DIFF WBC: CPT | Mod: HCNC | Performed by: NURSE PRACTITIONER

## 2023-07-13 PROCEDURE — 1159F MED LIST DOCD IN RCRD: CPT | Mod: HCNC,CPTII,S$GLB, | Performed by: NURSE PRACTITIONER

## 2023-07-13 PROCEDURE — 80053 COMPREHEN METABOLIC PANEL: CPT | Mod: HCNC | Performed by: NURSE PRACTITIONER

## 2023-07-13 PROCEDURE — 3008F BODY MASS INDEX DOCD: CPT | Mod: HCNC,CPTII,S$GLB, | Performed by: NURSE PRACTITIONER

## 2023-07-13 PROCEDURE — 99999 PR PBB SHADOW E&M-EST. PATIENT-LVL IV: CPT | Mod: PBBFAC,HCNC,, | Performed by: NURSE PRACTITIONER

## 2023-07-13 PROCEDURE — 3044F HG A1C LEVEL LT 7.0%: CPT | Mod: HCNC,CPTII,S$GLB, | Performed by: NURSE PRACTITIONER

## 2023-07-13 PROCEDURE — 3078F PR MOST RECENT DIASTOLIC BLOOD PRESSURE < 80 MM HG: ICD-10-PCS | Mod: HCNC,CPTII,S$GLB, | Performed by: NURSE PRACTITIONER

## 2023-07-13 PROCEDURE — 1159F PR MEDICATION LIST DOCUMENTED IN MEDICAL RECORD: ICD-10-PCS | Mod: HCNC,CPTII,S$GLB, | Performed by: NURSE PRACTITIONER

## 2023-07-13 PROCEDURE — 3078F DIAST BP <80 MM HG: CPT | Mod: HCNC,CPTII,S$GLB, | Performed by: NURSE PRACTITIONER

## 2023-07-13 PROCEDURE — 3074F PR MOST RECENT SYSTOLIC BLOOD PRESSURE < 130 MM HG: ICD-10-PCS | Mod: HCNC,CPTII,S$GLB, | Performed by: NURSE PRACTITIONER

## 2023-07-13 PROCEDURE — 3008F PR BODY MASS INDEX (BMI) DOCUMENTED: ICD-10-PCS | Mod: HCNC,CPTII,S$GLB, | Performed by: NURSE PRACTITIONER

## 2023-07-13 PROCEDURE — 36415 COLL VENOUS BLD VENIPUNCTURE: CPT | Mod: HCNC,PO | Performed by: NURSE PRACTITIONER

## 2023-07-13 PROCEDURE — 1160F RVW MEDS BY RX/DR IN RCRD: CPT | Mod: HCNC,CPTII,S$GLB, | Performed by: NURSE PRACTITIONER

## 2023-07-13 PROCEDURE — 3074F SYST BP LT 130 MM HG: CPT | Mod: HCNC,CPTII,S$GLB, | Performed by: NURSE PRACTITIONER

## 2023-07-13 PROCEDURE — 99999 PR PBB SHADOW E&M-EST. PATIENT-LVL IV: ICD-10-PCS | Mod: PBBFAC,HCNC,, | Performed by: NURSE PRACTITIONER

## 2023-07-13 PROCEDURE — 99214 PR OFFICE/OUTPT VISIT, EST, LEVL IV, 30-39 MIN: ICD-10-PCS | Mod: HCNC,S$GLB,, | Performed by: NURSE PRACTITIONER

## 2023-07-13 RX ORDER — CYCLOBENZAPRINE HCL 10 MG
10 TABLET ORAL 3 TIMES DAILY
Qty: 42 TABLET | Refills: 0 | Status: SHIPPED | OUTPATIENT
Start: 2023-07-13 | End: 2023-07-27

## 2023-07-13 NOTE — PROGRESS NOTES
Subjective:       Patient ID: Cat Denson is a 43 y.o. female.    Chief Complaint: Follow-up (3 month follow up)    HPI      Patient presents today for chronic conditons follow up. Last visit with PCP-Nena on 4/13/23  PMH SLE, CKD3, gastroparesis, bipolar disorder, fibromyalgia, CVA w/ L-side weakness, and hx RTA.     6/21/23 Hospital stay-acute on chronic kidney injury and likely starvation ketoacidosis She has history of renal tubular acidosis. Started on bicarb drip with noted improvement to CO2 on labs. Continued on IVF without bicarb. discharged on PO zofran and phenergan.       Still having diarrhea this morning-no blood or mucous soft    Botox injection -left arm hemiparesis/ spasm-    5/8/23 Psych-[facility]: bipolar 1    10/26/22 Cardiology-: history cerebral artery occlusion with residual right kiko paresis-on xarelto  6/30/22 Rheumatology-[facility]: lupus-She is now off Plaquenil-As long as there is no evidence of active lupus, she does not need prophylactic therapy    Past Medical History:   Diagnosis Date    Acid reflux     Allergy     Anemia     Anticoagulated 12/29/2015    Anxiety     Asthma     Bipolar 1 disorder     Bronchospasm with bronchitis, acute     Chronic daily headache 9/11/2018    Chronic kidney disease     Chronic pain     Depression     bipolar 2    Fibromyalgia     Gastroparesis     History of right MCA stroke 11/25/2015    Hx of psychiatric care     Lactose intolerance     Lupus     Mixed hyperlipidemia 11/28/2018    Psychiatric problem     Renal tubular acidosis     Seizure     Sjogren's syndrome     Smoker     Stroke 11-    Substance abuse-kratom daily use 8/30/2020    Suicide attempt     overdosed on a bottle of paxil, muscle relaxers, & zoloft    Therapy        Review of patient's allergies indicates:   Allergen Reactions    Metoclopramide hcl Anaphylaxis     Involuntary mouth movements     Amoxicillin-pot clavulanate      2 other medications    Amoxil  [amoxicillin]      hives    Avelox [moxifloxacin]      itching    Benadryl [diphenhydramine hcl]      Lowers seizure threshold     Coumadin [warfarin]      toxicity    Quinazolinones      Lowers seizure threshold     Ultram [tramadol]      Lowers seizure threshold     Wellbutrin [bupropion hcl]      Lowers seizure threshold          Current Outpatient Medications:     ARIPiprazole (ABILIFY) 10 MG Tab, Take 1 tablet (10 mg total) by mouth once daily., Disp: 30 tablet, Rfl: 2    cyclobenzaprine (FLEXERIL) 10 MG tablet, Take 1 tablet (10 mg total) by mouth 3 (three) times daily. for 14 days, Disp: 42 tablet, Rfl: 0    divalproex (DEPAKOTE) 250 MG EC tablet, TAKE 1 TABLET BY MOUTH EVERY MORNING AND TAKE 2 TABLETS BY MOUTH EVERY NIGHT AT BEDTIME, Disp: 90 tablet, Rfl: 2    famotidine (PEPCID) 40 MG tablet, TAKE 1 TABLET(40 MG) BY MOUTH EVERY EVENING, Disp: 30 tablet, Rfl: 3    magnesium oxide (MAG-OX) 400 mg (241.3 mg magnesium) tablet, Take 1 tablet (400 mg total) by mouth once daily., Disp: 90 tablet, Rfl: 1    mirtazapine (REMERON) 30 MG tablet, TAKE 1 TABLET(30 MG) BY MOUTH EVERY EVENING, Disp: 30 tablet, Rfl: 2    ondansetron (ZOFRAN) 8 MG tablet, Take 1 tablet (8 mg total) by mouth every 8 (eight) hours as needed for Nausea., Disp: 90 tablet, Rfl: 0    pantoprazole (PROTONIX) 40 MG tablet, Take 1 tablet (40 mg total) by mouth once daily., Disp: 90 tablet, Rfl: 3    propranoloL (INDERAL LA) 80 MG 24 hr capsule, Take 1 capsule (80 mg total) by mouth once daily., Disp: 30 capsule, Rfl: 11    rivaroxaban (XARELTO) 20 mg Tab, TAKE 1 TABLET(20 MG) BY MOUTH DAILY WITH THE EVENING MEAL AND DINNER, Disp: 90 tablet, Rfl: 3    sodium bicarbonate 650 MG tablet, Take 1 tablet (650 mg total) by mouth 2 (two) times daily., Disp: 60 tablet, Rfl: 5    traZODone (DESYREL) 100 MG tablet, Take three tablets po nightly prn insomnia, Disp: 90 tablet, Rfl: 2    BOTOX 200 unit SolR, , Disp: , Rfl:     gabapentin (NEURONTIN) 800 MG tablet,  "TAKE 1 TABLET(800 MG) BY MOUTH TWICE DAILY, Disp: 180 tablet, Rfl: 3    LIDOcaine-prilocaine (EMLA) cream, APPLY TOPICALLY TO THE AFFECTED AREA AS NEEDED, Disp: , Rfl:     Review of Systems   Constitutional:  Negative for unexpected weight change.   HENT:  Negative for trouble swallowing.    Eyes:  Negative for visual disturbance.   Respiratory:  Negative for shortness of breath.    Cardiovascular:  Negative for chest pain, palpitations and leg swelling.   Gastrointestinal:  Negative for blood in stool.   Genitourinary:  Negative for hematuria.   Skin:  Negative for rash.   Allergic/Immunologic: Negative for immunocompromised state.   Neurological:  Negative for headaches.   Hematological:  Does not bruise/bleed easily.   Psychiatric/Behavioral:  Negative for agitation. The patient is not nervous/anxious.      Objective:      /72 (BP Location: Right arm, Patient Position: Sitting, BP Method: Small (Manual))   Pulse 69   Temp 98.2 °F (36.8 °C) (Oral)   Ht 5' 3" (1.6 m)   Wt 46 kg (101 lb 6.6 oz)   LMP  (LMP Unknown)   SpO2 99%   BMI 17.96 kg/m²   Physical Exam  Constitutional:       Appearance: She is well-developed.   HENT:      Head: Normocephalic.   Cardiovascular:      Rate and Rhythm: Normal rate and regular rhythm.      Heart sounds: Normal heart sounds.   Pulmonary:      Effort: Pulmonary effort is normal.   Musculoskeletal:         General: Normal range of motion.      Comments: Left are contracted   Skin:     General: Skin is warm and dry.   Neurological:      Mental Status: She is alert and oriented to person, place, and time.   Psychiatric:         Behavior: Behavior normal.         Thought Content: Thought content normal.         Judgment: Judgment normal.       Assessment:       1. Mixed hyperlipidemia    2. Acute low back pain without sciatica, unspecified back pain laterality    3. Hemiparesis affecting left side as late effect of cerebrovascular accident    4. Systemic lupus " erythematosus, unspecified SLE type, unspecified organ involvement status    5. History of right MCA stroke    6. Bipolar 1 disorder, depressed, partial remission    7. Elevated liver enzymes    8. Other thrombophilia    9. Stage 3 chronic kidney disease, unspecified whether stage 3a or 3b CKD        Plan:       Mixed hyperlipidemia  Stable, continue management  The 10-year ASCVD risk score (Debbie JEAN BAPTISTE, et al., 2019) is: 1.3%    Values used to calculate the score:      Age: 43 years      Sex: Female      Is Non- : No      Diabetic: No      Tobacco smoker: Yes      Systolic Blood Pressure: 100 mmHg      Is BP treated: No      HDL Cholesterol: 43 mg/dL      Total Cholesterol: 145 mg/dL   Acute low back pain without sciatica, unspecified back pain laterality  -     cyclobenzaprine (FLEXERIL) 10 MG tablet; Take 1 tablet (10 mg total) by mouth 3 (three) times daily. for 14 days  Dispense: 42 tablet; Refill: 0  Stable, continue medication  Hemiparesis affecting left side as late effect of cerebrovascular accident  Stable, continue management  Systemic lupus erythematosus, unspecified SLE type, unspecified organ involvement status  Stable, continue management  History of right MCA stroke  Stable, on xarelto  Bipolar 1 disorder, depressed, partial remission  Stable, on ability  Elevated liver enzymes  -     CBC W/ AUTO DIFFERENTIAL; Future; Expected date: 07/13/2023  -     COMPREHENSIVE METABOLIC PANEL; Future; Expected date: 07/13/2023    Other thrombophilia  Stable, continue management  Stage 3 chronic kidney disease, unspecified whether stage 3a or 3b CKD  Stable and chronic.  Will continue to monitor q3-6 months and control chronic conditions as optimally as possible to preserve function.       Time spent with patient:  30 minutes    Patient with be reevaluated in 3 months or sooner prn    Greater than 50% of this visit was spent counseling as described in above documentation:Yes

## 2023-07-13 NOTE — PATIENT INSTRUCTIONS
Bridger Shelton,     If you are due for any health screening(s) below please notify me so we can arrange them to be ordered and scheduled to maintain your health. Most healthy patients complete it. Don't lose out on improving your health.     Tests to Keep You Healthy    Mammogram: Met on 1/25/2023  Cervical Cancer Screening: Met on 9/27/2021  Tobacco Cessation: NO

## 2023-07-19 DIAGNOSIS — M79.2 NEUROPATHIC PAIN: ICD-10-CM

## 2023-07-19 RX ORDER — GABAPENTIN 800 MG/1
TABLET ORAL
Qty: 180 TABLET | Refills: 3 | Status: ON HOLD | OUTPATIENT
Start: 2023-07-19 | End: 2023-10-06 | Stop reason: HOSPADM

## 2023-07-19 NOTE — TELEPHONE ENCOUNTER
No care due was identified.  [facility] Embedded Care Due Messages. Reference number: [id number].   7/19/2023 7:08:05 AM CDT

## 2023-07-26 PROBLEM — D68.69 OTHER THROMBOPHILIA: Status: ACTIVE | Noted: 2023-07-26

## 2023-08-17 ENCOUNTER — OFFICE VISIT (OUTPATIENT)
Dept: FAMILY MEDICINE | Facility: CLINIC | Age: 43
End: 2023-08-17
Payer: MEDICARE

## 2023-08-17 ENCOUNTER — LAB VISIT (OUTPATIENT)
Dept: LAB | Facility: HOSPITAL | Age: 43
End: 2023-08-17
Attending: FAMILY MEDICINE
Payer: MEDICARE

## 2023-08-17 VITALS
OXYGEN SATURATION: 95 % | WEIGHT: 98.31 LBS | SYSTOLIC BLOOD PRESSURE: 110 MMHG | TEMPERATURE: 99 F | BODY MASS INDEX: 17.42 KG/M2 | RESPIRATION RATE: 12 BRPM | DIASTOLIC BLOOD PRESSURE: 80 MMHG | HEART RATE: 96 BPM | HEIGHT: 63 IN

## 2023-08-17 DIAGNOSIS — I69.354 HEMIPARESIS AFFECTING LEFT SIDE AS LATE EFFECT OF CEREBROVASCULAR ACCIDENT: ICD-10-CM

## 2023-08-17 DIAGNOSIS — Z79.01 LONG TERM CURRENT USE OF ANTICOAGULANT THERAPY: ICD-10-CM

## 2023-08-17 DIAGNOSIS — E83.42 HYPOMAGNESEMIA: ICD-10-CM

## 2023-08-17 DIAGNOSIS — M32.9 SYSTEMIC LUPUS ERYTHEMATOSUS, UNSPECIFIED SLE TYPE, UNSPECIFIED ORGAN INVOLVEMENT STATUS: ICD-10-CM

## 2023-08-17 DIAGNOSIS — E83.39 HYPOPHOSPHATEMIA: ICD-10-CM

## 2023-08-17 DIAGNOSIS — B37.83 PERLECHE WITH CANDIDIASIS: ICD-10-CM

## 2023-08-17 DIAGNOSIS — N18.30 STAGE 3 CHRONIC KIDNEY DISEASE, UNSPECIFIED WHETHER STAGE 3A OR 3B CKD: ICD-10-CM

## 2023-08-17 DIAGNOSIS — E78.2 MIXED HYPERLIPIDEMIA: ICD-10-CM

## 2023-08-17 DIAGNOSIS — R74.8 ELEVATED AMYLASE: ICD-10-CM

## 2023-08-17 DIAGNOSIS — R74.8 ELEVATED AMYLASE: Primary | ICD-10-CM

## 2023-08-17 LAB
ALBUMIN SERPL BCP-MCNC: 4.1 G/DL (ref 3.5–5.2)
ALP SERPL-CCNC: 79 U/L (ref 55–135)
ALT SERPL W/O P-5'-P-CCNC: 11 U/L (ref 10–44)
AMYLASE SERPL-CCNC: 144 U/L (ref 20–110)
ANION GAP SERPL CALC-SCNC: 8 MMOL/L (ref 8–16)
AST SERPL-CCNC: 18 U/L (ref 10–40)
BASOPHILS # BLD AUTO: 0.05 K/UL (ref 0–0.2)
BASOPHILS NFR BLD: 0.9 % (ref 0–1.9)
BILIRUB SERPL-MCNC: 0.2 MG/DL (ref 0.1–1)
BUN SERPL-MCNC: 12 MG/DL (ref 6–20)
CALCIUM SERPL-MCNC: 8.9 MG/DL (ref 8.7–10.5)
CHLORIDE SERPL-SCNC: 112 MMOL/L (ref 95–110)
CO2 SERPL-SCNC: 19 MMOL/L (ref 23–29)
CREAT SERPL-MCNC: 1.3 MG/DL (ref 0.5–1.4)
DIFFERENTIAL METHOD: ABNORMAL
EOSINOPHIL # BLD AUTO: 0.1 K/UL (ref 0–0.5)
EOSINOPHIL NFR BLD: 2.1 % (ref 0–8)
ERYTHROCYTE [DISTWIDTH] IN BLOOD BY AUTOMATED COUNT: 13.6 % (ref 11.5–14.5)
EST. GFR  (NO RACE VARIABLE): 53 ML/MIN/1.73 M^2
GLUCOSE SERPL-MCNC: 65 MG/DL (ref 70–110)
HCT VFR BLD AUTO: 38.6 % (ref 37–48.5)
HGB BLD-MCNC: 12.8 G/DL (ref 12–16)
IMM GRANULOCYTES # BLD AUTO: 0.03 K/UL (ref 0–0.04)
IMM GRANULOCYTES NFR BLD AUTO: 0.6 % (ref 0–0.5)
LIPASE SERPL-CCNC: 51 U/L (ref 4–60)
LYMPHOCYTES # BLD AUTO: 2.2 K/UL (ref 1–4.8)
LYMPHOCYTES NFR BLD: 40.4 % (ref 18–48)
MAGNESIUM SERPL-MCNC: 1.8 MG/DL (ref 1.6–2.6)
MCH RBC QN AUTO: 31.4 PG (ref 27–31)
MCHC RBC AUTO-ENTMCNC: 33.2 G/DL (ref 32–36)
MCV RBC AUTO: 95 FL (ref 82–98)
MONOCYTES # BLD AUTO: 0.4 K/UL (ref 0.3–1)
MONOCYTES NFR BLD: 8.3 % (ref 4–15)
NEUTROPHILS # BLD AUTO: 2.5 K/UL (ref 1.8–7.7)
NEUTROPHILS NFR BLD: 47.7 % (ref 38–73)
NRBC BLD-RTO: 0 /100 WBC
PHOSPHATE SERPL-MCNC: 3.2 MG/DL (ref 2.7–4.5)
PLATELET # BLD AUTO: 285 K/UL (ref 150–450)
PMV BLD AUTO: 11 FL (ref 9.2–12.9)
POTASSIUM SERPL-SCNC: 4.8 MMOL/L (ref 3.5–5.1)
PROT SERPL-MCNC: 7.2 G/DL (ref 6–8.4)
RBC # BLD AUTO: 4.07 M/UL (ref 4–5.4)
SODIUM SERPL-SCNC: 139 MMOL/L (ref 136–145)
WBC # BLD AUTO: 5.32 K/UL (ref 3.9–12.7)

## 2023-08-17 PROCEDURE — 3074F SYST BP LT 130 MM HG: CPT | Mod: HCNC,CPTII,S$GLB, | Performed by: FAMILY MEDICINE

## 2023-08-17 PROCEDURE — 3008F PR BODY MASS INDEX (BMI) DOCUMENTED: ICD-10-PCS | Mod: HCNC,CPTII,S$GLB, | Performed by: FAMILY MEDICINE

## 2023-08-17 PROCEDURE — 3044F PR MOST RECENT HEMOGLOBIN A1C LEVEL <7.0%: ICD-10-PCS | Mod: HCNC,CPTII,S$GLB, | Performed by: FAMILY MEDICINE

## 2023-08-17 PROCEDURE — 1160F RVW MEDS BY RX/DR IN RCRD: CPT | Mod: HCNC,CPTII,S$GLB, | Performed by: FAMILY MEDICINE

## 2023-08-17 PROCEDURE — 3044F HG A1C LEVEL LT 7.0%: CPT | Mod: HCNC,CPTII,S$GLB, | Performed by: FAMILY MEDICINE

## 2023-08-17 PROCEDURE — 83735 ASSAY OF MAGNESIUM: CPT | Mod: HCNC | Performed by: FAMILY MEDICINE

## 2023-08-17 PROCEDURE — 99999 PR PBB SHADOW E&M-EST. PATIENT-LVL III: ICD-10-PCS | Mod: PBBFAC,HCNC,, | Performed by: FAMILY MEDICINE

## 2023-08-17 PROCEDURE — 80053 COMPREHEN METABOLIC PANEL: CPT | Mod: HCNC | Performed by: FAMILY MEDICINE

## 2023-08-17 PROCEDURE — 3079F DIAST BP 80-89 MM HG: CPT | Mod: HCNC,CPTII,S$GLB, | Performed by: FAMILY MEDICINE

## 2023-08-17 PROCEDURE — 1160F PR REVIEW ALL MEDS BY PRESCRIBER/CLIN PHARMACIST DOCUMENTED: ICD-10-PCS | Mod: HCNC,CPTII,S$GLB, | Performed by: FAMILY MEDICINE

## 2023-08-17 PROCEDURE — 3074F PR MOST RECENT SYSTOLIC BLOOD PRESSURE < 130 MM HG: ICD-10-PCS | Mod: HCNC,CPTII,S$GLB, | Performed by: FAMILY MEDICINE

## 2023-08-17 PROCEDURE — 99214 PR OFFICE/OUTPT VISIT, EST, LEVL IV, 30-39 MIN: ICD-10-PCS | Mod: HCNC,S$GLB,, | Performed by: FAMILY MEDICINE

## 2023-08-17 PROCEDURE — 1159F PR MEDICATION LIST DOCUMENTED IN MEDICAL RECORD: ICD-10-PCS | Mod: HCNC,CPTII,S$GLB, | Performed by: FAMILY MEDICINE

## 2023-08-17 PROCEDURE — 82150 ASSAY OF AMYLASE: CPT | Mod: HCNC | Performed by: FAMILY MEDICINE

## 2023-08-17 PROCEDURE — 99999 PR PBB SHADOW E&M-EST. PATIENT-LVL III: CPT | Mod: PBBFAC,HCNC,, | Performed by: FAMILY MEDICINE

## 2023-08-17 PROCEDURE — 3079F PR MOST RECENT DIASTOLIC BLOOD PRESSURE 80-89 MM HG: ICD-10-PCS | Mod: HCNC,CPTII,S$GLB, | Performed by: FAMILY MEDICINE

## 2023-08-17 PROCEDURE — 83690 ASSAY OF LIPASE: CPT | Mod: HCNC | Performed by: FAMILY MEDICINE

## 2023-08-17 PROCEDURE — 36415 COLL VENOUS BLD VENIPUNCTURE: CPT | Mod: HCNC,PO | Performed by: FAMILY MEDICINE

## 2023-08-17 PROCEDURE — 1159F MED LIST DOCD IN RCRD: CPT | Mod: HCNC,CPTII,S$GLB, | Performed by: FAMILY MEDICINE

## 2023-08-17 PROCEDURE — 3008F BODY MASS INDEX DOCD: CPT | Mod: HCNC,CPTII,S$GLB, | Performed by: FAMILY MEDICINE

## 2023-08-17 PROCEDURE — 84100 ASSAY OF PHOSPHORUS: CPT | Mod: HCNC | Performed by: FAMILY MEDICINE

## 2023-08-17 PROCEDURE — 99214 OFFICE O/P EST MOD 30 MIN: CPT | Mod: HCNC,S$GLB,, | Performed by: FAMILY MEDICINE

## 2023-08-17 PROCEDURE — 85025 COMPLETE CBC W/AUTO DIFF WBC: CPT | Mod: HCNC | Performed by: FAMILY MEDICINE

## 2023-08-17 RX ORDER — CLOTRIMAZOLE 1 %
CREAM (GRAM) TOPICAL 2 TIMES DAILY
Qty: 60 G | Refills: 0 | Status: SHIPPED | OUTPATIENT
Start: 2023-08-17 | End: 2023-10-02

## 2023-08-17 RX ORDER — FLUCONAZOLE 150 MG/1
TABLET ORAL
Qty: 2 TABLET | Refills: 0 | Status: SHIPPED | OUTPATIENT
Start: 2023-08-17 | End: 2023-10-02

## 2023-08-17 NOTE — PROGRESS NOTES
Subjective:       Patient ID: Cat Denson is a 43 y.o. female.    Chief Complaint: Follow-up (6mth f/u)    HPI  Review of Systems   Constitutional:  Negative for fatigue and unexpected weight change.   Respiratory:  Negative for chest tightness and shortness of breath.    Cardiovascular:  Negative for chest pain, palpitations and leg swelling.   Gastrointestinal:  Negative for abdominal pain.   Musculoskeletal:  Negative for arthralgias.   Neurological:  Negative for dizziness, syncope, light-headedness and headaches.       Patient Active Problem List   Diagnosis    Chronic pain    Fibromyalgia    Lupus (systemic lupus erythematosus)    Chronic asthma without complication    Hemiparesis affecting left side as late effect of cerebrovascular accident    Hemiplegic migraine    CKD (chronic kidney disease) stage 3, GFR 30-59 ml/min    Gastroesophageal reflux disease without esophagitis    Long term current use of anticoagulant therapy    Primary insomnia    Iron deficiency anemia    Cigarette nicotine dependence without complication    Homocysteinemia    Metabolic acidemia    Complex partial epilepsy with generalization and with intractable epilepsy    Chronic daily headache    Acute renal failure superimposed on stage 3 chronic kidney disease    Mixed hyperlipidemia    Gastroparesis    Bipolar 1 disorder, depressed, partial remission    Panic disorder with agoraphobia    Cannabis use disorder, moderate, dependence    Substance abuse-kratom daily use    Left hemiparesis    Spasticity as late effect of cerebrovascular accident (CVA)    Toxicity, late effect, due to drug, medicine, or biological substance    Intractable vomiting with nausea    Ketoacidosis    Elevated LFTs    Other thrombophilia     Patient is here for a chronic conditions follow up.    Reviewed labs   Ckd stage 3   h/o CVA on lipitor and xarelto     GI NP Erik/Dr. Kimble investigating elevated amylase.  Lipase normal and so are LFTs. EGD  1/2022 showing gastritis, small hiatal hernia. CT abd 12/2021 Chest; old granulomatous disease.  Small less than 6 mm nodules.  For multiple solid nodules all <6 mm, Fleischner Society 2017 guidelines recommend no routine follow up for a low risk patient, or follow up with non-contrast chest CT at 12 months after discovery in a high risk patient.   Abdomen and pelvis; prominent amount of stool within the colon.  Mild diverticulosis versus incomplete distention of colon without CT findings of acute diverticulitis.  Normal appearance of the appendix.  Findings suggesting complex thick-walled right ovarian cyst and suggest follow-up pelvic ultrasound to be sure that this resolves  Has chronic gastroparesis vomiting-taking phenergan, pepcid and protonix. EGD 6/2023 Normal esophagus.                          - Z-line regular, 36 cm from the incisors.                          - Small hiatal hernia.                          - Gastritis. Biopsied.                          - Normal examined duodenum.   Admitted 6/19/23 for vomiting and diarrhea Her labs in the ER are significant for acute on chronic kidney injury, metabolic acidosis with acidemia on VBG, serum and urine ketoacidosis, and elevated anion gap. Given 1L LR bolus and zofran in ER.     Ob/GYN Dr. Vargas 9/2021 u/s pelvis no abnl. mammo 1/2022 neg. PAP and HPV neg 2021    Psych Dr. Cruz. Is on depakote chronically for bipolar 1, chronic depression. H/o substance abuse-[facility]. Has had multiple psych admissions for depression /SI and attempt 19. self injury-cutting, impulse control. Followed at [facility] for intensive outpatient therapy 2020     Neuro Dr. Norman h/o RMCA CVA with residual left hemiparesis 2015 embolic from heart on xarelto . H/o grand mal seizures- on depakote . Started > 13 years ago.  Also h/o psuedoseizures.  Was in inpatient epilepsy monitoring unit 9/18. That was last sz activity. Neurology Dr. Norman     Psych Dr. Cruz- bipolar 1      Rheum Dr. Benoit- SLE complications of CKD, CVA 2015 with residual chronic left hemiplegia and seizure d/o        Nephrology Dr. Lovell CKD stage 3     Optometry Dr. Buchanan/Gus for homonymous chikis field defects  Objective:      Physical Exam  Vitals and nursing note reviewed.   Constitutional:       Appearance: She is well-developed.   HENT:      Mouth/Throat:     Cardiovascular:      Rate and Rhythm: Normal rate and regular rhythm.      Heart sounds: Normal heart sounds.   Pulmonary:      Effort: Pulmonary effort is normal.      Breath sounds: Normal breath sounds.   Skin:     General: Skin is warm and dry.   Neurological:      Mental Status: She is alert and oriented to person, place, and time.         Assessment:       1. Elevated amylase    2. Hypomagnesemia    3. Hypophosphatemia    4. Perleche with candidiasis    5. Systemic lupus erythematosus, unspecified SLE type, unspecified organ involvement status    6. Mixed hyperlipidemia    7. Hemiparesis affecting left side as late effect of cerebrovascular accident    8. Stage 3 chronic kidney disease, unspecified whether stage 3a or 3b CKD    9. Long term current use of anticoagulant therapy        Plan:         1. Elevated amylase  Continue GI work up  - CBC Auto Differential; Future  - Comprehensive Metabolic Panel; Future  - AMYLASE; Future  - LIPASE; Future  - Amylase, urine, timed 24 Hours; Future    2. Hypomagnesemia  Screen and treat as indicated:    - Magnesium; Future    3. Hypophosphatemia  Screen and treat as indicated:    - PHOSPHORUS; Future    4. Perleche with candidiasis  treat  - fluconazole (DIFLUCAN) 150 MG Tab; 1 po x 1 , may repeat 48 h  Dispense: 2 tablet; Refill: 0  - clotrimazole (LOTRIMIN) 1 % cream; Apply topically 2 (two) times daily.  Dispense: 60 g; Refill: 0    5. Systemic lupus erythematosus, unspecified SLE type, unspecified organ involvement status  Cont current mgmt    6. Mixed hyperlipidemia  Stable condition.  Continue  current medications.  Will adjust based on lab findings or if condition changes.      7. Hemiparesis affecting left side as late effect of cerebrovascular accident  stable    8. Stage 3 chronic kidney disease, unspecified whether stage 3a or 3b CKD  Stable and chronic.  Will continue to monitor q3-6 months and control chronic conditions as optimally as possible to preserve function.      9. Long term current use of anticoagulant therapy  Cont xarelto      Time spent with patient: 20 minutes    Patient with be reevaluated in 3 months or sooner prn    Greater than 50% of this visit was spent counseling as described in above documentation:Yes

## 2023-08-20 ENCOUNTER — PATIENT MESSAGE (OUTPATIENT)
Dept: FAMILY MEDICINE | Facility: CLINIC | Age: 43
End: 2023-08-20
Payer: MEDICARE

## 2023-08-20 DIAGNOSIS — K13.0 CHEILITIS: Primary | ICD-10-CM

## 2023-08-22 ENCOUNTER — PATIENT MESSAGE (OUTPATIENT)
Dept: FAMILY MEDICINE | Facility: CLINIC | Age: 43
End: 2023-08-22
Payer: MEDICARE

## 2023-08-22 NOTE — TELEPHONE ENCOUNTER
Unfortunately this could be an inflammatory related to your autoimmune disorder. I placed a referral to ENT Dr. Singh but would recommend contacting your rheumatologist to seek advice.

## 2023-08-30 ENCOUNTER — OFFICE VISIT (OUTPATIENT)
Dept: RHEUMATOLOGY | Facility: CLINIC | Age: 43
End: 2023-08-30
Payer: MEDICARE

## 2023-08-30 ENCOUNTER — TELEPHONE (OUTPATIENT)
Dept: ADMINISTRATIVE | Facility: CLINIC | Age: 43
End: 2023-08-30
Payer: MEDICARE

## 2023-08-30 ENCOUNTER — LAB VISIT (OUTPATIENT)
Dept: LAB | Facility: HOSPITAL | Age: 43
End: 2023-08-30
Attending: INTERNAL MEDICINE
Payer: MEDICARE

## 2023-08-30 VITALS
HEIGHT: 63 IN | WEIGHT: 95 LBS | SYSTOLIC BLOOD PRESSURE: 170 MMHG | DIASTOLIC BLOOD PRESSURE: 117 MMHG | HEART RATE: 113 BPM | BODY MASS INDEX: 16.83 KG/M2

## 2023-08-30 DIAGNOSIS — N18.30 STAGE 3 CHRONIC KIDNEY DISEASE, UNSPECIFIED WHETHER STAGE 3A OR 3B CKD: Chronic | ICD-10-CM

## 2023-08-30 DIAGNOSIS — G81.94 LEFT HEMIPARESIS: ICD-10-CM

## 2023-08-30 DIAGNOSIS — M32.9 SYSTEMIC LUPUS ERYTHEMATOSUS, UNSPECIFIED SLE TYPE, UNSPECIFIED ORGAN INVOLVEMENT STATUS: ICD-10-CM

## 2023-08-30 DIAGNOSIS — M32.9 SYSTEMIC LUPUS ERYTHEMATOSUS, UNSPECIFIED SLE TYPE, UNSPECIFIED ORGAN INVOLVEMENT STATUS: Primary | ICD-10-CM

## 2023-08-30 LAB
C3 SERPL-MCNC: 91 MG/DL (ref 50–180)
C4 SERPL-MCNC: 30 MG/DL (ref 11–44)

## 2023-08-30 PROCEDURE — 86160 COMPLEMENT ANTIGEN: CPT | Mod: HCNC | Performed by: INTERNAL MEDICINE

## 2023-08-30 PROCEDURE — 99214 OFFICE O/P EST MOD 30 MIN: CPT | Mod: HCNC,S$GLB,, | Performed by: INTERNAL MEDICINE

## 2023-08-30 PROCEDURE — 3044F HG A1C LEVEL LT 7.0%: CPT | Mod: HCNC,CPTII,S$GLB, | Performed by: INTERNAL MEDICINE

## 2023-08-30 PROCEDURE — 3080F DIAST BP >= 90 MM HG: CPT | Mod: HCNC,CPTII,S$GLB, | Performed by: INTERNAL MEDICINE

## 2023-08-30 PROCEDURE — 3077F PR MOST RECENT SYSTOLIC BLOOD PRESSURE >= 140 MM HG: ICD-10-PCS | Mod: HCNC,CPTII,S$GLB, | Performed by: INTERNAL MEDICINE

## 2023-08-30 PROCEDURE — 1159F MED LIST DOCD IN RCRD: CPT | Mod: HCNC,CPTII,S$GLB, | Performed by: INTERNAL MEDICINE

## 2023-08-30 PROCEDURE — 3080F PR MOST RECENT DIASTOLIC BLOOD PRESSURE >= 90 MM HG: ICD-10-PCS | Mod: HCNC,CPTII,S$GLB, | Performed by: INTERNAL MEDICINE

## 2023-08-30 PROCEDURE — 3044F PR MOST RECENT HEMOGLOBIN A1C LEVEL <7.0%: ICD-10-PCS | Mod: HCNC,CPTII,S$GLB, | Performed by: INTERNAL MEDICINE

## 2023-08-30 PROCEDURE — 99999 PR PBB SHADOW E&M-EST. PATIENT-LVL IV: ICD-10-PCS | Mod: PBBFAC,HCNC,, | Performed by: INTERNAL MEDICINE

## 2023-08-30 PROCEDURE — 3008F PR BODY MASS INDEX (BMI) DOCUMENTED: ICD-10-PCS | Mod: HCNC,CPTII,S$GLB, | Performed by: INTERNAL MEDICINE

## 2023-08-30 PROCEDURE — 1160F RVW MEDS BY RX/DR IN RCRD: CPT | Mod: HCNC,CPTII,S$GLB, | Performed by: INTERNAL MEDICINE

## 2023-08-30 PROCEDURE — 99214 PR OFFICE/OUTPT VISIT, EST, LEVL IV, 30-39 MIN: ICD-10-PCS | Mod: HCNC,S$GLB,, | Performed by: INTERNAL MEDICINE

## 2023-08-30 PROCEDURE — 1160F PR REVIEW ALL MEDS BY PRESCRIBER/CLIN PHARMACIST DOCUMENTED: ICD-10-PCS | Mod: HCNC,CPTII,S$GLB, | Performed by: INTERNAL MEDICINE

## 2023-08-30 PROCEDURE — 36415 COLL VENOUS BLD VENIPUNCTURE: CPT | Mod: HCNC | Performed by: INTERNAL MEDICINE

## 2023-08-30 PROCEDURE — 3077F SYST BP >= 140 MM HG: CPT | Mod: HCNC,CPTII,S$GLB, | Performed by: INTERNAL MEDICINE

## 2023-08-30 PROCEDURE — 86160 COMPLEMENT ANTIGEN: CPT | Mod: 59,HCNC | Performed by: INTERNAL MEDICINE

## 2023-08-30 PROCEDURE — 1159F PR MEDICATION LIST DOCUMENTED IN MEDICAL RECORD: ICD-10-PCS | Mod: HCNC,CPTII,S$GLB, | Performed by: INTERNAL MEDICINE

## 2023-08-30 PROCEDURE — 3008F BODY MASS INDEX DOCD: CPT | Mod: HCNC,CPTII,S$GLB, | Performed by: INTERNAL MEDICINE

## 2023-08-30 PROCEDURE — 99999 PR PBB SHADOW E&M-EST. PATIENT-LVL IV: CPT | Mod: PBBFAC,HCNC,, | Performed by: INTERNAL MEDICINE

## 2023-08-30 PROCEDURE — 86225 DNA ANTIBODY NATIVE: CPT | Mod: HCNC | Performed by: INTERNAL MEDICINE

## 2023-08-30 ASSESSMENT — ROUTINE ASSESSMENT OF PATIENT INDEX DATA (RAPID3)
PATIENT GLOBAL ASSESSMENT SCORE: 2.5
PSYCHOLOGICAL DISTRESS SCORE: 3.3
AM STIFFNESS SCORE: 1, YES
TOTAL RAPID3 SCORE: 2.72
MDHAQ FUNCTION SCORE: 1.1
FATIGUE SCORE: 2
PAIN SCORE: 2
WHEN YOU AWAKENED IN THE MORNING OVER THE LAST WEEK, PLEASE INDICATE THE AMOUNT OF TIME IT TAKES UNTIL YOU ARE AS LIMBER AS YOU WILL BE FOR THE DAY: 2

## 2023-08-30 NOTE — TELEPHONE ENCOUNTER
Called pt; no answer; could not confirm appt or leave message due to line kept ringing; I was calling to confirm pt's in office E[facility] appt on 8/31/23.

## 2023-08-30 NOTE — PROGRESS NOTES
8/29/2023     6:43 AM   Rapid3 Question Responses and Scores   MDHAQ Score 1.1   Psychologic Score 3.3   Pain Score 2   When you awakened in the morning OVER THE LAST WEEK, did you feel stiff? Yes   If Yes, please indicate the number of hours until you are as limber as you will be for the day 2   Fatigue Score 2   Global Health Score 2.5   RAPID3 Score 2.72        Answers submitted by the patient for this visit:  Rheumatology Questionnaire (Submitted on 8/29/2023)  fever: No  eye redness: No  mouth sores: Yes  headaches: Yes  shortness of breath: No  chest pain: No  trouble swallowing: No  diarrhea: No  constipation: No  unexpected weight change: No  genital sore: No  dysuria: No  During the last 3 days, have you had a skin rash?: No  Bruises or bleeds easily: No  cough: No

## 2023-08-31 LAB — DSDNA AB SER-ACNC: NORMAL [IU]/ML

## 2023-08-31 NOTE — PROGRESS NOTES
History of present illness: 43-year-old female I first saw in 2013.  She had a 10 year history of lupus although she had also been diagnosed as having Sjogren syndrome.  She had high titer TRISTAN with positive SSA and SSB antibody.  She had dry eye and dry mouth.  She had a livido pattern on her arms but not her legs.  She had no evidence of active lupus.  Laboratories at that time showed no evidence of lupus anticoagulant or anticardiolipin antibodies.  Her main problem was chronic pain secondary to fibromyalgia.  She also had a history of gastroparesis..  She is no longer on Plaquenil because of eye toxicity.     In 2015 she had an embolic stroke which left her with left-sided weakness.  She has had some trouble with spasms since then.  She is seeing Physical Medicine and gets Botox injections.    She was hospitalized recently for nausea and vomiting.  She is still having GI problems.  She apparently had an endoscopy.  She is been having problems with diarrhea.  She had elevated amylase.  She is no longer taking Flexeril.  It was not helping her spasm.      She is had no unexplained fevers.  She continues to complain of headache.  She takes Tylenol and caffeine.  She is had no rash or conjunctivitis.  She is had sores under her dentures but otherwise no oral ulcers.  She complains of dry eye and mouth.  She is had no pleurisy.  She had an episode of swelling in her right knee.  She was treated with prednisone and it resolved.  She is had no other joint complaints.    Physical examination:   Skin:  No rashes   ENT:  Adequate tears in saliva.  No conjunctivitis or oral ulcers.    Chest: Clear to auscultation  Cardiac:  No murmurs, gallops, rubs  Abdomen:  Liver edge is palpable but not tender.  She is no tenderness to palpation.  Extremities:  She has a left spastic hemiparesis of the upper extremity    Musculoskeletal:  No synovitis    Assessment:  No clinical evidence of active SLE     Plans:  1.  Laboratory  studies obtained  2. No therapeutic intervention recommended at this time   3.  Return in 12 months   Answers submitted by the patient for this visit:  Rheumatology Questionnaire (Submitted on 8/29/2023)  fever: No  eye redness: No  mouth sores: Yes  headaches: Yes  shortness of breath: No  chest pain: No  trouble swallowing: No  diarrhea: No  constipation: No  unexpected weight change: No  genital sore: No  dysuria: No  During the last 3 days, have you had a skin rash?: No  Bruises or bleeds easily: No  cough: No

## 2023-09-13 ENCOUNTER — HOSPITAL ENCOUNTER (OUTPATIENT)
Facility: HOSPITAL | Age: 43
Discharge: PSYCHIATRIC OR SUBSTANCE USE FACILITY | End: 2023-09-15
Attending: EMERGENCY MEDICINE | Admitting: INTERNAL MEDICINE
Payer: MEDICARE

## 2023-09-13 DIAGNOSIS — T50.901A MEDICATION OVERDOSE: ICD-10-CM

## 2023-09-13 DIAGNOSIS — E87.20 METABOLIC ACIDOSIS: Primary | ICD-10-CM

## 2023-09-13 DIAGNOSIS — T50.902A INTENTIONAL OVERDOSE: ICD-10-CM

## 2023-09-13 DIAGNOSIS — R07.9 CHEST PAIN: ICD-10-CM

## 2023-09-13 DIAGNOSIS — T50.901A OVERDOSE: ICD-10-CM

## 2023-09-13 DIAGNOSIS — N17.9 AKI (ACUTE KIDNEY INJURY): ICD-10-CM

## 2023-09-13 LAB
ALBUMIN SERPL BCP-MCNC: 4.7 G/DL (ref 3.5–5.2)
ALLENS TEST: ABNORMAL
ALP SERPL-CCNC: 63 U/L (ref 55–135)
ALT SERPL W/O P-5'-P-CCNC: 27 U/L (ref 10–44)
AMPHET+METHAMPHET UR QL: NEGATIVE
ANION GAP SERPL CALC-SCNC: 14 MMOL/L (ref 8–16)
APAP SERPL-MCNC: 4 UG/ML (ref 10–20)
AST SERPL-CCNC: 26 U/L (ref 10–40)
B-OH-BUTYR BLD STRIP-SCNC: 2.4 MMOL/L (ref 0–0.5)
BARBITURATES UR QL SCN>200 NG/ML: NEGATIVE
BASOPHILS # BLD AUTO: 0.04 K/UL (ref 0–0.2)
BASOPHILS NFR BLD: 0.7 % (ref 0–1.9)
BENZODIAZ UR QL SCN>200 NG/ML: NEGATIVE
BILIRUB SERPL-MCNC: 0.4 MG/DL (ref 0.1–1)
BILIRUB UR QL STRIP: NEGATIVE
BUN SERPL-MCNC: 13 MG/DL (ref 6–20)
BZE UR QL SCN: NEGATIVE
CALCIUM SERPL-MCNC: 9 MG/DL (ref 8.7–10.5)
CANNABINOIDS UR QL SCN: NEGATIVE
CHLORIDE SERPL-SCNC: 111 MMOL/L (ref 95–110)
CLARITY UR: CLEAR
CO2 SERPL-SCNC: 12 MMOL/L (ref 23–29)
COLOR UR: COLORLESS
CREAT SERPL-MCNC: 1.9 MG/DL (ref 0.5–1.4)
CREAT UR-MCNC: 41.2 MG/DL (ref 15–325)
DELSYS: ABNORMAL
DIFFERENTIAL METHOD: ABNORMAL
EOSINOPHIL # BLD AUTO: 0 K/UL (ref 0–0.5)
EOSINOPHIL NFR BLD: 0.4 % (ref 0–8)
ERYTHROCYTE [DISTWIDTH] IN BLOOD BY AUTOMATED COUNT: 13.4 % (ref 11.5–14.5)
EST. GFR  (NO RACE VARIABLE): 34 ML/MIN/1.73 M^2
ETHANOL SERPL-MCNC: <10 MG/DL
FIO2: 21
GLUCOSE SERPL-MCNC: 81 MG/DL (ref 70–110)
GLUCOSE UR QL STRIP: NEGATIVE
HCO3 UR-SCNC: 16.9 MMOL/L (ref 24–28)
HCT VFR BLD AUTO: 36.6 % (ref 37–48.5)
HGB BLD-MCNC: 12.2 G/DL (ref 12–16)
HGB UR QL STRIP: NEGATIVE
IMM GRANULOCYTES # BLD AUTO: 0.05 K/UL (ref 0–0.04)
IMM GRANULOCYTES NFR BLD AUTO: 0.9 % (ref 0–0.5)
KETONES UR QL STRIP: NEGATIVE
LACTATE SERPL-SCNC: 0.8 MMOL/L (ref 0.5–2.2)
LEUKOCYTE ESTERASE UR QL STRIP: NEGATIVE
LYMPHOCYTES # BLD AUTO: 1.3 K/UL (ref 1–4.8)
LYMPHOCYTES NFR BLD: 23.6 % (ref 18–48)
MAGNESIUM SERPL-MCNC: 1.7 MG/DL (ref 1.6–2.6)
MCH RBC QN AUTO: 30.7 PG (ref 27–31)
MCHC RBC AUTO-ENTMCNC: 33.3 G/DL (ref 32–36)
MCV RBC AUTO: 92 FL (ref 82–98)
METHADONE UR QL SCN>300 NG/ML: NEGATIVE
MODE: ABNORMAL
MONOCYTES # BLD AUTO: 0.5 K/UL (ref 0.3–1)
MONOCYTES NFR BLD: 8.7 % (ref 4–15)
NEUTROPHILS # BLD AUTO: 3.7 K/UL (ref 1.8–7.7)
NEUTROPHILS NFR BLD: 65.7 % (ref 38–73)
NITRITE UR QL STRIP: NEGATIVE
NRBC BLD-RTO: 0 /100 WBC
OPIATES UR QL SCN: NEGATIVE
PCO2 BLDA: 39.7 MMHG (ref 35–45)
PCP UR QL SCN>25 NG/ML: NEGATIVE
PH SMN: 7.24 [PH] (ref 7.35–7.45)
PH UR STRIP: 6 [PH] (ref 5–8)
PHOSPHATE SERPL-MCNC: 4 MG/DL (ref 2.7–4.5)
PLATELET # BLD AUTO: 243 K/UL (ref 150–450)
PMV BLD AUTO: 10.8 FL (ref 9.2–12.9)
PO2 BLDA: 34 MMHG (ref 40–60)
POC BE: -11 MMOL/L
POC SATURATED O2: 55 % (ref 95–100)
POC TCO2: 18 MMOL/L (ref 24–29)
POTASSIUM SERPL-SCNC: 3.4 MMOL/L (ref 3.5–5.1)
PROT SERPL-MCNC: 7.8 G/DL (ref 6–8.4)
PROT UR QL STRIP: ABNORMAL
RBC # BLD AUTO: 3.97 M/UL (ref 4–5.4)
SAMPLE: ABNORMAL
SITE: ABNORMAL
SODIUM SERPL-SCNC: 137 MMOL/L (ref 136–145)
SP GR UR STRIP: 1 (ref 1–1.03)
SP02: 98
TOXICOLOGY INFORMATION: NORMAL
TSH SERPL DL<=0.005 MIU/L-ACNC: 0.47 UIU/ML (ref 0.4–4)
URN SPEC COLLECT METH UR: ABNORMAL
UROBILINOGEN UR STRIP-ACNC: NEGATIVE EU/DL
WBC # BLD AUTO: 5.63 K/UL (ref 3.9–12.7)

## 2023-09-13 PROCEDURE — 93010 ELECTROCARDIOGRAM REPORT: CPT | Mod: ,,, | Performed by: INTERNAL MEDICINE

## 2023-09-13 PROCEDURE — 82803 BLOOD GASES ANY COMBINATION: CPT

## 2023-09-13 PROCEDURE — 99285 EMERGENCY DEPT VISIT HI MDM: CPT | Mod: 25

## 2023-09-13 PROCEDURE — 84100 ASSAY OF PHOSPHORUS: CPT | Performed by: NURSE PRACTITIONER

## 2023-09-13 PROCEDURE — 25000003 PHARM REV CODE 250: Performed by: EMERGENCY MEDICINE

## 2023-09-13 PROCEDURE — 82010 KETONE BODYS QUAN: CPT | Performed by: NURSE PRACTITIONER

## 2023-09-13 PROCEDURE — 96361 HYDRATE IV INFUSION ADD-ON: CPT

## 2023-09-13 PROCEDURE — 94761 N-INVAS EAR/PLS OXIMETRY MLT: CPT

## 2023-09-13 PROCEDURE — 99900035 HC TECH TIME PER 15 MIN (STAT)

## 2023-09-13 PROCEDURE — 80307 DRUG TEST PRSMV CHEM ANLYZR: CPT | Performed by: EMERGENCY MEDICINE

## 2023-09-13 PROCEDURE — 36415 COLL VENOUS BLD VENIPUNCTURE: CPT | Performed by: EMERGENCY MEDICINE

## 2023-09-13 PROCEDURE — 80143 DRUG ASSAY ACETAMINOPHEN: CPT | Mod: XB | Performed by: EMERGENCY MEDICINE

## 2023-09-13 PROCEDURE — 83735 ASSAY OF MAGNESIUM: CPT | Performed by: NURSE PRACTITIONER

## 2023-09-13 PROCEDURE — 81003 URINALYSIS AUTO W/O SCOPE: CPT | Mod: 59 | Performed by: EMERGENCY MEDICINE

## 2023-09-13 PROCEDURE — 93005 ELECTROCARDIOGRAM TRACING: CPT

## 2023-09-13 PROCEDURE — 96375 TX/PRO/DX INJ NEW DRUG ADDON: CPT

## 2023-09-13 PROCEDURE — 36415 COLL VENOUS BLD VENIPUNCTURE: CPT | Performed by: NURSE PRACTITIONER

## 2023-09-13 PROCEDURE — G0378 HOSPITAL OBSERVATION PER HR: HCPCS

## 2023-09-13 PROCEDURE — 93010 EKG 12-LEAD: ICD-10-PCS | Mod: ,,, | Performed by: INTERNAL MEDICINE

## 2023-09-13 PROCEDURE — 84443 ASSAY THYROID STIM HORMONE: CPT | Performed by: EMERGENCY MEDICINE

## 2023-09-13 PROCEDURE — 83605 ASSAY OF LACTIC ACID: CPT | Performed by: NURSE PRACTITIONER

## 2023-09-13 PROCEDURE — 85025 COMPLETE CBC W/AUTO DIFF WBC: CPT | Performed by: EMERGENCY MEDICINE

## 2023-09-13 PROCEDURE — 63600175 PHARM REV CODE 636 W HCPCS: Performed by: NURSE PRACTITIONER

## 2023-09-13 PROCEDURE — 80053 COMPREHEN METABOLIC PANEL: CPT | Performed by: EMERGENCY MEDICINE

## 2023-09-13 PROCEDURE — 82077 ASSAY SPEC XCP UR&BREATH IA: CPT | Performed by: EMERGENCY MEDICINE

## 2023-09-13 RX ORDER — POTASSIUM CHLORIDE 7.45 MG/ML
10 INJECTION INTRAVENOUS
Status: DISPENSED | OUTPATIENT
Start: 2023-09-13 | End: 2023-09-14

## 2023-09-13 RX ADMIN — POTASSIUM CHLORIDE 10 MEQ: 7.46 INJECTION, SOLUTION INTRAVENOUS at 10:09

## 2023-09-13 RX ADMIN — SODIUM CHLORIDE, POTASSIUM CHLORIDE, SODIUM LACTATE AND CALCIUM CHLORIDE 1000 ML: 600; 310; 30; 20 INJECTION, SOLUTION INTRAVENOUS at 10:09

## 2023-09-13 RX ADMIN — SODIUM CHLORIDE 1000 ML: 0.9 INJECTION, SOLUTION INTRAVENOUS at 08:09

## 2023-09-13 NOTE — ED NOTES
Per poison control, if short acting Trazadone pt requires 4-6 hrs of monitoring with peak onset 1-2 hrs after ingestion. Extended release requires 12-16 hrs monitoring. Recommends supportive care for possible hypotension, bradycardia and somnolence. Monitoring for seizures. Also recommends EKG and if QT interval greater than 450ms to administer Magnesium.

## 2023-09-14 PROBLEM — T50.902A INTENTIONAL OVERDOSE: Status: ACTIVE | Noted: 2023-09-14

## 2023-09-14 LAB
ALBUMIN SERPL BCP-MCNC: 3.7 G/DL (ref 3.5–5.2)
ALP SERPL-CCNC: 57 U/L (ref 55–135)
ALT SERPL W/O P-5'-P-CCNC: 20 U/L (ref 10–44)
ANION GAP SERPL CALC-SCNC: 8 MMOL/L (ref 8–16)
AST SERPL-CCNC: 17 U/L (ref 10–40)
BASOPHILS # BLD AUTO: 0.03 K/UL (ref 0–0.2)
BASOPHILS NFR BLD: 0.7 % (ref 0–1.9)
BILIRUB SERPL-MCNC: 0.2 MG/DL (ref 0.1–1)
BUN SERPL-MCNC: 11 MG/DL (ref 6–20)
CALCIUM SERPL-MCNC: 8.2 MG/DL (ref 8.7–10.5)
CHLORIDE SERPL-SCNC: 115 MMOL/L (ref 95–110)
CO2 SERPL-SCNC: 15 MMOL/L (ref 23–29)
CREAT SERPL-MCNC: 1.8 MG/DL (ref 0.5–1.4)
DIFFERENTIAL METHOD: ABNORMAL
EOSINOPHIL # BLD AUTO: 0 K/UL (ref 0–0.5)
EOSINOPHIL NFR BLD: 0.7 % (ref 0–8)
ERYTHROCYTE [DISTWIDTH] IN BLOOD BY AUTOMATED COUNT: 13.8 % (ref 11.5–14.5)
EST. GFR  (NO RACE VARIABLE): 36 ML/MIN/1.73 M^2
GLUCOSE SERPL-MCNC: 113 MG/DL (ref 70–110)
HCO3 UR-SCNC: 19.4 MMOL/L (ref 24–28)
HCT VFR BLD AUTO: 36.1 % (ref 37–48.5)
HGB BLD-MCNC: 12.5 G/DL (ref 12–16)
IMM GRANULOCYTES # BLD AUTO: 0.02 K/UL (ref 0–0.04)
IMM GRANULOCYTES NFR BLD AUTO: 0.5 % (ref 0–0.5)
LYMPHOCYTES # BLD AUTO: 2.4 K/UL (ref 1–4.8)
LYMPHOCYTES NFR BLD: 54.4 % (ref 18–48)
MAGNESIUM SERPL-MCNC: 1.8 MG/DL (ref 1.6–2.6)
MCH RBC QN AUTO: 31.6 PG (ref 27–31)
MCHC RBC AUTO-ENTMCNC: 34.6 G/DL (ref 32–36)
MCV RBC AUTO: 91 FL (ref 82–98)
MONOCYTES # BLD AUTO: 0.3 K/UL (ref 0.3–1)
MONOCYTES NFR BLD: 7.2 % (ref 4–15)
NEUTROPHILS # BLD AUTO: 1.6 K/UL (ref 1.8–7.7)
NEUTROPHILS NFR BLD: 36.5 % (ref 38–73)
NRBC BLD-RTO: 0 /100 WBC
PCO2 BLDA: 44.1 MMHG (ref 35–45)
PH SMN: 7.25 [PH] (ref 7.35–7.45)
PLATELET # BLD AUTO: 184 K/UL (ref 150–450)
PMV BLD AUTO: 11.9 FL (ref 9.2–12.9)
PO2 BLDA: 26 MMHG (ref 40–60)
POC BE: -8 MMOL/L
POC SATURATED O2: 40 % (ref 95–100)
POC TCO2: 21 MMOL/L (ref 24–29)
POTASSIUM SERPL-SCNC: 4.3 MMOL/L (ref 3.5–5.1)
PROT SERPL-MCNC: 6.3 G/DL (ref 6–8.4)
RBC # BLD AUTO: 3.96 M/UL (ref 4–5.4)
SAMPLE: ABNORMAL
SODIUM SERPL-SCNC: 138 MMOL/L (ref 136–145)
VALPROATE SERPL-MCNC: <12.5 UG/ML (ref 50–100)
WBC # BLD AUTO: 4.43 K/UL (ref 3.9–12.7)

## 2023-09-14 PROCEDURE — 85025 COMPLETE CBC W/AUTO DIFF WBC: CPT | Performed by: NURSE PRACTITIONER

## 2023-09-14 PROCEDURE — 25000003 PHARM REV CODE 250: Performed by: INTERNAL MEDICINE

## 2023-09-14 PROCEDURE — G0425 PR INPT TELEHEALTH CONSULT 30M: ICD-10-PCS | Mod: 95,,, | Performed by: PSYCHIATRY & NEUROLOGY

## 2023-09-14 PROCEDURE — 36415 COLL VENOUS BLD VENIPUNCTURE: CPT | Performed by: NURSE PRACTITIONER

## 2023-09-14 PROCEDURE — 25000003 PHARM REV CODE 250: Performed by: NURSE PRACTITIONER

## 2023-09-14 PROCEDURE — G0378 HOSPITAL OBSERVATION PER HR: HCPCS

## 2023-09-14 PROCEDURE — G0425 INPT/ED TELECONSULT30: HCPCS | Mod: 95,,, | Performed by: PSYCHIATRY & NEUROLOGY

## 2023-09-14 PROCEDURE — 83735 ASSAY OF MAGNESIUM: CPT | Performed by: NURSE PRACTITIONER

## 2023-09-14 PROCEDURE — 96365 THER/PROPH/DIAG IV INF INIT: CPT

## 2023-09-14 PROCEDURE — 82803 BLOOD GASES ANY COMBINATION: CPT

## 2023-09-14 PROCEDURE — 96366 THER/PROPH/DIAG IV INF ADDON: CPT

## 2023-09-14 PROCEDURE — 93005 ELECTROCARDIOGRAM TRACING: CPT

## 2023-09-14 PROCEDURE — 96376 TX/PRO/DX INJ SAME DRUG ADON: CPT

## 2023-09-14 PROCEDURE — 93010 ELECTROCARDIOGRAM REPORT: CPT | Mod: ,,, | Performed by: SPECIALIST

## 2023-09-14 PROCEDURE — 80053 COMPREHEN METABOLIC PANEL: CPT | Performed by: NURSE PRACTITIONER

## 2023-09-14 PROCEDURE — 63600175 PHARM REV CODE 636 W HCPCS: Performed by: NURSE PRACTITIONER

## 2023-09-14 PROCEDURE — S4991 NICOTINE PATCH NONLEGEND: HCPCS | Performed by: INTERNAL MEDICINE

## 2023-09-14 PROCEDURE — 93010 EKG 12-LEAD: ICD-10-PCS | Mod: ,,, | Performed by: SPECIALIST

## 2023-09-14 PROCEDURE — 80164 ASSAY DIPROPYLACETIC ACD TOT: CPT | Performed by: NURSE PRACTITIONER

## 2023-09-14 PROCEDURE — 96368 THER/DIAG CONCURRENT INF: CPT

## 2023-09-14 PROCEDURE — 99900035 HC TECH TIME PER 15 MIN (STAT)

## 2023-09-14 RX ORDER — LANOLIN ALCOHOL/MO/W.PET/CERES
800 CREAM (GRAM) TOPICAL
Status: DISCONTINUED | OUTPATIENT
Start: 2023-09-14 | End: 2023-09-15 | Stop reason: HOSPADM

## 2023-09-14 RX ORDER — AMOXICILLIN 250 MG
1 CAPSULE ORAL 2 TIMES DAILY
Status: DISCONTINUED | OUTPATIENT
Start: 2023-09-14 | End: 2023-09-15 | Stop reason: HOSPADM

## 2023-09-14 RX ORDER — IBUPROFEN 200 MG
24 TABLET ORAL
Status: DISCONTINUED | OUTPATIENT
Start: 2023-09-14 | End: 2023-09-15 | Stop reason: HOSPADM

## 2023-09-14 RX ORDER — TALC
6 POWDER (GRAM) TOPICAL NIGHTLY PRN
Status: DISCONTINUED | OUTPATIENT
Start: 2023-09-14 | End: 2023-09-15 | Stop reason: HOSPADM

## 2023-09-14 RX ORDER — GLUCAGON 1 MG
1 KIT INJECTION
Status: DISCONTINUED | OUTPATIENT
Start: 2023-09-14 | End: 2023-09-15 | Stop reason: HOSPADM

## 2023-09-14 RX ORDER — GABAPENTIN 300 MG/1
300 CAPSULE ORAL 2 TIMES DAILY
Status: DISCONTINUED | OUTPATIENT
Start: 2023-09-14 | End: 2023-09-15 | Stop reason: HOSPADM

## 2023-09-14 RX ORDER — IBUPROFEN 200 MG
1 TABLET ORAL DAILY
Status: DISCONTINUED | OUTPATIENT
Start: 2023-09-14 | End: 2023-09-15 | Stop reason: HOSPADM

## 2023-09-14 RX ORDER — DIVALPROEX SODIUM 250 MG/1
250 TABLET, DELAYED RELEASE ORAL EVERY 12 HOURS
Status: DISCONTINUED | OUTPATIENT
Start: 2023-09-14 | End: 2023-09-15 | Stop reason: HOSPADM

## 2023-09-14 RX ORDER — FAMOTIDINE 20 MG/1
20 TABLET, FILM COATED ORAL NIGHTLY
Status: DISCONTINUED | OUTPATIENT
Start: 2023-09-14 | End: 2023-09-15 | Stop reason: HOSPADM

## 2023-09-14 RX ORDER — POLYETHYLENE GLYCOL 3350 17 G/17G
17 POWDER, FOR SOLUTION ORAL 2 TIMES DAILY PRN
Status: DISCONTINUED | OUTPATIENT
Start: 2023-09-14 | End: 2023-09-15 | Stop reason: HOSPADM

## 2023-09-14 RX ORDER — IBUPROFEN 200 MG
16 TABLET ORAL
Status: DISCONTINUED | OUTPATIENT
Start: 2023-09-14 | End: 2023-09-15 | Stop reason: HOSPADM

## 2023-09-14 RX ORDER — ONDANSETRON 2 MG/ML
4 INJECTION INTRAMUSCULAR; INTRAVENOUS EVERY 6 HOURS PRN
Status: DISCONTINUED | OUTPATIENT
Start: 2023-09-14 | End: 2023-09-15 | Stop reason: HOSPADM

## 2023-09-14 RX ORDER — ACETAMINOPHEN 325 MG/1
650 TABLET ORAL EVERY 8 HOURS PRN
Status: DISCONTINUED | OUTPATIENT
Start: 2023-09-14 | End: 2023-09-15 | Stop reason: HOSPADM

## 2023-09-14 RX ORDER — DEXTROSE MONOHYDRATE AND SODIUM CHLORIDE 5; .45 G/100ML; G/100ML
INJECTION, SOLUTION INTRAVENOUS CONTINUOUS
Status: DISCONTINUED | OUTPATIENT
Start: 2023-09-14 | End: 2023-09-14

## 2023-09-14 RX ORDER — MAG HYDROX/ALUMINUM HYD/SIMETH 200-200-20
30 SUSPENSION, ORAL (FINAL DOSE FORM) ORAL 4 TIMES DAILY PRN
Status: DISCONTINUED | OUTPATIENT
Start: 2023-09-14 | End: 2023-09-15 | Stop reason: HOSPADM

## 2023-09-14 RX ORDER — SODIUM CHLORIDE 0.9 % (FLUSH) 0.9 %
10 SYRINGE (ML) INJECTION EVERY 12 HOURS PRN
Status: DISCONTINUED | OUTPATIENT
Start: 2023-09-14 | End: 2023-09-15 | Stop reason: HOSPADM

## 2023-09-14 RX ORDER — SODIUM BICARBONATE 650 MG/1
650 TABLET ORAL 2 TIMES DAILY
Status: DISCONTINUED | OUTPATIENT
Start: 2023-09-14 | End: 2023-09-14

## 2023-09-14 RX ORDER — SODIUM CHLORIDE, SODIUM LACTATE, POTASSIUM CHLORIDE, CALCIUM CHLORIDE 600; 310; 30; 20 MG/100ML; MG/100ML; MG/100ML; MG/100ML
INJECTION, SOLUTION INTRAVENOUS CONTINUOUS
Status: DISCONTINUED | OUTPATIENT
Start: 2023-09-14 | End: 2023-09-14

## 2023-09-14 RX ORDER — NALOXONE HCL 0.4 MG/ML
0.02 VIAL (ML) INJECTION
Status: DISCONTINUED | OUTPATIENT
Start: 2023-09-14 | End: 2023-09-15 | Stop reason: HOSPADM

## 2023-09-14 RX ADMIN — FAMOTIDINE 20 MG: 20 TABLET, FILM COATED ORAL at 02:09

## 2023-09-14 RX ADMIN — DIVALPROEX SODIUM 250 MG: 250 TABLET, DELAYED RELEASE ORAL at 08:09

## 2023-09-14 RX ADMIN — GABAPENTIN 300 MG: 300 CAPSULE ORAL at 08:09

## 2023-09-14 RX ADMIN — ACETAMINOPHEN 650 MG: 325 TABLET, FILM COATED ORAL at 08:09

## 2023-09-14 RX ADMIN — ACETAMINOPHEN 650 MG: 325 TABLET, FILM COATED ORAL at 10:09

## 2023-09-14 RX ADMIN — Medication 800 MG: at 08:09

## 2023-09-14 RX ADMIN — DOCUSATE SODIUM AND SENNOSIDES 1 TABLET: 8.6; 5 TABLET, FILM COATED ORAL at 02:09

## 2023-09-14 RX ADMIN — SODIUM BICARBONATE: 84 INJECTION, SOLUTION INTRAVENOUS at 10:09

## 2023-09-14 RX ADMIN — SODIUM BICARBONATE: 84 INJECTION, SOLUTION INTRAVENOUS at 12:09

## 2023-09-14 RX ADMIN — FAMOTIDINE 20 MG: 20 TABLET, FILM COATED ORAL at 08:09

## 2023-09-14 RX ADMIN — RIVAROXABAN 15 MG: 15 TABLET, FILM COATED ORAL at 04:09

## 2023-09-14 RX ADMIN — Medication 800 MG: at 12:09

## 2023-09-14 RX ADMIN — NICOTINE 1 PATCH: 14 PATCH TRANSDERMAL at 05:09

## 2023-09-14 RX ADMIN — SODIUM BICARBONATE: 84 INJECTION, SOLUTION INTRAVENOUS at 02:09

## 2023-09-14 RX ADMIN — DOCUSATE SODIUM AND SENNOSIDES 1 TABLET: 8.6; 5 TABLET, FILM COATED ORAL at 08:09

## 2023-09-14 RX ADMIN — THIAMINE HYDROCHLORIDE 300 MG: 100 INJECTION, SOLUTION INTRAMUSCULAR; INTRAVENOUS at 02:09

## 2023-09-14 NOTE — ED PROVIDER NOTES
Encounter Date: 9/13/2023       History     Chief Complaint   Patient presents with    Suicide Attempt     Pt brought to ED via EMS after a suicide attempt, pt reported to EMS that she took 20 trazodone around 2:30pm.        HPI patient is a 43-year-old woman who presents emergency department for suicide attempt by taking approximately 20 trazodone hydrochloride 100 mg tablets around 2:30 p.m..  She states that she feels she is a burden to everybody and does not want to live anymore.  She is a history of a CVA with left-sided deficits  and depends on her roommate to help her with the ADLs.  Review of patient's allergies indicates:   Allergen Reactions    Metoclopramide hcl Anaphylaxis     Involuntary mouth movements     Amoxicillin-pot clavulanate      2 other medications    Amoxil [amoxicillin]      hives    Avelox [moxifloxacin]      itching    Benadryl [diphenhydramine hcl]      Lowers seizure threshold     Coumadin [warfarin]      toxicity    Quinazolinones      Lowers seizure threshold     Ultram [tramadol]      Lowers seizure threshold     Wellbutrin [bupropion hcl]      Lowers seizure threshold      Past Medical History:   Diagnosis Date    Acid reflux     Allergy     Anemia     Anticoagulated 12/29/2015    Anxiety     Asthma     Bipolar 1 disorder     Bronchospasm with bronchitis, acute     Chronic daily headache 9/11/2018    Chronic kidney disease     Chronic pain     Depression     bipolar 2    Fibromyalgia     Gastroparesis     History of right MCA stroke 11/25/2015    Hx of psychiatric care     Lactose intolerance     Lupus     Mixed hyperlipidemia 11/28/2018    Psychiatric problem     Renal tubular acidosis     Seizure     Sjogren's syndrome     Smoker     Stroke 11-    Substance abuse-kratom daily use 8/30/2020    Suicide attempt     overdosed on a bottle of paxil, muscle relaxers, & zoloft    Therapy      Past Surgical History:   Procedure Laterality Date    CHOLECYSTECTOMY      COLONOSCOPY   11/12/2014    Dr. Kimble, repeat at 52 years old for screening    ESOPHAGOGASTRODUODENOSCOPY N/A 1/3/2022    Procedure: EGD (ESOPHAGOGASTRODUODENOSCOPY);  Surgeon: Scout Kimble MD;  [facility];  Service: Endoscopy;  Laterality: N/A;    ESOPHAGOGASTRODUODENOSCOPY N/A 6/12/2023    Procedure: EGD (ESOPHAGOGASTRODUODENOSCOPY);  Surgeon: Scout Kimble MD;  Location: [facility] ENDO;  Service: Endoscopy;  Laterality: N/A;    FLUOROSCOPIC URODYNAMIC STUDY N/A 7/23/2019    Procedure: URODYNAMIC STUDY, FLUOROSCOPIC;  Surgeon: Vanna Martinez MD;  Location: [address];  Service: Urology;  Laterality: N/A;  1 hour    UPPER GASTROINTESTINAL ENDOSCOPY  03/03/2017    Dr. Harris     Family History   Problem Relation Age of Onset    Hypertension Mother     Hyperlipidemia Mother     Psoriasis Mother     Thyroid disease Mother     No Known Problems Father     Post-traumatic stress disorder Brother     Drug abuse Brother     Diabetes Maternal Uncle     Hypertension Maternal Uncle     Alcohol abuse Maternal Uncle     Stroke Maternal Uncle     Scoliosis Paternal Aunt     Heart disease Paternal Uncle     Mental illness Maternal Grandmother     Cancer Maternal Grandmother         lung / brain - smoker    Drug abuse Maternal Grandmother     Hypertension Maternal Grandmother     Hyperlipidemia Maternal Grandmother     Diabetes Maternal Grandmother     Rheum arthritis Maternal Grandmother     Diabetes Mellitus Maternal Grandmother     Heart disease Maternal Grandfather     Hypertension Maternal Grandfather     Alcohol abuse Maternal Grandfather     Osteoarthritis Maternal Grandfather     No Known Problems Paternal Grandmother     Mental illness Paternal Grandfather     Early death Paternal Grandfather         self    Breast cancer Cousin     Breast cancer Other     Colon cancer Neg Hx     Colon polyps Neg Hx     Crohn's disease Neg Hx     Ulcerative colitis Neg Hx     Celiac disease Neg Hx     Lupus Neg Hx     Kidney  disease Neg Hx     Inflammatory bowel disease Neg Hx     Depression Neg Hx     COPD Neg Hx     Asthma Neg Hx     Macular degeneration Neg Hx     Retinal detachment Neg Hx     Glaucoma Neg Hx      Social History     Tobacco Use    Smoking status: Every Day     Current packs/day: 1.00     Average packs/day: 1 pack/day for 15.1 years (15.1 ttl pk-yrs)     Types: Cigarettes     Start date: 11/25/2015    Smokeless tobacco: Never   Substance Use Topics    Alcohol use: No     Alcohol/week: 0.0 standard drinks of alcohol    Drug use: Yes     Types: Marijuana     Comment: smokes occasionally, helps with pain and appetite     Review of Systems   Constitutional:  Negative for fever.   HENT:  Negative for sore throat.    Respiratory:  Negative for shortness of breath.    Cardiovascular:  Negative for chest pain.   Gastrointestinal:  Negative for nausea.   Genitourinary:  Negative for dysuria.   Musculoskeletal:  Negative for back pain.   Skin:  Negative for rash.   Neurological:  Positive for weakness.   Hematological:  Does not bruise/bleed easily.   Psychiatric/Behavioral:  Positive for dysphoric mood and suicidal ideas.        Physical Exam     Initial Vitals [09/13/23 1758]   BP Pulse Resp Temp SpO2   114/70 60 16 98.7 °F (37.1 °C) 99 %      MAP       --         Physical Exam    Constitutional: Vital signs are normal. She appears well-developed and well-nourished.  Non-toxic appearance. No distress.   HENT:   Head: Normocephalic and atraumatic.   Eyes: EOM are normal. Pupils are equal, round, and reactive to light.   Neck: Neck supple. No JVD present.   Normal range of motion.  Cardiovascular:  Normal rate, regular rhythm, normal heart sounds and intact distal pulses.     Exam reveals no gallop and no friction rub.       No murmur heard.  Pulmonary/Chest: Breath sounds normal. She has no wheezes. She has no rhonchi. She has no rales.   Abdominal: Abdomen is soft. Bowel sounds are normal. There is no abdominal tenderness.  There is no rebound and no guarding.   Musculoskeletal:         General: Normal range of motion.      Cervical back: Normal range of motion and neck supple. No rigidity.     Neurological: She is alert and oriented to person, place, and time. No cranial nerve deficit or sensory deficit. She exhibits normal muscle tone. Coordination normal. GCS eye subscore is 4. GCS verbal subscore is 5. GCS motor subscore is 6.   Left upper extremity weakness with contractures, left lower extremity weakness with foot drop   Skin: Skin is warm and dry. Capillary refill takes less than 2 seconds.   Psychiatric: Her speech is normal and behavior is normal. She is not actively hallucinating. She exhibits a depressed mood. She expresses suicidal ideation. She expresses suicidal plans.         ED Course   Procedures  Labs Reviewed   COMPREHENSIVE METABOLIC PANEL - Abnormal; Notable for the following components:       Result Value    Potassium 3.4 (*)     Chloride 111 (*)     CO2 12 (*)     Creatinine 1.9 (*)     eGFR 34 (*)     All other components within normal limits    Narrative:     Collection has been rescheduled by DTT at 09/13/2023 18:45 Reason:   Unable to collect   URINALYSIS, REFLEX TO URINE CULTURE - Abnormal; Notable for the following components:    Color, UA Colorless (*)     Protein, UA Trace (*)     All other components within normal limits    Narrative:     Specimen Source->Urine   ACETAMINOPHEN LEVEL - Abnormal; Notable for the following components:    Acetaminophen (Tylenol), Serum 4.0 (*)     All other components within normal limits    Narrative:     Collection has been rescheduled by DTT at 09/13/2023 18:45 Reason:   Unable to collect   CBC W/ AUTO DIFFERENTIAL - Abnormal; Notable for the following components:    RBC 3.97 (*)     Hematocrit 36.6 (*)     Immature Granulocytes 0.9 (*)     Immature Grans (Abs) 0.05 (*)     All other components within normal limits   ISTAT PROCEDURE - Abnormal; Notable for the following  components:    POC PH 7.236 (*)     POC PO2 34 (*)     POC HCO3 16.9 (*)     POC SATURATED O2 55 (*)     POC TCO2 18 (*)     All other components within normal limits   TSH    Narrative:     Collection has been rescheduled by DTT at 09/13/2023 18:45 Reason:   Unable to collect   DRUG SCREEN PANEL, URINE EMERGENCY    Narrative:     Specimen Source->Urine   ALCOHOL,MEDICAL (ETHANOL)    Narrative:     Collection has been rescheduled by DTT at 09/13/2023 18:45 Reason:   Unable to collect   LACTIC ACID, PLASMA   BETA - HYDROXYBUTYRATE, SERUM   MAGNESIUM   PHOSPHORUS     EKG Readings: (Independently Interpreted)   Initial Reading: No STEMI.   Normal sinus rhythm, 88 beats per minute with right atrial enlargement.  Nonspecific ST and T-wave abnormality       Imaging Results    None          Medications   lactated ringers bolus 1,000 mL (has no administration in time range)   potassium chloride 10 mEq in 100 mL IVPB (has no administration in time range)   sodium chloride 0.9% bolus 1,000 mL 1,000 mL (0 mLs Intravenous Stopped 9/13/23 2152)     Medical Decision Making  43-year-old woman presents emergency department for intentional medication overdose by taking approximately 2100 mg trazodone tablets in suicide attempt.  She has a history of stroke with left CVA deficits and requires assistance to in her ADLs.  She feels she is a burden to everybody.  Patient is PEC'd for suicidal ideation however unable to medically clear because she has a metabolic acidosis with a pH of 7.236, CO2 of 12 and a normal anion gap.  Her creatinine is elevated at 1.9 with a normal BUN of 13 representing acute kidney injury.  This is likely renal tubular acidosis.  She is had this in the past.  Patient started on IV fluids.  Discussed Hospital Medicine who agrees to observe the patient for further treatment prior to medical clearance.    Amount and/or Complexity of Data Reviewed  Labs: ordered.    Risk  Decision regarding hospitalization.                                Clinical Impression:   Final diagnoses:  [T50.901A] Medication overdose  [E87.20] Metabolic acidosis (Primary)  [N17.9] CHRISTEN (acute kidney injury)        ED Disposition Condition    Admit Stable                Mike Kirk MD  09/13/23 8553

## 2023-09-14 NOTE — PLAN OF CARE
09/14/23 0950   FRAZIER Message   Medicare Outpatient and Observation Notification regarding financial responsibility Explained to patient/caregiver;Signed/date by patient/caregiver   Date FRAZIER was signed 09/14/23   Time FRAZIER was signed 0950

## 2023-09-14 NOTE — ASSESSMENT & PLAN NOTE
Patient with acute kidney injury/acute renal failure likely due to pre-renal azotemia due to IVVD CHRISTEN is currently stable. Baseline creatinine 1.2 - Labs reviewed- Renal function/electrolytes with Estimated Creatinine Clearance: 26.7 mL/min (A) (based on SCr of 1.8 mg/dL (H)). according to latest data. Monitor urine output and serial BMP and adjust therapy as needed. Avoid nephrotoxins and renally dose meds for GFR listed above.    Given 1L NS bolus in ED, will give an add'l LR bolus   Then bicarb gtt  Monitor renal function daily   Hold nephrotoxic meds, renally dose all meds  Daily wt/accurate I&O

## 2023-09-14 NOTE — PLAN OF CARE
POC discussed with patient, verbalized understanding. Patient with uneventful night, slept well between care. Up to bathroom to void. Sitter at bedside. No complaints voiced. IVF infusing.

## 2023-09-14 NOTE — NURSING
Nurses Note -- 4 Eyes      9/14/2023   4:37 AM      Skin assessed during: Admit      [x] No Altered Skin Integrity Present    []Prevention Measures Documented      [] Yes- Altered Skin Integrity Present or Discovered   [] LDA Added if Not in Epic (Describe Wound)   [] New Altered Skin Integrity was Present on Admit and Documented in LDA   [] Wound Image Taken    Wound Care Consulted? No    Attending Nurse:  RAJAN Zapata RN    Second RN/Staff Member:   rEna Tay RN

## 2023-09-14 NOTE — PLAN OF CARE
Problem: Adult Inpatient Plan of Care  Goal: Plan of Care Review  Outcome: Ongoing, Progressing   Supine with HOB up 40. Na bicarb infusing at 100cc/hr into right ac without difficulty. DDI. No redness or swelling at site. POC and medications reviewed with pt. Verbalized understanding. Pt is a PEC and sitter in doorway monitoring. Will continue to monitor.   Problem: Adult Inpatient Plan of Care  Goal: Optimal Comfort and Wellbeing  Outcome: Ongoing, Progressing   Q 2 hourly rounds made through out shift. IV site, pain and position monitored. Prn meds given per MD order.

## 2023-09-14 NOTE — HOSPITAL COURSE
Patient admitted to Hospital Medicine under pec precautions.  Patient was provided supportive care with IV fluid hydration.  Patient was evaluated by tele psychiatrist.  Patient admitted major depression symptoms and suicidal ideation.  Patient has prior history of psychiatric hospitalization.

## 2023-09-14 NOTE — ASSESSMENT & PLAN NOTE
Admit to med/tele  Neuro checks q4h  PEC'd  Psych placement when medically cleared  Hold further SSRI/SNRI

## 2023-09-14 NOTE — PLAN OF CARE
[facility] - Med/Surg  Initial Discharge Assessment       Primary Care Provider: Neela Barrett MD    Admission Diagnosis: Intentional overdose [T50.902A]    Admission Date: 9/13/2023  Expected Discharge Date:     Transition of Care Barriers: None    Payor: HUMANA MANAGED MEDICARE / Plan: HUMANA SNP HMO PPO SPECIAL NEEDS / Product Type: Medicare Advantage /     Extended Emergency Contact Information  Primary Emergency Contact: RALPH CASTELLON  Address: [address] [place] [address] [address]           [place] [address] [facility]  Home Phone: [phone]  Work Phone: [phone]  Mobile Phone: [phone]  Relation: Friend  Preferred language: English   needed? No  Secondary Emergency Contact: Arabella Rose  Address: [address] [place] [address] [facility]  Home Phone: [phone]  Mobile Phone: [phone]  Relation: Mother  Preferred language: English   needed? No    Discharge Plan A: Psychiatric hospital  Discharge Plan B: Home with family, Home      [id number] - [place], [place] - [address]  [address] & [facility]  [address] [facility] [address]  Phone: [phone] Fax: [phone]    SW met with patient at bedside to complete discharge planning assessment.  Patient alert and oriented xs 4.  Patient verified all demographic information on facesheet is correct.  Patient verified PCP is Dr. Barrett.  Patient verified primary health insurance is [employer] and secondary is [place] Medicaid.  Patient with NO home health or DME.  Patient with NO POA or Living Will.  Patient not on dialysis or medication coumadin.  Patient with no 30 day admission.  Patient with no financial issues at this time.  Patient independent with ADLs, live with roommate, friends drives.      Initial Assessment (most recent)       Adult Discharge Assessment - 09/14/23 0950          Discharge Assessment    Assessment Type Discharge Planning  Assessment     Confirmed/corrected address, phone number and insurance Yes     Confirmed Demographics Correct on Facesheet     Source of Information patient     Does patient/caregiver understand observation status Yes     Communicated EDY with patient/caregiver Yes     People in Home --   roommate    Facility Arrived From: home     Do you expect to return to your current living situation? Yes     Do you have help at home or someone to help you manage your care at home? Yes     Who are your caregiver(s) and their phone number(s)? self     Prior to hospitilization cognitive status: Alert/Oriented     Current cognitive status: Alert/Oriented     Equipment Currently Used at Home none     Readmission within 30 days? No     Patient currently being followed by outpatient case management? No     Do you currently have service(s) that help you manage your care at home? No     Do you take prescription medications? Yes     Do you have prescription coverage? Yes     Do you have any problems affording any of your prescribed medications? No     Is the patient taking medications as prescribed? no     If no, which medications is patient not taking? patient states all     How do you get to doctors appointments? family or friend will provide     Are you on dialysis? No     Do you take coumadin? No     DME Needed Upon Discharge  none     Discharge Plan discussed with: Patient     Transition of Care Barriers None     Discharge Plan A Psychiatric hospital     Discharge Plan B Home with family;Home

## 2023-09-14 NOTE — HPI
"Ms. Denson is a 43 year old female with a history of SLE, CKD3, gastroparesis, bipolar disorder, fibromyalgia, seizure disorder. CVA w/ L-side weakness, and hx RTA presents with complaints of suicide attempt reports taking twenty 100mg trazodone around 2:30pm. It is severe. It is associated with depression and feeling "useless". She does report some diarrhea, does not know trigger, but is chronic and intermittent. She is supposed to take sodium bicarb tablets, but only takes when she's having diarrhea. She denies vomiting, chest pain, SOB, dizziness, or LOC. In the ED, she was found to have a significant NAGMA with a bicarb of 12. Previous admission was 7 and thought to be d/t starvation ketoacidosis and was treated with bicarb gtt. Creatinine is elevated at 1.9. Lactate WNL, BHB 2. EKG: NSR with right atrial enlargement and nonspecific ST and T wave abnormalities. She has been PEC'ed by ED MD. Hospital medicine is consulted for admission for severe NAGMA for further work up and treatment for medical clearance to psych facility.   "

## 2023-09-14 NOTE — PLAN OF CARE
1812-Have not seen  today. Called coroners office-spoke with Isabell with answering service. She is sending a message to investigators on call to have them call back with information on if they have received PEC.    1815-received call back from [facility]. She said PEC was received with coroners office.

## 2023-09-14 NOTE — ASSESSMENT & PLAN NOTE
Likely multifactorial through GI losses, Trazodone overdose, CHRISTEN on CKDIII, and starvation ketoacidosis  Start bicarb gtt - transition to PO when clinically indicated  Repeat chemistry in AM   Give a dose of IV thiamine given starvation component  Regular diet   Check phos

## 2023-09-14 NOTE — NURSING
Patient arrived via wheel chair, ambulated to bed. Sitter at bedside. Oriented to room, orders explained.

## 2023-09-14 NOTE — SUBJECTIVE & OBJECTIVE
Interval History:  Patient is under pec precautions.  Father is at bedside.  Patient confirms depressed mood with 2nd suicide attempt.  Prior history of hospitalizations.  Patient is noncompliant with medications.  Denies any hallucinations or homicidal ideation.  Patient is agreeable to go to inpatient psych placement.    Review of Systems   Constitutional:  Negative for activity change, appetite change, chills, diaphoresis, fatigue, fever and unexpected weight change.   HENT:  Negative for congestion, ear pain, facial swelling, hearing loss, sore throat and trouble swallowing.    Eyes:  Negative for pain and discharge.   Respiratory:  Negative for cough, chest tightness, shortness of breath and wheezing.    Cardiovascular:  Negative for chest pain, palpitations and leg swelling.   Gastrointestinal:  Positive for diarrhea and nausea. Negative for abdominal pain, blood in stool and vomiting.   Endocrine: Negative for polydipsia, polyphagia and polyuria.   Genitourinary:  Negative for difficulty urinating, dysuria, flank pain, frequency and urgency.   Musculoskeletal:  Negative for arthralgias, back pain, joint swelling, neck pain and neck stiffness.   Skin:  Negative for rash and wound.   Allergic/Immunologic: Negative for environmental allergies and immunocompromised state.   Neurological:  Negative for dizziness, seizures, syncope, speech difficulty, weakness, light-headedness, numbness and headaches.   Hematological:  Negative for adenopathy.   Psychiatric/Behavioral:  Positive for dysphoric mood and suicidal ideas. Negative for sleep disturbance. The patient is not nervous/anxious.    All other systems reviewed and are negative.    Objective:     Vital Signs (Most Recent):  Temp: 98.3 °F (36.8 °C) (09/14/23 0707)  Pulse: 72 (09/14/23 0707)  Resp: 20 (09/14/23 0707)  BP: 121/71 (09/14/23 0707)  SpO2: 98 % (09/14/23 0707) Vital Signs (24h Range):  Temp:  [97.7 °F (36.5 °C)-98.7 °F (37.1 °C)] 98.3 °F (36.8  °C)  Pulse:  [60-95] 72  Resp:  [15-20] 20  SpO2:  [98 %-100 %] 98 %  BP: ()/(54-90) 121/71     Weight: 41.1 kg (90 lb 9.7 oz)  Body mass index is 16.05 kg/m².    Intake/Output Summary (Last 24 hours) at 9/14/2023 1017  Last data filed at 9/14/2023 0438  Gross per 24 hour   Intake 120 ml   Output --   Net 120 ml         Physical Exam  Vitals and nursing note reviewed.   Constitutional:       Comments: Underweight, thin   HENT:      Head: Normocephalic and atraumatic.      Nose: Nose normal.      Mouth/Throat:      Mouth: Mucous membranes are moist.      Pharynx: Oropharynx is clear.   Eyes:      Extraocular Movements: Extraocular movements intact.      Pupils: Pupils are equal, round, and reactive to light.   Cardiovascular:      Rate and Rhythm: Normal rate and regular rhythm.      Pulses: Normal pulses.      Heart sounds: Normal heart sounds.   Pulmonary:      Effort: Pulmonary effort is normal.      Breath sounds: Normal breath sounds.   Abdominal:      General: Bowel sounds are normal.      Palpations: Abdomen is soft.   Musculoskeletal:         General: Normal range of motion.      Cervical back: Normal range of motion and neck supple.   Skin:     General: Skin is warm and dry.      Capillary Refill: Capillary refill takes less than 2 seconds.   Neurological:      Mental Status: She is alert and oriented to person, place, and time. Mental status is at baseline.   Psychiatric:      Comments: flat             Significant Labs: All pertinent labs within the past 24 hours have been reviewed.  CBC:   Recent Labs   Lab 09/13/23 2027 09/14/23  0357   WBC 5.63 4.43   HGB 12.2 12.5   HCT 36.6* 36.1*    184     CMP:   Recent Labs   Lab 09/13/23 1915 09/14/23  0357    138   K 3.4* 4.3   * 115*   CO2 12* 15*   GLU 81 113*   BUN 13 11   CREATININE 1.9* 1.8*   CALCIUM 9.0 8.2*   PROT 7.8 6.3   ALBUMIN 4.7 3.7   BILITOT 0.4 0.2   ALKPHOS 63 57   AST 26 17   ALT 27 20   ANIONGAP 14 8     Lactic Acid:    Recent Labs   Lab 09/13/23  2232   LACTATE 0.8     Urine Studies:   Recent Labs   Lab 09/13/23  1821   COLORU Colorless*   APPEARANCEUA Clear   PHUR 6.0   SPECGRAV 1.005   PROTEINUA Trace*   GLUCUA Negative   KETONESU Negative   BILIRUBINUA Negative   OCCULTUA Negative   NITRITE Negative   UROBILINOGEN Negative   LEUKOCYTESUR Negative   Beta hydroxy Butyrate: 2.4  Valproic acid less than 12.5   Acetaminophen level 4.0   Alcohol level less than 10   Urine drug screen: Negative  ABG  Recent Labs   Lab 09/14/23  0544   PH 7.251*   PO2 26*   PCO2 44.1   HCO3 19.4*   BE -8         Significant Imaging: None

## 2023-09-14 NOTE — PROGRESS NOTES
Pharmacist Renal Dose Adjustment Note    Cat Denson is a 43 y.o. female being treated with the medication Pepcid    Patient Data:    Vital Signs (Most Recent):  Temp: 97.7 °F (36.5 °C) (09/14/23 0008)  Pulse: 76 (09/14/23 0008)  Resp: 18 (09/14/23 0008)  BP: (!) 126/90 (09/14/23 0008)  SpO2: 100 % (09/14/23 0008) Vital Signs (72h Range):  Temp:  [97.7 °F (36.5 °C)-98.7 °F (37.1 °C)]   Pulse:  [60-95]   Resp:  [15-18]   BP: ()/(54-90)   SpO2:  [98 %-100 %]      Recent Labs   Lab 09/13/23 1915   CREATININE 1.9*     Serum creatinine: 1.9 mg/dL (H) 09/13/23 1915  Estimated creatinine clearance: 34.2 mL/min (A)    Medication:Pepcid dose: 20mg frequency daily will be changed to medication:Pepcid dose:20mg frequency:daily    Pharmacist's Name: Ramón Cowart  Pharmacist's Extension: [phone]

## 2023-09-14 NOTE — ASSESSMENT & PLAN NOTE
Replete with IV potassium now  Repeat level in AM, monitor closely on bicarb gtt  Further repletion as per electrolyte SS

## 2023-09-14 NOTE — H&P
"[facility] Medicine  History & Physical    Patient Name: Cat Denson  MRN: [id number]  Patient Class: OP- Observation  Admission Date: 9/13/2023  Attending Physician: Dr. Connors  Primary Care Provider: Neela Barrett MD         Patient information was obtained from patient, past medical records and ER records.     Subjective:     Principal Problem:Intentional overdose    Chief Complaint:   Chief Complaint   Patient presents with    Suicide Attempt     Pt brought to ED via EMS after a suicide attempt, pt reported to EMS that she took 20 trazodone around 2:30pm.           HPI: Ms. Denson is a 43 year old female with a history of SLE, CKD3, gastroparesis, bipolar disorder, fibromyalgia, seizure disorder. CVA w/ L-side weakness, and hx RTA presents with complaints of suicide attempt reports taking twenty 100mg trazodone around 2:30pm. It is severe. It is associated with depression and feeling "useless". She does report some diarrhea, does not know trigger, but is chronic and intermittent. She is supposed to take sodium bicarb tablets, but only takes when she's having diarrhea. She denies vomiting, chest pain, SOB, dizziness, or LOC. In the ED, she was found to have a significant NAGMA with a bicarb of 12. Previous admission was 7 and thought to be d/t starvation ketoacidosis and was treated with bicarb gtt. Creatinine is elevated at 1.9. Lactate WNL, BHB 2. EKG: NSR with right atrial enlargement and nonspecific ST and T wave abnormalities. She has been PEC'ed by ED MD. Hospital medicine is consulted for admission for severe NAGMA for further work up and treatment for medical clearance to psych facility.       Past Medical History:   Diagnosis Date    Acid reflux     Allergy     Anemia     Anticoagulated 12/29/2015    Anxiety     Asthma     Bipolar 1 disorder     Bronchospasm with bronchitis, acute     Chronic daily headache 9/11/2018    Chronic kidney disease     " Chronic pain     Depression     bipolar 2    Fibromyalgia     Gastroparesis     History of right MCA stroke 11/25/2015    Hx of psychiatric care     Lactose intolerance     Lupus     Mixed hyperlipidemia 11/28/2018    Psychiatric problem     Renal tubular acidosis     Seizure     Sjogren's syndrome     Smoker     Stroke 11-    Substance abuse-kratom daily use 8/30/2020    Suicide attempt     overdosed on a bottle of paxil, muscle relaxers, & zoloft    Therapy        Past Surgical History:   Procedure Laterality Date    CHOLECYSTECTOMY      COLONOSCOPY  11/12/2014    Dr. Kimble, repeat at 52 years old for screening    ESOPHAGOGASTRODUODENOSCOPY N/A 1/3/2022    Procedure: EGD (ESOPHAGOGASTRODUODENOSCOPY);  Surgeon: Scout Kimble MD;  Location: [facility];  Service: Endoscopy;  Laterality: N/A;    ESOPHAGOGASTRODUODENOSCOPY N/A 6/12/2023    Procedure: EGD (ESOPHAGOGASTRODUODENOSCOPY);  Surgeon: Scout Kimble MD;  Location: [facility];  Service: Endoscopy;  Laterality: N/A;    FLUOROSCOPIC URODYNAMIC STUDY N/A 7/23/2019    Procedure: URODYNAMIC STUDY, FLUOROSCOPIC;  Surgeon: Vanna Martinez MD;  Location: [address];  Service: Urology;  Laterality: N/A;  1 hour    UPPER GASTROINTESTINAL ENDOSCOPY  03/03/2017    Dr. Harris       Review of patient's allergies indicates:   Allergen Reactions    Metoclopramide hcl Anaphylaxis     Involuntary mouth movements     Amoxicillin-pot clavulanate      2 other medications    Amoxil [amoxicillin]      hives    Avelox [moxifloxacin]      itching    Benadryl [diphenhydramine hcl]      Lowers seizure threshold     Coumadin [warfarin]      toxicity    Quinazolinones      Lowers seizure threshold     Ultram [tramadol]      Lowers seizure threshold     Wellbutrin [bupropion hcl]      Lowers seizure threshold        No current facility-administered medications on file prior to encounter.     Current Outpatient Medications on File Prior to  Encounter   Medication Sig    ARIPiprazole (ABILIFY) 10 MG Tab Take 1 tablet (10 mg total) by mouth once daily.    BOTOX 200 unit SolR     clotrimazole (LOTRIMIN) 1 % cream Apply topically 2 (two) times daily.    divalproex (DEPAKOTE) 250 MG EC tablet TAKE 1 TABLET BY MOUTH EVERY MORNING AND TAKE 2 TABLETS BY MOUTH EVERY NIGHT AT BEDTIME    famotidine (PEPCID) 40 MG tablet TAKE 1 TABLET(40 MG) BY MOUTH EVERY EVENING    fluconazole (DIFLUCAN) 150 MG Tab 1 po x 1 , may repeat 48 h    gabapentin (NEURONTIN) 800 MG tablet TAKE 1 TABLET(800 MG) BY MOUTH TWICE DAILY    LIDOcaine-prilocaine (EMLA) cream APPLY TOPICALLY TO THE AFFECTED AREA AS NEEDED    magnesium oxide (MAG-OX) 400 mg (241.3 mg magnesium) tablet Take 1 tablet (400 mg total) by mouth once daily.    mirtazapine (REMERON) 30 MG tablet TAKE 1 TABLET(30 MG) BY MOUTH EVERY EVENING    ondansetron (ZOFRAN) 8 MG tablet Take 1 tablet (8 mg total) by mouth every 8 (eight) hours as needed for Nausea.    pantoprazole (PROTONIX) 40 MG tablet Take 1 tablet (40 mg total) by mouth once daily.    propranoloL (INDERAL LA) 80 MG 24 hr capsule Take 1 capsule (80 mg total) by mouth once daily.    rivaroxaban (XARELTO) 20 mg Tab TAKE 1 TABLET(20 MG) BY MOUTH DAILY WITH THE EVENING MEAL AND DINNER    sodium bicarbonate 650 MG tablet Take 1 tablet (650 mg total) by mouth 2 (two) times daily.    traZODone (DESYREL) 100 MG tablet Take three tablets po nightly prn insomnia     Family History       Problem Relation (Age of Onset)    Alcohol abuse Maternal Uncle, Maternal Grandfather    Breast cancer Cousin, Other    Cancer Maternal Grandmother    Diabetes Maternal Uncle, Maternal Grandmother    Diabetes Mellitus Maternal Grandmother    Drug abuse Brother, Maternal Grandmother    Early death Paternal Grandfather    Heart disease Paternal Uncle, Maternal Grandfather    Hyperlipidemia Mother, Maternal Grandmother    Hypertension Mother, Maternal Uncle, Maternal  Grandmother, Maternal Grandfather    Mental illness Maternal Grandmother, Paternal Grandfather    No Known Problems Father, Paternal Grandmother    Osteoarthritis Maternal Grandfather    Post-traumatic stress disorder Brother    Psoriasis Mother    Rheum arthritis Maternal Grandmother    Scoliosis Paternal Aunt    Stroke Maternal Uncle    Thyroid disease Mother          Tobacco Use    Smoking status: Every Day     Current packs/day: 1.00     Average packs/day: 1 pack/day for 15.1 years (15.1 ttl pk-yrs)     Types: Cigarettes     Start date: 11/25/2015    Smokeless tobacco: Never   Substance and Sexual Activity    Alcohol use: No     Alcohol/week: 0.0 standard drinks of alcohol    Drug use: Yes     Types: Marijuana     Comment: smokes occasionally, helps with pain and appetite    Sexual activity: Never     Partners: Female     Comment: usually female, but currently with her boyfriend     Review of Systems   Constitutional:  Negative for activity change, appetite change, chills, diaphoresis, fatigue, fever and unexpected weight change.   HENT:  Negative for congestion, ear pain, facial swelling, hearing loss, sore throat and trouble swallowing.    Eyes:  Negative for pain and discharge.   Respiratory:  Negative for cough, chest tightness, shortness of breath and wheezing.    Cardiovascular:  Negative for chest pain, palpitations and leg swelling.   Gastrointestinal:  Positive for diarrhea and nausea. Negative for abdominal pain, blood in stool and vomiting.   Endocrine: Negative for polydipsia, polyphagia and polyuria.   Genitourinary:  Negative for difficulty urinating, dysuria, flank pain, frequency and urgency.   Musculoskeletal:  Negative for arthralgias, back pain, joint swelling, neck pain and neck stiffness.   Skin:  Negative for rash and wound.   Allergic/Immunologic: Negative for environmental allergies and immunocompromised state.   Neurological:  Negative for dizziness, seizures, syncope, speech  difficulty, weakness, light-headedness, numbness and headaches.   Hematological:  Negative for adenopathy.   Psychiatric/Behavioral:  Positive for dysphoric mood and suicidal ideas. Negative for sleep disturbance. The patient is not nervous/anxious.    All other systems reviewed and are negative.    Objective:     Vital Signs (Most Recent):  Temp: 97.7 °F (36.5 °C) (09/14/23 0008)  Pulse: 76 (09/14/23 0008)  Resp: 18 (09/14/23 0008)  BP: (!) 126/90 (09/14/23 0008)  SpO2: 100 % (09/14/23 0008) Vital Signs (24h Range):  Temp:  [97.7 °F (36.5 °C)-98.7 °F (37.1 °C)] 97.7 °F (36.5 °C)  Pulse:  [60-95] 76  Resp:  [15-18] 18  SpO2:  [98 %-100 %] 100 %  BP: ()/(54-90) 126/90        There is no height or weight on file to calculate BMI.     Physical Exam  Vitals and nursing note reviewed.   Constitutional:       Comments: Underweight, thin   HENT:      Head: Normocephalic and atraumatic.      Nose: Nose normal.      Mouth/Throat:      Mouth: Mucous membranes are moist.      Pharynx: Oropharynx is clear.   Eyes:      Extraocular Movements: Extraocular movements intact.      Pupils: Pupils are equal, round, and reactive to light.   Cardiovascular:      Rate and Rhythm: Normal rate and regular rhythm.      Pulses: Normal pulses.      Heart sounds: Normal heart sounds.   Pulmonary:      Effort: Pulmonary effort is normal.      Breath sounds: Normal breath sounds.   Abdominal:      General: Bowel sounds are normal.      Palpations: Abdomen is soft.   Musculoskeletal:         General: Normal range of motion.      Cervical back: Normal range of motion and neck supple.   Skin:     General: Skin is warm and dry.      Capillary Refill: Capillary refill takes less than 2 seconds.   Neurological:      Mental Status: She is alert and oriented to person, place, and time. Mental status is at baseline.   Psychiatric:      Comments: flat              CRANIAL NERVES     CN III, IV, VI   Pupils are equal, round, and reactive to  light.       Significant Labs: All pertinent labs within the past 24 hours have been reviewed.  CBC:   Recent Labs   Lab 09/13/23 2027   WBC 5.63   HGB 12.2   HCT 36.6*        CMP:   Recent Labs   Lab 09/13/23 1915      K 3.4*   *   CO2 12*   GLU 81   BUN 13   CREATININE 1.9*   CALCIUM 9.0   PROT 7.8   ALBUMIN 4.7   BILITOT 0.4   ALKPHOS 63   AST 26   ALT 27   ANIONGAP 14     Lactic Acid:   Recent Labs   Lab 09/13/23  2232   LACTATE 0.8       Significant Imaging: I have reviewed all pertinent imaging results/findings within the past 24 hours.  EKG: I have reviewed all pertinent results/findings within the past 24 hours and my personal findings are: NSR with right atrial enlargement and nonspecific ST and T wave abnormalities    Assessment/Plan:     * Intentional overdose - Trazodone  Admit to med/tele  Neuro checks q4h  PEC'd  Psych placement when medically cleared  Hold further SSRI/SNRI      Left hemiparesis  chronic      Acute renal failure superimposed on stage 3 chronic kidney disease  Patient with acute kidney injury/acute renal failure likely due to pre-renal azotemia due to IVVD CHRISTEN is currently stable. Baseline creatinine 1.2 - Labs reviewed- Renal function/electrolytes with Estimated Creatinine Clearance: 25.3 mL/min (A) (based on SCr of 1.9 mg/dL (H)). according to latest data. Monitor urine output and serial BMP and adjust therapy as needed. Avoid nephrotoxins and renally dose meds for GFR listed above.    Given 1L NS bolus in ED, will give an add'l LR bolus   Then bicarb gtt  Monitor renal function daily   Hold nephrotoxic meds, renally dose all meds  Daily wt/accurate I&O    Metabolic acidosis, normal anion gap (NAG)  Likely multifactorial through GI losses, Trazodone overdose, CHRISTEN on CKDIII, and starvation ketoacidosis  Start bicarb gtt - transition to PO when clinically indicated  Repeat chemistry in AM   Give a dose of IV thiamine given starvation component  Regular diet   Check  phos    Long term current use of anticoagulant therapy  Renally dose xarelto and continue       Hypokalemia  Replete with IV potassium now  Repeat level in AM, monitor closely on bicarb gtt  Further repletion as per electrolyte SS      Lupus (systemic lupus erythematosus)  aware      VTE Risk Mitigation (From admission, onward)         Ordered     rivaroxaban tablet 15 mg  with dinner         09/14/23 0043     IP VTE HIGH RISK PATIENT  Once         09/14/23 0043     Place sequential compression device  Until discontinued         09/14/23 0043                  Sugey Elder NP  Department of Hospital Medicine  [facility]/Surg

## 2023-09-14 NOTE — SUBJECTIVE & OBJECTIVE
Past Medical History:   Diagnosis Date    Acid reflux     Allergy     Anemia     Anticoagulated 12/29/2015    Anxiety     Asthma     Bipolar 1 disorder     Bronchospasm with bronchitis, acute     Chronic daily headache 9/11/2018    Chronic kidney disease     Chronic pain     Depression     bipolar 2    Fibromyalgia     Gastroparesis     History of right MCA stroke 11/25/2015    Hx of psychiatric care     Lactose intolerance     Lupus     Mixed hyperlipidemia 11/28/2018    Psychiatric problem     Renal tubular acidosis     Seizure     Sjogren's syndrome     Smoker     Stroke 11-    Substance abuse-kratom daily use 8/30/2020    Suicide attempt     overdosed on a bottle of paxil, muscle relaxers, & zoloft    Therapy        Past Surgical History:   Procedure Laterality Date    CHOLECYSTECTOMY      COLONOSCOPY  11/12/2014    Dr. Kimble, repeat at 52 years old for screening    ESOPHAGOGASTRODUODENOSCOPY N/A 1/3/2022    Procedure: EGD (ESOPHAGOGASTRODUODENOSCOPY);  Surgeon: Scout Kimble MD;  Location: [facility];  Service: Endoscopy;  Laterality: N/A;    ESOPHAGOGASTRODUODENOSCOPY N/A 6/12/2023    Procedure: EGD (ESOPHAGOGASTRODUODENOSCOPY);  Surgeon: Scout Kimble MD;  Location: [facility];  Service: Endoscopy;  Laterality: N/A;    FLUOROSCOPIC URODYNAMIC STUDY N/A 7/23/2019    Procedure: URODYNAMIC STUDY, FLUOROSCOPIC;  Surgeon: Vanna Martinez MD;  Location: [address];  Service: Urology;  Laterality: N/A;  1 hour    UPPER GASTROINTESTINAL ENDOSCOPY  03/03/2017    Dr. Harris       Review of patient's allergies indicates:   Allergen Reactions    Metoclopramide hcl Anaphylaxis     Involuntary mouth movements     Amoxicillin-pot clavulanate      2 other medications    Amoxil [amoxicillin]      hives    Avelox [moxifloxacin]      itching    Benadryl [diphenhydramine hcl]      Lowers seizure threshold     Coumadin [warfarin]      toxicity    Quinazolinones      Lowers seizure threshold     Ultram  [tramadol]      Lowers seizure threshold     Wellbutrin [bupropion hcl]      Lowers seizure threshold        No current facility-administered medications on file prior to encounter.     Current Outpatient Medications on File Prior to Encounter   Medication Sig    ARIPiprazole (ABILIFY) 10 MG Tab Take 1 tablet (10 mg total) by mouth once daily.    BOTOX 200 unit SolR     clotrimazole (LOTRIMIN) 1 % cream Apply topically 2 (two) times daily.    divalproex (DEPAKOTE) 250 MG EC tablet TAKE 1 TABLET BY MOUTH EVERY MORNING AND TAKE 2 TABLETS BY MOUTH EVERY NIGHT AT BEDTIME    famotidine (PEPCID) 40 MG tablet TAKE 1 TABLET(40 MG) BY MOUTH EVERY EVENING    fluconazole (DIFLUCAN) 150 MG Tab 1 po x 1 , may repeat 48 h    gabapentin (NEURONTIN) 800 MG tablet TAKE 1 TABLET(800 MG) BY MOUTH TWICE DAILY    LIDOcaine-prilocaine (EMLA) cream APPLY TOPICALLY TO THE AFFECTED AREA AS NEEDED    magnesium oxide (MAG-OX) 400 mg (241.3 mg magnesium) tablet Take 1 tablet (400 mg total) by mouth once daily.    mirtazapine (REMERON) 30 MG tablet TAKE 1 TABLET(30 MG) BY MOUTH EVERY EVENING    ondansetron (ZOFRAN) 8 MG tablet Take 1 tablet (8 mg total) by mouth every 8 (eight) hours as needed for Nausea.    pantoprazole (PROTONIX) 40 MG tablet Take 1 tablet (40 mg total) by mouth once daily.    propranoloL (INDERAL LA) 80 MG 24 hr capsule Take 1 capsule (80 mg total) by mouth once daily.    rivaroxaban (XARELTO) 20 mg Tab TAKE 1 TABLET(20 MG) BY MOUTH DAILY WITH THE EVENING MEAL AND DINNER    sodium bicarbonate 650 MG tablet Take 1 tablet (650 mg total) by mouth 2 (two) times daily.    traZODone (DESYREL) 100 MG tablet Take three tablets po nightly prn insomnia     Family History       Problem Relation (Age of Onset)    Alcohol abuse Maternal Uncle, Maternal Grandfather    Breast cancer Cousin, Other    Cancer Maternal Grandmother    Diabetes Maternal Uncle, Maternal Grandmother    Diabetes Mellitus Maternal Grandmother    Drug abuse Brother,  Maternal Grandmother    Early death Paternal Grandfather    Heart disease Paternal Uncle, Maternal Grandfather    Hyperlipidemia Mother, Maternal Grandmother    Hypertension Mother, Maternal Uncle, Maternal Grandmother, Maternal Grandfather    Mental illness Maternal Grandmother, Paternal Grandfather    No Known Problems Father, Paternal Grandmother    Osteoarthritis Maternal Grandfather    Post-traumatic stress disorder Brother    Psoriasis Mother    Rheum arthritis Maternal Grandmother    Scoliosis Paternal Aunt    Stroke Maternal Uncle    Thyroid disease Mother          Tobacco Use    Smoking status: Every Day     Current packs/day: 1.00     Average packs/day: 1 pack/day for 15.1 years (15.1 ttl pk-yrs)     Types: Cigarettes     Start date: 11/25/2015    Smokeless tobacco: Never   Substance and Sexual Activity    Alcohol use: No     Alcohol/week: 0.0 standard drinks of alcohol    Drug use: Yes     Types: Marijuana     Comment: smokes occasionally, helps with pain and appetite    Sexual activity: Never     Partners: Female     Comment: usually female, but currently with her boyfriend     Review of Systems   Constitutional:  Negative for activity change, appetite change, chills, diaphoresis, fatigue, fever and unexpected weight change.   HENT:  Negative for congestion, ear pain, facial swelling, hearing loss, sore throat and trouble swallowing.    Eyes:  Negative for pain and discharge.   Respiratory:  Negative for cough, chest tightness, shortness of breath and wheezing.    Cardiovascular:  Negative for chest pain, palpitations and leg swelling.   Gastrointestinal:  Positive for diarrhea and nausea. Negative for abdominal pain, blood in stool and vomiting.   Endocrine: Negative for polydipsia, polyphagia and polyuria.   Genitourinary:  Negative for difficulty urinating, dysuria, flank pain, frequency and urgency.   Musculoskeletal:  Negative for arthralgias, back pain, joint swelling, neck pain and neck stiffness.    Skin:  Negative for rash and wound.   Allergic/Immunologic: Negative for environmental allergies and immunocompromised state.   Neurological:  Negative for dizziness, seizures, syncope, speech difficulty, weakness, light-headedness, numbness and headaches.   Hematological:  Negative for adenopathy.   Psychiatric/Behavioral:  Positive for dysphoric mood and suicidal ideas. Negative for sleep disturbance. The patient is not nervous/anxious.    All other systems reviewed and are negative.    Objective:     Vital Signs (Most Recent):  Temp: 97.7 °F (36.5 °C) (09/14/23 0008)  Pulse: 76 (09/14/23 0008)  Resp: 18 (09/14/23 0008)  BP: (!) 126/90 (09/14/23 0008)  SpO2: 100 % (09/14/23 0008) Vital Signs (24h Range):  Temp:  [97.7 °F (36.5 °C)-98.7 °F (37.1 °C)] 97.7 °F (36.5 °C)  Pulse:  [60-95] 76  Resp:  [15-18] 18  SpO2:  [98 %-100 %] 100 %  BP: ()/(54-90) 126/90        There is no height or weight on file to calculate BMI.     Physical Exam  Vitals and nursing note reviewed.   Constitutional:       Comments: Underweight, thin   HENT:      Head: Normocephalic and atraumatic.      Nose: Nose normal.      Mouth/Throat:      Mouth: Mucous membranes are moist.      Pharynx: Oropharynx is clear.   Eyes:      Extraocular Movements: Extraocular movements intact.      Pupils: Pupils are equal, round, and reactive to light.   Cardiovascular:      Rate and Rhythm: Normal rate and regular rhythm.      Pulses: Normal pulses.      Heart sounds: Normal heart sounds.   Pulmonary:      Effort: Pulmonary effort is normal.      Breath sounds: Normal breath sounds.   Abdominal:      General: Bowel sounds are normal.      Palpations: Abdomen is soft.   Musculoskeletal:         General: Normal range of motion.      Cervical back: Normal range of motion and neck supple.   Skin:     General: Skin is warm and dry.      Capillary Refill: Capillary refill takes less than 2 seconds.   Neurological:      Mental Status: She is alert and  oriented to person, place, and time. Mental status is at baseline.   Psychiatric:      Comments: flat              CRANIAL NERVES     CN III, IV, VI   Pupils are equal, round, and reactive to light.       Significant Labs: All pertinent labs within the past 24 hours have been reviewed.  CBC:   Recent Labs   Lab 09/13/23 2027   WBC 5.63   HGB 12.2   HCT 36.6*        CMP:   Recent Labs   Lab 09/13/23 1915      K 3.4*   *   CO2 12*   GLU 81   BUN 13   CREATININE 1.9*   CALCIUM 9.0   PROT 7.8   ALBUMIN 4.7   BILITOT 0.4   ALKPHOS 63   AST 26   ALT 27   ANIONGAP 14     Lactic Acid:   Recent Labs   Lab 09/13/23  2232   LACTATE 0.8       Significant Imaging: I have reviewed all pertinent imaging results/findings within the past 24 hours.  EKG: I have reviewed all pertinent results/findings within the past 24 hours and my personal findings are: NSR with right atrial enlargement and nonspecific ST and T wave abnormalities

## 2023-09-14 NOTE — ASSESSMENT & PLAN NOTE
Patient with acute kidney injury/acute renal failure likely due to pre-renal azotemia due to IVVD CHRISTEN is currently stable. Baseline creatinine 1.2 - Labs reviewed- Renal function/electrolytes with Estimated Creatinine Clearance: 25.3 mL/min (A) (based on SCr of 1.9 mg/dL (H)). according to latest data. Monitor urine output and serial BMP and adjust therapy as needed. Avoid nephrotoxins and renally dose meds for GFR listed above.    Given 1L NS bolus in ED, will give an add'l LR bolus   Then bicarb gtt  Monitor renal function daily   Hold nephrotoxic meds, renally dose all meds  Daily wt/accurate I&O

## 2023-09-15 VITALS
HEIGHT: 63 IN | TEMPERATURE: 98 F | OXYGEN SATURATION: 98 % | RESPIRATION RATE: 16 BRPM | SYSTOLIC BLOOD PRESSURE: 108 MMHG | WEIGHT: 90.63 LBS | HEART RATE: 89 BPM | DIASTOLIC BLOOD PRESSURE: 72 MMHG | BODY MASS INDEX: 16.06 KG/M2

## 2023-09-15 LAB
ALBUMIN SERPL BCP-MCNC: 2.7 G/DL (ref 3.5–5.2)
ALP SERPL-CCNC: 68 U/L (ref 55–135)
ALT SERPL W/O P-5'-P-CCNC: 13 U/L (ref 10–44)
ANION GAP SERPL CALC-SCNC: 9 MMOL/L (ref 8–16)
AST SERPL-CCNC: 16 U/L (ref 10–40)
B-HCG UR QL: NEGATIVE
BASOPHILS # BLD AUTO: 0.04 K/UL (ref 0–0.2)
BASOPHILS NFR BLD: 0.6 % (ref 0–1.9)
BILIRUB SERPL-MCNC: 0.1 MG/DL (ref 0.1–1)
BUN SERPL-MCNC: 15 MG/DL (ref 6–20)
CALCIUM SERPL-MCNC: 7.5 MG/DL (ref 8.7–10.5)
CHLORIDE SERPL-SCNC: 101 MMOL/L (ref 95–110)
CO2 SERPL-SCNC: 30 MMOL/L (ref 23–29)
CREAT SERPL-MCNC: 1.5 MG/DL (ref 0.5–1.4)
DIFFERENTIAL METHOD: ABNORMAL
EOSINOPHIL # BLD AUTO: 0.1 K/UL (ref 0–0.5)
EOSINOPHIL NFR BLD: 1.1 % (ref 0–8)
ERYTHROCYTE [DISTWIDTH] IN BLOOD BY AUTOMATED COUNT: 13.4 % (ref 11.5–14.5)
EST. GFR  (NO RACE VARIABLE): 45 ML/MIN/1.73 M^2
GLUCOSE SERPL-MCNC: 86 MG/DL (ref 70–110)
HCT VFR BLD AUTO: 31.5 % (ref 37–48.5)
HGB BLD-MCNC: 11.1 G/DL (ref 12–16)
IMM GRANULOCYTES # BLD AUTO: 0.02 K/UL (ref 0–0.04)
IMM GRANULOCYTES NFR BLD AUTO: 0.3 % (ref 0–0.5)
LYMPHOCYTES # BLD AUTO: 2.4 K/UL (ref 1–4.8)
LYMPHOCYTES NFR BLD: 38.6 % (ref 18–48)
MAGNESIUM SERPL-MCNC: 1.9 MG/DL (ref 1.6–2.6)
MCH RBC QN AUTO: 31.4 PG (ref 27–31)
MCHC RBC AUTO-ENTMCNC: 35.2 G/DL (ref 32–36)
MCV RBC AUTO: 89 FL (ref 82–98)
MONOCYTES # BLD AUTO: 0.4 K/UL (ref 0.3–1)
MONOCYTES NFR BLD: 5.9 % (ref 4–15)
NEUTROPHILS # BLD AUTO: 3.4 K/UL (ref 1.8–7.7)
NEUTROPHILS NFR BLD: 53.5 % (ref 38–73)
NRBC BLD-RTO: 0 /100 WBC
PLATELET # BLD AUTO: 205 K/UL (ref 150–450)
PMV BLD AUTO: 11.2 FL (ref 9.2–12.9)
POTASSIUM SERPL-SCNC: 4.1 MMOL/L (ref 3.5–5.1)
PROT SERPL-MCNC: 4.8 G/DL (ref 6–8.4)
RBC # BLD AUTO: 3.53 M/UL (ref 4–5.4)
SODIUM SERPL-SCNC: 140 MMOL/L (ref 136–145)
WBC # BLD AUTO: 6.32 K/UL (ref 3.9–12.7)

## 2023-09-15 PROCEDURE — 80053 COMPREHEN METABOLIC PANEL: CPT | Performed by: NURSE PRACTITIONER

## 2023-09-15 PROCEDURE — 96366 THER/PROPH/DIAG IV INF ADDON: CPT

## 2023-09-15 PROCEDURE — 25000003 PHARM REV CODE 250: Performed by: NURSE PRACTITIONER

## 2023-09-15 PROCEDURE — G0378 HOSPITAL OBSERVATION PER HR: HCPCS

## 2023-09-15 PROCEDURE — 81025 URINE PREGNANCY TEST: CPT | Performed by: INTERNAL MEDICINE

## 2023-09-15 PROCEDURE — S4991 NICOTINE PATCH NONLEGEND: HCPCS | Performed by: INTERNAL MEDICINE

## 2023-09-15 PROCEDURE — 25000003 PHARM REV CODE 250: Performed by: INTERNAL MEDICINE

## 2023-09-15 PROCEDURE — 83735 ASSAY OF MAGNESIUM: CPT | Performed by: NURSE PRACTITIONER

## 2023-09-15 PROCEDURE — 36415 COLL VENOUS BLD VENIPUNCTURE: CPT | Performed by: NURSE PRACTITIONER

## 2023-09-15 PROCEDURE — 85025 COMPLETE CBC W/AUTO DIFF WBC: CPT | Performed by: NURSE PRACTITIONER

## 2023-09-15 RX ORDER — SODIUM CHLORIDE 9 MG/ML
INJECTION, SOLUTION INTRAVENOUS CONTINUOUS
Status: DISCONTINUED | OUTPATIENT
Start: 2023-09-15 | End: 2023-09-15

## 2023-09-15 RX ADMIN — ACETAMINOPHEN 650 MG: 325 TABLET, FILM COATED ORAL at 07:09

## 2023-09-15 RX ADMIN — DOCUSATE SODIUM AND SENNOSIDES 1 TABLET: 8.6; 5 TABLET, FILM COATED ORAL at 09:09

## 2023-09-15 RX ADMIN — NICOTINE 1 PATCH: 14 PATCH TRANSDERMAL at 09:09

## 2023-09-15 RX ADMIN — GABAPENTIN 300 MG: 300 CAPSULE ORAL at 09:09

## 2023-09-15 RX ADMIN — DIVALPROEX SODIUM 250 MG: 250 TABLET, DELAYED RELEASE ORAL at 09:09

## 2023-09-15 RX ADMIN — SODIUM CHLORIDE: 9 INJECTION, SOLUTION INTRAVENOUS at 06:09

## 2023-09-15 RX ADMIN — MELATONIN TAB 3 MG 6 MG: 3 TAB at 01:09

## 2023-09-15 NOTE — CONSULTS
"    [facility]  Psychiatry  Telepsychiatry Consult Note     Please see previous notes:     Patient agreeable to consultation via telepsychiatry.     Tele-Consultation from Psychiatry started: 9/14/2023 at 1959  The chief complaint leading to psychiatric consultation is: suicide attempt  This consultation was requested by Dr. Eunice Connors  The location of the consulting psychiatrist is  [place], [place] .  The patient location is  [facility]*   The patient arrived at the ED at: 9/13/23 at 1848    Also present with the patient at the time of the consultation: nurse     Patient Identification:   Cat Denson is a 43 y.o. female.     Patient information was obtained from patient.     Inpatient consult to Telemedicine - Psych  Consult performed by: Felisa Jhaveri MD  Consult ordered by: Eunice Connors MD  Reason for consult: suicide attempt        Consult Start Time: 09/14/2023 19:59 CDT           Subjective:      History of Present Illness:  Patient is 43 y.o.disabled female with past psychiatric history of bipolar I disorder, panic disorder, cannabis use disorder, and cognitive disorder.    Tried to kill herself by taking a bunch of trazodone. At 20yo, took a bottle of pills in an overdose. Feels like a burden to everybody after her stroke. Had to move because she was scammed and lost her check. Has not been taking her medicine for about 6 months. Denies current SI or any plan/intent to self-harm. Patient states she has not had a manic episode in a long time.     Patient endorses "not good" mood and endorses poor appetite and ok sleep when she takes trazodone. The patient denies any current or history of HI or any plan/intent to harm others. Denies AH/VH, paranoia. No vocalized delusions.     Psychiatric History:   Previous Psychiatric Hospitalizations: Yes multiple for depression and SI  [facility] January 2015, [facility] for SI 2015,   First admit: 26 yo: " "for SI - [facility]; most recent [facility]. 2017 - for w/d affect, psychosis, poor memory recall, not sleeping x days  Previous Medication Trials: Yes   Wellbutrin for smoking cessation, helped to reduce, Zoloft, Trazodone (taken off due to tiredness one month ago), Lexapro, Paxil, Cymbalta (no pain benefit), Geodon, Trileptal, Lamictal, Seroquel, Chantix (makes her vomit), Olanzapine, Risperdal, Effexor, ritalin, depakote (was taken off ? Cannot recall why)  Previous Suicide Attempts: yes 22 yo  History of Violence: no  History of Depression: yes  History of Sanjuanita: yes  History of Auditory/Visual Hallucination no  History of Delusions: no  Outpatient psychiatrist (current & past): Yes, Dr Cruz     Substance Abuse History:  Tobacco:Yes, mostly cigars and vaping  Alcohol: No  Illicit Substances:Yes, marijuana when she can every day-has not been able to get it recently  Detox/Rehab: No     Legal History: Past charges/incarcerations: Yes, pled guilty for a couple felonies for having pills on her that someone had given her and was driving while she had taken one-5 years on probation     Family Psychiatric History:   Maternal grandmother-paranoid schizophrenia  Brother-PTSD from [employer]     Social History:  *Education:GED at 21yo; some online college  Employment Status/Finances:Disabled   Relationship Status/Sexual Orientation: Single-had online relationships; [employer] (kind of like a wedding)  Children: 0  Housing Status: roommate-best friend, Alivia    history:  NO  Access to gun: NO     Psychiatric Mental Status Exam:  Arousal: alert  Sensorium/Orientation: oriented to grossly intact  Behavior/Cooperation: normal, cooperative   Speech: normal tone, normal rate, normal pitch, normal volume  Language: grossly intact  Mood: " not good "   Affect: appropriate  Thought Process: normal and logical  Thought Content:   Auditory hallucinations: NO  Visual hallucinations: NO  Paranoia: " NO  Delusions:  NO  Suicidal ideation: NO  Homicidal ideation: NO  Attention/Concentration:  intact  Memory:               Recent:  Intact              Remote: Intact  Insight: intact  Judgment: behavior is adequate to circumstances        Past Medical History:        Past Medical History:   Diagnosis Date    Acid reflux      Allergy      Anemia      Anticoagulated 12/29/2015    Anxiety      Asthma      Bipolar 1 disorder      Bronchospasm with bronchitis, acute      Chronic daily headache 9/11/2018    Chronic kidney disease      Chronic pain      Depression       bipolar 2    Fibromyalgia      Gastroparesis      History of right MCA stroke 11/25/2015    Hx of psychiatric care      Lactose intolerance      Lupus      Mixed hyperlipidemia 11/28/2018    Psychiatric problem      Renal tubular acidosis      Seizure      Sjogren's syndrome      Smoker      Stroke 11-    Substance abuse-kratom daily use 8/30/2020    Suicide attempt       overdosed on a bottle of paxil, muscle relaxers, & zoloft    Therapy        Laboratory Data:         Labs Reviewed   COMPREHENSIVE METABOLIC PANEL - Abnormal; Notable for the following components:       Result Value      Potassium 3.4 (*)       Chloride 111 (*)       CO2 12 (*)       Creatinine 1.9 (*)       eGFR 34 (*)       All other components within normal limits     Narrative:      Collection has been rescheduled by DTT at 09/13/2023 18:45 Reason:   Unable to collect   URINALYSIS, REFLEX TO URINE CULTURE - Abnormal; Notable for the following components:     Color, UA Colorless (*)       Protein, UA Trace (*)       All other components within normal limits     Narrative:      Specimen Source->Urine   ACETAMINOPHEN LEVEL - Abnormal; Notable for the following components:     Acetaminophen (Tylenol), Serum 4.0 (*)       All other components within normal limits     Narrative:      Collection has been rescheduled by DTT at 09/13/2023 18:45 Reason:   Unable to collect   CBC W/ AUTO  DIFFERENTIAL - Abnormal; Notable for the following components:     RBC 3.97 (*)       Hematocrit 36.6 (*)       Immature Granulocytes 0.9 (*)       Immature Grans (Abs) 0.05 (*)       All other components within normal limits   BETA - HYDROXYBUTYRATE, SERUM - Abnormal; Notable for the following components:     Beta-Hydroxybutyrate 2.4 (*)       All other components within normal limits   ISTAT PROCEDURE - Abnormal; Notable for the following components:     POC PH 7.236 (*)       POC PO2 34 (*)       POC HCO3 16.9 (*)       POC SATURATED O2 55 (*)       POC TCO2 18 (*)       All other components within normal limits   TSH     Narrative:      Collection has been rescheduled by DTT at 09/13/2023 18:45 Reason:   Unable to collect   DRUG SCREEN PANEL, URINE EMERGENCY     Narrative:      Specimen Source->Urine   ALCOHOL,MEDICAL (ETHANOL)     Narrative:      Collection has been rescheduled by DTT at 09/13/2023 18:45 Reason:   Unable to collect   LACTIC ACID, PLASMA   MAGNESIUM   PHOSPHORUS         Neurological History:  CVA in November 2015 - Embolic stroke involving right middle cerebral artery; she is hypercoagulable due to lupus  Seizures: Yes  Head trauma: No     Allergies:         Review of patient's allergies indicates:   Allergen Reactions    Metoclopramide hcl Anaphylaxis       Involuntary mouth movements     Amoxicillin-pot clavulanate         2 other medications    Amoxil [amoxicillin]         hives    Avelox [moxifloxacin]         itching    Benadryl [diphenhydramine hcl]         Lowers seizure threshold     Coumadin [warfarin]         toxicity    Quinazolinones         Lowers seizure threshold     Ultram [tramadol]         Lowers seizure threshold     Wellbutrin [bupropion hcl]         Lowers seizure threshold          Medications in ER:   Medications   potassium chloride 10 mEq in 100 mL IVPB ( Intravenous Restarted 9/14/23 0230)   famotidine tablet 20 mg (20 mg Oral Given 9/14/23 0231)   rivaroxaban tablet 15  mg (15 mg Oral Given 9/14/23 1622)   sodium chloride 0.9% flush 10 mL (has no administration in time range)   melatonin tablet 6 mg (has no administration in time range)   ondansetron injection 4 mg (has no administration in time range)   polyethylene glycol packet 17 g (has no administration in time range)   senna-docusate 8.6-50 mg per tablet 1 tablet (1 tablet Oral Given 9/14/23 0852)   acetaminophen tablet 650 mg (650 mg Oral Given 9/14/23 0856)   aluminum-magnesium hydroxide-simethicone 200-200-20 mg/5 mL suspension 30 mL (has no administration in time range)   naloxone 0.4 mg/mL injection 0.02 mg (has no administration in time range)   potassium bicarbonate disintegrating tablet 50 mEq (has no administration in time range)   potassium bicarbonate disintegrating tablet 35 mEq (has no administration in time range)   potassium bicarbonate disintegrating tablet 60 mEq (has no administration in time range)   magnesium oxide tablet 800 mg (800 mg Oral Given 9/14/23 1208)   magnesium oxide tablet 800 mg (has no administration in time range)   glucose chewable tablet 16 g (has no administration in time range)   glucose chewable tablet 24 g (has no administration in time range)   glucagon (human recombinant) injection 1 mg (has no administration in time range)   dextrose 10% bolus 125 mL 125 mL (has no administration in time range)   dextrose 10% bolus 250 mL 250 mL (has no administration in time range)   sodium bicarbonate 150 mEq in dextrose 5 % (D5W) 1,000 mL infusion ( Intravenous Verify Only 9/14/23 1850)   gabapentin capsule 300 mg (300 mg Oral Given 9/14/23 0852)   divalproex EC tablet 250 mg (250 mg Oral Given 9/14/23 0852)   nicotine 14 mg/24 hr 1 patch (1 patch Transdermal Patch Applied 9/14/23 1723)   sodium chloride 0.9% bolus 1,000 mL 1,000 mL (0 mLs Intravenous Stopped 9/13/23 2152)   lactated ringers bolus 1,000 mL (0 mLs Intravenous Stopped 9/13/23 2339)   thiamine (B-1) 300 mg in dextrose 5 % (D5W) 100  mL IVPB (0 mg Intravenous Stopped 9/14/23 0244)         Medications at home:              Current Outpatient Medications on File Prior to Encounter   Medication Sig Dispense Refill Last Dose    ARIPiprazole (ABILIFY) 10 MG Tab Take 1 tablet (10 mg total) by mouth once daily. 30 tablet 2 9/13/2023    divalproex (DEPAKOTE) 250 MG EC tablet TAKE 1 TABLET BY MOUTH EVERY MORNING AND TAKE 2 TABLETS BY MOUTH EVERY NIGHT AT BEDTIME 90 tablet 2 9/13/2023    famotidine (PEPCID) 40 MG tablet TAKE 1 TABLET(40 MG) BY MOUTH EVERY EVENING 30 tablet 3 9/13/2023    gabapentin (NEURONTIN) 800 MG tablet TAKE 1 TABLET(800 MG) BY MOUTH TWICE DAILY 180 tablet 3 9/13/2023    mirtazapine (REMERON) 30 MG tablet TAKE 1 TABLET(30 MG) BY MOUTH EVERY EVENING 30 tablet 2 9/13/2023    ondansetron (ZOFRAN) 8 MG tablet Take 1 tablet (8 mg total) by mouth every 8 (eight) hours as needed for Nausea. 90 tablet 0 Past Week    pantoprazole (PROTONIX) 40 MG tablet Take 1 tablet (40 mg total) by mouth once daily. 90 tablet 3 9/13/2023    propranoloL (INDERAL LA) 80 MG 24 hr capsule Take 1 capsule (80 mg total) by mouth once daily. 30 capsule 11 9/13/2023    rivaroxaban (XARELTO) 20 mg Tab TAKE 1 TABLET(20 MG) BY MOUTH DAILY WITH THE EVENING MEAL AND DINNER 90 tablet 3 9/13/2023    sodium bicarbonate 650 MG tablet Take 1 tablet (650 mg total) by mouth 2 (two) times daily. 60 tablet 5 9/13/2023    traZODone (DESYREL) 100 MG tablet Take three tablets po nightly prn insomnia 90 tablet 2 9/13/2023    BOTOX 200 unit SolR            clotrimazole (LOTRIMIN) 1 % cream Apply topically 2 (two) times daily. 60 g 0      fluconazole (DIFLUCAN) 150 MG Tab 1 po x 1 , may repeat 48 h 2 tablet 0      LIDOcaine-prilocaine (EMLA) cream APPLY TOPICALLY TO THE AFFECTED AREA AS NEEDED          magnesium oxide (MAG-OX) 400 mg (241.3 mg magnesium) tablet Take 1 tablet (400 mg total) by mouth once daily. 90 tablet 1           Assessment - Diagnosis - Goals:       Diagnosis/Impression: Patient is 43 y.o.disabled female with past psychiatric history of bipolar I disorder, panic disorder, cannabis use disorder, and cognitive disorder. Patient intentionally overdosed on trazodone with the intention of killing herself. This is her second suicide attempt. For these reasons, will recommend inpatient psychiatric hospitalization upon medical clearance. Would not re-start any psychiatric medications at this time.     Rec:   1. Dispo/Legal Status:  Cont PEC at this time as the pt is currently a danger to self. Seek inpt bed for pt safety and stabilization when/if medically cleared.  2. Scheduled Medications: none. Defer any non-psych meds to primary team  3. PRN Medications: olanzapine 10mg po/im q8hr prn non-redirectable agitation associated with breakthrough psychosis or bowen if needed to help the pt more effectively interact with environment.  4. Precautions/Nursing:  suicide precautions; 1:1 sitter  5. To-Do: Continue to observe pt's behavior.    Time with patient: 15  In total, 19 minutes were spent on chart review, patient interview and charting     More than 50% of the time was spent counseling/coordinating care    Consultation ended: 9/14/2023 at 2053     Felisa Jhaveri MD   Psychiatry  [facility]

## 2023-09-15 NOTE — PLAN OF CARE
Consent: Indy Godoy, who was seen by synchronous (real-time) audio-video technology, and/or his healthcare decision maker, is aware that this patient-initiated, Telehealth encounter on 5/17/2022 is a billable service, with coverage as determined by his insurance carrier. He is aware that he may receive a bill and has provided verbal consent to proceed: YES-Consent obtained within past 12 months        712  Subjective:   Indy Godoy is a 81 y.o. male who was seen for Follow-up     presents for follow-up of HTN erection outflow normal.  Patient has history of paresthesia in his handsGets pins-and-needles, and tingling in bilateral feet which has been worsening recently. Livia Eder get similar symptoms in his hands which occur less often. Also notes coldness in hands and feet. Gets white discoloration of hands. Follows with neurology and EMG was negative. Per neurology was possibly having a Raynaud's. Due to this and his elevated blood pressure amlodipine was started at 2.5 mg and titrated up to 5 mg daily. Patient symptoms have improved as he has noticed that much less often. Blood pressure has improved mildly however continues abnormal.  Also systolic blood pressure readings tend to be in the 140s to 150s, and many of his diastolic blood pressure readings tend to be in the 50s to 60s. Examples below. He will check his blood pressure multiple times and eventually will get a normal reading after about the third time he checks it. Continues to take pantoprazoleHCTZ as well. SBP: 150-151  148/56    120/56    Current Outpatient Medications on File Prior to Visit   Medication Sig Dispense Refill    gabapentin (NEURONTIN) 100 mg capsule Take 2 Capsules by mouth three (3) times daily. Max Daily Amount: 600 mg. 180 Capsule 5    atorvastatin (LIPITOR) 20 mg tablet TAKE 1 TABLET DAILY 90 Tablet 3    fluticasone propionate (FLONASE) 50 mcg/actuation nasal spray 2 Sprays by Both Nostrils route daily.  3 Each 0 Per Dr. Connors, pt medically cleared and ready for inpt psych placement.  SW completed ADT 22 for transfer center to start the process to secure accepting psych facility     09/15/23 0989   Post-Acute Status   Post-Acute Authorization Placement   Discharge Plan   Discharge Plan A Psychiatric hospital   Discharge Plan B Psychiatric hospital           loratadine (Claritin) 10 mg tablet Take 1 Tablet by mouth daily. 90 Tablet 0    amLODIPine (NORVASC) 5 mg tablet Take 1 Tablet by mouth daily. 90 Tablet 1    clopidogreL (PLAVIX) 75 mg tab TAKE 1 TABLET ONCE DAILY 90 Tablet 3    cholecalciferol (VITAMIN D3) (1000 Units /25 mcg) tablet TAKE 2 TABLETS DAILY 180 Tablet 2    metroNIDAZOLE (METROGEL) 0.75 % topical gel APPLY TOPICALLY TO THE AFFECTED AREA TWICE DAILY 45 g 1    tadalafiL (CIALIS) 5 mg tablet Take 5 mg by mouth daily.  solifenacin (VESICARE) 10 mg tablet 10 mg.      tamsulosin (FLOMAX) 0.4 mg capsule Take 0.4 mg by mouth.  benazepril-hydroCHLOROthiazide (LOTENSIN HCT) 10-12.5 mg per tablet TAKE 1 TABLET DAILY FOR    BLOOD PRESSURE 90 Tablet 3    testosterone enanthate (Xyosted) 75 mg/0.5 mL atIn 0.5 mL by SubCUTAneous route Every Saturday.  Synthroid 100 mcg tablet TAKE 1 TABLET EVERY MORNING 90 Tab 3    latanoprost (XALATAN) 0.005 % ophthalmic solution Administer 1 Drop to both eyes nightly. No current facility-administered medications on file prior to visit. Allergies   Allergen Reactions    Zocor [Simvastatin] Myalgia     Patient Active Problem List    Diagnosis    Paresthesia     Hands and feet. Follows with neurology. Possibly due to Raynaud's. EMG negative for neuropathy or myopathy.  Cervical radiculopathy    Lumbar radiculopathy    LEO (obstructive sleep apnea)    TIA (transient ischemic attack)     Per patient. Negative imgaing.  Cervical spondylosis with radiculopathy     Mild spondylosis. Seen on EMG, and XR. Following with neurology.  DDD (degenerative disc disease), cervical     Seen on XR.  Other spondylosis with radiculopathy, lumbar region     Mild spondylosis. Seen on EMG, and XR. Following with neurology.       Benign prostatic hyperplasia    Essential hypertension    Hypogonadism in male    Other specified hypothyroidism    Mixed hyperlipidemia    Prediabetes    Rosacea    Severe obesity ([facility] [address]     Past Medical History:   Diagnosis Date    Benign prostatic hyperplasia     Blood glucose abnormal     Essential hypertension     Hypogonadism in male     Mixed hyperlipidemia     Other specified hypothyroidism     Palpitations     Prediabetes     Rosacea     TIA (transient ischemic attack)     Per patient. Negative imgaing. Review of Systems   All other systems reviewed and are negative. Objective:   Vital Signs: (As obtained by patient/caregiver at home)  There were no vitals taken for this visit. [INSTRUCTIONS:  \"[x]\" Indicates a positive item  \"[]\" Indicates a negative item  -- DELETE ALL ITEMS NOT EXAMINED]    Constitutional: [x] Appears well-developed and well-nourished [x] No apparent distress      [] Abnormal -     Mental status: [x] Alert and awake  [x] Oriented to person/place/time [x] Able to follow commands    [] Abnormal -     Eyes:   EOM    [x]  Normal    [] Abnormal -   Sclera  [x]  Normal    [] Abnormal -          Discharge [x]  None visible   [] Abnormal -     HENT: [x] Normocephalic, atraumatic  [] Abnormal -   [x] Mouth/Throat: Mucous membranes are moist    External Ears [x] Normal  [] Abnormal -    Neck: [x] No visualized mass [] Abnormal -     Pulmonary/Chest: [x] Respiratory effort normal   [x] No visualized signs of difficulty breathing or respiratory distress        [] Abnormal -        Neurological:        [x] No Facial Asymmetry (Cranial nerve 7 motor function) (limited exam due to video visit)          [x] No gaze palsy        [] Abnormal -          Skin:        [x] No significant exanthematous lesions or discoloration noted on facial skin         [] Abnormal -            Psychiatric:       [x] Normal Affect [] Abnormal -        [x] No Hallucinations    Other pertinent observable physical exam findings:-              Assessment & Plan:       1.  Essential hypertension  -Systolic blood pressure still not optimally controlled, but diastolic blood pressure is in the upper 50s or low 60s. At this point no medication changes will be made, he was advised to lose weight and decrease salt in his diet. He is to continue checking his blood pressure if there is any worsening or does not improve we will cautiously increase one of his medications. 2. Raynaud's phenomenon without gangrene    -I suspect the paresthesia patient felt in hands and feet, as was discussed by neurology, and symptoms have improved since starting amlodipine. Continue amlodipine at this time.  -Follows with neurology, and EMG was negative. Over the last couple years he has lost a total of 50 pounds. Follow-up and Dispositions    · Return in about 4 months (around 9/17/2022) for HTN, Raynaud. We discussed the expected course, resolution and complications of the diagnosis(es) in detail. Medication risks, benefits, costs, interactions, and alternatives were discussed as indicated. I advised him to contact the office if his condition worsens, changes or fails to improve as anticipated. He expressed understanding with the diagnosis(es) and plan. Oneyda Razo is a 81 y.o. male being evaluated by a video visit encounter for concerns as above. A caregiver was present when appropriate. Due to this being a TeleHealth encounter (During [facility]- public health emergency), evaluation of the following organ systems was limited: Vitals/Constitutional/EENT/Resp/CV/GI//MS/Neuro/Skin/Heme-Lymph-Imm. Pursuant to the emergency declaration under the [facility] waiver authority and the [employer] and [employer] Act, this Virtual  Visit was conducted, with patient's (and/or legal guardian's) consent, to reduce the patient's risk of exposure to COVID-19 and provide necessary medical care.      Services were provided through a video synchronous discussion virtually to substitute for in-person clinic visit. Patient and provider were located at their individual homes.         Yadira Rollins MD

## 2023-09-15 NOTE — PROGRESS NOTES
"Critical access hospital Medicine  Progress Note    Patient Name: Cat Denson  MRN: [id number]  Patient Class: OP- Observation   Admission Date: 9/13/2023  Length of Stay: 0 days  Attending Physician: Eunice Connors MD  Primary Care Provider: Neela Barrett MD        Subjective:     Principal Problem:Intentional overdose        HPI:  Ms. Denson is a 43 year old female with a history of SLE, CKD3, gastroparesis, bipolar disorder, fibromyalgia, seizure disorder. CVA w/ L-side weakness, and hx RTA presents with complaints of suicide attempt reports taking twenty 100mg trazodone around 2:30pm. It is severe. It is associated with depression and feeling "useless". She does report some diarrhea, does not know trigger, but is chronic and intermittent. She is supposed to take sodium bicarb tablets, but only takes when she's having diarrhea. She denies vomiting, chest pain, SOB, dizziness, or LOC. In the ED, she was found to have a significant NAGMA with a bicarb of 12. Previous admission was 7 and thought to be d/t starvation ketoacidosis and was treated with bicarb gtt. Creatinine is elevated at 1.9. Lactate WNL, BHB 2. EKG: NSR with right atrial enlargement and nonspecific ST and T wave abnormalities. She has been PEC'ed by ED MD. Hospital medicine is consulted for admission for severe NAGMA for further work up and treatment for medical clearance to psych facility.       Overview/Hospital Course:  Patient admitted to Hospital Medicine under pec precautions.  Patient was provided supportive care with IV fluid hydration.  Patient was evaluated by tele psychiatrist.  Patient admitted major depression symptoms and suicidal ideation.  Patient has prior history of psychiatric hospitalization.      Interval History:  Patient is under pec precautions.  Father is at bedside.  Patient confirms depressed mood with 2nd suicide attempt.  Prior history of hospitalizations.  Patient is noncompliant with " medications.  Denies any hallucinations or homicidal ideation.  Patient is agreeable to go to inpatient psych placement.    Review of Systems   Constitutional:  Negative for activity change, appetite change, chills, diaphoresis, fatigue, fever and unexpected weight change.   HENT:  Negative for congestion, ear pain, facial swelling, hearing loss, sore throat and trouble swallowing.    Eyes:  Negative for pain and discharge.   Respiratory:  Negative for cough, chest tightness, shortness of breath and wheezing.    Cardiovascular:  Negative for chest pain, palpitations and leg swelling.   Gastrointestinal:  Positive for diarrhea and nausea. Negative for abdominal pain, blood in stool and vomiting.   Endocrine: Negative for polydipsia, polyphagia and polyuria.   Genitourinary:  Negative for difficulty urinating, dysuria, flank pain, frequency and urgency.   Musculoskeletal:  Negative for arthralgias, back pain, joint swelling, neck pain and neck stiffness.   Skin:  Negative for rash and wound.   Allergic/Immunologic: Negative for environmental allergies and immunocompromised state.   Neurological:  Negative for dizziness, seizures, syncope, speech difficulty, weakness, light-headedness, numbness and headaches.   Hematological:  Negative for adenopathy.   Psychiatric/Behavioral:  Positive for dysphoric mood and suicidal ideas. Negative for sleep disturbance. The patient is not nervous/anxious.    All other systems reviewed and are negative.    Objective:     Vital Signs (Most Recent):  Temp: 98.3 °F (36.8 °C) (09/14/23 0707)  Pulse: 72 (09/14/23 0707)  Resp: 20 (09/14/23 0707)  BP: 121/71 (09/14/23 0707)  SpO2: 98 % (09/14/23 0707) Vital Signs (24h Range):  Temp:  [97.7 °F (36.5 °C)-98.7 °F (37.1 °C)] 98.3 °F (36.8 °C)  Pulse:  [60-95] 72  Resp:  [15-20] 20  SpO2:  [98 %-100 %] 98 %  BP: ()/(54-90) 121/71     Weight: 41.1 kg (90 lb 9.7 oz)  Body mass index is 16.05 kg/m².    Intake/Output Summary (Last 24 hours) at  9/14/2023 1017  Last data filed at 9/14/2023 0438  Gross per 24 hour   Intake 120 ml   Output --   Net 120 ml         Physical Exam  Vitals and nursing note reviewed.   Constitutional:       Comments: Underweight, thin   HENT:      Head: Normocephalic and atraumatic.      Nose: Nose normal.      Mouth/Throat:      Mouth: Mucous membranes are moist.      Pharynx: Oropharynx is clear.   Eyes:      Extraocular Movements: Extraocular movements intact.      Pupils: Pupils are equal, round, and reactive to light.   Cardiovascular:      Rate and Rhythm: Normal rate and regular rhythm.      Pulses: Normal pulses.      Heart sounds: Normal heart sounds.   Pulmonary:      Effort: Pulmonary effort is normal.      Breath sounds: Normal breath sounds.   Abdominal:      General: Bowel sounds are normal.      Palpations: Abdomen is soft.   Musculoskeletal:         General: Normal range of motion.      Cervical back: Normal range of motion and neck supple.   Skin:     General: Skin is warm and dry.      Capillary Refill: Capillary refill takes less than 2 seconds.   Neurological:      Mental Status: She is alert and oriented to person, place, and time. Mental status is at baseline.   Psychiatric:      Comments: flat             Significant Labs: All pertinent labs within the past 24 hours have been reviewed.  CBC:   Recent Labs   Lab 09/13/23 2027 09/14/23  0357   WBC 5.63 4.43   HGB 12.2 12.5   HCT 36.6* 36.1*    184     CMP:   Recent Labs   Lab 09/13/23  1915 09/14/23  0357    138   K 3.4* 4.3   * 115*   CO2 12* 15*   GLU 81 113*   BUN 13 11   CREATININE 1.9* 1.8*   CALCIUM 9.0 8.2*   PROT 7.8 6.3   ALBUMIN 4.7 3.7   BILITOT 0.4 0.2   ALKPHOS 63 57   AST 26 17   ALT 27 20   ANIONGAP 14 8     Lactic Acid:   Recent Labs   Lab 09/13/23  2232   LACTATE 0.8     Urine Studies:   Recent Labs   Lab 09/13/23  1821   COLORU Colorless*   APPEARANCEUA Clear   PHUR 6.0   SPECGRAV 1.005   PROTEINUA Trace*   GLUCUA Negative    KETONESU Negative   BILIRUBINUA Negative   OCCULTUA Negative   NITRITE Negative   UROBILINOGEN Negative   LEUKOCYTESUR Negative   Beta hydroxy Butyrate: 2.4  Valproic acid less than 12.5   Acetaminophen level 4.0   Alcohol level less than 10   Urine drug screen: Negative  ABG  Recent Labs   Lab 09/14/23  0544   PH 7.251*   PO2 26*   PCO2 44.1   HCO3 19.4*   BE -8         Significant Imaging: None      Assessment/Plan:      * Intentional overdose - Trazodone  Admit to med/tele  Neuro checks q4h  PEC'd  Psych placement when medically cleared  Hold further SSRI/SNRI      Left hemiparesis  chronic      Acute renal failure superimposed on stage 3 chronic kidney disease  Patient with acute kidney injury/acute renal failure likely due to pre-renal azotemia due to IVVD CHRISTEN is currently stable. Baseline creatinine 1.2 - Labs reviewed- Renal function/electrolytes with Estimated Creatinine Clearance: 26.7 mL/min (A) (based on SCr of 1.8 mg/dL (H)). according to latest data. Monitor urine output and serial BMP and adjust therapy as needed. Avoid nephrotoxins and renally dose meds for GFR listed above.    Given 1L NS bolus in ED, will give an add'l LR bolus   Then bicarb gtt  Monitor renal function daily   Hold nephrotoxic meds, renally dose all meds  Daily wt/accurate I&O    Metabolic acidosis, normal anion gap (NAG)  Likely multifactorial through GI losses, Trazodone overdose, CHRISTEN on CKDIII, and starvation ketoacidosis  Start bicarb gtt - transition to PO when clinically indicated  Repeat chemistry in AM   Give a dose of IV thiamine given starvation component  Regular diet   Check phos    Long term current use of anticoagulant therapy  Renally dose xarelto and continue       Hypokalemia   Replete potassium chloride.  Follow daily BMP.      Lupus (systemic lupus erythematosus)  aware      Discussed with patient's father, answered all questions.  Discussed with  regarding discharge planning.  Awaiting psychiatric  evaluation and recommendations.  VTE Risk Mitigation (From admission, onward)         Ordered     rivaroxaban tablet 15 mg  with dinner         09/14/23 0043     IP VTE HIGH RISK PATIENT  Once         09/14/23 0043     Place sequential compression device  Until discontinued         09/14/23 0043                Discharge Planning   EDY: 9/15/2023     Code Status: Full Code   Is the patient medically ready for discharge?:     Reason for patient still in hospital (select all that apply): Patient trending condition and Consult recommendations  Discharge Plan A: [facility]                  Eunice Connors MD  Department of Hospital Medicine   [facility]

## 2023-09-15 NOTE — DISCHARGE SUMMARY
"[facility] Medicine  Discharge Summary      Patient Name: Cat Denson  MRN: [id number]  RICH: [id number]  Patient Class: OP- Observation  Admission Date: 9/13/2023  Hospital Length of Stay: 0 days  Discharge Date and Time:  09/15/2023 1:44 PM  Attending Physician: Eunice Connors MD   Discharging Provider: Eunice Connors MD  Primary Care Provider: Neela Barrett MD    Primary Care Team: Networked reference to record PCT     HPI:   Ms. Denson is a 43 year old female with a history of SLE, CKD3, gastroparesis, bipolar disorder, fibromyalgia, seizure disorder. CVA w/ L-side weakness, and hx RTA presents with complaints of suicide attempt reports taking twenty 100mg trazodone around 2:30pm. It is severe. It is associated with depression and feeling "useless". She does report some diarrhea, does not know trigger, but is chronic and intermittent. She is supposed to take sodium bicarb tablets, but only takes when she's having diarrhea. She denies vomiting, chest pain, SOB, dizziness, or LOC. In the ED, she was found to have a significant NAGMA with a bicarb of 12. Previous admission was 7 and thought to be d/t starvation ketoacidosis and was treated with bicarb gtt. Creatinine is elevated at 1.9. Lactate WNL, BHB 2. EKG: NSR with right atrial enlargement and nonspecific ST and T wave abnormalities. She has been PEC'ed by ED MD. Hospital medicine is consulted for admission for severe NAGMA for further work up and treatment for medical clearance to psych facility.       * No surgery found *      Hospital Course:   Patient admitted to Hospital Medicine under pec precautions.  Patient was provided supportive care with IV fluid hydration.  Patient was evaluated by tele psychiatrist.  Patient admitted major depression symptoms and suicidal ideation.  Patient has prior history of psychiatric hospitalization. No cardiac dysrythmias noted. Patient remained calm and cooperative. Patient has " been accepted at [facility] for further management.        Goals of Care Treatment Preferences:  Code Status: Full Code      Consults:   Consults (From admission, onward)          Status Ordering Provider     Inpatient consult to Telemedicine - Psych  Once        Provider:  (Not yet assigned)    VELIA Cheatham          Microbiology Results (last 7 days)       ** No results found for the last 168 hours. **          Final Active Diagnoses:    Diagnosis Date Noted POA    PRINCIPAL PROBLEM:  Intentional overdose - Trazodone [T50.902A] 09/14/2023 Yes    Left hemiparesis [G81.94] 06/22/2022 Yes    Acute renal failure superimposed on stage 3 chronic kidney disease [N17.9, N18.30] 09/14/2018 Yes    Metabolic acidosis, normal anion gap (NAG) [E87.20] 03/02/2017 Yes    Long term current use of anticoagulant therapy [Z79.01] 12/29/2015 Not Applicable    Hypokalemia [E87.6] 11/25/2015 Yes    Lupus (systemic lupus erythematosus) [M32.9] 11/27/2013 Yes      Problems Resolved During this Admission:       Discharged Condition: good    Disposition: Rehab Facility    Follow Up:   Follow-up Information       Neela Barrett MD Follow up in 1 week(s).    Specialty: Family Medicine  Contact information:  [address] [facility] [id number] [phone]                           Patient Instructions:      Ambulatory referral/consult to Outpatient Case Management   Referral Priority: Routine Referral Type: Consultation   Referral Reason: Specialty Services Required   Number of Visits Requested: 1     Diet Adult Regular     Notify your health care provider if you experience any of the following:  increased confusion or weakness     Notify your health care provider if you experience any of the following:  persistent dizziness, light-headedness, or visual disturbances     Notify your health care provider if you experience any of the following:  worsening rash     Notify your health care provider if you experience any of the following:   severe persistent headache     Notify your health care provider if you experience any of the following:  difficulty breathing or increased cough     Notify your health care provider if you experience any of the following:  redness, tenderness, or signs of infection (pain, swelling, redness, odor or green/yellow discharge around incision site)     Notify your health care provider if you experience any of the following:  severe uncontrolled pain     Notify your health care provider if you experience any of the following:  persistent nausea and vomiting or diarrhea     Notify your health care provider if you experience any of the following:  temperature >100.4     Activity as tolerated   Order Comments: Fall precautions       Significant Diagnostic Studies: Labs:   CMP   Recent Labs   Lab 09/13/23 1915 09/14/23  0357 09/15/23  0323    138 140   K 3.4* 4.3 4.1   * 115* 101   CO2 12* 15* 30*   GLU 81 113* 86   BUN 13 11 15   CREATININE 1.9* 1.8* 1.5*   CALCIUM 9.0 8.2* 7.5*   PROT 7.8 6.3 4.8*   ALBUMIN 4.7 3.7 2.7*   BILITOT 0.4 0.2 0.1   ALKPHOS 63 57 68   AST 26 17 16   ALT 27 20 13   ANIONGAP 14 8 9   , CBC   Recent Labs   Lab 09/13/23  2027 09/14/23  0357 09/15/23  0323   WBC 5.63 4.43 6.32   HGB 12.2 12.5 11.1*   HCT 36.6* 36.1* 31.5*    184 205    and INR   Lab Results   Component Value Date    INR 1.2 10/08/2022    INR 1.0 09/18/2021    INR 1.1 03/04/2017       Pending Diagnostic Studies:       Procedure Component Value Units Date/Time    EKG 12-lead [[id number]]     Order Status: Sent Lab Status: No result            Medications:  Reconciled Home Medications:      Medication List        CONTINUE taking these medications      ARIPiprazole 10 MG Tab  Commonly known as: ABILIFY  Take 1 tablet (10 mg total) by mouth once daily.     BOTOX 200 unit Solr  Generic drug: onabotulinumtoxina     clotrimazole 1 % cream  Commonly known as: LOTRIMIN  Apply topically 2 (two) times daily.     divalproex 250  MG EC tablet  Commonly known as: DEPAKOTE  TAKE 1 TABLET BY MOUTH EVERY MORNING AND TAKE 2 TABLETS BY MOUTH EVERY NIGHT AT BEDTIME     famotidine 40 MG tablet  Commonly known as: PEPCID  TAKE 1 TABLET(40 MG) BY MOUTH EVERY EVENING     fluconazole 150 MG Tab  Commonly known as: DIFLUCAN  1 po x 1 , may repeat 48 h     gabapentin 800 MG tablet  Commonly known as: NEURONTIN  TAKE 1 TABLET(800 MG) BY MOUTH TWICE DAILY     LIDOcaine-prilocaine cream  Commonly known as: EMLA  APPLY TOPICALLY TO THE AFFECTED AREA AS NEEDED     magnesium oxide 400 mg (241.3 mg magnesium) tablet  Commonly known as: MAG-OX  Take 1 tablet (400 mg total) by mouth once daily.     mirtazapine 30 MG tablet  Commonly known as: REMERON  TAKE 1 TABLET(30 MG) BY MOUTH EVERY EVENING     ondansetron 8 MG tablet  Commonly known as: ZOFRAN  Take 1 tablet (8 mg total) by mouth every 8 (eight) hours as needed for Nausea.     pantoprazole 40 MG tablet  Commonly known as: PROTONIX  Take 1 tablet (40 mg total) by mouth once daily.     propranoloL 80 MG 24 hr capsule  Commonly known as: INDERAL LA  Take 1 capsule (80 mg total) by mouth once daily.     rivaroxaban 20 mg Tab  Commonly known as: XARELTO  TAKE 1 TABLET(20 MG) BY MOUTH DAILY WITH THE EVENING MEAL AND DINNER     sodium bicarbonate 650 MG tablet  Take 1 tablet (650 mg total) by mouth 2 (two) times daily.     traZODone 100 MG tablet  Commonly known as: DESYREL  Take three tablets po nightly prn insomnia              Indwelling Lines/Drains at time of discharge:   Lines/Drains/Airways       None                   Time spent on the discharge of patient: 33 minutes         uEnice Connors MD  Department of Hospital Medicine  [facility]/Surg

## 2023-09-15 NOTE — PLAN OF CARE
Problem: Violence Risk or Actual  Goal: Anger and Impulse Control  Outcome: Ongoing, Progressing     Problem: Adult Inpatient Plan of Care  Goal: Plan of Care Review  Outcome: Ongoing, Progressing  Goal: Patient-Specific Goal (Individualized)  Outcome: Ongoing, Progressing  Goal: Absence of Hospital-Acquired Illness or Injury  Outcome: Ongoing, Progressing  Goal: Optimal Comfort and Wellbeing  Outcome: Ongoing, Progressing  Goal: Readiness for Transition of Care  Outcome: Ongoing, Progressing     Problem: Fluid and Electrolyte Imbalance (Acute Kidney Injury/Impairment)  Goal: Fluid and Electrolyte Balance  Outcome: Ongoing, Progressing     Problem: Oral Intake Inadequate (Acute Kidney Injury/Impairment)  Goal: Optimal Nutrition Intake  Outcome: Ongoing, Progressing     Problem: Renal Function Impairment (Acute Kidney Injury/Impairment)  Goal: Effective Renal Function  Outcome: Ongoing, Progressing

## 2023-09-15 NOTE — SUBJECTIVE & OBJECTIVE
Interval History:  Patient is under pec precautions.  Father is at bedside.  Patient confirms depressed mood with 2nd suicide attempt.  Prior history of hospitalizations.  Patient is noncompliant with medications.  Denies any hallucinations or homicidal ideation.  Patient is agreeable to go to inpatient psych placement.    Review of Systems   Constitutional:  Negative for activity change, appetite change, chills, diaphoresis, fatigue, fever and unexpected weight change.   HENT:  Negative for congestion, ear pain, facial swelling, hearing loss, sore throat and trouble swallowing.    Eyes:  Negative for pain and discharge.   Respiratory:  Negative for cough, chest tightness, shortness of breath and wheezing.    Cardiovascular:  Negative for chest pain, palpitations and leg swelling.   Gastrointestinal:  Positive for diarrhea and nausea. Negative for abdominal pain, blood in stool and vomiting.   Endocrine: Negative for polydipsia, polyphagia and polyuria.   Genitourinary:  Negative for difficulty urinating, dysuria, flank pain, frequency and urgency.   Musculoskeletal:  Negative for arthralgias, back pain, joint swelling, neck pain and neck stiffness.   Skin:  Negative for rash and wound.   Allergic/Immunologic: Negative for environmental allergies and immunocompromised state.   Neurological:  Negative for dizziness, seizures, syncope, speech difficulty, weakness, light-headedness, numbness and headaches.   Hematological:  Negative for adenopathy.   Psychiatric/Behavioral:  Positive for dysphoric mood and suicidal ideas. Negative for sleep disturbance. The patient is not nervous/anxious.    All other systems reviewed and are negative.    Objective:     Vital Signs (Most Recent):  Temp: 97.9 °F (36.6 °C) (09/15/23 0704)  Pulse: 89 (09/15/23 0704)  Resp: 16 (09/15/23 0704)  BP: 108/72 (09/15/23 0704)  SpO2: 98 % (09/15/23 0704) Vital Signs (24h Range):  Temp:  [96.4 °F (35.8 °C)-98 °F (36.7 °C)] 97.9 °F (36.6  °C)  Pulse:  [74-89] 89  Resp:  [16-18] 16  SpO2:  [98 %-100 %] 98 %  BP: (108-135)/(67-85) 108/72     Weight: 41.1 kg (90 lb 9.7 oz)  Body mass index is 16.05 kg/m².    Intake/Output Summary (Last 24 hours) at 9/15/2023 0936  Last data filed at 9/14/2023 1850  Gross per 24 hour   Intake 2153.86 ml   Output --   Net 2153.86 ml           Physical Exam  Vitals and nursing note reviewed.   Constitutional:       Comments: Underweight, thin   HENT:      Head: Normocephalic and atraumatic.      Nose: Nose normal.      Mouth/Throat:      Mouth: Mucous membranes are moist.      Pharynx: Oropharynx is clear.   Eyes:      Extraocular Movements: Extraocular movements intact.      Pupils: Pupils are equal, round, and reactive to light.   Cardiovascular:      Rate and Rhythm: Normal rate and regular rhythm.      Pulses: Normal pulses.      Heart sounds: Normal heart sounds.   Pulmonary:      Effort: Pulmonary effort is normal.      Breath sounds: Normal breath sounds.   Abdominal:      General: Bowel sounds are normal.      Palpations: Abdomen is soft.   Musculoskeletal:         General: Normal range of motion.      Cervical back: Normal range of motion and neck supple.   Skin:     General: Skin is warm and dry.      Capillary Refill: Capillary refill takes less than 2 seconds.   Neurological:      Mental Status: She is alert and oriented to person, place, and time. Mental status is at baseline.   Psychiatric:      Comments: flat             Significant Labs: All pertinent labs within the past 24 hours have been reviewed.  CBC:   Recent Labs   Lab 09/13/23  2027 09/14/23  0357 09/15/23  0323   WBC 5.63 4.43 6.32   HGB 12.2 12.5 11.1*   HCT 36.6* 36.1* 31.5*    184 205       CMP:   Recent Labs   Lab 09/13/23  1915 09/14/23  0357 09/15/23  0323    138 140   K 3.4* 4.3 4.1   * 115* 101   CO2 12* 15* 30*   GLU 81 113* 86   BUN 13 11 15   CREATININE 1.9* 1.8* 1.5*   CALCIUM 9.0 8.2* 7.5*   PROT 7.8 6.3 4.8*    ALBUMIN 4.7 3.7 2.7*   BILITOT 0.4 0.2 0.1   ALKPHOS 63 57 68   AST 26 17 16   ALT 27 20 13   ANIONGAP 14 8 9       Lactic Acid:   Recent Labs   Lab 09/13/23  2232   LACTATE 0.8       Urine Studies:   Recent Labs   Lab 09/13/23  1821   COLORU Colorless*   APPEARANCEUA Clear   PHUR 6.0   SPECGRAV 1.005   PROTEINUA Trace*   GLUCUA Negative   KETONESU Negative   BILIRUBINUA Negative   OCCULTUA Negative   NITRITE Negative   UROBILINOGEN Negative   LEUKOCYTESUR Negative     Beta hydroxy Butyrate: 2.4  Valproic acid less than 12.5   Acetaminophen level 4.0   Alcohol level less than 10   Urine drug screen: Negative  ABG  Recent Labs   Lab 09/14/23  0544   PH 7.251*   PO2 26*   PCO2 44.1   HCO3 19.4*   BE -8           Significant Imaging: None

## 2023-09-22 ENCOUNTER — TELEPHONE (OUTPATIENT)
Dept: ADMINISTRATIVE | Facility: CLINIC | Age: 43
End: 2023-09-22
Payer: MEDICARE

## 2023-09-26 ENCOUNTER — TELEPHONE (OUTPATIENT)
Dept: PSYCHIATRY | Facility: CLINIC | Age: 43
End: 2023-09-26
Payer: MEDICARE

## 2023-09-26 ENCOUNTER — HOSPITAL ENCOUNTER (EMERGENCY)
Facility: HOSPITAL | Age: 43
Discharge: HOME | End: 2023-09-26
Attending: EMERGENCY MEDICINE
Payer: MEDICARE

## 2023-09-26 ENCOUNTER — OUTPATIENT CASE MANAGEMENT (OUTPATIENT)
Dept: ADMINISTRATIVE | Facility: OTHER | Age: 43
End: 2023-09-26
Payer: MEDICARE

## 2023-09-26 VITALS
DIASTOLIC BLOOD PRESSURE: 91 MMHG | RESPIRATION RATE: 17 BRPM | SYSTOLIC BLOOD PRESSURE: 158 MMHG | BODY MASS INDEX: 15.77 KG/M2 | HEART RATE: 66 BPM | WEIGHT: 89 LBS | HEIGHT: 63 IN | TEMPERATURE: 98 F | OXYGEN SATURATION: 98 %

## 2023-09-26 DIAGNOSIS — L03.032 ABSCESS OR CELLULITIS OF TOE, LEFT: Primary | ICD-10-CM

## 2023-09-26 DIAGNOSIS — L02.612 ABSCESS OR CELLULITIS OF TOE, LEFT: Primary | ICD-10-CM

## 2023-09-26 LAB
B-HCG UR QL: NEGATIVE
CTP QC/QA: YES

## 2023-09-26 PROCEDURE — 25000003 PHARM REV CODE 250: Performed by: PHYSICIAN ASSISTANT

## 2023-09-26 PROCEDURE — 10160 PNXR ASPIR ABSC HMTMA BULLA: CPT

## 2023-09-26 PROCEDURE — 99282 EMERGENCY DEPT VISIT SF MDM: CPT | Mod: 25

## 2023-09-26 PROCEDURE — 81025 URINE PREGNANCY TEST: CPT | Performed by: PHYSICIAN ASSISTANT

## 2023-09-26 RX ORDER — DOXYCYCLINE 100 MG/1
100 CAPSULE ORAL 2 TIMES DAILY
Qty: 14 CAPSULE | Refills: 0 | Status: SHIPPED | OUTPATIENT
Start: 2023-09-26 | End: 2023-09-26 | Stop reason: SDUPTHER

## 2023-09-26 RX ORDER — DOXYCYCLINE 100 MG/1
100 CAPSULE ORAL 2 TIMES DAILY
Qty: 14 CAPSULE | Refills: 0 | Status: ON HOLD | OUTPATIENT
Start: 2023-09-26 | End: 2023-10-06 | Stop reason: HOSPADM

## 2023-09-26 RX ORDER — MUPIROCIN 20 MG/G
1 OINTMENT TOPICAL
Status: COMPLETED | OUTPATIENT
Start: 2023-09-26 | End: 2023-09-26

## 2023-09-26 RX ADMIN — MUPIROCIN 22 G: 20 OINTMENT TOPICAL at 04:09

## 2023-09-26 NOTE — PROGRESS NOTES
09/26/23 Attempt assessment with patient/caregiver. Pt discharged from the hospital to inpatient psych. Does not qualify for OPCM at this time.  RN OPCM

## 2023-09-26 NOTE — TELEPHONE ENCOUNTER
Yolanda with [facility] called and needed to schedule patient for appointment as she is being released. Scheduled 10/03/2023 @ 9 AM

## 2023-09-26 NOTE — ED PROVIDER NOTES
"Encounter Date: 9/26/2023       History     Chief Complaint   Patient presents with    Abscess     Patient states she has an abscess on her left toe, states [place] got [place] today and has had this on her foot for 1 week states it is painful to walk        Cat Denson is a 43 y.o. female presenting for evaluation of redness, swelling, pain and "abscess" to her left great toe for the last week.  She states she has always had a callus on the toe, but she is concerned that it is now infected.  No fever, no chills.  No numbness, tingling or weakness.  She has noticed no drainage or bleeding from the area.   She has a past medical history of Acid reflux, Allergy, Anemia, Anticoagulated (12/29/2015), Anxiety, Asthma, Bipolar 1 disorder, Bronchospasm with bronchitis, acute, Chronic daily headache (9/11/2018), Chronic kidney disease, Chronic pain, Depression, Fibromyalgia, Gastroparesis, History of right MCA stroke (11/25/2015), psychiatric care, Lactose intolerance, Lupus, Mixed hyperlipidemia (11/28/2018), Psychiatric problem, Renal tubular acidosis, Seizure, Sjogren's syndrome, Smoker, Stroke (11-), Substance abuse-kratom daily use (8/30/2020), Suicide attempt, and Therapy.      The history is provided by the patient.     Review of patient's allergies indicates:   Allergen Reactions    Metoclopramide hcl Anaphylaxis     Involuntary mouth movements     Amoxicillin-pot clavulanate      2 other medications    Amoxil [amoxicillin]      hives    Avelox [moxifloxacin]      itching    Benadryl [diphenhydramine hcl]      Lowers seizure threshold     Coumadin [warfarin]      toxicity    Quinazolinones      Lowers seizure threshold     Ultram [tramadol]      Lowers seizure threshold     Wellbutrin [bupropion hcl]      Lowers seizure threshold      Past Medical History:   Diagnosis Date    Acid reflux     Allergy     Anemia     Anticoagulated 12/29/2015    Anxiety     Asthma     Bipolar 1 disorder     " Bronchospasm with bronchitis, acute     Chronic daily headache 9/11/2018    Chronic kidney disease     Chronic pain     Depression     bipolar 2    Fibromyalgia     Gastroparesis     History of right MCA stroke 11/25/2015    Hx of psychiatric care     Lactose intolerance     Lupus     Mixed hyperlipidemia 11/28/2018    Psychiatric problem     Renal tubular acidosis     Seizure     Sjogren's syndrome     Smoker     Stroke 11-    Substance abuse-kratom daily use 8/30/2020    Suicide attempt     overdosed on a bottle of paxil, muscle relaxers, & zoloft    Therapy      Past Surgical History:   Procedure Laterality Date    CHOLECYSTECTOMY      COLONOSCOPY  11/12/2014    Dr. Kimble, repeat at 52 years old for screening    ESOPHAGOGASTRODUODENOSCOPY N/A 1/3/2022    Procedure: EGD (ESOPHAGOGASTRODUODENOSCOPY);  Surgeon: Scout Kimble MD;  Location: [facility];  Service: Endoscopy;  Laterality: N/A;    ESOPHAGOGASTRODUODENOSCOPY N/A 6/12/2023    Procedure: EGD (ESOPHAGOGASTRODUODENOSCOPY);  Surgeon: Scout Kimble MD;  Location: [facility];  Service: Endoscopy;  Laterality: N/A;    FLUOROSCOPIC URODYNAMIC STUDY N/A 7/23/2019    Procedure: URODYNAMIC STUDY, FLUOROSCOPIC;  Surgeon: Vanna Martinez MD;  Location: [address];  Service: Urology;  Laterality: N/A;  1 hour    UPPER GASTROINTESTINAL ENDOSCOPY  03/03/2017    Dr. Harris     Family History   Problem Relation Age of Onset    Hypertension Mother     Hyperlipidemia Mother     Psoriasis Mother     Thyroid disease Mother     No Known Problems Father     Post-traumatic stress disorder Brother     Drug abuse Brother     Diabetes Maternal Uncle     Hypertension Maternal Uncle     Alcohol abuse Maternal Uncle     Stroke Maternal Uncle     Scoliosis Paternal Aunt     Heart disease Paternal Uncle     Mental illness Maternal Grandmother     Cancer Maternal Grandmother         lung / brain - smoker    Drug abuse Maternal Grandmother     Hypertension Maternal  Grandmother     Hyperlipidemia Maternal Grandmother     Diabetes Maternal Grandmother     Rheum arthritis Maternal Grandmother     Diabetes Mellitus Maternal Grandmother     Heart disease Maternal Grandfather     Hypertension Maternal Grandfather     Alcohol abuse Maternal Grandfather     Osteoarthritis Maternal Grandfather     No Known Problems Paternal Grandmother     Mental illness Paternal Grandfather     Early death Paternal Grandfather         self    Breast cancer Cousin     Breast cancer Other     Colon cancer Neg Hx     Colon polyps Neg Hx     Crohn's disease Neg Hx     Ulcerative colitis Neg Hx     Celiac disease Neg Hx     Lupus Neg Hx     Kidney disease Neg Hx     Inflammatory bowel disease Neg Hx     Depression Neg Hx     COPD Neg Hx     Asthma Neg Hx     Macular degeneration Neg Hx     Retinal detachment Neg Hx     Glaucoma Neg Hx      Social History     Tobacco Use    Smoking status: Every Day     Current packs/day: 1.00     Average packs/day: 1 pack/day for 15.1 years (15.1 ttl pk-yrs)     Types: Cigarettes     Start date: 11/25/2015    Smokeless tobacco: Never   Substance Use Topics    Alcohol use: No     Alcohol/week: 0.0 standard drinks of alcohol    Drug use: Yes     Types: Marijuana     Comment: smokes occasionally, helps with pain and appetite     Review of Systems   Constitutional:  Negative for chills and fever.   Musculoskeletal:  Positive for arthralgias, joint swelling and myalgias. Negative for back pain, neck pain and neck stiffness.   Skin:  Negative for color change, pallor, rash and wound.   Neurological:  Negative for weakness and numbness.   Hematological:  Does not bruise/bleed easily.       Physical Exam     Initial Vitals [09/26/23 1523]   BP Pulse Resp Temp SpO2   (!) 158/91 66 17 98 °F (36.7 °C) 98 %      MAP       --         Physical Exam    Nursing note and vitals reviewed.  Constitutional: She appears well-developed and well-nourished. She is not diaphoretic. No distress.    HENT:   Head: Normocephalic and atraumatic.   Cardiovascular:  Intact distal pulses.           Musculoskeletal:         General: Tenderness present. Normal range of motion.      Comments: Erythema noted to right great toe with callus formation to medial right great toe.  No active bleeding or discharge.  No decreased ROM, decreased strength or loss of sensation to right great toe.  Palpable 2+ pedal pulse.      Neurological: She is alert and oriented to person, place, and time. She has normal strength. No sensory deficit.   Skin: Skin is warm and dry. No rash and no abscess noted. No erythema.   Psychiatric: She has a normal mood and affect.         ED Course   Abscess Aspiration    Date/Time: 9/26/2023 2:58 PM    Performed by: Val Singh PA-C  Authorized by: Harry Reyes MD    A time out verifies correct patient, procedure, equipment, support staff and site/side marked as required:   Procedure Details:     Site prepped with:  Chlorhexadine    Location of Abscess #1:  Rigth great toe    Size of needle #1:  20    Aspirated amount #1 (mL):  0 (dry aspiration)  Post-procedure:     Patient tolerance:  Patient tolerated the procedure well with no immediate complications    Labs Reviewed   POCT URINE PREGNANCY          Imaging Results    None          Medications   mupirocin 2 % ointment 22 g (22 g Topical (Top) Given 9/26/23 1631)     Medical Decision Making  Differential Diagnosis:   Abscess  Cellulitis      Pt emergently evaluated here in the ED.    No fluid was aspirated from the area of callus formation and lower suspicion for abscess at this time.  Symptoms consistent with cellulitis.  We will treat with doxycycline and discharged home to follow up with her primary care provider for re-evaluation.  She voices understanding and is agreeable to plan.  She is given specific return precautions.    Amount and/or Complexity of Data Reviewed  Labs: ordered.    Risk  Prescription drug management.                                Clinical Impression:   Final diagnoses:  [L03.032, L02.612] Abscess or cellulitis of toe, left (Primary)        ED Disposition Condition    Discharge Stable          ED Prescriptions       Medication Sig Dispense Start Date End Date Auth. Provider    doxycycline (VIBRAMYCIN) 100 MG Cap  (Status: Discontinued) Take 1 capsule (100 mg total) by mouth 2 (two) times daily. for 7 days 14 capsule 9/26/2023 9/26/2023 Val Singh PA-C    doxycycline (VIBRAMYCIN) 100 MG Cap Take 1 capsule (100 mg total) by mouth 2 (two) times daily. for 7 days 14 capsule 9/26/2023 10/3/2023 Val Singh PA-C          Follow-up Information       Follow up With Specialties Details Why Contact Info Additional Information    [facility] Emergency Medicine  As needed, If symptoms worsen [address] [place] [address] [address] [place] [address] 1st floor    Neela Barrett MD Family Medicine  As needed [address] [facility]  [place] [address]  [phone]                Val Singh PA-C  09/26/23 2038

## 2023-10-02 ENCOUNTER — OFFICE VISIT (OUTPATIENT)
Dept: FAMILY MEDICINE | Facility: CLINIC | Age: 43
End: 2023-10-02
Payer: MEDICARE

## 2023-10-02 VITALS
DIASTOLIC BLOOD PRESSURE: 66 MMHG | HEIGHT: 63 IN | RESPIRATION RATE: 16 BRPM | WEIGHT: 111.31 LBS | SYSTOLIC BLOOD PRESSURE: 114 MMHG | BODY MASS INDEX: 19.72 KG/M2 | HEART RATE: 89 BPM | OXYGEN SATURATION: 100 %

## 2023-10-02 DIAGNOSIS — F17.218 NICOTINE DEPENDENCE, CIGARETTES, WITH OTHER NICOTINE-INDUCED DISORDERS: ICD-10-CM

## 2023-10-02 DIAGNOSIS — F31.75 BIPOLAR 1 DISORDER, DEPRESSED, PARTIAL REMISSION: ICD-10-CM

## 2023-10-02 DIAGNOSIS — Z09 HOSPITAL DISCHARGE FOLLOW-UP: Primary | ICD-10-CM

## 2023-10-02 DIAGNOSIS — Z23 NEED FOR PROPHYLACTIC VACCINATION AGAINST STREPTOCOCCUS PNEUMONIAE (PNEUMOCOCCUS) AND INFLUENZA: ICD-10-CM

## 2023-10-02 PROCEDURE — 90677 PCV20 VACCINE IM: CPT | Mod: HCNC,S$GLB,, | Performed by: STUDENT IN AN ORGANIZED HEALTH CARE EDUCATION/TRAINING PROGRAM

## 2023-10-02 PROCEDURE — 3044F PR MOST RECENT HEMOGLOBIN A1C LEVEL <7.0%: ICD-10-PCS | Mod: HCNC,CPTII,S$GLB, | Performed by: STUDENT IN AN ORGANIZED HEALTH CARE EDUCATION/TRAINING PROGRAM

## 2023-10-02 PROCEDURE — 99214 PR OFFICE/OUTPT VISIT, EST, LEVL IV, 30-39 MIN: ICD-10-PCS | Mod: HCNC,25,S$GLB, | Performed by: STUDENT IN AN ORGANIZED HEALTH CARE EDUCATION/TRAINING PROGRAM

## 2023-10-02 PROCEDURE — 99999 PR PBB SHADOW E&M-EST. PATIENT-LVL III: CPT | Mod: PBBFAC,HCNC,, | Performed by: STUDENT IN AN ORGANIZED HEALTH CARE EDUCATION/TRAINING PROGRAM

## 2023-10-02 PROCEDURE — 3078F DIAST BP <80 MM HG: CPT | Mod: HCNC,CPTII,S$GLB, | Performed by: STUDENT IN AN ORGANIZED HEALTH CARE EDUCATION/TRAINING PROGRAM

## 2023-10-02 PROCEDURE — G0009 ADMIN PNEUMOCOCCAL VACCINE: HCPCS | Mod: HCNC,S$GLB,, | Performed by: STUDENT IN AN ORGANIZED HEALTH CARE EDUCATION/TRAINING PROGRAM

## 2023-10-02 PROCEDURE — 1160F RVW MEDS BY RX/DR IN RCRD: CPT | Mod: HCNC,CPTII,S$GLB, | Performed by: STUDENT IN AN ORGANIZED HEALTH CARE EDUCATION/TRAINING PROGRAM

## 2023-10-02 PROCEDURE — 1160F PR REVIEW ALL MEDS BY PRESCRIBER/CLIN PHARMACIST DOCUMENTED: ICD-10-PCS | Mod: HCNC,CPTII,S$GLB, | Performed by: STUDENT IN AN ORGANIZED HEALTH CARE EDUCATION/TRAINING PROGRAM

## 2023-10-02 PROCEDURE — 90686 IIV4 VACC NO PRSV 0.5 ML IM: CPT | Mod: HCNC,S$GLB,, | Performed by: STUDENT IN AN ORGANIZED HEALTH CARE EDUCATION/TRAINING PROGRAM

## 2023-10-02 PROCEDURE — 99406 BEHAV CHNG SMOKING 3-10 MIN: CPT | Mod: HCNC,S$GLB,, | Performed by: STUDENT IN AN ORGANIZED HEALTH CARE EDUCATION/TRAINING PROGRAM

## 2023-10-02 PROCEDURE — 3044F HG A1C LEVEL LT 7.0%: CPT | Mod: HCNC,CPTII,S$GLB, | Performed by: STUDENT IN AN ORGANIZED HEALTH CARE EDUCATION/TRAINING PROGRAM

## 2023-10-02 PROCEDURE — G0009 PNEUMOCOCCAL CONJUGATE VACCINE 20-VALENT: ICD-10-PCS | Mod: HCNC,S$GLB,, | Performed by: STUDENT IN AN ORGANIZED HEALTH CARE EDUCATION/TRAINING PROGRAM

## 2023-10-02 PROCEDURE — 1159F PR MEDICATION LIST DOCUMENTED IN MEDICAL RECORD: ICD-10-PCS | Mod: HCNC,CPTII,S$GLB, | Performed by: STUDENT IN AN ORGANIZED HEALTH CARE EDUCATION/TRAINING PROGRAM

## 2023-10-02 PROCEDURE — 3008F BODY MASS INDEX DOCD: CPT | Mod: HCNC,CPTII,S$GLB, | Performed by: STUDENT IN AN ORGANIZED HEALTH CARE EDUCATION/TRAINING PROGRAM

## 2023-10-02 PROCEDURE — 1159F MED LIST DOCD IN RCRD: CPT | Mod: HCNC,CPTII,S$GLB, | Performed by: STUDENT IN AN ORGANIZED HEALTH CARE EDUCATION/TRAINING PROGRAM

## 2023-10-02 PROCEDURE — 3078F PR MOST RECENT DIASTOLIC BLOOD PRESSURE < 80 MM HG: ICD-10-PCS | Mod: HCNC,CPTII,S$GLB, | Performed by: STUDENT IN AN ORGANIZED HEALTH CARE EDUCATION/TRAINING PROGRAM

## 2023-10-02 PROCEDURE — 99999 PR PBB SHADOW E&M-EST. PATIENT-LVL III: ICD-10-PCS | Mod: PBBFAC,HCNC,, | Performed by: STUDENT IN AN ORGANIZED HEALTH CARE EDUCATION/TRAINING PROGRAM

## 2023-10-02 PROCEDURE — 3008F PR BODY MASS INDEX (BMI) DOCUMENTED: ICD-10-PCS | Mod: HCNC,CPTII,S$GLB, | Performed by: STUDENT IN AN ORGANIZED HEALTH CARE EDUCATION/TRAINING PROGRAM

## 2023-10-02 PROCEDURE — 99214 OFFICE O/P EST MOD 30 MIN: CPT | Mod: HCNC,25,S$GLB, | Performed by: STUDENT IN AN ORGANIZED HEALTH CARE EDUCATION/TRAINING PROGRAM

## 2023-10-02 PROCEDURE — 3074F SYST BP LT 130 MM HG: CPT | Mod: HCNC,CPTII,S$GLB, | Performed by: STUDENT IN AN ORGANIZED HEALTH CARE EDUCATION/TRAINING PROGRAM

## 2023-10-02 PROCEDURE — G0008 ADMIN INFLUENZA VIRUS VAC: HCPCS | Mod: HCNC,S$GLB,, | Performed by: STUDENT IN AN ORGANIZED HEALTH CARE EDUCATION/TRAINING PROGRAM

## 2023-10-02 PROCEDURE — 99406 PR TOBACCO USE CESSATION INTERMEDIATE 3-10 MINUTES: ICD-10-PCS | Mod: HCNC,S$GLB,, | Performed by: STUDENT IN AN ORGANIZED HEALTH CARE EDUCATION/TRAINING PROGRAM

## 2023-10-02 PROCEDURE — 3074F PR MOST RECENT SYSTOLIC BLOOD PRESSURE < 130 MM HG: ICD-10-PCS | Mod: HCNC,CPTII,S$GLB, | Performed by: STUDENT IN AN ORGANIZED HEALTH CARE EDUCATION/TRAINING PROGRAM

## 2023-10-02 PROCEDURE — G0008 FLU VACCINE (QUAD) GREATER THAN OR EQUAL TO 3YO PRESERVATIVE FREE IM: ICD-10-PCS | Mod: HCNC,S$GLB,, | Performed by: STUDENT IN AN ORGANIZED HEALTH CARE EDUCATION/TRAINING PROGRAM

## 2023-10-02 PROCEDURE — 90686 FLU VACCINE (QUAD) GREATER THAN OR EQUAL TO 3YO PRESERVATIVE FREE IM: ICD-10-PCS | Mod: HCNC,S$GLB,, | Performed by: STUDENT IN AN ORGANIZED HEALTH CARE EDUCATION/TRAINING PROGRAM

## 2023-10-02 PROCEDURE — 90677 PNEUMOCOCCAL CONJUGATE VACCINE 20-VALENT: ICD-10-PCS | Mod: HCNC,S$GLB,, | Performed by: STUDENT IN AN ORGANIZED HEALTH CARE EDUCATION/TRAINING PROGRAM

## 2023-10-02 RX ORDER — QUETIAPINE FUMARATE 200 MG/1
200 TABLET, FILM COATED ORAL NIGHTLY
Status: ON HOLD | COMMUNITY
Start: 2023-09-26 | End: 2023-10-06 | Stop reason: HOSPADM

## 2023-10-02 NOTE — PROGRESS NOTES
2 patient identifiers used (name and ). Administered Flu vaccine IM( LD) . Patient tolerated well, no bleeding at insertion site noted. Pain 0 on scale 0/10. Aseptic technique maintained. Immunization information given to patient. Advised patient to remain in clinic for 15 minutes to monitor for reaction. No AR noted.      2 patient identifiers used (name and ). Administered PCV 20  vaccine IM ( RD) . Patient tolerated well, no bleeding at insertion site noted. Pain 0 on scale 0/10. Aseptic technique maintained. Immunization information given to patient. Advised patient to remain in clinic for 15 minutes to monitor for reaction. No AR noted.

## 2023-10-02 NOTE — PROGRESS NOTES
[facility] Note    Subjective:  Chief Complaint:   No chief complaint on file.      History of Present Illness:  Cat is a pleasant intelligent patient who is here for recent hospitalization follow up    [facility] admitted for +SI  Divalproex 500 qhs  Quetiapine 200 qhs  Gabapentin 800 bid  Had been following outpatient with psychiatry, appointment tomorrow     Gastroparesis  Mirtazapine 30 qhs    Migraines   Propranolol 80  200 mg caffeine   Has seen a headache specialist     Tobacco use and hx stoke  Contemplating cessation, Nicoderm not covered by insurance     Transitional Care Note    Family and/or Caretaker present at visit?  No.  Diagnostic tests reviewed/disposition: No diagnosic tests pending after this hospitalization.  Disease/illness education: Bipolar with depression   Home health/community services discussion/referrals: Patient does not have home health established from hospital visit.  They do not need home health.  If needed, we will set up home health for the patient.   Establishment or re-establishment of referral orders for community resources: No other necessary community resources.   Discussion with other health care providers: No discussion with other health care providers necessary.         Discharge Information     Discharge Date:  Unknown, couple days ago per patient          Primary Discharge Diagnosis:  Bipolar          How patient is feeling since discharge from the hospital?  Feeling well, but fatigued 2/2 medication           Medication & Order Review     Discharge Medication Review:    Medication reconciliation performed Yes   If no, state reason why not performed: N/A       Did patient have any difficulty/problems filling prescriptions?  No           If yes, state reason and steps taken to assist in resolving issue: N/A     Does patient have any questions regarding medications?  No           If yes, state question and steps taken to answer question:   "N/A    Was Home Health and/or any equipment ordered for patient upon discharge?  No          Home Health No   If yes, has home health contacted patient and/or initiated services? N/A   Name of Home Health Agency N/A   Durable Medical Equipment (DME)  No (Example: walker, wheelchair, nebulizer)   If yes, has the DME provider contacted patient and delivered equipment? N/A    DME Company N/A     Red Flag Review     Was patient educated on "red flags" or things to watch for?  No        Is patient experiencing any red flags today (or any of these symptoms) (List examples of red flags specifically)?  No      Educated the patient on 3 "red flags" to watch for:     Thoughts of hurting oneself             Worsening symptoms                  Unintentional weight loss                     Is patient experiencing any red flags today (or any of these symptoms) (List examples of red flags specifically)?  No        Patient Education & Follow Up               Phone number patient will call if having any questions or problems:  Patient was not able to state the phone number for [facility].    Appointment scheduled?  Yes      Follow-up/transition of care appointment, including the date/time and location of your appointment:  Patient was able to state the follow-up appointment correctly.        Notes:  Psychiatry tomorrow           3 questions patient would like to ask physician during appointment:    none              Screening  Health Maintenance         Date Due Completion Date    TETANUS VACCINE Never done ---    Pneumococcal Vaccines (Age 0-64) (3 - PCV) 03/09/2021 3/9/2020    COVID-19 Vaccine (4 - Moderna series) 01/29/2022 12/4/2021    Influenza Vaccine (1) 09/01/2023 2/18/2022    Mammogram 01/25/2024 1/25/2023    Lipid Panel 05/10/2024 5/10/2023    Cervical Cancer Screening 09/27/2026 9/27/2021    Override on 7/19/2012: Done          Immunization History   Administered Date(s) Administered    COVID-19, MRNA, LN-S, PF " (MODERNA FULL 0.5 ML DOSE) 04/22/2021, 05/20/2021, 12/04/2021    COVID-19, rS-ChAdOx1, PF (AstraZeneca) 05/20/2021    Influenza 01/16/2013    Influenza - Quadrivalent 11/17/2015    Influenza - Quadrivalent - PF *Preferred* (6 months and older) 01/16/2013, 10/26/2016, 10/03/2017, 11/27/2018, 09/09/2019, 11/13/2020, 02/18/2022    Influenza - Trivalent (ADULT) 01/16/2013    Pneumococcal Polysaccharide - 23 Valent 11/27/2018, 03/09/2020     The 10-year ASCVD risk score (Debbie JEAN BAPTISTE, et al., 2019) is: 1.6%    Values used to calculate the score:      Age: 43 years      Sex: Female      Is Non- : No      Diabetic: No      Tobacco smoker: Yes      Systolic Blood Pressure: 114 mmHg      Is BP treated: No      HDL Cholesterol: 43 mg/dL      Total Cholesterol: 145 mg/dL    Allergies:  Review of patient's allergies indicates:   Allergen Reactions    Metoclopramide hcl Anaphylaxis     Involuntary mouth movements     Amoxicillin-pot clavulanate      2 other medications    Amoxil [amoxicillin]      hives    Avelox [moxifloxacin]      itching    Benadryl [diphenhydramine hcl]      Lowers seizure threshold     Coumadin [warfarin]      toxicity    Quinazolinones      Lowers seizure threshold     Ultram [tramadol]      Lowers seizure threshold     Wellbutrin [bupropion hcl]      Lowers seizure threshold        Home Medications:  Current Outpatient Medications on File Prior to Visit   Medication Sig    ARIPiprazole (ABILIFY) 10 MG Tab Take 1 tablet (10 mg total) by mouth once daily.    divalproex (DEPAKOTE) 250 MG EC tablet TAKE 1 TABLET BY MOUTH EVERY MORNING AND TAKE 2 TABLETS BY MOUTH EVERY NIGHT AT BEDTIME    doxycycline (VIBRAMYCIN) 100 MG Cap Take 1 capsule (100 mg total) by mouth 2 (two) times daily. for 7 days    famotidine (PEPCID) 40 MG tablet TAKE 1 TABLET(40 MG) BY MOUTH EVERY EVENING    gabapentin (NEURONTIN) 800 MG tablet TAKE 1 TABLET(800 MG) BY MOUTH TWICE DAILY    magnesium oxide (MAG-OX) 400 mg  (241.3 mg magnesium) tablet Take 1 tablet (400 mg total) by mouth once daily.    mirtazapine (REMERON) 30 MG tablet TAKE 1 TABLET(30 MG) BY MOUTH EVERY EVENING    pantoprazole (PROTONIX) 40 MG tablet Take 1 tablet (40 mg total) by mouth once daily.    propranoloL (INDERAL LA) 80 MG 24 hr capsule Take 1 capsule (80 mg total) by mouth once daily.    QUEtiapine (SEROQUEL) 200 MG Tab Take 200 mg by mouth every evening.    rivaroxaban (XARELTO) 20 mg Tab TAKE 1 TABLET(20 MG) BY MOUTH DAILY WITH THE EVENING MEAL AND DINNER    LIDOcaine-prilocaine (EMLA) cream APPLY TOPICALLY TO THE AFFECTED AREA AS NEEDED    [DISCONTINUED] BOTOX 200 unit SolR     [DISCONTINUED] clotrimazole (LOTRIMIN) 1 % cream Apply topically 2 (two) times daily. (Patient not taking: Reported on 10/2/2023)    [DISCONTINUED] fluconazole (DIFLUCAN) 150 MG Tab 1 po x 1 , may repeat 48 h (Patient not taking: Reported on 10/2/2023)    [DISCONTINUED] sodium bicarbonate 650 MG tablet Take 1 tablet (650 mg total) by mouth 2 (two) times daily. (Patient not taking: Reported on 10/2/2023)    [DISCONTINUED] traZODone (DESYREL) 100 MG tablet Take three tablets po nightly prn insomnia (Patient not taking: Reported on 10/2/2023)     No current facility-administered medications on file prior to visit.       Past Medical History:   Diagnosis Date    Acid reflux     Allergy     Anemia     Anticoagulated 12/29/2015    Anxiety     Asthma     Bipolar 1 disorder     Bronchospasm with bronchitis, acute     Chronic daily headache 9/11/2018    Chronic kidney disease     Chronic pain     Depression     bipolar 2    Fibromyalgia     Gastroparesis     History of right MCA stroke 11/25/2015    Hx of psychiatric care     Lactose intolerance     Lupus     Mixed hyperlipidemia 11/28/2018    Psychiatric problem     Renal tubular acidosis     Seizure     Sjogren's syndrome     Smoker     Stroke 11-    Substance abuse-kratom daily use 8/30/2020    Suicide attempt     overdosed on a  bottle of paxil, muscle relaxers, & zoloft    Therapy      Past Surgical History:   Procedure Laterality Date    CHOLECYSTECTOMY      COLONOSCOPY  11/12/2014    Dr. Kimble, repeat at 52 years old for screening    ESOPHAGOGASTRODUODENOSCOPY N/A 1/3/2022    Procedure: EGD (ESOPHAGOGASTRODUODENOSCOPY);  Surgeon: Scout Kimble MD;  Location: [facility];  Service: Endoscopy;  Laterality: N/A;    ESOPHAGOGASTRODUODENOSCOPY N/A 6/12/2023    Procedure: EGD (ESOPHAGOGASTRODUODENOSCOPY);  Surgeon: Scout Kimble MD;  Location: [facility] ENDO;  Service: Endoscopy;  Laterality: N/A;    FLUOROSCOPIC URODYNAMIC STUDY N/A 7/23/2019    Procedure: URODYNAMIC STUDY, FLUOROSCOPIC;  Surgeon: Vanna Martinez MD;  Location: [address];  Service: Urology;  Laterality: N/A;  1 hour    UPPER GASTROINTESTINAL ENDOSCOPY  03/03/2017    Dr. Harris     Family History   Problem Relation Age of Onset    Hypertension Mother     Hyperlipidemia Mother     Psoriasis Mother     Thyroid disease Mother     No Known Problems Father     Post-traumatic stress disorder Brother     Drug abuse Brother     Diabetes Maternal Uncle     Hypertension Maternal Uncle     Alcohol abuse Maternal Uncle     Stroke Maternal Uncle     Scoliosis Paternal Aunt     Heart disease Paternal Uncle     Mental illness Maternal Grandmother     Cancer Maternal Grandmother         lung / brain - smoker    Drug abuse Maternal Grandmother     Hypertension Maternal Grandmother     Hyperlipidemia Maternal Grandmother     Diabetes Maternal Grandmother     Rheum arthritis Maternal Grandmother     Diabetes Mellitus Maternal Grandmother     Heart disease Maternal Grandfather     Hypertension Maternal Grandfather     Alcohol abuse Maternal Grandfather     Osteoarthritis Maternal Grandfather     No Known Problems Paternal Grandmother     Mental illness Paternal Grandfather     Early death Paternal Grandfather         self    Breast cancer Cousin     Breast cancer Other     Colon cancer  Neg Hx     Colon polyps Neg Hx     Crohn's disease Neg Hx     Ulcerative colitis Neg Hx     Celiac disease Neg Hx     Lupus Neg Hx     Kidney disease Neg Hx     Inflammatory bowel disease Neg Hx     Depression Neg Hx     COPD Neg Hx     Asthma Neg Hx     Macular degeneration Neg Hx     Retinal detachment Neg Hx     Glaucoma Neg Hx      Social History     Tobacco Use    Smoking status: Every Day     Current packs/day: 1.00     Average packs/day: 1 pack/day for 15.2 years (15.2 ttl pk-yrs)     Types: Cigarettes     Start date: 11/25/2015    Smokeless tobacco: Never   Substance Use Topics    Alcohol use: No     Alcohol/week: 0.0 standard drinks of alcohol    Drug use: Yes     Types: Marijuana     Comment: smokes occasionally, helps with pain and appetite            The patient's past medical history, surgical history, social history, family history, allergies and medications have been reviewed.    Review of Systems     10 point review of systems was conducted and only the pertinent positives and pertinent negatives are noted above in the HPI section.    Physical Examination  General appearance: alert, cooperative, no distress  HEENT: normocephalic, atraumatic, PERRLA  Neck: trachea midline, no neck stiffness  Lungs: clear to auscultation, no wheezes, rales or rhonchi, symmetric air entry  Heart: normal rate, regular rhythm, normal S1, S2, no murmurs, rubs, clicks or gallops  Abdomen: nontender, nondistended  Skin: No rashes or abnormal skin lesions, no apparent jaundice or bruising  Extremities:  peripheral pulses normal, chronic left side deficit 2/2 stroke  Neurological:alert, oriented, normal speech, no new focal findings or movement disorder noted from baseline      BP Readings from Last 3 Encounters:   10/02/23 114/66   09/26/23 (!) 158/91   09/15/23 108/72     Wt Readings from Last 3 Encounters:   10/02/23 50.5 kg (111 lb 5.3 oz)   09/26/23 40.4 kg (89 lb)   09/14/23 41.1 kg (90 lb 9.7 oz)     /66 (BP  "Location: Right arm, Patient Position: Sitting, BP Method: Small (Manual))   Pulse 89   Resp 16   Ht 5' 3" (1.6 m)   Wt 50.5 kg (111 lb 5.3 oz)   LMP 09/24/2023 (Within Days)   SpO2 100%   BMI 19.72 kg/m²       274}  Laboratory: I have reviewed old labs below:       274}    Lab Results   Component Value Date    WBC 6.32 09/15/2023    HGB 11.1 (L) 09/15/2023    HCT 31.5 (L) 09/15/2023    MCV 89 09/15/2023     09/15/2023     09/15/2023    K 4.1 09/15/2023     09/15/2023    CALCIUM 7.5 (L) 09/15/2023    PHOS 4.0 09/13/2023    CO2 30 (H) 09/15/2023    GLU 86 09/15/2023    BUN 15 09/15/2023    CREATININE 1.5 (H) 09/15/2023    EGFRNORACEVR 45 (A) 09/15/2023    ANIONGAP 9 09/15/2023    PROT 4.8 (L) 09/15/2023    ALBUMIN 2.7 (L) 09/15/2023    BILITOT 0.1 09/15/2023    ALKPHOS 68 09/15/2023    ALT 13 09/15/2023    AST 16 09/15/2023    INR 1.2 10/08/2022    CHOL 145 05/10/2023    TRIG 42 05/10/2023    HDL 43 05/10/2023    LDLCALC 93.6 05/10/2023    TSH 0.470 09/13/2023    HGBA1C 4.9 05/10/2023      Lab reviewed by me: Particular labs of significance that I will monitor, workup, or treat to improve are mentioned below in diagnostic impression remarks.    Imaging/EKG: I have reviewed the pertinent results and my findings are noted in remarks.     274}    CC: No chief complaint on file.          274}    Assessment/Plan  Cat Denson is a 43 y.o. female who presents to clinic with:  1. Hospital discharge follow-up    2. Bipolar 1 disorder, depressed, partial remission    3. Need for prophylactic vaccination against Streptococcus pneumoniae (pneumococcus) and influenza    4. Nicotine dependence, cigarettes, with other nicotine-induced disorders          274}  Diagnostic Impression Remarks       43 y.o. female who  has a past medical history of Acid reflux, Allergy, Anemia, Anticoagulated (12/29/2015), Anxiety, Asthma, Bipolar 1 disorder, Bronchospasm with bronchitis, acute, Chronic daily " headache (9/11/2018), Chronic kidney disease, Chronic pain, Depression, Fibromyalgia, Gastroparesis, History of right MCA stroke (11/25/2015), psychiatric care, Lactose intolerance, Lupus, Mixed hyperlipidemia (11/28/2018), Psychiatric problem, Renal tubular acidosis, Seizure, Sjogren's syndrome, Smoker, Stroke (11-), Substance abuse-kratom daily use (8/30/2020), Suicide attempt, and Therapy. presents to clinic today for behavioral health follow up     This is the extent of this pleasant patient's concerns at this present time. She did not feel chest pain upon exertion, dyspnea, nausea, vomiting, diaphoresis, or syncope. No pleuritic chest pain, unilateral leg swelling, calf tenderness, or calf pain. Negative for unintentional weight loss night sweats, hematuria, and fevers. Cat will return to clinic in a few months for further workup and reassessment or sooner as needed. She was instructed to call the clinic or go to the emergency department or urgent care immediately if her symptoms do not improve, worsens, or if any new symptoms develop. As we discussed that symptoms could worsen over the next 24 hours she was advised that if any increased swelling, pain, or numbness arise to go immediately to the ED. Patient knows to call any time if an emergency arises. Shared decision making occurred and she verbalized understanding in agreement with this plan. I discussed imaging findings, diagnosis, possibilities, treatment options, medications, risks, and benefits. She had many questions regarding the options and long-term effects. All questions were answered. She expressed understanding after counseling regarding the diagnosis and recommendations. She was capable and demonstrated competence with understanding of these options. Shared decision making was performed resulting in her choosing the current treatment plan. Patient handout was given with instructions and recommendations. Advised the patient that if  they become pregnant to alert us immediately to assess for medication changes. Having a healthy weight can decrease the risk of 13 cancers and is an important goal. I also discussed the importance of close follow up to discuss labs, change or modify her medications if needed, monitor side effects, and further evaluation of medical problems.     Additional workup planned: see labs ordered below.    See below for labs and meds ordered with associated diagnosis      1. Hospital discharge follow-up    2. Bipolar 1 disorder, depressed, partial remission  Follows with psychiatry  Keep appointment tomorrow as scheduled    3. Need for prophylactic vaccination against Streptococcus pneumoniae (pneumococcus) and influenza  - Influenza - Quadrivalent *Preferred* (6 months+) (PF)  - (In Office Administered) Pneumococcal Conjugate Vaccine (20 Valent) (IM) (Preferred)    4. Nicotine dependence, cigarettes, with other nicotine-induced disorders  Counseled on smoking cessation for 3 minutes       RTC PRN    Alicja Carrasco MD, [facility]   Family Medicine Physician  10/02/2023

## 2023-10-03 ENCOUNTER — OFFICE VISIT (OUTPATIENT)
Dept: PSYCHIATRY | Facility: CLINIC | Age: 43
End: 2023-10-03
Payer: MEDICARE

## 2023-10-03 ENCOUNTER — HOSPITAL ENCOUNTER (EMERGENCY)
Facility: HOSPITAL | Age: 43
Discharge: PSYCHIATRIC OR SUBSTANCE USE FACILITY | End: 2023-10-03
Attending: EMERGENCY MEDICINE
Payer: MEDICARE

## 2023-10-03 VITALS
TEMPERATURE: 98 F | SYSTOLIC BLOOD PRESSURE: 151 MMHG | HEART RATE: 70 BPM | RESPIRATION RATE: 16 BRPM | OXYGEN SATURATION: 99 % | DIASTOLIC BLOOD PRESSURE: 83 MMHG | WEIGHT: 104 LBS | BODY MASS INDEX: 18.42 KG/M2

## 2023-10-03 VITALS
WEIGHT: 108.69 LBS | BODY MASS INDEX: 19.26 KG/M2 | SYSTOLIC BLOOD PRESSURE: 128 MMHG | HEIGHT: 63 IN | HEART RATE: 95 BPM | DIASTOLIC BLOOD PRESSURE: 88 MMHG

## 2023-10-03 DIAGNOSIS — F41.9 ANXIETY: ICD-10-CM

## 2023-10-03 DIAGNOSIS — F31.4 BIPOLAR 1 DISORDER, DEPRESSED, SEVERE: ICD-10-CM

## 2023-10-03 DIAGNOSIS — Z00.8 MEDICAL CLEARANCE FOR PSYCHIATRIC ADMISSION: Primary | ICD-10-CM

## 2023-10-03 DIAGNOSIS — F32.A DEPRESSION, UNSPECIFIED DEPRESSION TYPE: ICD-10-CM

## 2023-10-03 DIAGNOSIS — F12.20 CANNABIS USE DISORDER, SEVERE, DEPENDENCE: ICD-10-CM

## 2023-10-03 PROBLEM — R45.851 SUICIDAL IDEATION: Status: ACTIVE | Noted: 2023-10-03

## 2023-10-03 LAB
ALBUMIN SERPL BCP-MCNC: 4.3 G/DL (ref 3.5–5.2)
ALP SERPL-CCNC: 104 U/L (ref 55–135)
ALT SERPL W/O P-5'-P-CCNC: 18 U/L (ref 10–44)
AMPHET+METHAMPHET UR QL: NEGATIVE
ANION GAP SERPL CALC-SCNC: 6 MMOL/L (ref 8–16)
APAP SERPL-MCNC: 0.4 UG/ML (ref 10–20)
AST SERPL-CCNC: 14 U/L (ref 10–40)
B-HCG UR QL: NEGATIVE
BACTERIA #/AREA URNS HPF: ABNORMAL /HPF
BARBITURATES UR QL SCN>200 NG/ML: NEGATIVE
BASOPHILS # BLD AUTO: 0.11 K/UL (ref 0–0.2)
BASOPHILS NFR BLD: 1.3 % (ref 0–1.9)
BENZODIAZ UR QL SCN>200 NG/ML: NEGATIVE
BILIRUB SERPL-MCNC: 0.4 MG/DL (ref 0.1–1)
BILIRUB UR QL STRIP: NEGATIVE
BUN SERPL-MCNC: 18 MG/DL (ref 6–20)
BZE UR QL SCN: NEGATIVE
CALCIUM SERPL-MCNC: 9 MG/DL (ref 8.7–10.5)
CANNABINOIDS UR QL SCN: ABNORMAL
CHLORIDE SERPL-SCNC: 113 MMOL/L (ref 95–110)
CLARITY UR: ABNORMAL
CO2 SERPL-SCNC: 25 MMOL/L (ref 23–29)
COLOR UR: YELLOW
CREAT SERPL-MCNC: 1.4 MG/DL (ref 0.5–1.4)
CREAT UR-MCNC: 25.9 MG/DL (ref 15–325)
CTP QC/QA: YES
DIFFERENTIAL METHOD: ABNORMAL
EOSINOPHIL # BLD AUTO: 0.1 K/UL (ref 0–0.5)
EOSINOPHIL NFR BLD: 1.5 % (ref 0–8)
ERYTHROCYTE [DISTWIDTH] IN BLOOD BY AUTOMATED COUNT: 14.1 % (ref 11.5–14.5)
EST. GFR  (NO RACE VARIABLE): 48.5 ML/MIN/1.73 M^2
ETHANOL SERPL-MCNC: <10 MG/DL
GLUCOSE SERPL-MCNC: 81 MG/DL (ref 70–110)
GLUCOSE UR QL STRIP: NEGATIVE
HCT VFR BLD AUTO: 40.2 % (ref 37–48.5)
HGB BLD-MCNC: 12.5 G/DL (ref 12–16)
HGB UR QL STRIP: ABNORMAL
HYALINE CASTS #/AREA URNS LPF: 4 /LPF
IMM GRANULOCYTES # BLD AUTO: 0.17 K/UL (ref 0–0.04)
IMM GRANULOCYTES NFR BLD AUTO: 2 % (ref 0–0.5)
KETONES UR QL STRIP: NEGATIVE
LEUKOCYTE ESTERASE UR QL STRIP: NEGATIVE
LYMPHOCYTES # BLD AUTO: 1.9 K/UL (ref 1–4.8)
LYMPHOCYTES NFR BLD: 22.9 % (ref 18–48)
MCH RBC QN AUTO: 31.6 PG (ref 27–31)
MCHC RBC AUTO-ENTMCNC: 31.1 G/DL (ref 32–36)
MCV RBC AUTO: 102 FL (ref 82–98)
MICROSCOPIC COMMENT: ABNORMAL
MONOCYTES # BLD AUTO: 0.7 K/UL (ref 0.3–1)
MONOCYTES NFR BLD: 8.2 % (ref 4–15)
NEUTROPHILS # BLD AUTO: 5.4 K/UL (ref 1.8–7.7)
NEUTROPHILS NFR BLD: 64.1 % (ref 38–73)
NITRITE UR QL STRIP: NEGATIVE
NRBC BLD-RTO: 0 /100 WBC
OPIATES UR QL SCN: NEGATIVE
PCP UR QL SCN>25 NG/ML: NEGATIVE
PH UR STRIP: 7 [PH] (ref 5–8)
PLATELET # BLD AUTO: 346 K/UL (ref 150–450)
PMV BLD AUTO: 10.3 FL (ref 9.2–12.9)
POTASSIUM SERPL-SCNC: 5.5 MMOL/L (ref 3.5–5.1)
PROT SERPL-MCNC: 7.8 G/DL (ref 6–8.4)
PROT UR QL STRIP: NEGATIVE
RBC # BLD AUTO: 3.95 M/UL (ref 4–5.4)
RBC #/AREA URNS HPF: 3 /HPF (ref 0–4)
SALICYLATES SERPL-MCNC: <1.5 MG/DL (ref 15–30)
SODIUM SERPL-SCNC: 144 MMOL/L (ref 136–145)
SP GR UR STRIP: 1 (ref 1–1.03)
SQUAMOUS #/AREA URNS HPF: 17 /HPF
TOXICOLOGY INFORMATION: ABNORMAL
TSH SERPL DL<=0.005 MIU/L-ACNC: 3.27 UIU/ML (ref 0.34–5.6)
URN SPEC COLLECT METH UR: ABNORMAL
UROBILINOGEN UR STRIP-ACNC: NEGATIVE EU/DL
WBC # BLD AUTO: 8.4 K/UL (ref 3.9–12.7)
WBC #/AREA URNS HPF: 1 /HPF (ref 0–5)

## 2023-10-03 PROCEDURE — 85025 COMPLETE CBC W/AUTO DIFF WBC: CPT | Performed by: EMERGENCY MEDICINE

## 2023-10-03 PROCEDURE — 99999 PR PBB SHADOW E&M-EST. PATIENT-LVL III: ICD-10-PCS | Mod: PBBFAC,HCNC,, | Performed by: PSYCHIATRY & NEUROLOGY

## 2023-10-03 PROCEDURE — 99285 EMERGENCY DEPT VISIT HI MDM: CPT

## 2023-10-03 PROCEDURE — 80307 DRUG TEST PRSMV CHEM ANLYZR: CPT | Performed by: EMERGENCY MEDICINE

## 2023-10-03 PROCEDURE — 82077 ASSAY SPEC XCP UR&BREATH IA: CPT | Mod: XB | Performed by: EMERGENCY MEDICINE

## 2023-10-03 PROCEDURE — 99215 OFFICE O/P EST HI 40 MIN: CPT | Mod: HCNC,S$GLB,, | Performed by: PSYCHIATRY & NEUROLOGY

## 2023-10-03 PROCEDURE — 3008F BODY MASS INDEX DOCD: CPT | Mod: HCNC,CPTII,S$GLB, | Performed by: PSYCHIATRY & NEUROLOGY

## 2023-10-03 PROCEDURE — 81001 URINALYSIS AUTO W/SCOPE: CPT | Performed by: EMERGENCY MEDICINE

## 2023-10-03 PROCEDURE — 84443 ASSAY THYROID STIM HORMONE: CPT | Performed by: EMERGENCY MEDICINE

## 2023-10-03 PROCEDURE — 99215 PR OFFICE/OUTPT VISIT, EST, LEVL V, 40-54 MIN: ICD-10-PCS | Mod: HCNC,S$GLB,, | Performed by: PSYCHIATRY & NEUROLOGY

## 2023-10-03 PROCEDURE — 80143 DRUG ASSAY ACETAMINOPHEN: CPT | Performed by: EMERGENCY MEDICINE

## 2023-10-03 PROCEDURE — 3044F PR MOST RECENT HEMOGLOBIN A1C LEVEL <7.0%: ICD-10-PCS | Mod: HCNC,CPTII,S$GLB, | Performed by: PSYCHIATRY & NEUROLOGY

## 2023-10-03 PROCEDURE — 3074F PR MOST RECENT SYSTOLIC BLOOD PRESSURE < 130 MM HG: ICD-10-PCS | Mod: HCNC,CPTII,S$GLB, | Performed by: PSYCHIATRY & NEUROLOGY

## 2023-10-03 PROCEDURE — 3079F DIAST BP 80-89 MM HG: CPT | Mod: HCNC,CPTII,S$GLB, | Performed by: PSYCHIATRY & NEUROLOGY

## 2023-10-03 PROCEDURE — 80053 COMPREHEN METABOLIC PANEL: CPT | Performed by: EMERGENCY MEDICINE

## 2023-10-03 PROCEDURE — 3074F SYST BP LT 130 MM HG: CPT | Mod: HCNC,CPTII,S$GLB, | Performed by: PSYCHIATRY & NEUROLOGY

## 2023-10-03 PROCEDURE — 3079F PR MOST RECENT DIASTOLIC BLOOD PRESSURE 80-89 MM HG: ICD-10-PCS | Mod: HCNC,CPTII,S$GLB, | Performed by: PSYCHIATRY & NEUROLOGY

## 2023-10-03 PROCEDURE — 80179 DRUG ASSAY SALICYLATE: CPT | Performed by: EMERGENCY MEDICINE

## 2023-10-03 PROCEDURE — 3008F PR BODY MASS INDEX (BMI) DOCUMENTED: ICD-10-PCS | Mod: HCNC,CPTII,S$GLB, | Performed by: PSYCHIATRY & NEUROLOGY

## 2023-10-03 PROCEDURE — 99999 PR PBB SHADOW E&M-EST. PATIENT-LVL III: CPT | Mod: PBBFAC,HCNC,, | Performed by: PSYCHIATRY & NEUROLOGY

## 2023-10-03 PROCEDURE — 3044F HG A1C LEVEL LT 7.0%: CPT | Mod: HCNC,CPTII,S$GLB, | Performed by: PSYCHIATRY & NEUROLOGY

## 2023-10-03 PROCEDURE — 81025 URINE PREGNANCY TEST: CPT | Performed by: EMERGENCY MEDICINE

## 2023-10-03 RX ORDER — DIVALPROEX SODIUM 500 MG/1
500 TABLET, DELAYED RELEASE ORAL
COMMUNITY
Start: 2023-09-26 | End: 2023-10-03

## 2023-10-03 NOTE — PROGRESS NOTES
Outpatient Psychiatry Follow-Up Visit (MD/NP)    10/3/2023    Clinical Status of Patient:  Outpatient (Ambulatory)    Chief Complaint:  Cat Denson is a 43 y.o. female who presents today for hospital discharge follow-up of mood disorder and anxiety.  Met with patient alone initially, then included friend Brent.     Interval History and Content of Current Session:  Interim Events/Subjective Report/Content of Current Session:  43 y.o.disabled female presents to clinic for follow up treatment of  follow up appt of bipolar I disorder, panic disorder, cannabis use disorder, and cognitive disorder.   She has a prior hx of chronic pain, nausea, and emesis.  Has been having health issues since she was a child, dx with Lupus at 12 yo. She missed a lot of school.  Has impulsive tendencies with hx of cutting, shaving her head (shaved today).  She has prior hx of suicide attempt at 22 yo. Multiple prior admissions for depression, SI.   Pt had a CVA in November 2015 - Embolic stroke involving right middle cerebral artery; she is hypercoagulable due to lupus.  She is currently taking Coumadin.  Pt has residual left hemiparesis.   She has had balance problems, forgetfulness, and severe focusing issue.      Neuropsychological testing 2/17:   Personality test data suggested the presence of mild depression. Neuropsychological test results reveal impairment in immediate and delayed visual memory, visual attention, temporal orientation, visuospatial/constructional abilities, abstract reasoning, psychomotor speed, and mental flexibility; and variability in immediate auditory/verbal memory (high average and mildly impaired performances) and verbal fluency (low average and moderately impaired performances); with mild depression. The pattern of impairment is consistent with right MCA CVA. She will continue to see Dr. Cruz and Mrs. Wiley for psychiatric care. She was encouraged to resume PT, OT, and Speech Therapy for  "further stroke rehabilitation.     Past Psychiatric Hx:  Dx Bipolar 2 Disorder (dx over 10 yrs ago), Anxiety, Impulse Control Disorder, hx pseudoseizures with possible epileptic seizures   Prior IOP: [facility]   Hx Suicide attempt at 20 yo  Hx suicidal ideations with admit to [facility] January 2015, [facility] for SI 2015,   First admit: 24 yo: for SI - [facility]; most recent [facility]. 2017 - for w/d affect, psychosis, poor memory recall, not sleeping x days  5 prior psych admissions  Hx self harm at age 17 yo     Past psychiatric hx: Wellbutrin for smoking cessation, helped to reduce, Zoloft, Trazodone (taken off due to tiredness one month ago), Lexapro, Paxil, Cymbalta (no pain benefit), Geodon, Trileptal, Lamictal, Seroquel, Chantix (makes her vomit), Olanzapine, Risperdal, Effexor, ritalin, depakote (was taken off ? Cannot recall why)    Previous overdoses on paxil, zoloft, muscle relaxants -emesis for a few days, did not tell anyone. Prior to CVA in 2015.   No recent self harm - stopped with CVA when started on blood thinners.   MOCA 12/17/19:  25/30 (+1 point for less than 12 yrs of education). Lost points for cube copy, visuospatial/trailblazing, digits/attention, serial 7s, fluency).       Past medical history:  Lupus   Sjogren's   CKD  Fibromyalgia, chronic pain   Seizure Disorder   Bronchospasm   Hx CVA  Gastroparesis   Fibromyalgia     Interim Hx:   The patient presents for follow-up visit. The pt was scheduled to be seen in August, but she cancelled follow up appt.  Her last visit was 05/2023.  Pt presents today following recent hospital admission to [facility].  I do not have discharge paperwork.  From chart review, pt was first admitted to hospital medicine for [facility] following a suicide attempt of overdose with twenty 100mg trazodone associated with depression and feeling "useless". Urine toxicology was negative at that time.  BAL < 10.   Pt today " "appears to be withdrawn, speech is low volume - this became more pronounced during her visit.   Meds:  Depakote 250 mg in morning, 500 mg nightly, Mirtazapine 30 mg nightly, Quetiapine 200 mg nightly (she reduced to 100 mg nightly because it is too sedating), Abilify 10 mg daily.   Pt states she feels like she is in quick sand and cannot function.  She admits to some med non-compliance.     Last visit May 8th, 2023.  Pt cancelled appt with me August 8th.   Stressors: recently scammed, worried about her health - went to her PCP for cracking around her mouth   "I have had some lows."  "In August, I was scammed and lost my whole check (online). She did not want anyone to know. (Through social media).  It was impulsive "kind of, also had been thinking of it."   Took a handful of trazodone 100 mg tabs.  She sent a picture of a suicide note to friend to [place]. Friend ended up calling 911.  She did not realize she would get help that quickly.  Not sure if she wants to be alive.  Was admitted to [facility] - checked on every 15 mins - it was disruptive of her sleep.  She was admitted 11 days.   Poor eye contact. Does not want to go inpatient again - tearful, it was hard to sleep.   Dr. Ball was her doctor.    She had been depressed for months.  Had been off of all psych meds for months (self-d/c) - she was starting to have trouble swallowing them.  Did not like having to take them "one by one."   She is taking meds her psych meds now "70% of the time."   She feels dosing of Quetiapine 200 mg has her like a complete zombie; she could not take full dose last PM.  She took 100 mg last pm so she could be here.   Sleeping too much.  Not as depressed as she was; states she is not suicidal but wishes she were fearless like she used to be to act on the thoughts she is having.   Tearful, misses younger self, misses being fearless and  taking whatever drug she could   She is calling [facility] to get started in IOP - she is " "willing to go to [facility].   "When I was younger I cut myself, this made me feel better - I am now on serious medication. (Referring to anticoagulant).   She bought some THC syrup - local store; she takes it one sip in morning   Smokes one hit of marijuana occasionally   No alcohol or other drug use; smokes 5 cigars daily.   Lives with Alivia now (friend) - brother's ex GF.   She provided me with her number - I tried to reach her unsuccessfully. [phone]    Psych ROS:  Tired, motivation is low, appetite is increased "never been so hungry" - is eating a lot.   Denies current SI.  Denies HI.   Self harm: cut self months ago.  Was off all medications.   Denies bowen.  Denies paranoia.   Pt has altered mental status - worsening during visit   Caffeine pills x 2 [employer] brand, liquid THC - a sip this morning   + decreased self-care     Per Brent today, he reports she appears more depressed today and she is also more altered now than when he picked her up for visit.   Pt endorses some dizziness   Right ear bud in place   Repeat VS: 128/88, HR 95  Seroquel feels too strong - took 100 mg last night.    Questionable med compliance; took Seroquel 200 mg only once the first night after discharge; friend helped her to reduce the dose half tablet;  friend Brent arranges her pill trays.   Pt denies bowen, no psychosis.   Denies current suicidal/homicidal ideations.  No self harm or violence.   Alcohol: does not tolerate.      Chantix lowers seizure threshold - does not want to take it.   Nicotine patches make her itch   No access to firearms.       Wt Readings from Last 3 Encounters:   10/03/23 0914 49.3 kg (108 lb 11 oz)   10/02/23 1038 50.5 kg (111 lb 5.3 oz)   09/26/23 1523 40.4 kg (89 lb)     Review of Systems   PSYCHIATRIC: Pertinant items are noted in the narrative.  CONSTITUTIONAL: weight loss, endorses dizziness   MUSCULOSKELETAL: denies pain today    CV: no chest pain, denies awareness of tachycardia  GI:  " "Gastroparesis  NEURO: left HP, treated with botox, no seizures in interim, denies recent falls    Past Medical, Family and Social History: The patient's past medical, family and social history have been reviewed and updated as appropriate within the electronic medical record - see encounter notes.    Medications:   Depakote  mg in am and 500 mg nightly   Mirtazapine 30 mg nighty  Quetiapine 100 mg nightly prn insomnia (200 mg prescribed dose)  Abilify 10 mg daily     Compliance: see above      Side effects: sedation     Risk Parameters:  Patient reports no current  ideation   Patient reports no homicidal ideation  Patient reports no self-injurious behavior  Patient reports no violent behavior    Exam (detailed: at least 9 elements; comprehensive: all 15 elements)   Constitutional  Vitals:  Most recent vital signs, dated less than 90 days prior to this appointment, were reviewed.        General:  age appropriate, limited grooming, poor eye contact, thin, wearing dark clothes     Musculoskeletal  Muscle Strength/Tone:  left sided hemiparesis   Gait & Station:  Slow, psychomotor retardation      Psychiatric  Speech:  decreased spontaneity, low volume, + poverty of speech   Mood & Affect:  "Not good"  Dysphoric, sedated   Thought Process:  Slowed, limited vague responses    Associations:  intact   Thought Content:  no current suicidality, no homicidality, delusions, or paranoia, but makes vague comments about wishing she were fearless to follow through on her thoughts    Insight:  impaired   Judgement: Impaired   Orientation:  Appears sedated, oriented to person, place, situation    Memory: Able to recall recent events     Language: grossly intact   Attention Span & Concentration:  Impaired    Fund of Knowledge:  intact and appropriate to age and level of education, familiar with aspects of current personal life     Assessment and Diagnosis   Status/Progress: Based on the examination today, the patient's " problem(s) is/are under inadequate control.  New problems have been presented today.  Co-morbidities are complicating management of the primary condition.      General Impression: pt had CVA Nov 2015 with significantly impaired focus, memory lapses, worsening depression, anxiety.  Pt had episode of acute hepatitis, liver enzymes improved, but now with declining renal function; Multiple prior psych admissions. Prior neuropsychological testing done and consistent with her MCA CVA.      Hx of cannabis and Kratom use disorders, med noncompliance.  Presents today following discharge from [facility].  She appears altered in session - concern for possible ingestion, severe depression, makes vague statements about wishing she were fearless to follow through on thoughts.  Appears more sedated now per Brent who picked her up for appt this morning.      Bipolar 1 Disorder, MRE depressed, severe   Panic Disorder with Agoraphobia   Cognitive Disorder due to CVA  Insomnia Disorder  Cannabis Use Disorder, severe   Lupus, migraines, CVA, hx hepatitis, declining renal function, gastroparesis, dyskinesias, seizure disorder   Body mass index is 19.25 kg/m².    GAF: 35  Intervention/Counseling/Treatment Plan   Medication Management: The risks and benefits of medication were discussed with the patient.     Pt placed under PEC for grave disability, declining mental status during this appointment, vague suicidal statements made during visit.  Seek inpatient psychiatric admission once medically cleared.  Report provided to ED staff, including concern for AMS and declining mental status during visit.  Pt was transferred to [facility] ED via [facility]. Friend Brent is aware and stayed in clinic until she was transferred.     Return to Clinic: following hospital discharge

## 2023-10-03 NOTE — ED PROVIDER NOTES
Encounter Date: 10/3/2023       History     Chief Complaint   Patient presents with    Mental Health Problem     43-year-old female with history of depression and recently treated psychiatric hospital today was seen by psychiatrist and was depressed and tearful at the psychiatrist's office and psychiatrist was also concerned that patient likely was taking extra medication patient also used THC prior to that appointment.  Given patient's concern that patient might be suicidal and depressed placed under physician emergency certificate and sent patient here for medical clearance for psychiatric placement.  Patient denies any other complaints at this time.  Patient had previous stroke and has left-sided hemiparesis which is at baseline.  Patient denies fever or chills or nausea vomiting or chest pain or shortness of breath or abdominal pain.  Patient admits to feeling depressed and being tearful at the psychiatrist's office.      Review of patient's allergies indicates:   Allergen Reactions    Metoclopramide hcl Anaphylaxis     Involuntary mouth movements     Amoxicillin-pot clavulanate      2 other medications    Amoxil [amoxicillin]      hives    Avelox [moxifloxacin]      itching    Benadryl [diphenhydramine hcl]      Lowers seizure threshold     Coumadin [warfarin]      toxicity    Quinazolinones      Lowers seizure threshold     Ultram [tramadol]      Lowers seizure threshold     Wellbutrin [bupropion hcl]      Lowers seizure threshold      Past Medical History:   Diagnosis Date    Acid reflux     Allergy     Anemia     Anticoagulated 12/29/2015    Anxiety     Asthma     Bipolar 1 disorder     Bronchospasm with bronchitis, acute     Chronic daily headache 9/11/2018    Chronic kidney disease     Chronic pain     Depression     bipolar 2    Fibromyalgia     Gastroparesis     History of right MCA stroke 11/25/2015    Hx of psychiatric care     Lactose intolerance     Lupus     Mixed hyperlipidemia 11/28/2018     Psychiatric problem     Renal tubular acidosis     Seizure     Sjogren's syndrome     Smoker     Stroke 13-    Substance abuse-kratom daily use 8/30/2020    Suicide attempt     overdosed on a bottle of paxil, muscle relaxers, & zoloft    Therapy      Past Surgical History:   Procedure Laterality Date    CHOLECYSTECTOMY      COLONOSCOPY  11/12/2014    Dr. Kimble, repeat at 52 years old for screening    ESOPHAGOGASTRODUODENOSCOPY N/A 1/3/2022    Procedure: EGD (ESOPHAGOGASTRODUODENOSCOPY);  Surgeon: Scout Kimble MD;  Location: [facility] ENDO;  Service: Endoscopy;  Laterality: N/A;    ESOPHAGOGASTRODUODENOSCOPY N/A 6/12/2023    Procedure: EGD (ESOPHAGOGASTRODUODENOSCOPY);  Surgeon: Scout Kimble MD;  Location: [facility];  Service: Endoscopy;  Laterality: N/A;    FLUOROSCOPIC URODYNAMIC STUDY N/A 7/23/2019    Procedure: URODYNAMIC STUDY, FLUOROSCOPIC;  Surgeon: Vanna Martinez MD;  Location: [address];  Service: Urology;  Laterality: N/A;  1 hour    UPPER GASTROINTESTINAL ENDOSCOPY  03/03/2017    Dr. Harris     Family History   Problem Relation Age of Onset    Hypertension Mother     Hyperlipidemia Mother     Psoriasis Mother     Thyroid disease Mother     No Known Problems Father     Post-traumatic stress disorder Brother     Drug abuse Brother     Diabetes Maternal Uncle     Hypertension Maternal Uncle     Alcohol abuse Maternal Uncle     Stroke Maternal Uncle     Scoliosis Paternal Aunt     Heart disease Paternal Uncle     Mental illness Maternal Grandmother     Cancer Maternal Grandmother         lung / brain - smoker    Drug abuse Maternal Grandmother     Hypertension Maternal Grandmother     Hyperlipidemia Maternal Grandmother     Diabetes Maternal Grandmother     Rheum arthritis Maternal Grandmother     Diabetes Mellitus Maternal Grandmother     Heart disease Maternal Grandfather     Hypertension Maternal Grandfather     Alcohol abuse Maternal Grandfather     Osteoarthritis Maternal Grandfather      No Known Problems Paternal Grandmother     Mental illness Paternal Grandfather     Early death Paternal Grandfather         self    Breast cancer Cousin     Breast cancer Other     Colon cancer Neg Hx     Colon polyps Neg Hx     Crohn's disease Neg Hx     Ulcerative colitis Neg Hx     Celiac disease Neg Hx     Lupus Neg Hx     Kidney disease Neg Hx     Inflammatory bowel disease Neg Hx     Depression Neg Hx     COPD Neg Hx     Asthma Neg Hx     Macular degeneration Neg Hx     Retinal detachment Neg Hx     Glaucoma Neg Hx      Social History     Tobacco Use    Smoking status: Every Day     Current packs/day: 1.00     Average packs/day: 1 pack/day for 15.2 years (15.2 ttl pk-yrs)     Types: Cigarettes     Start date: 11/25/2015    Smokeless tobacco: Never   Substance Use Topics    Alcohol use: No     Alcohol/week: 0.0 standard drinks of alcohol    Drug use: Yes     Types: Marijuana     Comment: smokes occasionally, helps with pain and appetite     Review of Systems   Constitutional: Negative.    HENT: Negative.     Eyes: Negative.    Respiratory: Negative.     Cardiovascular: Negative.  Negative for chest pain.   Gastrointestinal: Negative.    Endocrine: Negative.    Genitourinary: Negative.    Musculoskeletal: Negative.    Skin: Negative.    Allergic/Immunologic: Negative.    Neurological: Negative.         Baseline left-sided hemiparesis   Hematological: Negative.    Psychiatric/Behavioral:  Positive for dysphoric mood.    All other systems reviewed and are negative.      Physical Exam     Initial Vitals   BP Pulse Resp Temp SpO2   10/03/23 1457 10/03/23 1457 10/03/23 1457 10/03/23 1457 10/03/23 1458   138/81 88 16 97.9 °F (36.6 °C) 98 %      MAP       --                Physical Exam    Nursing note and vitals reviewed.  Constitutional: She appears well-developed and well-nourished.   HENT:   Head: Normocephalic and atraumatic.   Nose: Nose normal.   Mouth/Throat: Oropharynx is clear and moist.   Eyes:  Conjunctivae and EOM are normal. Pupils are equal, round, and reactive to light.   Neck: Neck supple. No thyromegaly present. No tracheal deviation present. No JVD present.   Normal range of motion.  Cardiovascular:  Normal rate, regular rhythm, normal heart sounds and intact distal pulses.           No murmur heard.  Pulmonary/Chest: Breath sounds normal. No stridor. No respiratory distress. She has no wheezes. She has no rales.   Abdominal: Abdomen is soft. Bowel sounds are normal. She exhibits no distension. There is no abdominal tenderness.   Musculoskeletal:         General: No edema. Normal range of motion.      Cervical back: Normal range of motion and neck supple.     Neurological: She is alert and oriented to person, place, and time.   Left-sided hemiparesis at baseline   Skin: Skin is warm. Capillary refill takes less than 2 seconds.   Psychiatric: Thought content normal. She is withdrawn. She expresses impulsivity. She exhibits a depressed mood. She is noncommunicative.         ED Course   Procedures  Labs Reviewed   CBC W/ AUTO DIFFERENTIAL - Abnormal; Notable for the following components:       Result Value    RBC 3.95 (*)      (*)     MCH 31.6 (*)     MCHC 31.1 (*)     Immature Granulocytes 2.0 (*)     Immature Grans (Abs) 0.17 (*)     All other components within normal limits   COMPREHENSIVE METABOLIC PANEL - Abnormal; Notable for the following components:    Potassium 5.5 (*)     Chloride 113 (*)     eGFR 48.5 (*)     Anion Gap 6 (*)     All other components within normal limits   ACETAMINOPHEN LEVEL - Abnormal; Notable for the following components:    Acetaminophen (Tylenol), Serum 0.4 (*)     All other components within normal limits   SALICYLATE LEVEL - Abnormal; Notable for the following components:    Salicylate Lvl <1.5 (*)     All other components within normal limits   TSH   ALCOHOL,MEDICAL (ETHANOL)   URINALYSIS, REFLEX TO URINE CULTURE   DRUG SCREEN PANEL, URINE EMERGENCY   POCT  URINE PREGNANCY          Imaging Results    None          Medications - No data to display  Medical Decision Making  43-year-old female presented emergency department sent by psychiatrist for medical clearance for psychiatric placement.  Patient placed under physician emergency certificate and was recently treated at psychiatric facility.  Patient otherwise nontoxic and medically stable.  Patient cleared to be transferred to psychiatric facility for further management and treatment.  Patient under physician emergency certificate done by psychiatrist.  Screening labs done for psychiatric placement.    Amount and/or Complexity of Data Reviewed  Labs: ordered. Decision-making details documented in ED Course.                  Medically cleared for psychiatry placement: 10/3/2023 11:38 AM            Clinical Impression:   Final diagnoses:  [Z00.8] Medical clearance for psychiatric admission (Primary)  [F32.A] Depression, unspecified depression type        ED Disposition Condition    Transfer to Psych Facility Stable          ED Prescriptions    None       Follow-up Information    None          Doug Duke MD  10/03/23 1140       Doug Duke MD  10/03/23 9949

## 2023-10-04 NOTE — ED NOTES
Patient remains calm and cooperative in stretcher at a 30 degree angle, wheels locked and both side rails up. Patient remains in purple gown and appears to be resting comfortably with unlabored, equal respirations, chest rising and falling. Patient appears to be sleeping. All removable objects have been taken out of room/out of reach, belongings remain with security.Sitter remains at bedside performing necessary q15 policy documentation.

## 2023-10-04 NOTE — ED NOTES
Called [facility] and spoke to Ricardo about transport for pt. Transport was arranged for 2130. Ricardo stated transport was en route from [facility] and would be here shortly.

## 2023-10-04 NOTE — ED NOTES
Patient remains calm and cooperative in stretcher at 45 degree angle, wheels locked. Pt sitting in her bed talking with her family member. Patient remains in purple gown and appears to be resting comfortably with unlabored, equal respirations. All removable objects have been taken out of room/out of reach, restroom needs have been offered. All personal belongings remain with security. Any and all questions of patient have been addressed. Sitter remains at bedside performing necessary q15 policy documentation.

## 2023-10-06 PROBLEM — F31.4 BIPOLAR 1 DISORDER, DEPRESSED, SEVERE: Chronic | Status: ACTIVE | Noted: 2023-10-06

## 2023-10-06 PROBLEM — R79.89 ELEVATED LFTS: Status: RESOLVED | Noted: 2023-06-20 | Resolved: 2023-10-06

## 2023-10-06 PROBLEM — R11.2 INTRACTABLE VOMITING WITH NAUSEA: Status: RESOLVED | Noted: 2023-06-19 | Resolved: 2023-10-06

## 2023-10-06 PROBLEM — E87.29 KETOACIDOSIS: Status: RESOLVED | Noted: 2023-06-19 | Resolved: 2023-10-06

## 2023-10-06 PROBLEM — T50.905S TOXICITY, LATE EFFECT, DUE TO DRUG, MEDICINE, OR BIOLOGICAL SUBSTANCE: Status: RESOLVED | Noted: 2022-10-08 | Resolved: 2023-10-06

## 2023-10-06 PROBLEM — N17.9 ACUTE RENAL FAILURE SUPERIMPOSED ON STAGE 3 CHRONIC KIDNEY DISEASE: Status: RESOLVED | Noted: 2018-09-14 | Resolved: 2023-10-06

## 2023-10-06 PROBLEM — N18.30 ACUTE RENAL FAILURE SUPERIMPOSED ON STAGE 3 CHRONIC KIDNEY DISEASE: Status: RESOLVED | Noted: 2018-09-14 | Resolved: 2023-10-06

## 2023-10-16 ENCOUNTER — PATIENT MESSAGE (OUTPATIENT)
Dept: PSYCHIATRY | Facility: CLINIC | Age: 43
End: 2023-10-16
Payer: MEDICARE

## 2023-10-17 ENCOUNTER — TELEPHONE (OUTPATIENT)
Dept: PSYCHIATRY | Facility: CLINIC | Age: 43
End: 2023-10-17
Payer: MEDICARE

## 2023-10-17 ENCOUNTER — OFFICE VISIT (OUTPATIENT)
Dept: PSYCHIATRY | Facility: CLINIC | Age: 43
End: 2023-10-17
Payer: MEDICARE

## 2023-10-17 VITALS
SYSTOLIC BLOOD PRESSURE: 131 MMHG | DIASTOLIC BLOOD PRESSURE: 90 MMHG | HEIGHT: 63 IN | HEART RATE: 115 BPM | BODY MASS INDEX: 19.28 KG/M2 | WEIGHT: 108.81 LBS

## 2023-10-17 DIAGNOSIS — F12.20 CANNABIS USE DISORDER, SEVERE, DEPENDENCE: ICD-10-CM

## 2023-10-17 DIAGNOSIS — F51.04 PSYCHOPHYSIOLOGICAL INSOMNIA: ICD-10-CM

## 2023-10-17 DIAGNOSIS — F17.200 TOBACCO USE DISORDER: ICD-10-CM

## 2023-10-17 DIAGNOSIS — I69.359 CVA, OLD, HEMIPARESIS: ICD-10-CM

## 2023-10-17 DIAGNOSIS — F09 COGNITIVE DISORDER: ICD-10-CM

## 2023-10-17 DIAGNOSIS — F31.75 BIPOLAR 1 DISORDER, DEPRESSED, PARTIAL REMISSION: ICD-10-CM

## 2023-10-17 DIAGNOSIS — R00.0 TACHYCARDIA: ICD-10-CM

## 2023-10-17 DIAGNOSIS — F41.9 ANXIETY: ICD-10-CM

## 2023-10-17 PROCEDURE — 1111F PR DISCHARGE MEDS RECONCILED W/ CURRENT OUTPATIENT MED LIST: ICD-10-PCS | Mod: HCNC,CPTII,S$GLB, | Performed by: PSYCHIATRY & NEUROLOGY

## 2023-10-17 PROCEDURE — 1159F PR MEDICATION LIST DOCUMENTED IN MEDICAL RECORD: ICD-10-PCS | Mod: HCNC,CPTII,S$GLB, | Performed by: PSYCHIATRY & NEUROLOGY

## 2023-10-17 PROCEDURE — 1160F RVW MEDS BY RX/DR IN RCRD: CPT | Mod: HCNC,CPTII,S$GLB, | Performed by: PSYCHIATRY & NEUROLOGY

## 2023-10-17 PROCEDURE — 99214 OFFICE O/P EST MOD 30 MIN: CPT | Mod: HCNC,S$GLB,, | Performed by: PSYCHIATRY & NEUROLOGY

## 2023-10-17 PROCEDURE — 3008F BODY MASS INDEX DOCD: CPT | Mod: HCNC,CPTII,S$GLB, | Performed by: PSYCHIATRY & NEUROLOGY

## 2023-10-17 PROCEDURE — 1111F DSCHRG MED/CURRENT MED MERGE: CPT | Mod: HCNC,CPTII,S$GLB, | Performed by: PSYCHIATRY & NEUROLOGY

## 2023-10-17 PROCEDURE — 3008F PR BODY MASS INDEX (BMI) DOCUMENTED: ICD-10-PCS | Mod: HCNC,CPTII,S$GLB, | Performed by: PSYCHIATRY & NEUROLOGY

## 2023-10-17 PROCEDURE — 3044F PR MOST RECENT HEMOGLOBIN A1C LEVEL <7.0%: ICD-10-PCS | Mod: HCNC,CPTII,S$GLB, | Performed by: PSYCHIATRY & NEUROLOGY

## 2023-10-17 PROCEDURE — 3044F HG A1C LEVEL LT 7.0%: CPT | Mod: HCNC,CPTII,S$GLB, | Performed by: PSYCHIATRY & NEUROLOGY

## 2023-10-17 PROCEDURE — 1160F PR REVIEW ALL MEDS BY PRESCRIBER/CLIN PHARMACIST DOCUMENTED: ICD-10-PCS | Mod: HCNC,CPTII,S$GLB, | Performed by: PSYCHIATRY & NEUROLOGY

## 2023-10-17 PROCEDURE — 3080F DIAST BP >= 90 MM HG: CPT | Mod: HCNC,CPTII,S$GLB, | Performed by: PSYCHIATRY & NEUROLOGY

## 2023-10-17 PROCEDURE — 3075F SYST BP GE 130 - 139MM HG: CPT | Mod: HCNC,CPTII,S$GLB, | Performed by: PSYCHIATRY & NEUROLOGY

## 2023-10-17 PROCEDURE — 3080F PR MOST RECENT DIASTOLIC BLOOD PRESSURE >= 90 MM HG: ICD-10-PCS | Mod: HCNC,CPTII,S$GLB, | Performed by: PSYCHIATRY & NEUROLOGY

## 2023-10-17 PROCEDURE — 99999 PR PBB SHADOW E&M-EST. PATIENT-LVL IV: CPT | Mod: PBBFAC,HCNC,, | Performed by: PSYCHIATRY & NEUROLOGY

## 2023-10-17 PROCEDURE — 3075F PR MOST RECENT SYSTOLIC BLOOD PRESS GE 130-139MM HG: ICD-10-PCS | Mod: HCNC,CPTII,S$GLB, | Performed by: PSYCHIATRY & NEUROLOGY

## 2023-10-17 PROCEDURE — 99999 PR PBB SHADOW E&M-EST. PATIENT-LVL IV: ICD-10-PCS | Mod: PBBFAC,HCNC,, | Performed by: PSYCHIATRY & NEUROLOGY

## 2023-10-17 PROCEDURE — 99214 PR OFFICE/OUTPT VISIT, EST, LEVL IV, 30-39 MIN: ICD-10-PCS | Mod: HCNC,S$GLB,, | Performed by: PSYCHIATRY & NEUROLOGY

## 2023-10-17 PROCEDURE — 1159F MED LIST DOCD IN RCRD: CPT | Mod: HCNC,CPTII,S$GLB, | Performed by: PSYCHIATRY & NEUROLOGY

## 2023-10-17 RX ORDER — NALOXONE HYDROCHLORIDE 4 MG/.1ML
SPRAY NASAL
COMMUNITY
Start: 2023-10-06

## 2023-10-17 RX ORDER — ARIPIPRAZOLE 15 MG/1
TABLET ORAL
Start: 2023-10-17

## 2023-10-17 NOTE — TELEPHONE ENCOUNTER
I have a patient that dr loco just saw and she put in referral she wants her scheduled with Keara soon she is just out of hospital she will be in IOP tues wed and fridays    per dr loco

## 2023-10-19 ENCOUNTER — TELEPHONE (OUTPATIENT)
Dept: PSYCHIATRY | Facility: CLINIC | Age: 43
End: 2023-10-19
Payer: MEDICARE

## 2023-10-19 NOTE — TELEPHONE ENCOUNTER
Called to schedule new patient therapy appointment with Keara Arguelles LCSW per the referral from Dr. Cruz. No answer, phone picks up and disconnects. Sent my chart message

## 2023-10-22 DIAGNOSIS — Z87.19 HISTORY OF GASTROESOPHAGEAL REFLUX (GERD): ICD-10-CM

## 2023-10-22 DIAGNOSIS — R11.2 NON-INTRACTABLE VOMITING WITH NAUSEA: ICD-10-CM

## 2023-10-23 ENCOUNTER — PATIENT MESSAGE (OUTPATIENT)
Dept: PSYCHIATRY | Facility: CLINIC | Age: 43
End: 2023-10-23
Payer: MEDICARE

## 2023-10-23 RX ORDER — FAMOTIDINE 40 MG/1
40 TABLET, FILM COATED ORAL NIGHTLY
Qty: 30 TABLET | Refills: 3 | Status: SHIPPED | OUTPATIENT
Start: 2023-10-23

## 2023-10-24 NOTE — PROGRESS NOTES
Outpatient Psychiatry Follow-Up Visit (MD/NP)    10/17/2023    Clinical Status of Patient:  Outpatient (Ambulatory)    Chief Complaint:  Cat Denson is a 43 y.o. female who presents today for hospital discharge follow-up of mood disorder and anxiety.  Met with patient alone.    Interval History and Content of Current Session:  Interim Events/Subjective Report/Content of Current Session:  43 y.o.disabled female presents to clinic for follow up treatment of  follow up appt of bipolar I disorder, panic disorder, cannabis use disorder, and cognitive disorder.   She has a prior hx of chronic pain, nausea, and emesis.  Has been having health issues since she was a child, dx with Lupus at 10 yo. She missed a lot of school.  Has impulsive tendencies with hx of cutting, shaving her head (shaved today).  She has prior hx of suicide attempt at 20 yo. Multiple prior admissions for depression, SI.   Pt had a CVA in November 2015 - Embolic stroke involving right middle cerebral artery; she is hypercoagulable due to lupus.  She is currently taking Coumadin.  Pt has residual left hemiparesis.   She has had balance problems, forgetfulness, and severe focusing issue.      Neuropsychological testing 2/17:   Personality test data suggested the presence of mild depression. Neuropsychological test results reveal impairment in immediate and delayed visual memory, visual attention, temporal orientation, visuospatial/constructional abilities, abstract reasoning, psychomotor speed, and mental flexibility; and variability in immediate auditory/verbal memory (high average and mildly impaired performances) and verbal fluency (low average and moderately impaired performances); with mild depression. The pattern of impairment is consistent with right MCA CVA. She will continue to see Dr. Cruz and Mrs. Wiley for psychiatric care. She was encouraged to resume PT, OT, and Speech Therapy for further stroke rehabilitation.     Past  Psychiatric Hx:  Dx Bipolar 2 Disorder (dx over 10 yrs ago), Anxiety, Impulse Control Disorder, hx pseudoseizures with possible epileptic seizures   Prior IOP: [facility]   Hx Suicide attempt at 22 yo  Hx suicidal ideations with admit to [facility] January 2015, [facility] for SI 2015,   First admit: 24 yo: for SI - [facility]; most recent [facility]. 2017 - for w/d affect, psychosis, poor memory recall, not sleeping x days  5 prior psych admissions  Hx self harm at age 17 yo     Past psychiatric hx: Wellbutrin for smoking cessation, helped to reduce, Zoloft, Trazodone (taken off due to tiredness one month ago), Lexapro, Paxil, Cymbalta (no pain benefit), Geodon, Trileptal, Lamictal, Seroquel, Chantix (makes her vomit), Olanzapine, Risperdal, Effexor, ritalin, depakote (was taken off ? Cannot recall why)    Previous overdoses on paxil, zoloft, muscle relaxants -emesis for a few days, did not tell anyone. Prior to CVA in 2015.   No recent self harm - stopped with CVA when started on blood thinners.   MOCA 12/17/19:  25/30 (+1 point for less than 12 yrs of education). Lost points for cube copy, visuospatial/trailblazing, digits/attention, serial 7s, fluency).       Past medical history:  Lupus   Sjogren's   CKD  Fibromyalgia, chronic pain   Seizure Disorder   Bronchospasm   Hx CVA  Gastroparesis   Fibromyalgia     Interim Hx:   The patient presents for follow-up visit. Recall prior hospital admission to [facility] for suicide attempt.  Pt was started on Quetiapine 200 mg nightly in addition to other meds during that admission.  She presented for hospital follow up last visit and was impaired, had altered mental status, made some concerning passive suicidal statements.  She was again placed under a PEC.  Pt was hospitalized at [facility] and benefited from this stay.   Chart review: Pt received Abilify Aristada (662 mg total) into the muscle every 28 days. Last  given on 10/6/23, Give every 28 days, Next DUE on 11/3/23 - Intramuscular.   Pt states she did not receive PO Abilify and has not been taking it since her discharge on 10/06.     Other Meds:  Depakote 250 mg in morning, 500 mg nightly, Mirtazapine 30 mg nightly.   Pt is attending [facility] three times/week.  Pt is interested in individual psychotherapy.   Overall, she is MUCH improved.  Has home health nurse who advised her he could administer Abilify Aristada.  I called Home Health nurse Junito at [facility] per pt request [phone] and confirmed this is case and also learned that Rx does not need refrigeration.  Still struggles with some sleep issues at night, but being in routine at [facility] may help.  She tends to be sedentary at home, spends much of day playing video games.   Support: friend Scout and roommate Alivia.   Denies current suicidal/homicidal ideations.  No self harm or violence.   Alcohol: does not tolerate.    Smokes cigars 1 pack (#5) over a few days.   She has been counseled on smoking cessation program.    Still using liquid marijuana, not smoking it  - states she is sad/mad without cannabis and is not interested in abstinence.  Chantix lowers seizure threshold - does not want to take it.   Nicotine patches make her itch.  Caffeine: she is still using caffeine tablets (does not drink coffee) - she took 200 mg this morning at 5 am and took another 100 mg at 6 am.  Heart rate is elevated today and she relates to caffeine use.   No access to firearms.     Pt denies significant depressed mood today.  Mood is euthymic.   Denies bowen, psychosis.     Lab Results   Component Value Date    VALPROATE 49.0 (L) 10/05/2023         Wt Readings from Last 3 Encounters:   10/17/23 1330 49.3 kg (108 lb 12.8 oz)   10/04/23 0130 46 kg (101 lb 8 oz)   10/04/23 0120 46 kg (101 lb 8 oz)   10/03/23 1519 47.2 kg (104 lb)     Review of Systems   PSYCHIATRIC: Pertinant items are noted in the  "narrative.  CONSTITUTIONAL: weight gain  MUSCULOSKELETAL: denies pain today    CV: no chest pain, denies awareness of tachycardia  GI:  Gastroparesis  NEURO: left HP, treated with botox, no seizures in interim, denies recent falls    Past Medical, Family and Social History: The patient's past medical, family and social history have been reviewed and updated as appropriate within the electronic medical record - see encounter notes.    Medications:   Depakote  mg in am and 500 mg nightly   Mirtazapine 30 mg nighty  Abilify 15 mg daily   Abilify Aristada (662 mg total) into the muscle every 28 days. Last given on 10/6/23, Give every 28 days, Next DUE on 11/3/23 - Intramuscular    Compliance: she has not been taking PO Abilify, states she did  Rx.     Side effects: sedation on Seroquel (significant).     Risk Parameters:  Patient reports no current  ideation   Patient reports no homicidal ideation  Patient reports no self-injurious behavior  Patient reports no violent behavior    Exam (detailed: at least 9 elements; comprehensive: all 15 elements)   Constitutional  Vitals:  Most recent vital signs, dated less than 90 days prior to this appointment, were reviewed.        General:  age appropriate, improved grooming, improved eye contact, thin, wearing dark clothes     Musculoskeletal  Muscle Strength/Tone:  left sided hemiparesis   Gait & Station:  steady     Psychiatric  Speech:  Improved spontaneity, normal volume   Mood & Affect:  "Better"  Full    Thought Process:  Linear    Associations:  intact   Thought Content:  no current suicidality, no homicidality, delusions, or paranoia    Insight:  Improved    Judgement: Improved   Orientation:  Appears alert oriented to person, place, situation    Memory: Able to recall recent events     Language: grossly intact   Attention Span & Concentration:  Improved   Fund of Knowledge:  intact and appropriate to age and level of education, familiar with aspects of " current personal life     Assessment and Diagnosis   Status/Progress: Based on the examination today, the patient's problem(s) is/are under improved control.  New problems have not been presented today.  Co-morbidities are complicating management of the primary condition.      General Impression: pt had CVA Nov 2015 with significantly impaired focus, memory lapses, worsening depression, anxiety.  Pt had episode of acute hepatitis, liver enzymes improved, but now with declining renal function; Multiple prior psych admissions. Prior neuropsychological testing done and consistent with her MCA CVA.      Hx of cannabis and Kratom use disorders, med noncompliance.  Presents today following discharge from [facility].  She appears altered in session - concern for possible ingestion, severe depression, makes vague statements about wishing she were fearless to follow through on thoughts.  Appeared more sedated per Brent who picked her up last appt; pt placed under PEC last visit. She presents today for follow up.  She has transitioned to Abilify Aristada, although has not been taking PO formulation as recommended for 3 weeks.  She is attending [facility] and generally is much improved.      Bipolar 1 Disorder, MRE depressed, partial remission   Panic Disorder with Agoraphobia  Cognitive Disorder due to CVA  Insomnia Disorder  Cannabis Use Disorder, severe   Tobacco Use Disorder   Lupus, migraines, CVA, hx hepatitis, declining renal function, gastroparesis, dyskinesias, seizure disorder   Body mass index is 19.27 kg/m².    GAF: 60   Intervention/Counseling/Treatment Plan   Medication Management: The risks and benefits of medication were discussed with the patient.   Bipolar 1 disorder, depressed, partial remission  -     Ambulatory referral/consult to Psychiatry - Psy Authorization: Individual Psychotherapy Only, 50 x/yr; Future; Expected date: 10/24/2023    Anxiety    Cannabis use disorder, severe, dependence    Cognitive  disorder    CVA, old, hemiparesis    Psychophysiological insomnia    Other orders  -     ARIPiprazole (ABILIFY) 15 MG Tab; Take 15mg po daily until 11/02/2023, then stop taking pills and continue monthly abilify aristada injection    Continue with plan for Abilify Aristafa 662 mg monthly.  Next dose 11/03/23 and I have confirmed it will be administered by her home health nurse (spoke to him today by phone).   I advised pt to take Abilify 15 mg daily orally until 11/02/2023 to endure coverage.  She is tolerating well, denies prior akathisia on med.  Pt asked to notify me if side effects occur.    Continue Mirtazapine 30 mg at night (also helps with gastroparesis)   Continue Depakote as scheduled 250 mg in morning, 500 mg nightly   Referral to therapy with Keara Wills LCSW; continue [facility].   Pt counseled on smoking cessation; she is aware of smoking cessation program.  Encouraged abstinence from marijuana; psychoeducation provided about impacts on mood, health.   Follow up with PCP and medical specialists RE: health issues, tachycardia.  Pt did take caffeine pills x 2 this morning which she believes caused tachycardia;  vitals reviewed while inpatient.   Encouraged to minimize caffeine.      -Spent 30 min face to face with the pt; >50% time spent in counseling   -Supportive therapy and psychoeducation provided  -R/B/SE's of medications discussed with the pt who expresses understanding and chooses to take medications as prescribed.   -Pt instructed to call clinic, 911 or go to nearest emergency room if sxs worsen or pt is in   crisis. The pt expresses understanding.      Return to Clinic: 11/17

## 2023-10-26 ENCOUNTER — OFFICE VISIT (OUTPATIENT)
Dept: PSYCHIATRY | Facility: CLINIC | Age: 43
End: 2023-10-26
Payer: MEDICARE

## 2023-10-26 DIAGNOSIS — F09 COGNITIVE DISORDER: ICD-10-CM

## 2023-10-26 DIAGNOSIS — F31.75 BIPOLAR 1 DISORDER, DEPRESSED, PARTIAL REMISSION: Primary | ICD-10-CM

## 2023-10-26 PROCEDURE — 90791 PSYCH DIAGNOSTIC EVALUATION: CPT | Mod: HCNC,S$GLB,, | Performed by: SOCIAL WORKER

## 2023-10-26 PROCEDURE — 90791 PR PSYCHIATRIC DIAGNOSTIC EVALUATION: ICD-10-PCS | Mod: HCNC,S$GLB,, | Performed by: SOCIAL WORKER

## 2023-10-26 PROCEDURE — 99999 PR PBB SHADOW E&M-EST. PATIENT-LVL I: ICD-10-PCS | Mod: PBBFAC,HCNC,, | Performed by: SOCIAL WORKER

## 2023-10-26 PROCEDURE — 3044F HG A1C LEVEL LT 7.0%: CPT | Mod: HCNC,CPTII,S$GLB, | Performed by: SOCIAL WORKER

## 2023-10-26 PROCEDURE — 99999 PR PBB SHADOW E&M-EST. PATIENT-LVL I: CPT | Mod: PBBFAC,HCNC,, | Performed by: SOCIAL WORKER

## 2023-10-26 PROCEDURE — 3044F PR MOST RECENT HEMOGLOBIN A1C LEVEL <7.0%: ICD-10-PCS | Mod: HCNC,CPTII,S$GLB, | Performed by: SOCIAL WORKER

## 2023-10-26 NOTE — PROGRESS NOTES
Psychiatry Initial Visit (PhD/LCSW)  Diagnostic Interview - CPT 23863    Date: 10/26/2023    Site: [facility]    Referral source: Sakina Cruz,*    Clinical status of patient: Outpatient    Cat Denson, a 43 y.o. female, for initial evaluation visit.  Met with patient.    Chief complaint/reason for encounter: depression, anxiety, behavior, and cognition    History of present illness: Hx of bipolar 1; cognitie disorder; panic disorder.  Lupus age 11.  Hx of cutting, shaving head.  Hx of suicide attempts.  CVA in 2015 - stroke, struggles with gait.  Was hospitalized at [facility] in [place], [place].      Pain: noncontributory    Symptoms:   Mood: depressed mood, diminished interest, worthlessness/guilt, poor concentration, and tearfulness  Anxiety: decreased memory, excessive anxiety/worry, muscle tension, and panic attacks  Substance abuse: denied  Cognitive functioning: denied  Health behaviors:  suffered CVA in 2015.      Psychiatric history: prior inpatient treatment, prior suicide attempt(s), has participated in counseling/psychotherapy on an outpatient basis in the past, and currently under psychiatric care    Medical history:   Past Medical History:   Diagnosis Date    Acid reflux     Allergy     Anemia     Anticoagulated 12/29/2015    Anxiety     Asthma     Bipolar 1 disorder     Bronchospasm with bronchitis, acute     Chronic daily headache 9/11/2018    Chronic kidney disease     Chronic pain     Depression     bipolar 2    Fibromyalgia     Gastroparesis     History of right MCA stroke 11/25/2015    Hx of psychiatric care     Lactose intolerance     Lupus     Mixed hyperlipidemia 11/28/2018    Psychiatric problem     Renal tubular acidosis     Seizure     Sjogren's syndrome     Smoker     Stroke 13-    Substance abuse-kratom daily use 8/30/2020    Suicide attempt     overdosed on a bottle of paxil, muscle relaxers, & zoloft    Therapy        Medications:    Current Outpatient  Medications:     acetaminophen (TYLENOL ORAL), Take 2 tablets by mouth every 8 (eight) hours as needed (PAIN)., Disp: , Rfl:     ARIPiprazole (ABILIFY) 15 MG Tab, Take 15mg po daily until 11/02/2023, then stop taking pills and continue monthly abilify aristada injection, Disp: , Rfl:     [START ON 11/3/2023] ARIPiprazole lauroxil (ARISTADA) 662 mg/2.4 mL injection, Inject 2.4 mLs (662 mg total) into the muscle every 28 days. Last given on 10/6/23, Give every 28 days, Next DUE on 11/3/23, Disp: 1 each, Rfl: 3    divalproex (DEPAKOTE) 250 MG EC tablet, TAKE 1 TABLET BY MOUTH EVERY MORNING AND TAKE 2 TABLETS BY MOUTH EVERY NIGHT AT BEDTIME, Disp: 90 tablet, Rfl: 2    famotidine (PEPCID) 40 MG tablet, Take 1 tablet (40 mg total) by mouth every evening., Disp: 30 tablet, Rfl: 3    gabapentin (NEURONTIN) 300 MG capsule, Take 1 capsule (300 mg total) by mouth 3 (three) times daily. (Patient taking differently: Take 300 mg by mouth 2 (two) times daily.), Disp: 90 capsule, Rfl: 0    magnesium oxide (MAG-OX) 400 mg (241.3 mg magnesium) tablet, Take 1 tablet (400 mg total) by mouth once daily., Disp: 90 tablet, Rfl: 1    mirtazapine (REMERON) 30 MG tablet, TAKE 1 TABLET(30 MG) BY MOUTH EVERY EVENING, Disp: 30 tablet, Rfl: 2    naloxone (NARCAN) 4 mg/actuation Spry, SMARTSIG:Both Nares, Disp: , Rfl:     pantoprazole (PROTONIX) 40 MG tablet, Take 1 tablet (40 mg total) by mouth once daily., Disp: 30 tablet, Rfl: 0    propranoloL (INDERAL LA) 80 MG 24 hr capsule, Take 1 capsule (80 mg total) by mouth once daily., Disp: 30 capsule, Rfl: 0    rivaroxaban (XARELTO) 20 mg Tab, TAKE 1 TABLET(20 MG) BY MOUTH DAILY WITH THE EVENING MEAL AND DINNER, Disp: 90 tablet, Rfl: 3    Family history of psychiatric illness:   Family History   Problem Relation Age of Onset    Hypertension Mother     Hyperlipidemia Mother     Psoriasis Mother     Thyroid disease Mother     No Known Problems Father     Post-traumatic stress disorder Brother     Drug  "abuse Brother     Diabetes Maternal Uncle     Hypertension Maternal Uncle     Alcohol abuse Maternal Uncle     Stroke Maternal Uncle     Scoliosis Paternal Aunt     Heart disease Paternal Uncle     Mental illness Maternal Grandmother     Cancer Maternal Grandmother         lung / brain - smoker    Drug abuse Maternal Grandmother     Hypertension Maternal Grandmother     Hyperlipidemia Maternal Grandmother     Diabetes Maternal Grandmother     Rheum arthritis Maternal Grandmother     Diabetes Mellitus Maternal Grandmother     Heart disease Maternal Grandfather     Hypertension Maternal Grandfather     Alcohol abuse Maternal Grandfather     Osteoarthritis Maternal Grandfather     No Known Problems Paternal Grandmother     Mental illness Paternal Grandfather     Early death Paternal Grandfather         self    Breast cancer Cousin     Breast cancer Other     Colon cancer Neg Hx     Colon polyps Neg Hx     Crohn's disease Neg Hx     Ulcerative colitis Neg Hx     Celiac disease Neg Hx     Lupus Neg Hx     Kidney disease Neg Hx     Inflammatory bowel disease Neg Hx     Depression Neg Hx     COPD Neg Hx     Asthma Neg Hx     Macular degeneration Neg Hx     Retinal detachment Neg Hx     Glaucoma Neg Hx        Social history (marriage, employment, etc.): Cat presents on time for today's initial session.  She is 43, , not , no children, and an extensive hx of multiple conditions including diagnosed with Lupus at age 11; hx of cutting, shaving her head, suicide attempt, CVA in 2015 which has impacted her walking and ability to care for herself.  Values her independence.  Has supportive friends including Brent who accompanies her today to this session.  Room mate is Alivia, 38, who dates Cat's brother.  Was at [facility] in [place] for "medical adjustment".  Dropped out of school in 9th grade and then went for her GED.  Her friend Brent, whom she describes as a close friend, they met in IOP. "  Admits her maternal grandmother was diagnosed with paranoid schizophrenia.  Mother is Arabella, , 72, resides in [place].  Brother Patrick, 39, also resides in [place].  Father Sr. Patrick Is 65 and makes no further mention of him.  Would like to engage in therapy and learn coping skills.  Appears to be a yuri young woman who wants to move forward.  Will see her in therapy as scheduled.   Was very nervous and anxious throughout initial session.   Social History     Tobacco Use    Smoking status: Every Day     Current packs/day: 1.00     Average packs/day: 1 pack/day for 15.2 years (15.2 ttl pk-yrs)     Types: Cigarettes     Start date: 11/25/2015    Smokeless tobacco: Never   Substance Use Topics    Alcohol use: No     Alcohol/week: 0.0 standard drinks of alcohol    Drug use: Yes     Types: Marijuana     Comment: smokes occasionally, helps with pain and appetite       Current medications and drug reactions (include OTC, herbal): see medication list     Strengths and liabilities: Strength: Patient accepts guidance/feedback, Strength: Patient is intelligent., Strength: Patient is motivated for change., Strength: Patient has positive support network., Strength: Patient has reasonable judgment., Strength: Patient is stable., Liability: Patient lacks coping skills.    Current Evaluation:     Mental Status Exam:  General Appearance:  unremarkable, age appropriate, casually dressed, neatly groomed   Speech: normal tone, normal pitch, normal volume, slowed      Level of Cooperation: cooperative      Thought Processes: normal and logical   Mood: steady, anxious, depressed, sad      Thought Content: normal, no suicidality, no homicidality, delusions, or paranoia   Affect: appropriate, mood-congruent, sad, anxious   Orientation: Oriented x3   Memory: Unable to gauge at this time   Attention Span & Concentration: Unable to gauge at this time   Fund of General Knowledge: 9th grade, got her GED   Abstract Reasoning: Unable  to gauge at this time    Judgment & Insight: fair     Language  intact     Diagnostic Impression - Plan:       ICD-10-CM ICD-9-CM   1. Bipolar 1 disorder, depressed, partial remission  F31.75 296.55   2. Cognitive disorder  F09 294.9       Plan:individual psychotherapy    Return to Clinic: as scheduled    Length of Service (minutes): 45

## 2023-10-27 ENCOUNTER — OFFICE VISIT (OUTPATIENT)
Dept: PODIATRY | Facility: CLINIC | Age: 43
End: 2023-10-27
Payer: MEDICARE

## 2023-10-27 VITALS — HEIGHT: 63 IN | BODY MASS INDEX: 19.3 KG/M2 | WEIGHT: 108.94 LBS

## 2023-10-27 DIAGNOSIS — M79.675 PAIN OF TOE OF LEFT FOOT: ICD-10-CM

## 2023-10-27 DIAGNOSIS — L85.1 ACQUIRED KERATODERMA: Primary | ICD-10-CM

## 2023-10-27 PROCEDURE — 1160F PR REVIEW ALL MEDS BY PRESCRIBER/CLIN PHARMACIST DOCUMENTED: ICD-10-PCS | Mod: CPTII,S$GLB,, | Performed by: PODIATRIST

## 2023-10-27 PROCEDURE — 1159F MED LIST DOCD IN RCRD: CPT | Mod: CPTII,S$GLB,, | Performed by: PODIATRIST

## 2023-10-27 PROCEDURE — 3008F PR BODY MASS INDEX (BMI) DOCUMENTED: ICD-10-PCS | Mod: CPTII,S$GLB,, | Performed by: PODIATRIST

## 2023-10-27 PROCEDURE — 1111F DSCHRG MED/CURRENT MED MERGE: CPT | Mod: CPTII,S$GLB,, | Performed by: PODIATRIST

## 2023-10-27 PROCEDURE — 1160F RVW MEDS BY RX/DR IN RCRD: CPT | Mod: CPTII,S$GLB,, | Performed by: PODIATRIST

## 2023-10-27 PROCEDURE — 99213 OFFICE O/P EST LOW 20 MIN: CPT | Mod: S$GLB,,, | Performed by: PODIATRIST

## 2023-10-27 PROCEDURE — 1111F PR DISCHARGE MEDS RECONCILED W/ CURRENT OUTPATIENT MED LIST: ICD-10-PCS | Mod: CPTII,S$GLB,, | Performed by: PODIATRIST

## 2023-10-27 PROCEDURE — 3008F BODY MASS INDEX DOCD: CPT | Mod: CPTII,S$GLB,, | Performed by: PODIATRIST

## 2023-10-27 PROCEDURE — 3044F PR MOST RECENT HEMOGLOBIN A1C LEVEL <7.0%: ICD-10-PCS | Mod: CPTII,S$GLB,, | Performed by: PODIATRIST

## 2023-10-27 PROCEDURE — 99999 PR PBB SHADOW E&M-EST. PATIENT-LVL III: CPT | Mod: PBBFAC,HCNC,, | Performed by: PODIATRIST

## 2023-10-27 PROCEDURE — 1159F PR MEDICATION LIST DOCUMENTED IN MEDICAL RECORD: ICD-10-PCS | Mod: CPTII,S$GLB,, | Performed by: PODIATRIST

## 2023-10-27 PROCEDURE — 3044F HG A1C LEVEL LT 7.0%: CPT | Mod: CPTII,S$GLB,, | Performed by: PODIATRIST

## 2023-10-27 PROCEDURE — 99213 PR OFFICE/OUTPT VISIT, EST, LEVL III, 20-29 MIN: ICD-10-PCS | Mod: S$GLB,,, | Performed by: PODIATRIST

## 2023-10-27 PROCEDURE — 99999 PR PBB SHADOW E&M-EST. PATIENT-LVL III: ICD-10-PCS | Mod: PBBFAC,HCNC,, | Performed by: PODIATRIST

## 2023-10-27 NOTE — PROGRESS NOTES
"  [address] [facility] [address]  [place] [address]  Phone: [phone]   [phone]    Patient's PCP:Neela Barrett MD  Referring Provider: No ref. provider found    Subjective:      Chief Complaint:: Callouses (Right foot great toe )    FAMILIA Denson is a 43 y.o. female who presents today with a complaint of corn/ callous on right foot great toe . The current episode started in September.  The symptoms include pain when walking. Probable cause of complaint unknown, patient states it developed when she was in the hospital.  The symptoms are aggravated by walking. The problem has improved. Treatment to date have included none, the corn fell off on its own and patient filed callous while in the shower which provided some relief.         Vitals:    10/27/23 1002   Weight: 49.4 kg (108 lb 14.5 oz)   Height: 5' 3" (1.6 m)   PainSc:   2      Shoe Size: 5 kids     Past Surgical History:   Procedure Laterality Date    CHOLECYSTECTOMY      COLONOSCOPY  11/12/2014    Dr. Kimble, repeat at 52 years old for screening    ESOPHAGOGASTRODUODENOSCOPY N/A 1/3/2022    Procedure: EGD (ESOPHAGOGASTRODUODENOSCOPY);  Surgeon: Scout Kimble MD;  Location: [facility];  Service: Endoscopy;  Laterality: N/A;    ESOPHAGOGASTRODUODENOSCOPY N/A 6/12/2023    Procedure: EGD (ESOPHAGOGASTRODUODENOSCOPY);  Surgeon: Scout Kimble MD;  Location: [facility];  Service: Endoscopy;  Laterality: N/A;    FLUOROSCOPIC URODYNAMIC STUDY N/A 7/23/2019    Procedure: URODYNAMIC STUDY, FLUOROSCOPIC;  Surgeon: Vanna Martinez MD;  Location: [address];  Service: Urology;  Laterality: N/A;  1 hour    UPPER GASTROINTESTINAL ENDOSCOPY  03/03/2017    Dr. Harris     Past Medical History:   Diagnosis Date    Acid reflux     Allergy     Anemia     Anticoagulated 12/29/2015    Anxiety     Asthma     Bipolar 1 disorder     Bronchospasm with bronchitis, acute     Chronic daily headache 9/11/2018    Chronic kidney disease     Chronic pain  "    Depression     bipolar 2    Fibromyalgia     Gastroparesis     History of right MCA stroke 11/25/2015    Hx of psychiatric care     Lactose intolerance     Lupus     Mixed hyperlipidemia 11/28/2018    Psychiatric problem     Renal tubular acidosis     Seizure     Sjogren's syndrome     Smoker     Stroke 13-    Substance abuse-kratom daily use 8/30/2020    Suicide attempt     overdosed on a bottle of paxil, muscle relaxers, & zoloft    Therapy      Family History   Problem Relation Age of Onset    Hypertension Mother     Hyperlipidemia Mother     Psoriasis Mother     Thyroid disease Mother     No Known Problems Father     Post-traumatic stress disorder Brother     Drug abuse Brother     Diabetes Maternal Uncle     Hypertension Maternal Uncle     Alcohol abuse Maternal Uncle     Stroke Maternal Uncle     Scoliosis Paternal Aunt     Heart disease Paternal Uncle     Mental illness Maternal Grandmother     Cancer Maternal Grandmother         lung / brain - smoker    Drug abuse Maternal Grandmother     Hypertension Maternal Grandmother     Hyperlipidemia Maternal Grandmother     Diabetes Maternal Grandmother     Rheum arthritis Maternal Grandmother     Diabetes Mellitus Maternal Grandmother     Heart disease Maternal Grandfather     Hypertension Maternal Grandfather     Alcohol abuse Maternal Grandfather     Osteoarthritis Maternal Grandfather     No Known Problems Paternal Grandmother     Mental illness Paternal Grandfather     Early death Paternal Grandfather         self    Breast cancer Cousin     Breast cancer Other     Colon cancer Neg Hx     Colon polyps Neg Hx     Crohn's disease Neg Hx     Ulcerative colitis Neg Hx     Celiac disease Neg Hx     Lupus Neg Hx     Kidney disease Neg Hx     Inflammatory bowel disease Neg Hx     Depression Neg Hx     COPD Neg Hx     Asthma Neg Hx     Macular degeneration Neg Hx     Retinal detachment Neg Hx     Glaucoma Neg Hx         Social History:   Marital Status:  Single  Alcohol History:  reports no history of alcohol use.  Tobacco History:  reports that she has been smoking cigarettes. She started smoking about 7 years ago. She has a 15.2 pack-year smoking history. She has never used smokeless tobacco.  Drug History:  reports current drug use. Drug: Marijuana.    Review of patient's allergies indicates:   Allergen Reactions    Metoclopramide hcl Anaphylaxis     Involuntary mouth movements     Amoxicillin-pot clavulanate      2 other medications    Amoxil [amoxicillin]      hives    Avelox [moxifloxacin]      itching    Benadryl [diphenhydramine hcl]      Lowers seizure threshold     Coumadin [warfarin]      toxicity    Quinazolinones      Lowers seizure threshold     Ultram [tramadol]      Lowers seizure threshold     Wellbutrin [bupropion hcl]      Lowers seizure threshold        Current Outpatient Medications   Medication Sig Dispense Refill    acetaminophen (TYLENOL ORAL) Take 2 tablets by mouth every 8 (eight) hours as needed (PAIN).      ARIPiprazole (ABILIFY) 15 MG Tab Take 15mg po daily until 11/02/2023, then stop taking pills and continue monthly abilify aristada injection      [START ON 11/3/2023] ARIPiprazole lauroxil (ARISTADA) 662 mg/2.4 mL injection Inject 2.4 mLs (662 mg total) into the muscle every 28 days. Last given on 10/6/23, Give every 28 days, Next DUE on 11/3/23 1 each 3    divalproex (DEPAKOTE) 250 MG EC tablet TAKE 1 TABLET BY MOUTH EVERY MORNING AND TAKE 2 TABLETS BY MOUTH EVERY NIGHT AT BEDTIME 90 tablet 2    famotidine (PEPCID) 40 MG tablet Take 1 tablet (40 mg total) by mouth every evening. 30 tablet 3    gabapentin (NEURONTIN) 300 MG capsule Take 1 capsule (300 mg total) by mouth 3 (three) times daily. (Patient taking differently: Take 300 mg by mouth 2 (two) times daily.) 90 capsule 0    magnesium oxide (MAG-OX) 400 mg (241.3 mg magnesium) tablet Take 1 tablet (400 mg total) by mouth once daily. 90 tablet 1    mirtazapine (REMERON) 30 MG tablet  TAKE 1 TABLET(30 MG) BY MOUTH EVERY EVENING 30 tablet 2    naloxone (NARCAN) 4 mg/actuation Spry SMARTSIG:Both Nares      pantoprazole (PROTONIX) 40 MG tablet Take 1 tablet (40 mg total) by mouth once daily. 30 tablet 0    propranoloL (INDERAL LA) 80 MG 24 hr capsule Take 1 capsule (80 mg total) by mouth once daily. 30 capsule 0    rivaroxaban (XARELTO) 20 mg Tab TAKE 1 TABLET(20 MG) BY MOUTH DAILY WITH THE EVENING MEAL AND DINNER 90 tablet 3     No current facility-administered medications for this visit.       Review of Systems   Constitutional:  Negative for chills, fatigue, fever and unexpected weight change.   HENT:  Negative for hearing loss and trouble swallowing.    Eyes:  Negative for photophobia and visual disturbance.   Respiratory:  Negative for cough, shortness of breath and wheezing.    Cardiovascular:  Negative for chest pain, palpitations and leg swelling.   Gastrointestinal:  Negative for abdominal pain and nausea.   Genitourinary:  Negative for dysuria and frequency.   Musculoskeletal:  Positive for myalgias. Negative for arthralgias, back pain, gait problem and joint swelling.   Skin:  Negative for rash and wound.   Neurological:  Positive for seizures. Negative for tremors, weakness, numbness and headaches.   Hematological:  Does not bruise/bleed easily.         Objective:        Physical Exam:   Foot Exam    General  General Appearance: appears stated age and healthy   Orientation: alert and oriented to person, place, and time   Affect: appropriate   Gait: unimpaired       Left Foot/Ankle      Inspection and Palpation  Ecchymosis: none  Tenderness: (plantar great toe)  Swelling: none   Skin Exam: callus; no drainage and no erythema   Neurovascular  Dorsalis pedis: 2+  Posterior tibial: 2+  Capillary refill: 2+  Varicose veins: not present  Saphenous nerve sensation: diminished  Tibial nerve sensation: diminished  Superficial peroneal nerve sensation: diminished  Deep peroneal nerve sensation:  diminished  Sural nerve sensation: diminished    Edema  Type of edema: non-pitting    Muscle Strength  Ankle dorsiflexion: 4  Ankle plantar flexion: 4  Ankle inversion: 4  Ankle eversion: 4  Great toe extension: 4  Great toe flexion: 4        Physical Exam  Cardiovascular:      Pulses:           Dorsalis pedis pulses are 2+ on the left side.        Posterior tibial pulses are 2+ on the left side.   Feet:      Left foot:      Skin integrity: Callus present. No erythema.                   Muscle Strength   Left Lower Extremity   Ankle Dorsiflexion:  4   Plantar flexion:  4/5     Vascular Exam       Left Pulses  Dorsalis Pedis:      2+  Posterior Tibial:      2+           Imaging: none            Assessment:       1. Acquired keratoderma    2. Pain of toe of left foot      Plan:   Acquired keratoderma    Pain of toe of left foot      Follow up if symptoms worsen or fail to improve.    Procedures        I trimmed the thickened callus of the plantar great toe.  Patient tolerated well.  Recommend pumice stone and urea cream for continued management.    Counseling:     I provided patient education verbally regarding:   Patient diagnosis, treatment options, as well as alternatives, risks, and benefits.     This note was created using Dragon voice recognition software that occasionally misinterpreted phrases or words.

## 2023-10-30 ENCOUNTER — PATIENT MESSAGE (OUTPATIENT)
Dept: FAMILY MEDICINE | Facility: CLINIC | Age: 43
End: 2023-10-30
Payer: MEDICARE

## 2023-11-06 ENCOUNTER — PATIENT MESSAGE (OUTPATIENT)
Dept: FAMILY MEDICINE | Facility: CLINIC | Age: 43
End: 2023-11-06
Payer: MEDICARE

## 2023-11-08 ENCOUNTER — OFFICE VISIT (OUTPATIENT)
Dept: OPTOMETRY | Facility: CLINIC | Age: 43
End: 2023-11-08
Payer: COMMERCIAL

## 2023-11-08 DIAGNOSIS — H53.40 VISUAL FIELD DEFECT: ICD-10-CM

## 2023-11-08 DIAGNOSIS — Z01.00 EXAMINATION OF EYES AND VISION: Primary | ICD-10-CM

## 2023-11-08 DIAGNOSIS — H52.7 REFRACTIVE ERROR: ICD-10-CM

## 2023-11-08 PROCEDURE — 92014 PR EYE EXAM, EST PATIENT,COMPREHESV: ICD-10-PCS | Mod: S$GLB,,, | Performed by: OPTOMETRIST

## 2023-11-08 PROCEDURE — 99999 PR PBB SHADOW E&M-EST. PATIENT-LVL III: CPT | Mod: PBBFAC,,, | Performed by: OPTOMETRIST

## 2023-11-08 PROCEDURE — 99999 PR PBB SHADOW E&M-EST. PATIENT-LVL III: ICD-10-PCS | Mod: PBBFAC,,, | Performed by: OPTOMETRIST

## 2023-11-08 PROCEDURE — 92015 PR REFRACTION: ICD-10-PCS | Mod: S$GLB,,, | Performed by: OPTOMETRIST

## 2023-11-08 PROCEDURE — 92015 DETERMINE REFRACTIVE STATE: CPT | Mod: S$GLB,,, | Performed by: OPTOMETRIST

## 2023-11-08 PROCEDURE — 92014 COMPRE OPH EXAM EST PT 1/>: CPT | Mod: S$GLB,,, | Performed by: OPTOMETRIST

## 2023-11-08 NOTE — PROGRESS NOTES
HPI     Annual Exam     Additional comments: LDE: 12/05/2020           Comments    44 YO female presents today for an annual eye exam. Patient notes no   problems or complaints. Curent glasses a few years old most recent pair   nose piece fell off.           Last edited by Lorna Santacruz on 11/8/2023  9:44 AM.            Assessment /Plan     For exam results, see Encounter Report.    Examination of eyes and vision    Refractive error    Visual field defect      1. Examination of eyes and vision    2. Refractive error  Dispensed updated spectacle Rx, overall stable from previous    3. Visual field defect  Secondary to CVA 2015  Stable, observe yearly

## 2023-11-10 ENCOUNTER — OFFICE VISIT (OUTPATIENT)
Dept: FAMILY MEDICINE | Facility: CLINIC | Age: 43
End: 2023-11-10
Payer: MEDICARE

## 2023-11-10 VITALS
HEART RATE: 91 BPM | DIASTOLIC BLOOD PRESSURE: 70 MMHG | TEMPERATURE: 98 F | SYSTOLIC BLOOD PRESSURE: 110 MMHG | HEIGHT: 63 IN | OXYGEN SATURATION: 98 % | WEIGHT: 117.31 LBS | BODY MASS INDEX: 20.79 KG/M2

## 2023-11-10 DIAGNOSIS — J32.1 FRONTAL SINUSITIS, UNSPECIFIED CHRONICITY: ICD-10-CM

## 2023-11-10 DIAGNOSIS — N18.30 STAGE 3 CHRONIC KIDNEY DISEASE, UNSPECIFIED WHETHER STAGE 3A OR 3B CKD: ICD-10-CM

## 2023-11-10 DIAGNOSIS — E78.2 MIXED HYPERLIPIDEMIA: Primary | ICD-10-CM

## 2023-11-10 DIAGNOSIS — F31.75 BIPOLAR 1 DISORDER, DEPRESSED, PARTIAL REMISSION: ICD-10-CM

## 2023-11-10 DIAGNOSIS — G40.219 COMPLEX PARTIAL EPILEPSY WITH GENERALIZATION AND WITH INTRACTABLE EPILEPSY: Chronic | ICD-10-CM

## 2023-11-10 DIAGNOSIS — M32.9 SYSTEMIC LUPUS ERYTHEMATOSUS, UNSPECIFIED SLE TYPE, UNSPECIFIED ORGAN INVOLVEMENT STATUS: ICD-10-CM

## 2023-11-10 PROCEDURE — 99999 PR PBB SHADOW E&M-EST. PATIENT-LVL IV: ICD-10-PCS | Mod: PBBFAC,HCNC,, | Performed by: NURSE PRACTITIONER

## 2023-11-10 PROCEDURE — 1159F PR MEDICATION LIST DOCUMENTED IN MEDICAL RECORD: ICD-10-PCS | Mod: HCNC,CPTII,S$GLB, | Performed by: NURSE PRACTITIONER

## 2023-11-10 PROCEDURE — 1160F RVW MEDS BY RX/DR IN RCRD: CPT | Mod: HCNC,CPTII,S$GLB, | Performed by: NURSE PRACTITIONER

## 2023-11-10 PROCEDURE — 1159F MED LIST DOCD IN RCRD: CPT | Mod: HCNC,CPTII,S$GLB, | Performed by: NURSE PRACTITIONER

## 2023-11-10 PROCEDURE — 99214 OFFICE O/P EST MOD 30 MIN: CPT | Mod: HCNC,S$GLB,, | Performed by: NURSE PRACTITIONER

## 2023-11-10 PROCEDURE — 3044F HG A1C LEVEL LT 7.0%: CPT | Mod: HCNC,CPTII,S$GLB, | Performed by: NURSE PRACTITIONER

## 2023-11-10 PROCEDURE — 3008F PR BODY MASS INDEX (BMI) DOCUMENTED: ICD-10-PCS | Mod: HCNC,CPTII,S$GLB, | Performed by: NURSE PRACTITIONER

## 2023-11-10 PROCEDURE — 3008F BODY MASS INDEX DOCD: CPT | Mod: HCNC,CPTII,S$GLB, | Performed by: NURSE PRACTITIONER

## 2023-11-10 PROCEDURE — 3074F PR MOST RECENT SYSTOLIC BLOOD PRESSURE < 130 MM HG: ICD-10-PCS | Mod: HCNC,CPTII,S$GLB, | Performed by: NURSE PRACTITIONER

## 2023-11-10 PROCEDURE — 99214 PR OFFICE/OUTPT VISIT, EST, LEVL IV, 30-39 MIN: ICD-10-PCS | Mod: HCNC,S$GLB,, | Performed by: NURSE PRACTITIONER

## 2023-11-10 PROCEDURE — 3044F PR MOST RECENT HEMOGLOBIN A1C LEVEL <7.0%: ICD-10-PCS | Mod: HCNC,CPTII,S$GLB, | Performed by: NURSE PRACTITIONER

## 2023-11-10 PROCEDURE — 3074F SYST BP LT 130 MM HG: CPT | Mod: HCNC,CPTII,S$GLB, | Performed by: NURSE PRACTITIONER

## 2023-11-10 PROCEDURE — 3078F DIAST BP <80 MM HG: CPT | Mod: HCNC,CPTII,S$GLB, | Performed by: NURSE PRACTITIONER

## 2023-11-10 PROCEDURE — 3078F PR MOST RECENT DIASTOLIC BLOOD PRESSURE < 80 MM HG: ICD-10-PCS | Mod: HCNC,CPTII,S$GLB, | Performed by: NURSE PRACTITIONER

## 2023-11-10 PROCEDURE — 99999 PR PBB SHADOW E&M-EST. PATIENT-LVL IV: CPT | Mod: PBBFAC,HCNC,, | Performed by: NURSE PRACTITIONER

## 2023-11-10 PROCEDURE — 1160F PR REVIEW ALL MEDS BY PRESCRIBER/CLIN PHARMACIST DOCUMENTED: ICD-10-PCS | Mod: HCNC,CPTII,S$GLB, | Performed by: NURSE PRACTITIONER

## 2023-11-10 RX ORDER — SUMATRIPTAN SUCCINATE 25 MG/1
50 TABLET ORAL
Status: CANCELLED | OUTPATIENT
Start: 2023-11-10 | End: 2023-12-10

## 2023-11-10 RX ORDER — DOXYCYCLINE 100 MG/1
100 CAPSULE ORAL EVERY 12 HOURS
Qty: 14 CAPSULE | Refills: 0 | Status: SHIPPED | OUTPATIENT
Start: 2023-11-10 | End: 2023-11-17

## 2023-11-10 RX ORDER — FLUTICASONE PROPIONATE 50 MCG
1 SPRAY, SUSPENSION (ML) NASAL DAILY
Qty: 9.9 ML | Refills: 1 | Status: SHIPPED | OUTPATIENT
Start: 2023-11-10

## 2023-11-10 NOTE — PROGRESS NOTES
"Subjective:       Patient ID: Cat Denson is a 43 y.o. female.    Chief Complaint: Follow-up    HPI    Patient presents today for follow up. History  CVA  2015  residual left sided hemiparesis,  Lupus age 11. Last visit with PCP-Nena on 8/17/23. Last sezure acitvity 2019. Denies suicidal ideations at today's office visit.    1108/23 optometry- [facility]-eye exam    10/27/23 [facility] Keratoderm[facility]: complaint of corn/ callous on right foot great toe .     10/26/23 [id number]: Bipolar 1 disorder, depression, partial remission    10/4/23 [facility]    9/13/23 Hosptial stay for :complaints of suicide attempt reports taking twenty 100mg trazodone around 2:30pm. It is severe. It is associated with depression and feeling "useless". provided supportive care with IV fluid hydration.  Patient was evaluated by tele psychiatrist.  Patient admitted major depression symptoms and suicidal ideation    8/30/23 Rheumatology-Systemic lupus erythematosus, unspecified SLE type, unspecified organ involvement status  Past Medical History:   Diagnosis Date    Acid reflux     Allergy     Anemia     Anticoagulated 12/29/2015    Anxiety     Asthma     Bipolar 1 disorder     Bronchospasm with bronchitis, acute     Chronic daily headache 9/11/2018    Chronic kidney disease     Chronic pain     Depression     bipolar 2    Fibromyalgia     Gastroparesis     History of right MCA stroke 11/25/2015    Hx of psychiatric care     Lactose intolerance     Lupus     Mixed hyperlipidemia 11/28/2018    Psychiatric problem     Renal tubular acidosis     Seizure     Sjogren's syndrome     Smoker     Stroke 11-    Substance abuse-kratom daily use 8/30/2020    Suicide attempt     overdosed on a bottle of paxil, muscle relaxers, & zoloft    Therapy        Review of patient's allergies indicates:   Allergen Reactions    Metoclopramide hcl Anaphylaxis     Involuntary mouth movements  "    Amoxicillin-pot clavulanate      2 other medications    Amoxil [amoxicillin]      hives    Avelox [moxifloxacin]      itching    Benadryl [diphenhydramine hcl]      Lowers seizure threshold     Coumadin [warfarin]      toxicity    Quinazolinones      Lowers seizure threshold     Ultram [tramadol]      Lowers seizure threshold     Wellbutrin [bupropion hcl]      Lowers seizure threshold          Current Outpatient Medications:     acetaminophen (TYLENOL ORAL), Take 2 tablets by mouth every 8 (eight) hours as needed (PAIN)., Disp: , Rfl:     ARIPiprazole (ABILIFY) 15 MG Tab, Take 15mg po daily until 11/02/2023, then stop taking pills and continue monthly abilify aristada injection, Disp: , Rfl:     ARIPiprazole lauroxil (ARISTADA) 662 mg/2.4 mL injection, Inject 2.4 mLs (662 mg total) into the muscle every 28 days. Last given on 10/6/23, Give every 28 days, Next DUE on 11/3/23, Disp: 1 each, Rfl: 3    divalproex (DEPAKOTE) 250 MG EC tablet, TAKE 1 TABLET BY MOUTH EVERY MORNING AND TAKE 2 TABLETS BY MOUTH EVERY NIGHT AT BEDTIME, Disp: 90 tablet, Rfl: 2    famotidine (PEPCID) 40 MG tablet, Take 1 tablet (40 mg total) by mouth every evening., Disp: 30 tablet, Rfl: 3    gabapentin (NEURONTIN) 300 MG capsule, Take 1 capsule (300 mg total) by mouth 3 (three) times daily. (Patient taking differently: Take 300 mg by mouth 2 (two) times daily.), Disp: 90 capsule, Rfl: 0    magnesium oxide (MAG-OX) 400 mg (241.3 mg magnesium) tablet, Take 1 tablet (400 mg total) by mouth once daily., Disp: 90 tablet, Rfl: 1    mirtazapine (REMERON) 30 MG tablet, TAKE 1 TABLET(30 MG) BY MOUTH EVERY EVENING, Disp: 30 tablet, Rfl: 2    naloxone (NARCAN) 4 mg/actuation Spry, SMARTSIG:Both Nares, Disp: , Rfl:     pantoprazole (PROTONIX) 40 MG tablet, Take 1 tablet (40 mg total) by mouth once daily., Disp: 30 tablet, Rfl: 0    rivaroxaban (XARELTO) 20 mg Tab, TAKE 1 TABLET(20 MG) BY MOUTH DAILY WITH THE EVENING MEAL AND DINNER, Disp: 90 tablet,  "Rfl: 3    doxycycline (MONODOX) 100 MG capsule, Take 1 capsule (100 mg total) by mouth every 12 (twelve) hours. for 7 days, Disp: 14 capsule, Rfl: 0    fluticasone propionate (FLONASE) 50 mcg/actuation nasal spray, 1 spray (50 mcg total) by Each Nostril route once daily., Disp: 9.9 mL, Rfl: 1    propranoloL (INDERAL LA) 80 MG 24 hr capsule, Take 1 capsule (80 mg total) by mouth once daily., Disp: 30 capsule, Rfl: 0    Review of Systems   Constitutional:  Negative for unexpected weight change.   HENT:  Positive for sneezing. Negative for trouble swallowing.    Eyes:  Negative for visual disturbance.   Respiratory:  Positive for cough. Negative for shortness of breath.    Cardiovascular:  Negative for chest pain, palpitations and leg swelling.   Gastrointestinal:  Negative for blood in stool.   Genitourinary:  Negative for hematuria.   Skin:  Negative for rash.   Allergic/Immunologic: Negative for immunocompromised state.   Neurological:  Positive for headaches (frontal sinus).   Hematological:  Does not bruise/bleed easily.   Psychiatric/Behavioral:  Negative for agitation. The patient is not nervous/anxious.        Objective:      /70 (BP Location: Right arm, Patient Position: Sitting, BP Method: Small (Manual))   Pulse 91   Temp 98.2 °F (36.8 °C) (Oral)   Ht 5' 3" (1.6 m)   Wt 53.2 kg (117 lb 4.6 oz)   LMP 10/23/2023 (Approximate)   SpO2 98%   BMI 20.78 kg/m²   Physical Exam  Constitutional:       Appearance: She is well-developed.   HENT:      Head: Normocephalic.   Cardiovascular:      Rate and Rhythm: Normal rate and regular rhythm.      Heart sounds: Normal heart sounds.   Pulmonary:      Effort: Pulmonary effort is normal.   Musculoskeletal:         General: Normal range of motion.   Skin:     General: Skin is warm and dry.   Neurological:      Mental Status: She is alert and oriented to person, place, and time.   Psychiatric:         Behavior: Behavior normal.         Thought Content: Thought " content normal.         Judgment: Judgment normal.         Assessment:       1. Mixed hyperlipidemia    2. Bipolar 1 disorder, depressed, partial remission    3. Systemic lupus erythematosus, unspecified SLE type, unspecified organ involvement status    4. Stage 3 chronic kidney disease, unspecified whether stage 3a or 3b CKD    5. Complex partial epilepsy with generalization and with intractable epilepsy    6. Frontal sinusitis, unspecified chronicity    7. BMI 20.0-20.9, adult        Plan:       Mixed hyperlipidemia  Stable, continue management    Bipolar 1 disorder, depressed, partial remission  Stable, continue follow up  Systemic lupus erythematosus, unspecified SLE type, unspecified organ involvement status  Stable, continue follow up  Stage 3 chronic kidney disease, unspecified whether stage 3a or 3b CKD  Stable and chronic.  Will continue to monitor q3-6 months and control chronic conditions as optimally as possible to preserve function.   Complex partial epilepsy with generalization and with intractable epilepsy  Stable, take OTC tylenol  Frontal sinusitis, unspecified chronicity  -     doxycycline (MONODOX) 100 MG capsule; Take 1 capsule (100 mg total) by mouth every 12 (twelve) hours. for 7 days  Dispense: 14 capsule; Refill: 0  -     fluticasone propionate (FLONASE) 50 mcg/actuation nasal spray; 1 spray (50 mcg total) by Each Nostril route once daily.  Dispense: 9.9 mL; Refill: 1    BMI 20.0-20.9, adult  Stable, continue management        Time spent with patient:  30 minutes    Patient with be reevaluated in 6 months or sooner prn    Greater than 50% of this visit was spent counseling as described in above documentation:Yes

## 2023-11-13 ENCOUNTER — OFFICE VISIT (OUTPATIENT)
Dept: PSYCHIATRY | Facility: CLINIC | Age: 43
End: 2023-11-13
Payer: MEDICARE

## 2023-11-13 DIAGNOSIS — F31.75 BIPOLAR 1 DISORDER, DEPRESSED, PARTIAL REMISSION: Primary | ICD-10-CM

## 2023-11-13 PROCEDURE — 90837 PSYTX W PT 60 MINUTES: CPT | Mod: HCNC,S$GLB,, | Performed by: SOCIAL WORKER

## 2023-11-13 PROCEDURE — 3044F HG A1C LEVEL LT 7.0%: CPT | Mod: HCNC,CPTII,S$GLB, | Performed by: SOCIAL WORKER

## 2023-11-13 PROCEDURE — 90837 PR PSYCHOTHERAPY W/PATIENT, 60 MIN: ICD-10-PCS | Mod: HCNC,S$GLB,, | Performed by: SOCIAL WORKER

## 2023-11-13 PROCEDURE — 3044F PR MOST RECENT HEMOGLOBIN A1C LEVEL <7.0%: ICD-10-PCS | Mod: HCNC,CPTII,S$GLB, | Performed by: SOCIAL WORKER

## 2023-11-13 NOTE — PROGRESS NOTES
Individual Psychotherapy (PhD/LCSW)    11/13/2023    Site:  [facility]        Therapeutic Intervention: Met with patient.  Outpatient - Supportive psychotherapy 45 min - CPT Code 42516 and Outpatient - Interactive psychotherapy 45 min - CPT code 11081    Chief complaint/reason for encounter: Hx of bipolar 1; cognitie disorder; panic disorder.  Lupus age 11.  Hx of cutting, shaving head.  Hx of suicide attempts.  CVA in 2015 - stroke, struggles with gait.  Was hospitalized at [facility] in [place], [place].    Medical history:   Past Medical History:   Diagnosis Date    Acid reflux     Allergy     Anemia     Anticoagulated 12/29/2015    Anxiety     Asthma     Bipolar 1 disorder     Bronchospasm with bronchitis, acute     Chronic daily headache 9/11/2018    Chronic kidney disease     Chronic pain     Depression     bipolar 2    Fibromyalgia     Gastroparesis     History of right MCA stroke 11/25/2015    Hx of psychiatric care     Lactose intolerance     Lupus     Mixed hyperlipidemia 11/28/2018    Psychiatric problem     Renal tubular acidosis     Seizure     Sjogren's syndrome     Smoker     Stroke 11-    Substance abuse-kratom daily use 8/30/2020    Suicide attempt     overdosed on a bottle of paxil, muscle relaxers, & zoloft    Therapy        Medications:    Current Outpatient Medications:     acetaminophen (TYLENOL ORAL), Take 2 tablets by mouth every 8 (eight) hours as needed (PAIN)., Disp: , Rfl:     ARIPiprazole (ABILIFY) 15 MG Tab, Take 15mg po daily until 11/02/2023, then stop taking pills and continue monthly abilify aristada injection, Disp: , Rfl:     ARIPiprazole lauroxil (ARISTADA) 662 mg/2.4 mL injection, Inject 2.4 mLs (662 mg total) into the muscle every 28 days. Last given on 10/6/23, Give every 28 days, Next DUE on 11/3/23, Disp: 1 each, Rfl: 3    divalproex (DEPAKOTE) 250 MG EC tablet, TAKE 1 TABLET BY MOUTH EVERY MORNING AND TAKE 2 TABLETS BY MOUTH EVERY NIGHT AT BEDTIME, Disp: 90 tablet,  Rfl: 2    doxycycline (MONODOX) 100 MG capsule, Take 1 capsule (100 mg total) by mouth every 12 (twelve) hours. for 7 days, Disp: 14 capsule, Rfl: 0    famotidine (PEPCID) 40 MG tablet, Take 1 tablet (40 mg total) by mouth every evening., Disp: 30 tablet, Rfl: 3    fluticasone propionate (FLONASE) 50 mcg/actuation nasal spray, 1 spray (50 mcg total) by Each Nostril route once daily., Disp: 9.9 mL, Rfl: 1    gabapentin (NEURONTIN) 300 MG capsule, Take 1 capsule (300 mg total) by mouth 3 (three) times daily. (Patient taking differently: Take 300 mg by mouth 2 (two) times daily.), Disp: 90 capsule, Rfl: 0    magnesium oxide (MAG-OX) 400 mg (241.3 mg magnesium) tablet, Take 1 tablet (400 mg total) by mouth once daily., Disp: 90 tablet, Rfl: 1    mirtazapine (REMERON) 30 MG tablet, TAKE 1 TABLET(30 MG) BY MOUTH EVERY EVENING, Disp: 30 tablet, Rfl: 2    naloxone (NARCAN) 4 mg/actuation Spry, SMARTSIG:Both Nares, Disp: , Rfl:     pantoprazole (PROTONIX) 40 MG tablet, Take 1 tablet (40 mg total) by mouth once daily., Disp: 30 tablet, Rfl: 0    propranoloL (INDERAL LA) 80 MG 24 hr capsule, Take 1 capsule (80 mg total) by mouth once daily., Disp: 30 capsule, Rfl: 0    rivaroxaban (XARELTO) 20 mg Tab, TAKE 1 TABLET(20 MG) BY MOUTH DAILY WITH THE EVENING MEAL AND DINNER, Disp: 90 tablet, Rfl: 3    Family history of psychiatric illness:   Family History   Problem Relation Age of Onset    Hypertension Mother     Hyperlipidemia Mother     Psoriasis Mother     Thyroid disease Mother     No Known Problems Father     Post-traumatic stress disorder Brother     Drug abuse Brother     Diabetes Maternal Uncle     Hypertension Maternal Uncle     Alcohol abuse Maternal Uncle     Stroke Maternal Uncle     Scoliosis Paternal Aunt     Heart disease Paternal Uncle     Mental illness Maternal Grandmother     Cancer Maternal Grandmother         lung / brain - smoker    Drug abuse Maternal Grandmother     Hypertension Maternal Grandmother      "Hyperlipidemia Maternal Grandmother     Diabetes Maternal Grandmother     Rheum arthritis Maternal Grandmother     Diabetes Mellitus Maternal Grandmother     Heart disease Maternal Grandfather     Hypertension Maternal Grandfather     Alcohol abuse Maternal Grandfather     Osteoarthritis Maternal Grandfather     No Known Problems Paternal Grandmother     Mental illness Paternal Grandfather     Early death Paternal Grandfather         self    Breast cancer Cousin     Breast cancer Other     Colon cancer Neg Hx     Colon polyps Neg Hx     Crohn's disease Neg Hx     Ulcerative colitis Neg Hx     Celiac disease Neg Hx     Lupus Neg Hx     Kidney disease Neg Hx     Inflammatory bowel disease Neg Hx     Depression Neg Hx     COPD Neg Hx     Asthma Neg Hx     Macular degeneration Neg Hx     Retinal detachment Neg Hx     Glaucoma Neg Hx        Social history (marriage, employment, etc.): Cat presents on time for today's initial session.  She is 43, , not , no children, and an extensive hx of multiple conditions including diagnosed with Lupus at age 11; hx of cutting, shaving her head, suicide attempt, CVA in 2015 which has impacted her walking and ability to care for herself.  Values her independence.  Has supportive friends including Brent who accompanies her today to this session.  Room mate is Alivia, 38, who dates Cat's brother.  Was at [facility] in [place] for "medical adjustment".  Dropped out of school in 9th grade and then went for her GED.  Her friend Brent, whom she describes as a close friend, they met in [facility].  Admits her maternal grandmother was diagnosed with paranoid schizophrenia.  Mother is Arabella, , 72, resides in [place].  Brother Patrick, 39, also resides in [place].  Father Patrick, . Is 65 and makes no further mention of him.  Would like to engage in therapy and learn coping skills.  Appears to be a yuri young woman who wants to move forward.  Will see her in " therapy as scheduled.   Was very nervous and anxious throughout initial session.     Social History     Tobacco Use    Smoking status: Every Day     Current packs/day: 1.00     Average packs/day: 1 pack/day for 15.3 years (15.3 ttl pk-yrs)     Types: Cigarettes     Start date: 11/25/2015    Smokeless tobacco: Never   Substance Use Topics    Alcohol use: No     Alcohol/week: 0.0 standard drinks of alcohol    Drug use: Yes     Types: Marijuana     Comment: smokes occasionally, helps with pain and appetite       Interval history and content of current session: Cat presents on time for today's follow up and speaks to present circumstances as she is fearful that she will be evicted.  During the course of the session she is able to acknowledge that she has conducted Problem Solving, with Alivia her roommate, and there is a Plan of Action for avoiding eviction.  We speak to DDB and engage Cat in same.  Gave her a Breath Stone to help with panic moments.  Continues at [facility].  We speak to Distress Tolerance Skills and is well versed in Self-Soothing activities (bubble bath in Lavender and listening to music that soothes her).  Is also knowledgeable about Distracting when she hears a sad song that catapults her towards a painful past and goes to something that uplifts and soothes her.  Is able to speak to stroke incident.  Wants to try Smoking Cessation and gave her landyard with number to Smoking Cessation clinic.  Will see Cat as scheduled.  Introduction to Mindfulness next.     Treatment plan:  Target symptoms: recurrent depression, anxiety , adjustment, grief  Why chosen therapy is appropriate versus another modality: relevant to diagnosis, patient responds to this modality, evidence based practice  Outcome monitoring methods: self-report, observation  Therapeutic intervention type: insight oriented psychotherapy, behavior modifying psychotherapy, supportive psychotherapy    Risk  parameters:  Patient reports no suicidal ideation  Patient reports no homicidal ideation  Patient reports no self-injurious behavior  Patient reports no violent behavior    Verbal deficits: None    Patient's response to intervention:  The patient's response to intervention is accepting, motivated.    Progress toward goals and other mental status changes:  The patient's progress toward goals is good.    Diagnosis:   1. Bipolar 1 disorder, depressed, partial remission        Plan:  individual psychotherapy    Return to clinic: as scheduled    Length of Service (minutes): 60

## 2023-11-18 ENCOUNTER — PATIENT MESSAGE (OUTPATIENT)
Dept: FAMILY MEDICINE | Facility: CLINIC | Age: 43
End: 2023-11-18
Payer: MEDICARE

## 2023-11-19 ENCOUNTER — PATIENT MESSAGE (OUTPATIENT)
Dept: FAMILY MEDICINE | Facility: CLINIC | Age: 43
End: 2023-11-19
Payer: MEDICARE

## 2023-11-19 DIAGNOSIS — R06.02 SOB (SHORTNESS OF BREATH): Primary | ICD-10-CM

## 2023-11-19 NOTE — TELEPHONE ENCOUNTER
"Please refer to attached message. Patient performed office visit on 11-10-23. Reports completing antibiotic therapy.  States "still feeling terrible."  Patient is requesting prescription for Zofran.  Writer sent return reply to patient to confirm what symptoms patient continues to experience, as this will be beneficial to Mrs. Dukes while making determination of what treatment is needed going forward. Awaiting patient's return reply.   "

## 2023-11-19 NOTE — TELEPHONE ENCOUNTER
Received 2 messages from patient regarding this matter.  This matter was handled & documented in a separate encounter.

## 2023-11-20 NOTE — TELEPHONE ENCOUNTER
"Please inform patient I have ordered a STAT xray due to concerns of : having trouble taking deep breaths"  "

## 2023-11-20 NOTE — TELEPHONE ENCOUNTER
Please see attached portal message from patient. Patient states she completed antibiotic therapy, and still feels terrible. Patient reports symptoms as sore throat, cough, sneezing, and having trouble taking deep breaths.  No mention of nausea or vomiting noted. Please advise. Thank you.

## 2023-11-20 NOTE — TELEPHONE ENCOUNTER
Call placed to patient. No answer received. Left message requesting return call to office.  Patient notified via e-mail due to this being initial point of contact from patient regarding this matter.       [phone] --no answer, left message   [phone] --number no longer in service

## 2023-11-21 ENCOUNTER — HOSPITAL ENCOUNTER (OUTPATIENT)
Dept: RADIOLOGY | Facility: HOSPITAL | Age: 43
Discharge: HOME | End: 2023-11-21
Attending: NURSE PRACTITIONER
Payer: MEDICARE

## 2023-11-21 DIAGNOSIS — J32.1 FRONTAL SINUSITIS, UNSPECIFIED CHRONICITY: Primary | ICD-10-CM

## 2023-11-21 DIAGNOSIS — R06.02 SOB (SHORTNESS OF BREATH): ICD-10-CM

## 2023-11-21 PROCEDURE — 71046 X-RAY EXAM CHEST 2 VIEWS: CPT | Mod: 26,,, | Performed by: RADIOLOGY

## 2023-11-21 PROCEDURE — 71046 XR CHEST PA AND LATERAL: ICD-10-PCS | Mod: 26,,, | Performed by: RADIOLOGY

## 2023-11-21 PROCEDURE — 71046 X-RAY EXAM CHEST 2 VIEWS: CPT | Mod: TC

## 2023-11-21 RX ORDER — PROMETHAZINE HYDROCHLORIDE AND DEXTROMETHORPHAN HYDROBROMIDE 6.25; 15 MG/5ML; MG/5ML
5 SYRUP ORAL EVERY 6 HOURS PRN
Qty: 118 ML | Refills: 1 | Status: SHIPPED | OUTPATIENT
Start: 2023-11-21 | End: 2023-12-03

## 2023-11-21 RX ORDER — METHYLPREDNISOLONE 4 MG/1
TABLET ORAL
Qty: 1 EACH | Refills: 0 | Status: SHIPPED | OUTPATIENT
Start: 2023-11-21 | End: 2023-12-12

## 2023-11-22 NOTE — TELEPHONE ENCOUNTER
Nothing narcotic and we need to assess what your baseline headaches are like once you are off illicit substances. I recommend a neuro consult and a clinic appointment with me or NP to discuss non -narcotic options.  Would you like to schedule these appointments?

## 2023-11-24 ENCOUNTER — PATIENT MESSAGE (OUTPATIENT)
Dept: FAMILY MEDICINE | Facility: CLINIC | Age: 43
End: 2023-11-24
Payer: MEDICARE

## 2023-11-27 NOTE — TELEPHONE ENCOUNTER
No care due was identified.  [facility] Embedded Care Due Messages. Reference number: [id number].   11/27/2023 2:55:36 PM CST

## 2023-11-29 RX ORDER — ONDANSETRON HYDROCHLORIDE 8 MG/1
8 TABLET, FILM COATED ORAL EVERY 8 HOURS PRN
Qty: 90 TABLET | Refills: 0 | Status: SHIPPED | OUTPATIENT
Start: 2023-11-29 | End: 2024-02-27

## 2023-12-04 ENCOUNTER — TELEPHONE (OUTPATIENT)
Dept: PSYCHIATRY | Facility: CLINIC | Age: 43
End: 2023-12-04
Payer: MEDICARE

## 2023-12-11 ENCOUNTER — PATIENT MESSAGE (OUTPATIENT)
Dept: FAMILY MEDICINE | Facility: CLINIC | Age: 43
End: 2023-12-11
Payer: MEDICARE

## 2023-12-12 DIAGNOSIS — G89.4 CHRONIC PAIN SYNDROME: Primary | ICD-10-CM

## 2023-12-12 RX ORDER — GABAPENTIN 300 MG/1
300 CAPSULE ORAL 3 TIMES DAILY
Qty: 90 CAPSULE | Refills: 0 | Status: SHIPPED | OUTPATIENT
Start: 2023-12-12 | End: 2024-12-11

## 2023-12-12 NOTE — TELEPHONE ENCOUNTER
No care due was identified.  [facility] Embedded Care Due Messages. Reference number: [id number].   12/12/2023 3:55:03 PM CST

## 2024-01-02 ENCOUNTER — OFFICE VISIT (OUTPATIENT)
Dept: PSYCHIATRY | Facility: CLINIC | Age: 44
End: 2024-01-02
Payer: MEDICARE

## 2024-01-02 DIAGNOSIS — F09 COGNITIVE DISORDER: ICD-10-CM

## 2024-01-02 DIAGNOSIS — F31.75 BIPOLAR 1 DISORDER, DEPRESSED, PARTIAL REMISSION: Primary | ICD-10-CM

## 2024-01-02 DIAGNOSIS — I69.359 CVA, OLD, HEMIPARESIS: ICD-10-CM

## 2024-01-02 PROCEDURE — 90834 PSYTX W PT 45 MINUTES: CPT | Mod: HCNC,S$GLB,, | Performed by: SOCIAL WORKER

## 2024-01-02 NOTE — PROGRESS NOTES
Individual Psychotherapy (PhD/LCSW)    1/2/2024    Site:  [facility]        Therapeutic Intervention: Met with patient.  Outpatient - Supportive psychotherapy 45 min - CPT Code 54259 and Outpatient - Interactive psychotherapy 45 min - CPT code 47538    Chief complaint/reason for encounter:Hx of bipolar 1; cognitie disorder; panic disorder.  Lupus age 11.  Hx of cutting, shaving head.  Hx of suicide attempts.  CVA in 2015 - stroke, struggles with gait.  Was hospitalized at [facility] in [place], [place].  depression and anxiety   Medical history:   Past Medical History:   Diagnosis Date    Acid reflux     Allergy     Anemia     Anticoagulated 12/29/2015    Anxiety     Asthma     Bipolar 1 disorder     Bronchospasm with bronchitis, acute     Chronic daily headache 9/11/2018    Chronic kidney disease     Chronic pain     Depression     bipolar 2    Fibromyalgia     Gastroparesis     History of right MCA stroke 11/25/2015    Hx of psychiatric care     Lactose intolerance     Lupus     Mixed hyperlipidemia 11/28/2018    Psychiatric problem     Renal tubular acidosis     Seizure     Sjogren's syndrome     Smoker     Stroke 13-    Substance abuse-kratom daily use 8/30/2020    Suicide attempt     overdosed on a bottle of paxil, muscle relaxers, & zoloft    Therapy        Medications:    Current Outpatient Medications:     acetaminophen (TYLENOL ORAL), Take 2 tablets by mouth every 8 (eight) hours as needed (PAIN)., Disp: , Rfl:     ARIPiprazole (ABILIFY) 15 MG Tab, Take 15mg po daily until 11/02/2023, then stop taking pills and continue monthly abilify aristada injection, Disp: , Rfl:     ARIPiprazole lauroxil (ARISTADA) 662 mg/2.4 mL injection, Inject 2.4 mLs (662 mg total) into the muscle every 28 days. Last given on 10/6/23, Give every 28 days, Next DUE on 11/3/23, Disp: 1 each, Rfl: 3    divalproex (DEPAKOTE) 250 MG EC tablet, TAKE 1 TABLET BY MOUTH EVERY MORNING AND TAKE 2 TABLETS BY MOUTH EVERY NIGHT AT  BEDTIME, Disp: 90 tablet, Rfl: 2    famotidine (PEPCID) 40 MG tablet, Take 1 tablet (40 mg total) by mouth every evening., Disp: 30 tablet, Rfl: 3    fluticasone propionate (FLONASE) 50 mcg/actuation nasal spray, 1 spray (50 mcg total) by Each Nostril route once daily., Disp: 9.9 mL, Rfl: 1    gabapentin (NEURONTIN) 300 MG capsule, Take 1 capsule (300 mg total) by mouth 3 (three) times daily., Disp: 90 capsule, Rfl: 0    magnesium oxide (MAG-OX) 400 mg (241.3 mg magnesium) tablet, Take 1 tablet (400 mg total) by mouth once daily., Disp: 90 tablet, Rfl: 1    mirtazapine (REMERON) 30 MG tablet, TAKE 1 TABLET(30 MG) BY MOUTH EVERY EVENING, Disp: 30 tablet, Rfl: 2    naloxone (NARCAN) 4 mg/actuation Spry, SMARTSIG:Both Nares, Disp: , Rfl:     ondansetron (ZOFRAN) 8 MG tablet, Take 1 tablet (8 mg total) by mouth every 8 (eight) hours as needed for Nausea., Disp: 90 tablet, Rfl: 0    pantoprazole (PROTONIX) 40 MG tablet, Take 1 tablet (40 mg total) by mouth once daily., Disp: 30 tablet, Rfl: 0    propranoloL (INDERAL LA) 80 MG 24 hr capsule, Take 1 capsule (80 mg total) by mouth once daily., Disp: 30 capsule, Rfl: 0    rivaroxaban (XARELTO) 20 mg Tab, TAKE 1 TABLET(20 MG) BY MOUTH DAILY WITH THE EVENING MEAL AND DINNER, Disp: 90 tablet, Rfl: 3    Family history of psychiatric illness:   Family History   Problem Relation Age of Onset    Hypertension Mother     Hyperlipidemia Mother     Psoriasis Mother     Thyroid disease Mother     No Known Problems Father     Post-traumatic stress disorder Brother     Drug abuse Brother     Diabetes Maternal Uncle     Hypertension Maternal Uncle     Alcohol abuse Maternal Uncle     Stroke Maternal Uncle     Scoliosis Paternal Aunt     Heart disease Paternal Uncle     Mental illness Maternal Grandmother     Cancer Maternal Grandmother         lung / brain - smoker    Drug abuse Maternal Grandmother     Hypertension Maternal Grandmother     Hyperlipidemia Maternal Grandmother     Diabetes  "Maternal Grandmother     Rheum arthritis Maternal Grandmother     Diabetes Mellitus Maternal Grandmother     Heart disease Maternal Grandfather     Hypertension Maternal Grandfather     Alcohol abuse Maternal Grandfather     Osteoarthritis Maternal Grandfather     No Known Problems Paternal Grandmother     Mental illness Paternal Grandfather     Early death Paternal Grandfather         self    Breast cancer Cousin     Breast cancer Other     Colon cancer Neg Hx     Colon polyps Neg Hx     Crohn's disease Neg Hx     Ulcerative colitis Neg Hx     Celiac disease Neg Hx     Lupus Neg Hx     Kidney disease Neg Hx     Inflammatory bowel disease Neg Hx     Depression Neg Hx     COPD Neg Hx     Asthma Neg Hx     Macular degeneration Neg Hx     Retinal detachment Neg Hx     Glaucoma Neg Hx        Social history (marriage, employment, etc.): Cat presents on time for today's initial session.  She is 43, , not , no children, and an extensive hx of multiple conditions including diagnosed with Lupus at age 11; hx of cutting, shaving her head, suicide attempt, CVA in 2015 which has impacted her walking and ability to care for herself.  Values her independence.  Has supportive friends including Brent who accompanies her today to this session.  Room mate is Alivia, 38, who dates Cat's brother.  Was at [facility] in [place] for "medical adjustment".  Dropped out of school in 9th grade and then went for her GED.  Her friend Brent, whom she describes as a close friend, they met in [facility].  Admits her maternal grandmother was diagnosed with paranoid schizophrenia.  Mother is Arabella, , 72, resides in [place].  Brother Patrick, 39, also resides in [place].  Father Patrick, . Is 65 and makes no further mention of him.  Would like to engage in therapy and learn coping skills.  Appears to be a yuri young woman who wants to move forward.  Will see her in therapy as scheduled.   Was very nervous and " anxious throughout initial session.     Previous session 11/13/2023:   Cat presents on time for today's follow up and speaks to present circumstances as she is fearful that she will be evicted.  During the course of the session she is able to acknowledge that she has conducted Problem Solving, with Alivia her roommate, and there is a Plan of Action for avoiding eviction.  We speak to DDB and engage Cat in same.  Gave her a Breath Stone to help with panic moments.  Continues at [facility].  We speak to Distress Tolerance Skills and is well versed in Self-Soothing activities (bubble bath in Lavender and listening to music that soothes her).  Is also knowledgeable about Distracting when she hears a sad song that catapults her towards a painful past and goes to something that uplifts and soothes her.  Is able to speak to stroke incident.  Wants to try Smoking Cessation and gave her landyard with number to Smoking Cessation clinic.  Will see Cat as scheduled.  Introduction to Mindfulness next.      Social History     Tobacco Use    Smoking status: Every Day     Current packs/day: 1.00     Average packs/day: 1 pack/day for 15.4 years (15.4 ttl pk-yrs)     Types: Cigarettes     Start date: 11/25/2015    Smokeless tobacco: Never   Substance Use Topics    Alcohol use: No     Alcohol/week: 0.0 standard drinks of alcohol    Drug use: Yes     Types: Marijuana     Comment: smokes occasionally, helps with pain and appetite       Interval history and content of current session:  Gave her information on [employer]; gave her jell packs to cool down pulse points; share info and exercises on balance exercises when sitting and getting up.  Spoke to accident with [address] martin; stroke incident (Depo and smoking one pack a day).      Treatment plan:  Target symptoms: recurrent depression, anxiety   Why chosen therapy is appropriate versus another modality: relevant to diagnosis, patient responds to this modality,  evidence based practice  Outcome monitoring methods: self-report, observation  Therapeutic intervention type: insight oriented psychotherapy, behavior modifying psychotherapy, supportive psychotherapy    Risk parameters:  Patient reports no suicidal ideation  Patient reports no homicidal ideation  Patient reports no self-injurious behavior  Patient reports no violent behavior    Verbal deficits: None    Patient's response to intervention:  The patient's response to intervention is accepting, motivated.    Progress toward goals and other mental status changes:  The patient's progress toward goals is fair .    Diagnosis:   1. Bipolar 1 disorder, depressed, partial remission    2. Cognitive disorder    3. CVA, old, hemiparesis        Plan:  individual psychotherapy    Return to clinic: as scheduled    Length of Service (minutes): 45

## 2024-01-29 ENCOUNTER — OFFICE VISIT (OUTPATIENT)
Dept: PSYCHIATRY | Facility: CLINIC | Age: 44
End: 2024-01-29
Payer: MEDICARE

## 2024-01-29 DIAGNOSIS — F41.9 ANXIETY: ICD-10-CM

## 2024-01-29 DIAGNOSIS — F31.75 BIPOLAR 1 DISORDER, DEPRESSED, PARTIAL REMISSION: Primary | ICD-10-CM

## 2024-01-29 PROCEDURE — 90832 PSYTX W PT 30 MINUTES: CPT | Mod: HCNC,S$GLB,, | Performed by: SOCIAL WORKER

## 2024-01-29 NOTE — PROGRESS NOTES
Individual Psychotherapy (PhD/LCSW)    1/29/2024    Site:  [facility]        Therapeutic Intervention: Met with patient.  Outpatient - Supportive psychotherapy 45 min - CPT Code 26393 and Outpatient - Interactive psychotherapy 45 min - CPT code 51867    Chief complaint/reason for encounter: depression, anxiety, cognition, and bipolar 1, sudden departure to [place] as being evicted from her apartment, terminating therapeutic alliance     Medical history:   Past Medical History:   Diagnosis Date    Acid reflux     Allergy     Anemia     Anticoagulated 12/29/2015    Anxiety     Asthma     Bipolar 1 disorder     Bronchospasm with bronchitis, acute     Chronic daily headache 9/11/2018    Chronic kidney disease     Chronic pain     Depression     bipolar 2    Fibromyalgia     Gastroparesis     History of right MCA stroke 11/25/2015    Hx of psychiatric care     Lactose intolerance     Lupus     Mixed hyperlipidemia 11/28/2018    Psychiatric problem     Renal tubular acidosis     Seizure     Sjogren's syndrome     Smoker     Stroke 11-    Substance abuse-kratom daily use 8/30/2020    Suicide attempt     overdosed on a bottle of paxil, muscle relaxers, & zoloft    Therapy        Medications:    Current Outpatient Medications:     acetaminophen (TYLENOL ORAL), Take 2 tablets by mouth every 8 (eight) hours as needed (PAIN)., Disp: , Rfl:     ARIPiprazole (ABILIFY) 15 MG Tab, Take 15mg po daily until 11/02/2023, then stop taking pills and continue monthly abilify aristada injection, Disp: , Rfl:     ARIPiprazole lauroxil (ARISTADA) 662 mg/2.4 mL injection, Inject 2.4 mLs (662 mg total) into the muscle every 28 days. Last given on 10/6/23, Give every 28 days, Next DUE on 11/3/23, Disp: 1 each, Rfl: 3    divalproex (DEPAKOTE) 250 MG EC tablet, TAKE 1 TABLET BY MOUTH EVERY MORNING AND TAKE 2 TABLETS BY MOUTH EVERY NIGHT AT BEDTIME, Disp: 90 tablet, Rfl: 2    famotidine (PEPCID) 40 MG tablet, Take 1 tablet (40 mg total) by  mouth every evening., Disp: 30 tablet, Rfl: 3    fluticasone propionate (FLONASE) 50 mcg/actuation nasal spray, 1 spray (50 mcg total) by Each Nostril route once daily., Disp: 9.9 mL, Rfl: 1    gabapentin (NEURONTIN) 300 MG capsule, Take 1 capsule (300 mg total) by mouth 3 (three) times daily., Disp: 90 capsule, Rfl: 0    magnesium oxide (MAG-OX) 400 mg (241.3 mg magnesium) tablet, Take 1 tablet (400 mg total) by mouth once daily., Disp: 90 tablet, Rfl: 1    mirtazapine (REMERON) 30 MG tablet, TAKE 1 TABLET(30 MG) BY MOUTH EVERY EVENING, Disp: 30 tablet, Rfl: 2    naloxone (NARCAN) 4 mg/actuation Spry, SMARTSIG:Both Nares, Disp: , Rfl:     ondansetron (ZOFRAN) 8 MG tablet, Take 1 tablet (8 mg total) by mouth every 8 (eight) hours as needed for Nausea., Disp: 90 tablet, Rfl: 0    pantoprazole (PROTONIX) 40 MG tablet, Take 1 tablet (40 mg total) by mouth once daily., Disp: 30 tablet, Rfl: 0    propranoloL (INDERAL LA) 80 MG 24 hr capsule, Take 1 capsule (80 mg total) by mouth once daily., Disp: 30 capsule, Rfl: 0    rivaroxaban (XARELTO) 20 mg Tab, TAKE 1 TABLET(20 MG) BY MOUTH DAILY WITH THE EVENING MEAL AND DINNER, Disp: 90 tablet, Rfl: 3    Family history of psychiatric illness:   Family History   Problem Relation Age of Onset    Hypertension Mother     Hyperlipidemia Mother     Psoriasis Mother     Thyroid disease Mother     No Known Problems Father     Post-traumatic stress disorder Brother     Drug abuse Brother     Diabetes Maternal Uncle     Hypertension Maternal Uncle     Alcohol abuse Maternal Uncle     Stroke Maternal Uncle     Scoliosis Paternal Aunt     Heart disease Paternal Uncle     Mental illness Maternal Grandmother     Cancer Maternal Grandmother         lung / brain - smoker    Drug abuse Maternal Grandmother     Hypertension Maternal Grandmother     Hyperlipidemia Maternal Grandmother     Diabetes Maternal Grandmother     Rheum arthritis Maternal Grandmother     Diabetes Mellitus Maternal  "Grandmother     Heart disease Maternal Grandfather     Hypertension Maternal Grandfather     Alcohol abuse Maternal Grandfather     Osteoarthritis Maternal Grandfather     No Known Problems Paternal Grandmother     Mental illness Paternal Grandfather     Early death Paternal Grandfather         self    Breast cancer Cousin     Breast cancer Other     Colon cancer Neg Hx     Colon polyps Neg Hx     Crohn's disease Neg Hx     Ulcerative colitis Neg Hx     Celiac disease Neg Hx     Lupus Neg Hx     Kidney disease Neg Hx     Inflammatory bowel disease Neg Hx     Depression Neg Hx     COPD Neg Hx     Asthma Neg Hx     Macular degeneration Neg Hx     Retinal detachment Neg Hx     Glaucoma Neg Hx        Social history (marriage, employment, etc.): Cat presents on time for today's initial session.  She is 43, , not , no children, and an extensive hx of multiple conditions including diagnosed with Lupus at age 11; hx of cutting, shaving her head, suicide attempt, CVA in 2015 which has impacted her walking and ability to care for herself.  Values her independence.  Has supportive friends including Brent who accompanies her today to this session.  Room mate is Alivia, Carmelita, who dates Cat's brother.  Was at [facility] in [place] for "medical adjustment".  Dropped out of school in 9th grade and then went for her GED.  Her friend Brent, whom she describes as a close friend, they met in [facility].  Admits her maternal grandmother was diagnosed with paranoid schizophrenia.  Mother is Arabella, , 72, resides in [place].  Brother Patrick, 39, also resides in [place].  Father Sr Patrick. Is 65 and makes no further mention of him.  Would like to engage in therapy and learn coping skills.  Appears to be a yuri young woman who wants to move forward.  Will see her in therapy as scheduled.   Was very nervous and anxious throughout initial session.      Previous session 11/13/2023:   Cat presents on " time for today's follow up and speaks to present circumstances as she is fearful that she will be evicted.  During the course of the session she is able to acknowledge that she has conducted Problem Solving, with Alivia her roommate, and there is a Plan of Action for avoiding eviction.  We speak to DDB and engage Cat in same.  Gave her a Breath Stone to help with panic moments.  Continues at [facility].  We speak to Distress Tolerance Skills and is well versed in Self-Soothing activities (bubble bath in Lav[place] and listening to music that soothes her).  Is also knowledgeable about Distracting when she hears a sad song that catapults her towards a painful past and goes to something that uplifts and soothes her.  Is able to speak to stroke incident.  Wants to try Smoking Cessation and gave her landyard with number to Smoking Cessation clinic.  Will see Cat as scheduled.  Introduction to Mindfulness next.   Social History     Tobacco Use    Smoking status: Every Day     Current packs/day: 1.00     Average packs/day: 1 pack/day for 15.5 years (15.5 ttl pk-yrs)     Types: Cigarettes     Start date: 11/25/2015    Smokeless tobacco: Never   Substance Use Topics    Alcohol use: No     Alcohol/week: 0.0 standard drinks of alcohol    Drug use: Yes     Types: Marijuana     Comment: smokes occasionally, helps with pain and appetite       Interval history and content of current session:   Cat presents on time for today's follow up session and informs clinician that she is moving tomorrow to [place], [place] as she is being evicted from the apartment that she shares with Alivia. She has spoken to her bio mom who resides in [place] who is looking forward to having Cat come stay with her.  Cat looks happy and admits the only anxiety she is experiencing is about getting on a plane and is fearful of missing the connecting flight and we speak to this.  Admits that her mom has arranged for the  airline/airport to make accommodations for her disability and she is making plans to just breathe and take a nap or two while the flight is in the air.  We review the work we have done and encourage Cat to continue (gave her information on [facility] and also messaged Dr. Cruz, her psychiatrist, about impending departure while in session) her therapy with a new therapist.  Wished her well.  Client's case is now closed.     Treatment plan:  Target symptoms: recurrent depression, anxiety , mood disorder  Why chosen therapy is appropriate versus another modality: relevant to diagnosis, patient responds to this modality, evidence based practice  Outcome monitoring methods: self-report, observation  Therapeutic intervention type: supportive psychotherapy    Risk parameters:  Patient reports no suicidal ideation  Patient reports no homicidal ideation  Patient reports no self-injurious behavior  Patient reports no violent behavior    Verbal deficits: None    Patient's response to intervention:  The patient's response to intervention is accepting, motivated.    Progress toward goals and other mental status changes:  The patient's progress toward goals is good.    Diagnosis:   1. Bipolar 1 disorder, depressed, partial remission    2. Anxiety        Plan:  individual psychotherapy    Return to clinic:  client's file is closed as she is moving out of state to [place].     Length of Service (minutes): 30

## 2024-02-07 ENCOUNTER — TELEPHONE (OUTPATIENT)
Dept: PSYCHIATRY | Facility: CLINIC | Age: 44
End: 2024-02-07
Payer: MEDICARE

## 2024-02-07 NOTE — ASSESSMENT & PLAN NOTE
Spoke with patient's wife, notified to have patient increase Venlafaxine from 37.5 mg daily to 75 mg daily. Script sent to pharmacy.    Suspect due to recent dehydration/starvation from N/V/D lack of PO intake  - IVF with dextrose  - address the N/V/D

## 2024-02-08 NOTE — TELEPHONE ENCOUNTER
I spoke to the pt. She has relocated to [place] and is living with her mother.   She has already seen her new therapist today in [place], [place] and will see new psychiatrist and PCP next week (she does not know the names of the providers).  I gave her our fax # and advised that she can sign a NORMA and fax to us so that we can forward her records.  Pt verbalized an understanding.  She states it is cold and she is still adjusting to the relocation.

## 2024-02-08 NOTE — TELEPHONE ENCOUNTER
----- Message from Sakina Cruz MD sent at 1/30/2024  5:41 PM CST -----  Regarding: transition of care
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
## 2024-10-09 DIAGNOSIS — N18.30 CKD (CHRONIC KIDNEY DISEASE) STAGE 3, GFR 30-59 ML/MIN: ICD-10-CM

## 2024-11-22 DIAGNOSIS — N18.30 CKD (CHRONIC KIDNEY DISEASE) STAGE 3, GFR 30-59 ML/MIN: ICD-10-CM

## 2025-01-17 NOTE — HOSPITAL COURSE
Patient was admitted to the hospital with dehydration and acute kidney injury secondary to uncontrolled nausea and vomiting from likely viral gastroenteritis.  He has multiple chronic medical problems.  The patient was given aggressive IV hydration, and control of her nausea and vomiting on diarrhea by antiemetics and antidiarrheals.  Well over the course of her hospitalization, was tolerating clear liquids well at time of discharge.  Her nausea and diarrhea were controlled with oral medications.  He was discharged home with follow-up with primary care and outpatient case management.   [de-identified] : A discussion regarding available pain management treatment options occurred with the patient. These included interventional, rehabilitative, pharmacological, and alternative modalities. We will proceed with the following:   Interventional/ Procedures: 1. None indicated at this time.   Rehabilitative/alternative modalities: 1. Initiate physician directed activity and lifestyle modifications. 2. Participation in active HEP was discussed and printed. 3. Patient was advised to stay away from any heavy lifting. If needed, she was advised to squat and not bend forward.    Total clinician time spent today on the patient is 20 minutes including preparing to see the patient, obtaining and/or reviewing and confirming history, performing medically necessary and appropriate examination, counseling and educating the patient and/or family, documenting clinical information in the EHR and communicating and/or referring to other healthcare professionals.  F/u as needed   BRUCE Mack, DO

## 2025-02-03 ENCOUNTER — PATIENT OUTREACH (OUTPATIENT)
Dept: ADMINISTRATIVE | Facility: HOSPITAL | Age: 45
End: 2025-02-03
Payer: MEDICARE

## 2025-02-03 NOTE — PROGRESS NOTES
Population Health Chart Review & Patient Outreach Details    Outreach Performed: YES Portal Active and/or Letter inactive    Additional [facility] Health Notes:    BREAST CANCER SCREENING    Non-compliant report chart audits for BREAST CANCER SCREENING     Outreach to patient in reference to SCHEDULING A MAMMOGRAM EXAM.            Updates Requested / Reviewed:               Health Maintenance Topics Overdue:      VBHM Score: 1     Mammogram

## 2025-08-20 ENCOUNTER — PATIENT MESSAGE (OUTPATIENT)
Dept: ADMINISTRATIVE | Facility: HOSPITAL | Age: 45
End: 2025-08-20
Payer: MEDICARE